# Patient Record
Sex: FEMALE | Race: BLACK OR AFRICAN AMERICAN | Employment: OTHER | URBAN - METROPOLITAN AREA
[De-identification: names, ages, dates, MRNs, and addresses within clinical notes are randomized per-mention and may not be internally consistent; named-entity substitution may affect disease eponyms.]

---

## 2017-01-23 RX ORDER — FUROSEMIDE 80 MG/1
TABLET ORAL
Qty: 45 TAB | Refills: 11 | Status: SHIPPED | OUTPATIENT
Start: 2017-01-23 | End: 2017-11-21 | Stop reason: SDUPTHER

## 2017-01-31 DIAGNOSIS — F41.9 CHRONIC ANXIETY: ICD-10-CM

## 2017-01-31 RX ORDER — CLONAZEPAM 1 MG/1
TABLET ORAL
Qty: 60 TAB | Refills: 3 | OUTPATIENT
Start: 2017-01-31 | End: 2017-02-02 | Stop reason: SDUPTHER

## 2017-02-02 DIAGNOSIS — F41.9 CHRONIC ANXIETY: ICD-10-CM

## 2017-02-02 RX ORDER — CLONAZEPAM 1 MG/1
TABLET ORAL
Qty: 60 TAB | Refills: 3 | OUTPATIENT
Start: 2017-02-02 | End: 2017-03-28

## 2017-02-13 ENCOUNTER — HOSPITAL ENCOUNTER (OUTPATIENT)
Dept: LAB | Age: 82
Discharge: HOME | End: 2017-02-13
Payer: MEDICARE

## 2017-02-13 ENCOUNTER — OFFICE VISIT (OUTPATIENT)
Dept: FAMILY MEDICINE CLINIC | Age: 82
End: 2017-02-13

## 2017-02-13 VITALS
SYSTOLIC BLOOD PRESSURE: 125 MMHG | OXYGEN SATURATION: 95 % | RESPIRATION RATE: 16 BRPM | HEIGHT: 60 IN | WEIGHT: 193.8 LBS | TEMPERATURE: 96.6 F | HEART RATE: 66 BPM | DIASTOLIC BLOOD PRESSURE: 86 MMHG | BODY MASS INDEX: 38.05 KG/M2

## 2017-02-13 DIAGNOSIS — I10 ESSENTIAL HYPERTENSION: Primary | Chronic | ICD-10-CM

## 2017-02-13 DIAGNOSIS — K21.9 GASTROESOPHAGEAL REFLUX DISEASE WITHOUT ESOPHAGITIS: Chronic | ICD-10-CM

## 2017-02-13 DIAGNOSIS — M15.9 PRIMARY OSTEOARTHRITIS INVOLVING MULTIPLE JOINTS: ICD-10-CM

## 2017-02-13 DIAGNOSIS — F41.9 ANXIETY: Chronic | ICD-10-CM

## 2017-02-13 DIAGNOSIS — J06.9 UPPER RESPIRATORY TRACT INFECTION, UNSPECIFIED TYPE: ICD-10-CM

## 2017-02-13 DIAGNOSIS — I25.10 ATHEROSCLEROSIS OF NATIVE CORONARY ARTERY OF NATIVE HEART WITHOUT ANGINA PECTORIS: ICD-10-CM

## 2017-02-13 DIAGNOSIS — I50.32 CHRONIC DIASTOLIC HEART FAILURE (HCC): ICD-10-CM

## 2017-02-13 DIAGNOSIS — Z51.81 ENCOUNTER FOR MEDICATION MONITORING: ICD-10-CM

## 2017-02-13 DIAGNOSIS — M47.815 SPONDYLOSIS OF THORACOLUMBAR REGION WITHOUT MYELOPATHY OR RADICULOPATHY: ICD-10-CM

## 2017-02-13 DIAGNOSIS — E78.2 MIXED HYPERLIPIDEMIA: ICD-10-CM

## 2017-02-13 PROCEDURE — 80048 BASIC METABOLIC PNL TOTAL CA: CPT

## 2017-02-13 PROCEDURE — 36415 COLL VENOUS BLD VENIPUNCTURE: CPT

## 2017-02-13 PROCEDURE — 80061 LIPID PANEL: CPT

## 2017-02-13 RX ORDER — DICLOFENAC SODIUM 75 MG/1
75 TABLET, DELAYED RELEASE ORAL
Qty: 30 TAB | Refills: 1 | Status: SHIPPED | OUTPATIENT
Start: 2017-02-13 | End: 2017-04-28

## 2017-02-13 NOTE — PROGRESS NOTES
Chief Complaint   Patient presents with    Hypertension     follow up    Cholesterol Problem     follow up    Nasal Congestion    Knee Pain     right knee     Pt states she was seen at [facility] 12/26/2016 and ws told she had a sinus infection and given prescription for antibiotic but only took for a couple of days because she was feeling better. Pt still c/o nasal congestion.      CMP 12/26/2016    Lipid 11/7/2016    Mammogram 7/16/2015

## 2017-02-13 NOTE — PROGRESS NOTES
HISTORY OF PRESENT ILLNESS  Eugene Kay is a 80 y.o. female. HPI   Follow up on chronic medical problems. C/o nasal congestion and cough for the past few weeks. Was treated at the end of December thru ER for sinus infection with Augmentin but never completed rx. Congestion got worse again after stopping the medication. days. Coughing up phlegm at times. No fever or chills noted. Has malaise. Throat is scratchy. Has post nasal drainage. No chest pain or SOB. No wheezing noted. Cardiovascular Review:  The patient has hypertension, hyperlipidemia, Chronic diastolic heart failure, ASCHD and obesity. Diet and Lifestyle: generally follows a low fat low cholesterol diet, generally follows a low sodium diet, sedentary. Pertinent ROS: taking medications as instructed, no medication side effects noted, no TIA's, no chest pain on exertion, no dyspnea on exertion, no swelling of ankles, no orthostatic dizziness or lightheadedness, no palpitations, no muscle aches or pain. Home BP Monitoring: is not measured at home. Pertinent ROS: taking medications as instructed  Osteoarthritis:  Patient has osteoarthritis. Overall doing better with back pain. Pain waxes and wanes. C/o b/l knee pain for the past several weeks. Knee pain comes and goes. No injury noted. Also having more pain in the left shoulder d/t her arthritis. Has not taken anything for the pain. Pain is 5-6/10. Symptoms onset: problem is longstanding. Rheumatological ROS: stable, mild-to-moderate joint symptoms intermittently, reasonably well controlled by PRN meds. Response to treatment plan: stable and intermittent. Anxiety Review:  Patient is seen for anxiety. Increase stress with family issues. Ongoing symptoms include:anxiety overall is better. Patient denies: palpitations, sweating, chest pain, shortness of breath. Reported side effects from the treatment: none.     Patient Active Problem List   Diagnosis Code  Hypokalemia E87.6    Hiatal hernia K44.9    Anxiety F41.9    S/P hysterectomy Z90.710    Spinal stenosis M48.00    S/P foot surgery Z98.890    Environmental allergies Z91.09    S/P cardiac catheterization Z98.890    Hypovitaminosis D E55.9    Chronic ischemic heart disease I25.9    Mixed hyperlipidemia E78.2    Chronic diastolic heart failure (HCC) I50.32    Acute respiratory failure (HCC) J96.00    Pneumonia, organism unspecified J18.9    Chest pain at rest R07.9    RBBB I45.10    Chest pain, unspecified R07.9    Essential hypertension I10    Gastroesophageal reflux disease without esophagitis K21.9    Atherosclerosis of native coronary artery without angina pectoris I25.10    Chronic anxiety F41.9    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 Take with food  Indications: OSTEOARTHRITIS 30 Tab 1    clonazePAM (KLONOPIN) 1 mg tablet TAKE 1/2 to 1 TABLET BY MOUTH IN THE MORNING as needed for anxiety AND TAKE 1 TABLET AT BEDTIME for sleep. 60 Tab 3    furosemide (LASIX) 80 mg tablet TAKE 1 TABLET BY MOUTH EVERY MORNING AND TAKE 1/2 TABLET EVERY EVENING 45 Tab 11    guaiFENesin ER (MUCINEX) 600 mg ER tablet Take 1 Tab by mouth two (2) times daily as needed for Congestion. 20 Tab 0    fluticasone (FLONASE) 50 mcg/actuation nasal spray 2 Sprays by Both Nostrils route daily. 1 Bottle 0    pravastatin (PRAVACHOL) 40 mg tablet Take 1 Tab by mouth nightly.  30 Tab 11    amLODIPine (NORVASC) 10 mg tablet TAKE 1/2 (ONE-HALF) TABLET BY MOUTH TWICE PER DAY 30 Tab 6    losartan (COZAAR) 100 mg tablet TAKE 1 TABLET BY MOUTH EVERY DAY 30 Tab 5    hydrocortisone (HYTONE) 2.5 % ointment Apply  to affected area two (2) times daily as needed.  triamcinolone acetonide (KENALOG) 0.1 % topical cream Apply  to affected area two (2) times daily as needed for Skin Irritation or Itching. 30 g 0    nitroglycerin (NITROSTAT) 0.4 mg SL tablet TAKE 1 TAB BY SUBLINGUAL ROUTE EVERY 5 MINUTES AS NEEDED. 25 Tab 1    omeprazole (PRILOSEC) 20 mg capsule TAKE ONE CAPSULE BY MOUTH EVERY MORNING 30 Cap 11    potassium chloride SR (KLOR-CON 10) 10 mEq tablet TAKE 2 TABLETS BY MOUTH EVERY DAY 60 Tab 11    metoprolol (LOPRESSOR) 25 mg tablet TAKE 1 TABLET BY MOUTH TWICE A DAY 60 Tab 11    isosorbide mononitrate ER (IMDUR) 30 mg tablet TAKE 1 TABLET EVERY DAY 30 Tab 11    cholecalciferol, vitamin D3, (VITAMIN D3) 2,000 unit tab Take 1 Tab by mouth daily.  desonide (DESOWEN) 0.05 % topical ointment Apply  to affected area two (2) times daily as needed for Skin Irritation.  econazole nitrate (SPECTAZOLE) 1 % topical cream Apply  to affected area two (2) times daily as needed. 15 g 0    acetaminophen (TYLENOL EXTRA STRENGTH) 500 mg tablet Take 1,000 mg by mouth every six (6) hours as needed for Pain.  Aspirin, Buffered 81 mg tab Take 81 mg by mouth daily.          Allergies   Allergen Reactions    Bactrim [Sulfamethoxazole-Trimethoprim] Unknown (comments)     Pt does not recall    Darvocet A500 [Propoxyphene N-Acetaminophen] Itching       Past Medical History   Diagnosis Date    Anxiety     Aortic stenosis 3/3/2010    Aortic stenosis     Arrhythmia      MURMUR    Arthritis      SPINAL STENOSIS    Atherosclerosis of coronary artery     CHF (congestive heart failure) ([facility] [address] 3/3/2010    CHF (congestive heart failure) ([facility] [address] 01/2002    Chronic heart failure ([facility] [address] 1/2002; 3/3/2010     chronic diastolic heart failure    Chronic pain      LOWER BACK, RIGHT KNEE    Environmental allergies 3/3/2010    GERD (gastroesophageal reflux disease) 3/3/2010    Hiatal hernia 3/3/2010    High cholesterol 3/3/2010    HTN (hypertension) 3/3/2010    Hypokalemia 3/3/2010    Ischemic heart disease, chronic     Obese     Psychiatric disorder      anxiety    S/P hysterectomy 3/3/2010    Spinal stenosis 3/3/2010       Past Surgical History   Procedure Laterality Date    Hx bunionectomy      Cardiac catheterization  01/2002     normal coronaries    Decompress disc rf lumbar  07/2007    Pr colonoscopy flx dx w/collj spec when pfrmd  [id number]     Dr Rigo Skinner Hx orthopaedic Bilateral      BUNIONECTOMY    Hx orthopaedic Right      WRIST FX    Hx bunionectomy      Hx heart catheterization  3/3/10    Hx hysterectomy  1978    Hx lumbar diskectomy      Hx breast lumpectomy Left 1989     benign left breast    Hx back surgery  5/1/2014    Hx other surgical  10/12/2015     mole removed from right side of face by Dr. Alesia Mckeon History   Problem Relation Age of Onset    Hypertension Mother     Heart Disease Father     Stroke Sister     Heart Disease Sister     Cancer Brother      LUNG    Cancer Brother      PROSTATE    Stroke Daughter 47    Anesth Problems Neg Hx        Social History   Substance Use Topics    Smoking status: Never Smoker    Smokeless tobacco: Never Used    Alcohol use No        Lab Results  Component Value Date/Time   WBC 5.0 12/26/2016 02:25 PM   WBC 5.2 05/07/2012 01:27 PM   HGB 12.8 12/26/2016 02:25 PM   HCT 38.9 12/26/2016 02:25 PM   PLATELET 994 46/34/5567 02:25 PM   MCV 88.0 12/26/2016 02:25 PM       Lab Results  Component Value Date/Time   Cholesterol, total 225 11/07/2016 12:21 PM   HDL Cholesterol 54 11/07/2016 12:21 PM   LDL, calculated 140 11/07/2016 12:21 PM   Triglyceride 155 11/07/2016 12:21 PM   CHOL/HDL Ratio 3.1 11/01/2010 05:26 PM       Lab Results   Component Value Date/Time    Sodium 138 12/26/2016 02:25 PM    Potassium 3.2 12/26/2016 02:25 PM    Chloride 101 12/26/2016 02:25 PM    CO2 32 12/26/2016 02:25 PM    Anion gap 5 12/26/2016 02:25 PM    Glucose 96 12/26/2016 02:25 PM    BUN 13 12/26/2016 02:25 PM    Creatinine 1.01 12/26/2016 02:25 PM    BUN/Creatinine ratio 13 12/26/2016 02:25 PM    GFR est AA >60 12/26/2016 02:25 PM    GFR est non-AA 53 12/26/2016 02:25 PM    Calcium 8.8 12/26/2016 02:25 PM    Bilirubin, total 0.5 12/26/2016 02:25 PM    ALT (SGPT) 18 12/26/2016 02:25 PM    AST (SGOT) 17 12/26/2016 02:25 PM    Alk. phosphatase 91 12/26/2016 02:25 PM    Protein, total 7.6 12/26/2016 02:25 PM    Albumin 3.3 12/26/2016 02:25 PM    Globulin 4.3 12/26/2016 02:25 PM    A-G Ratio 0.8 12/26/2016 02:25 PM      Lab Results   Component Value Date/Time    Hemoglobin A1c 5.4 04/16/2014 12:20 PM    Hemoglobin A1c 6.1 05/07/2012 01:27 PM    Hemoglobin A1c (POC) 5.5 11/26/2014 12:12 PM         Review of Systems   HENT: Positive for congestion. Respiratory: Positive for cough and sputum production. Musculoskeletal: Positive for back pain and joint pain. Endo/Heme/Allergies: Positive for environmental allergies. Physical Exam   Constitutional: She appears well-developed and well-nourished. /86 (BP 1 Location: Left arm, BP Patient Position: Sitting)  Pulse 66  Temp 96.6 °F (35.9 °C) (Oral)   Resp 16  Ht 5' (1.524 m)  Wt 193 lb 12.8 oz (87.9 kg)  LMP  (LMP Unknown)  SpO2 95%  BMI 37.85 kg/m2     HENT:   Right Ear: Tympanic membrane and ear canal normal.   Left Ear: Tympanic membrane and ear canal normal.   Nose: Mucosal edema and rhinorrhea present. Mouth/Throat: Mucous membranes are normal. Posterior oropharyngeal erythema present. No oropharyngeal exudate. Neck: Trachea normal, normal range of motion and full passive range of motion without pain. Neck supple. No edema present. No thyromegaly present. Cardiovascular: Normal rate and regular rhythm. No murmur heard. Pulmonary/Chest: Effort normal and breath sounds normal. She has no wheezes. She has no rales. Abdominal: Soft. Normal appearance and bowel sounds are normal. There is no tenderness.    Musculoskeletal: Normal range of motion. She exhibits no edema. Left shoulder: She exhibits tenderness, bony tenderness and pain. She exhibits normal range of motion, no spasm, normal pulse and normal strength. Right knee: She exhibits normal range of motion, no swelling and no effusion. Tenderness found. Medial joint line and lateral joint line tenderness noted. Left knee: She exhibits normal range of motion, no swelling and no effusion. Tenderness found. Medial joint line and lateral joint line tenderness noted. Lymphadenopathy:     She has no cervical adenopathy. Skin: Skin is warm and dry. Psychiatric: She has a normal mood and affect. Nursing note and vitals reviewed. ASSESSMENT and Sakshi Maddox was seen today for hypertension, cholesterol problem, nasal congestion and knee pain. Diagnoses and all orders for this visit:    Essential hypertension  Stable   -     METABOLIC PANEL, BASIC    Mixed hyperlipidemia  -     LIPID PANEL    Chronic diastolic heart failure (HCC)//  Atherosclerosis of native coronary artery of native heart without angina pectoris  aortic stenosis    Anxiety  Stable     Gastroesophageal reflux disease without esophagitis  Stable on prilosec. 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 Take with food  Indications: OSTEOARTHRITIS  Discussed GI side effects and to watch for increased with swelling. Instructions for exercises given and reviewed with pt. Pt also to use heat to the area 3-4 times a day over the next several days until sx are improved. Upper Respiratory Tract Infection  Has 1 week of the Augmentin left so can resume this and add mucinex.       Encounter for medication monitoring      Follow-up Disposition: 1 month  reviewed diet, exercise and weight control  cardiovascular risk and specific lipid/LDL goals reviewed  reviewed medications and side effects in detail  specific diabetic recommendations: low cholesterol diet, weight control and daily exercise discussed and glycohemoglobin and other lab monitoring discussed     I have discussed diagnosis listed in this note with pt and/or family. I have discussed treatment plans and options and the risk/benefit analysis of those options, including safe use of medications and possible medication side effects. Through the use of shared decision making we have agreed to the above plan. The patient has received an after-visit summary and questions were answered concerning future plans and follow up. Advise pt of any urgent changes then to proceed to the ER.

## 2017-02-13 NOTE — MR AVS SNAPSHOT
Visit Information Date & Time Provider Department Dept. Phone Encounter #  
 2/13/2017 10:45 AM Santa Benavides MD [facility] [phone] [id number] Your Appointments 2/22/2017  9:30 AM  
ESTABLISHED PATIENT with MD L[facility]) Appt Note: 6 MO. F/U  
 [address]  
[phone] [address]  
  
    
  
 3/28/2017 11:15 AM  
TRANSITIONAL CARE MANAGEMENT with Santa Benavides MD  
[facility] Appt Note: Medicare Wellness per Dr. Guille Pleitez [address] [id number] [phone] [address] [place] [address] [address] Upcoming Health Maintenance Date Due OSTEOPOROSIS SCREENING (DEXA) 7/31/2002 GLAUCOMA SCREENING Q2Y 6/15/2015 MEDICARE YEARLY EXAM 10/13/2016 COLONOSCOPY 10/13/2020 DTaP/Tdap/Td series (2 - Td) 7/25/2026 Allergies as of 2/13/2017  Review Complete On: 2/13/2017 By: Shadia Self LPN Severity Noted Reaction Type Reactions Bactrim [Sulfamethoxazole-trimethoprim]  03/29/2010    Unknown (comments) Pt does not recall Darvocet A500 [Propoxyphene N-acetaminophen]  11/01/2010    Itching Current Immunizations  Reviewed on 2/13/2017 Name Date Influenza High Dose Vaccine PF 11/7/2016, 10/13/2015, 11/26/2014 Influenza Vaccine 12/31/2013 Influenza Vaccine Split 10/9/2012, 11/1/2011, 11/1/2010 Pneumococcal Vaccine (Unspecified Type) 1/1/2008 Reviewed by Santa Benavides MD on 2/13/2017 at 11:55 AM  
You Were Diagnosed With   
  
 Codes Comments Essential hypertension    -  Primary ICD-10-CM: I10 
ICD-9-CM: 401.9 Mixed hyperlipidemia     ICD-10-CM: E78.2 ICD-9-CM: 272.2 Primary osteoarthritis involving multiple joints     ICD-10-CM: M15.0 ICD-9-CM: 715.09 Vitals BP Pulse Temp Resp Height(growth percentile) Weight(growth percentile) 125/86 (BP 1 Location: Left arm, BP Patient Position: Sitting) 66 96.6 °F (35.9 °C) (Oral) 16 5' (1.524 m) 193 lb 12.8 oz (87.9 kg) LMP SpO2 BMI OB Status Smoking Status (LMP Unknown) 95% 37.85 kg/m2 Hysterectomy Never Smoker Vitals History BMI and BSA Data Body Mass Index Body Surface Area  
 37.85 kg/m 2 1.93 m 2 Preferred Pharmacy Pharmacy Name Phone [id number]- [place], [place] - [address] [phone] Your Updated Medication List  
  
   
This list is accurate as of: 2/13/17 12:08 PM.  Always use your most recent med list. amLODIPine 10 mg tablet Commonly known as:  David Ruelas TAKE 1/2 (ONE-HALF) TABLET BY MOUTH TWICE PER DAY  
  
 aspirin, buffered 81 mg Tab Take 81 mg by mouth daily. clonazePAM 1 mg tablet Commonly known as:  Shoshana Adele 1/2 to 1 TABLET BY MOUTH IN THE MORNING as needed for anxiety AND TAKE 1 TABLET AT BEDTIME for sleep. desonide 0.05 % topical ointment Commonly known as:  [place] Apply  to affected area two (2) times daily as needed for Skin Irritation. diclofenac EC 75 mg EC tablet Commonly known as:  VOLTAREN Take 1 Tab by mouth two (2) times daily as needed. Take with food  Indications: OSTEOARTHRITIS  
  
 econazole nitrate 1 % topical cream  
Commonly known as:  Pankaj Alcala Apply  to affected area two (2) times daily as needed. fluticasone 50 mcg/actuation nasal spray Commonly known as:  [place] 2 Sprays by Both Nostrils route daily. furosemide 80 mg tablet Commonly known as:  LASIX TAKE 1 TABLET BY MOUTH EVERY MORNING AND TAKE 1/2 TABLET EVERY EVENING  
  
 guaiFENesin  mg ER tablet Commonly known as:  Miguelito & Miguelito Take 1 Tab by mouth two (2) times daily as needed for Congestion. hydrocortisone 2.5 % ointment Commonly known as:  HYTONE Apply  to affected area two (2) times daily as needed. isosorbide mononitrate ER 30 mg tablet Commonly known as:  IMDUR  
TAKE 1 TABLET EVERY DAY  
  
 losartan 100 mg tablet Commonly known as:  COZAAR  
TAKE 1 TABLET BY MOUTH EVERY DAY  
  
 metoprolol tartrate 25 mg tablet Commonly known as:  LOPRESSOR  
TAKE 1 TABLET BY MOUTH TWICE A DAY  
  
 nitroglycerin 0.4 mg SL tablet Commonly known as:  NITROSTAT  
TAKE 1 TAB BY SUBLINGUAL ROUTE EVERY 5 MINUTES AS NEEDED. omeprazole 20 mg capsule Commonly known as:  PRILOSEC  
TAKE ONE CAPSULE BY MOUTH EVERY MORNING  
  
 potassium chloride SR 10 mEq tablet Commonly known as:  KLOR-CON 10  
TAKE 2 TABLETS BY MOUTH EVERY DAY  
  
 pravastatin 40 mg tablet Commonly known as:  PRAVACHOL Take 1 Tab by mouth nightly. triamcinolone acetonide 0.1 % topical cream  
Commonly known as:  KENALOG Apply  to affected area two (2) times daily as needed for Skin Irritation or Itching. TYLENOL EXTRA STRENGTH 500 mg tablet Generic drug:  acetaminophen Take 1,000 mg by mouth every six (6) hours as needed for Pain. VITAMIN D3 2,000 unit Tab Generic drug:  cholecalciferol (vitamin D3) Take 1 Tab by mouth daily. Prescriptions Sent to Pharmacy Refills  
 diclofenac EC (VOLTAREN) 75 mg EC tablet 1 Sig: Take 1 Tab by mouth two (2) times daily as needed. Take with food  Indications: OSTEOARTHRITIS Class: Normal  
 Pharmacy: [facility]/pharmacy [address] [place] [address] [address] Ph #: [phone] Route: Oral  
  
We Performed the Following LIPID PANEL [42047 CPT(R)] METABOLIC PANEL, BASIC [72685 CPT(R)] To-Do List   
 03/13/2017 9:50 AM  
  Appointment with [facility] at [facility] ([phone]) Shower or bathe using soap and water. Do not use deodorant, powder, perfumes, or lotion the day of your exam.  If your prior mammograms were not performed at [facility] 6 please bring films with you or forward prior images 2 days before your procedure. Check in at registration 15min before your appointment time unless you were instructed to do otherwise. A script is not necessary, but if you have one, please bring it on the day of the mammogram or have it faxed to the department. [facility] [phone] [facility]  [phone] [facility] [address] [place]  [phone]  [phone] [address] [phone] [facility]! Dear Harmony Dunn: 
Thank you for requesting a [employer] account. Our records indicate that you already have an active [employer] account. You can access your account anytime at [email or web address] Did you know that you can access your hospital and ER discharge instructions at any time in [employer]? You can also review all of your test results from your hospital stay or ER visit. Additional Information If you have questions, please visit the Frequently Asked Questions section of the [employer] website at [email or web address] Remember, [employer] is NOT to be used for urgent needs. For medical emergencies, dial 911. Now available from your iPhone and Android! Please provide this summary of care documentation to your next provider. Your primary care clinician is listed as Thai Piper. If you have any questions after today's visit, please call [phone].

## 2017-02-14 LAB
BUN SERPL-MCNC: 10 MG/DL (ref 8–27)
BUN/CREAT SERPL: 11 (ref 11–26)
CALCIUM SERPL-MCNC: 9.2 MG/DL (ref 8.7–10.3)
CHLORIDE SERPL-SCNC: 99 MMOL/L (ref 96–106)
CHOLEST SERPL-MCNC: 194 MG/DL (ref 100–199)
CO2 SERPL-SCNC: 31 MMOL/L (ref 18–29)
CREAT SERPL-MCNC: 0.95 MG/DL (ref 0.57–1)
GLUCOSE SERPL-MCNC: 94 MG/DL (ref 65–99)
HDLC SERPL-MCNC: 50 MG/DL
INTERPRETATION, 910389: NORMAL
INTERPRETATION: NORMAL
LDLC SERPL CALC-MCNC: 109 MG/DL (ref 0–99)
PDF IMAGE, 910387: NORMAL
POTASSIUM SERPL-SCNC: 3.7 MMOL/L (ref 3.5–5.2)
SODIUM SERPL-SCNC: 143 MMOL/L (ref 134–144)
TRIGL SERPL-MCNC: 177 MG/DL (ref 0–149)
VLDLC SERPL CALC-MCNC: 35 MG/DL (ref 5–40)

## 2017-02-16 ENCOUNTER — TELEPHONE (OUTPATIENT)
Dept: FAMILY MEDICINE CLINIC | Age: 82
End: 2017-02-16

## 2017-02-16 NOTE — TELEPHONE ENCOUNTER
Kar Casillas calling from [facility] [phone], he needs to knee brace form faxed over to [phone].

## 2017-02-22 ENCOUNTER — OFFICE VISIT (OUTPATIENT)
Dept: CARDIOLOGY CLINIC | Age: 82
End: 2017-02-22

## 2017-02-22 VITALS
SYSTOLIC BLOOD PRESSURE: 172 MMHG | OXYGEN SATURATION: 93 % | HEART RATE: 65 BPM | DIASTOLIC BLOOD PRESSURE: 88 MMHG | WEIGHT: 192.8 LBS | BODY MASS INDEX: 37.85 KG/M2 | HEIGHT: 60 IN

## 2017-02-22 DIAGNOSIS — I25.9 CHRONIC ISCHEMIC HEART DISEASE: ICD-10-CM

## 2017-02-22 DIAGNOSIS — E78.2 MIXED HYPERLIPIDEMIA: ICD-10-CM

## 2017-02-22 DIAGNOSIS — F41.9 CHRONIC ANXIETY: ICD-10-CM

## 2017-02-22 DIAGNOSIS — G47.20 CIRCADIAN RHYTHM SLEEP DISORDER: ICD-10-CM

## 2017-02-22 DIAGNOSIS — I50.32 CHRONIC DIASTOLIC HEART FAILURE (HCC): ICD-10-CM

## 2017-02-22 DIAGNOSIS — R07.9 CHEST PAIN AT REST: ICD-10-CM

## 2017-02-22 DIAGNOSIS — M48.00 SPINAL STENOSIS, UNSPECIFIED SPINAL REGION: ICD-10-CM

## 2017-02-22 DIAGNOSIS — I10 ESSENTIAL HYPERTENSION: Primary | ICD-10-CM

## 2017-02-22 NOTE — MR AVS SNAPSHOT
Visit Information Date & Time Provider Department Dept. Phone Encounter #  
 2/22/2017  9:30 AM MD Lirbado [facility] [phone] [id number] Your Appointments 8/23/2017  9:30 AM  
ESTABLISHED PATIENT with MD Librado Garcia [facility]) Appt Note: 6 MO. F/U  
 [address]  
[phone] [address]  
  
    
  
 3/28/2017 11:15 AM  
TRANSITIONAL CARE MANAGEMENT with Clarke Flores MD  
[facility] [address]) Appt Note: Medicare Wellness per Dr. Mcallister [address] [place] [address] Drive [facility] [id number] [phone] [address] [facility] [address] Upcoming Health Maintenance Date Due OSTEOPOROSIS SCREENING (DEXA) 7/31/2002 GLAUCOMA SCREENING Q2Y 6/15/2015 MEDICARE YEARLY EXAM 10/13/2016 COLONOSCOPY 10/13/2020 DTaP/Tdap/Td series (2 - Td) 7/25/2026 Allergies as of 2/22/2017  Review Complete On: 2/22/2017 By: Otto Grigsby Severity Noted Reaction Type Reactions Bactrim [Sulfamethoxazole-trimethoprim]  03/29/2010    Unknown (comments) Pt does not recall Darvocet A500 [Propoxyphene N-acetaminophen]  11/01/2010    Itching Current Immunizations  Reviewed on 2/13/2017 Name Date Influenza High Dose Vaccine PF 11/7/2016, 10/13/2015, 11/26/2014 Influenza Vaccine 12/31/2013 Influenza Vaccine Split 10/9/2012, 11/1/2011, 11/1/2010 Pneumococcal Vaccine (Unspecified Type) 1/1/2008 Not reviewed this visit You Were Diagnosed With   
  
 Codes Comments Essential hypertension    -  Primary ICD-10-CM: I10 
ICD-9-CM: 401.9 Chronic diastolic heart failure (HCC)     ICD-10-CM: I50.32 
ICD-9-CM: 428.32 Chest pain, unspecified     ICD-10-CM: R07.9 ICD-9-CM: 786.50 Chest pain at rest     ICD-10-CM: R07.9 ICD-9-CM: 786.50 Vitals BP  
  
  
  
  
  
 172/88 Vitals History BMI and BSA Data Body Mass Index Body Surface Area  
 37.65 kg/m 2 1.92 m 2 Preferred Pharmacy Pharmacy Name Phone [id number] [place], [place] - [address] [phone] Your Updated Medication List  
  
   
This list is accurate as of: 2/22/17 11:43 AM.  Always use your most recent med list. amLODIPine 10 mg tablet Commonly known as:  Mckinley Smith TAKE 1/2 (ONE-HALF) TABLET BY MOUTH TWICE PER DAY  
  
 aspirin, buffered 81 mg Tab Take 81 mg by mouth daily. clonazePAM 1 mg tablet Commonly known as:  [place] 1/2 to 1 TABLET BY MOUTH IN THE MORNING as needed for anxiety AND TAKE 1 TABLET AT BEDTIME for sleep. desonide 0.05 % topical ointment Commonly known as:  Filomena Gaxiola Apply  to affected area two (2) times daily as needed for Skin Irritation. diclofenac EC 75 mg EC tablet Commonly known as:  VOLTAREN Take 1 Tab by mouth two (2) times daily as needed. Take with food  Indications: OSTEOARTHRITIS  
  
 econazole nitrate 1 % topical cream  
Commonly known as:  Sean House Apply  to affected area two (2) times daily as needed. fluticasone 50 mcg/actuation nasal spray Commonly known as:  Silvamanisha Simpson 2 Sprays by Both Nostrils route daily. furosemide 80 mg tablet Commonly known as:  LASIX TAKE 1 TABLET BY MOUTH EVERY MORNING AND TAKE 1/2 TABLET EVERY EVENING  
  
 guaiFENesin  mg ER tablet Commonly known as:  Miguelito & Miguelito Take 1 Tab by mouth two (2) times daily as needed for Congestion. hydrocortisone 2.5 % ointment Commonly known as:  HYTONE Apply  to affected area two (2) times daily as needed. isosorbide mononitrate ER 30 mg tablet Commonly known as:  IMDUR  
TAKE 1 TABLET EVERY DAY  
  
 losartan 100 mg tablet Commonly known as:  COZAAR  
TAKE 1 TABLET BY MOUTH EVERY DAY  
  
 metoprolol tartrate 25 mg tablet Commonly known as:  LOPRESSOR  
TAKE 1 TABLET BY MOUTH TWICE A DAY  
  
 nitroglycerin 0.4 mg SL tablet Commonly known as:  NITROSTAT  
TAKE 1 TAB BY SUBLINGUAL ROUTE EVERY 5 MINUTES AS NEEDED. omeprazole 20 mg capsule Commonly known as:  PRILOSEC  
TAKE ONE CAPSULE BY MOUTH EVERY MORNING  
  
 potassium chloride SR 10 mEq tablet Commonly known as:  KLOR-CON 10  
TAKE 2 TABLETS BY MOUTH EVERY DAY  
  
 pravastatin 40 mg tablet Commonly known as:  PRAVACHOL Take 1 Tab by mouth nightly. triamcinolone acetonide 0.1 % topical cream  
Commonly known as:  KENALOG Apply  to affected area two (2) times daily as needed for Skin Irritation or Itching. TYLENOL EXTRA STRENGTH 500 mg tablet Generic drug:  acetaminophen Take 1,000 mg by mouth every six (6) hours as needed for Pain. VITAMIN D3 2,000 unit Tab Generic drug:  cholecalciferol (vitamin D3) Take 1 Tab by mouth daily. We Performed the Following AMB POC EKG ROUTINE W/ 12 LEADS, INTER & REP [93028 CPT(R)] To-Do List   
 03/13/2017 9:50 AM  
  Appointment with [facility] 1 at [facility] ([phone]) Shower or bathe using soap and water. Do not use deodorant, powder, perfumes, or lotion the day of your exam.  If your prior mammograms were not performed at [facility] 6 please bring films with you or forward prior images 2 days before your procedure. Check in at registration 15min before your appointment time unless you were instructed to do otherwise. A script is not necessary, but if you have one, please bring it on the day of the mammogram or have it faxed to the department. [facility] [phone] [facility]  [phone] [place] [address] MG  [phone] [facility] [phone] [place] [id number] [facility] [phone] Patient Instructions -- Follow up 6 months Heart-Healthy Diet: Care Instructions Your Care Instructions A heart-healthy diet has lots of vegetables, fruits, nuts, beans, and whole grains, and is low in salt. It limits foods that are high in saturated fat, such as meats, cheeses, and fried foods. It may be hard to change your diet, but even small changes can lower your risk of heart attack and heart disease. Follow-up care is a key part of your treatment and safety. Be sure to make and go to all appointments, and call your doctor if you are having problems. It's also a good idea to know your test results and keep a list of the medicines you take. How can you care for yourself at home? Watch your portions · Learn what a serving is. A \"serving\" and a \"portion\" are not always the same thing. Make sure that you are not eating larger portions than are recommended. For example, a serving of pasta is ½ cup. A serving size of meat is 2 to 3 ounces. A 3-ounce serving is about the size of a deck of cards. Measure serving sizes until you are good at [place]" them. Keep in mind that restaurants often serve portions that are 2 or 3 times the size of one serving. · To keep your energy level up and keep you from feeling hungry, eat often but in smaller portions. · Eat only the number of calories you need to stay at a healthy weight. If you need to lose weight, eat fewer calories than your body burns (through exercise and other physical activity). Eat more fruits and vegetables · Eat a variety of fruit and vegetables every day. Dark green, deep orange, red, or yellow fruits and vegetables are especially good for you. Examples include spinach, carrots, peaches, and berries. · Keep carrots, celery, and other veggies handy for snacks. Buy fruit that is in season and store it where you can see it so that you will be tempted to eat it. · Cook dishes that have a lot of veggies in them, such as stir-fries and soups. Limit saturated and trans fat · Read food labels, and try to avoid saturated and trans fats. They increase your risk of heart disease. Trans fat is found in many processed foods such as cookies and crackers. · Use olive or canola oil when you cook. Try cholesterol-lowering spreads, such as Benecol or Take Control. · Bake, broil, grill, or steam foods instead of frying them. · Choose lean meats instead of high-fat meats such as hot dogs and sausages. Cut off all visible fat when you prepare meat. · Eat fish, skinless poultry, and meat alternatives such as soy products instead of high-fat meats. Soy products, such as tofu, may be especially good for your heart. · Choose low-fat or fat-free milk and dairy products. Eat fish · Eat at least two servings of fish a week. Certain fish, such as salmon and tuna, contain omega-3 fatty acids, which may help reduce your risk of heart attack. Eat foods high in fiber · Eat a variety of grain products every day. Include whole-grain foods that have lots of fiber and nutrients. Examples of whole-grain foods include oats, whole wheat bread, and brown rice. · Buy whole-grain breads and cereals, instead of white bread or pastries. Limit salt and sodium · Limit how much salt and sodium you eat to help lower your blood pressure. · Taste food before you salt it. Add only a little salt when you think you need it. With time, your taste buds will adjust to less salt. · Eat fewer snack items, fast foods, and other high-salt, processed foods. Check food labels for the amount of sodium in packaged foods. · Choose low-sodium versions of canned goods (such as soups, vegetables, and beans). Limit sugar · Limit drinks and foods with added sugar. These include candy, desserts, and soda pop. Limit alcohol · Limit alcohol to no more than 2 drinks a day for men and 1 drink a day for women. Too much alcohol can cause health problems. When should you call for help? Watch closely for changes in your health, and be sure to contact your doctor if: 
· You would like help planning heart-healthy meals. Where can you learn more? Go to http://anderson-jesús.info/. Enter V137 in the search box to learn more about \"Heart-Healthy Diet: Care Instructions. \" Current as of: January 27, 2016 Content Version: 11.1 © 8560-3817 [employer]. Care instructions adapted under license by [employer] (which disclaims liability or warranty for this information). If you have questions about a medical condition or this instruction, always ask your healthcare professional. [address] any warranty or liability for your use of this information. Introducing [facility] & HEALTH SERVICES! Dear Yosef Jay: 
Thank you for requesting a [employer] account. Our records indicate that you already have an active [employer] account. You can access your account anytime at [email or web address] Did you know that you can access your hospital and ER discharge instructions at any time in [employer]? You can also review all of your test results from your hospital stay or ER visit. Additional Information If you have questions, please visit the Frequently Asked Questions section of the [employer] website at [email or web address] Remember, [employer] is NOT to be used for urgent needs. For medical emergencies, dial 911. Now available from your iPhone and Android! Please provide this summary of care documentation to your next provider. Your primary care clinician is listed as Yury Munguia. If you have any questions after today's visit, please call [phone].

## 2017-02-22 NOTE — PROGRESS NOTES
[facility]   [address] [facility], [address] [place] [address] Road                                          NEW PATIENT HPI/FOLLOW-UP      NAME:  Whitney Peterson   :   1935   MRN:   [id number]   PCP:  Michelle Seth MD           Subjective: The patient is a 80y.o. year old female  who returns for a routine follow-up. Since the last visit, patient reports no new cardiac symptoms. States she is suffering from a sinus infection with congestion and HA - being treated and her knee arthritis - also being treated; this is her main complaint today. There is some edema but she notes this is chronic as well. She denies change in exercise tolerance - notes stable RICHARD \"if I move too fast\", denies chest pain, medication intolerance, palpitations, shortness of breath, PND/orthopneam wheezing, sputum, syncope, dizziness or light headedness. Doing satisfactorily. Review of Systems  General: Pt denies excessive weight gain or loss. Pt is able to conduct ADL's. Respiratory:  +RICHARD, nasal/sinus congestion Denies shortness of breath, wheezing or stridor.   Cardiovascular: +edema Denies precordial pain, palpitations, or PND  Gastrointestinal: Denies poor appetite, indigestion, abdominal pain or blood in stool  Peripheral vascular: Denies claudication, leg cramps  Neuropsychiatric: +HA, Denies paresthesias,tingling,numbness,anxiety,depression,fatigue  Musculoskeletal: Denies pain,tenderness, soreness,swelling      Past Medical History:   Diagnosis Date    Anxiety     Aortic stenosis 3/3/2010    Aortic stenosis     Arrhythmia     MURMUR    Arthritis     SPINAL STENOSIS    Atherosclerosis of coronary artery     CHF (congestive heart failure) ([address] 3/3/2010    CHF (congestive heart failure) ([address] 2002    Chronic heart failure ([address] 2002; 3/3/2010    chronic diastolic heart failure    Chronic pain     LOWER BACK, RIGHT KNEE    Environmental allergies 3/3/2010    GERD (gastroesophageal reflux disease) 3/3/2010    Hiatal hernia 3/3/2010    High cholesterol 3/3/2010    HTN (hypertension) 3/3/2010    Hypokalemia 3/3/2010    Ischemic heart disease, chronic     Obese     Psychiatric disorder     anxiety    S/P hysterectomy 3/3/2010    Spinal stenosis 3/3/2010     Patient Active Problem List    Diagnosis Date Noted    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 02/12/2016    Encounter for medication monitoring 11/29/2015    Sleep - wake disorder 08/25/2015    Essential hypertension 07/12/2015    Gastroesophageal reflux disease without esophagitis 07/12/2015    Atherosclerosis of native coronary artery without angina pectoris 07/12/2015    Chronic anxiety 07/12/2015    Chest pain, unspecified 02/24/2014    Chest pain at rest 02/20/2014    RBBB 02/20/2014    Acute respiratory failure ([facility] 04/02/2013    Pneumonia, organism unspecified 04/02/2013    Chronic ischemic heart disease 05/29/2012    Mixed hyperlipidemia 05/29/2012    Chronic diastolic heart failure ([facility] 05/29/2012    Hypovitaminosis D 07/28/2010    Hypokalemia 03/03/2010    Hiatal hernia 03/03/2010    Anxiety 03/03/2010    S/P hysterectomy 03/03/2010    Spinal stenosis 03/03/2010    S/P foot surgery 03/03/2010    Environmental allergies 03/03/2010    S/P cardiac catheterization 03/03/2010      Past Surgical History:   Procedure Laterality Date    CARDIAC CATHETERIZATION  01/2002    normal coronaries    DECOMPRESS DISC RF LUMBAR  07/2007    HX BACK SURGERY  5/1/2014    HX BREAST LUMPECTOMY Left 1989    benign left breast    HX BUNIONECTOMY      HX BUNIONECTOMY      HX HEART CATHETERIZATION  3/3/10    HX HYSTERECTOMY  1978    HX LUMBAR DISKECTOMY      HX ORTHOPAEDIC Bilateral     BUNIONECTOMY    HX ORTHOPAEDIC Right     WRIST FX    HX OTHER SURGICAL  10/12/2015    mole removed from right side of face by Dr. Atlee Litten [facility] [address] [facility]  [id number] Dr Lakhwinder Sainz     Allergies   Allergen Reactions    Bactrim [Sulfamethoxazole-Trimethoprim] Unknown (comments)     Pt does not recall    Darvocet A500 [Propoxyphene N-Acetaminophen] Itching      Family History   Problem Relation Age of Onset    Hypertension Mother     Heart Disease Father     Stroke Sister     Heart Disease Sister     Cancer Brother      LUNG    Cancer Brother      PROSTATE    Stroke Daughter 47    Anesth Problems Neg Hx       Social History     Social History    Marital status:      Spouse name: N/A    Number of children: N/A    Years of education: N/A     Occupational History    Not on file. Social History Main Topics    Smoking status: Never Smoker    Smokeless tobacco: Never Used    Alcohol use No    Drug use: No    Sexual activity: Not Currently     Other Topics Concern    Not on file     Social History Narrative      Current Outpatient Prescriptions   Medication Sig    diclofenac EC (VOLTAREN) 75 mg EC tablet Take 1 Tab by mouth two (2) times daily as needed. Take with food  Indications: OSTEOARTHRITIS    clonazePAM (KLONOPIN) 1 mg tablet TAKE 1/2 to 1 TABLET BY MOUTH IN THE MORNING as needed for anxiety AND TAKE 1 TABLET AT BEDTIME for sleep.  furosemide (LASIX) 80 mg tablet TAKE 1 TABLET BY MOUTH EVERY MORNING AND TAKE 1/2 TABLET EVERY EVENING    guaiFENesin ER (MUCINEX) 600 mg ER tablet Take 1 Tab by mouth two (2) times daily as needed for Congestion.  fluticasone (FLONASE) 50 mcg/actuation nasal spray 2 Sprays by Both Nostrils route daily.  pravastatin (PRAVACHOL) 40 mg tablet Take 1 Tab by mouth nightly.  amLODIPine (NORVASC) 10 mg tablet TAKE 1/2 (ONE-HALF) TABLET BY MOUTH TWICE PER DAY    losartan (COZAAR) 100 mg tablet TAKE 1 TABLET BY MOUTH EVERY DAY    hydrocortisone (HYTONE) 2.5 % ointment Apply  to affected area two (2) times daily as needed.     triamcinolone acetonide (KENALOG) 0.1 % topical cream Apply  to affected area two (2) times daily as needed for Skin Irritation or Itching.  nitroglycerin (NITROSTAT) 0.4 mg SL tablet TAKE 1 TAB BY SUBLINGUAL ROUTE EVERY 5 MINUTES AS NEEDED.  omeprazole (PRILOSEC) 20 mg capsule TAKE ONE CAPSULE BY MOUTH EVERY MORNING    potassium chloride SR (KLOR-CON 10) 10 mEq tablet TAKE 2 TABLETS BY MOUTH EVERY DAY    metoprolol (LOPRESSOR) 25 mg tablet TAKE 1 TABLET BY MOUTH TWICE A DAY    isosorbide mononitrate ER (IMDUR) 30 mg tablet TAKE 1 TABLET EVERY DAY    cholecalciferol, vitamin D3, (VITAMIN D3) 2,000 unit tab Take 1 Tab by mouth daily.  desonide (DESOWEN) 0.05 % topical ointment Apply  to affected area two (2) times daily as needed for Skin Irritation.  econazole nitrate (SPECTAZOLE) 1 % topical cream Apply  to affected area two (2) times daily as needed.  acetaminophen (TYLENOL EXTRA STRENGTH) 500 mg tablet Take 1,000 mg by mouth every six (6) hours as needed for Pain.  Aspirin, Buffered 81 mg tab Take 81 mg by mouth daily. No current facility-administered medications for this visit. I have reviewed the MAs notes, vitals, problem list, allergy list, medical history, family medical, social history and medications. Objective:     Physical Exam:     Vitals:    02/22/17 1039   BP: 172/88   Pulse: 65   SpO2: 93%   Weight: 192 lb 12.8 oz (87.5 kg)   Height: 5' (1.524 m)    Body mass index is 37.65 kg/(m^2). General: WDWN, in no acute distress. Pleasant affect. HEENT: No carotid bruits, no JVD, trach is midline. Heart:  Normal S1/S2 negative S3 or S4. Regular, no murmur, gallop or rub.   Respiratory: Clear bilaterally, no wheezing or rales  Abdomen:   Soft, non-tender, bowel sounds are active.   Extremities:  +LE edema L>R, normal cap refill, no cyanosis. +TTP right knee  Neuro: A&Ox3, speech clear, gait stable. Skin: Skin color is normal. No rashes; +hyperpigmented papular lesions on face. No diaphoresis.   Vascular: 2+ pulses symmetric in all extremities      Data Review:       Cardiographics:    EKG: NSR,RBBB    Cardiology Labs:    Results for orders placed or performed during the hospital encounter of 05/28/14   EKG, 12 LEAD, INITIAL   Result Value Ref Range    Ventricular Rate 98 BPM    Atrial Rate 98 BPM    P-R Interval 196 ms    QRS Duration 118 ms    Q-T Interval 356 ms    QTC Calculation (Bezet) 454 ms    Calculated P Axis 69 degrees    Calculated R Axis -32 degrees    Calculated T Axis 20 degrees    Diagnosis       Normal sinus rhythm  Left axis deviation  Left ventricular hypertrophy with QRS widening  Right bundle branch block  When compared with ECG of 02-MAY-2014 08:09,  Vent. rate has increased BY  35 BPM  Confirmed by Nicci Franco ([id number]) on 5/28/2014 3:32:37 PM       Lab Results   Component Value Date/Time    Cholesterol, total 194 02/13/2017 12:17 PM    HDL Cholesterol 50 02/13/2017 12:17 PM    LDL, calculated 109 02/13/2017 12:17 PM    Triglyceride 177 02/13/2017 12:17 PM    CHOL/HDL Ratio 3.1 11/01/2010 05:26 PM       Lab Results   Component Value Date/Time    Sodium 143 02/13/2017 12:17 PM    Potassium 3.7 02/13/2017 12:17 PM    Chloride 99 02/13/2017 12:17 PM    CO2 31 02/13/2017 12:17 PM    Anion gap 5 12/26/2016 02:25 PM    Glucose 94 02/13/2017 12:17 PM    BUN 10 02/13/2017 12:17 PM    Creatinine 0.95 02/13/2017 12:17 PM    BUN/Creatinine ratio 11 02/13/2017 12:17 PM    GFR est AA 66 02/13/2017 12:17 PM    GFR est non-AA 57 02/13/2017 12:17 PM    Calcium 9.2 02/13/2017 12:17 PM    Bilirubin, total 0.5 12/26/2016 02:25 PM    AST (SGOT) 17 12/26/2016 02:25 PM    Alk. phosphatase 91 12/26/2016 02:25 PM    Protein, total 7.6 12/26/2016 02:25 PM    Albumin 3.3 12/26/2016 02:25 PM    Globulin 4.3 12/26/2016 02:25 PM    A-G Ratio 0.8 12/26/2016 02:25 PM    ALT (SGPT) 18 12/26/2016 02:25 PM          Assessment:       ICD-10-CM ICD-9-CM    1. Essential hypertension I10 401.9 AMB POC EKG ROUTINE W/ 12 LEADS, INTER & REP   2.  Chronic diastolic heart failure ([facility] I50.32 428.32 AMB POC EKG ROUTINE W/ 12 LEADS, INTER & REP   3. Chest pain at rest R07.9 786.50 AMB POC EKG ROUTINE W/ 12 LEADS, INTER & REP   4. Chronic ischemic heart disease I25.9 414.9    5. Mixed hyperlipidemia E78.2 272.2    6. 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 Chronic anxiety F41.9 300.00    8. Spinal stenosis, unspecified spinal region M48.00 724.00          Discussion: Patient presents at this time stable from a cardiac perspective. Pleased with present cardiac status. Plan: 1. Continue same meds. Lipid profile and labs followed by PCP, Dr. Wellington Starkey. 2.Encouraged to exercise to tolerance, lose weight and follow low fat, low cholesterol, low sodium predominantly Plant-based (consider Mediterranean) diet. Call with questions or concerns. Will follow up any test results by phone and/or f/u here in office if needed. Swetha Arellano 3.Follow up: 6 months    I have discussed the diagnosis with the patient and the intended plan as seen in the above orders. The patient has received an after-visit summary and questions were answered concerning future plans. I have discussed any concerning medication side effects and warnings with the patient as well. Patient seen and examined. All pertinent data reviewed. I have reviewed detailed note as outlined by Jimbo Zamora. Case discussed with Nursing/medical assistant staff and Jimbo Zamora. Patient with mildly elevated BP due to diet self-indulgence. Advised to follow low salt diet. Plans as outlined.     Fredi Arthur PA-C  2/22/2017     BONY Bagley

## 2017-02-22 NOTE — PATIENT INSTRUCTIONS
-- Follow up 6 months     Heart-Healthy Diet: Care Instructions  Your Care Instructions    A heart-healthy diet has lots of vegetables, fruits, nuts, beans, and whole grains, and is low in salt. It limits foods that are high in saturated fat, such as meats, cheeses, and fried foods. It may be hard to change your diet, but even small changes can lower your risk of heart attack and heart disease. Follow-up care is a key part of your treatment and safety. Be sure to make and go to all appointments, and call your doctor if you are having problems. It's also a good idea to know your test results and keep a list of the medicines you take. How can you care for yourself at home? Watch your portions  · Learn what a serving is. A \"serving\" and a \"portion\" are not always the same thing. Make sure that you are not eating larger portions than are recommended. For example, a serving of pasta is ½ cup. A serving size of meat is 2 to 3 ounces. A 3-ounce serving is about the size of a deck of cards. Measure serving sizes until you are good at [place]" them. Keep in mind that restaurants often serve portions that are 2 or 3 times the size of one serving. · To keep your energy level up and keep you from feeling hungry, eat often but in smaller portions. · Eat only the number of calories you need to stay at a healthy weight. If you need to lose weight, eat fewer calories than your body burns (through exercise and other physical activity). Eat more fruits and vegetables  · Eat a variety of fruit and vegetables every day. Dark green, deep orange, red, or yellow fruits and vegetables are especially good for you. Examples include spinach, carrots, peaches, and berries. · Keep carrots, celery, and other veggies handy for snacks. Buy fruit that is in season and store it where you can see it so that you will be tempted to eat it. · Cook dishes that have a lot of veggies in them, such as stir-fries and soups.   Limit saturated and trans fat  · Read food labels, and try to avoid saturated and trans fats. They increase your risk of heart disease. Trans fat is found in many processed foods such as cookies and crackers. · Use olive or canola oil when you cook. Try cholesterol-lowering spreads, such as Benecol or Take Control. · Bake, broil, grill, or steam foods instead of frying them. · Choose lean meats instead of high-fat meats such as hot dogs and sausages. Cut off all visible fat when you prepare meat. · Eat fish, skinless poultry, and meat alternatives such as soy products instead of high-fat meats. Soy products, such as tofu, may be especially good for your heart. · Choose low-fat or fat-free milk and dairy products. Eat fish  · Eat at least two servings of fish a week. Certain fish, such as salmon and tuna, contain omega-3 fatty acids, which may help reduce your risk of heart attack. Eat foods high in fiber  · Eat a variety of grain products every day. Include whole-grain foods that have lots of fiber and nutrients. Examples of whole-grain foods include oats, whole wheat bread, and brown rice. · Buy whole-grain breads and cereals, instead of white bread or pastries. Limit salt and sodium  · Limit how much salt and sodium you eat to help lower your blood pressure. · Taste food before you salt it. Add only a little salt when you think you need it. With time, your taste buds will adjust to less salt. · Eat fewer snack items, fast foods, and other high-salt, processed foods. Check food labels for the amount of sodium in packaged foods. · Choose low-sodium versions of canned goods (such as soups, vegetables, and beans). Limit sugar  · Limit drinks and foods with added sugar. These include candy, desserts, and soda pop. Limit alcohol  · Limit alcohol to no more than 2 drinks a day for men and 1 drink a day for women. Too much alcohol can cause health problems. When should you call for help?   Watch closely for changes in your health, and be sure to contact your doctor if:  · You would like help planning heart-healthy meals. Where can you learn more? Go to http://anderson-jesús.info/. Enter V137 in the search box to learn more about \"Heart-Healthy Diet: Care Instructions. \"  Current as of: January 27, 2016  Content Version: 11.1  © 2634-0889 [employer]. Care instructions adapted under license by [employer] (which disclaims liability or warranty for this information). If you have questions about a medical condition or this instruction, always ask your healthcare professional. [place] [address] any warranty or liability for your use of this information.

## 2017-03-21 RX ORDER — ISOSORBIDE MONONITRATE 30 MG/1
TABLET, EXTENDED RELEASE ORAL
Qty: 30 TAB | Refills: 11 | Status: SHIPPED | OUTPATIENT
Start: 2017-03-21 | End: 2017-10-10 | Stop reason: SDUPTHER

## 2017-03-27 RX ORDER — METOPROLOL TARTRATE 25 MG/1
TABLET, FILM COATED ORAL
Qty: 60 TAB | Refills: 11 | Status: SHIPPED | OUTPATIENT
Start: 2017-03-27 | End: 2018-04-06 | Stop reason: SDUPTHER

## 2017-03-27 RX ORDER — LOSARTAN POTASSIUM 100 MG/1
TABLET ORAL
Qty: 30 TAB | Refills: 11 | Status: SHIPPED | OUTPATIENT
Start: 2017-03-27 | End: 2017-11-21 | Stop reason: SDUPTHER

## 2017-03-28 ENCOUNTER — OFFICE VISIT (OUTPATIENT)
Dept: FAMILY MEDICINE CLINIC | Age: 82
End: 2017-03-28

## 2017-03-28 ENCOUNTER — HOSPITAL ENCOUNTER (OUTPATIENT)
Dept: LAB | Age: 82
Discharge: HOME | End: 2017-03-28
Payer: MEDICARE

## 2017-03-28 VITALS
WEIGHT: 195.8 LBS | HEIGHT: 58 IN | RESPIRATION RATE: 16 BRPM | DIASTOLIC BLOOD PRESSURE: 89 MMHG | HEART RATE: 64 BPM | OXYGEN SATURATION: 94 % | TEMPERATURE: 96.1 F | BODY MASS INDEX: 41.1 KG/M2 | SYSTOLIC BLOOD PRESSURE: 135 MMHG

## 2017-03-28 DIAGNOSIS — Z13.5 GLAUCOMA SCREENING: ICD-10-CM

## 2017-03-28 DIAGNOSIS — Z00.00 MEDICARE ANNUAL WELLNESS VISIT, SUBSEQUENT: Primary | ICD-10-CM

## 2017-03-28 DIAGNOSIS — Z51.81 ENCOUNTER FOR MEDICATION MONITORING: ICD-10-CM

## 2017-03-28 DIAGNOSIS — I50.32 CHRONIC DIASTOLIC HEART FAILURE (HCC): ICD-10-CM

## 2017-03-28 DIAGNOSIS — I25.10 ATHEROSCLEROSIS OF NATIVE CORONARY ARTERY OF NATIVE HEART WITHOUT ANGINA PECTORIS: ICD-10-CM

## 2017-03-28 DIAGNOSIS — Z12.31 ENCOUNTER FOR SCREENING MAMMOGRAM FOR BREAST CANCER: ICD-10-CM

## 2017-03-28 DIAGNOSIS — F41.9 CHRONIC ANXIETY: ICD-10-CM

## 2017-03-28 DIAGNOSIS — K21.9 GASTROESOPHAGEAL REFLUX DISEASE WITHOUT ESOPHAGITIS: Chronic | ICD-10-CM

## 2017-03-28 DIAGNOSIS — E78.2 MIXED HYPERLIPIDEMIA: ICD-10-CM

## 2017-03-28 DIAGNOSIS — Z13.820 SCREENING FOR OSTEOPOROSIS: ICD-10-CM

## 2017-03-28 DIAGNOSIS — M47.815 SPONDYLOSIS OF THORACOLUMBAR REGION WITHOUT MYELOPATHY OR RADICULOPATHY: ICD-10-CM

## 2017-03-28 DIAGNOSIS — I25.9 CHRONIC ISCHEMIC HEART DISEASE: ICD-10-CM

## 2017-03-28 DIAGNOSIS — Z78.0 POST-MENOPAUSAL: ICD-10-CM

## 2017-03-28 DIAGNOSIS — Z12.11 ENCOUNTER FOR SCREENING FECAL OCCULT BLOOD TESTING: ICD-10-CM

## 2017-03-28 DIAGNOSIS — I10 ESSENTIAL HYPERTENSION: Chronic | ICD-10-CM

## 2017-03-28 DIAGNOSIS — R82.90 NONSPECIFIC FINDING ON EXAMINATION OF URINE: ICD-10-CM

## 2017-03-28 DIAGNOSIS — Z79.82 LONG TERM (CURRENT) USE OF ASPIRIN: ICD-10-CM

## 2017-03-28 LAB
BILIRUB UR QL STRIP: NORMAL
GLUCOSE UR-MCNC: NEGATIVE MG/DL
KETONES P FAST UR STRIP-MCNC: NEGATIVE MG/DL
PH UR STRIP: 6 [PH] (ref 4.6–8)
PROT UR QL STRIP: NORMAL MG/DL
SP GR UR STRIP: 1.01 (ref 1–1.03)
UA UROBILINOGEN AMB POC: NORMAL (ref 0.2–1)
URINALYSIS CLARITY POC: CLEAR
URINALYSIS COLOR POC: YELLOW
URINE BLOOD POC: NORMAL
URINE LEUKOCYTES POC: NORMAL
URINE NITRITES POC: NEGATIVE

## 2017-03-28 PROCEDURE — 87086 URINE CULTURE/COLONY COUNT: CPT

## 2017-03-28 RX ORDER — KETOCONAZOLE 200 MG/1
200 TABLET ORAL DAILY
Qty: 7 TAB | Refills: 0 | Status: SHIPPED | OUTPATIENT
Start: 2017-03-28 | End: 2017-04-04

## 2017-03-28 RX ORDER — CEPHALEXIN 500 MG/1
500 CAPSULE ORAL 3 TIMES DAILY
Qty: 30 CAP | Refills: 0 | Status: SHIPPED | OUTPATIENT
Start: 2017-03-28 | End: 2017-04-02

## 2017-03-28 NOTE — PROGRESS NOTES
Chief Complaint   Patient presents with   [place] Annual Wellness Visit     Medicare [facility] 2/13/2017    Lipid 2/13/2017    Mammogram 7/16/2015    Pt states her last eye exam was with MD at [facility]. ( Pt can not remember MD's name)

## 2017-03-28 NOTE — MR AVS SNAPSHOT
Visit Information Date & Time Provider Department Dept. Phone Encounter #  
 3/28/2017 11:15 AM Yamil Dennis MD [facility] [phone] [id number] Follow-up Instructions Return in about 5 months (around 8/28/2017). Your Appointments 8/11/2017 10:00 AM  
ROUTINE CARE with Yamil Dennis MD  
[facility]) Appt Note: 5m f/u  
 [address] [place] [phone]  
[phone] [phone]  
  
    
 8/23/2017  9:30 AM  
ESTABLISHED PATIENT with Nicole Velez MD  
[facility]) Appt Note: 6 MO. F/U  
 [address] [facility]  
[phone] [address] Upcoming Health Maintenance Date Due OSTEOPOROSIS SCREENING (DEXA) 7/31/2002 GLAUCOMA SCREENING Q2Y 6/15/2015 MEDICARE YEARLY EXAM 3/29/2018 COLONOSCOPY 10/13/2020 DTaP/Tdap/Td series (2 - Td) 7/25/2026 Allergies as of 3/28/2017  Review Complete On: 3/28/2017 By: Yamil Dennis MD  
  
 Severity Noted Reaction Type Reactions Bactrim [Sulfamethoxazole-trimethoprim]  03/29/2010    Unknown (comments) Pt does not recall Darvocet A500 [Propoxyphene N-acetaminophen]  11/01/2010    Itching Current Immunizations  Reviewed on 3/28/2017 Name Date Influenza High Dose Vaccine PF 11/7/2016, 10/13/2015, 11/26/2014 Influenza Vaccine 12/31/2013 Influenza Vaccine Split 10/9/2012, 11/1/2011, 11/1/2010 Pneumococcal Vaccine (Unspecified Type) 1/1/2008 Reviewed by Yamil Dennis MD on 3/28/2017 at  2:26 PM  
You Were Diagnosed With   
  
 Codes Comments Medicare annual wellness visit, subsequent    -  Primary ICD-10-CM: Z00.00 ICD-9-CM: V70.0 Encounter for screening fecal occult blood testing     ICD-10-CM: Z12.11 ICD-9-CM: V76.51 Long term (current) use of aspirin     ICD-10-CM: Z79.82 ICD-9-CM: V58.66 Essential hypertension     ICD-10-CM: I10 
ICD-9-CM: 401.9 Mixed hyperlipidemia     ICD-10-CM: E78.2 ICD-9-CM: 272.2 Chronic diastolic heart failure (HCC)     ICD-10-CM: I50.32 
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 
ICD-9-CM: V58.83 Post-menopausal     ICD-10-CM: Z78.0 ICD-9-CM: V49.81 Screening for osteoporosis     ICD-10-CM: Z13.820 ICD-9-CM: V82.81 Encounter for screening mammogram for breast cancer     ICD-10-CM: Z12.31 
ICD-9-CM: V76.12 Vitals BP Pulse Temp Resp Height(growth percentile) Weight(growth percentile) 135/89 (BP 1 Location: Left arm, BP Patient Position: Sitting) 64 96.1 °F (35.6 °C) (Oral) 16 4' 9.5\" (1.461 m) 195 lb 12.8 oz (88.8 kg) LMP SpO2 BMI OB Status Smoking Status (LMP Unknown) 94% 41.64 kg/m2 Hysterectomy Never Smoker Vitals History BMI and BSA Data Body Mass Index Body Surface Area  
 41.64 kg/m 2 1.9 m 2 Preferred Pharmacy Pharmacy Name Phone [id number]- [place], [place] - [address] [phone] Your Updated Medication List  
  
   
This list is accurate as of: 3/28/17  4:36 PM.  Always use your most recent med list. amLODIPine 10 mg tablet Commonly known as:  Tonia Srinivasan TAKE 1/2 (ONE-HALF) TABLET BY MOUTH TWICE PER DAY  
  
 aspirin, buffered 81 mg Tab Take 81 mg by mouth daily. diclofenac EC 75 mg EC tablet Commonly known as:  VOLTAREN Take 1 Tab by mouth two (2) times daily as needed. Take with food  Indications: OSTEOARTHRITIS  
  
 econazole nitrate 1 % topical cream  
Commonly known as:  Evie Wilmer Apply  to affected area two (2) times daily as needed. fluticasone 50 mcg/actuation nasal spray Commonly known as:  Alric Eaves 2 Sprays by Both Nostrils route daily. furosemide 80 mg tablet Commonly known as:  LASIX TAKE 1 TABLET BY MOUTH EVERY MORNING AND TAKE 1/2 TABLET EVERY EVENING  
  
 guaiFENesin  mg ER tablet Commonly known as:  Miguelito & Miguelito Take 1 Tab by mouth two (2) times daily as needed for Congestion. hydrocortisone 2.5 % ointment Commonly known as:  HYTONE Apply  to affected area two (2) times daily as needed. isosorbide mononitrate ER 30 mg tablet Commonly known as:  IMDUR  
TAKE 1 TABLET EVERY DAY  
  
 ketoconazole 200 mg tablet Commonly known as:  NIZORAL Take 1 Tab by mouth daily for 7 days. losartan 100 mg tablet Commonly known as:  COZAAR  
TAKE 1 TABLET BY MOUTH EVERY DAY  
  
 metoprolol tartrate 25 mg tablet Commonly known as:  LOPRESSOR  
TAKE 1 TABLET BY MOUTH TWICE A DAY  
  
 nitroglycerin 0.4 mg SL tablet Commonly known as:  NITROSTAT  
TAKE 1 TAB BY SUBLINGUAL ROUTE EVERY 5 MINUTES AS NEEDED. omeprazole 20 mg capsule Commonly known as:  PRILOSEC  
TAKE ONE CAPSULE BY MOUTH EVERY MORNING  
  
 potassium chloride SR 10 mEq tablet Commonly known as:  KLOR-CON 10  
TAKE 2 TABLETS BY MOUTH EVERY DAY  
  
 pravastatin 40 mg tablet Commonly known as:  PRAVACHOL Take 1 Tab by mouth nightly. TYLENOL EXTRA STRENGTH 500 mg tablet Generic drug:  acetaminophen Take 1,000 mg by mouth every six (6) hours as needed for Pain. VITAMIN D3 2,000 unit Tab Generic drug:  cholecalciferol (vitamin D3) Take 1 Tab by mouth daily. Prescriptions Sent to Pharmacy Refills  
 ketoconazole (NIZORAL) 200 mg tablet 0 Sig: Take 1 Tab by mouth daily for 7 days. Class: Normal  
 [facility] [address] [address] [facility] [address] Ph #: [phone] Route: Oral  
  
We Performed the Following AMB POC FECAL BLOOD, OCCULT, QL 1 CARD [24606 CPT(R)] AMB POC URINALYSIS DIP STICK AUTO W/O MICRO [26931 CPT(R)] Follow-up Instructions Return in about 5 months (around 8/28/2017). To-Do List   
 03/28/2017 Imaging:  DEXA BONE DENSITY STUDY AXIAL   
  
 03/28/2017 Imaging:  CRYSTAL MAMMO BI SCREENING INCL CAD Introducing [facility] SERVICES! Dear Nazia Madison: 
Thank you for requesting a [employer] account. Our records indicate that you already have an active D[employer]er account. You can access your account anytime at [email or web address] Did you know that you can access your hospital and ER discharge instructions at any time in Dial [employer]er? You can also review all of your test results from your hospital stay or ER visit. Additional Information If you have questions, please visit the Frequently Asked Questions section of the Dial a [employer]er website at [email or web address] Remember, Dial a Dealer is NOT to be used for urgent needs. For medical emergencies, dial 911. Now available from your iPhone and Android! Please provide this summary of care documentation to your next provider. Your primary care clinician is listed as Sarah Real. If you have any questions after today's visit, please call [phone].

## 2017-03-28 NOTE — PROGRESS NOTES
This is a Subsequent Medicare Annual Wellness Visit providing Personalized Prevention Plan Services (PPPS) (Performed 12 months after initial AWV and PPPS )    I have reviewed the patient's medical history in detail and updated the computerized patient record.      History     Past Medical History:   Diagnosis Date    Anxiety     Aortic stenosis 3/3/2010    Aortic stenosis     Arrhythmia     MURMUR    Arthritis     SPINAL STENOSIS    Atherosclerosis of coronary artery     CHF (congestive heart failure) ([facility] [address] 3/3/2010    CHF (congestive heart failure) ([facility] [address] 01/2002    Chronic heart failure ([facility] [address] 1/2002; 3/3/2010    chronic diastolic heart failure    Chronic pain     LOWER BACK, RIGHT KNEE    Environmental allergies 3/3/2010    GERD (gastroesophageal reflux disease) 3/3/2010    Hiatal hernia 3/3/2010    High cholesterol 3/3/2010    HTN (hypertension) 3/3/2010    Hypokalemia 3/3/2010    Ischemic heart disease, chronic     Obese     Psychiatric disorder     anxiety    S/P hysterectomy 3/3/2010    Spinal stenosis 3/3/2010      Past Surgical History:   Procedure Laterality Date    CARDIAC CATHETERIZATION  01/2002    normal coronaries    DECOMPRESS DISC RF LUMBAR  07/2007    HX BACK SURGERY  5/1/2014    HX BREAST LUMPECTOMY Left 1989    benign left breast    HX BUNIONECTOMY      HX BUNIONECTOMY      HX HEART CATHETERIZATION  3/3/10    HX HYSTERECTOMY  1978    HX LUMBAR DISKECTOMY      HX ORTHOPAEDIC Bilateral     BUNIONECTOMY    HX ORTHOPAEDIC Right     WRIST FX    HX OTHER SURGICAL  10/12/2015    mole removed from right side of face by Dr. Graeme Eli FLX DX [place] [facility] PFRMD  [id number]    Dr Thrasher Marceline     Current Outpatient Prescriptions   Medication Sig Dispense Refill    metoprolol tartrate (LOPRESSOR) 25 mg tablet TAKE 1 TABLET BY MOUTH TWICE A DAY 60 Tab 11    losartan (COZAAR) 100 mg tablet TAKE 1 TABLET BY MOUTH EVERY DAY 30 Tab 11    isosorbide mononitrate ER (IMDUR) 30 mg tablet TAKE 1 TABLET EVERY DAY 30 Tab 11    diclofenac EC (VOLTAREN) 75 mg EC tablet Take 1 Tab by mouth two (2) times daily as needed. Take with food  Indications: OSTEOARTHRITIS 30 Tab 1    clonazePAM (KLONOPIN) 1 mg tablet TAKE 1/2 to 1 TABLET BY MOUTH IN THE MORNING as needed for anxiety AND TAKE 1 TABLET AT BEDTIME for sleep. 60 Tab 3    furosemide (LASIX) 80 mg tablet TAKE 1 TABLET BY MOUTH EVERY MORNING AND TAKE 1/2 TABLET EVERY EVENING 45 Tab 11    guaiFENesin ER (MUCINEX) 600 mg ER tablet Take 1 Tab by mouth two (2) times daily as needed for Congestion. 20 Tab 0    fluticasone (FLONASE) 50 mcg/actuation nasal spray 2 Sprays by Both Nostrils route daily. 1 Bottle 0    pravastatin (PRAVACHOL) 40 mg tablet Take 1 Tab by mouth nightly. 30 Tab 11    amLODIPine (NORVASC) 10 mg tablet TAKE 1/2 (ONE-HALF) TABLET BY MOUTH TWICE PER DAY 30 Tab 6    hydrocortisone (HYTONE) 2.5 % ointment Apply  to affected area two (2) times daily as needed.  triamcinolone acetonide (KENALOG) 0.1 % topical cream Apply  to affected area two (2) times daily as needed for Skin Irritation or Itching. 30 g 0    nitroglycerin (NITROSTAT) 0.4 mg SL tablet TAKE 1 TAB BY SUBLINGUAL ROUTE EVERY 5 MINUTES AS NEEDED. 25 Tab 1    omeprazole (PRILOSEC) 20 mg capsule TAKE ONE CAPSULE BY MOUTH EVERY MORNING 30 Cap 11    potassium chloride SR (KLOR-CON 10) 10 mEq tablet TAKE 2 TABLETS BY MOUTH EVERY DAY 60 Tab 11    cholecalciferol, vitamin D3, (VITAMIN D3) 2,000 unit tab Take 1 Tab by mouth daily.  desonide (DESOWEN) 0.05 % topical ointment Apply  to affected area two (2) times daily as needed for Skin Irritation.  econazole nitrate (SPECTAZOLE) 1 % topical cream Apply  to affected area two (2) times daily as needed. 15 g 0    acetaminophen (TYLENOL EXTRA STRENGTH) 500 mg tablet Take 1,000 mg by mouth every six (6) hours as needed for Pain.  Aspirin, Buffered 81 mg tab Take 81 mg by mouth daily. Allergies   Allergen Reactions    Bactrim [Sulfamethoxazole-Trimethoprim] Unknown (comments)     Pt does not recall    Darvocet A500 [Propoxyphene N-Acetaminophen] Itching     Family History   Problem Relation Age of Onset    Hypertension Mother     Heart Disease Father     Stroke Sister     Heart Disease Sister     Cancer Brother      LUNG    Cancer Brother      PROSTATE    Stroke Daughter 47    Anesth Problems Neg Hx      Social History   Substance Use Topics    Smoking status: Never Smoker    Smokeless tobacco: Never Used    Alcohol use No     Patient Active Problem List   Diagnosis Code    Hypokalemia E87.6    Hiatal hernia K44.9    Anxiety F41.9    S/P hysterectomy Z90.710    Spinal stenosis M48.00    S/P foot surgery Z98.890    Environmental allergies Z91.09    S/P cardiac catheterization Z98.890    Hypovitaminosis D E55.9    Chronic ischemic heart disease I25.9    Mixed hyperlipidemia E78.2    Chronic diastolic heart failure (HCC) I50.32    Acute respiratory failure (HCC) J96.00    Pneumonia, organism unspecified J18.9    Chest pain at rest R07.9    RBBB I45.10    Chest pain, unspecified R07.9    Essential hypertension I10    Gastroesophageal reflux disease without esophagitis K21.9    Atherosclerosis of native coronary artery without angina pectoris I25.10    Chronic anxiety F41.9    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 No    Do you average more than 7 drinks per week?   No      Functional Ability and Level of Safety:   Fall Risk Assessment:   Fall Risk Assessment, last 12 mths  4/30/2014    Able to walk? Yes    Fall in past 12 months? No        Functional Ability:   Does the patient exhibit a steady gait? yes    How long did it take the patient to get up and walk from a sitting position? seconds    Is the patient self reliant? (ie can do own laundry, meals, household chores)  yes   Does the patient handle his/her own medications? yes   Does the patient handle his/her own money? yes   Is the patients home safe (ie good lighting, handrails on stairs and bath, etc.)? yes   Did you notice or did patient express any hearing difficulties? no   Did you notice or did patient express any vision difficulties?  no   Were distance and reading eye charts used? yes     Advance Care Planning:   Patient was offered the opportunity to discuss advance care planning:  yes    Does patient have an Advance Directive:  no    If no, did you provide information on Caring Connections? yes        Hearing Loss   none    Activities of Daily Living   Self-care. Requires assistance with: no ADLs    Fall Risk     Fall Risk Assessment, last 12 mths 2/13/2017   Able to walk? Yes   Fall in past 12 months? No   Fall with injury? -   Number of falls in past 12 months -   Fall Risk Score -     Abuse Screen   Patient is not abused    Review of Systems   Constitutional: Negative for malaise/fatigue. HENT: Negative for congestion. Eyes: Negative for blurred vision. Respiratory: Negative for cough and shortness of breath. Cardiovascular: Negative for chest pain, palpitations and leg swelling. Gastrointestinal: Negative for abdominal pain, constipation and heartburn. Genitourinary: Negative for dysuria, frequency and urgency. Musculoskeletal: Positive for back pain and joint pain. Neurological: Negative for dizziness, tingling and headaches. Endo/Heme/Allergies: Negative for environmental allergies. Psychiatric/Behavioral: Negative for depression. The patient does not have insomnia. Rash under both breast.  Seen by derm and put on cream for yeast infection but has not been helping. Physical Examination     Evaluation of Cognitive Function:  Mood/affect:  neutral  Appearance: age appropriate  Family member/caregiver input: NA    Visit Vitals    /89 (BP 1 Location: Left arm, BP Patient Position: Sitting)    Pulse 64    Temp 96.1 °F (35.6 °C) (Oral)    Resp 16    Ht 4' 9.5\" (1.461 m)    Wt 195 lb 12.8 oz (88.8 kg)    LMP  (LMP Unknown)    SpO2 94%    BMI 41.64 kg/m2     General:  Alert, cooperative, no distress, appears stated age. Head:  Normocephalic, without obvious abnormality, atraumatic. Eyes:  Conjunctivae/corneas clear. PERRL, EOMs intact. Fundi benign. Ears:  Normal TMs and external ear canals both ears. Nose: Nares normal. Septum midline. Mucosa normal. No drainage or sinus tenderness. Throat: Lips, mucosa, and tongue normal. Teeth and gums normal.   Neck: Supple, symmetrical, trachea midline, no adenopathy, thyroid: no enlargement/tenderness/nodules, no carotid bruit and no JVD. Back:   Symmetric, no curvature. ROM normal. No CVA tenderness. Lungs:   Clear to auscultation bilaterally. Chest wall:  No tenderness or deformity. Heart:  Regular rate and rhythm, S1, S2 normal, no murmur, click, rub or gallop. Breast Exam:  No tenderness, masses, or nipple abnormality. Abdomen:   Soft, non-tender. Bowel sounds normal. No masses,  No organomegaly. Genitalia:  Normal female    Rectal:  Normal tone,  no masses or tenderness  Guaiac negative stool. Extremities: Extremities normal, atraumatic, no cyanosis. Trace edema. Pulses: 2+ and symmetric all extremities. Skin: Skin color, texture, turgor normal. Fungal rash under both breasts   Lymph nodes: Cervical, supraclavicular, and axillary nodes normal.   Neurologic: CNII-XII intact.  Normal strength, sensation and reflexes throughout. Patient Care Team:  Agustina Johnson MD as PCP - General  Edith Kraus, RN as Nurse Navigator  Jp Hansen MD (Cardiology)    Advice/Referrals/Counseling   Education and counseling provided:  Are appropriate based on today's review and evaluation  End-of-Life planning (with patient's consent)      Assessment/Plan   Azam Mondragon was seen today for annual wellness visit. 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 Intertrigo  -     ketoconazole (NIZORAL) 200 mg tablet; Take 1 Tab by mouth daily for 7 days. Discussed preventive measures for rash. Follow-up Disposition:  Return in about 5 months (around 8/28/2017). reviewed diet, exercise and weight control  cardiovascular risk and specific lipid/LDL goals reviewed  reviewed medications and side effects in detail.

## 2017-03-29 LAB — BACTERIA UR CULT: NORMAL

## 2017-04-19 ENCOUNTER — TELEPHONE (OUTPATIENT)
Dept: FAMILY MEDICINE CLINIC | Age: 82
End: 2017-04-19

## 2017-04-19 NOTE — TELEPHONE ENCOUNTER
Princess pacheco from [facility] [phone] would like to know if the office received the paper work for a knee brace.

## 2017-04-21 RX ORDER — POTASSIUM CHLORIDE 750 MG/1
TABLET, FILM COATED, EXTENDED RELEASE ORAL
Qty: 60 TAB | Refills: 11 | Status: SHIPPED | OUTPATIENT
Start: 2017-04-21 | End: 2017-08-31 | Stop reason: SDUPTHER

## 2017-04-23 ENCOUNTER — APPOINTMENT (OUTPATIENT)
Dept: GENERAL RADIOLOGY | Age: 82
End: 2017-04-23
Attending: PHYSICIAN ASSISTANT
Payer: MEDICARE

## 2017-04-23 ENCOUNTER — HOSPITAL ENCOUNTER (EMERGENCY)
Age: 82
Discharge: HOME | End: 2017-04-23
Attending: EMERGENCY MEDICINE
Payer: MEDICARE

## 2017-04-23 VITALS
RESPIRATION RATE: 16 BRPM | WEIGHT: 193 LBS | OXYGEN SATURATION: 92 % | SYSTOLIC BLOOD PRESSURE: 159 MMHG | HEIGHT: 60 IN | BODY MASS INDEX: 37.89 KG/M2 | HEART RATE: 72 BPM | TEMPERATURE: 98.4 F | DIASTOLIC BLOOD PRESSURE: 90 MMHG

## 2017-04-23 DIAGNOSIS — B30.9 VIRAL CONJUNCTIVITIS OF RIGHT EYE: ICD-10-CM

## 2017-04-23 DIAGNOSIS — J84.9 INTERSTITIAL LUNG DISEASE (HCC): ICD-10-CM

## 2017-04-23 DIAGNOSIS — J18.9 COMMUNITY ACQUIRED PNEUMONIA: Primary | ICD-10-CM

## 2017-04-23 PROCEDURE — 94640 AIRWAY INHALATION TREATMENT: CPT

## 2017-04-23 PROCEDURE — 99284 EMERGENCY DEPT VISIT MOD MDM: CPT

## 2017-04-23 PROCEDURE — 71020 XR CHEST PA LAT: CPT

## 2017-04-23 PROCEDURE — 74011250637 HC RX REV CODE- 250/637: Performed by: PHYSICIAN ASSISTANT

## 2017-04-23 PROCEDURE — 74011000250 HC RX REV CODE- 250: Performed by: PHYSICIAN ASSISTANT

## 2017-04-23 PROCEDURE — 77030029684 HC NEB SM VOL KT MONA -A

## 2017-04-23 RX ORDER — IPRATROPIUM BROMIDE AND ALBUTEROL SULFATE 2.5; .5 MG/3ML; MG/3ML
3 SOLUTION RESPIRATORY (INHALATION)
Status: COMPLETED | OUTPATIENT
Start: 2017-04-23 | End: 2017-04-23

## 2017-04-23 RX ORDER — ALBUTEROL SULFATE 90 UG/1
2 AEROSOL, METERED RESPIRATORY (INHALATION)
Qty: 1 INHALER | Refills: 0 | Status: SHIPPED | OUTPATIENT
Start: 2017-04-23 | End: 2017-08-15

## 2017-04-23 RX ORDER — AZITHROMYCIN 250 MG/1
TABLET, FILM COATED ORAL
Qty: 6 TAB | Refills: 0 | Status: SHIPPED | OUTPATIENT
Start: 2017-04-23 | End: 2017-04-28 | Stop reason: ALTCHOICE

## 2017-04-23 RX ORDER — BENZONATATE 100 MG/1
100 CAPSULE ORAL
Status: DISCONTINUED | OUTPATIENT
Start: 2017-04-23 | End: 2017-04-23 | Stop reason: HOSPADM

## 2017-04-23 RX ORDER — GUAIFENESIN 100 MG/5ML
200 SOLUTION ORAL
Qty: 118 ML | Refills: 0 | Status: SHIPPED | OUTPATIENT
Start: 2017-04-23 | End: 2017-08-15

## 2017-04-23 RX ORDER — ERYTHROMYCIN 5 MG/G
OINTMENT OPHTHALMIC
Qty: 1 G | Refills: 0 | Status: SHIPPED | OUTPATIENT
Start: 2017-04-23 | End: 2017-04-28

## 2017-04-23 RX ORDER — GUAIFENESIN 100 MG/5ML
200 SOLUTION ORAL
Status: COMPLETED | OUTPATIENT
Start: 2017-04-23 | End: 2017-04-23

## 2017-04-23 RX ADMIN — BENZONATATE 100 MG: 100 CAPSULE ORAL at 10:40

## 2017-04-23 RX ADMIN — IPRATROPIUM BROMIDE AND ALBUTEROL SULFATE 3 ML: .5; 3 SOLUTION RESPIRATORY (INHALATION) at 10:33

## 2017-04-23 RX ADMIN — GUAIFENESIN 200 MG: 100 SOLUTION ORAL at 10:40

## 2017-04-23 NOTE — DISCHARGE INSTRUCTIONS
Pinkeye: Care Instructions  Your Care Instructions    Pinkeye is redness and swelling of the eye surface and the conjunctiva (the lining of the eyelid and the covering of the white part of the eye). Pinkeye is also called conjunctivitis. Pinkeye is often caused by infection with bacteria or a virus. Dry air, allergies, smoke, and chemicals are other common causes. Pinkeye often clears on its own in 7 to 10 days. Antibiotics only help if the pinkeye is caused by bacteria. Pinkeye caused by infection spreads easily. If an allergy or chemical is causing pinkeye, it will not go away unless you can avoid whatever is causing it. Follow-up care is a key part of your treatment and safety. Be sure to make and go to all appointments, and call your doctor if you are having problems. Its also a good idea to know your test results and keep a list of the medicines you take. How can you care for yourself at home? · Wash your hands often. Always wash them before and after you treat pinkeye or touch your eyes or face. · Use moist cotton or a clean, wet cloth to remove crust. Wipe from the inside corner of the eye to the outside. Use a clean part of the cloth for each wipe. · Put cold or warm wet cloths on your eye a few times a day if the eye hurts. · Do not wear contact lenses or eye makeup until the pinkeye is gone. Throw away any eye makeup you were using when you got pinkeye. Clean your contacts and storage case. If you wear disposable contacts, use a new pair when your eye has cleared and it is safe to wear contacts again. · If the doctor gave you antibiotic ointment or eyedrops, use them as directed. Use the medicine for as long as instructed, even if your eye starts looking better soon. Keep the bottle tip clean, and do not let it touch the eye area. · To put in eyedrops or ointment:  ¨ Tilt your head back, and pull your lower eyelid down with one finger.   ¨ Drop or squirt the medicine inside the lower lid.  ¨ Close your eye for 30 to 60 seconds to let the drops or ointment move around. ¨ Do not touch the ointment or dropper tip to your eyelashes or any other surface. · Do not share towels, pillows, or washcloths while you have pinkeye. When should you call for help? Call your doctor now or seek immediate medical care if:  · You have pain in your eye, not just irritation on the surface. · You have a change in vision or loss of vision. · You have an increase in discharge from the eye. · Your eye has not started to improve or begins to get worse within 48 hours after you start using antibiotics. · Pinkeye lasts longer than 7 days. Watch closely for changes in your health, and be sure to contact your doctor if you have any problems. Where can you learn more? Go to http://anderson-jesús.info/. Enter Y392 in the search box to learn more about \"Pinkeye: Care Instructions. \"  Current as of: May 27, 2016  Content Version: 11.2  © 8911-3367 [employer]. Care instructions adapted under license by [employer] (which disclaims liability or warranty for this information). If you have questions about a medical condition or this instruction, always ask your healthcare professional. [address] any warranty or liability for your use of this information. Pneumonia: Care Instructions  Your Care Instructions    Pneumonia is an infection of the lungs. Most cases are caused by infections from bacteria or viruses. Pneumonia may be mild or very severe. If it is caused by bacteria, you will be treated with antibiotics. It may take a few weeks to a few months to recover fully from pneumonia, depending on how sick you were and whether your overall health is good. Follow-up care is a key part of your treatment and safety. Be sure to make and go to all appointments, and call your doctor if you are having problems.  Its also a good idea to know your test results and keep a list of the medicines you take. How can you care for yourself at home? · Take your antibiotics exactly as directed. Do not stop taking the medicine just because you are feeling better. You need to take the full course of antibiotics. · Take your medicines exactly as prescribed. Call your doctor if you think you are having a problem with your medicine. · Get plenty of rest and sleep. You may feel weak and tired for a while, but your energy level will improve with time. · To prevent dehydration, drink plenty of fluids, enough so that your urine is light yellow or clear like water. Choose water and other caffeine-free clear liquids until you feel better. If you have kidney, heart, or liver disease and have to limit fluids, talk with your doctor before you increase the amount of fluids you drink. · Take care of your cough so you can rest. A cough that brings up mucus from your lungs is common with pneumonia. It is one way your body gets rid of the infection. But if coughing keeps you from resting or causes severe fatigue and chest-wall pain, talk to your doctor. He or she may suggest that you take a medicine to reduce the cough. · Use a vaporizer or humidifier to add moisture to your bedroom. Follow the directions for cleaning the machine. · Do not smoke or allow others to smoke around you. Smoke will make your cough last longer. If you need help quitting, talk to your doctor about stop-smoking programs and medicines. These can increase your chances of quitting for good. · Take an over-the-counter pain medicine, such as acetaminophen (Tylenol), ibuprofen (Advil, Motrin), or naproxen (Aleve). Read and follow all instructions on the label. · Do not take two or more pain medicines at the same time unless the doctor told you to. Many pain medicines have acetaminophen, which is Tylenol. Too much acetaminophen (Tylenol) can be harmful.   · If you were given a spirometer to measure how well your lungs are working, use it as instructed. This can help your doctor tell how your recovery is going. · To prevent pneumonia in the future, talk to your doctor about getting a flu vaccine (once a year) and a pneumococcal vaccine (one time only for most people). When should you call for help? Call 911 anytime you think you may need emergency care. For example, call if:  · You have severe trouble breathing. Call your doctor now or seek immediate medical care if:  · You cough up dark brown or bloody mucus (sputum). · You have new or worse trouble breathing. · You are dizzy or lightheaded, or you feel like you may faint. Watch closely for changes in your health, and be sure to contact your doctor if:  · You have a new or higher fever. · You are coughing more deeply or more often. · You are not getting better after 2 days (48 hours). · You do not get better as expected. Where can you learn more? Go to http://anderson-jesús.info/. Enter [id number] in the search box to learn more about \"Pneumonia: Care Instructions. \"  Current as of: May 23, 2016  Content Version: 11.2  © 2493-1814 [employer]. Care instructions adapted under license by [employer] (which disclaims liability or warranty for this information). If you have questions about a medical condition or this instruction, always ask your healthcare professional. [place] [address] any warranty or liability for your use of this information.

## 2017-04-23 NOTE — ED NOTES
Family at bedside. Patient reports feeling \"bad\" for about two days. First neb treatment in progress. Emergency Department Nursing Plan of Care       The Nursing Plan of Care is developed from the Nursing assessment and Emergency Department Attending provider initial evaluation. The plan of care may be reviewed in the ED Provider note.     The Plan of Care was developed with the following considerations:   Patient / Family readiness to learn indicated by:verbalized understanding  Persons(s) to be included in education: patient  Barriers to Learning/Limitations:No    Signed     Austin Langston RN    4/23/2017   10:37 AM

## 2017-04-23 NOTE — ED PROVIDER NOTES
Patient is a 80 y.o. female presenting with cough. The history is provided by the patient and a relative. Cough   This is a new (other people in the household displaying similar symptoms) problem. Episode onset: 3 days ago. The problem occurs every few minutes. The problem has not changed since onset. The cough is productive of sputum (white sputum). There has been no fever. Associated symptoms include eye redness (R), headaches (gradual onset) and rhinorrhea. Pertinent negatives include no chest pain, no chills, no sweats, no weight loss, no ear congestion, no ear pain, no sore throat, no myalgias, no shortness of breath, no wheezing, no nausea, no vomiting and no confusion. She has tried nothing for the symptoms. She is not a smoker.         Past Medical History:   Diagnosis Date    Anxiety     Aortic stenosis 3/3/2010    Aortic stenosis     Arrhythmia     MURMUR    Arthritis     SPINAL STENOSIS    Atherosclerosis of coronary artery     CHF (congestive heart failure) ([address] 3/3/2010    CHF (congestive heart failure) ([address] 01/2002    Chronic heart failure ([address] 1/2002; 3/3/2010    chronic diastolic heart failure    Chronic pain     LOWER BACK, RIGHT KNEE    Environmental allergies 3/3/2010    GERD (gastroesophageal reflux disease) 3/3/2010    Hiatal hernia 3/3/2010    High cholesterol 3/3/2010    HTN (hypertension) 3/3/2010    Hypokalemia 3/3/2010    Ischemic heart disease, chronic     Obese     Psychiatric disorder     anxiety    S/P hysterectomy 3/3/2010    Spinal stenosis 3/3/2010       Past Surgical History:   Procedure Laterality Date    CARDIAC CATHETERIZATION  01/2002    normal coronaries    DECOMPRESS DISC RF LUMBAR  07/2007    HX BACK SURGERY  5/1/2014    HX BREAST LUMPECTOMY Left 1989    benign left breast    HX BUNIONECTOMY      HX BUNIONECTOMY      HX HEART CATHETERIZATION  3/3/10    HX HYSTERECTOMY  1978    HX LUMBAR DISKECTOMY      HX ORTHOPAEDIC Bilateral BUNIONECTOMY    HX ORTHOPAEDIC Right     WRIST FX    HX OTHER SURGICAL  10/12/2015    mole removed from right side of face by Dr. Swapna Mccormick FLX DX W/COLLJ Radha Bonilla PFRMD  [id number]    Dr Yordan Youngblood         Family History:   Problem Relation Age of Onset    Hypertension Mother     Heart Disease Father     Stroke Sister     Heart Disease Sister     Cancer Brother      LUNG    Cancer Brother      PROSTATE    Hypertension Brother     No Known Problems Brother     Lung Disease Brother     No Known Problems Brother     No Known Problems Brother     Stroke Daughter 47    No Known Problems Daughter     Anesth Problems Neg Hx        Social History     Social History    Marital status:      Spouse name: N/A    Number of children: N/A    Years of education: N/A     Occupational History    Not on file. Social History Main Topics    Smoking status: Never Smoker    Smokeless tobacco: Never Used    Alcohol use No    Drug use: No    Sexual activity: Not Currently     Other Topics Concern    Not on file     Social History Narrative         ALLERGIES: Bactrim [sulfamethoxazole-trimethoprim] and Darvocet a500 [propoxyphene n-acetaminophen]    Review of Systems   Constitutional: Negative. Negative for activity change, appetite change, chills, diaphoresis, fever, unexpected weight change and weight loss. HENT: Positive for rhinorrhea. Negative for congestion, ear pain, hearing loss, sinus pressure, sneezing, sore throat and trouble swallowing. Eyes: Positive for redness (R). Negative for pain, itching and visual disturbance. Respiratory: Positive for cough (productive). Negative for shortness of breath and wheezing. Cardiovascular: Negative for chest pain, palpitations and leg swelling. Gastrointestinal: Negative for abdominal pain, constipation, diarrhea, nausea and vomiting. Genitourinary: Negative for dysuria.    Musculoskeletal: Negative for arthralgias, gait problem and myalgias. Skin: Negative for color change, pallor, rash and wound. Neurological: Positive for headaches (gradual onset). Negative for tremors, weakness, light-headedness and numbness. Psychiatric/Behavioral: Negative for confusion. All other systems reviewed and are negative. Vitals:    04/23/17 0947 04/23/17 1034 04/23/17 1039   BP: 159/90     Pulse: 78  72   Resp: 16     Temp: 98.4 °F (36.9 °C)     SpO2: 91% 92%    Weight: 87.5 kg (193 lb)     Height: 5' (1.524 m)              Physical Exam   Constitutional: She is oriented to person, place, and time. Vital signs are normal. She appears well-developed and well-nourished. No distress. 80 y.o.  female in NAD  Communicates appropriately and in full sentences   HENT:   Head: Normocephalic and atraumatic. Right Ear: External ear normal.   Left Ear: External ear normal.   Mouth/Throat: Oropharynx is clear and moist. No oropharyngeal exudate. Minimally injected posterior oropharynx   Eyes: EOM are normal. Pupils are equal, round, and reactive to light. Right eye exhibits no discharge. Left eye exhibits no discharge. Right conjunctiva is injected. Right conjunctiva has no hemorrhage. Left conjunctiva is not injected. Left conjunctiva has no hemorrhage. No scleral icterus. Right eye exhibits normal extraocular motion and no nystagmus. Left eye exhibits normal extraocular motion and no nystagmus. Right pupil is round and reactive. Left pupil is round and reactive. Pupils are equal.   Neck: Normal range of motion. Neck supple. No tracheal deviation present. No nuchal rigidity or meningeal signs   Cardiovascular: Normal rate, regular rhythm, normal heart sounds and intact distal pulses. Pulmonary/Chest: Effort normal and breath sounds normal. No stridor. No respiratory distress. She has no wheezes. Shallow breaths, non-tachypneic  Decreased breath sounds   Abdominal: Soft. Bowel sounds are normal. She exhibits no distension.  There is no tenderness. Elevated BMI   Musculoskeletal: Normal range of motion. She exhibits no edema, tenderness or deformity. No neurologic, motor, vascular, or compartment embarrassment observed on exam. No focal neurologic deficits. Lymphadenopathy:     She has no cervical adenopathy. Neurological: She is alert and oriented to person, place, and time. She has normal strength. She displays no atrophy. No cranial nerve deficit or sensory deficit. She exhibits normal muscle tone. Coordination and gait normal.   GCS 15   Skin: Skin is warm and dry. No rash noted. She is not diaphoretic. No erythema. No pallor. Psychiatric: She has a normal mood and affect. Nursing note and vitals reviewed. MDM  Number of Diagnoses or Management Options  Community acquired pneumonia:   Interstitial lung disease ([facility] [address]):   Viral conjunctivitis of right eye:   Diagnosis management comments: DDx: PNA, viral conjunctivitis, bronchitis, asthma, URI, viral illness, sinusitis       Amount and/or Complexity of Data Reviewed  Tests in the radiology section of CPT®: ordered and reviewed  Review and summarize past medical records: yes  Independent visualization of images, tracings, or specimens: yes      ED Course       Procedures    I reviewed our electronic medical record system for any past medical records that were available that may contribute to the patients current condition, the nursing notes and and vital signs from today's visit     Nursing notes will be reviewed as they become available in realtime while the pt is in the ED. Progress Note:  10:07 AM  The patients presenting problems have been discussed, and they are in agreement with the care plan formulated and outlined with them. I have encouraged them to ask questions as they arise throughout their visit. Will continue to monitor. Progress Note:  11:13 AM  Pt states she is feeling significantly better. Pt pulse ox is 92% on 1.5L NC.  Pt states that she is [employer] and has spoke with her cardiologist about this who has counseled her that she does not need home oxygen therapy. Will refer patient to pulmonologist due to her lower oxygen saturations and her interstitial disease. DISCHARGE NOTE:  11:15 AM  Mishel Farley's  results have been reviewed with her. She has been counseled regarding her diagnosis. She verbally conveys understanding and agreement of the signs, symptoms, diagnosis, treatment and prognosis and additionally agrees to follow up as recommended with Dr. Mike Mustafa MD in 25 - 48 hours. She also agrees with the care-plan and conveys that all of her questions have been answered. I have also put together some discharge instructions for her that include: 1) educational information regarding their diagnosis, 2) how to care for their diagnosis at home, as well a 3) list of reasons why they would want to return to the ED prior to their follow-up appointment, should their condition change. She and/or family's questions have been answered. I have encouraged them to see the official results in [facility]\" or to retrieve the specifics of their results from medical records. IMAGING COMPLETED AND REVIEWED:  CXR Results  (Last 48 hours)               04/23/17 1019  XR CHEST PA LAT Final result    Impression:  IMPRESSION:    Slight increase in interstitial prominence since prior studies. Correlate for   developing interstitial edema or interstitial infection. .  . Narrative:  INDICATION:  productive cough, no known fever. EXAM: 2 VIEW CHEST RADIOGRAPH. COMPARISON: 12/26/2016, 10/13/2015. FINDINGS: Frontal and lateral views of the chest show diffuse interstitial   prominence, mildly increased since prior studies. Shelvy Setting NThe heart, mediastinum and   pulmonary vasculature are stable. The bony thorax is unremarkable for age,   except for incidentally noted lumbar fixation. . ..                   CLINICAL IMPRESSION:  1.  Community acquired pneumonia    2. Viral conjunctivitis of right eye    3. Interstitial lung disease ([facility] [address]        Plan:  1. Return precautions  2. Medications as prescribed  3. Follow-ups as discussed    Discharge Medication List as of 4/23/2017 10:44 AM      START taking these medications    Details   erythromycin (ILOTYCIN) ophthalmic ointment Apply to affected eye up to four times daily, Normal, Disp-1 g, R-0      azithromycin (ZITHROMAX Z-KERRI) 250 mg tablet Take 2 tablets (500mg) on day 1; then take 1 tablet (250 mg) on days 2, 3, 4 and 5, Normal, Disp-6 Tab, R-0      guaiFENesin (ROBITUSSIN) 100 mg/5 mL liquid Take 10 mL by mouth three (3) times daily as needed for Cough., Normal, Disp-118 mL, R-0      albuterol (PROAIR HFA) 90 mcg/actuation inhaler Take 2 Puffs by inhalation every four (4) hours as needed for Wheezing., Normal, Disp-1 Inhaler, R-0         CONTINUE these medications which have NOT CHANGED    Details   potassium chloride SR (KLOR-CON 10) 10 mEq tablet TAKE 2 TABLETS BY MOUTH EVERY DAY, Normal, Disp-60 Tab, R-11      metoprolol tartrate (LOPRESSOR) 25 mg tablet TAKE 1 TABLET BY MOUTH TWICE A DAY, Normal, Disp-60 Tab, R-11      losartan (COZAAR) 100 mg tablet TAKE 1 TABLET BY MOUTH EVERY DAY, Normal, Disp-30 Tab, R-11      isosorbide mononitrate ER (IMDUR) 30 mg tablet TAKE 1 TABLET EVERY DAY, Normal, Disp-30 Tab, R-11      diclofenac EC (VOLTAREN) 75 mg EC tablet Take 1 Tab by mouth two (2) times daily as needed. Take with food  Indications: OSTEOARTHRITIS, Normal, Disp-30 Tab, R-1      furosemide (LASIX) 80 mg tablet TAKE 1 TABLET BY MOUTH EVERY MORNING AND TAKE 1/2 TABLET EVERY EVENING, Normal, Disp-45 Tab, R-11      fluticasone (FLONASE) 50 mcg/actuation nasal spray 2 Sprays by Both Nostrils route daily. , Normal, Disp-1 Bottle, R-0      pravastatin (PRAVACHOL) 40 mg tablet Take 1 Tab by mouth nightly., Normal, Disp-30 Tab, R-11      amLODIPine (NORVASC) 10 mg tablet TAKE 1/2 (ONE-HALF) TABLET BY MOUTH TWICE PER DAY, Normal, Disp-30 Tab, R-6      hydrocortisone (HYTONE) 2.5 % ointment Apply  to affected area two (2) times daily as needed., Historical Med      nitroglycerin (NITROSTAT) 0.4 mg SL tablet TAKE 1 TAB BY SUBLINGUAL ROUTE EVERY 5 MINUTES AS NEEDED., Normal, Disp-25 Tab, R-1      omeprazole (PRILOSEC) 20 mg capsule TAKE ONE CAPSULE BY MOUTH EVERY MORNING, Normal, Disp-30 Cap, R-11      cholecalciferol, vitamin D3, (VITAMIN D3) 2,000 unit tab Take 1 Tab by mouth daily. , Historical Med      econazole nitrate (SPECTAZOLE) 1 % topical cream Apply  to affected area two (2) times daily as needed., Normal, Disp-15 g, R-0      acetaminophen (TYLENOL EXTRA STRENGTH) 500 mg tablet Take 1,000 mg by mouth every six (6) hours as needed for Pain., Historical Med      Aspirin, Buffered 81 mg tab Take 81 mg by mouth daily. , Historical Med         STOP taking these medications       guaiFENesin ER (MUCINEX) 600 mg ER tablet Comments:   Reason for Stopping:              Follow-up Information     Follow up With Details Comments Contact Kailee Casas MD Schedule an appointment as soon as possible for a visit in 2 days As needed, If symptoms worsen, Possible further evaluation and treatment [address] [place] [address] Road  [address] [place] [address]  [phone]      [facility] Schedule an appointment as soon as possible for a visit in 2 days As needed, If symptoms worsen, Possible further evaluation and treatment [address] [facility] EMERGENCY DEPT Go to As needed, If symptoms worsen [address] [facility]    Mary Carmen Mccauley MD Schedule an appointment as soon as possible for a visit in 2 days As needed, If symptoms worsen, Possible further evaluation and treatment, Ensure resolution of pneumonia [address] [facility]  [facility]  [address]  [facility]  [phone]          Return to the closest emergency room or follow up sooner for any deterioration      This note will not be viewable in MyChart.

## 2017-04-23 NOTE — ED NOTES
Reviewed discharge instructions, follow up information and (4) prescriptions with patient and her family. Declined w/c escort out of ED. Ambulatory with cane. Discharged home with family.

## 2017-04-25 ENCOUNTER — PATIENT OUTREACH (OUTPATIENT)
Dept: FAMILY MEDICINE CLINIC | Age: 82
End: 2017-04-25

## 2017-04-25 NOTE — PROGRESS NOTES
Patient listed on discharge [facility]) report on . Patient  to [facility] ED  and DX with pneumonia. Contacted patient to perform post ED discharge assessment. Verified  and address with patient as identifiers. Provided introduction to self, and explanation of the NN role. Performed medication reconciliation with patient, and patient verbalizes understanding of administration of home medications. Reviewed discharge instructions with patient. Patient verbalizes understand of discharge instructions and follow-up care. Patient needs scheduled for  f/u with Dr Darcie Bird, none open until  and patient has an eye appointment then. Reviewed red flags with patient, and patient verbalizes understanding. Patient given an opportunity to ask questions. Contact information provided to the patient for future reference or further questions. Patient states that she is still wheezing and some audible wheezing on the phone, last used her albuterol last night. Patient states she doesn't think she is doing it right. Had her sit down and walked her through step by step. Encouraged patient to use it every 4 hours as needed and explained why she needs to use it. Denies fever or cough.          Red Flags:  Fever, cough, increased SOB, wheezing or C/P.

## 2017-04-25 NOTE — Clinical Note
Hi, To [facility] ED 4/23 and DX with pneumonia, still wheezing. Next appt open is 5/5 and she has an eye appt that day.

## 2017-04-28 ENCOUNTER — OFFICE VISIT (OUTPATIENT)
Dept: FAMILY MEDICINE CLINIC | Age: 82
End: 2017-04-28

## 2017-04-28 VITALS
HEART RATE: 69 BPM | OXYGEN SATURATION: 93 % | WEIGHT: 194.6 LBS | TEMPERATURE: 96.2 F | SYSTOLIC BLOOD PRESSURE: 125 MMHG | BODY MASS INDEX: 38.21 KG/M2 | HEIGHT: 60 IN | DIASTOLIC BLOOD PRESSURE: 82 MMHG | RESPIRATION RATE: 20 BRPM

## 2017-04-28 DIAGNOSIS — J98.01 BRONCHOSPASM: ICD-10-CM

## 2017-04-28 DIAGNOSIS — L30.9 DERMATITIS: ICD-10-CM

## 2017-04-28 DIAGNOSIS — J18.9 PNEUMONIA DUE TO INFECTIOUS ORGANISM, UNSPECIFIED LATERALITY, UNSPECIFIED PART OF LUNG: Primary | ICD-10-CM

## 2017-04-28 RX ORDER — PROMETHAZINE HYDROCHLORIDE AND DEXTROMETHORPHAN HYDROBROMIDE 6.25; 15 MG/5ML; MG/5ML
5 SYRUP ORAL
Qty: 240 ML | Refills: 1 | Status: SHIPPED | OUTPATIENT
Start: 2017-04-28 | End: 2018-03-28

## 2017-04-28 RX ORDER — LEVOFLOXACIN 500 MG/1
500 TABLET, FILM COATED ORAL DAILY
Qty: 7 TAB | Refills: 0 | Status: SHIPPED | OUTPATIENT
Start: 2017-04-28 | End: 2017-05-05

## 2017-04-28 RX ORDER — PREDNISONE 10 MG/1
10 TABLET ORAL 2 TIMES DAILY
Qty: 10 TAB | Refills: 0 | Status: SHIPPED | OUTPATIENT
Start: 2017-04-28 | End: 2017-05-03

## 2017-04-28 RX ORDER — PROMETHAZINE HYDROCHLORIDE AND DEXTROMETHORPHAN HYDROBROMIDE 6.25; 15 MG/5ML; MG/5ML
5 SYRUP ORAL
Qty: 240 ML | Refills: 1 | Status: SHIPPED | OUTPATIENT
Start: 2017-04-28 | End: 2017-04-28 | Stop reason: SDUPTHER

## 2017-04-28 RX ORDER — MOMETASONE FUROATE 1 MG/G
CREAM TOPICAL
Qty: 15 G | Refills: 0 | Status: SHIPPED | OUTPATIENT
Start: 2017-04-28 | End: 2017-08-15

## 2017-04-28 NOTE — PROGRESS NOTES
Patient here for a follow up from the ER for pneumonia.  Patient is feeling a little better, but is still wheezing

## 2017-04-28 NOTE — MR AVS SNAPSHOT
Visit Information Date & Time Provider Department Dept. Phone Encounter #  
 4/28/2017  8:00 AM Juilanne KernLinden [phone] [id number] Follow-up Instructions Return in about 2 weeks (around 5/12/2017). Your Appointments 5/15/2017  2:00 PM  
ROUTINE CARE with Julianne Kern MD  
[address]) Appt Note: follow up double book per [address] [facility] [facility] 7 [address]  
[phone] [address] [facility] [address] 8/11/2017 10:00 AM  
ROUTINE CARE with Julianne Kern MD  
[address]) Appt Note: 5m f/u  
 [address] [place] [address] [address]  
[phone] 8/23/2017  9:30 AM  
ESTABLISHED PATIENT with Gabe Ng MD  
[facility]) Appt Note: 6 MO. F/U  
 [address] [facility]  
[phone] [address] Upcoming Health Maintenance Date Due OSTEOPOROSIS SCREENING (DEXA) 7/31/2002 GLAUCOMA SCREENING Q2Y 6/15/2015 MEDICARE YEARLY EXAM 3/29/2018 COLONOSCOPY 10/13/2020 DTaP/Tdap/Td series (2 - Td) 7/25/2026 Allergies as of 4/28/2017  Review Complete On: 4/28/2017 By: Julianne Kern MD  
  
 Severity Noted Reaction Type Reactions Bactrim [Sulfamethoxazole-trimethoprim]  03/29/2010    Unknown (comments) Pt does not recall Darvocet A500 [Propoxyphene N-acetaminophen]  11/01/2010    Itching Current Immunizations  Reviewed on 4/28/2017 Name Date Influenza High Dose Vaccine PF 11/7/2016, 10/13/2015, 11/26/2014 Influenza Vaccine 12/31/2013 Influenza Vaccine Split 10/9/2012, 11/1/2011, 11/1/2010 Pneumococcal Vaccine (Unspecified Type) 1/1/2008 Reviewed by Julianne Kern MD on 4/28/2017 at  8:22 AM  
You Were Diagnosed With   
  
 Codes Comments Pneumonia due to infectious organism, unspecified laterality, unspecified part of lung    -  Primary ICD-10-CM: J18.9 ICD-9-CM: 136.9, 484.8 Bronchospasm     ICD-10-CM: J98.01 
ICD-9-CM: 519.11 Vitals BP Pulse Temp Resp Height(growth percentile) Weight(growth percentile) 125/82 69 96.2 °F (35.7 °C) (Oral) 20 5' (1.524 m) 194 lb 9.6 oz (88.3 kg) LMP SpO2 PF BMI OB Status Smoking Status (LMP Unknown) 93% 200 L/min 38.01 kg/m2 Hysterectomy Never Smoker Vitals History BMI and BSA Data Body Mass Index Body Surface Area 38.01 kg/m 2 1.93 m 2 Preferred Pharmacy Pharmacy Name Phone [id number]- [place], [place] - [address] [phone] Your Updated Medication List  
  
   
This list is accurate as of: 4/28/17  2:01 PM.  Always use your most recent med list.  
  
  
  
  
 albuterol 90 mcg/actuation inhaler Commonly known as:  PROAIR HFA Take 2 Puffs by inhalation every four (4) hours as needed for Wheezing. amLODIPine 10 mg tablet Commonly known as:  Elsy Dural TAKE 1/2 (ONE-HALF) TABLET BY MOUTH TWICE PER DAY  
  
 aspirin, buffered 81 mg Tab Take 81 mg by mouth daily. econazole nitrate 1 % topical cream  
Commonly known as:  Manoj Duck Apply  to affected area two (2) times daily as needed. furosemide 80 mg tablet Commonly known as:  LASIX TAKE 1 TABLET BY MOUTH EVERY MORNING AND TAKE 1/2 TABLET EVERY EVENING  
  
 guaiFENesin 100 mg/5 mL liquid Commonly known as:  ROBITUSSIN Take 10 mL by mouth three (3) times daily as needed for Cough. hydrocortisone 2.5 % ointment Commonly known as:  HYTONE Apply  to affected area two (2) times daily as needed. isosorbide mononitrate ER 30 mg tablet Commonly known as:  IMDUR  
TAKE 1 TABLET EVERY DAY  
  
 levoFLOXacin 500 mg tablet Commonly known as:  Julio Leak Take 1 Tab by mouth daily for 7 days. losartan 100 mg tablet Commonly known as:  COZAAR  
TAKE 1 TABLET BY MOUTH EVERY DAY  
  
 metoprolol tartrate 25 mg tablet Commonly known as:  LOPRESSOR  
TAKE 1 TABLET BY MOUTH TWICE A DAY  
  
 mometasone 0.1 % topical cream  
Commonly known as:  Audria Gun Apply  to affected area two (2) times daily (after meals). nitroglycerin 0.4 mg SL tablet Commonly known as:  NITROSTAT  
TAKE 1 TAB BY SUBLINGUAL ROUTE EVERY 5 MINUTES AS NEEDED. omeprazole 20 mg capsule Commonly known as:  PRILOSEC  
TAKE ONE CAPSULE BY MOUTH EVERY MORNING  
  
 potassium chloride SR 10 mEq tablet Commonly known as:  KLOR-CON 10  
TAKE 2 TABLETS BY MOUTH EVERY DAY  
  
 pravastatin 40 mg tablet Commonly known as:  PRAVACHOL Take 1 Tab by mouth nightly. predniSONE 10 mg tablet Commonly known as:  Nir Hug Take 1 Tab by mouth two (2) times a day for 5 days. promethazine-dextromethorphan 6.25-15 mg/5 mL syrup Commonly known as:  PROMETHAZINE-DM Take 5 mL by mouth every six (6) hours as needed for Cough. TYLENOL EXTRA STRENGTH 500 mg tablet Generic drug:  acetaminophen Take 1,000 mg by mouth every six (6) hours as needed for Pain. VITAMIN D3 2,000 unit Tab Generic drug:  cholecalciferol (vitamin D3) Take 1 Tab by mouth daily. Prescriptions Sent to Pharmacy Refills  
 predniSONE (DELTASONE) 10 mg tablet 0 Sig: Take 1 Tab by mouth two (2) times a day for 5 days. Class: Normal  
 Pharmacy: [facility]/pharmacy [address] [place] [address] [address] Ph #: [phone] Route: Oral  
 levoFLOXacin (LEVAQUIN) 500 mg tablet 0 Sig: Take 1 Tab by mouth daily for 7 days. Class: Normal  
 Pharmacy: [facility]/pharmacy [address] [place] [address] [address] Ph #: [phone]  Route: Oral  
 promethazine-dextromethorphan (PROMETHAZINE-DM) 6.25-15 mg/5 mL syrup (Discontinued) 1 Sig: Take 5 mL by mouth every six (6) hours as needed for Cough. Class: Normal  
 Pharmacy: [facility]/pharmacy [address] [place] [address] [address] Ph #: [phone] Route: Oral  
 Reason for Discontinue: Reorder  
 mometasone (ELOCON) 0.1 % topical cream 0 Sig: Apply  to affected area two (2) times daily (after meals). Class: Normal  
 Pharmacy: [facility]/pharmacy [address] [place] [address] [address] Ph #: [phone] Route: Topical  
  
Follow-up Instructions Return in about 2 weeks (around 5/12/2017). To-Do List   
 04/28/2017 Imaging:  XR CHEST PA LAT [facility] SERVICES! Dear [place]: 
Thank you for requesting a ChatID account. Our records indicate that you already have an active ChatID account. You can access your account anytime at [email or web address] Did you know that you can access your hospital and ER discharge instructions at any time in ChatID? You can also review all of your test results from your hospital stay or ER visit. Additional Information If you have questions, please visit the Frequently Asked Questions section of the ChatID website at [email or web address] Remember, ChatID is NOT to be used for urgent needs. For medical emergencies, dial 911. Now available from your iPhone and Android! Please provide this summary of care documentation to your next provider. Your primary care clinician is listed as Andrea Mcintosh. If you have any questions after today's visit, please call [phone].

## 2017-04-28 NOTE — PROGRESS NOTES
HISTORY OF PRESENT ILLNESS  Libertad Escobar is a 80 y.o. female. HPI   Follow up ER for PNA. Still having cough and chest feels congested. Completed the zpak today. Has been wheezing and increased cough at night. Coughing up thick phlegm and feels like she chokes on it at night. No fever or chills noted. Has malaise. Throat is scratchy. No chest pain or SOB. Patient Active Problem List   Diagnosis Code    Hypokalemia E87.6    Hiatal hernia K44.9    Anxiety F41.9    S/P hysterectomy Z90.710    Spinal stenosis M48.00    S/P foot surgery Z98.890    Environmental allergies Z91.09    S/P cardiac catheterization Z98.890    Hypovitaminosis D E55.9    Chronic ischemic heart disease I25.9    Mixed hyperlipidemia E78.2    Chronic diastolic heart failure (HCC) I50.32    Acute respiratory failure (HCC) J96.00    Pneumonia, organism unspecified J18.9    Chest pain at rest R07.9    RBBB I45.10    Chest pain, unspecified R07.9    Essential hypertension I10    Gastroesophageal reflux disease without esophagitis K21.9    Atherosclerosis of native coronary artery without angina pectoris I25.10    Chronic anxiety F41.9    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 118 mL 0    albuterol (PROAIR HFA) 90 mcg/actuation inhaler Take 2 Puffs by inhalation every four (4) hours as needed for Wheezing.  1 Inhaler 0    potassium chloride SR (KLOR-CON 10) 10 mEq tablet TAKE 2 TABLETS BY MOUTH EVERY DAY 60 Tab 11    metoprolol tartrate (LOPRESSOR) 25 mg tablet TAKE 1 TABLET BY MOUTH TWICE A DAY 60 Tab 11    losartan (COZAAR) 100 mg tablet TAKE 1 TABLET BY MOUTH EVERY DAY 30 Tab 11    isosorbide mononitrate ER (IMDUR) 30 mg tablet TAKE 1 TABLET EVERY DAY 30 Tab 11    diclofenac EC (VOLTAREN) 75 mg EC tablet Take 1 Tab by mouth two (2) times daily as needed. Take with food  Indications: OSTEOARTHRITIS 30 Tab 1    furosemide (LASIX) 80 mg tablet TAKE 1 TABLET BY MOUTH EVERY MORNING AND TAKE 1/2 TABLET EVERY EVENING 45 Tab 11    fluticasone (FLONASE) 50 mcg/actuation nasal spray 2 Sprays by Both Nostrils route daily. 1 Bottle 0    pravastatin (PRAVACHOL) 40 mg tablet Take 1 Tab by mouth nightly. 30 Tab 11    amLODIPine (NORVASC) 10 mg tablet TAKE 1/2 (ONE-HALF) TABLET BY MOUTH TWICE PER DAY 30 Tab 6    hydrocortisone (HYTONE) 2.5 % ointment Apply  to affected area two (2) times daily as needed.  nitroglycerin (NITROSTAT) 0.4 mg SL tablet TAKE 1 TAB BY SUBLINGUAL ROUTE EVERY 5 MINUTES AS NEEDED. 25 Tab 1    omeprazole (PRILOSEC) 20 mg capsule TAKE ONE CAPSULE BY MOUTH EVERY MORNING 30 Cap 11    cholecalciferol, vitamin D3, (VITAMIN D3) 2,000 unit tab Take 1 Tab by mouth daily.  econazole nitrate (SPECTAZOLE) 1 % topical cream Apply  to affected area two (2) times daily as needed. 15 g 0    acetaminophen (TYLENOL EXTRA STRENGTH) 500 mg tablet Take 1,000 mg by mouth every six (6) hours as needed for Pain.  Aspirin, Buffered 81 mg tab Take 81 mg by mouth daily.          Allergies   Allergen Reactions    Bactrim [Sulfamethoxazole-Trimethoprim] Unknown (comments)     Pt does not recall    Darvocet A500 [Propoxyphene N-Acetaminophen] Itching       Past Medical History:   Diagnosis Date    Anxiety     Aortic stenosis 3/3/2010    Aortic stenosis     Arrhythmia     MURMUR    Arthritis     SPINAL STENOSIS    Atherosclerosis of coronary artery     CHF (congestive heart failure) ([facility] 3/3/2010    CHF (congestive heart failure) ([facility] 01/2002    Chronic heart failure ([facility] 1/2002; 3/3/2010    chronic diastolic heart failure    Chronic pain     LOWER BACK, RIGHT KNEE    Environmental allergies 3/3/2010    GERD (gastroesophageal reflux disease) 3/3/2010    Hiatal hernia 3/3/2010    High cholesterol 3/3/2010    HTN (hypertension) 3/3/2010    Hypokalemia 3/3/2010    Ischemic heart disease, chronic     Obese     Psychiatric disorder     anxiety    S/P hysterectomy 3/3/2010    Spinal stenosis 3/3/2010       Past Surgical History:   Procedure Laterality Date    CARDIAC CATHETERIZATION  01/2002    normal coronaries    DECOMPRESS DISC RF LUMBAR  07/2007    HX BACK SURGERY  5/1/2014    HX BREAST LUMPECTOMY Left 1989    benign left breast    HX BUNIONECTOMY      HX BUNIONECTOMY      HX HEART CATHETERIZATION  3/3/10    HX HYSTERECTOMY  1978    HX LUMBAR DISKECTOMY      HX ORTHOPAEDIC Bilateral     BUNIONECTOMY    HX ORTHOPAEDIC Right     WRIST FX    HX OTHER SURGICAL  10/12/2015    mole removed from right side of face by Dr. Camelia Mays FLX DX W/COLLJ [address] PFRMD  [id number]    Dr Laney Amado       Family History   Problem Relation Age of Onset    Hypertension Mother     Heart Disease Father     Stroke Sister     Heart Disease Sister     Cancer Brother      LUNG    Cancer Brother      PROSTATE    Hypertension Brother     No Known Problems Brother     Lung Disease Brother     No Known Problems Brother     No Known Problems Brother     Stroke Daughter 47    No Known Problems Daughter     Anesth Problems Neg Hx        Social History   Substance Use Topics    Smoking status: Never Smoker    Smokeless tobacco: Never Used    Alcohol use No        Review of Systems   Skin: Positive for itching and rash. Rash around the left eye which has been itching. Physical Exam   Constitutional: She appears well-developed and well-nourished. /82  Pulse 69  Temp 96.2 °F (35.7 °C) (Oral)   Resp 20  Ht 5' (1.524 m)  Wt 194 lb 9.6 oz (88.3 kg)  LMP  (LMP Unknown)  SpO2 93%   L/min  BMI 38.01 kg/m2     HENT:   Right Ear: Tympanic membrane and ear canal normal.   Left Ear: Tympanic membrane and ear canal normal.   Nose: Mucosal edema and rhinorrhea present. Mouth/Throat: Mucous membranes are normal. Posterior oropharyngeal erythema present. No oropharyngeal exudate. Neck: Trachea normal, normal range of motion and full passive range of motion without pain. Neck supple. No edema present. No thyromegaly present. Cardiovascular: Normal rate and regular rhythm. Pulmonary/Chest: Effort normal. She has wheezes. She has rhonchi. She has no rales. Abdominal: Soft. Normal appearance and bowel sounds are normal. There is no tenderness. Lymphadenopathy:     She has no cervical adenopathy. Skin: Rash noted. Vitals reviewed. ASSESSMENT and Vanita Nadine was seen today for pneumonia. Diagnoses and all orders for this visit:    Pneumonia due to infectious organism, unspecified laterality, unspecified part of lung  -     XR CHEST PA LAT;  I reviewed xray which shows no actual acute infiltrate at this point.  -    Add levoFLOXacin (LEVAQUIN) 500 mg tablet; Take 1 Tab by mouth daily for 7 days based on sx of not improved with sx. Add mucinex     Bronchospasm  -     predniSONE (DELTASONE) 10 mg tablet; Take 1 Tab by mouth two (2) times a day for 5 days. Dermatitis   -     mometasone (ELOCON) 0.1 % topical cream; Apply  to affected area two (2) times daily      Follow-up Disposition:  Return in about 2 weeks (around 5/12/2017). reviewed medications and side effects in detail  . Through the use of shared decision making we have agreed to the above plan. The patient has received an after-visit summary and questions were answered concerning future plans and follow up. Advise pt of any urgent changes then to proceed to the ER.

## 2017-05-10 RX ORDER — NITROGLYCERIN 0.4 MG/1
TABLET SUBLINGUAL
Qty: 25 TAB | Refills: 1 | Status: SHIPPED | OUTPATIENT
Start: 2017-05-10 | End: 2017-12-27 | Stop reason: SDUPTHER

## 2017-05-15 ENCOUNTER — OFFICE VISIT (OUTPATIENT)
Dept: FAMILY MEDICINE CLINIC | Age: 82
End: 2017-05-15

## 2017-05-15 ENCOUNTER — HOSPITAL ENCOUNTER (OUTPATIENT)
Dept: LAB | Age: 82
Discharge: HOME | End: 2017-05-15
Payer: MEDICARE

## 2017-05-15 VITALS
BODY MASS INDEX: 38.64 KG/M2 | DIASTOLIC BLOOD PRESSURE: 78 MMHG | WEIGHT: 196.8 LBS | HEIGHT: 60 IN | OXYGEN SATURATION: 93 % | RESPIRATION RATE: 18 BRPM | TEMPERATURE: 96.3 F | HEART RATE: 68 BPM | SYSTOLIC BLOOD PRESSURE: 139 MMHG

## 2017-05-15 DIAGNOSIS — I50.32 CHRONIC DIASTOLIC HEART FAILURE (HCC): ICD-10-CM

## 2017-05-15 DIAGNOSIS — R53.83 FATIGUE, UNSPECIFIED TYPE: ICD-10-CM

## 2017-05-15 DIAGNOSIS — I25.10 ATHEROSCLEROSIS OF NATIVE CORONARY ARTERY OF NATIVE HEART WITHOUT ANGINA PECTORIS: ICD-10-CM

## 2017-05-15 DIAGNOSIS — I25.9 CHRONIC ISCHEMIC HEART DISEASE: ICD-10-CM

## 2017-05-15 DIAGNOSIS — Z51.81 ENCOUNTER FOR MEDICATION MONITORING: ICD-10-CM

## 2017-05-15 DIAGNOSIS — E78.2 MIXED HYPERLIPIDEMIA: ICD-10-CM

## 2017-05-15 DIAGNOSIS — I10 ESSENTIAL HYPERTENSION: Chronic | ICD-10-CM

## 2017-05-15 DIAGNOSIS — R06.02 SOB (SHORTNESS OF BREATH): Primary | ICD-10-CM

## 2017-05-15 LAB
BILIRUB UR QL STRIP: NEGATIVE
GLUCOSE UR-MCNC: NEGATIVE MG/DL
KETONES P FAST UR STRIP-MCNC: NEGATIVE MG/DL
PH UR STRIP: 6 [PH] (ref 4.6–8)
PROT UR QL STRIP: NEGATIVE MG/DL
SP GR UR STRIP: 1.01 (ref 1–1.03)
UA UROBILINOGEN AMB POC: NORMAL (ref 0.2–1)
URINALYSIS CLARITY POC: CLEAR
URINALYSIS COLOR POC: YELLOW
URINE BLOOD POC: NORMAL
URINE LEUKOCYTES POC: NEGATIVE
URINE NITRITES POC: NEGATIVE

## 2017-05-15 PROCEDURE — 36415 COLL VENOUS BLD VENIPUNCTURE: CPT

## 2017-05-15 PROCEDURE — 80053 COMPREHEN METABOLIC PANEL: CPT

## 2017-05-15 PROCEDURE — 85025 COMPLETE CBC W/AUTO DIFF WBC: CPT

## 2017-05-15 PROCEDURE — 84443 ASSAY THYROID STIM HORMONE: CPT

## 2017-05-15 RX ORDER — DICLOFENAC SODIUM 75 MG/1
TABLET, DELAYED RELEASE ORAL
Refills: 1 | COMMUNITY
Start: 2017-02-13 | End: 2017-08-15

## 2017-05-15 RX ORDER — CLONAZEPAM 1 MG/1
TABLET ORAL
Refills: 3 | COMMUNITY
Start: 2017-05-02 | End: 2017-06-02 | Stop reason: SDUPTHER

## 2017-05-15 RX ORDER — AZITHROMYCIN 250 MG/1
TABLET, FILM COATED ORAL
Refills: 0 | COMMUNITY
Start: 2017-04-23 | End: 2017-05-15 | Stop reason: ALTCHOICE

## 2017-05-15 NOTE — PROGRESS NOTES
Chief Complaint   Patient presents with    Pneumonia     1 month F/U for PNA. Pt here for a F/U on PNA continues with non-productive cough.     BMP 2/13/2017    Lipid 2/13/2017    Mammogram 7/16/2015    Pt states her last eye exam was with Dr. Dante Meek MD at [facility].

## 2017-05-15 NOTE — MR AVS SNAPSHOT
Visit Information Date & Time Provider Department Dept. Phone Encounter #  
 5/15/2017  2:00 PM Shira TejadaLinden [phone] [id number] Follow-up Instructions Return in about 3 months (around 8/15/2017). Follow-up and Disposition History Your Appointments 8/11/2017 10:00 AM  
ROUTINE CARE with Shira Tejada MD  
[facility]) Appt Note: 5m f/u  
 [address] [facility] [address]  
[phone] [address] [id number]  
  
    
 8/23/2017  9:30 AM  
ESTABLISHED PATIENT with Maxi Dillard MD  
[facility]) Appt Note: 6 MO. F/U  
 [address]  
[phone] [address] Upcoming Health Maintenance Date Due OSTEOPOROSIS SCREENING (DEXA) 7/31/2002 GLAUCOMA SCREENING Q2Y 6/15/2015 INFLUENZA AGE 9 TO ADULT 8/1/2017 MEDICARE YEARLY EXAM 3/29/2018 COLONOSCOPY 10/13/2020 DTaP/Tdap/Td series (2 - Td) 7/25/2026 Allergies as of 5/15/2017  Review Complete On: 5/15/2017 By: Shira Tejada MD  
  
 Severity Noted Reaction Type Reactions Bactrim [Sulfamethoxazole-trimethoprim]  03/29/2010    Unknown (comments) Pt does not recall Darvocet A500 [Propoxyphene N-acetaminophen]  11/01/2010    Itching Current Immunizations  Reviewed on 5/15/2017 Name Date Influenza High Dose Vaccine PF 11/7/2016, 10/13/2015, 11/26/2014 Influenza Vaccine 12/31/2013 Influenza Vaccine Split 10/9/2012, 11/1/2011, 11/1/2010 Pneumococcal Vaccine (Unspecified Type) 1/1/2008 Reviewed by Shira Tejada MD on 5/15/2017 at  2:00 PM  
You Were Diagnosed With   
  
 Codes Comments  SOB (shortness of breath)    -  Primary ICD-10-CM: R06.02 
ICD-9-CM: 786.05   
 Fatigue, unspecified type     ICD-10-CM: R53.83 ICD-9-CM: 780.79 Essential hypertension     ICD-10-CM: I10 
ICD-9-CM: 401.9 Chronic diastolic heart failure (HCC)     ICD-10-CM: I50.32 
ICD-9-CM: 428.32 Chronic ischemic heart disease     ICD-10-CM: I25.9 ICD-9-CM: 414.9 Mixed hyperlipidemia     ICD-10-CM: E78.2 ICD-9-CM: 272.2 Atherosclerosis of native coronary artery of native heart without angina pectoris     ICD-10-CM: I25.10 ICD-9-CM: 414.01 Encounter for medication monitoring     ICD-10-CM: Z51.81 
ICD-9-CM: V58.83 Vitals BP Pulse Temp Resp Height(growth percentile) Weight(growth percentile) 139/78 (BP 1 Location: Left arm, BP Patient Position: Sitting) 68 96.3 °F (35.7 °C) (Oral) 18 5' (1.524 m) 196 lb 12.8 oz (89.3 kg) LMP SpO2 PF BMI OB Status Smoking Status (LMP Unknown) 93% 200 L/min 38.43 kg/m2 Hysterectomy Never Smoker Vitals History BMI and BSA Data Body Mass Index Body Surface Area  
 38.43 kg/m 2 1.94 m 2 Preferred Pharmacy Pharmacy Name Phone [facility]Y #[id number]- [place], [place] - [address] [phone] Your Updated Medication List  
  
   
This list is accurate as of: 5/15/17  2:56 PM.  Always use your most recent med list.  
  
  
  
  
 albuterol 90 mcg/actuation inhaler Commonly known as:  PROAIR HFA Take 2 Puffs by inhalation every four (4) hours as needed for Wheezing. amLODIPine 10 mg tablet Commonly known as:  Terence Jaksch TAKE 1/2 (ONE-HALF) TABLET BY MOUTH TWICE PER DAY  
  
 aspirin, buffered 81 mg Tab Take 81 mg by mouth daily. clonazePAM 1 mg tablet Commonly known as:  KlonoPIN  
TAKE1/2 TO 1 TAB BY MOUTH EVERY MORNING AS NEEDED ANXIETY & TAKE 1 TAB BY MOUTH AT BEDTIME FOR SLEEP  
  
 diclofenac EC 75 mg EC tablet Commonly known as:  VOLTAREN  
TAKE 1 TABLET BY MOUTH TWICE A DAY WITH FOOD AS NEEDED  
  
 econazole nitrate 1 % topical cream  
 Commonly known as:  Nathaniel Shaper Apply  to affected area two (2) times daily as needed. furosemide 80 mg tablet Commonly known as:  LASIX TAKE 1 TABLET BY MOUTH EVERY MORNING AND TAKE 1/2 TABLET EVERY EVENING  
  
 guaiFENesin 100 mg/5 mL liquid Commonly known as:  ROBITUSSIN Take 10 mL by mouth three (3) times daily as needed for Cough. hydrocortisone 2.5 % ointment Commonly known as:  HYTONE Apply  to affected area two (2) times daily as needed. isosorbide mononitrate ER 30 mg tablet Commonly known as:  IMDUR  
TAKE 1 TABLET EVERY DAY  
  
 losartan 100 mg tablet Commonly known as:  COZAAR  
TAKE 1 TABLET BY MOUTH EVERY DAY  
  
 metoprolol tartrate 25 mg tablet Commonly known as:  LOPRESSOR  
TAKE 1 TABLET BY MOUTH TWICE A DAY  
  
 mometasone 0.1 % topical cream  
Commonly known as:  Saddie Satchel Apply  to affected area two (2) times daily (after meals). nitroglycerin 0.4 mg SL tablet Commonly known as:  NITROSTAT  
TAKE 1 TAB BY SUBLINGUAL ROUTE EVERY 5 MINUTES AS NEEDED. omeprazole 20 mg capsule Commonly known as:  PRILOSEC  
TAKE ONE CAPSULE BY MOUTH EVERY MORNING  
  
 potassium chloride SR 10 mEq tablet Commonly known as:  KLOR-CON 10  
TAKE 2 TABLETS BY MOUTH EVERY DAY  
  
 pravastatin 40 mg tablet Commonly known as:  PRAVACHOL Take 1 Tab by mouth nightly. promethazine-dextromethorphan 6.25-15 mg/5 mL syrup Commonly known as:  PROMETHAZINE-DM Take 5 mL by mouth every six (6) hours as needed for Cough. TYLENOL EXTRA STRENGTH 500 mg tablet Generic drug:  acetaminophen Take 1,000 mg by mouth every six (6) hours as needed for Pain. VITAMIN D3 2,000 unit Tab Generic drug:  cholecalciferol (vitamin D3) Take 1 Tab by mouth daily. We Performed the Following AMB POC COMPLETE CBC, AUTOMATED [96388 CPT(R)] AMB POC EKG ROUTINE W/ 12 LEADS, INTER & REP [83444 CPT(R)] AMB POC URINALYSIS DIP STICK AUTO W/ MICRO [99520 CPT(R)] METABOLIC PANEL, COMPREHENSIVE [82887 CPT(R)] TSH 3RD GENERATION [42628 CPT(R)] Follow-up Instructions Return in about 3 months (around 8/15/2017). Introducing [facility]! Dear Griselda Mack: 
Thank you for requesting a SHARKMARX account. Our records indicate that you already have an active SHARKMARX account. You can access your account anytime at [email or web address] Did you know that you can access your hospital and ER discharge instructions at any time in SHARKMARX? You can also review all of your test results from your hospital stay or ER visit. Additional Information If you have questions, please visit the Frequently Asked Questions section of the SHARKMARX website at [email or web address] Remember, SHARKMARX is NOT to be used for urgent needs. For medical emergencies, dial 911. Now available from your iPhone and Android! Please provide this summary of care documentation to your next provider. Your primary care clinician is listed as Katie Allen. If you have any questions after today's visit, please call [phone].

## 2017-05-15 NOTE — PROGRESS NOTES
HISTORY OF PRESENT ILLNESS  Justin Bishop is a 80 y.o. female. HPI   Follow up ER for PNA. Still having cough and feels like she can't get the mucous up. Overall cough is better. Wheezing is better and \"just about gone\". No fever or chills noted. Has malaise and feels tire and slow. No chest pain and SOB is at baseline. Cardiovascular Review:  The patient has hypertension, hyperlipidemia, Chronic diastolic heart failure, ASCHD and obesity. Diet and Lifestyle: generally follows a low fat low cholesterol diet, generally follows a low sodium diet, sedentary. Pertinent ROS: taking medications as instructed, no medication side effects noted, no TIA's, no chest pain on exertion, no swelling of ankles, no orthostatic dizziness or lightheadedness, no palpitations, no muscle aches or pain. Home BP Monitoring: is not measured at home.   Pertinent ROS: taking medications as instructed        Patient Active Problem List   Diagnosis Code    Hypokalemia E87.6    Hiatal hernia K44.9    Anxiety F41.9    S/P hysterectomy Z90.710    Spinal stenosis M48.00    S/P foot surgery Z98.890    Environmental allergies Z91.09    S/P cardiac catheterization Z98.890    Hypovitaminosis D E55.9    Chronic ischemic heart disease I25.9    Mixed hyperlipidemia E78.2    Chronic diastolic heart failure (HCC) I50.32    Acute respiratory failure (HCC) J96.00    Pneumonia, organism unspecified J18.9    Chest pain at rest R07.9    RBBB I45.10    Chest pain, unspecified R07.9    Essential hypertension I10    Gastroesophageal reflux disease without esophagitis K21.9    Atherosclerosis of native coronary artery without angina pectoris I25.10    Chronic anxiety F41.9    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 25 Tab 1    mometasone (ELOCON) 0.1 % topical cream Apply  to affected area two (2) times daily (after meals). 15 g 0    promethazine-dextromethorphan (PROMETHAZINE-DM) 6.25-15 mg/5 mL syrup Take 5 mL by mouth every six (6) hours as needed for Cough. 240 mL 1    guaiFENesin (ROBITUSSIN) 100 mg/5 mL liquid Take 10 mL by mouth three (3) times daily as needed for Cough. 118 mL 0    albuterol (PROAIR HFA) 90 mcg/actuation inhaler Take 2 Puffs by inhalation every four (4) hours as needed for Wheezing. 1 Inhaler 0    potassium chloride SR (KLOR-CON 10) 10 mEq tablet TAKE 2 TABLETS BY MOUTH EVERY DAY 60 Tab 11    metoprolol tartrate (LOPRESSOR) 25 mg tablet TAKE 1 TABLET BY MOUTH TWICE A DAY 60 Tab 11    losartan (COZAAR) 100 mg tablet TAKE 1 TABLET BY MOUTH EVERY DAY 30 Tab 11    isosorbide mononitrate ER (IMDUR) 30 mg tablet TAKE 1 TABLET EVERY DAY 30 Tab 11    furosemide (LASIX) 80 mg tablet TAKE 1 TABLET BY MOUTH EVERY MORNING AND TAKE 1/2 TABLET EVERY EVENING 45 Tab 11    pravastatin (PRAVACHOL) 40 mg tablet Take 1 Tab by mouth nightly. 30 Tab 11    amLODIPine (NORVASC) 10 mg tablet TAKE 1/2 (ONE-HALF) TABLET BY MOUTH TWICE PER DAY 30 Tab 6    hydrocortisone (HYTONE) 2.5 % ointment Apply  to affected area two (2) times daily as needed.  omeprazole (PRILOSEC) 20 mg capsule TAKE ONE CAPSULE BY MOUTH EVERY MORNING 30 Cap 11    cholecalciferol, vitamin D3, (VITAMIN D3) 2,000 unit tab Take 1 Tab by mouth daily.  econazole nitrate (SPECTAZOLE) 1 % topical cream Apply  to affected area two (2) times daily as needed. 15 g 0    acetaminophen (TYLENOL EXTRA STRENGTH) 500 mg tablet Take 1,000 mg by mouth every six (6) hours as needed for Pain.  Aspirin, Buffered 81 mg tab Take 81 mg by mouth daily.          Allergies   Allergen Reactions    Bactrim [Sulfamethoxazole-Trimethoprim] Unknown (comments) Pt does not recall    Darvocet A500 [Propoxyphene N-Acetaminophen] Itching         Past Medical History:   Diagnosis Date    Anxiety     Aortic stenosis 3/3/2010    Aortic stenosis     Arrhythmia     MURMUR    Arthritis     SPINAL STENOSIS    Atherosclerosis of coronary artery     CHF (congestive heart failure) ([facility] [address] 3/3/2010    CHF (congestive heart failure) ([facility] [address] 01/2002    Chronic heart failure ([facility] [address] 1/2002; 3/3/2010    chronic diastolic heart failure    Chronic pain     LOWER BACK, RIGHT KNEE    Environmental allergies 3/3/2010    GERD (gastroesophageal reflux disease) 3/3/2010    Hiatal hernia 3/3/2010    High cholesterol 3/3/2010    HTN (hypertension) 3/3/2010    Hypokalemia 3/3/2010    Ischemic heart disease, chronic     Obese     Psychiatric disorder     anxiety    S/P hysterectomy 3/3/2010    Spinal stenosis 3/3/2010         Past Surgical History:   Procedure Laterality Date    CARDIAC CATHETERIZATION  01/2002    normal coronaries    DECOMPRESS DISC RF LUMBAR  07/2007    HX BACK SURGERY  5/1/2014    HX BREAST LUMPECTOMY Left 1989    benign left breast    HX BUNIONECTOMY      HX BUNIONECTOMY      HX HEART CATHETERIZATION  3/3/10    HX HYSTERECTOMY  1978    HX LUMBAR DISKECTOMY      HX ORTHOPAEDIC Bilateral     BUNIONECTOMY    HX ORTHOPAEDIC Right     WRIST FX    HX OTHER SURGICAL  10/12/2015    mole removed from right side of face by Dr. Scotty Boyer FLX DX W/COLLJ [facility] INPATIENT REHABILITATION WHEN PFRMD  [id number]    Dr Jannelle Kocher         Family History   Problem Relation Age of Onset    Hypertension Mother     Heart Disease Father     Stroke Sister     Heart Disease Sister     Cancer Brother      LUNG    Cancer Brother      PROSTATE    Hypertension Brother     No Known Problems Brother     Lung Disease Brother     No Known Problems Brother     No Known Problems Brother     Stroke Daughter 47    No Known Problems Daughter     Anesth Problems Neg Hx        Social History Substance Use Topics    Smoking status: Never Smoker    Smokeless tobacco: Never Used    Alcohol use No        Review of Systems       Physical Exam   Constitutional: She appears well-developed and well-nourished. Visit Vitals    /78 (BP 1 Location: Left arm, BP Patient Position: Sitting)    Pulse 68    Temp 96.3 °F (35.7 °C) (Oral)    Resp 18    Ht 5' (1.524 m)    Wt 196 lb 12.8 oz (89.3 kg)    LMP  (LMP Unknown)    SpO2 93%     L/min    BMI 38.43 kg/m2       Review of Systems   Constitutional: Negative for malaise/fatigue. HENT: Negative for congestion. Eyes: Negative for blurred vision. Respiratory: Negative for cough and shortness of breath. Cardiovascular: Negative for chest pain, palpitations and leg swelling. Gastrointestinal: Negative for abdominal pain, constipation and heartburn. Genitourinary: Negative for dysuria, frequency and urgency. Musculoskeletal: Positive for back pain and joint pain. Neurological: Negative for dizziness, tingling and headaches. Endo/Heme/Allergies: Negative for environmental allergies. Psychiatric/Behavioral: Negative for depression. The patient does not have insomnia. Physical Exam   Constitutional: She appears well-developed and well-nourished. Visit Vitals    /78 (BP 1 Location: Left arm, BP Patient Position: Sitting)    Pulse 68    Temp 96.3 °F (35.7 °C) (Oral)    Resp 18    Ht 5' (1.524 m)    Wt 196 lb 12.8 oz (89.3 kg)    LMP  (LMP Unknown)    SpO2 93%     L/min    BMI 38.43 kg/m2       HENT:   Right Ear: Tympanic membrane and ear canal normal.   Left Ear: Tympanic membrane and ear canal normal.   Nose: No mucosal edema or rhinorrhea. Mouth/Throat: Oropharynx is clear and moist and mucous membranes are normal.   Neck: Normal range of motion. Neck supple. No thyromegaly present. Cardiovascular: Normal rate and regular rhythm. No murmur heard.   Pulmonary/Chest: Effort normal and breath sounds normal.   Abdominal: Soft. Bowel sounds are normal. There is no tenderness. Musculoskeletal: Normal range of motion. She exhibits no edema. Lymphadenopathy:     She has no cervical adenopathy. Skin: Skin is warm and dry. Psychiatric: She has a normal mood and affect. Nursing note and vitals reviewed. ASSESSMENT and Lexie Perera was seen today for pneumonia. Diagnoses and all orders for this visit:    SOB (shortness of breath)  Essentially at baseline. PNA has resolved. Will monitor for worsening sx  -     AMB POC EKG ROUTINE W/ 12 LEADS, INTER & REP    Fatigue, unspecified type  -     AMB POC COMPLETE CBC, AUTOMATED  -     METABOLIC PANEL, COMPREHENSIVE  -     TSH 3RD GENERATION    Essential hypertension  Stable     Chronic diastolic heart failure (HCC)//Chronic ischemic heart disease  Atherosclerosis of native coronary artery of native heart without angina pectoris  EKG no acute change noted     Mixed hyperlipidemia  Continue to monitor. Work on diet and exercise. Encounter for medication monitoring  -     AMB POC URINALYSIS DIP STICK AUTO W/ MICRO      Follow-up Disposition:  Return in about 3 months (around 8/15/2017). reviewed diet, exercise and weight control  cardiovascular risk and specific lipid/LDL goals reviewed  reviewed medications and side effects in detail    I have discussed diagnosis listed in this note with pt and/or family. I have discussed treatment plans and options and the risk/benefit analysis of those options, including safe use of medications and possible medication side effects. Through the use of shared decision making we have agreed to the above plan. The patient has received an after-visit summary and questions were answered concerning future plans and follow up. Advise pt of any urgent changes then to proceed to the ER.

## 2017-05-16 LAB
ALBUMIN SERPL-MCNC: 3.7 G/DL (ref 3.5–4.8)
ALBUMIN/GLOB SERPL: 1.2 {RATIO} (ref 1.2–2.2)
ALP SERPL-CCNC: 76 IU/L (ref 39–117)
ALT SERPL-CCNC: 13 IU/L (ref 0–32)
AST SERPL-CCNC: 17 IU/L (ref 0–40)
BASOPHILS # BLD AUTO: 0 X10E3/UL (ref 0–0.2)
BASOPHILS NFR BLD AUTO: 1 %
BILIRUB SERPL-MCNC: 0.5 MG/DL (ref 0–1.2)
BUN SERPL-MCNC: 23 MG/DL (ref 8–27)
BUN/CREAT SERPL: 19 (ref 12–28)
CALCIUM SERPL-MCNC: 8.8 MG/DL (ref 8.7–10.3)
CHLORIDE SERPL-SCNC: 98 MMOL/L (ref 96–106)
CO2 SERPL-SCNC: 28 MMOL/L (ref 18–29)
CREAT SERPL-MCNC: 1.24 MG/DL (ref 0.57–1)
EOSINOPHIL # BLD AUTO: 0.3 X10E3/UL (ref 0–0.4)
EOSINOPHIL NFR BLD AUTO: 5 %
ERYTHROCYTE [DISTWIDTH] IN BLOOD BY AUTOMATED COUNT: 14.3 % (ref 12.3–15.4)
GLOBULIN SER CALC-MCNC: 3.2 G/DL (ref 1.5–4.5)
GLUCOSE SERPL-MCNC: 98 MG/DL (ref 65–99)
HCT VFR BLD AUTO: 38.1 % (ref 34–46.6)
HGB BLD-MCNC: 12.4 G/DL (ref 11.1–15.9)
IMM GRANULOCYTES # BLD: 0 X10E3/UL (ref 0–0.1)
IMM GRANULOCYTES NFR BLD: 0 %
INTERPRETATION: NORMAL
LYMPHOCYTES # BLD AUTO: 1.7 X10E3/UL (ref 0.7–3.1)
LYMPHOCYTES NFR BLD AUTO: 27 %
MCH RBC QN AUTO: 28.5 PG (ref 26.6–33)
MCHC RBC AUTO-ENTMCNC: 32.5 G/DL (ref 31.5–35.7)
MCV RBC AUTO: 88 FL (ref 79–97)
MONOCYTES # BLD AUTO: 0.6 X10E3/UL (ref 0.1–0.9)
MONOCYTES NFR BLD AUTO: 9 %
NEUTROPHILS # BLD AUTO: 3.7 X10E3/UL (ref 1.4–7)
NEUTROPHILS NFR BLD AUTO: 58 %
PLATELET # BLD AUTO: 204 X10E3/UL (ref 150–379)
POTASSIUM SERPL-SCNC: 3.7 MMOL/L (ref 3.5–5.2)
PROT SERPL-MCNC: 6.9 G/DL (ref 6–8.5)
RBC # BLD AUTO: 4.35 X10E6/UL (ref 3.77–5.28)
SODIUM SERPL-SCNC: 141 MMOL/L (ref 134–144)
TSH SERPL DL<=0.005 MIU/L-ACNC: 2.47 UIU/ML (ref 0.45–4.5)
WBC # BLD AUTO: 6.3 X10E3/UL (ref 3.4–10.8)

## 2017-05-25 ENCOUNTER — PATIENT OUTREACH (OUTPATIENT)
Dept: FAMILY MEDICINE CLINIC | Age: 82
End: 2017-05-25

## 2017-06-01 RX ORDER — AMLODIPINE BESYLATE 10 MG/1
TABLET ORAL
Qty: 30 TAB | Refills: 2 | Status: SHIPPED | OUTPATIENT
Start: 2017-06-01 | End: 2017-08-26 | Stop reason: SDUPTHER

## 2017-06-02 RX ORDER — CLONAZEPAM 1 MG/1
TABLET ORAL
Qty: 60 TAB | Refills: 3 | OUTPATIENT
Start: 2017-06-02 | End: 2017-10-05 | Stop reason: SDUPTHER

## 2017-06-02 NOTE — TELEPHONE ENCOUNTER
Spoke with patient and informed that Norvasc was refilled by Dr. Lauren Marshall yesterday. Patient verbalized understanding.

## 2017-06-02 NOTE — TELEPHONE ENCOUNTER
Pt needs refills for amLODIPine (NORVASC) 10 mg tablet, clonazePAM (KLONOPIN) 1 mg tablet, [facility] on file.      Pt# [phone]

## 2017-06-27 RX ORDER — OMEPRAZOLE 20 MG/1
CAPSULE, DELAYED RELEASE ORAL
Qty: 30 CAP | Refills: 11 | Status: SHIPPED | OUTPATIENT
Start: 2017-06-27 | End: 2018-02-05

## 2017-07-17 ENCOUNTER — PATIENT OUTREACH (OUTPATIENT)
Dept: FAMILY MEDICINE CLINIC | Age: 82
End: 2017-07-17

## 2017-07-17 ENCOUNTER — HOSPITAL ENCOUNTER (EMERGENCY)
Age: 82
Discharge: HOME | End: 2017-07-17
Attending: EMERGENCY MEDICINE
Payer: MEDICARE

## 2017-07-17 ENCOUNTER — APPOINTMENT (OUTPATIENT)
Dept: GENERAL RADIOLOGY | Age: 82
End: 2017-07-17
Attending: PHYSICIAN ASSISTANT
Payer: MEDICARE

## 2017-07-17 VITALS
HEIGHT: 60 IN | WEIGHT: 191 LBS | OXYGEN SATURATION: 97 % | RESPIRATION RATE: 18 BRPM | HEART RATE: 98 BPM | TEMPERATURE: 97.8 F | SYSTOLIC BLOOD PRESSURE: 154 MMHG | DIASTOLIC BLOOD PRESSURE: 89 MMHG | BODY MASS INDEX: 37.5 KG/M2

## 2017-07-17 DIAGNOSIS — M94.0 COSTOCHONDRITIS: Primary | ICD-10-CM

## 2017-07-17 DIAGNOSIS — R07.81 RIB PAIN ON RIGHT SIDE: ICD-10-CM

## 2017-07-17 PROCEDURE — 71101 X-RAY EXAM UNILAT RIBS/CHEST: CPT

## 2017-07-17 PROCEDURE — 96372 THER/PROPH/DIAG INJ SC/IM: CPT

## 2017-07-17 PROCEDURE — 74011250636 HC RX REV CODE- 250/636: Performed by: PHYSICIAN ASSISTANT

## 2017-07-17 PROCEDURE — 99282 EMERGENCY DEPT VISIT SF MDM: CPT

## 2017-07-17 RX ORDER — KETOROLAC TROMETHAMINE 30 MG/ML
15 INJECTION, SOLUTION INTRAMUSCULAR; INTRAVENOUS
Status: COMPLETED | OUTPATIENT
Start: 2017-07-17 | End: 2017-07-17

## 2017-07-17 RX ORDER — TRAMADOL HYDROCHLORIDE 50 MG/1
50 TABLET ORAL
Qty: 10 TAB | Refills: 0 | Status: SHIPPED | OUTPATIENT
Start: 2017-07-17 | End: 2017-10-10 | Stop reason: ALTCHOICE

## 2017-07-17 RX ADMIN — KETOROLAC TROMETHAMINE 15 MG: 30 INJECTION, SOLUTION INTRAMUSCULAR; INTRAVENOUS at 13:57

## 2017-07-17 NOTE — ED PROVIDER NOTES
Patient is a 80 y.o. female presenting with rib pain. The history is provided by the patient. Rib Pain   This is a new problem. Episode onset: 5 days. The problem has not changed since onset. Associated symptoms include cough (x 2wks). Pertinent negatives include no rhinorrhea, no sore throat, no sputum production, no hemoptysis, no wheezing, no vomiting, no abdominal pain and no leg swelling. She has tried nothing for the symptoms. She has had prior ED visits. Associated medical issues include pneumonia. Associated medical issues comments: Aortic stenosis, CHF, HTN, anxiety, hiatal hernia.         Past Medical History:   Diagnosis Date    Anxiety     Aortic stenosis 3/3/2010    Aortic stenosis     Arrhythmia     MURMUR    Arthritis     SPINAL STENOSIS    Atherosclerosis of coronary artery     CHF (congestive heart failure) ([facility] [address] 3/3/2010    CHF (congestive heart failure) ([facility] [address] 01/2002    Chronic heart failure ([facility] [address] 1/2002; 3/3/2010    chronic diastolic heart failure    Chronic pain     LOWER BACK, RIGHT KNEE    Environmental allergies 3/3/2010    GERD (gastroesophageal reflux disease) 3/3/2010    Hiatal hernia 3/3/2010    High cholesterol 3/3/2010    HTN (hypertension) 3/3/2010    Hypokalemia 3/3/2010    Ischemic heart disease, chronic     Obese     Psychiatric disorder     anxiety    S/P hysterectomy 3/3/2010    Spinal stenosis 3/3/2010       Past Surgical History:   Procedure Laterality Date    CARDIAC CATHETERIZATION  01/2002    normal coronaries    DECOMPRESS DISC RF LUMBAR  07/2007    HX BACK SURGERY  5/1/2014    HX BREAST LUMPECTOMY Left 1989    benign left breast    HX BUNIONECTOMY      HX BUNIONECTOMY      HX HEART CATHETERIZATION  3/3/10    HX HYSTERECTOMY  1978    HX LUMBAR DISKECTOMY      HX ORTHOPAEDIC Bilateral     BUNIONECTOMY    HX ORTHOPAEDIC Right     WRIST FX    HX OTHER SURGICAL  10/12/2015    mole removed from right side of face by Dr. Dania Blackmon FLX DX W/COLLJ [facility] INPATIENT REHABILITATION WHEN PFRMD  [id number]    Dr Sujey Rivas         Family History:   Problem Relation Age of Onset    Hypertension Mother     Heart Disease Father     Stroke Sister     Heart Disease Sister     Cancer Brother      LUNG    Cancer Brother      PROSTATE    Hypertension Brother     No Known Problems Brother     Lung Disease Brother     No Known Problems Brother     No Known Problems Brother     Stroke Daughter 47    No Known Problems Daughter     Anesth Problems Neg Hx        Social History     Social History    Marital status:      Spouse name: N/A    Number of children: N/A    Years of education: N/A     Occupational History    Not on file. Social History Main Topics    Smoking status: Never Smoker    Smokeless tobacco: Never Used    Alcohol use No    Drug use: No    Sexual activity: Not Currently     Other Topics Concern    Not on file     Social History Narrative         ALLERGIES: Bactrim [sulfamethoxazole-trimethoprim] and Darvocet a500 [propoxyphene n-acetaminophen]    Review of Systems   HENT: Negative for rhinorrhea and sore throat. Respiratory: Positive for cough (x 2wks). Negative for hemoptysis, sputum production and wheezing. Cardiovascular: Negative for leg swelling. Gastrointestinal: Negative for abdominal pain and vomiting. Vitals:    07/17/17 1210   BP: (!) 151/99   Pulse: 69   Resp: 18   Temp: 97.8 °F (36.6 °C)   SpO2: 95%   Weight: 86.6 kg (191 lb)   Height: 5' (1.524 m)            Physical Exam   Constitutional: She is oriented to person, place, and time. She appears well-developed and well-nourished. No distress. HENT:   Head: Normocephalic and atraumatic. Eyes: Conjunctivae are normal.   Neck: Normal range of motion. Cardiovascular: Normal rate and regular rhythm. Murmur heard. Pulmonary/Chest: Effort normal and breath sounds normal. No respiratory distress. She has no wheezes. She has no rales.  She exhibits bony tenderness (along R lateral rib cage). She exhibits no crepitus, no edema, no deformity, no swelling and no retraction. Abdominal: Soft. Bowel sounds are normal. She exhibits no distension. There is no tenderness. There is no rigidity, no rebound, no guarding and no CVA tenderness. Musculoskeletal: Normal range of motion. Neurological: She is alert and oriented to person, place, and time. Skin: Skin is warm and dry. Psychiatric: She has a normal mood and affect. Her behavior is normal. Judgment and thought content normal.   Nursing note and vitals reviewed. MDM  Number of Diagnoses or Management Options  Diagnosis management comments: DDX: PNA, bronchitis, costochondritis, mm strain, URI, rib fx    Progress Note:  1:31 PM  Discussed Xray findings with pt and daughter. The patient is ready for discharge. The patient's signs, symptoms, diagnosis, and discharge instruction have been discussed and the patient has conveyed their understanding. The patient is to follow up as recommended or return to the ER should their symptoms worsen. Plan has been discussed and the patient is in agreement.        Amount and/or Complexity of Data Reviewed  Tests in the radiology section of CPT®: ordered and reviewed      ED Course       Procedures

## 2017-07-17 NOTE — ED NOTES
Pt presents to ED c/o right flank pain x 1 week w/o injury. Pt states she has also had intermittent shortness of breath \"for a long time\". Pt denies any other symptoms. Emergency Department Nursing Plan of Care       The Nursing Plan of Care is developed from the Nursing assessment and Emergency Department Attending provider initial evaluation. The plan of care may be reviewed in the ED Provider note.     The Plan of Care was developed with the following considerations:   Patient / Family readiness to learn indicated by:verbalized understanding  Persons(s) to be included in education: patient  Barriers to Learning/Limitations:No    Signed     Gisel Payton RN    7/17/2017   12:17 PM

## 2017-07-17 NOTE — PROGRESS NOTES
Nurse Navigator Note- patient seen at [facility] with C/O Rib pain. Called to make a F/U appt. [address] [facility].

## 2017-07-17 NOTE — DISCHARGE INSTRUCTIONS
Costochondritis: Care Instructions  Your Care Instructions  You have chest pain because the cartilage of your rib cage is inflamed. This problem is called costochondritis. This type of chest wall pain may last from days to weeks. It is not a heart problem. Sometimes costochondritis occurs with a cold or the flu, and other times the exact cause is not known. Follow-up care is a key part of your treatment and safety. Be sure to make and go to all appointments, and call your doctor if you are having problems. Its also a good idea to know your test results and keep a list of the medicines you take. How can you care for yourself at home? · Take medicines for pain and inflammation exactly as directed. ¨ If the doctor gave you a prescription medicine, take it as prescribed. ¨ If you are not taking a prescription pain medicine, ask your doctor if you can take an over-the-counter medicine. ¨ Do not take two or more pain medicines at the same time unless the doctor told you to. Many pain medicines have acetaminophen, which is Tylenol. Too much acetaminophen (Tylenol) can be harmful. · It may help to use a warm compress or heating pad (set on low) on your chest. You can also try alternating heat and ice. Put ice or a cold pack on the area for 10 to 20 minutes at a time. Put a thin cloth between the ice and your skin. · Avoid any activity that strains the chest area. As your pain gets better, you can slowly return to your normal activities. · Do not use tape, an elastic bandage, a \"rib belt,\" or anything else that restricts your chest wall motion. When should you call for help? Call 911 anytime you think you may need emergency care. For example, call if:  · You have new or different chest pain or pressure. This may occur with:  ¨ Sweating. ¨ Shortness of breath. ¨ Nausea or vomiting. ¨ Pain that spreads from the chest to the neck, jaw, or one or both shoulders or arms. ¨ Dizziness or lightheadedness.   ¨ A fast or uneven pulse. After calling 911, chew 1 adult-strength aspirin. Wait for an ambulance. Do not try to drive yourself. · You have severe trouble breathing. Call your doctor now or seek immediate medical care if:  · You have a fever or cough. · You have any trouble breathing. · Your chest pain gets worse. Watch closely for changes in your health, and be sure to contact your doctor if:  · Your chest pain continues even though you are taking anti-inflammatory medicine. · Your chest wall pain has not improved after 5 to 7 days. Where can you learn more? Go to http://anderson-jesús.info/. Enter P593 in the search box to learn more about \"Costochondritis: Care Instructions. \"  Current as of: March 20, 2017  Content Version: 11.3  © 1019-2086 [employer]. Care instructions adapted under license by [employer] (which disclaims liability or warranty for this information). If you have questions about a medical condition or this instruction, always ask your healthcare professional. [place] [address] any warranty or liability for your use of this information.

## 2017-07-18 ENCOUNTER — PATIENT OUTREACH (OUTPATIENT)
Dept: FAMILY MEDICINE CLINIC | Age: 82
End: 2017-07-18

## 2017-08-15 ENCOUNTER — HOSPITAL ENCOUNTER (OUTPATIENT)
Dept: LAB | Age: 82
Discharge: HOME | End: 2017-08-15
Payer: MEDICARE

## 2017-08-15 ENCOUNTER — OFFICE VISIT (OUTPATIENT)
Dept: FAMILY MEDICINE CLINIC | Age: 82
End: 2017-08-15

## 2017-08-15 VITALS
HEIGHT: 60 IN | WEIGHT: 196.6 LBS | BODY MASS INDEX: 38.6 KG/M2 | DIASTOLIC BLOOD PRESSURE: 79 MMHG | TEMPERATURE: 96.8 F | OXYGEN SATURATION: 91 % | SYSTOLIC BLOOD PRESSURE: 120 MMHG | HEART RATE: 68 BPM | RESPIRATION RATE: 18 BRPM

## 2017-08-15 DIAGNOSIS — Z51.81 ENCOUNTER FOR MEDICATION MONITORING: ICD-10-CM

## 2017-08-15 DIAGNOSIS — E78.2 MIXED HYPERLIPIDEMIA: ICD-10-CM

## 2017-08-15 DIAGNOSIS — F41.9 CHRONIC ANXIETY: ICD-10-CM

## 2017-08-15 DIAGNOSIS — R06.02 SOBOE (SHORTNESS OF BREATH ON EXERTION): ICD-10-CM

## 2017-08-15 DIAGNOSIS — I50.32 CHRONIC DIASTOLIC HEART FAILURE (HCC): ICD-10-CM

## 2017-08-15 DIAGNOSIS — M47.815 SPONDYLOSIS OF THORACOLUMBAR REGION WITHOUT MYELOPATHY OR RADICULOPATHY: ICD-10-CM

## 2017-08-15 DIAGNOSIS — I10 ESSENTIAL HYPERTENSION: Primary | Chronic | ICD-10-CM

## 2017-08-15 DIAGNOSIS — I25.10 ATHEROSCLEROSIS OF NATIVE CORONARY ARTERY OF NATIVE HEART WITHOUT ANGINA PECTORIS: ICD-10-CM

## 2017-08-15 PROCEDURE — 83880 ASSAY OF NATRIURETIC PEPTIDE: CPT

## 2017-08-15 PROCEDURE — 80053 COMPREHEN METABOLIC PANEL: CPT

## 2017-08-15 PROCEDURE — 80061 LIPID PANEL: CPT

## 2017-08-15 NOTE — PROGRESS NOTES
HISTORY OF PRESENT ILLNESS  Kaci Chowdhury is a [de-identified] y.o. female. HPI    Follow up on chronic medical problems. C/o increased SOB over the past 5 to 6 weeks. This is different from what she has experienced in the past.  Gets winded with ambulating to the bathroom in her house. Has to sit and rest with ambulating in the house and doing her household chores. She is usually not SOB with theses activities. No chest pain. No increased in swelling. Denies chest pain. Has been compliant with her medications. Due to see cardiology on next week. PA chest xray 2 weeks ago no increased in interstitial edema. Cardiovascular Review:  The patient has hypertension, hyperlipidemia, chronic diastolic heart failure and obesity. Diet and Lifestyle: generally follows a low fat low cholesterol diet, generally follows a low sodium diet, sedentary. Pertinent ROS: taking medications as instructed, no medication side effects noted, no TIA's, no chest pain on exertion, no swelling of ankles, no orthostatic dizziness or lightheadedness, no palpitations, no muscle aches or pain. Home BP Monitoring: is not measured at home. Pertinent ROS: taking medications as instructed  Osteoarthritis:  Patient has osteoarthritis. Overall doing better with back pain. Pain waxes and wanes. Symptoms onset: problem is longstanding. Rheumatological ROS: stable, mild-to-moderate joint symptoms intermittently, reasonably well controlled by PRN meds. Response to treatment plan: stable and intermittent. Anxiety Review:  Patient is seen for anxiety. Increase stress with family issues. Ongoing symptoms include:anxiety overall is better. Patient denies: palpitations, sweating, chest pain, shortness of breath. Reported side effects from the treatment: none.     Patient Active Problem List   Diagnosis Code    Hypokalemia E87.6    Hiatal hernia K44.9    Anxiety F41.9    S/P hysterectomy Z90.710    Spinal stenosis M48.00    S/P foot surgery Z98.890    Environmental allergies Z91.09    S/P cardiac catheterization Z98.890    Hypovitaminosis D E55.9    Chronic ischemic heart disease I25.9    Mixed hyperlipidemia E78.2    Chronic diastolic heart failure (HCC) I50.32    Acute respiratory failure (HCC) J96.00    Chest pain at rest R07.9    RBBB I45.10    Chest pain, unspecified R07.9    Essential hypertension I10    Gastroesophageal reflux disease without esophagitis K21.9    Atherosclerosis of native coronary artery without angina pectoris I25.10    Chronic anxiety F41.9    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 Max Daily Amount: 150 mg. 10 Tab 0    omeprazole (PRILOSEC) 20 mg capsule TAKE ONE CAPSULE BY MOUTH EVERY MORNING 30 Cap 11    clonazePAM (KLONOPIN) 1 mg tablet TAKE1/2 TO 1 TAB BY MOUTH EVERY MORNING AS NEEDED ANXIETY & TAKE 1 TAB BY MOUTH AT BEDTIME FOR SLEEP 60 Tab 3    amLODIPine (NORVASC) 10 mg tablet TAKE 1/2 TABLET BY MOUTH TWICE PER DAY 30 Tab 2    diclofenac EC (VOLTAREN) 75 mg EC tablet TAKE 1 TABLET BY MOUTH TWICE A DAY WITH FOOD AS NEEDED  1    nitroglycerin (NITROSTAT) 0.4 mg SL tablet TAKE 1 TAB BY SUBLINGUAL ROUTE EVERY 5 MINUTES AS NEEDED. 25 Tab 1    mometasone (ELOCON) 0.1 % topical cream Apply  to affected area two (2) times daily (after meals). 15 g 0    promethazine-dextromethorphan (PROMETHAZINE-DM) 6.25-15 mg/5 mL syrup Take 5 mL by mouth every six (6) hours as needed for Cough. 240 mL 1    guaiFENesin (ROBITUSSIN) 100 mg/5 mL liquid Take 10 mL by mouth three (3) times daily as needed for Cough.  118 mL 0    albuterol (PROAIR HFA) 90 mcg/actuation inhaler Take 2 Puffs by inhalation every four (4) hours as needed for Wheezing. 1 Inhaler 0    potassium chloride SR (KLOR-CON 10) 10 mEq tablet TAKE 2 TABLETS BY MOUTH EVERY DAY 60 Tab 11    metoprolol tartrate (LOPRESSOR) 25 mg tablet TAKE 1 TABLET BY MOUTH TWICE A DAY 60 Tab 11    losartan (COZAAR) 100 mg tablet TAKE 1 TABLET BY MOUTH EVERY DAY 30 Tab 11    isosorbide mononitrate ER (IMDUR) 30 mg tablet TAKE 1 TABLET EVERY DAY 30 Tab 11    furosemide (LASIX) 80 mg tablet TAKE 1 TABLET BY MOUTH EVERY MORNING AND TAKE 1/2 TABLET EVERY EVENING 45 Tab 11    pravastatin (PRAVACHOL) 40 mg tablet Take 1 Tab by mouth nightly. 30 Tab 11    hydrocortisone (HYTONE) 2.5 % ointment Apply  to affected area two (2) times daily as needed.  cholecalciferol, vitamin D3, (VITAMIN D3) 2,000 unit tab Take 1 Tab by mouth daily.  econazole nitrate (SPECTAZOLE) 1 % topical cream Apply  to affected area two (2) times daily as needed. 15 g 0    acetaminophen (TYLENOL EXTRA STRENGTH) 500 mg tablet Take 1,000 mg by mouth every six (6) hours as needed for Pain.  Aspirin, Buffered 81 mg tab Take 81 mg by mouth daily.          Allergies   Allergen Reactions    Bactrim [Sulfamethoxazole-Trimethoprim] Unknown (comments)     Pt does not recall    Darvocet A500 [Propoxyphene N-Acetaminophen] Itching         Past Medical History:   Diagnosis Date    Anxiety     Aortic stenosis 3/3/2010    Aortic stenosis     Arrhythmia     MURMUR    Arthritis     SPINAL STENOSIS    Atherosclerosis of coronary artery     CHF (congestive heart failure) ([facility] [address] 3/3/2010    CHF (congestive heart failure) ([facility] [address] 01/2002    Chronic heart failure ([facility] [address] 1/2002; 3/3/2010    chronic diastolic heart failure    Chronic pain     LOWER BACK, RIGHT KNEE    Environmental allergies 3/3/2010    GERD (gastroesophageal reflux disease) 3/3/2010    Hiatal hernia 3/3/2010    High cholesterol 3/3/2010    HTN (hypertension) 3/3/2010    Hypokalemia 3/3/2010    Ischemic heart disease, chronic     Obese     Psychiatric disorder     anxiety    S/P hysterectomy 3/3/2010    Spinal stenosis 3/3/2010         Past Surgical History:   Procedure Laterality Date    CARDIAC CATHETERIZATION  01/2002    normal coronaries    DECOMPRESS DISC RF LUMBAR  07/2007    HX BACK SURGERY  5/1/2014    HX BREAST LUMPECTOMY Left 1989    benign left breast    HX BUNIONECTOMY      HX BUNIONECTOMY      HX HEART CATHETERIZATION  3/3/10    HX HYSTERECTOMY  1978    HX LUMBAR DISKECTOMY      HX ORTHOPAEDIC Bilateral     BUNIONECTOMY    HX ORTHOPAEDIC Right     WRIST FX    HX OTHER SURGICAL  10/12/2015    mole removed from right side of face by Dr. Santos Guevara FLX DX W/COLLJ [facility] PFRMD  [id number]    Dr Yvette Sanchez         Family History   Problem Relation Age of Onset    Hypertension Mother     Heart Disease Father     Stroke Sister     Heart Disease Sister     Cancer Brother      LUNG    Cancer Brother      PROSTATE    Hypertension Brother     No Known Problems Brother     Lung Disease Brother     No Known Problems Brother     No Known Problems Brother     Stroke Daughter 47    No Known Problems Daughter     Anesth Problems Neg Hx        Social History   Substance Use Topics    Smoking status: Never Smoker    Smokeless tobacco: Never Used    Alcohol use No        Lab Results   Component Value Date/Time    WBC 6.3 05/15/2017 03:04 PM    WBC 5.2 05/07/2012 01:27 PM    HGB 12.4 05/15/2017 03:04 PM    HCT 38.1 05/15/2017 03:04 PM    PLATELET 281 68/88/1224 03:04 PM    MCV 88 05/15/2017 03:04 PM       Lab Results   Component Value Date/Time    Cholesterol, total 194 02/13/2017 12:17 PM    HDL Cholesterol 50 02/13/2017 12:17 PM    LDL, calculated 109 02/13/2017 12:17 PM    Triglyceride 177 02/13/2017 12:17 PM    CHOL/HDL Ratio 3.1 11/01/2010 05:26 PM       Lab Results   Component Value Date/Time    Sodium 141 05/15/2017 03:04 PM    Potassium 3.7 05/15/2017 03:04 PM    Chloride 98 05/15/2017 03:04 PM CO2 28 05/15/2017 03:04 PM    Anion gap 5 12/26/2016 02:25 PM    Glucose 98 05/15/2017 03:04 PM    BUN 23 05/15/2017 03:04 PM    Creatinine 1.24 05/15/2017 03:04 PM    BUN/Creatinine ratio 19 05/15/2017 03:04 PM    GFR est AA 48 05/15/2017 03:04 PM    GFR est non-AA 41 05/15/2017 03:04 PM    Calcium 8.8 05/15/2017 03:04 PM    Bilirubin, total 0.5 05/15/2017 03:04 PM    ALT (SGPT) 13 05/15/2017 03:04 PM    AST (SGOT) 17 05/15/2017 03:04 PM    Alk. phosphatase 76 05/15/2017 03:04 PM    Protein, total 6.9 05/15/2017 03:04 PM    Albumin 3.7 05/15/2017 03:04 PM    Globulin 4.3 12/26/2016 02:25 PM    A-G Ratio 1.2 05/15/2017 03:04 PM      Lab Results   Component Value Date/Time    Hemoglobin A1c 5.4 04/16/2014 12:20 PM    Hemoglobin A1c (POC) 5.5 11/26/2014 12:12 PM         Review of Systems   Constitutional: Negative for malaise/fatigue. HENT: Negative for congestion. Eyes: Negative for blurred vision. Respiratory: Negative for cough and shortness of breath. Cardiovascular: Negative for chest pain, palpitations and leg swelling. Gastrointestinal: Negative for abdominal pain, constipation and heartburn. Genitourinary: Negative for dysuria, frequency and urgency. Musculoskeletal: Positive for back pain and joint pain. Neurological: Negative for dizziness, tingling and headaches. Endo/Heme/Allergies: Negative for environmental allergies. Psychiatric/Behavioral: Negative for depression. The patient does not have insomnia. Physical Exam   Constitutional: She appears well-developed and well-nourished. /79 (BP 1 Location: Left arm, BP Patient Position: Sitting)  Pulse 68  Temp 96.8 °F (36 °C) (Oral)   Resp 18  Ht 5' (1.524 m)  Wt 196 lb 9.6 oz (89.2 kg)  LMP  (LMP Unknown)  SpO2 91%  BMI 38.4 kg/m2     HENT:   Right Ear: Tympanic membrane and ear canal normal.   Left Ear: Tympanic membrane and ear canal normal.   Nose: No mucosal edema or rhinorrhea.    Mouth/Throat: Oropharynx is clear and moist and mucous membranes are normal.   Neck: Normal range of motion. Neck supple. No thyromegaly present. Cardiovascular: Normal rate and regular rhythm. No murmur heard. Pulmonary/Chest: Effort normal and breath sounds normal.   Abdominal: Soft. Bowel sounds are normal. There is no tenderness. Musculoskeletal: Normal range of motion. She exhibits trace edema. Lymphadenopathy:     She has no cervical adenopathy. Skin: Skin is warm and dry. Psychiatric: She has a normal mood and affect. Nursing note and vitals reviewed. ASSESSMENT and PLAN  Diagnoses and all orders for this visit:    1. Essential hypertension  Stable     2. Mixed hyperlipidemia  -     LIPID PANEL    3. SOBOE (shortness of breath on exertion)  O2 sat is a lower than her usual.  Pt could not go for CTA today but stated she will go if scheduled for tomorrow. She is advised to proceed to the ER should her sx worsen or if she develops chest pain. -     CTA CHEST W OR W WO CONT; Future    4. Chronic diastolic heart failure (HCC)//  5. Atherosclerosis of native coronary artery of native heart without angina pectoris  Follow up as per cardiology    6. 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 Chronic anxiety  Stable     8. Encounter for medication monitoring  -     METABOLIC PANEL, COMPREHENSIVE  -     AMB POC COMPLETE CBC, AUTOMATED      Follow-up Disposition:  Return in about 3 months (around 11/15/2017). reviewed diet, exercise and weight control  cardiovascular risk and specific lipid/LDL goals reviewed  reviewed medications and side effects in detail  specific diabetic recommendations: low cholesterol diet, weight control and daily exercise discussed and glycohemoglobin and other lab monitoring discussed    I have discussed diagnosis listed in this note with pt and/or family.  I have discussed treatment plans and options and the risk/benefit analysis of those options, including safe use of medications and possible medication side effects. Through the use of shared decision making we have agreed to the above plan. The patient has received an after-visit summary and questions were answered concerning future plans and follow up. Advise pt of any urgent changes then to proceed to the ER.

## 2017-08-15 NOTE — MR AVS SNAPSHOT
Visit Information Date & Time Provider Department Dept. Phone Encounter #  
 8/15/2017  9:45 AM Jasmyn Story MD [facility] [phone] [id number] Follow-up Instructions Return in about 3 months (around 11/15/2017). Your Appointments 8/23/2017  9:30 AM  
ESTABLISHED PATIENT with [facility]) Appt Note: 6 MO. F/U  
 [address] [place]  
[phone] [address] Upcoming Health Maintenance Date Due OSTEOPOROSIS SCREENING (DEXA) 7/31/2002 GLAUCOMA SCREENING Q2Y 6/15/2015 INFLUENZA AGE 9 TO ADULT 8/1/2017 MEDICARE YEARLY EXAM 3/29/2018 COLONOSCOPY 10/13/2020 DTaP/Tdap/Td series (2 - Td) 7/25/2026 Allergies as of 8/15/2017  Review Complete On: 8/15/2017 By: Jasmyn Story MD  
  
 Severity Noted Reaction Type Reactions Bactrim [Sulfamethoxazole-trimethoprim]  03/29/2010    Unknown (comments) Pt does not recall Darvocet A500 [Propoxyphene N-acetaminophen]  11/01/2010    Itching Current Immunizations  Reviewed on 8/15/2017 Name Date Influenza High Dose Vaccine PF 11/7/2016, 10/13/2015, 11/26/2014 Influenza Vaccine 12/31/2013 Influenza Vaccine Split 10/9/2012, 11/1/2011, 11/1/2010 Pneumococcal Vaccine (Unspecified Type) 1/1/2008 Reviewed by Jasmyn Story MD on 8/15/2017 at 10:52 AM  
You Were Diagnosed With   
  
 Codes Comments Essential hypertension    -  Primary ICD-10-CM: I10 
ICD-9-CM: 401.9 Mixed hyperlipidemia     ICD-10-CM: E78.2 ICD-9-CM: 272.2 SOBOE (shortness of breath on exertion)     ICD-10-CM: R06.02 
ICD-9-CM: 786.05 Chronic diastolic heart failure (HCC)     ICD-10-CM: I50.32 
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 2 Chronic anxiety     ICD-10-CM: F41.9 ICD-9-CM: 300.00 Encounter for medication monitoring     ICD-10-CM: Z51.81 
ICD-9-CM: V58.83 Vitals BP Pulse Temp Resp Height(growth percentile) Weight(growth percentile) 120/79 (BP 1 Location: Left arm, BP Patient Position: Sitting) 68 96.8 °F (36 °C) (Oral) 18 5' (1.524 m) 196 lb 9.6 oz (89.2 kg) LMP SpO2 BMI OB Status Smoking Status (LMP Unknown) 91% 38.4 kg/m2 Hysterectomy Never Smoker BMI and BSA Data Body Mass Index Body Surface Area  
 38.4 kg/m 2 1.94 m 2 Preferred Pharmacy Pharmacy Name Phone [id number]- [place], [place] - [address] [phone] Your Updated Medication List  
  
   
This list is accurate as of: 8/15/17 11:32 AM.  Always use your most recent med list. amLODIPine 10 mg tablet Commonly known as:  Unknown [place] TAKE 1/2 TABLET BY MOUTH TWICE PER DAY  
  
 aspirin, buffered 81 mg Tab Take 81 mg by mouth daily. clonazePAM 1 mg tablet Commonly known as:  KlonoPIN  
TAKE1/2 TO 1 TAB BY MOUTH EVERY MORNING AS NEEDED ANXIETY & TAKE 1 TAB BY MOUTH AT BEDTIME FOR SLEEP  
  
 furosemide 80 mg tablet Commonly known as:  LASIX TAKE 1 TABLET BY MOUTH EVERY MORNING AND TAKE 1/2 TABLET EVERY EVENING  
  
 isosorbide mononitrate ER 30 mg tablet Commonly known as:  IMDUR  
TAKE 1 TABLET EVERY DAY  
  
 losartan 100 mg tablet Commonly known as:  COZAAR  
TAKE 1 TABLET BY MOUTH EVERY DAY  
  
 metoprolol tartrate 25 mg tablet Commonly known as:  LOPRESSOR  
TAKE 1 TABLET BY MOUTH TWICE A DAY  
  
 nitroglycerin 0.4 mg SL tablet Commonly known as:  NITROSTAT  
TAKE 1 TAB BY SUBLINGUAL ROUTE EVERY 5 MINUTES AS NEEDED. omeprazole 20 mg capsule Commonly known as:  PRILOSEC  
TAKE ONE CAPSULE BY MOUTH EVERY MORNING  
  
 potassium chloride SR 10 mEq tablet Commonly known as:  KLOR-CON 10  
TAKE 2 TABLETS BY MOUTH EVERY DAY  
  
 pravastatin 40 mg tablet Commonly known as:  PRAVACHOL Take 1 Tab by mouth nightly. promethazine-dextromethorphan 6.25-15 mg/5 mL syrup Commonly known as:  PROMETHAZINE-DM Take 5 mL by mouth every six (6) hours as needed for Cough. traMADol 50 mg tablet Commonly known as:  ULTRAM  
Take 1 Tab by mouth every eight (8) hours as needed for Pain. Max Daily Amount: 150 mg.  
  
 TYLENOL EXTRA STRENGTH 500 mg tablet Generic drug:  acetaminophen Take 1,000 mg by mouth every six (6) hours as needed for Pain. VITAMIN D3 2,000 unit Tab Generic drug:  cholecalciferol (vitamin D3) Take 1 Tab by mouth daily. We Performed the Following AMB POC COMPLETE CBC, AUTOMATED [90774 CPT(R)] LIPID PANEL [08642 CPT(R)] METABOLIC PANEL, COMPREHENSIVE [00353 CPT(R)] Follow-up Instructions Return in about 3 months (around 11/15/2017). To-Do List   
 08/15/2017 Imaging:  CTA CHEST W OR W WO CONT   
  
 08/16/2017 1:00 PM  
  Appointment with [facility] 1 at [facility] [address] ([phone]) CONTRAST STUDY: 1. The patient should not eat solid food four hours before the appointment but should be encouraged to drink clear liquids. 2. The patient will require IV access for contrast administration. 3. The patient should not take  Ibuprofen (Advil, Motrin, etc.) and Naproxen Sodium (Aleve, etc.)  on the day of the exam. Stopping non-steroidal anti-inflammatory agents (NSAIDs) like Ibuprofen decreases the risk of kidney damage from the x-ray contrast (dye). 4. Bring any non [facility] facility films/images pertaining to the area of interest with you on the day of appointment.  5. Bring current lab work if available(within last 90 days CMP) ***If scheduled at [facility], iSTAT is not available, labs will need to be done before appointment*** 6. Check in at registration at least 30 minutes before appt time unless you were instructed to do otherwise. Introducing [facility] SERVICES! Dear Pastor Aguilera: 
Thank you for requesting a [employer] account. Our records indicate that you already have an active [employer] account. You can access your account anytime at [email or web address]. Patient [email or web address] Did you know that you can access your hospital and ER discharge instructions at any time in [employer]? You can also review all of your test results from your hospital stay or ER visit. Additional Information If you have questions, please visit the Frequently Asked Questions section of the [employer] website at [email or web address] Remember, [employer] is NOT to be used for urgent needs. For medical emergencies, dial 911. Now available from your iPhone and Android! Please provide this summary of care documentation to your next provider. Your primary care clinician is listed as Timur Groves. If you have any questions after today's visit, please call [phone].

## 2017-08-15 NOTE — PROGRESS NOTES
Chief Complaint   Patient presents with    Hypertension    Cholesterol Problem     1. Have you been to the ER, urgent care clinic since your last visit? Hospitalized since your last visit? Yes When: June 2017 to [facility] ER for SOB and congestion    2. Have you seen or consulted any other health care providers outside of the [address] [place] [address] since your last visit? Include any pap smears or colon screening.  No

## 2017-08-16 ENCOUNTER — TELEPHONE (OUTPATIENT)
Dept: FAMILY MEDICINE CLINIC | Age: 82
End: 2017-08-16

## 2017-08-16 ENCOUNTER — HOSPITAL ENCOUNTER (OUTPATIENT)
Dept: CT IMAGING | Age: 82
Discharge: HOME | End: 2017-08-16
Attending: FAMILY MEDICINE
Payer: MEDICARE

## 2017-08-16 DIAGNOSIS — R06.02 SOBOE (SHORTNESS OF BREATH ON EXERTION): ICD-10-CM

## 2017-08-16 LAB
ALBUMIN SERPL-MCNC: 4 G/DL (ref 3.5–4.7)
ALBUMIN/GLOB SERPL: 1.3 {RATIO} (ref 1.2–2.2)
ALP SERPL-CCNC: 85 IU/L (ref 39–117)
ALT SERPL-CCNC: 14 IU/L (ref 0–32)
AST SERPL-CCNC: 17 IU/L (ref 0–40)
BILIRUB SERPL-MCNC: 0.6 MG/DL (ref 0–1.2)
BUN SERPL-MCNC: 14 MG/DL (ref 8–27)
BUN/CREAT SERPL: 15 (ref 12–28)
CALCIUM SERPL-MCNC: 9.1 MG/DL (ref 8.7–10.3)
CHLORIDE SERPL-SCNC: 99 MMOL/L (ref 96–106)
CHOLEST SERPL-MCNC: 212 MG/DL (ref 100–199)
CO2 SERPL-SCNC: 27 MMOL/L (ref 18–29)
CREAT SERPL-MCNC: 0.96 MG/DL (ref 0.57–1)
GLOBULIN SER CALC-MCNC: 3.2 G/DL (ref 1.5–4.5)
GLUCOSE SERPL-MCNC: 99 MG/DL (ref 65–99)
HDLC SERPL-MCNC: 59 MG/DL
INTERPRETATION, 910389: NORMAL
INTERPRETATION: NORMAL
LDLC SERPL CALC-MCNC: 130 MG/DL (ref 0–99)
PDF IMAGE, 910387: NORMAL
POTASSIUM SERPL-SCNC: 3.4 MMOL/L (ref 3.5–5.2)
PROT SERPL-MCNC: 7.2 G/DL (ref 6–8.5)
SODIUM SERPL-SCNC: 142 MMOL/L (ref 134–144)
TRIGL SERPL-MCNC: 115 MG/DL (ref 0–149)
VLDLC SERPL CALC-MCNC: 23 MG/DL (ref 5–40)

## 2017-08-16 PROCEDURE — 74011636320 HC RX REV CODE- 636/320

## 2017-08-16 PROCEDURE — 71275 CT ANGIOGRAPHY CHEST: CPT

## 2017-08-16 RX ORDER — SODIUM CHLORIDE 0.9 % (FLUSH) 0.9 %
10 SYRINGE (ML) INJECTION
Status: DISPENSED | OUTPATIENT
Start: 2017-08-16 | End: 2017-08-17

## 2017-08-17 LAB — BNP SERPL-MCNC: 120.5 PG/ML (ref 0–100)

## 2017-08-23 ENCOUNTER — OFFICE VISIT (OUTPATIENT)
Dept: CARDIOLOGY CLINIC | Age: 82
End: 2017-08-23

## 2017-08-23 VITALS
BODY MASS INDEX: 38.48 KG/M2 | SYSTOLIC BLOOD PRESSURE: 139 MMHG | WEIGHT: 196 LBS | OXYGEN SATURATION: 93 % | HEIGHT: 60 IN | HEART RATE: 62 BPM | DIASTOLIC BLOOD PRESSURE: 85 MMHG

## 2017-08-23 DIAGNOSIS — E78.2 MIXED HYPERLIPIDEMIA: ICD-10-CM

## 2017-08-23 DIAGNOSIS — Z98.890 S/P CARDIAC CATHETERIZATION: ICD-10-CM

## 2017-08-23 DIAGNOSIS — I50.32 CHRONIC DIASTOLIC HEART FAILURE (HCC): ICD-10-CM

## 2017-08-23 DIAGNOSIS — I10 ESSENTIAL HYPERTENSION: Primary | Chronic | ICD-10-CM

## 2017-08-23 DIAGNOSIS — G47.20 CIRCADIAN RHYTHM SLEEP DISORDER: ICD-10-CM

## 2017-08-23 DIAGNOSIS — I25.9 CHRONIC ISCHEMIC HEART DISEASE: ICD-10-CM

## 2017-08-23 DIAGNOSIS — F41.9 CHRONIC ANXIETY: ICD-10-CM

## 2017-08-23 DIAGNOSIS — I45.10 RBBB: ICD-10-CM

## 2017-08-23 DIAGNOSIS — I25.10 ATHEROSCLEROSIS OF NATIVE CORONARY ARTERY OF NATIVE HEART WITHOUT ANGINA PECTORIS: ICD-10-CM

## 2017-08-23 NOTE — PROGRESS NOTES
[facility]   [address] [facility] [place], [address]                                          NEW PATIENT HPI/FOLLOW-UP      NAME:  Milford Lanes   :   1935   MRN:   [id number]   PCP:  Samanta Small MD           Subjective: The patient is a [de-identified]y.o. year old female with h/o HTN, hyperlipidemia, chronic diastolic heart failure, RBBB, NSTEMI s/p cardiac cath in  showing distal PDA too small for stenting (see scanned cath document under Cardiology) who returns for a routine follow-up. Since the last visit, patient reports worsening RICHARD in past 2 months. Reports it is worse when she rushes and can somewhat control the SOB by slowing down and taking her time to do things. Notes it occurs with short distances and mild exertion. Need only cross a room to elicit sx. She also reports an episode of chest pain with palpitations/rapid heart beat and SOB that woke her from sleep 2 nights ago. No LE swelling, except at right knee where she reports arthritis. Denies medication intolerance, palpitations, sputum, syncope, dizziness or light headedness. Doing satisfactorily. Review of Systems  General: Pt denies excessive weight gain or loss. Pt is able to conduct ADL's. Respiratory: +shortness of breath, RICHARD, Denies wheezing or stridor.   Cardiovascular: +precordial pain, palpitations, Denies edema or PND  Gastrointestinal: Denies poor appetite, indigestion, abdominal pain or blood in stool  Peripheral vascular: Denies claudication, leg cramps  Neuropsychiatric: Denies paresthesias,tingling,numbness,anxiety,depression,fatigue  Musculoskeletal: Denies pain,tenderness, soreness,swelling      Past Medical History:   Diagnosis Date    Anxiety     Aortic stenosis 3/3/2010    Aortic stenosis     Arrhythmia     MURMUR    Arthritis     SPINAL STENOSIS    Atherosclerosis of coronary artery     CHF (congestive heart failure) ([facility] 3/3/2010    CHF (congestive heart failure) ([facility] 01/2002    Chronic heart failure ([facility] 1/2002; 3/3/2010    chronic diastolic heart failure    Chronic pain     LOWER BACK, RIGHT KNEE    Environmental allergies 3/3/2010    GERD (gastroesophageal reflux disease) 3/3/2010    Hiatal hernia 3/3/2010    High cholesterol 3/3/2010    HTN (hypertension) 3/3/2010    Hypokalemia 3/3/2010    Ischemic heart disease, chronic     Obese     Psychiatric disorder     anxiety    S/P hysterectomy 3/3/2010    Spinal stenosis 3/3/2010     Patient Active Problem List    Diagnosis Date Noted    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 02/12/2016    Encounter for medication monitoring 11/29/2015    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    Essential hypertension 07/12/2015    Gastroesophageal reflux disease without esophagitis 07/12/2015    Atherosclerosis of native coronary artery without angina pectoris 07/12/2015    Chronic anxiety 07/12/2015    Chest pain, unspecified 02/24/2014    Chest pain at rest 02/20/2014    RBBB 02/20/2014    Acute respiratory failure ([facility] 04/02/2013    Chronic ischemic heart disease 05/29/2012    Mixed hyperlipidemia 05/29/2012    Chronic diastolic heart failure ([facility] 05/29/2012    Hypovitaminosis D 07/28/2010    Hypokalemia 03/03/2010    Hiatal hernia 03/03/2010    Anxiety 03/03/2010    S/P hysterectomy 03/03/2010    Spinal stenosis 03/03/2010    S/P foot surgery 03/03/2010    Environmental allergies 03/03/2010    S/P cardiac catheterization 03/03/2010      Past Surgical History:   Procedure Laterality Date    CARDIAC CATHETERIZATION  01/2002    normal coronaries    DECOMPRESS DISC RF LUMBAR  07/2007    HX BACK SURGERY  5/1/2014    HX BREAST LUMPECTOMY Left 1989    benign left breast    HX BUNIONECTOMY      HX BUNIONECTOMY      HX HEART CATHETERIZATION  3/3/10    HX HYSTERECTOMY  1978    HX LUMBAR DISKECTOMY      HX ORTHOPAEDIC Bilateral     BUNIONECTOMY    HX ORTHOPAEDIC Right     WRIST FX    HX OTHER SURGICAL  10/12/2015    mole removed from right side of face by Dr. Jeanna Fonseca FLX DX Kamryn Rubin PFD  [id number]    Dr Brayan Loving     Allergies   Allergen Reactions    Bactrim [Sulfamethoxazole-Trimethoprim] Unknown (comments)     Pt does not recall    Darvocet A500 [Propoxyphene N-Acetaminophen] Itching      Family History   Problem Relation Age of Onset    Hypertension Mother     Heart Disease Father     Stroke Sister     Heart Disease Sister     Cancer Brother      LUNG    Cancer Brother      PROSTATE    Hypertension Brother     No Known Problems Brother     Lung Disease Brother     No Known Problems Brother     No Known Problems Brother     Stroke Daughter 47    No Known Problems Daughter     Anesth Problems Neg Hx       Social History     Social History    Marital status:      Spouse name: N/A    Number of children: N/A    Years of education: N/A     Occupational History    Not on file. Social History Main Topics    Smoking status: Never Smoker    Smokeless tobacco: Never Used    Alcohol use No    Drug use: No    Sexual activity: Not Currently     Other Topics Concern    Not on file     Social History Narrative      Current Outpatient Prescriptions   Medication Sig    traMADol (ULTRAM) 50 mg tablet Take 1 Tab by mouth every eight (8) hours as needed for Pain. Max Daily Amount: 150 mg.    omeprazole (PRILOSEC) 20 mg capsule TAKE ONE CAPSULE BY MOUTH EVERY MORNING    clonazePAM (KLONOPIN) 1 mg tablet TAKE1/2 TO 1 TAB BY MOUTH EVERY MORNING AS NEEDED ANXIETY & TAKE 1 TAB BY MOUTH AT BEDTIME FOR SLEEP    amLODIPine (NORVASC) 10 mg tablet TAKE 1/2 TABLET BY MOUTH TWICE PER DAY    nitroglycerin (NITROSTAT) 0.4 mg SL tablet TAKE 1 TAB BY SUBLINGUAL ROUTE EVERY 5 MINUTES AS NEEDED.  promethazine-dextromethorphan (PROMETHAZINE-DM) 6.25-15 mg/5 mL syrup Take 5 mL by mouth every six (6) hours as needed for Cough.  potassium chloride SR (KLOR-CON 10) 10 mEq tablet TAKE 2 TABLETS BY MOUTH EVERY DAY    metoprolol tartrate (LOPRESSOR) 25 mg tablet TAKE 1 TABLET BY MOUTH TWICE A DAY    losartan (COZAAR) 100 mg tablet TAKE 1 TABLET BY MOUTH EVERY DAY    isosorbide mononitrate ER (IMDUR) 30 mg tablet TAKE 1 TABLET EVERY DAY    furosemide (LASIX) 80 mg tablet TAKE 1 TABLET BY MOUTH EVERY MORNING AND TAKE 1/2 TABLET EVERY EVENING    pravastatin (PRAVACHOL) 40 mg tablet Take 1 Tab by mouth nightly.  cholecalciferol, vitamin D3, (VITAMIN D3) 2,000 unit tab Take 1 Tab by mouth daily.  acetaminophen (TYLENOL EXTRA STRENGTH) 500 mg tablet Take 1,000 mg by mouth every six (6) hours as needed for Pain.  Aspirin, Buffered 81 mg tab Take 81 mg by mouth daily. No current facility-administered medications for this visit. I have reviewed the MAs notes, vitals, problem list, allergy list, medical history, family medical, social history and medications. Objective:     Physical Exam:     Vitals:    08/23/17 0932   BP: 139/85   Pulse: 62   SpO2: 93%   Weight: 196 lb (88.9 kg)   Height: 5' (1.524 m)    Body mass index is 38.28 kg/(m^2). General: WDWN elderly female ambulatory with cane, in no acute distress. Pleasant affect. HEENT: No carotid bruits, no JVD, trach is midline. Heart:  Normal S1/S2 negative S3 or S4. Regular with extra beats, no murmur, gallop or rub.   Respiratory: Clear bilaterally, no wheezing or rales  Abdomen:   Soft, non-tender, bowel sounds are active.   Extremities:  No edema, normal cap refill, no cyanosis. Neuro: A&Ox3, speech clear, gait stable. Skin: Skin color is normal. No rashes or lesions. No diaphoresis. Vascular: 2+ pulses symmetric in all extremities      Data Review:       Cardiographics:    EKG: NSR, with RBBB (chronic) and occasional PVCs.     Cardiology Labs:    Results for orders placed or performed during the hospital encounter of 05/28/14   EKG, 12 LEAD, INITIAL Result Value Ref Range    Ventricular Rate 98 BPM    Atrial Rate 98 BPM    P-R Interval 196 ms    QRS Duration 118 ms    Q-T Interval 356 ms    QTC Calculation (Bezet) 454 ms    Calculated P Axis 69 degrees    Calculated R Axis -32 degrees    Calculated T Axis 20 degrees    Diagnosis       Normal sinus rhythm  Left axis deviation  Left ventricular hypertrophy with QRS widening  Right bundle branch block  When compared with ECG of 02-MAY-2014 08:09,  Vent. rate has increased BY  35 BPM  Confirmed by Kathy Doom ([id number]) on 5/28/2014 3:32:37 PM       Lab Results   Component Value Date/Time    Cholesterol, total 212 08/15/2017 11:07 AM    HDL Cholesterol 59 08/15/2017 11:07 AM    LDL, calculated 130 08/15/2017 11:07 AM    Triglyceride 115 08/15/2017 11:07 AM    CHOL/HDL Ratio 3.1 11/01/2010 05:26 PM       Lab Results   Component Value Date/Time    Sodium 142 08/15/2017 11:07 AM    Potassium 3.4 08/15/2017 11:07 AM    Chloride 99 08/15/2017 11:07 AM    CO2 27 08/15/2017 11:07 AM    Anion gap 5 12/26/2016 02:25 PM    Glucose 99 08/15/2017 11:07 AM    BUN 14 08/15/2017 11:07 AM    Creatinine 0.96 08/15/2017 11:07 AM    BUN/Creatinine ratio 15 08/15/2017 11:07 AM    GFR est AA 65 08/15/2017 11:07 AM    GFR est non-AA 56 08/15/2017 11:07 AM    Calcium 9.1 08/15/2017 11:07 AM    Bilirubin, total 0.6 08/15/2017 11:07 AM    AST (SGOT) 17 08/15/2017 11:07 AM    Alk. phosphatase 85 08/15/2017 11:07 AM    Protein, total 7.2 08/15/2017 11:07 AM    Albumin 4.0 08/15/2017 11:07 AM    Globulin 4.3 12/26/2016 02:25 PM    A-G Ratio 1.3 08/15/2017 11:07 AM    ALT (SGPT) 14 08/15/2017 11:07 AM         Assessment:       ICD-10-CM ICD-9-CM    1. Essential hypertension I10 401.9 AMB POC EKG ROUTINE W/ 12 LEADS, INTER & REP   2. Chronic ischemic heart disease I25.9 414.9 AMB POC EKG ROUTINE W/ 12 LEADS, INTER & REP   3. Mixed hyperlipidemia E78.2 272.2    4. Chronic diastolic heart failure (HCC) I50.32 428.32    5.  Circadian rhythm sleep disorder G47.20 327.30    6. Chronic anxiety F41.9 300.00          Discussion: Patient presents at this time stable from a cardiac perspective. Pleased with present status. Plan: 1. Continue same meds. Lipid profile and labs followed by PCP.   -- NM Lexiscan Stress test   -- Echo   -- Consider event monitor if palpitations recur after ischemic w/u complete   -- Advise sleep apnea w/u through PCP    2. Encouraged to exercise to tolerance, lose weight, and follow low fat, low cholesterol, low sodium predominantly Plant-based (consider Mediterranean) diet. 3.Follow up: 6 months or sooner, pending results   -- Call with questions or concerns. Will follow up any test results by phone and/or f/u here in office if needed. .        I have discussed the diagnosis with the patient and the intended plan as seen in the above orders. The patient has received an after-visit summary and questions were answered concerning future plans. I have discussed any concerning medication side effects and warnings with the patient as well. Patient seen and examined. All pertinent data reviewed. I have reviewed detailed note as outlined by Roosevelt Adams. Case discussed with Nursing/medical assistant staff and Roosevelt Adams. Patient with progressive SOB and fatigue over very short distances over last few months. One episode of nocturnal rapid palpitations awakening from sleep few days ago. Took sl NTG with relief in 10-15 min. No chest pain. Has hx of severe diffuse small vsl disease in PDA/ROSA RCA via cardiac cath 2009--not a candidate for stenting mor CABG at the time. Aggressive medical rx implemented. Plans as outlined.         J Carlos Nunez PA-C  8/23/2017    BONY Cox

## 2017-08-23 NOTE — PATIENT INSTRUCTIONS
-- Please schedule the stress test and echo  -- We will follow up with you regarding results     Cardiac Perfusion Scan (Medicine): About This Test  What is it? A cardiac perfusion scan measures the amount of blood in your heart muscle at rest and after your heart has been made to work hard. During the scan, a camera takes pictures of your heart after a radioactive tracer is injected into a vein in your arm. The tracer travels through the blood and into your heart. As the tracer moves through your heart, areas that have good blood flow absorb the tracer. Areas that do not absorb the tracer may not be getting enough blood or may have been damaged by a heart attack. The pictures show this difference. Two sets of pictures may be made during the test. One set is taken while you are resting. Another set is taken after your heart has been made to work harder (called a stress test). The heart can be stressed by using medicine or exercise. This information is about using medicine to stress the heart. This test is also known by other names, including myocardial perfusion scan, myocardial perfusion imaging, thallium scan, sestamibi cardiac scan, and nuclear stress test.  Why is this test done? The test is often done to find out what may be causing chest pain or pressure. It may be done after a heart attack to see if areas of the heart are not getting enough blood or to find out how much your heart has been damaged from the heart attack. How can you prepare for the test?  Tell your doctor if:  · You take medicine for an erection problem, such as sildenafil (Viagra), tadalafil (Cialis), and vardenafil (Levitra). You may need to take nitroglycerin during this test, which can cause a serious reaction if you have taken a medicine for an erection problem within the past 48 hours. · You have had bleeding problems, or if you take aspirin or some other blood thinner. · You are or might be pregnant. · You are breastfeeding. Don't use your breast milk for 1 to 2 days after the scan. Use formula instead. What happens during the test?  Resting or baseline scan  · You will take your top off and be given a gown to wear. · Electrodes will be attached to your chest to keep track of your heartbeats. · Your arm will be cleaned. You will have a tube, called an IV, going into your arm. A small amount of the radioactive tracer will be put in the IV. · You will lie on your back or your stomach on a table with a large camera positioned above your chest. The camera records the tracer's signals as it moves through your blood. The camera does not produce any radiation, so you are not exposed to any additional radiation while the scan is being done. · You will be asked to remain very still during each scan, which takes about 5 to 10 minutes. The camera will move to take more pictures at different angles. Several scans will be taken. This test takes about 30 to 40 minutes. Stress scan using medicine  The stress scan is done in two parts. In many hospitals, you first have the resting scan. You are then given a medicine that makes your heart work harder and you have another scan. Sometimes the stress scan is done first.  · You will have a test called an electrocardiogram (EKG or ECG), which takes about 5 to 10 minutes. You may have other EKGs during and after the stress test.  · Medicine will be put in your IV. It will make your heart work harder. You may get a headache and feel dizzy, flushed, and nauseated from the medicine, but these symptoms usually do not last long. · Your heartbeat and blood pressure may be checked. · Your symptoms such as chest pain and shortness of breath will be checked. · A few minutes after you get the medicine, another small amount of radioactive tracer is injected. You may be given something to reverse the medicine used to make your heart work harder.   · You will wait for 30 to 40 minutes and then have another resting scan. See the \"Resting or baseline scan\" section. This test takes about 30 to 40 minutes. What else should you know about the test?  · Sometimes more pictures are taken 2 to 4 hours after the stress scan. · No electricity passes through your body during the test. There is no danger of getting an electrical shock. What happens after the test?  · You can go back to your usual activities right away. · You will probably be able to go home right away. · Drink plenty of fluids for the next 24 hours to help flush the tracer out of your body. If you have kidney, heart, or liver disease and have to limit fluids, talk with your doctor before you increase the amount of fluids you drink. When should you call for help? Call 911 anytime you think you may need emergency care. For example, call if:  · You passed out (lost consciousness). · You have been diagnosed with angina, and you have angina symptoms that do not go away with rest or are not getting better within 5 minutes after you take a dose of nitroglycerin. · You have symptoms of a heart attack. These may include:  ¨ Chest pain or pressure, or a strange feeling in the chest.  ¨ Sweating. ¨ Shortness of breath. ¨ Nausea or vomiting. ¨ Pain, pressure, or a strange feeling in the back, neck, jaw, or upper belly or in one or both shoulders or arms. ¨ Lightheadedness or sudden weakness. ¨ A fast or irregular heartbeat. After you call 911, the  may tell you to chew 1 adult-strength or 2 to 4 low-dose aspirin. Wait for an ambulance. Do not try to drive yourself. Call your doctor now or seek immediate medical care if:  · You have had any angina symptoms, such as chest pain or pressure, even if they have gone away. · You have new or increased shortness of breath. · You are dizzy or lightheaded, or you feel like you may faint. Watch closely for changes in your health, and be sure to contact your doctor if you have any problems.   Follow-up care is a key part of your treatment and safety. Be sure to make and go to all appointments, and call your doctor if you are having problems. It's also a good idea to keep a list of the medicines you take. Ask your doctor when you can expect to have your test results. Where can you learn more? Go to http://anderson-jesús.info/. Enter R320 in the search box to learn more about \"Cardiac Perfusion Scan (Medicine): About This Test.\"  Current as of: May 2, 2016  Content Version: 11.3  © 6164-2786 [employer]. Care instructions adapted under license by [employer] (which disclaims liability or warranty for this information). If you have questions about a medical condition or this instruction, always ask your healthcare professional. [address] any warranty or liability for your use of this information.

## 2017-08-23 NOTE — MR AVS SNAPSHOT
Visit Information Date & Time Provider Department Dept. Phone Encounter #  
 8/23/2017  9:30 AM Claudeen Platts, MD [facility] [place] [address] [id number] Your Appointments 11/20/2017 10:00 AM  
ROUTINE CARE with Bharath Issa MD  
[facility]) Appt Note: 3 mnth fu; r/s  
 [address] [id number] [phone] [address] [place] [address] [address] Upcoming Health Maintenance Date Due OSTEOPOROSIS SCREENING (DEXA) 7/31/2002 GLAUCOMA SCREENING Q2Y 6/15/2015 INFLUENZA AGE 9 TO ADULT 8/1/2017 MEDICARE YEARLY EXAM 3/29/2018 COLONOSCOPY 10/13/2020 DTaP/Tdap/Td series (2 - Td) 7/25/2026 Allergies as of 8/23/2017  Review Complete On: 8/15/2017 By: Bharath Issa MD  
  
 Severity Noted Reaction Type Reactions Bactrim [Sulfamethoxazole-trimethoprim]  03/29/2010    Unknown (comments) Pt does not recall Darvocet A500 [Propoxyphene N-acetaminophen]  11/01/2010    Itching Current Immunizations  Reviewed on 8/15/2017 Name Date Influenza High Dose Vaccine PF 11/7/2016, 10/13/2015, 11/26/2014 Influenza Vaccine 12/31/2013 Influenza Vaccine Split 10/9/2012, 11/1/2011, 11/1/2010 Pneumococcal Vaccine (Unspecified Type) 1/1/2008 Not reviewed this visit You Were Diagnosed With   
  
 Codes Comments Essential hypertension    -  Primary ICD-10-CM: I10 
ICD-9-CM: 401.9 Chronic ischemic heart disease     ICD-10-CM: I25.9 ICD-9-CM: 414.9 Mixed hyperlipidemia     ICD-10-CM: E78.2 ICD-9-CM: 272.2 Chronic diastolic heart failure (HCC)     ICD-10-CM: I50.32 
ICD-9-CM: 428.32 Circadian rhythm sleep disorder     ICD-10-CM: G47.20 ICD-9-CM: 327.30 Chronic anxiety     ICD-10-CM: F41.9 ICD-9-CM: 300.00 RBBB     ICD-10-CM: I45.10 ICD-9-CM: 426.4 S/P cardiac catheterization     ICD-10-CM: Z98.890 ICD-9-CM: V45.89 2009 Vitals BP Pulse Height(growth percentile) Weight(growth percentile) LMP SpO2  
 139/85 62 5' (1.524 m) 196 lb (88.9 kg) (LMP Unknown) 93% BMI OB Status Smoking Status 38.28 kg/m2 Hysterectomy Never Smoker Vitals History BMI and BSA Data Body Mass Index Body Surface Area  
 38.28 kg/m 2 1.94 m 2 Preferred Pharmacy Pharmacy Name Phone [id number]- [facility] [address] [phone] Your Updated Medication List  
  
   
This list is accurate as of: 8/23/17 10:21 AM.  Always use your most recent med list. amLODIPine 10 mg tablet Commonly known as:  Jim Rings TAKE 1/2 TABLET BY MOUTH TWICE PER DAY  
  
 aspirin, buffered 81 mg Tab Take 81 mg by mouth daily. clonazePAM 1 mg tablet Commonly known as:  KlonoPIN  
TAKE1/2 TO 1 TAB BY MOUTH EVERY MORNING AS NEEDED ANXIETY & TAKE 1 TAB BY MOUTH AT BEDTIME FOR SLEEP  
  
 furosemide 80 mg tablet Commonly known as:  LASIX TAKE 1 TABLET BY MOUTH EVERY MORNING AND TAKE 1/2 TABLET EVERY EVENING  
  
 isosorbide mononitrate ER 30 mg tablet Commonly known as:  IMDUR  
TAKE 1 TABLET EVERY DAY  
  
 losartan 100 mg tablet Commonly known as:  COZAAR  
TAKE 1 TABLET BY MOUTH EVERY DAY  
  
 metoprolol tartrate 25 mg tablet Commonly known as:  LOPRESSOR  
TAKE 1 TABLET BY MOUTH TWICE A DAY  
  
 nitroglycerin 0.4 mg SL tablet Commonly known as:  NITROSTAT  
TAKE 1 TAB BY SUBLINGUAL ROUTE EVERY 5 MINUTES AS NEEDED. omeprazole 20 mg capsule Commonly known as:  PRILOSEC  
TAKE ONE CAPSULE BY MOUTH EVERY MORNING  
  
 potassium chloride SR 10 mEq tablet Commonly known as:  KLOR-CON 10  
TAKE 2 TABLETS BY MOUTH EVERY DAY  
  
 pravastatin 40 mg tablet Commonly known as:  PRAVACHOL Take 1 Tab by mouth nightly. promethazine-dextromethorphan 6.25-15 mg/5 mL syrup Commonly known as:  PROMETHAZINE-DM Take 5 mL by mouth every six (6) hours as needed for Cough. traMADol 50 mg tablet Commonly known as:  ULTRAM  
Take 1 Tab by mouth every eight (8) hours as needed for Pain. Max Daily Amount: 150 mg.  
  
 TYLENOL EXTRA STRENGTH 500 mg tablet Generic drug:  acetaminophen Take 1,000 mg by mouth every six (6) hours as needed for Pain. VITAMIN D3 2,000 unit Tab Generic drug:  cholecalciferol (vitamin D3) Take 1 Tab by mouth daily. We Performed the Following AMB POC EKG ROUTINE W/ 12 LEADS, INTER & REP [03282 CPT(R)] To-Do List   
 08/23/2017 ECHO:  2D ECHO COMPLETE ADULT (TTE) W OR WO CONTR   
  
 08/23/2017 Imaging:  NM CARDIAC SPECT W STRS/REST MULT   
  
 08/23/2017 ECG:  NUCLEAR STRESS TEST Patient Instructions -- Please schedule the stress test and echo 
-- We will follow up with you regarding results Cardiac Perfusion Scan (Medicine): About This Test 
What is it? A cardiac perfusion scan measures the amount of blood in your heart muscle at rest and after your heart has been made to work hard. During the scan, a camera takes pictures of your heart after a radioactive tracer is injected into a vein in your arm. The tracer travels through the blood and into your heart. As the tracer moves through your heart, areas that have good blood flow absorb the tracer. Areas that do not absorb the tracer may not be getting enough blood or may have been damaged by a heart attack. The pictures show this difference. Two sets of pictures may be made during the test. One set is taken while you are resting. Another set is taken after your heart has been made to work harder (called a stress test). The heart can be stressed by using medicine or exercise. This information is about using medicine to stress the heart. This test is also known by other names, including myocardial perfusion scan, myocardial perfusion imaging, thallium scan, sestamibi cardiac scan, and nuclear stress test. 
Why is this test done? The test is often done to find out what may be causing chest pain or pressure. It may be done after a heart attack to see if areas of the heart are not getting enough blood or to find out how much your heart has been damaged from the heart attack. How can you prepare for the test? 
Tell your doctor if: 
· You take medicine for an erection problem, such as sildenafil (Viagra), tadalafil (Cialis), and vardenafil (Levitra). You may need to take nitroglycerin during this test, which can cause a serious reaction if you have taken a medicine for an erection problem within the past 48 hours. · You have had bleeding problems, or if you take aspirin or some other blood thinner. · You are or might be pregnant. · You are breastfeeding. Don't use your breast milk for 1 to 2 days after the scan. Use formula instead. What happens during the test? 
Resting or baseline scan · You will take your top off and be given a gown to wear. · Electrodes will be attached to your chest to keep track of your heartbeats. · Your arm will be cleaned. You will have a tube, called an IV, going into your arm. A small amount of the radioactive tracer will be put in the IV. · You will lie on your back or your stomach on a table with a large camera positioned above your chest. The camera records the tracer's signals as it moves through your blood. The camera does not produce any radiation, so you are not exposed to any additional radiation while the scan is being done. · You will be asked to remain very still during each scan, which takes about 5 to 10 minutes. The camera will move to take more pictures at different angles. Several scans will be taken. This test takes about 30 to 40 minutes. Stress scan using medicine The stress scan is done in two parts. In many hospitals, you first have the resting scan. You are then given a medicine that makes your heart work harder and you have another scan. Sometimes the stress scan is done first. 
· You will have a test called an electrocardiogram (EKG or ECG), which takes about 5 to 10 minutes. You may have other EKGs during and after the stress test. 
· Medicine will be put in your IV. It will make your heart work harder. You may get a headache and feel dizzy, flushed, and nauseated from the medicine, but these symptoms usually do not last long. · Your heartbeat and blood pressure may be checked. · Your symptoms such as chest pain and shortness of breath will be checked. · A few minutes after you get the medicine, another small amount of radioactive tracer is injected. You may be given something to reverse the medicine used to make your heart work harder. · You will wait for 30 to 40 minutes and then have another resting scan. See the \"Resting or baseline scan\" section. This test takes about 30 to 40 minutes. What else should you know about the test? 
· Sometimes more pictures are taken 2 to 4 hours after the stress scan. · No electricity passes through your body during the test. There is no danger of getting an electrical shock. What happens after the test? 
· You can go back to your usual activities right away. · You will probably be able to go home right away. · Drink plenty of fluids for the next 24 hours to help flush the tracer out of your body. If you have kidney, heart, or liver disease and have to limit fluids, talk with your doctor before you increase the amount of fluids you drink. When should you call for help? Call 911 anytime you think you may need emergency care. For example, call if: 
· You passed out (lost consciousness).  
· You have been diagnosed with angina, and you have angina symptoms that do not go away with rest or are not getting better within 5 minutes after you take a dose of nitroglycerin. · You have symptoms of a heart attack. These may include: ¨ Chest pain or pressure, or a strange feeling in the chest. 
¨ Sweating. ¨ Shortness of breath. ¨ Nausea or vomiting. ¨ Pain, pressure, or a strange feeling in the back, neck, jaw, or upper belly or in one or both shoulders or arms. ¨ Lightheadedness or sudden weakness. ¨ A fast or irregular heartbeat. After you call 911, the  may tell you to chew 1 adult-strength or 2 to 4 low-dose aspirin. Wait for an ambulance. Do not try to drive yourself. Call your doctor now or seek immediate medical care if: 
· You have had any angina symptoms, such as chest pain or pressure, even if they have gone away. · You have new or increased shortness of breath. · You are dizzy or lightheaded, or you feel like you may faint. Watch closely for changes in your health, and be sure to contact your doctor if you have any problems. Follow-up care is a key part of your treatment and safety. Be sure to make and go to all appointments, and call your doctor if you are having problems. It's also a good idea to keep a list of the medicines you take. Ask your doctor when you can expect to have your test results. Where can you learn more? Go to http://anderson-jesús.info/. Enter R320 in the search box to learn more about \"Cardiac Perfusion Scan (Medicine): About This Test.\" Current as of: May 2, 2016 Content Version: 11.3 © 6461-9592 Healthwise, Incorporated. Care instructions adapted under license by [employer] (which disclaims liability or warranty for this information). If you have questions about a medical condition or this instruction, always ask your healthcare professional. [place] [address] any warranty or liability for your use of this information. Introducing [facility]! Dear Jayce Chirinos: Thank you for requesting a [email or web address] account. Our records indicate that you already have an active [email or web address] account. You can access your account anytime at [email or web address] Did you know that you can access your hospital and ER discharge instructions at any time in [email or web address]? You can also review all of your test results from your hospital stay or ER visit. Additional Information If you have questions, please visit the Frequently Asked Questions section of the [email or web address] website at [email or web address]/. Remember, [email or web address] is NOT to be used for urgent needs. For medical emergencies, dial 911. Now available from your iPhone and Android! Please provide this summary of care documentation to your next provider. Your primary care clinician is listed as Candy Castro. If you have any questions after today's visit, please call [phone].

## 2017-08-31 RX ORDER — POTASSIUM CHLORIDE 750 MG/1
TABLET, FILM COATED, EXTENDED RELEASE ORAL
Qty: 90 TAB | Refills: 11 | Status: SHIPPED | OUTPATIENT
Start: 2017-08-31 | End: 2017-11-21 | Stop reason: SDUPTHER

## 2017-09-05 ENCOUNTER — HOSPITAL ENCOUNTER (OUTPATIENT)
Dept: NON INVASIVE DIAGNOSTICS | Age: 82
Discharge: HOME | End: 2017-09-05
Attending: PHYSICIAN ASSISTANT
Payer: MEDICARE

## 2017-09-05 ENCOUNTER — HOSPITAL ENCOUNTER (OUTPATIENT)
Dept: NUCLEAR MEDICINE | Age: 82
Discharge: HOME | End: 2017-09-05
Attending: PHYSICIAN ASSISTANT
Payer: MEDICARE

## 2017-09-05 DIAGNOSIS — F41.9 CHRONIC ANXIETY: ICD-10-CM

## 2017-09-05 DIAGNOSIS — E78.2 MIXED HYPERLIPIDEMIA: ICD-10-CM

## 2017-09-05 DIAGNOSIS — I25.9 CHRONIC ISCHEMIC HEART DISEASE: ICD-10-CM

## 2017-09-05 DIAGNOSIS — G47.20 CIRCADIAN RHYTHM SLEEP DISORDER: ICD-10-CM

## 2017-09-05 DIAGNOSIS — I45.10 RBBB: ICD-10-CM

## 2017-09-05 DIAGNOSIS — Z98.890 S/P CARDIAC CATHETERIZATION: ICD-10-CM

## 2017-09-05 DIAGNOSIS — I10 ESSENTIAL HYPERTENSION: Chronic | ICD-10-CM

## 2017-09-05 DIAGNOSIS — I50.32 CHRONIC DIASTOLIC HEART FAILURE (HCC): ICD-10-CM

## 2017-09-05 LAB
ATTENDING PHYSICIAN, CST07: NORMAL
DIAGNOSIS, 93000: NORMAL
DUKE TM SCORE RESULT, CST14: NORMAL
DUKE TREADMILL SCORE, CST13: -5
ECG INTERP BEFORE EX, CST11: NORMAL
ECG INTERP DURING EX, CST12: NORMAL
FUNCTIONAL CAPACITY, CST17: NORMAL
KNOWN CARDIAC CONDITION, CST08: NORMAL
MAX. DIASTOLIC BP, CST04: 97 MMHG
MAX. HEART RATE, CST05: 86 BPM
MAX. SYSTOLIC BP, CST03: 177 MMHG
OVERALL BP RESPONSE TO EXERCISE, CST16: NORMAL
OVERALL HR RESPONSE TO EXERCISE, CST15: NORMAL
PEAK EX METS, CST10: 1 METS
PROTOCOL NAME, CST01: NORMAL
TEST INDICATION, CST09: NORMAL

## 2017-09-05 PROCEDURE — 93306 TTE W/DOPPLER COMPLETE: CPT

## 2017-09-05 PROCEDURE — 93017 CV STRESS TEST TRACING ONLY: CPT

## 2017-09-05 PROCEDURE — 74011250636 HC RX REV CODE- 250/636

## 2017-09-05 PROCEDURE — 78452 HT MUSCLE IMAGE SPECT MULT: CPT

## 2017-09-05 RX ORDER — SODIUM CHLORIDE 0.9 % (FLUSH) 0.9 %
10 SYRINGE (ML) INJECTION AS NEEDED
Status: DISCONTINUED | OUTPATIENT
Start: 2017-09-05 | End: 2017-09-09 | Stop reason: HOSPADM

## 2017-09-05 RX ORDER — SODIUM CHLORIDE 0.9 % (FLUSH) 0.9 %
SYRINGE (ML) INJECTION
Status: DISPENSED
Start: 2017-09-05 | End: 2017-09-05

## 2017-09-05 RX ADMIN — REGADENOSON 0.4 MG: 0.08 INJECTION, SOLUTION INTRAVENOUS at 12:57

## 2017-09-05 RX ADMIN — Medication 10 ML: at 13:00

## 2017-09-05 NOTE — PROGRESS NOTES
PROGRESS NOTE    The patient Lew arana [de-identified] y.o. female arrived on 9/5/2017 for an appointment in cardiology. Patient was transported to cardiology outpatient unit by Caldwell Lesch, RN by wheelchair. RN verifies test explanation by physician with patient. Reviews test and allows for questions. No further questions. Medical history and allergies reviewed  and noted. Home Meds reviewed. Prior to Admission medications    Medication Sig Start Date End Date Taking? Authorizing Provider   potassium chloride SR (KLOR-CON 10) 10 mEq tablet TAKE 3 TABLETS BY MOUTH EVERY DAY 8/31/17   Lakshmi Bruce MD   amLODIPine (NORVASC) 10 mg tablet TAKE 1/2 TABLET BY MOUTH TWICE A DAY 8/27/17   Robert Keyes MD   traMADol (ULTRAM) 50 mg tablet Take 1 Tab by mouth every eight (8) hours as needed for Pain. Max Daily Amount: 150 mg. 7/17/17   Gabe Ahumada, PA-C   omeprazole (PRILOSEC) 20 mg capsule TAKE ONE CAPSULE BY MOUTH EVERY MORNING 6/27/17   Lakshmi Bruce MD   clonazePAM (KLONOPIN) 1 mg tablet TAKE1/2 TO 1 TAB BY MOUTH EVERY MORNING AS NEEDED ANXIETY & TAKE 1 TAB BY MOUTH AT BEDTIME FOR SLEEP 6/2/17   Lakshmi Bruce MD   nitroglycerin (NITROSTAT) 0.4 mg SL tablet TAKE 1 TAB BY SUBLINGUAL ROUTE EVERY 5 MINUTES AS NEEDED. 5/10/17   Lakshmi Bruce MD   promethazine-dextromethorphan (PROMETHAZINE-DM) 6.25-15 mg/5 mL syrup Take 5 mL by mouth every six (6) hours as needed for Cough.  4/28/17   Lakshmi Bruce MD   metoprolol tartrate (LOPRESSOR) 25 mg tablet TAKE 1 TABLET BY MOUTH TWICE A DAY 3/27/17   Lakshmi Bruce MD   losartan (COZAAR) 100 mg tablet TAKE 1 TABLET BY MOUTH EVERY DAY 3/27/17   Lakshmi Bruce MD   isosorbide mononitrate ER (IMDUR) 30 mg tablet TAKE 1 TABLET EVERY DAY 3/21/17   Lakshmi Bruce MD   furosemide (LASIX) 80 mg tablet TAKE 1 TABLET BY MOUTH EVERY MORNING AND TAKE 1/2 TABLET EVERY EVENING 1/23/17   Balaji Salter Griselda Mena MD   pravastatin (PRAVACHOL) 40 mg tablet Take 1 Tab by mouth nightly. 12/19/16   Kelsie Paredes MD   cholecalciferol, vitamin D3, (VITAMIN D3) 2,000 unit tab Take 1 Tab by mouth daily. Historical Provider   acetaminophen (TYLENOL EXTRA STRENGTH) 500 mg tablet Take 1,000 mg by mouth every six (6) hours as needed for Pain. Historical Provider   Aspirin, Buffered 81 mg tab Take 81 mg by mouth daily. Historical Provider   Dr Jennifer Reyes notified of pt arrival    Patient resting on stretcher with side rails in up position for safety. All BP's noted on stress strip. Patient monitored throughout test. Physician and RN remained in room with patient throughout test.144/65 hr 69 resp 18, no sob this am, 99 % sats on room air. Patient prepped for test. IV started # 24 right hand by this Joanne Stone RN.    3566  Dr. Jennifer Reyes, Joanne Stone RN and Nuclear Medicine Tech in room, Timeout called. Universal protocol followed. Dr Jennifer Reyes explained test tot he patient, no further questions noted, consent obtained. Patient sitting on side of bed with seated leg exercise forLexiscan stress test.    Patient IV checked for patency. Normal saline flush 10 cc injected and IV remains patent. [address] 0.04mg/5ml injected over ten seconds followed by saline flush of 10cc. After 40 seconds Myoview injected by Nuclear Medicine Tech and then saline flush of 10 cc injected to maintain patency of IV.  1313  Patient given snack and soda. Patient Vicky Artemio monitored in unit until BP and heart rate returned to baseline. Patient allowed to dress and this RN transported patient by wheelchair Nuclear Medicine area. iv removed, tip  Intact, dsg placed. All personal belongings returned to patient. This RN notes to patient if chest pain or shortness of breath occurs in the future, please return to the Emergency Room.

## 2017-09-13 ENCOUNTER — TELEPHONE (OUTPATIENT)
Dept: CARDIOLOGY CLINIC | Age: 82
End: 2017-09-13

## 2017-09-13 NOTE — TELEPHONE ENCOUNTER
Outgoing call to patient per. [facility] GONZALES to notify her that stress test was negative for blockages. Patient was asked if she was having rayne palpitations? Patient stated 'she was not but, was having some shortness of breath.'    Patient was informed that we will follow up with her soon regarding Echo results. Patient verbalized understanding.

## 2017-09-22 RX ORDER — OMEGA-3-ACID ETHYL ESTERS 1 G/1
2 CAPSULE, LIQUID FILLED ORAL 2 TIMES DAILY
Qty: 360 CAP | Refills: 3 | Status: SHIPPED | OUTPATIENT
Start: 2017-09-22 | End: 2017-10-10 | Stop reason: ALTCHOICE

## 2017-10-05 ENCOUNTER — TELEPHONE (OUTPATIENT)
Dept: FAMILY MEDICINE CLINIC | Age: 82
End: 2017-10-05

## 2017-10-05 RX ORDER — CLONAZEPAM 1 MG/1
TABLET ORAL
Qty: 60 TAB | Refills: 3 | Status: SHIPPED | OUTPATIENT
Start: 2017-10-05 | End: 2018-02-12 | Stop reason: SDUPTHER

## 2017-10-10 ENCOUNTER — OFFICE VISIT (OUTPATIENT)
Dept: CARDIOLOGY CLINIC | Age: 82
End: 2017-10-10

## 2017-10-10 VITALS
DIASTOLIC BLOOD PRESSURE: 97 MMHG | BODY MASS INDEX: 39.07 KG/M2 | OXYGEN SATURATION: 95 % | HEART RATE: 65 BPM | WEIGHT: 199 LBS | HEIGHT: 60 IN | SYSTOLIC BLOOD PRESSURE: 148 MMHG

## 2017-10-10 DIAGNOSIS — I45.10 RBBB: ICD-10-CM

## 2017-10-10 DIAGNOSIS — I10 ESSENTIAL HYPERTENSION: ICD-10-CM

## 2017-10-10 DIAGNOSIS — Z98.890 S/P CARDIAC CATHETERIZATION: ICD-10-CM

## 2017-10-10 DIAGNOSIS — R07.89 ATYPICAL CHEST PAIN: Primary | ICD-10-CM

## 2017-10-10 DIAGNOSIS — I25.10 ATHEROSCLEROSIS OF NATIVE CORONARY ARTERY OF NATIVE HEART WITHOUT ANGINA PECTORIS: ICD-10-CM

## 2017-10-10 DIAGNOSIS — F41.9 CHRONIC ANXIETY: ICD-10-CM

## 2017-10-10 DIAGNOSIS — I50.32 CHRONIC DIASTOLIC HEART FAILURE (HCC): ICD-10-CM

## 2017-10-10 DIAGNOSIS — G47.20 CIRCADIAN RHYTHM SLEEP DISORDER: ICD-10-CM

## 2017-10-10 DIAGNOSIS — I25.9 CHRONIC ISCHEMIC HEART DISEASE: ICD-10-CM

## 2017-10-10 RX ORDER — ISOSORBIDE MONONITRATE 60 MG/1
TABLET, EXTENDED RELEASE ORAL
Qty: 30 TAB | Refills: 11 | Status: SHIPPED | OUTPATIENT
Start: 2017-10-10 | End: 2018-11-20 | Stop reason: SDUPTHER

## 2017-10-10 NOTE — PATIENT INSTRUCTIONS
-- INCREASE isosorbide mononitrate to 60 mg once a day  -- We will hold off on any other changes for now and see how you do  -- Please call us in 1-2 weeks after starting this medication to let us know how you're doing  -- Follow up in the office in 3 months     -- Call with any questions or concerns      Learning About Left Ventricular Hypertrophy (LVH)  What is left ventricular hypertrophy? Left ventricular hypertrophy (LVH) means that the muscle of the heart's main pump (left ventricle) has become thick and enlarged. This can happen over time if the left ventricle has to work too hard. This part of the heart needs to be strong to pump oxygen-rich blood to your entire body. When the ventricle gets thick, other changes can happen in the heart. The heart's electrical system might not work normally, the heart muscle may not get enough oxygen, and the heart may not pump as well as it should. LVH is usually caused by high blood pressure. It may also be caused by a heart problem, such as hypertrophic cardiomyopathy or a heart valve problem like aortic valve stenosis. It can be stressful to learn that you have a problem with your heart. But there are things you can do to feel better and help keep this condition from getting worse. What are the symptoms? LVH may not cause symptoms. When it does, the most common ones are:  · Shortness of breath. · Feeling tired or dizzy. · Angina symptoms, such as chest pain or pressure, which may be worse when you're active. · Feeling like your heart is fluttering, racing, or pounding (palpitations). New or worse symptoms may be a sign of heart failure. Heart failure means that your heart doesn't pump as much blood as your body needs. What can you expect when you have LVH? LVH is linked to an increased risk of other problems, including heart attack, heart failure, stroke, and heart rhythm problems. Treatment can help reduce these risks. How is LVH treated?   The best treatment to know your test results and keep a list of the medicines you take. Where can you learn more? Go to http://anderson-jesús.info/. Enter D396 in the search box to learn more about \"Learning About Left Ventricular Hypertrophy (LVH). \"  Current as of: November 14, 2016  Content Version: 11.3  © 8500-7565 [employer]. Care instructions adapted under license by [employer] (which disclaims liability or warranty for this information). If you have questions about a medical condition or this instruction, always ask your healthcare professional. [address] any warranty or liability for your use of this information.

## 2017-10-10 NOTE — MR AVS SNAPSHOT
Visit Information Date & Time Provider Department Dept. Phone Encounter #  
 10/10/2017  9:45 AM Cammy Ward MD [facility] 1 [id number] Your Appointments 11/20/2017 10:00 AM  
ROUTINE CARE with Pau Paul MD  
[facility] [address]) Appt Note: 3 mnth fu; r/s  
 [address] [facility] [id number] [phone] [address] [facility] [address] 3/13/2018 10:00 AM  
ESTABLISHED PATIENT with Cammy Ward MD  
[facility]) Appt Note: 6 MO. F/U STRESS TEST / ECHO  
 [address] [place] [address]  
[phone] [address] Upcoming Health Maintenance Date Due OSTEOPOROSIS SCREENING (DEXA) 7/31/2002 GLAUCOMA SCREENING Q2Y 6/15/2015 INFLUENZA AGE 9 TO ADULT 8/1/2017 MEDICARE YEARLY EXAM 3/29/2018 COLONOSCOPY 10/13/2020 DTaP/Tdap/Td series (2 - Td) 7/25/2026 Allergies as of 10/10/2017  Review Complete On: 8/23/2017 By: Camym Ward MD  
  
 Severity Noted Reaction Type Reactions Bactrim [Sulfamethoxazole-trimethoprim]  03/29/2010    Unknown (comments) Pt does not recall Darvocet A500 [Propoxyphene N-acetaminophen]  11/01/2010    Itching Current Immunizations  Reviewed on 8/15/2017 Name Date Influenza High Dose Vaccine PF 11/7/2016, 10/13/2015, 11/26/2014 Influenza Vaccine 12/31/2013 Influenza Vaccine Split 10/9/2012, 11/1/2011, 11/1/2010 Pneumococcal Vaccine (Unspecified Type) 1/1/2008 Not reviewed this visit You Were Diagnosed With   
  
 Codes Comments Essential hypertension    -  Primary ICD-10-CM: I10 
ICD-9-CM: 401.9 RBBB     ICD-10-CM: I45.10 ICD-9-CM: 426.4 Atherosclerosis of native coronary artery of native heart without angina pectoris     ICD-10-CM: I25.10 ICD-9-CM: 414.01 Chronic ischemic heart disease     ICD-10-CM: I25.9 ICD-9-CM: 414.9 Chronic diastolic heart failure (HCC)     ICD-10-CM: I50.32 
ICD-9-CM: 428.32 S/P cardiac catheterization     ICD-10-CM: Z98.890 ICD-9-CM: V45.89 Vitals BP Pulse Height(growth percentile) Weight(growth percentile) LMP SpO2  
 (!) 148/97 65 5' (1.524 m) 199 lb (90.3 kg) (LMP Unknown) 95% BMI OB Status Smoking Status 38.86 kg/m2 Hysterectomy Never Smoker Vitals History BMI and BSA Data Body Mass Index Body Surface Area  
 38.86 kg/m 2 1.96 m 2 Preferred Pharmacy Pharmacy Name Phone [id number]- [place], [place] - [address] [phone] Your Updated Medication List  
  
   
This list is accurate as of: 10/10/17 10:53 AM.  Always use your most recent med list. amLODIPine 10 mg tablet Commonly known as:  Ma Fanti TAKE 1/2 TABLET BY MOUTH TWICE A DAY  
  
 aspirin, buffered 81 mg Tab Take 81 mg by mouth daily. clonazePAM 1 mg tablet Commonly known as:  KlonoPIN  
TAKE1/2 TO 1 TAB BY MOUTH EVERY MORNING AS NEEDED ANXIETY & TAKE 1 TAB BY MOUTH AT BEDTIME FOR SLEEP  
  
 furosemide 80 mg tablet Commonly known as:  LASIX TAKE 1 TABLET BY MOUTH EVERY MORNING AND TAKE 1/2 TABLET EVERY EVENING  
  
 isosorbide mononitrate ER 60 mg CR tablet Commonly known as:  IMDUR  
TAKE 1 TABLET EVERY DAY  
  
 losartan 100 mg tablet Commonly known as:  COZAAR  
TAKE 1 TABLET BY MOUTH EVERY DAY  
  
 metoprolol tartrate 25 mg tablet Commonly known as:  LOPRESSOR  
TAKE 1 TABLET BY MOUTH TWICE A DAY  
  
 nitroglycerin 0.4 mg SL tablet Commonly known as:  NITROSTAT  
TAKE 1 TAB BY SUBLINGUAL ROUTE EVERY 5 MINUTES AS NEEDED. omeprazole 20 mg capsule Commonly known as:  PRILOSEC  
 TAKE ONE CAPSULE BY MOUTH EVERY MORNING  
  
 potassium chloride SR 10 mEq tablet Commonly known as:  KLOR-CON 10  
TAKE 3 TABLETS BY MOUTH EVERY DAY  
  
 pravastatin 40 mg tablet Commonly known as:  PRAVACHOL Take 1 Tab by mouth nightly. promethazine-dextromethorphan 6.25-15 mg/5 mL syrup Commonly known as:  PROMETHAZINE-DM Take 5 mL by mouth every six (6) hours as needed for Cough. TYLENOL EXTRA STRENGTH 500 mg tablet Generic drug:  acetaminophen Take 1,000 mg by mouth every six (6) hours as needed for Pain. VITAMIN D3 2,000 unit Tab Generic drug:  cholecalciferol (vitamin D3) Take 1 Tab by mouth daily. Prescriptions Sent to Pharmacy Refills  
 isosorbide mononitrate ER (IMDUR) 60 mg CR tablet 11 Sig: TAKE 1 TABLET EVERY DAY Class: Normal  
 Pharmacy: [facility]/pharmacy [address] [place] [address] [address]  #: [phone] Patient Instructions -- INCREASE isosorbide mononitrate to 60 mg once a day 
-- We will hold off on any other changes for now and see how you do -- Please call us in 1-2 weeks after starting this medication to let us know how you're doing 
-- Follow up in the office in 3 months  
 
-- Call with any questions or concerns Learning About Left Ventricular Hypertrophy (LVH) What is left ventricular hypertrophy? Left ventricular hypertrophy (LVH) means that the muscle of the heart's main pump (left ventricle) has become thick and enlarged. This can happen over time if the left ventricle has to work too hard. This part of the heart needs to be strong to pump oxygen-rich blood to your entire body. When the ventricle gets thick, other changes can happen in the heart. The heart's electrical system might not work normally, the heart muscle may not get enough oxygen, and the heart may not pump as well as it should. LVH is usually caused by high blood pressure.  It may also be caused by a heart problem, such as hypertrophic cardiomyopathy or a heart valve problem like aortic valve stenosis. It can be stressful to learn that you have a problem with your heart. But there are things you can do to feel better and help keep this condition from getting worse. What are the symptoms? LVH may not cause symptoms. When it does, the most common ones are: · Shortness of breath. · Feeling tired or dizzy. · Angina symptoms, such as chest pain or pressure, which may be worse when you're active. · Feeling like your heart is fluttering, racing, or pounding (palpitations). New or worse symptoms may be a sign of heart failure. Heart failure means that your heart doesn't pump as much blood as your body needs. What can you expect when you have LVH? LVH is linked to an increased risk of other problems, including heart attack, heart failure, stroke, and heart rhythm problems. Treatment can help reduce these risks. How is LVH treated? The best treatment will depend on what caused LVH. For many people, the focus will be on treating high blood pressure. Getting high blood pressure under control may keep LVH from getting worse. This can help prevent heart failure. It can also help lower the risk of heart attack and stroke. Medicines and lifestyle changes are used to treat high blood pressure. It may take some time to find the right medicine or medicines for you. Work with your doctor by taking your medicines as prescribed and going to all of your follow-up appointments. If LVH was caused by a heart problem, you may have other treatment options. Treatment may help lower your risk of heart failure and other serious problems. What you can do at home Healthy habits are important for your heart. Taking an active role in your treatment can help you feel better and protect your health. · Be more active.  Talk to your doctor before you start an exercise program. Together you can create a plan that can help keep your heart and body healthy. Your doctor might suggest that you get 30 minutes of exercise most days of the week. · Eat heart-healthy foods. Heart-healthy foods include fruits, vegetables, high-fiber foods, fish, and foods low in sodium, saturated fat, and trans fat. · Lose extra weight. Being active and eating healthy foods can help you stay at a healthy weight or lose weight if you need to. · Take your medicines exactly as prescribed. Do not stop or change your medicines without talking to your doctor first. Talk to your doctor if you have problems with your medicines. · Don't smoke. Quitting smoking lowers your risk of heart attack and stroke. If you need help quitting, talk to your doctor about stop-smoking programs and medicines. These can increase your chances of quitting for good. Follow-up care is a key part of your treatment and safety. Be sure to make and go to all appointments, and call your doctor if you are having problems. It's also a good idea to know your test results and keep a list of the medicines you take. Where can you learn more? Go to http://anderson-jesús.info/. Enter D396 in the search box to learn more about \"Learning About Left Ventricular Hypertrophy (LVH). \" 
Current as of: November 14, 2016 Content Version: 11.3 © 2311-7314 [employer]. Care instructions adapted under license by [employer] (which disclaims liability or warranty for this information). If you have questions about a medical condition or this instruction, always ask your healthcare professional. [place] [address] any warranty or liability for your use of this information. Introducing [facility]! Dear Natalia Duenas: 
Thank you for requesting a WeGame account. Our records indicate that you already have an active WeGame account.   You can access your account anytime at [email or web address]. [email or web address] Did you know that you can access your hospital and ER discharge instructions at any time in Keepstream? You can also review all of your test results from your hospital stay or ER visit. Additional Information If you have questions, please visit the Frequently Asked Questions section of the Keepstream website at [email or web address] Remember, Keepstream is NOT to be used for urgent needs. For medical emergencies, dial 911. Now available from your iPhone and Android! Please provide this summary of care documentation to your next provider. Your primary care clinician is listed as Claude Combe. If you have any questions after today's visit, please call [phone].

## 2017-10-10 NOTE — PROGRESS NOTES
[facility]   [address] [facility], [address] [place] [address] Road                                          NEW PATIENT HPI/FOLLOW-UP      NAME:  Myrna Card   :   1935   MRN:   [id number]   PCP:  Santa Benavides MD           Subjective: The patient is a [de-identified]y.o. year old female with h/o HTN, hyperlipidemia, chronic diastolic heart failure, RBBB, NSTEMI s/p cardiac cath in  showing distal PDA too small for stenting (see scanned cath document under Cardiology) who returns for a routine follow-up. Since the last visit, patient reports no new symptoms. RICHARD is stable, chronic. Denies chest pain, edema, medication intolerance, palpitations, PND/orthopnea, wheezing, sputum, syncope, dizziness or light headedness. Doing satisfactorily. Review of Systems  General: Pt denies excessive weight gain or loss. Pt is able to conduct ADL's. Respiratory: +shortness of breath, RICHARD,Denies wheezing or stridor.   Cardiovascular: Denies precordial pain, palpitations, edema or PND  Gastrointestinal: Denies poor appetite, indigestion, abdominal pain or blood in stool  Peripheral vascular: Denies claudication, leg cramps  Neuropsychiatric: Denies paresthesias,tingling,numbness,anxiety,depression,fatigue  Musculoskeletal: Denies pain,tenderness, soreness,swelling      Past Medical History:   Diagnosis Date    Anxiety     Aortic stenosis 3/3/2010    Aortic stenosis     Arrhythmia     MURMUR    Arthritis     SPINAL STENOSIS    Atherosclerosis of coronary artery     CHF (congestive heart failure) ([address] 3/3/2010    CHF (congestive heart failure) ([address] 2002    Chronic heart failure ([address] 2002; 3/3/2010    chronic diastolic heart failure    Chronic pain     LOWER BACK, RIGHT KNEE    Environmental allergies 3/3/2010    GERD (gastroesophageal reflux disease) 3/3/2010    Hiatal hernia 3/3/2010    High cholesterol 3/3/2010    HTN (hypertension) 3/3/2010    Hypokalemia 3/3/2010  Ischemic heart disease, chronic     Obese     Psychiatric disorder     anxiety    S/P hysterectomy 3/3/2010    Spinal stenosis 3/3/2010     Patient Active Problem List    Diagnosis Date Noted    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 02/12/2016    Encounter for medication monitoring 11/29/2015    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    Essential hypertension 07/12/2015    Gastroesophageal reflux disease without esophagitis 07/12/2015    Atherosclerosis of native coronary artery without angina pectoris--diffuse small vsl disease via cath cath 2009--RCA PDA/ROSA 07/12/2015    Chronic anxiety 07/12/2015    Chest pain, unspecified 02/24/2014    Chest pain at rest 02/20/2014    RBBB 02/20/2014    Acute respiratory failure ([facility] 04/02/2013    Chronic ischemic heart disease 05/29/2012    Mixed hyperlipidemia 05/29/2012    Chronic diastolic heart failure ([facility] 05/29/2012    Hypovitaminosis D 07/28/2010    Hypokalemia 03/03/2010    Hiatal hernia 03/03/2010    Anxiety 03/03/2010    S/P hysterectomy 03/03/2010    Spinal stenosis 03/03/2010    S/P foot surgery 03/03/2010    Environmental allergies 03/03/2010    S/P cardiac catheterization 03/03/2010      Past Surgical History:   Procedure Laterality Date    CARDIAC CATHETERIZATION  01/2002    normal coronaries    DECOMPRESS DISC RF LUMBAR  07/2007    HX BACK SURGERY  5/1/2014    HX BREAST LUMPECTOMY Left 1989    benign left breast    HX BUNIONECTOMY      HX BUNIONECTOMY      HX HEART CATHETERIZATION  3/3/10    HX HYSTERECTOMY  1978    HX LUMBAR DISKECTOMY      HX ORTHOPAEDIC Bilateral     BUNIONECTOMY    HX ORTHOPAEDIC Right     WRIST FX    HX OTHER SURGICAL  10/12/2015    mole removed from right side of face by Dr. Rubio Height FLX DX W/COLLJ [facility] WHEN PFRMD  [id number]    Dr Peña Pour     Allergies   Allergen Reactions    Bactrim [Sulfamethoxazole-Trimethoprim] Unknown (comments)     Pt does not recall    Darvocet A500 [Propoxyphene N-Acetaminophen] Itching      Family History   Problem Relation Age of Onset    Hypertension Mother     Heart Disease Father     Stroke Sister     Heart Disease Sister     Cancer Brother      LUNG    Cancer Brother      PROSTATE    Hypertension Brother     No Known Problems Brother     Lung Disease Brother     No Known Problems Brother     No Known Problems Brother     Stroke Daughter 47    No Known Problems Daughter     Anesth Problems Neg Hx       Social History     Social History    Marital status:      Spouse name: N/A    Number of children: N/A    Years of education: N/A     Occupational History    Not on file. Social History Main Topics    Smoking status: Never Smoker    Smokeless tobacco: Never Used    Alcohol use No    Drug use: No    Sexual activity: Not Currently     Other Topics Concern    Not on file     Social History Narrative      Current Outpatient Prescriptions   Medication Sig    isosorbide mononitrate ER (IMDUR) 60 mg CR tablet TAKE 1 TABLET EVERY DAY    clonazePAM (KLONOPIN) 1 mg tablet TAKE1/2 TO 1 TAB BY MOUTH EVERY MORNING AS NEEDED ANXIETY & TAKE 1 TAB BY MOUTH AT BEDTIME FOR SLEEP    potassium chloride SR (KLOR-CON 10) 10 mEq tablet TAKE 3 TABLETS BY MOUTH EVERY DAY    amLODIPine (NORVASC) 10 mg tablet TAKE 1/2 TABLET BY MOUTH TWICE A DAY    omeprazole (PRILOSEC) 20 mg capsule TAKE ONE CAPSULE BY MOUTH EVERY MORNING    nitroglycerin (NITROSTAT) 0.4 mg SL tablet TAKE 1 TAB BY SUBLINGUAL ROUTE EVERY 5 MINUTES AS NEEDED.  promethazine-dextromethorphan (PROMETHAZINE-DM) 6.25-15 mg/5 mL syrup Take 5 mL by mouth every six (6) hours as needed for Cough.     metoprolol tartrate (LOPRESSOR) 25 mg tablet TAKE 1 TABLET BY MOUTH TWICE A DAY    losartan (COZAAR) 100 mg tablet TAKE 1 TABLET BY MOUTH EVERY DAY    furosemide (LASIX) 80 mg tablet TAKE 1 TABLET BY MOUTH EVERY MORNING AND TAKE 1/2 TABLET EVERY EVENING  pravastatin (PRAVACHOL) 40 mg tablet Take 1 Tab by mouth nightly.  cholecalciferol, vitamin D3, (VITAMIN D3) 2,000 unit tab Take 1 Tab by mouth daily.  acetaminophen (TYLENOL EXTRA STRENGTH) 500 mg tablet Take 1,000 mg by mouth every six (6) hours as needed for Pain.  Aspirin, Buffered 81 mg tab Take 81 mg by mouth daily. No current facility-administered medications for this visit. I have reviewed the MAs notes, vitals, problem list, allergy list, medical history, family medical, social history and medications. Objective:     Physical Exam:     Vitals:    10/10/17 0957   BP: (!) 148/97   Pulse: 65   SpO2: 95%   Weight: 199 lb (90.3 kg)   Height: 5' (1.524 m)    Body mass index is 38.86 kg/(m^2). General: WDWN elderly, in no acute distress. HEENT: No carotid bruits, no JVD, trach is midline. Heart:  Normal S1/S2 negative S3 or S4. Regular, 1/2 systolic murmur, no gallop or rub.   Respiratory: Clear bilaterally, no wheezing or rales  Abdomen:   Soft, non-tender, bowel sounds are active.   Extremities:  No edema, normal cap refill, no cyanosis. Neuro: A&Ox3, speech clear, gait stable. Skin: Skin color is normal. No rashes or lesions. No diaphoresis. Vascular: 2+ pulses symmetric in all extremities      Data Review:       Cardiographics:    EKG: None today    Cardiology Labs:    Results for orders placed or performed during the hospital encounter of 05/28/14   EKG, 12 LEAD, INITIAL   Result Value Ref Range    Ventricular Rate 98 BPM    Atrial Rate 98 BPM    P-R Interval 196 ms    QRS Duration 118 ms    Q-T Interval 356 ms    QTC Calculation (Bezet) 454 ms    Calculated P Axis 69 degrees    Calculated R Axis -32 degrees    Calculated T Axis 20 degrees    Diagnosis       Normal sinus rhythm  Left axis deviation  Left ventricular hypertrophy with QRS widening  Right bundle branch block  When compared with ECG of 02-MAY-2014 08:09,  Vent.  rate has increased BY  35 BPM  Confirmed by Melly Dawson ([id number]) on 5/28/2014 3:32:37 PM       Lab Results   Component Value Date/Time    Cholesterol, total 212 08/15/2017 11:07 AM    HDL Cholesterol 59 08/15/2017 11:07 AM    LDL, calculated 130 08/15/2017 11:07 AM    Triglyceride 115 08/15/2017 11:07 AM    CHOL/HDL Ratio 3.1 11/01/2010 05:26 PM       Lab Results   Component Value Date/Time    Sodium 142 08/15/2017 11:07 AM    Potassium 3.4 08/15/2017 11:07 AM    Chloride 99 08/15/2017 11:07 AM    CO2 27 08/15/2017 11:07 AM    Anion gap 5 12/26/2016 02:25 PM    Glucose 99 08/15/2017 11:07 AM    BUN 14 08/15/2017 11:07 AM    Creatinine 0.96 08/15/2017 11:07 AM    BUN/Creatinine ratio 15 08/15/2017 11:07 AM    GFR est AA 65 08/15/2017 11:07 AM    GFR est non-AA 56 08/15/2017 11:07 AM    Calcium 9.1 08/15/2017 11:07 AM    Bilirubin, total 0.6 08/15/2017 11:07 AM    AST (SGOT) 17 08/15/2017 11:07 AM    Alk. phosphatase 85 08/15/2017 11:07 AM    Protein, total 7.2 08/15/2017 11:07 AM    Albumin 4.0 08/15/2017 11:07 AM    Globulin 4.3 12/26/2016 02:25 PM    A-G Ratio 1.3 08/15/2017 11:07 AM    ALT (SGPT) 14 08/15/2017 11:07 AM          Assessment:       ICD-10-CM ICD-9-CM    1. Atypical chest pain R07.89 786.59    2. Chronic diastolic heart failure (HCC) I50.32 428.32 isosorbide mononitrate ER (IMDUR) 60 mg CR tablet   3. Essential hypertension I10 401.9 isosorbide mononitrate ER (IMDUR) 60 mg CR tablet   4. RBBB I45.10 426.4 isosorbide mononitrate ER (IMDUR) 60 mg CR tablet   5. Atherosclerosis of native coronary artery of native heart without angina pectoris I25.10 414.01 isosorbide mononitrate ER (IMDUR) 60 mg CR tablet   6. Chronic ischemic heart disease I25.9 414.9 isosorbide mononitrate ER (IMDUR) 60 mg CR tablet   7. S/P cardiac catheterization Z98.890 V45.89 isosorbide mononitrate ER (IMDUR) 60 mg CR tablet   8. Chronic anxiety F41.9 300.00    9.  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 BP not ideally controlled. Echo with RVH/LVH and dilated atria. Perfusion imaging shows a : \"small area of reversibility is most likely artifactual due to hypertrophy and inferior wall interference from the liver. There is no sign of abnormal coronary flow reserve. The patient has severe concentric hypertrophy and may have potential for outflow tract obstruction under certain circumstances\". Will watch closely on larger dose of nitrate. Call if headache worse with associated dizziness,etc. Consider change amlodipine and metoprolol to Cardiazem. Pleased with present cardiac status. Plan: 1. Continue same meds - with INCREASE in IMDUR to 60 mg every day    -- Consider switch to Diltiazem from Amlodipine/metoprolol combo at next visit (pt notes she just filled this medication and would like to hold off on switch d/t financial concerns)   -- Lipid profile and labs followed by PCP. 2.Encouraged to exercise to tolerance, lose weight, and follow low fat, low cholesterol, low sodium predominantly Plant-based (consider Mediterranean) diet. 3.Follow up: 3 months   -- with phone call 1-2 weeks to let us know how she is doing on increased Imdur    I have discussed the diagnosis with the patient and the intended plan as seen in the above orders. The patient has received an after-visit summary and questions were answered concerning future plans. I have discussed any concerning medication side effects and warnings with the patient as well. Patient seen and examined. All pertinent data reviewed. I have reviewed detailed note as outlined by Anmol Bowman. Case discussed with Nursing/medical assistant staff and Anmol Bowman. Patient with RICHARD and atypical chest pain for which NST obtained revealing inferior artifact--no ischemia. Will try increase in Imdur dose and note BP and atypical chest pain response. May need fine tuning. Plans as outlined.       Jonn Castillo CHRISTIAN Manrique  10/10/2017     BONY Mcadams

## 2017-11-20 ENCOUNTER — HOSPITAL ENCOUNTER (OUTPATIENT)
Dept: LAB | Age: 82
Discharge: HOME | End: 2017-11-20
Payer: MEDICARE

## 2017-11-20 ENCOUNTER — OFFICE VISIT (OUTPATIENT)
Dept: FAMILY MEDICINE CLINIC | Age: 82
End: 2017-11-20

## 2017-11-20 VITALS
TEMPERATURE: 95.5 F | SYSTOLIC BLOOD PRESSURE: 128 MMHG | DIASTOLIC BLOOD PRESSURE: 78 MMHG | WEIGHT: 198 LBS | OXYGEN SATURATION: 93 % | HEART RATE: 71 BPM | HEIGHT: 60 IN | BODY MASS INDEX: 38.87 KG/M2 | RESPIRATION RATE: 20 BRPM

## 2017-11-20 DIAGNOSIS — I50.32 CHRONIC DIASTOLIC HEART FAILURE (HCC): ICD-10-CM

## 2017-11-20 DIAGNOSIS — K21.9 GASTROESOPHAGEAL REFLUX DISEASE WITHOUT ESOPHAGITIS: Chronic | ICD-10-CM

## 2017-11-20 DIAGNOSIS — Z51.81 ENCOUNTER FOR MEDICATION MONITORING: ICD-10-CM

## 2017-11-20 DIAGNOSIS — I10 ESSENTIAL HYPERTENSION: Primary | Chronic | ICD-10-CM

## 2017-11-20 DIAGNOSIS — M17.11 PRIMARY OSTEOARTHRITIS OF RIGHT KNEE: ICD-10-CM

## 2017-11-20 DIAGNOSIS — E78.2 MIXED HYPERLIPIDEMIA: ICD-10-CM

## 2017-11-20 DIAGNOSIS — I25.9 CHRONIC ISCHEMIC HEART DISEASE: ICD-10-CM

## 2017-11-20 DIAGNOSIS — Z13.5 GLAUCOMA SCREENING: ICD-10-CM

## 2017-11-20 DIAGNOSIS — Z23 ENCOUNTER FOR IMMUNIZATION: ICD-10-CM

## 2017-11-20 DIAGNOSIS — F41.9 CHRONIC ANXIETY: ICD-10-CM

## 2017-11-20 DIAGNOSIS — F51.01 PRIMARY INSOMNIA: ICD-10-CM

## 2017-11-20 PROCEDURE — 80061 LIPID PANEL: CPT

## 2017-11-20 PROCEDURE — 80053 COMPREHEN METABOLIC PANEL: CPT

## 2017-11-20 PROCEDURE — 36415 COLL VENOUS BLD VENIPUNCTURE: CPT

## 2017-11-20 RX ORDER — ISOSORBIDE MONONITRATE 30 MG/1
TABLET, EXTENDED RELEASE ORAL
Refills: 11 | COMMUNITY
Start: 2017-09-26 | End: 2017-11-20

## 2017-11-20 RX ORDER — HYDROCORTISONE 25 MG/G
OINTMENT TOPICAL
Refills: 2 | COMMUNITY
Start: 2017-09-22 | End: 2017-11-20

## 2017-11-20 RX ORDER — MOMETASONE FUROATE 1 MG/G
OINTMENT TOPICAL
Refills: 2 | COMMUNITY
Start: 2017-10-18 | End: 2020-07-22 | Stop reason: ALTCHOICE

## 2017-11-20 RX ORDER — ISOSORBIDE MONONITRATE 60 MG/1
TABLET, EXTENDED RELEASE ORAL
COMMUNITY
End: 2019-02-11 | Stop reason: DRUGHIGH

## 2017-11-20 RX ORDER — MOMETASONE FUROATE 50 UG/1
SPRAY, METERED NASAL
COMMUNITY
Start: 2017-11-18 | End: 2017-11-20

## 2017-11-20 RX ORDER — MELATONIN 5 MG
5 CAPSULE ORAL
Qty: 30 CAP | Refills: 3
Start: 2017-11-20 | End: 2018-02-12

## 2017-11-20 RX ORDER — HYDROCORTISONE VALERATE 2 MG/G
OINTMENT TOPICAL
Refills: 1 | COMMUNITY
Start: 2017-10-18 | End: 2019-04-15

## 2017-11-20 RX ORDER — ECONAZOLE NITRATE 10 MG/G
CREAM TOPICAL
COMMUNITY
End: 2018-06-07

## 2017-11-20 NOTE — TELEPHONE ENCOUNTER
[address] MANFRED Thibodeaux and informed that lidocaine ointment is not on medication list. Will call to verify. Reviewed chart and on 9- and Freya Flores spoke with patient and did not request medication. Called patient left message to call back to verify if lidocaine ointment requested from [address]

## 2017-11-20 NOTE — TELEPHONE ENCOUNTER
[facility] @ [phone] would like a refill on patients Lidocaine patches.  They said they are faxing over another request.

## 2017-11-20 NOTE — MR AVS SNAPSHOT
Visit Information Date & Time Provider Department Dept. Phone Encounter #  
 11/20/2017 10:00 AM [place] Euceda [phone] [id number] Follow-up Instructions Return in about 3 months (around 2/20/2018). Your Appointments 2/20/2018 11:00 AM  
ESTABLISHED PATIENT with MD Librado [facility]) [address]  
[phone] [address]  
  
    
 3/13/2018 10:00 AM  
ESTABLISHED PATIENT with MD Librado [facility]) Appt Note: 6 MO. F/U STRESS TEST / ECHO  
 [address]  
[phone] Upcoming Health Maintenance Date Due OSTEOPOROSIS SCREENING (DEXA) 7/31/2002 GLAUCOMA SCREENING Q2Y 6/15/2015 MEDICARE YEARLY EXAM 3/29/2018 COLONOSCOPY 10/13/2020 DTaP/Tdap/Td series (2 - Td) 7/25/2026 Allergies as of 11/20/2017  Review Complete On: 11/20/2017 By: Josefina Donohue LPN Severity Noted Reaction Type Reactions Bactrim [Sulfamethoxazole-trimethoprim]  03/29/2010    Unknown (comments) Pt does not recall Darvocet A500 [Propoxyphene N-acetaminophen]  11/01/2010    Itching Current Immunizations  Reviewed on 8/15/2017 Name Date Influenza High Dose Vaccine PF 11/7/2016, 10/13/2015, 11/26/2014 Influenza Vaccine 12/31/2013 Influenza Vaccine Split 10/9/2012, 11/1/2011, 11/1/2010 Pneumococcal Vaccine (Unspecified Type) 1/1/2008 Not reviewed this visit You Were Diagnosed With   
  
 Codes Comments Essential hypertension    -  Primary ICD-10-CM: I10 
ICD-9-CM: 401.9 Chronic diastolic heart failure (HCC)     ICD-10-CM: I50.32 
ICD-9-CM: 428.32  Encounter for medication monitoring     ICD-10-CM: Z51.81 
 ICD-9-CM: V58.83 Mixed hyperlipidemia     ICD-10-CM: E78.2 ICD-9-CM: 272.2 Chronic ischemic heart disease     ICD-10-CM: I25.9 ICD-9-CM: 414.9 Glaucoma screening     ICD-10-CM: Z13.5 ICD-9-CM: V80.1 Primary osteoarthritis of right knee     ICD-10-CM: M17.11 ICD-9-CM: 715.16 Vitals BP Pulse Temp Resp Height(growth percentile) Weight(growth percentile) 128/78 71 95.5 °F (35.3 °C) (Oral) 20 5' (1.524 m) 198 lb (89.8 kg) LMP SpO2 BMI OB Status Smoking Status (LMP Unknown) 93% 38.67 kg/m2 Hysterectomy Never Smoker Vitals History BMI and BSA Data Body Mass Index Body Surface Area  
 38.67 kg/m 2 1.95 m 2 Preferred Pharmacy Pharmacy Name Phone [facility][id number]- [place], [place] - [address] [phone] Your Updated Medication List  
  
   
This list is accurate as of: 11/20/17 11:50 AM.  Always use your most recent med list. amLODIPine 10 mg tablet Commonly known as:  Leroy Mullet TAKE 1/2 TABLET BY MOUTH TWICE A DAY  
  
 aspirin, buffered 81 mg Tab Take 81 mg by mouth daily. clonazePAM 1 mg tablet Commonly known as:  KlonoPIN  
TAKE1/2 TO 1 TAB BY MOUTH EVERY MORNING AS NEEDED ANXIETY & TAKE 1 TAB BY MOUTH AT BEDTIME FOR SLEEP  
  
 econazole nitrate 1 % topical cream  
Commonly known as:  Ibarra Moment Apply  to affected area daily. furosemide 80 mg tablet Commonly known as:  LASIX TAKE 1 TABLET BY MOUTH EVERY MORNING AND TAKE 1/2 TABLET EVERY EVENING  
  
 hydrocortisone valerate 0.2 % ointment Commonly known as:  WEST-STEFFANIE  
APPLY TO AFFECTED AREA (FACE) TWICE A DAY AS NEEDED  
  
 isosorbide mononitrate ER 60 mg CR tablet Commonly known as:  IMDUR Take 1/2 tablet daily  
  
 losartan 100 mg tablet Commonly known as:  COZAAR  
TAKE 1 TABLET BY MOUTH EVERY DAY  
  
 melatonin 5 mg Cap capsule Take 1 Cap by mouth nightly. metoprolol tartrate 25 mg tablet Commonly known as:  LOPRESSOR  
TAKE 1 TABLET BY MOUTH TWICE A DAY  
  
 mometasone 0.1 % ointment Commonly known as:  ELOCON  
APPLY TO AFFECTED AREA (SCALP) EVERY DAY AS NEEDED  
  
 nitroglycerin 0.4 mg SL tablet Commonly known as:  NITROSTAT  
TAKE 1 TAB BY SUBLINGUAL ROUTE EVERY 5 MINUTES AS NEEDED. omeprazole 20 mg capsule Commonly known as:  PRILOSEC  
TAKE ONE CAPSULE BY MOUTH EVERY MORNING  
  
 potassium chloride SR 10 mEq tablet Commonly known as:  KLOR-CON 10  
TAKE 3 TABLETS BY MOUTH EVERY DAY  
  
 pravastatin 40 mg tablet Commonly known as:  PRAVACHOL Take 1 Tab by mouth nightly. promethazine-dextromethorphan 6.25-15 mg/5 mL syrup Commonly known as:  PROMETHAZINE-DM Take 5 mL by mouth every six (6) hours as needed for Cough. TYLENOL EXTRA STRENGTH 500 mg tablet Generic drug:  acetaminophen Take 1,000 mg by mouth every six (6) hours as needed for Pain. VITAMIN D3 2,000 unit Tab Generic drug:  cholecalciferol (vitamin D3) Take 1 Tab by mouth daily. We Performed the Following LIPID PANEL [38011 CPT(R)] METABOLIC PANEL, COMPREHENSIVE [04151 CPT(R)] REFERRAL TO OPTOMETRY [id number] Custom] REFERRAL TO ORTHOPEDIC SURGERY [REF62 Custom] Follow-up Instructions Return in about 3 months (around 2/20/2018). Referral Information Referral ID Referred By Referred To  
  
 [id number] Alcon Ward MD   
   [address], [address] [place], [place] [address] Phone: [phone] Fax: [phone] Visits Status Start Date End Date 1 Authorized 11/20/17 11/20/18 If your referral has a status of pending review or denied, additional information will be sent to support the outcome of this decision. Referral ID Referred By Referred To  
 [id number] DESIREE [place]) Stanford Marte MD  
   [address] [facility] [address], [address] Phone: [phone] Fax: [phone] Visits Status Start Date End Date 1 Authorized 11/20/17 11/20/18 If your referral has a status of pending review or denied, additional information will be sent to support the outcome of this decision. Introducing [facility]! Dear [facility]: 
Thank you for requesting a [employer] account. Our records indicate that you already have an active Strong[employer] account. You can access your account anytime at [email or web address] Did you know that you can access your hospital and ER discharge instructions at any time in [employer]? You can also review all of your test results from your hospital stay or ER visit. Additional Information If you have questions, please visit the Frequently Asked Questions section of the Strong[employer] website at [email or web address] Remember, Strong[employer] is NOT to be used for urgent needs. For medical emergencies, dial 911. Now available from your iPhone and Android! Please provide this summary of care documentation to your next provider. Your primary care clinician is listed as [employer]. If you have any questions after today's visit, please call [phone].

## 2017-11-20 NOTE — PROGRESS NOTES
HISTORY OF PRESENT ILLNESS  Ary Rios is a [de-identified] y.o. female. HPI   Follow up on chronic medical problems. Cardiovascular Review:  The patient has hypertension, hyperlipidemia, Chronic diastolic heart failure, ASCHD and obesity. Had eval for her SOB and told d/t aortic stenosis. Diet and Lifestyle: generally follows a low fat low cholesterol diet, generally follows a low sodium diet, sedentary. Pertinent ROS: taking medications as instructed, no medication side effects noted, no TIA's, no chest pain on exertion, mild swelling of ankles, no orthostatic dizziness or lightheadedness, no palpitations, no muscle aches or pain. Home BP Monitoring: is not measured at home. Osteoarthritis:  Patient has osteoarthritis. Overall doing better with back pain but has been having increase knee pain. Aching in the right knees and increase pain with walking. Has had 2 injections in the knee which has not helped very much. Pain is 5-6/10. Taking tylenol for the pain which helps some. Symptoms onset: problem is longstanding. Rheumatological ROS: stable, mild-to-moderate joint symptoms intermittently, reasonably well controlled by PRN meds. Response to treatment plan: stable and intermittent. Anxiety Review:  Patient is seen for anxiety. Ongoing symptoms include:anxiety overall is better. Patient denies: palpitations, sweating, chest pain, shortness of breath. Reported side effects from the treatment: none.     Patient Active Problem List   Diagnosis Code    Hypokalemia E87.6    Hiatal hernia K44.9    Anxiety F41.9    S/P hysterectomy Z90.710    Spinal stenosis M48.00    S/P foot surgery Z98.890    Environmental allergies Z91.09    S/P cardiac catheterization Z98.890    Hypovitaminosis D E55.9    Chronic ischemic heart disease I25.9    Mixed hyperlipidemia E78.2    Chronic diastolic heart failure (HCC) I50.32    Chest pain at rest R07.9    RBBB I45.10    Atypical chest pain R07.89  Essential hypertension I10    Gastroesophageal reflux disease without esophagitis K21.9    Atherosclerosis of native coronary artery without angina pectoris--diffuse small vsl disease via cath cath 2009--RCA PDA/ROSA I25.10    Chronic anxiety F41.9    Encounter for medication monitoring Z51.81    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  melatonin 5 mg cap capsule Take 1 Cap by mouth nightly. 30 Cap 3    clonazePAM (KLONOPIN) 1 mg tablet TAKE1/2 TO 1 TAB BY MOUTH EVERY MORNING AS NEEDED ANXIETY & TAKE 1 TAB BY MOUTH AT BEDTIME FOR SLEEP 60 Tab 3    potassium chloride SR (KLOR-CON 10) 10 mEq tablet TAKE 3 TABLETS BY MOUTH EVERY DAY 90 Tab 11    amLODIPine (NORVASC) 10 mg tablet TAKE 1/2 TABLET BY MOUTH TWICE A DAY 30 Tab 6    omeprazole (PRILOSEC) 20 mg capsule TAKE ONE CAPSULE BY MOUTH EVERY MORNING 30 Cap 11    nitroglycerin (NITROSTAT) 0.4 mg SL tablet TAKE 1 TAB BY SUBLINGUAL ROUTE EVERY 5 MINUTES AS NEEDED. 25 Tab 1    promethazine-dextromethorphan (PROMETHAZINE-DM) 6.25-15 mg/5 mL syrup Take 5 mL by mouth every six (6) hours as needed for Cough.  240 mL 1    metoprolol tartrate (LOPRESSOR) 25 mg tablet TAKE 1 TABLET BY MOUTH TWICE A DAY 60 Tab 11    losartan (COZAAR) 100 mg tablet TAKE 1 TABLET BY MOUTH EVERY DAY 30 Tab 11    furosemide (LASIX) 80 mg tablet TAKE 1 TABLET BY MOUTH EVERY MORNING AND TAKE 1/2 TABLET EVERY EVENING 45 Tab 11    pravastatin (PRAVACHOL) 40 mg tablet Take 1 Tab by mouth nightly. 30 Tab 11    cholecalciferol, vitamin D3, (VITAMIN D3) 2,000 unit tab Take 1 Tab by mouth daily.  acetaminophen (TYLENOL EXTRA STRENGTH) 500 mg tablet Take 1,000 mg by mouth every six (6) hours as needed for Pain.  Aspirin, Buffered 81 mg tab Take 81 mg by mouth daily.          Allergies   Allergen Reactions    Bactrim [Sulfamethoxazole-Trimethoprim] Unknown (comments)     Pt does not recall    Darvocet A500 [Propoxyphene N-Acetaminophen] Itching       Past Medical History:   Diagnosis Date    Anxiety     Aortic stenosis 3/3/2010    Aortic stenosis     Arrhythmia     MURMUR    Arthritis     SPINAL STENOSIS    Atherosclerosis of coronary artery     CHF (congestive heart failure) ([facility] [address] 3/3/2010    CHF (congestive heart failure) ([facility] [address] 01/2002    Chronic heart failure ([facility] [address] 1/2002; 3/3/2010    chronic diastolic heart failure    Chronic pain     LOWER BACK, RIGHT KNEE    Environmental allergies 3/3/2010    GERD (gastroesophageal reflux disease) 3/3/2010    Hiatal hernia 3/3/2010    High cholesterol 3/3/2010    HTN (hypertension) 3/3/2010    Hypokalemia 3/3/2010    Ischemic heart disease, chronic     Obese     Psychiatric disorder     anxiety    S/P hysterectomy 3/3/2010    Spinal stenosis 3/3/2010       Past Surgical History:   Procedure Laterality Date    CARDIAC CATHETERIZATION  01/2002    normal coronaries    DECOMPRESS DISC RF LUMBAR  07/2007    HX BACK SURGERY  5/1/2014    HX BREAST LUMPECTOMY Left 1989    benign left breast    HX BUNIONECTOMY      HX BUNIONECTOMY      HX HEART CATHETERIZATION  3/3/10    HX HYSTERECTOMY  1978    HX LUMBAR DISKECTOMY      HX ORTHOPAEDIC Bilateral     BUNIONECTOMY    HX ORTHOPAEDIC Right     WRIST FX    HX OTHER SURGICAL  10/12/2015    mole removed from right side of face by Dr. Alonso Wasserman FLX DX [facility]COLLJ [facility]  [id number]    Dr Dorian Squires       Family History   Problem Relation Age of Onset    Hypertension Mother     Heart Disease Father     Stroke Sister     Heart Disease Sister     Cancer Brother      LUNG    Cancer Brother      PROSTATE    Hypertension Brother     No Known Problems Brother     Lung Disease Brother     No Known Problems Brother     No Known Problems Brother     Stroke Daughter 47    No Known Problems Daughter     Anesth Problems Neg Hx        Social History   Substance Use Topics    Smoking status: Never Smoker    Smokeless tobacco: Never Used    Alcohol use No        Lab Results  Component Value Date/Time   WBC 6.3 05/15/2017 03:04 PM   HGB 12.4 05/15/2017 03:04 PM   HCT 38.1 05/15/2017 03:04 PM   PLATELET 918 92/39/4400 03:04 PM   MCV 88 05/15/2017 03:04 PM     Lab Results  Component Value Date/Time   Cholesterol, total 212 08/15/2017 11:07 AM   HDL Cholesterol 59 08/15/2017 11:07 AM   LDL, calculated 130 08/15/2017 11:07 AM   Triglyceride 115 08/15/2017 11:07 AM   CHOL/HDL Ratio 3.1 11/01/2010 05:26 PM     Lab Results   Component Value Date/Time    Sodium 142 08/15/2017 11:07 AM    Potassium 3.4 08/15/2017 11:07 AM    Chloride 99 08/15/2017 11:07 AM    CO2 27 08/15/2017 11:07 AM    Anion gap 5 12/26/2016 02:25 PM    Glucose 99 08/15/2017 11:07 AM    BUN 14 08/15/2017 11:07 AM    Creatinine 0.96 08/15/2017 11:07 AM    BUN/Creatinine ratio 15 08/15/2017 11:07 AM    GFR est AA 65 08/15/2017 11:07 AM    GFR est non-AA 56 08/15/2017 11:07 AM    Calcium 9.1 08/15/2017 11:07 AM    Bilirubin, total 0.6 08/15/2017 11:07 AM    ALT (SGPT) 14 08/15/2017 11:07 AM    AST (SGOT) 17 08/15/2017 11:07 AM    Alk.  phosphatase 85 08/15/2017 11:07 AM    Protein, total 7.2 08/15/2017 11:07 AM    Albumin 4.0 08/15/2017 11:07 AM    Globulin 4.3 12/26/2016 02:25 PM    A-G Ratio 1.3 08/15/2017 11:07 AM      Lab Results   Component Value Date/Time    Hemoglobin A1c 5.4 04/16/2014 12:20 PM Hemoglobin A1c (POC) 5.5 11/26/2014 12:12 PM      Review of Systems   Constitutional: Negative for malaise/fatigue. HENT: Negative for congestion. Eyes: Negative for blurred vision. Respiratory: Negative for cough and shortness of breath. Cardiovascular: Negative for chest pain, palpitations and leg swelling. Gastrointestinal: Negative for abdominal pain, constipation and heartburn. Genitourinary: Negative for dysuria, frequency and urgency. Musculoskeletal: Negative for back pain and joint pain. Neurological: Negative for dizziness, tingling and headaches. Endo/Heme/Allergies: Negative for environmental allergies. Psychiatric/Behavioral: Negative for depression. The patient is nervous/anxious and has insomnia. Physical Exam   Constitutional: She appears well-developed and well-nourished. /78  Pulse 71  Temp 95.5 °F (35.3 °C) (Oral)   Resp 20  Ht 5' (1.524 m)  Wt 198 lb (89.8 kg)  LMP  (LMP Unknown)  SpO2 93%  BMI 38.67 kg/m2     HENT:   Right Ear: Tympanic membrane and ear canal normal.   Left Ear: Tympanic membrane and ear canal normal.   Nose: No mucosal edema or rhinorrhea. Mouth/Throat: Oropharynx is clear and moist and mucous membranes are normal.   Neck: Normal range of motion. Neck supple. No thyromegaly present. Cardiovascular: Normal rate and regular rhythm. No murmur heard. Pulmonary/Chest: Effort normal and breath sounds normal.   Abdominal: Soft. Bowel sounds are normal. There is no tenderness. Musculoskeletal: Normal range of motion. She exhibits edema (trace). Right knee: She exhibits normal range of motion, no swelling and no effusion. Lymphadenopathy:     She has no cervical adenopathy. Skin: Skin is warm and dry. Psychiatric: She has a normal mood and affect. Nursing note and vitals reviewed. ASSESSMENT and PLAN  Diagnoses and all orders for this visit:    1.  Essential hypertension  Stable at goal.      2. Mixed hyperlipidemia  -     LIPID PANEL    3. Chronic diastolic heart failure (HCC)//  4. Chronic ischemic heart disease  As per cardiology. 5. Primary osteoarthritis of right knee  -     REFERRAL TO ORTHOPEDIC SURGERY  Instructions for exercises given and reviewed with pt. Pt also to use heat to the area 3-4 times a day over the next several days until sx are improved. 6. Chronic anxiety  Stable     7. Gastroesophageal reflux disease without esophagitis  Stable on prilosec. 8. Primary insomnia  -     Add melatonin 5 mg cap capsule; Take 1 Cap by mouth nightly. 9. Glaucoma screening  -     REFERRAL TO OPTOMETRY    10. Class 2 obesity due to excess calories with serious comorbidity and body mass index (BMI) of 38.0 to 38.9 in adult  I have reviewed/discussed the above normal BMI with the patient. I have recommended the following interventions: dietary management education, guidance, and counseling . 11. Encounter for medication monitoring  -     METABOLIC PANEL, COMPREHENSIVE      Follow-up Disposition:  Return in about 3 months (around 2/20/2018). reviewed diet, exercise and weight control  cardiovascular risk and specific lipid/LDL goals reviewed  reviewed medications and side effects in detail    I have discussed diagnosis listed in this note with pt and/or family. I have discussed treatment plans and options and the risk/benefit analysis of those options, including safe use of medications and possible medication side effects. Through the use of shared decision making we have agreed to the above plan. The patient has received an after-visit summary and questions were answered concerning future plans and follow up. Advise pt of any urgent changes then to proceed to the ER.

## 2017-11-20 NOTE — PROGRESS NOTES
Chief Complaint   Patient presents with    Hypertension    Cholesterol Problem    Knee Pain         CMP 8/15/17  Lipid 8/15/17  Micro albumin 5/5/17  Mammogram 7/6/15

## 2017-11-21 LAB
ALBUMIN SERPL-MCNC: 4.1 G/DL (ref 3.5–4.7)
ALBUMIN/GLOB SERPL: 1.2 {RATIO} (ref 1.2–2.2)
ALP SERPL-CCNC: 96 IU/L (ref 39–117)
ALT SERPL-CCNC: 15 IU/L (ref 0–32)
AST SERPL-CCNC: 18 IU/L (ref 0–40)
BILIRUB SERPL-MCNC: 0.6 MG/DL (ref 0–1.2)
BUN SERPL-MCNC: 14 MG/DL (ref 8–27)
BUN/CREAT SERPL: 16 (ref 12–28)
CALCIUM SERPL-MCNC: 9.2 MG/DL (ref 8.7–10.3)
CHLORIDE SERPL-SCNC: 98 MMOL/L (ref 96–106)
CHOLEST SERPL-MCNC: 230 MG/DL (ref 100–199)
CO2 SERPL-SCNC: 29 MMOL/L (ref 18–29)
CREAT SERPL-MCNC: 0.9 MG/DL (ref 0.57–1)
GFR SERPLBLD CREATININE-BSD FMLA CKD-EPI: 61 ML/MIN/1.73
GFR SERPLBLD CREATININE-BSD FMLA CKD-EPI: 70 ML/MIN/1.73
GLOBULIN SER CALC-MCNC: 3.5 G/DL (ref 1.5–4.5)
GLUCOSE SERPL-MCNC: 99 MG/DL (ref 65–99)
HDLC SERPL-MCNC: 57 MG/DL
INTERPRETATION, 910389: NORMAL
LDLC SERPL CALC-MCNC: 146 MG/DL (ref 0–99)
POTASSIUM SERPL-SCNC: 3.9 MMOL/L (ref 3.5–5.2)
PROT SERPL-MCNC: 7.6 G/DL (ref 6–8.5)
SODIUM SERPL-SCNC: 141 MMOL/L (ref 134–144)
TRIGL SERPL-MCNC: 136 MG/DL (ref 0–149)
VLDLC SERPL CALC-MCNC: 27 MG/DL (ref 5–40)

## 2017-11-21 RX ORDER — POTASSIUM CHLORIDE 750 MG/1
TABLET, FILM COATED, EXTENDED RELEASE ORAL
Qty: 270 TAB | Refills: 3 | Status: SHIPPED | OUTPATIENT
Start: 2017-11-21 | End: 2018-03-08 | Stop reason: SDUPTHER

## 2017-11-21 RX ORDER — PRAVASTATIN SODIUM 40 MG/1
40 TABLET ORAL
Qty: 90 TAB | Refills: 3 | Status: SHIPPED | OUTPATIENT
Start: 2017-11-21 | End: 2018-02-22 | Stop reason: ALTCHOICE

## 2017-11-21 RX ORDER — LOSARTAN POTASSIUM 100 MG/1
TABLET ORAL
Qty: 90 TAB | Refills: 3 | Status: SHIPPED | OUTPATIENT
Start: 2017-11-21 | End: 2018-12-24 | Stop reason: SDUPTHER

## 2017-11-21 RX ORDER — FUROSEMIDE 80 MG/1
TABLET ORAL
Qty: 135 TAB | Refills: 3 | Status: SHIPPED | OUTPATIENT
Start: 2017-11-21 | End: 2018-11-23 | Stop reason: SDUPTHER

## 2017-11-22 NOTE — PROGRESS NOTES
Verbal order received per Dr. Rhoades Stamp 13 IM-VORB. Pt received Prevnar 13 IM in right deltoid without any difficulty. Verbal order received by Dr. Ajit Duke dose flu vaccine IM. Pt received high dose flu vaccine IM in left deltoid without any difficulty.

## 2017-11-30 RX ORDER — ATORVASTATIN CALCIUM 40 MG/1
40 TABLET, FILM COATED ORAL
Qty: 30 TAB | Refills: 3 | Status: SHIPPED | OUTPATIENT
Start: 2017-11-30 | End: 2018-02-22 | Stop reason: ALTCHOICE

## 2017-12-27 RX ORDER — NITROGLYCERIN 0.4 MG/1
TABLET SUBLINGUAL
Qty: 25 TAB | Refills: 1 | Status: SHIPPED | OUTPATIENT
Start: 2017-12-27 | End: 2018-05-21 | Stop reason: SDUPTHER

## 2017-12-29 ENCOUNTER — OFFICE VISIT (OUTPATIENT)
Dept: FAMILY MEDICINE CLINIC | Age: 82
End: 2017-12-29

## 2017-12-29 VITALS
BODY MASS INDEX: 37.5 KG/M2 | SYSTOLIC BLOOD PRESSURE: 127 MMHG | TEMPERATURE: 95.4 F | WEIGHT: 191 LBS | OXYGEN SATURATION: 92 % | HEIGHT: 60 IN | RESPIRATION RATE: 16 BRPM | HEART RATE: 70 BPM | DIASTOLIC BLOOD PRESSURE: 84 MMHG

## 2017-12-29 DIAGNOSIS — M48.061 SPINAL STENOSIS OF LUMBAR REGION WITHOUT NEUROGENIC CLAUDICATION: ICD-10-CM

## 2017-12-29 DIAGNOSIS — M54.30 SCIATIC LEG PAIN: Primary | ICD-10-CM

## 2017-12-29 RX ORDER — MOMETASONE FUROATE 50 UG/1
SPRAY, METERED NASAL
COMMUNITY
Start: 2017-12-02 | End: 2018-02-12

## 2017-12-29 RX ORDER — PREDNISONE 10 MG/1
10 TABLET ORAL 2 TIMES DAILY
Qty: 10 TAB | Refills: 0 | Status: SHIPPED | OUTPATIENT
Start: 2017-12-29 | End: 2018-01-03

## 2017-12-29 RX ORDER — TRAMADOL HYDROCHLORIDE 50 MG/1
50 TABLET ORAL
Qty: 21 TAB | Refills: 0 | Status: SHIPPED | OUTPATIENT
Start: 2017-12-29 | End: 2018-02-12

## 2017-12-29 NOTE — PROGRESS NOTES
Chief Complaint   Patient presents with    Leg Pain     right leg     Pain x 2 weeks. Pt had eye exam with Dr. Yomaira Coats approximately 2 weeks ago. KRYSTLE signed.

## 2018-01-02 NOTE — PROGRESS NOTES
HISTORY OF PRESENT ILLNESS  Omi Hernandez is a [de-identified] y.o. female. HPI   C/o right leg pain over the past 2-3 weeks. Has been worse over the past weeks. Pain is keeping her awake at night. Has known spinal stenosis  An has also had some pain in the lower back. No numbness or weakness. Pain is 8/10. \"Pain goes all the way down the leg. \"  Has been taking tylenol for the pain which has not been helping. Patient Active Problem List   Diagnosis Code    Hypokalemia E87.6    Hiatal hernia K44.9    Anxiety F41.9    S/P hysterectomy Z90.710    Spinal stenosis M48.00    S/P foot surgery Z98.890    Environmental allergies Z91.09    S/P cardiac catheterization Z98.890    Hypovitaminosis D E55.9    Chronic ischemic heart disease I25.9    Mixed hyperlipidemia E78.2    Chronic diastolic heart failure (HCC) I50.32    Chest pain at rest R07.9    RBBB I45.10    Atypical chest pain R07.89    Essential hypertension I10    Gastroesophageal reflux disease without esophagitis K21.9    Atherosclerosis of native coronary artery without angina pectoris--diffuse small vsl disease via cath cath 2009--RCA PDA/ROSA I25.10    Chronic anxiety F41.9    Encounter for medication monitoring Z51.81    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 10 Tab 0    traMADol (ULTRAM) 50 mg tablet Take 1 Tab by mouth every eight (8) hours as needed for Pain. Max Daily Amount: 150 mg. 21 Tab 0    nitroglycerin (NITROSTAT) 0.4 mg SL tablet TAKE 1 TAB BY SUBLINGUAL ROUTE EVERY 5 MINUTES AS NEEDED. 25 Tab 1    atorvastatin (LIPITOR) 40 mg tablet Take 1 Tab by mouth nightly.  30 Tab 3    amLODIPine (NORVASC) 10 mg tablet TAKE 1/2 TABLET BY MOUTH TWICE A DAY 30 Tab 6    losartan (COZAAR) 100 mg tablet TAKE 1 TABLET BY MOUTH EVERY DAY 90 Tab 3    potassium chloride SR (KLOR-CON 10) 10 mEq tablet TAKE 3 TABLETS BY MOUTH EVERY  Tab 3    furosemide (LASIX) 80 mg tablet TAKE 1 TABLET BY MOUTH EVERY MORNING AND TAKE 1/2 TABLET EVERY EVENING 135 Tab 3    pravastatin (PRAVACHOL) 40 mg tablet Take 1 Tab by mouth nightly. 90 Tab 3    hydrocortisone valerate (WEST-STEFFANIE) 0.2 % ointment APPLY TO AFFECTED AREA (FACE) TWICE A DAY AS NEEDED  1    mometasone (ELOCON) 0.1 % ointment APPLY TO AFFECTED AREA (SCALP) EVERY DAY AS NEEDED  2    isosorbide mononitrate ER (IMDUR) 60 mg CR tablet Take 1/2 tablet daily      econazole nitrate (SPECTAZOLE) 1 % topical cream Apply  to affected area daily.  melatonin 5 mg cap capsule Take 1 Cap by mouth nightly. 30 Cap 3    clonazePAM (KLONOPIN) 1 mg tablet TAKE1/2 TO 1 TAB BY MOUTH EVERY MORNING AS NEEDED ANXIETY & TAKE 1 TAB BY MOUTH AT BEDTIME FOR SLEEP 60 Tab 3    omeprazole (PRILOSEC) 20 mg capsule TAKE ONE CAPSULE BY MOUTH EVERY MORNING 30 Cap 11    promethazine-dextromethorphan (PROMETHAZINE-DM) 6.25-15 mg/5 mL syrup Take 5 mL by mouth every six (6) hours as needed for Cough. 240 mL 1    metoprolol tartrate (LOPRESSOR) 25 mg tablet TAKE 1 TABLET BY MOUTH TWICE A DAY 60 Tab 11    cholecalciferol, vitamin D3, (VITAMIN D3) 2,000 unit tab Take 1 Tab by mouth daily.  acetaminophen (TYLENOL EXTRA STRENGTH) 500 mg tablet Take 1,000 mg by mouth every six (6) hours as needed for Pain.  Aspirin, Buffered 81 mg tab Take 81 mg by mouth daily.       mometasone (NASONEX) 50 mcg/actuation nasal spray          Allergies   Allergen Reactions    Bactrim [Sulfamethoxazole-Trimethoprim] Unknown (comments)     Pt does not recall    Darvocet A500 [Propoxyphene N-Acetaminophen] Itching       Past Medical History:   Diagnosis Date    Anxiety     Aortic stenosis 3/3/2010    Aortic stenosis     Arrhythmia     MURMUR    Arthritis     SPINAL STENOSIS    Atherosclerosis of coronary artery     CHF (congestive heart failure) ([facility] [address] 3/3/2010    CHF (congestive heart failure) ([facility] [address] 01/2002    Chronic heart failure ([facility] [address] 1/2002; 3/3/2010    chronic diastolic heart failure    Chronic pain     LOWER BACK, RIGHT KNEE    Environmental allergies 3/3/2010    GERD (gastroesophageal reflux disease) 3/3/2010    Hiatal hernia 3/3/2010    High cholesterol 3/3/2010    HTN (hypertension) 3/3/2010    Hypokalemia 3/3/2010    Ischemic heart disease, chronic     Obese     Psychiatric disorder     anxiety    S/P hysterectomy 3/3/2010    Spinal stenosis 3/3/2010       Past Surgical History:   Procedure Laterality Date    CARDIAC CATHETERIZATION  01/2002    normal coronaries    DECOMPRESS DISC RF LUMBAR  07/2007    HX BACK SURGERY  5/1/2014    HX BREAST LUMPECTOMY Left 1989    benign left breast    HX BUNIONECTOMY      HX BUNIONECTOMY      HX HEART CATHETERIZATION  3/3/10    HX HYSTERECTOMY  1978    HX LUMBAR DISKECTOMY      HX ORTHOPAEDIC Bilateral     BUNIONECTOMY    HX ORTHOPAEDIC Right     WRIST FX    HX OTHER SURGICAL  10/12/2015    mole removed from right side of face by Dr. Jax Quan FLX DX W/[facility] WHEN PFRMD  [id number]    Dr Patrick Hendrix       Family History   Problem Relation Age of Onset    Hypertension Mother     Heart Disease Father     Stroke Sister     Heart Disease Sister     Cancer Brother      LUNG    Cancer Brother      PROSTATE    Hypertension Brother     No Known Problems Brother     Lung Disease Brother     No Known Problems Brother     No Known Problems Brother     Stroke Daughter 47    No Known Problems Daughter     Anesth Problems Neg Hx        Social History   Substance Use Topics    Smoking status: Never Smoker    Smokeless tobacco: Never Used    Alcohol use No     Lab Results  Component Value Date/Time   WBC 6.3 05/15/2017 03:04 PM HGB 12.4 05/15/2017 03:04 PM   HCT 38.1 05/15/2017 03:04 PM   PLATELET 814 28/29/8697 03:04 PM   MCV 88 05/15/2017 03:04 PM     Lab Results  Component Value Date/Time   Cholesterol, total 230 11/20/2017 11:35 AM   HDL Cholesterol 57 11/20/2017 11:35 AM   LDL, calculated 146 11/20/2017 11:35 AM   Triglyceride 136 11/20/2017 11:35 AM   CHOL/HDL Ratio 3.1 11/01/2010 05:26 PM     Lab Results   Component Value Date/Time    Sodium 141 11/20/2017 11:35 AM    Potassium 3.9 11/20/2017 11:35 AM    Chloride 98 11/20/2017 11:35 AM    CO2 29 11/20/2017 11:35 AM    Anion gap 5 12/26/2016 02:25 PM    Glucose 99 11/20/2017 11:35 AM    BUN 14 11/20/2017 11:35 AM    Creatinine 0.90 11/20/2017 11:35 AM    BUN/Creatinine ratio 16 11/20/2017 11:35 AM    GFR est AA 70 11/20/2017 11:35 AM    GFR est non-AA 61 11/20/2017 11:35 AM    Calcium 9.2 11/20/2017 11:35 AM    Bilirubin, total 0.6 11/20/2017 11:35 AM    ALT (SGPT) 15 11/20/2017 11:35 AM    AST (SGOT) 18 11/20/2017 11:35 AM    Alk. phosphatase 96 11/20/2017 11:35 AM    Protein, total 7.6 11/20/2017 11:35 AM    Albumin 4.1 11/20/2017 11:35 AM    Globulin 4.3 12/26/2016 02:25 PM    A-G Ratio 1.2 11/20/2017 11:35 AM         Review of Systems   Constitutional: Negative for malaise/fatigue. HENT: Negative for congestion. Eyes: Negative for blurred vision. Respiratory: Negative for cough and shortness of breath. Cardiovascular: Negative for chest pain, palpitations and leg swelling. Gastrointestinal: Negative for abdominal pain, constipation and heartburn. Genitourinary: Negative for dysuria, frequency and urgency. Musculoskeletal: Positive for back pain. Negative for joint pain. Neurological: Negative for dizziness, tingling and headaches. Endo/Heme/Allergies: Negative for environmental allergies. Psychiatric/Behavioral: Negative for depression. The patient does not have insomnia. Physical Exam   Constitutional: She appears well-developed and well-nourished. /84 (BP 1 Location: Left arm, BP Patient Position: Sitting)  Pulse 70  Temp 95.4 °F (35.2 °C) (Oral)   Resp 16  Ht 5' (1.524 m)  Wt 191 lb (86.6 kg)  LMP  (LMP Unknown)  SpO2 92%  BMI 37.3 kg/m2     Cardiovascular: Normal rate and regular rhythm. No murmur heard. Pulmonary/Chest: Effort normal and breath sounds normal.   Abdominal: Soft. Bowel sounds are normal. There is no tenderness. Musculoskeletal: She exhibits no edema. Lumbar back: She exhibits decreased range of motion, tenderness, bony tenderness and pain. Neurological: She has normal strength. Gait (limps with walking on the right side.  ) abnormal.   Nursing note and vitals reviewed. ASSESSMENT and PLAN  Diagnoses and all orders for this visit:    1. Sciatic leg pain//  2. Spinal stenosis of lumbar region  -     predniSONE (DELTASONE) 10 mg tablet; Take 1 Tab by mouth two (2) times a day for 5 days. -     traMADol (ULTRAM) 50 mg tablet; Take 1 Tab by mouth every eight (8) hours as needed for Pain. Max Daily Amount: 150 mg. Instructions for exercises given and reviewed with pt. Pt also to use heat to the area 3-4 times a day over the next several days until sx are improved. Follow-up Disposition:  Return if symptoms worsen or fail to improve. reviewed medications and side effects in detail    I have discussed diagnosis listed in this note with pt and/or family. I have discussed treatment plans and options and the risk/benefit analysis of those options, including safe use of medications and possible medication side effects. Through the use of shared decision making we have agreed to the above plan. The patient has received an after-visit summary and questions were answered concerning future plans and follow up. Advise pt of any urgent changes then to proceed to the ER.

## 2018-02-05 ENCOUNTER — HOSPITAL ENCOUNTER (EMERGENCY)
Age: 83
Discharge: HOME | End: 2018-02-05
Attending: EMERGENCY MEDICINE
Payer: MEDICARE

## 2018-02-05 ENCOUNTER — APPOINTMENT (OUTPATIENT)
Dept: GENERAL RADIOLOGY | Age: 83
End: 2018-02-05
Attending: EMERGENCY MEDICINE
Payer: MEDICARE

## 2018-02-05 VITALS
OXYGEN SATURATION: 98 % | DIASTOLIC BLOOD PRESSURE: 60 MMHG | RESPIRATION RATE: 20 BRPM | BODY MASS INDEX: 37.48 KG/M2 | WEIGHT: 190.92 LBS | SYSTOLIC BLOOD PRESSURE: 119 MMHG | HEIGHT: 60 IN | TEMPERATURE: 97.9 F | HEART RATE: 70 BPM

## 2018-02-05 DIAGNOSIS — K21.00 GASTROESOPHAGEAL REFLUX DISEASE WITH ESOPHAGITIS: ICD-10-CM

## 2018-02-05 DIAGNOSIS — R07.89 CHEST WALL PAIN: ICD-10-CM

## 2018-02-05 DIAGNOSIS — R07.89 ATYPICAL CHEST PAIN: Primary | ICD-10-CM

## 2018-02-05 LAB
ALBUMIN SERPL-MCNC: 3.2 G/DL (ref 3.5–5)
ALBUMIN/GLOB SERPL: 0.8 {RATIO} (ref 1.1–2.2)
ALP SERPL-CCNC: 87 U/L (ref 45–117)
ALT SERPL-CCNC: 20 U/L (ref 12–78)
ANION GAP SERPL CALC-SCNC: 7 MMOL/L (ref 5–15)
AST SERPL-CCNC: 18 U/L (ref 15–37)
ATRIAL RATE: 77 BPM
BASOPHILS # BLD: 0 K/UL (ref 0–0.1)
BASOPHILS NFR BLD: 1 % (ref 0–1)
BILIRUB SERPL-MCNC: 0.5 MG/DL (ref 0.2–1)
BUN SERPL-MCNC: 13 MG/DL (ref 6–20)
BUN/CREAT SERPL: 13 (ref 12–20)
CALCIUM SERPL-MCNC: 8.9 MG/DL (ref 8.5–10.1)
CALCULATED P AXIS, ECG09: 59 DEGREES
CALCULATED R AXIS, ECG10: -32 DEGREES
CALCULATED T AXIS, ECG11: 17 DEGREES
CHLORIDE SERPL-SCNC: 104 MMOL/L (ref 97–108)
CK MB CFR SERPL CALC: NORMAL % (ref 0–2.5)
CK MB SERPL-MCNC: <1 NG/ML (ref 5–25)
CK SERPL-CCNC: 107 U/L (ref 26–192)
CO2 SERPL-SCNC: 32 MMOL/L (ref 21–32)
CREAT SERPL-MCNC: 1.01 MG/DL (ref 0.55–1.02)
DIAGNOSIS, 93000: NORMAL
DIFFERENTIAL METHOD BLD: NORMAL
EOSINOPHIL # BLD: 0.1 K/UL (ref 0–0.4)
EOSINOPHIL NFR BLD: 2 % (ref 0–7)
ERYTHROCYTE [DISTWIDTH] IN BLOOD BY AUTOMATED COUNT: 13.5 % (ref 11.5–14.5)
GLOBULIN SER CALC-MCNC: 4 G/DL (ref 2–4)
GLUCOSE SERPL-MCNC: 103 MG/DL (ref 65–100)
HCT VFR BLD AUTO: 38.6 % (ref 35–47)
HGB BLD-MCNC: 12.4 G/DL (ref 11.5–16)
IMM GRANULOCYTES # BLD: 0 K/UL (ref 0–0.04)
IMM GRANULOCYTES NFR BLD AUTO: 0 % (ref 0–0.5)
LYMPHOCYTES # BLD: 1.4 K/UL (ref 0.8–3.5)
LYMPHOCYTES NFR BLD: 27 % (ref 12–49)
MCH RBC QN AUTO: 28.4 PG (ref 26–34)
MCHC RBC AUTO-ENTMCNC: 32.1 G/DL (ref 30–36.5)
MCV RBC AUTO: 88.5 FL (ref 80–99)
MONOCYTES # BLD: 0.4 K/UL (ref 0–1)
MONOCYTES NFR BLD: 8 % (ref 5–13)
NEUTS SEG # BLD: 3.2 K/UL (ref 1.8–8)
NEUTS SEG NFR BLD: 62 % (ref 32–75)
NRBC # BLD: 0 K/UL (ref 0–0.01)
NRBC BLD-RTO: 0 PER 100 WBC
P-R INTERVAL, ECG05: 186 MS
PLATELET # BLD AUTO: 200 K/UL (ref 150–400)
PMV BLD AUTO: 11.8 FL (ref 8.9–12.9)
POTASSIUM SERPL-SCNC: 3.5 MMOL/L (ref 3.5–5.1)
PROT SERPL-MCNC: 7.2 G/DL (ref 6.4–8.2)
Q-T INTERVAL, ECG07: 414 MS
QRS DURATION, ECG06: 126 MS
QTC CALCULATION (BEZET), ECG08: 468 MS
RBC # BLD AUTO: 4.36 M/UL (ref 3.8–5.2)
SODIUM SERPL-SCNC: 143 MMOL/L (ref 136–145)
TROPONIN I SERPL-MCNC: <0.04 NG/ML
VENTRICULAR RATE, ECG03: 77 BPM
WBC # BLD AUTO: 5.2 K/UL (ref 3.6–11)

## 2018-02-05 PROCEDURE — 71045 X-RAY EXAM CHEST 1 VIEW: CPT

## 2018-02-05 PROCEDURE — 85025 COMPLETE CBC W/AUTO DIFF WBC: CPT | Performed by: EMERGENCY MEDICINE

## 2018-02-05 PROCEDURE — 80053 COMPREHEN METABOLIC PANEL: CPT | Performed by: EMERGENCY MEDICINE

## 2018-02-05 PROCEDURE — 74011000250 HC RX REV CODE- 250: Performed by: EMERGENCY MEDICINE

## 2018-02-05 PROCEDURE — 99284 EMERGENCY DEPT VISIT MOD MDM: CPT

## 2018-02-05 PROCEDURE — 36415 COLL VENOUS BLD VENIPUNCTURE: CPT | Performed by: EMERGENCY MEDICINE

## 2018-02-05 PROCEDURE — 84484 ASSAY OF TROPONIN QUANT: CPT | Performed by: EMERGENCY MEDICINE

## 2018-02-05 PROCEDURE — 93005 ELECTROCARDIOGRAM TRACING: CPT

## 2018-02-05 PROCEDURE — 74011250637 HC RX REV CODE- 250/637: Performed by: EMERGENCY MEDICINE

## 2018-02-05 PROCEDURE — 94762 N-INVAS EAR/PLS OXIMTRY CONT: CPT

## 2018-02-05 PROCEDURE — 82550 ASSAY OF CK (CPK): CPT | Performed by: EMERGENCY MEDICINE

## 2018-02-05 RX ORDER — ALUMINA, MAGNESIA, AND SIMETHICONE 2400; 2400; 240 MG/30ML; MG/30ML; MG/30ML
10 SUSPENSION ORAL
Qty: 335 ML | Refills: 0 | Status: SHIPPED | OUTPATIENT
Start: 2018-02-05 | End: 2018-05-30

## 2018-02-05 RX ORDER — OMEPRAZOLE 20 MG/1
CAPSULE, DELAYED RELEASE ORAL
Qty: 30 CAP | Refills: 11 | Status: SHIPPED | OUTPATIENT
Start: 2018-02-05 | End: 2018-03-28

## 2018-02-05 RX ADMIN — PHENOBARBITAL ELIXIR 50 ML: 16.2; .1037; .0065; .0194 ELIXIR ORAL at 12:50

## 2018-02-05 NOTE — DISCHARGE INSTRUCTIONS
Chest Pain: Care Instructions  Your Care Instructions    There are many things that can cause chest pain. Some are not serious and will get better on their own in a few days. But some kinds of chest pain need more testing and treatment. Your doctor may have recommended a follow-up visit in the next 8 to 12 hours. If you are not getting better, you may need more tests or treatment. Even though your doctor has released you, you still need to watch for any problems. The doctor carefully checked you, but sometimes problems can develop later. If you have new symptoms or if your symptoms do not get better, get medical care right away. If you have worse or different chest pain or pressure that lasts more than 5 minutes or you passed out (lost consciousness), call 911 or seek other emergency help right away. A medical visit is only one step in your treatment. Even if you feel better, you still need to do what your doctor recommends, such as going to all suggested follow-up appointments and taking medicines exactly as directed. This will help you recover and help prevent future problems. How can you care for yourself at home? · Rest until you feel better. · Take your medicine exactly as prescribed. Call your doctor if you think you are having a problem with your medicine. · Do not drive after taking a prescription pain medicine. When should you call for help? Call 911 if:  ? · You passed out (lost consciousness). ? · You have severe difficulty breathing. ? · You have symptoms of a heart attack. These may include:  ¨ Chest pain or pressure, or a strange feeling in your chest.  ¨ Sweating. ¨ Shortness of breath. ¨ Nausea or vomiting. ¨ Pain, pressure, or a strange feeling in your back, neck, jaw, or upper belly or in one or both shoulders or arms. ¨ Lightheadedness or sudden weakness. ¨ A fast or irregular heartbeat.   After you call 911, the  may tell you to chew 1 adult-strength or 2 to 4 low-dose aspirin. Wait for an ambulance. Do not try to drive yourself. ?Call your doctor today if:  ? · You have any trouble breathing. ? · Your chest pain gets worse. ? · You are dizzy or lightheaded, or you feel like you may faint. ? · You are not getting better as expected. ? · You are having new or different chest pain. Where can you learn more? Go to http://anderson-jesús.info/. Enter A120 in the search box to learn more about \"Chest Pain: Care Instructions. \"  Current as of: March 20, 2017  Content Version: 11.4  © 8917-0950 [employer]. Care instructions adapted under license by [employer] (which disclaims liability or warranty for this information). If you have questions about a medical condition or this instruction, always ask your healthcare professional. [place] [address] any warranty or liability for your use of this information. Musculoskeletal Chest Pain: Care Instructions  Your Care Instructions    Chest pain is not always a sign that something is wrong with your heart or that you have another serious problem. The doctor thinks your chest pain is caused by strained muscles or ligaments, inflamed chest cartilage, or another problem in your chest, rather than by your heart. You may need more tests to find the cause of your chest pain. Follow-up care is a key part of your treatment and safety. Be sure to make and go to all appointments, and call your doctor if you are having problems. It's also a good idea to know your test results and keep a list of the medicines you take. How can you care for yourself at home? · Take pain medicines exactly as directed. ¨ If the doctor gave you a prescription medicine for pain, take it as prescribed. ¨ If you are not taking a prescription pain medicine, ask your doctor if you can take an over-the-counter medicine. · Rest and protect the sore area.   · Stop, change, or take a break from any activity that may be causing your pain or soreness. · Put ice or a cold pack on the sore area for 10 to 20 minutes at a time. Try to do this every 1 to 2 hours for the next 3 days (when you are awake) or until the swelling goes down. Put a thin cloth between the ice and your skin. · After 2 or 3 days, apply a heating pad set on low or a warm cloth to the area that hurts. Some doctors suggest that you go back and forth between hot and cold. · Do not wrap or tape your ribs for support. This may cause you to take smaller breaths, which could increase your risk of lung problems. · Mentholated creams such as Bengay or Icy Hot may soothe sore muscles. Follow the instructions on the package. · Follow your doctor's instructions for exercising. · Gentle stretching and massage may help you get better faster. Stretch slowly to the point just before pain begins, and hold the stretch for at least 15 to 30 seconds. Do this 3 or 4 times a day. Stretch just after you have applied heat. · As your pain gets better, slowly return to your normal activities. Any increased pain may be a sign that you need to rest a while longer. When should you call for help? Call 911 anytime you think you may need emergency care. For example, call if:  ? · You have chest pain or pressure. This may occur with:  ¨ Sweating. ¨ Shortness of breath. ¨ Nausea or vomiting. ¨ Pain that spreads from the chest to the neck, jaw, or one or both shoulders or arms. ¨ Dizziness or lightheadedness. ¨ A fast or uneven pulse. After calling 911, chew 1 adult-strength aspirin. Wait for an ambulance. Do not try to drive yourself. ? · You have sudden chest pain and shortness of breath, or you cough up blood. ?Call your doctor now or seek immediate medical care if:  ? · You have any trouble breathing. ? · Your chest pain gets worse. ? · Your chest pain occurs consistently with exercise and is relieved by rest.   ? Watch closely for changes in your health, and be sure to contact your doctor if:  ? · Your chest pain does not get better after 1 week. Where can you learn more? Go to http://anderson-jesús.info/. Enter V293 in the search box to learn more about \"Musculoskeletal Chest Pain: Care Instructions. \"  Current as of: March 20, 2017  Content Version: 11.4  © 7164-7673 [employer]. Care instructions adapted under license by [employer] (which disclaims liability or warranty for this information). If you have questions about a medical condition or this instruction, always ask your healthcare professional. [place] [address] any warranty or liability for your use of this information. Gastroesophageal Reflux Disease (GERD): Care Instructions  Your Care Instructions    Gastroesophageal reflux disease (GERD) is the backward flow of stomach acid into the esophagus. The esophagus is the tube that leads from your throat to your stomach. A one-way valve prevents the stomach acid from moving up into this tube. When you have GERD, this valve does not close tightly enough. If you have mild GERD symptoms including heartburn, you may be able to control the problem with antacids or over-the-counter medicine. Changing your diet, losing weight, and making other lifestyle changes can also help reduce symptoms. Follow-up care is a key part of your treatment and safety. Be sure to make and go to all appointments, and call your doctor if you are having problems. It's also a good idea to know your test results and keep a list of the medicines you take. How can you care for yourself at home? · Take your medicines exactly as prescribed. Call your doctor if you think you are having a problem with your medicine. · Your doctor may recommend over-the-counter medicine. For mild or occasional indigestion, antacids, such as Tums, Gaviscon, Mylanta, or Maalox, may help.  Your doctor also may recommend over-the-counter acid reducers, such as Pepcid AC, Tagamet HB, Zantac 75, or Prilosec. Read and follow all instructions on the label. If you use these medicines often, talk with your doctor. · Change your eating habits. ¨ It's best to eat several small meals instead of two or three large meals. ¨ After you eat, wait 2 to 3 hours before you lie down. ¨ Chocolate, mint, and alcohol can make GERD worse. ¨ Spicy foods, foods that have a lot of acid (like tomatoes and oranges), and coffee can make GERD symptoms worse in some people. If your symptoms are worse after you eat a certain food, you may want to stop eating that food to see if your symptoms get better. · Do not smoke or chew tobacco. Smoking can make GERD worse. If you need help quitting, talk to your doctor about stop-smoking programs and medicines. These can increase your chances of quitting for good. · If you have GERD symptoms at night, raise the head of your bed 6 to 8 inches by putting the frame on blocks or placing a foam wedge under the head of your mattress. (Adding extra pillows does not work.)  · Do not wear tight clothing around your middle. · Lose weight if you need to. Losing just 5 to 10 pounds can help. When should you call for help? Call your doctor now or seek immediate medical care if:  ? · You have new or different belly pain. ? · Your stools are black and tarlike or have streaks of blood. ? Watch closely for changes in your health, and be sure to contact your doctor if:  ? · Your symptoms have not improved after 2 days. ? · Food seems to catch in your throat or chest.   Where can you learn more? Go to http://anderson-jesús.info/. Enter Q019 in the search box to learn more about \"Gastroesophageal Reflux Disease (GERD): Care Instructions. \"  Current as of: May 12, 2017  Content Version: 11.4  © 8909-3405 [employer].  Care instructions adapted under license by [employer] (which disclaims liability or warranty for this information). If you have questions about a medical condition or this instruction, always ask your healthcare professional. [place] [address] any warranty or liability for your use of this information.

## 2018-02-05 NOTE — PROGRESS NOTES
CM reviewed chart. CM attempted twice to go in to assess patient, but nursing staff was with patient one time then x-ray was with them the second time. CM met with patient, verified for correct patient, and completed assessment. CM spoke with patient and patient daughter on PCP appointment. Patient confirmed she has an appointment scheduled already and does not need another one set up. CM also explained home health to patient. Patient was not interested in obtaining this resource at this time. CM explained to patient and family if patient ever becomes interested in this resource she can ask Care Management. Patient asked clarifying questions and expressed she will ask if she becomes interested.        Scaffold MSW, QMHP

## 2018-02-05 NOTE — ED PROVIDER NOTES
EMERGENCY DEPARTMENT HISTORY AND PHYSICAL EXAM      Date: 2/5/2018  Patient Name: Deonte Calderon    History of Presenting Illness     Chief Complaint   Patient presents with    Shortness of Breath     Pt c/o SOB      Chest Pain     CP for 3-4 days. + use of NTG sl       History Provided By: Patient    HPI: Deonte Calderon, [de-identified] y.o. female with PMHx significant for high cholesterol, hypokalemia, hiatal hernia, GERD, aortic stenosis, spinal stenosis, CHF, arrhythmia, HTN, obesity, anxiety, and atherosclerosis, presents ambulatory to the ED with cc of intermittent episodes of chest pain which started 3 to 4 days ago. Pt describes the sensation as pressure. She explains she \"feels like her heart is skipping a beat\". Pt notes her pain is worse with exertion. She also c/o associated SOB. Pt states she thinks her pain would improve if she were able to belch. She initially thought it was gas, but now believes it is cardiac related. Pt took 2 Nitroglycerin with mild relief. Her pain is moderate. She takes 81 mg of ASA daily. Pt has not had any stents placed. She reports no additional complaints. PCP: Jose L Hu MD  Cardiology: Dr. Pete Lozada    There are no other complaints, changes, or physical findings at this time. Current Outpatient Prescriptions   Medication Sig Dispense Refill    omeprazole (PRILOSEC) 20 mg capsule TAKE ONE CAPSULE BY MOUTH EVERY MORNING 30 Cap 11    aluminum & magnesium hydroxide-simethicone (MAALOX MAXIMUM STRENGTH) 400-400-40 mg/5 mL suspension Take 10 mL by mouth every six (6) hours as needed for Indigestion. 335 mL 0    mometasone (NASONEX) 50 mcg/actuation nasal spray       traMADol (ULTRAM) 50 mg tablet Take 1 Tab by mouth every eight (8) hours as needed for Pain.  Max Daily Amount: 150 mg. 21 Tab 0    nitroglycerin (NITROSTAT) 0.4 mg SL tablet TAKE 1 TAB BY SUBLINGUAL ROUTE EVERY 5 MINUTES AS NEEDED. 25 Tab 1    atorvastatin (LIPITOR) 40 mg tablet Take 1 Tab by mouth nightly. 30 Tab 3    amLODIPine (NORVASC) 10 mg tablet TAKE 1/2 TABLET BY MOUTH TWICE A DAY 30 Tab 6    losartan (COZAAR) 100 mg tablet TAKE 1 TABLET BY MOUTH EVERY DAY 90 Tab 3    potassium chloride SR (KLOR-CON 10) 10 mEq tablet TAKE 3 TABLETS BY MOUTH EVERY  Tab 3    furosemide (LASIX) 80 mg tablet TAKE 1 TABLET BY MOUTH EVERY MORNING AND TAKE 1/2 TABLET EVERY EVENING 135 Tab 3    pravastatin (PRAVACHOL) 40 mg tablet Take 1 Tab by mouth nightly. 90 Tab 3    hydrocortisone valerate (WEST-STEFFANIE) 0.2 % ointment APPLY TO AFFECTED AREA (FACE) TWICE A DAY AS NEEDED  1    mometasone (ELOCON) 0.1 % ointment APPLY TO AFFECTED AREA (SCALP) EVERY DAY AS NEEDED  2    isosorbide mononitrate ER (IMDUR) 60 mg CR tablet Take 1/2 tablet daily      econazole nitrate (SPECTAZOLE) 1 % topical cream Apply  to affected area daily.  melatonin 5 mg cap capsule Take 1 Cap by mouth nightly. 30 Cap 3    clonazePAM (KLONOPIN) 1 mg tablet TAKE1/2 TO 1 TAB BY MOUTH EVERY MORNING AS NEEDED ANXIETY & TAKE 1 TAB BY MOUTH AT BEDTIME FOR SLEEP 60 Tab 3    promethazine-dextromethorphan (PROMETHAZINE-DM) 6.25-15 mg/5 mL syrup Take 5 mL by mouth every six (6) hours as needed for Cough. 240 mL 1    metoprolol tartrate (LOPRESSOR) 25 mg tablet TAKE 1 TABLET BY MOUTH TWICE A DAY 60 Tab 11    cholecalciferol, vitamin D3, (VITAMIN D3) 2,000 unit tab Take 1 Tab by mouth daily.  acetaminophen (TYLENOL EXTRA STRENGTH) 500 mg tablet Take 1,000 mg by mouth every six (6) hours as needed for Pain.  Aspirin, Buffered 81 mg tab Take 81 mg by mouth daily.          Past History     Past Medical History:  Past Medical History:   Diagnosis Date    Anxiety     Aortic stenosis 3/3/2010    Aortic stenosis     Arrhythmia     MURMUR    Arthritis     SPINAL STENOSIS    Atherosclerosis of coronary artery     CHF (congestive heart failure) ([facility] [address] 3/3/2010    [facility] 01/[facility]    Chronic heart failure ([address] 1/2002; 3/3/2010    chronic diastolic heart failure    Chronic pain     LOWER BACK, RIGHT KNEE    Environmental allergies 3/3/2010    GERD (gastroesophageal reflux disease) 3/3/2010    Hiatal hernia 3/3/2010    High cholesterol 3/3/2010    HTN (hypertension) 3/3/2010    Hypokalemia 3/3/2010    Ischemic heart disease, chronic     Obese     Psychiatric disorder     anxiety    S/P hysterectomy 3/3/2010    Spinal stenosis 3/3/2010       Past Surgical History:  Past Surgical History:   Procedure Laterality Date    CARDIAC CATHETERIZATION  01/2002    normal coronaries    DECOMPRESS DISC RF LUMBAR  07/2007    HX BACK SURGERY  5/1/2014    HX BREAST LUMPECTOMY Left 1989    benign left breast    HX BUNIONECTOMY      HX BUNIONECTOMY      HX HEART CATHETERIZATION  3/3/10    HX HYSTERECTOMY  1978    HX LUMBAR DISKECTOMY      HX ORTHOPAEDIC Bilateral     BUNIONECTOMY    HX ORTHOPAEDIC Right     WRIST FX    HX OTHER SURGICAL  10/12/2015    mole removed from right side of face by Dr. Shai Benton FLX DX W/[facility] WHEN PFRMD  [id number]    Dr Tri Light       Family History:  Family History   Problem Relation Age of Onset    Hypertension Mother     Heart Disease Father     Stroke Sister     Heart Disease Sister     Cancer Brother      LUNG    Cancer Brother      PROSTATE    Hypertension Brother     No Known Problems Brother     Lung Disease Brother     No Known Problems Brother     No Known Problems Brother     Stroke Daughter 47    No Known Problems Daughter     Anesth Problems Neg Hx        Social History:  Social History   Substance Use Topics    Smoking status: Never Smoker    Smokeless tobacco: Never Used    Alcohol use No       Allergies:   Allergies   Allergen Reactions    Bactrim [Sulfamethoxazole-Trimethoprim] Unknown (comments)     Pt does not recall    Darvocet A500 [Propoxyphene N-Acetaminophen] Itching         Review of Systems   Review of Systems Constitutional: Negative for chills and fever. HENT: Negative for congestion, rhinorrhea, sneezing and sore throat. Eyes: Negative for redness and visual disturbance. Respiratory: Positive for shortness of breath. Cardiovascular: Positive for chest pain. Negative for leg swelling. Gastrointestinal: Negative for abdominal pain, nausea and vomiting. Genitourinary: Negative for difficulty urinating and frequency. Musculoskeletal: Negative for back pain, myalgias and neck stiffness. Skin: Negative for rash. Neurological: Negative for dizziness, syncope, weakness and headaches. Hematological: Negative for adenopathy. All other systems reviewed and are negative. Physical Exam   Physical Exam   Constitutional: She is oriented to person, place, and time. She appears well-developed and well-nourished. Pt is overweight. HENT:   Head: Normocephalic and atraumatic. Mouth/Throat: Oropharynx is clear and moist.   Eyes: Conjunctivae and EOM are normal.   Neck: Normal range of motion and full passive range of motion without pain. Neck supple. Cardiovascular: Normal rate, regular rhythm, S1 normal, S2 normal, intact distal pulses and normal pulses. Murmur heard. 2 out of 6 systolic ejection murmur best heard along the external border. Pulmonary/Chest: Effort normal and breath sounds normal. No respiratory distress. She has no wheezes. Abdominal: Soft. Normal appearance and bowel sounds are normal. She exhibits no distension. There is no tenderness. There is no rebound. Musculoskeletal: Normal range of motion. Neurological: She is alert and oriented to person, place, and time. She has normal strength. Skin: Skin is warm, dry and intact. No rash noted. Psychiatric: She has a normal mood and affect. Her speech is normal and behavior is normal. Judgment and thought content normal.   Nursing note and vitals reviewed.       Diagnostic Study Results     Labs -     Recent Results (from the past 12 hour(s))   EKG, 12 LEAD, INITIAL    Collection Time: 02/05/18 11:40 AM   Result Value Ref Range    Ventricular Rate 77 BPM    Atrial Rate 77 BPM    P-R Interval 186 ms    QRS Duration 126 ms    Q-T Interval 414 ms    QTC Calculation (Bezet) 468 ms    Calculated P Axis 59 degrees    Calculated R Axis -32 degrees    Calculated T Axis 17 degrees    Diagnosis       Sinus rhythm with premature atrial complexes with aberrant conduction  Left axis deviation  Right bundle branch block  Voltage criteria for left ventricular hypertrophy  Abnormal ECG  When compared with ECG of 28-MAY-2014 14:27,  aberrant conduction is now present  Right bundle branch block is now present     TROPONIN I    Collection Time: 02/05/18 12:44 PM   Result Value Ref Range    Troponin-I, Qt. <0.04 <0.05 ng/mL   CBC WITH AUTOMATED DIFF    Collection Time: 02/05/18 12:44 PM   Result Value Ref Range    WBC 5.2 3.6 - 11.0 K/uL    RBC 4.36 3.80 - 5.20 M/uL    HGB 12.4 11.5 - 16.0 g/dL    HCT 38.6 35.0 - 47.0 %    MCV 88.5 80.0 - 99.0 FL    MCH 28.4 26.0 - 34.0 PG    MCHC 32.1 30.0 - 36.5 g/dL    RDW 13.5 11.5 - 14.5 %    PLATELET 821 153 - 228 K/uL    MPV 11.8 8.9 - 12.9 FL    NRBC 0.0 0  WBC    ABSOLUTE NRBC 0.00 0.00 - 0.01 K/uL    NEUTROPHILS 62 32 - 75 %    LYMPHOCYTES 27 12 - 49 %    MONOCYTES 8 5 - 13 %    EOSINOPHILS 2 0 - 7 %    BASOPHILS 1 0 - 1 %    IMMATURE GRANULOCYTES 0 0.0 - 0.5 %    ABS. NEUTROPHILS 3.2 1.8 - 8.0 K/UL    ABS. LYMPHOCYTES 1.4 0.8 - 3.5 K/UL    ABS. MONOCYTES 0.4 0.0 - 1.0 K/UL    ABS. EOSINOPHILS 0.1 0.0 - 0.4 K/UL    ABS. BASOPHILS 0.0 0.0 - 0.1 K/UL    ABS. IMM.  GRANS. 0.0 0.00 - 0.04 K/UL    DF AUTOMATED     CK W/ CKMB & INDEX    Collection Time: 02/05/18 12:44 PM   Result Value Ref Range     26 - 192 U/L    CK - MB <1.0 <3.6 NG/ML    CK-MB Index Cannot be calculated 0.0 - 2.5     METABOLIC PANEL, COMPREHENSIVE    Collection Time: 02/05/18 12:44 PM   Result Value Ref Range    Sodium 143 136 - 145 mmol/L    Potassium 3.5 3.5 - 5.1 mmol/L    Chloride 104 97 - 108 mmol/L    CO2 32 21 - 32 mmol/L    Anion gap 7 5 - 15 mmol/L    Glucose 103 (H) 65 - 100 mg/dL    BUN 13 6 - 20 MG/DL    Creatinine 1.01 0.55 - 1.02 MG/DL    BUN/Creatinine ratio 13 12 - 20      GFR est AA >60 >60 ml/min/1.73m2    GFR est non-AA 53 (L) >60 ml/min/1.73m2    Calcium 8.9 8.5 - 10.1 MG/DL    Bilirubin, total 0.5 0.2 - 1.0 MG/DL    ALT (SGPT) 20 12 - 78 U/L    AST (SGOT) 18 15 - 37 U/L    Alk. phosphatase 87 45 - 117 U/L    Protein, total 7.2 6.4 - 8.2 g/dL    Albumin 3.2 (L) 3.5 - 5.0 g/dL    Globulin 4.0 2.0 - 4.0 g/dL    A-G Ratio 0.8 (L) 1.1 - 2.2         Radiologic Studies -     CXR Results  (Last 48 hours)               02/05/18 1228  XR CHEST PORT Final result    Impression:  IMPRESSION: Bibasilar subsegmental atelectasis. Narrative:  EXAM:  XR CHEST PORT       INDICATION:  Chest Pain       COMPARISON:  7.17.2017. FINDINGS: A portable AP radiograph of the chest was obtained at 1225 hours. The   patient is on a cardiac monitor. There is peribronchial cuffing and there is   bibasilar subsegmental atelectasis. There is central congestion. The cardiac and   mediastinal contours are stable. The bones and soft tissues are grossly within   normal limits. Medical Decision Making   I am the first provider for this patient. I reviewed the vital signs, available nursing notes, past medical history, past surgical history, family history and social history. Vital Signs-Reviewed the patient's vital signs.   Patient Vitals for the past 12 hrs:   Temp Pulse Resp BP SpO2   02/05/18 1433 - 70 20 119/60 98 %   02/05/18 1316 - 66 17 - -   02/05/18 1132 97.9 °F (36.6 °C) 75 18 127/77 97 %       Pulse Oximetry Analysis - 97% on RA    Cardiac Monitor:   Rate: 75 bpm  Rhythm: sinus rhythm with premature atrial complexes    EKG interpretation: (Preliminary) 1140  Rhythm: sinus rhythm with premature atrial complexes; and regular. Rate (approx.): 77; Axis: left axis deviation; PA interval: normal; QRS interval: normal ; ST/T wave: normal; Other findings: right bundle branch block. Written by [facility], ED Scribe, as dictated by Randal Barclay MD.    Records Reviewed: Nursing Notes and Old Medical Records    Provider Notes (Medical Decision Making):   DDx: ACS, unstable angina, GERD, hiatal hernia     ED Course:   Initial assessment performed. The patients presenting problems have been discussed, and they are in agreement with the care plan formulated and outlined with them. I have encouraged them to ask questions as they arise throughout their visit. CONSULT NOTE:  1:44 PM  Randal Barclay MD spoke with Dr. Cristiana Bazan,  Specialty: Cardiology  Discussed patient's hx, disposition, and available diagnostic and imaging results. Reviewed care plans. Consultant agrees with plans as outlined. He will see and evaluate in the ED.    2:28 PM  Cardiology at bedside. He recommends discharge and close outpatient follow up. Dr. Cristiana Bazan will repeat her stress test. However, he feels that most of this is GERD related. Disposition:  DISCHARGE NOTE  2:44 PM  The patient has been re-evaluated and is ready for discharge. Reviewed available results with patient. Counseled patient on diagnosis and care plan. Patient has expressed understanding, and all questions have been answered. Patient agrees with plan and agrees to follow up as recommended, or return to the ED if their symptoms worsen. Discharge instructions have been provided and explained to the patient, along with reasons to return to the ED. PLAN:  1. Current Discharge Medication List      START taking these medications    Details   aluminum & magnesium hydroxide-simethicone (MAALOX MAXIMUM STRENGTH) 400-400-40 mg/5 mL suspension Take 10 mL by mouth every six (6) hours as needed for Indigestion.   Qty: 335 mL, Refills: 0         CONTINUE these medications which have CHANGED    Details   omeprazole (PRILOSEC) 20 mg capsule TAKE ONE CAPSULE BY MOUTH EVERY MORNING  Qty: 30 Cap, Refills: 11           2. Follow-up Information     Follow up With Details Comments Contact Steven De La Vega MD Schedule an appointment as soon as possible for a visit  [address]  [address] [facility] [address]  [address] [place] [address] Road, MD Schedule an appointment as soon as possible for a visit  [address]  [address] [address]  [phone]      [facility] EMERGENCY DEPT  As needed, If symptoms worsen [address]  [phone]        Return to ED if worse     Diagnosis     Clinical Impression:   1. Atypical chest pain    2. Chest wall pain    3. Gastroesophageal reflux disease with esophagitis        Attestations:    Attestation Note:  This note is prepared by LISA Camacho, acting as Scribe for MD Renetta Cabral MD: The scribe's documentation has been prepared under my direction and personally reviewed by me in its entirety. I confirm that the note above accurately reflects all work, treatment, procedures, and medical decision making performed by me.

## 2018-02-05 NOTE — ED NOTES
Reviewed discharge instructions, follow up information and prescriptions with patient and daughter. Patient ambulatory with cane out of ED. Declined wheelchair escort out of ED. Discharged home with family.

## 2018-02-09 ENCOUNTER — TELEPHONE (OUTPATIENT)
Dept: CARDIOLOGY CLINIC | Age: 83
End: 2018-02-09

## 2018-02-09 NOTE — TELEPHONE ENCOUNTER
Spoke with pt . Verified 2 identifers.   Pt states she is getting better from her gas pain and will make an appointment to be seen by [facility] [place]

## 2018-02-12 ENCOUNTER — OFFICE VISIT (OUTPATIENT)
Dept: FAMILY MEDICINE CLINIC | Age: 83
End: 2018-02-12

## 2018-02-12 VITALS
HEIGHT: 60 IN | OXYGEN SATURATION: 94 % | DIASTOLIC BLOOD PRESSURE: 70 MMHG | RESPIRATION RATE: 20 BRPM | SYSTOLIC BLOOD PRESSURE: 119 MMHG | BODY MASS INDEX: 39.27 KG/M2 | WEIGHT: 200 LBS | TEMPERATURE: 97.3 F | HEART RATE: 66 BPM

## 2018-02-12 DIAGNOSIS — K21.9 GASTROESOPHAGEAL REFLUX DISEASE WITHOUT ESOPHAGITIS: Primary | ICD-10-CM

## 2018-02-12 DIAGNOSIS — I49.8 PERIODIC HEART FLUTTER: ICD-10-CM

## 2018-02-12 DIAGNOSIS — Z51.81 ENCOUNTER FOR MEDICATION MONITORING: ICD-10-CM

## 2018-02-12 DIAGNOSIS — I50.32 CHRONIC DIASTOLIC HEART FAILURE (HCC): ICD-10-CM

## 2018-02-12 DIAGNOSIS — I10 ESSENTIAL HYPERTENSION: Chronic | ICD-10-CM

## 2018-02-12 DIAGNOSIS — E78.2 MIXED HYPERLIPIDEMIA: ICD-10-CM

## 2018-02-12 DIAGNOSIS — H10.33 ACUTE BACTERIAL CONJUNCTIVITIS OF BOTH EYES: ICD-10-CM

## 2018-02-12 DIAGNOSIS — F41.9 ANXIETY: Chronic | ICD-10-CM

## 2018-02-12 RX ORDER — CLONAZEPAM 1 MG/1
TABLET ORAL
Qty: 60 TAB | Refills: 3 | Status: SHIPPED | OUTPATIENT
Start: 2018-02-12 | End: 2018-06-18 | Stop reason: SDUPTHER

## 2018-02-12 RX ORDER — TOBRAMYCIN AND DEXAMETHASONE 3; 1 MG/ML; MG/ML
1 SUSPENSION/ DROPS OPHTHALMIC 3 TIMES DAILY
Qty: 5 ML | Refills: 0 | Status: SHIPPED | OUTPATIENT
Start: 2018-02-12 | End: 2018-02-17

## 2018-02-12 RX ORDER — PANTOPRAZOLE SODIUM 40 MG/1
40 TABLET, DELAYED RELEASE ORAL DAILY
Qty: 30 TAB | Refills: 6 | Status: SHIPPED | OUTPATIENT
Start: 2018-02-12 | End: 2018-03-28 | Stop reason: SDUPTHER

## 2018-02-12 NOTE — MR AVS SNAPSHOT
[address] [facility] 
 
 
 [address] [facility] [address] [address] 
[phone] Patient: Nestor Escobedo MRN: [id number] :1935 Visit Information Date & Time Provider Department Dept. Phone Encounter #  
 2018 11:15 AM Jason Mtz MD [address] [facility] [place] [phone] [id number] Follow-up Instructions Return in about 6 weeks (around 3/26/2018). Your Appointments 2018 11:00 AM  
ESTABLISHED PATIENT with Ruba Xie MD  
[address] [facility]) [address]  
[phone] [address]  
  
    
 3/13/2018 10:00 AM  
ESTABLISHED PATIENT with Ruba Xie MD  
[address] [facility]) Appt Note: 6 MO. F/U STRESS TEST / ECHO  
 [address]  
[phone] 3/21/2018 10:45 AM  
ROUTINE CARE with Jason Mtz MD  
[address] [address] [facility]) Appt Note: ROUTINE FOLLOW UP  
 [address] [facility] [address] [address]  
[phone] [address] Upcoming Health Maintenance Date Due OSTEOPOROSIS SCREENING (DEXA) 2002 GLAUCOMA SCREENING Q2Y 6/15/2015 MEDICARE YEARLY EXAM 3/29/2018 COLONOSCOPY 10/13/2020 DTaP/Tdap/Td series (2 - Td) 2026 Allergies as of 2018  Review Complete On: 2018 By: Jason Mtz MD  
  
 Severity Noted Reaction Type Reactions Bactrim [Sulfamethoxazole-trimethoprim]  2010    Unknown (comments) Pt does not recall Darvocet A500 [Propoxyphene N-acetaminophen]  2010    Itching Current Immunizations  Reviewed on 2018 Name Date Influenza High Dose Vaccine PF 11/20/2017, 11/7/2016, 10/13/2015, 11/26/2014 Influenza Vaccine 12/31/2013 Influenza Vaccine Split 10/9/2012, 11/1/2011, 11/1/2010 Pneumococcal Conjugate (PCV-13) 11/20/2017 Pneumococcal Vaccine (Unspecified Type) 1/1/2008 Not reviewed this visit You Were Diagnosed With   
  
 Codes Comments Anxiety    -  Primary ICD-10-CM: F41.9 ICD-9-CM: 300.00 Periodic heart flutter     ICD-10-CM: I49.8 ICD-9-CM: 427.42 Vitals BP Pulse Temp Resp Height(growth percentile) Weight(growth percentile) 119/70 (BP 1 Location: Right arm, BP Patient Position: Sitting) 66 97.3 °F (36.3 °C) (Oral) 20 5' (1.524 m) 200 lb (90.7 kg) LMP SpO2 BMI OB Status Smoking Status (LMP Unknown) 94% 39.06 kg/m2 Hysterectomy Never Smoker Vitals History BMI and BSA Data Body Mass Index Body Surface Area 39.06 kg/m 2 1.96 m 2 Preferred Pharmacy Pharmacy Name Phone [id number]- [place], [place] - [address] [phone] Your Updated Medication List  
  
   
This list is accurate as of: 2/12/18 12:06 PM.  Always use your most recent med list.  
  
  
  
  
 aluminum & magnesium hydroxide-simethicone 400-400-40 mg/5 mL suspension Commonly known as:  MAALOX MAXIMUM STRENGTH Take 10 mL by mouth every six (6) hours as needed for Indigestion. amLODIPine 10 mg tablet Commonly known as:  Cookie Boozer TAKE 1/2 TABLET BY MOUTH TWICE A DAY  
  
 aspirin, buffered 81 mg Tab Take 81 mg by mouth daily. atorvastatin 40 mg tablet Commonly known as:  LIPITOR Take 1 Tab by mouth nightly. clonazePAM 1 mg tablet Commonly known as:  KlonoPIN  
TAKE1/2 TO 1 TAB BY MOUTH EVERY MORNING AS NEEDED ANXIETY & TAKE 1 TAB BY MOUTH AT BEDTIME FOR SLEEP  
  
 econazole nitrate 1 % topical cream  
Commonly known as:  Dipesh Richey Apply  to affected area daily. furosemide 80 mg tablet Commonly known as:  LASIX TAKE 1 TABLET BY MOUTH EVERY MORNING AND TAKE 1/2 TABLET EVERY EVENING  
  
 hydrocortisone valerate 0.2 % ointment Commonly known as:  WEST-STEFFANIE  
APPLY TO AFFECTED AREA (FACE) TWICE A DAY AS NEEDED  
  
 isosorbide mononitrate ER 60 mg CR tablet Commonly known as:  IMDUR Take 1/2 tablet daily  
  
 losartan 100 mg tablet Commonly known as:  COZAAR  
TAKE 1 TABLET BY MOUTH EVERY DAY  
  
 metoprolol tartrate 25 mg tablet Commonly known as:  LOPRESSOR  
TAKE 1 TABLET BY MOUTH TWICE A DAY  
  
 mometasone 0.1 % ointment Commonly known as:  ELOCON  
APPLY TO AFFECTED AREA (SCALP) EVERY DAY AS NEEDED  
  
 nitroglycerin 0.4 mg SL tablet Commonly known as:  NITROSTAT  
TAKE 1 TAB BY SUBLINGUAL ROUTE EVERY 5 MINUTES AS NEEDED. omeprazole 20 mg capsule Commonly known as:  PRILOSEC  
TAKE ONE CAPSULE BY MOUTH EVERY MORNING  
  
 pantoprazole 40 mg tablet Commonly known as:  PROTONIX Take 1 Tab by mouth daily. potassium chloride SR 10 mEq tablet Commonly known as:  KLOR-CON 10  
TAKE 3 TABLETS BY MOUTH EVERY DAY  
  
 pravastatin 40 mg tablet Commonly known as:  PRAVACHOL Take 1 Tab by mouth nightly. promethazine-dextromethorphan 6.25-15 mg/5 mL syrup Commonly known as:  PROMETHAZINE-DM Take 5 mL by mouth every six (6) hours as needed for Cough. tobramycin-dexamethasone ophthalmic suspension Commonly known as:  Kriss Fogo Administer 1 Drop to both eyes three (3) times daily for 5 days. TYLENOL EXTRA STRENGTH 500 mg tablet Generic drug:  acetaminophen Take 1,000 mg by mouth every six (6) hours as needed for Pain. VITAMIN D3 2,000 unit Tab Generic drug:  cholecalciferol (vitamin D3) Take 1 Tab by mouth daily. Prescriptions Printed Refills  
 clonazePAM (KLONOPIN) 1 mg tablet 3 Sig: TAKE1/2 TO 1 TAB BY MOUTH EVERY MORNING AS NEEDED ANXIETY & TAKE 1 TAB BY MOUTH AT BEDTIME FOR SLEEP Class: Print Prescriptions Sent to Pharmacy Refills  
 pantoprazole (PROTONIX) 40 mg tablet 6 Sig: Take 1 Tab by mouth daily. Class: Normal  
 Pharmacy: [facility]/pharmacy [address] [place] [address] S[address] Ph #: [phone] Route: Oral  
 tobramycin-dexamethasone (TOBRADEX) ophthalmic suspension 0 Sig: Administer 1 Drop to both eyes three (3) times daily for 5 days. Class: Normal  
 Pharmacy: [facility]/pharmacy [address] [place] [address] [address] Ph #: [phone] Route: Both Eyes We Performed the Following REFERRAL TO CARDIOLOGY [SRN21 Custom] Comments:  
 Holter Monitor Follow-up Instructions Return in about 6 weeks (around 3/26/2018). Referral Information Referral ID Referred By Referred To  
  
 [id number] Gabriele [place] MD   
   [address] [address] [address] [facility] Phone: [phone] Fax: [phone] Visits Status Start Date End Date 1 New Request 2/12/18 2/12/19 If your referral has a status of pending review or denied, additional information will be sent to support the outcome of this decision. Introducing \[facility]\"" & HEALTH SERVICES! Dear Rosa Silva: 
Thank you for requesting a SNADEC account. Our records indicate that you already have an active SNADEC account. You can access your account anytime at [email or web address] Did you know that you can access your hospital and ER discharge instructions at any time in SNADEC? You can also review all of your test results from your hospital stay or ER visit. Additional Information If you have questions, please visit the Frequently Asked Questions section of the SNADEC website at [email or web address] Remember, SNADEC is NOT to be used for urgent needs. For medical emergencies, dial 911. Now available from your iPhone and Android! Please provide this summary of care documentation to your next provider. Your primary care clinician is listed as Joon Sr. If you have any questions after today's visit, please call [phone].

## 2018-02-12 NOTE — PROGRESS NOTES
HISTORY OF PRESENT ILLNESS  Atul Madera is a [de-identified] y.o. female. HPI   Follow ER for chest pain that turned out to be d/t GERD. Had negative cardiac eval thru the [facility]. was seen by cardiology. Started on antiacid which has help some. Knows that fried foods flare up her sx of heartburn. Still has a fluttering sensation in the chest.  \"Feels nervous feeing in the chest.\"  No SOB. Patient Active Problem List   Diagnosis Code    Hypokalemia E87.6    Hiatal hernia K44.9    Anxiety F41.9    S/P hysterectomy Z90.710    Spinal stenosis M48.00    S/P foot surgery Z98.890    Environmental allergies Z91.09    S/P cardiac catheterization Z98.890    Hypovitaminosis D E55.9    Chronic ischemic heart disease I25.9    Mixed hyperlipidemia E78.2    Chronic diastolic heart failure (HCC) I50.32    Chest pain, unspecified R07.9    Chest pain at rest R07.9    RBBB I45.10    Atypical chest pain R07.89    Essential hypertension I10    Gastroesophageal reflux disease without esophagitis K21.9    Atherosclerosis of native coronary artery without angina pectoris--diffuse small vsl disease via cath cath 2009--RCA PDA/ROSA I25.10    Chronic anxiety F41.9    Encounter for medication monitoring Z51.81    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 30 Tab 6    tobramycin-dexamethasone (TOBRADEX) ophthalmic suspension Administer 1 Drop to both eyes three (3) times daily for 5 days.  5 mL 0    omeprazole (PRILOSEC) 20 mg capsule TAKE ONE CAPSULE BY MOUTH EVERY MORNING 30 Cap 11    aluminum & magnesium hydroxide-simethicone (MAALOX MAXIMUM STRENGTH) 400-400-40 mg/5 mL suspension Take 10 mL by mouth every six (6) hours as needed for Indigestion. 335 mL 0    nitroglycerin (NITROSTAT) 0.4 mg SL tablet TAKE 1 TAB BY SUBLINGUAL ROUTE EVERY 5 MINUTES AS NEEDED. 25 Tab 1    atorvastatin (LIPITOR) 40 mg tablet Take 1 Tab by mouth nightly. 30 Tab 3    amLODIPine (NORVASC) 10 mg tablet TAKE 1/2 TABLET BY MOUTH TWICE A DAY 30 Tab 6    losartan (COZAAR) 100 mg tablet TAKE 1 TABLET BY MOUTH EVERY DAY 90 Tab 3    potassium chloride SR (KLOR-CON 10) 10 mEq tablet TAKE 3 TABLETS BY MOUTH EVERY  Tab 3    furosemide (LASIX) 80 mg tablet TAKE 1 TABLET BY MOUTH EVERY MORNING AND TAKE 1/2 TABLET EVERY EVENING 135 Tab 3    pravastatin (PRAVACHOL) 40 mg tablet Take 1 Tab by mouth nightly. 90 Tab 3    hydrocortisone valerate (WEST-STEFFANIE) 0.2 % ointment APPLY TO AFFECTED AREA (FACE) TWICE A DAY AS NEEDED  1    mometasone (ELOCON) 0.1 % ointment APPLY TO AFFECTED AREA (SCALP) EVERY DAY AS NEEDED  2    isosorbide mononitrate ER (IMDUR) 60 mg CR tablet Take 1/2 tablet daily      econazole nitrate (SPECTAZOLE) 1 % topical cream Apply  to affected area daily.  promethazine-dextromethorphan (PROMETHAZINE-DM) 6.25-15 mg/5 mL syrup Take 5 mL by mouth every six (6) hours as needed for Cough. 240 mL 1    metoprolol tartrate (LOPRESSOR) 25 mg tablet TAKE 1 TABLET BY MOUTH TWICE A DAY 60 Tab 11    cholecalciferol, vitamin D3, (VITAMIN D3) 2,000 unit tab Take 1 Tab by mouth daily.  acetaminophen (TYLENOL EXTRA STRENGTH) 500 mg tablet Take 1,000 mg by mouth every six (6) hours as needed for Pain.  Aspirin, Buffered 81 mg tab Take 81 mg by mouth daily.          Allergies   Allergen Reactions    Bactrim [Sulfamethoxazole-Trimethoprim] Unknown (comments)     Pt does not recall    Darvocet A500 [Propoxyphene N-Acetaminophen] Itching       Past Medical History:   Diagnosis Date  Anxiety     Aortic stenosis 3/3/2010    Aortic stenosis     Arrhythmia     MURMUR    Arthritis     SPINAL STENOSIS    Atherosclerosis of coronary artery     CHF (congestive heart failure) ([facility] [address] 3/3/2010    CHF (congestive heart failure) ([facility] [address] 01/2002    Chronic heart failure ([facility] [address] 1/2002; 3/3/2010    chronic diastolic heart failure    Chronic pain     LOWER BACK, RIGHT KNEE    Environmental allergies 3/3/2010    GERD (gastroesophageal reflux disease) 3/3/2010    Hiatal hernia 3/3/2010    High cholesterol 3/3/2010    HTN (hypertension) 3/3/2010    Hypokalemia 3/3/2010    Ischemic heart disease, chronic     Obese     Psychiatric disorder     anxiety    S/P hysterectomy 3/3/2010    Spinal stenosis 3/3/2010       Past Surgical History:   Procedure Laterality Date    CARDIAC CATHETERIZATION  01/2002    normal coronaries    DECOMPRESS DISC RF LUMBAR  07/2007    HX BACK SURGERY  5/1/2014    HX BREAST LUMPECTOMY Left 1989    benign left breast    HX BUNIONECTOMY      HX BUNIONECTOMY      HX HEART CATHETERIZATION  3/3/10    HX HYSTERECTOMY  1978    HX LUMBAR DISKECTOMY      HX ORTHOPAEDIC Bilateral     BUNIONECTOMY    HX ORTHOPAEDIC Right     WRIST FX    HX OTHER SURGICAL  10/12/2015    mole removed from right side of face by Dr. Thornton Night FLX DX W/[facility] WHEN PFRMD  [id number]    Dr Estefani Sheriff       Family History   Problem Relation Age of Onset    Hypertension Mother     Heart Disease Father     Stroke Sister     Heart Disease Sister     Cancer Brother      LUNG    Cancer Brother      PROSTATE    Hypertension Brother     No Known Problems Brother     Lung Disease Brother     No Known Problems Brother     No Known Problems Brother     Stroke Daughter 47    No Known Problems Daughter     Anesth Problems Neg Hx        Social History   Substance Use Topics    Smoking status: Never Smoker    Smokeless tobacco: Never Used    Alcohol use No        Lab Results  Component Value Date/Time   WBC 5.2 02/05/2018 12:44 PM   HGB 12.4 02/05/2018 12:44 PM   HCT 38.6 02/05/2018 12:44 PM   PLATELET 960 41/99/1232 12:44 PM   MCV 88.5 02/05/2018 12:44 PM     Lab Results  Component Value Date/Time   Cholesterol, total 230 (H) 11/20/2017 11:35 AM   HDL Cholesterol 57 11/20/2017 11:35 AM   LDL, calculated 146 (H) 11/20/2017 11:35 AM   Triglyceride 136 11/20/2017 11:35 AM   CHOL/HDL Ratio 3.1 11/01/2010 05:26 PM     Lab Results  Component Value Date/Time   TSH 2.470 05/15/2017 03:04 PM      Lab Results   Component Value Date/Time    Sodium 143 02/05/2018 12:44 PM    Potassium 3.5 02/05/2018 12:44 PM    Chloride 104 02/05/2018 12:44 PM    CO2 32 02/05/2018 12:44 PM    Anion gap 7 02/05/2018 12:44 PM    Glucose 103 (H) 02/05/2018 12:44 PM    BUN 13 02/05/2018 12:44 PM    Creatinine 1.01 02/05/2018 12:44 PM    BUN/Creatinine ratio 13 02/05/2018 12:44 PM    GFR est AA >60 02/05/2018 12:44 PM    GFR est non-AA 53 (L) 02/05/2018 12:44 PM    Calcium 8.9 02/05/2018 12:44 PM    Bilirubin, total 0.5 02/05/2018 12:44 PM    ALT (SGPT) 20 02/05/2018 12:44 PM    AST (SGOT) 18 02/05/2018 12:44 PM    Alk. phosphatase 87 02/05/2018 12:44 PM    Protein, total 7.2 02/05/2018 12:44 PM    Albumin 3.2 (L) 02/05/2018 12:44 PM    Globulin 4.0 02/05/2018 12:44 PM    A-G Ratio 0.8 (L) 02/05/2018 12:44 PM          Review of Systems   Constitutional: Negative for malaise/fatigue. HENT: Negative for congestion. Eyes: Positive for discharge. Negative for blurred vision. Has had eye discharge over the past wee. Eyes are stuck together in the mornings d/t drainage. No blurred vision noted. Respiratory: Negative for cough and shortness of breath. Cardiovascular: Negative for chest pain, palpitations and leg swelling. Gastrointestinal: Positive for heartburn. Negative for abdominal pain, blood in stool, constipation, melena, nausea and vomiting.    Genitourinary: Negative for dysuria, frequency and urgency. Musculoskeletal: Negative for back pain and joint pain. Neurological: Negative for dizziness, tingling and headaches. Endo/Heme/Allergies: Negative for environmental allergies. Psychiatric/Behavioral: Negative for depression. The patient does not have insomnia. Physical Exam   Constitutional: She appears well-developed and well-nourished. /70 (BP 1 Location: Right arm, BP Patient Position: Sitting)  Pulse 66  Temp 97.3 °F (36.3 °C) (Oral)   Resp 20  Ht 5' (1.524 m)  Wt 200 lb (90.7 kg)  LMP  (LMP Unknown)  SpO2 94%  BMI 39.06 kg/m2     HENT:   Right Ear: Tympanic membrane and ear canal normal.   Left Ear: Tympanic membrane and ear canal normal.   Nose: No mucosal edema or rhinorrhea. Mouth/Throat: Oropharynx is clear and moist and mucous membranes are normal.   Eyes: EOM are normal. Pupils are equal, round, and reactive to light. Right conjunctiva is injected. Left conjunctiva is injected. Neck: Normal range of motion. Neck supple. No thyromegaly present. Cardiovascular: Normal rate and regular rhythm. No murmur heard. Pulmonary/Chest: Effort normal and breath sounds normal.   Abdominal: Soft. Bowel sounds are normal. There is no tenderness. Musculoskeletal: Normal range of motion. She exhibits no edema. Lymphadenopathy:     She has no cervical adenopathy. Skin: Skin is warm and dry. Psychiatric: She has a normal mood and affect. Nursing note and vitals reviewed. ASSESSMENT and PLAN  Diagnoses and all orders for this visit:    1. Gastroesophageal reflux disease without esophagitis  -     Change to pantoprazole (PROTONIX) 40 mg tablet; Take 1 Tab by mouth daily. The pathophysiology of reflux is discussed. Anti-reflux measures such as raising the head of the bed, avoiding tight clothing or belts, avoiding eating late at night and not lying down shortly after mealtime and achieving weight loss are discussed.  Avoid ASA, NSAID's, caffeine, peppermints, chocolate and other food triggers, alcohol and tobacco.     2. Periodic heart flutter  -     AMB POC EKG 24HR MONITORING  -     ECG HOLTER MONITOR, ANALYSIS 48 HR; Future    3. Anxiety  -     Refill clonazePAM (KLONOPIN) 1 mg tablet; TAKE1/2 TO 1 TAB BY MOUTH EVERY MORNING AS NEEDED ANXIETY & TAKE 1 TAB BY MOUTH AT BEDTIME FOR SLEEP    4. Essential hypertension  Stable     5. Mixed hyperlipidemia  Continue to monitor. Work on diet and exercise. 6. Chronic diastolic heart failure ([address]  Has follow up with cardiology for next week. 7. Acute bacterial conjunctivitis of both eyes  -     tobramycin-dexamethasone (TOBRADEX) ophthalmic suspension; Administer 1 Drop to both eyes three (3) times daily for 5 days. Warm compresses. 8. Encounter for medication monitoring      Follow-up Disposition:  Return in about 6 weeks (around 3/26/2018). reviewed diet, exercise and weight control  cardiovascular risk and specific lipid/LDL goals reviewed  reviewed medications and side effects in detail    I have discussed diagnosis listed in this note with pt and/or family. I have discussed treatment plans and options and the risk/benefit analysis of those options, including safe use of medications and possible medication side effects. Through the use of shared decision making we have agreed to the above plan. The patient has received an after-visit summary and questions were answered concerning future plans and follow up. Advise pt of any urgent changes then to proceed to the ER.

## 2018-02-13 ENCOUNTER — HOSPITAL ENCOUNTER (OUTPATIENT)
Dept: NON INVASIVE DIAGNOSTICS | Age: 83
Discharge: HOME | End: 2018-02-13
Attending: FAMILY MEDICINE
Payer: MEDICARE

## 2018-02-13 DIAGNOSIS — I49.8 PERIODIC HEART FLUTTER: ICD-10-CM

## 2018-02-13 PROCEDURE — 93225 XTRNL ECG REC<48 HRS REC: CPT | Performed by: FAMILY MEDICINE

## 2018-02-13 NOTE — PROGRESS NOTES
Pt arrived for 48 Holter monitor. Pt stated she did not want to come back 3 days in a row. (Monitors only record for 24 hours, so she would have to be re-hooked tomorrow and return monitor on Thursday. Pt stated she has her symptoms every day.  Order changed to 24 hour monitor per patient's request.

## 2018-02-15 ENCOUNTER — TELEPHONE (OUTPATIENT)
Dept: FAMILY MEDICINE CLINIC | Age: 83
End: 2018-02-15

## 2018-02-15 NOTE — PROGRESS NOTES
Homero Michelle,  Please review this holter report. She has an appt with you for next week.   She is complaining of feeling fluttering in her chest.

## 2018-02-15 NOTE — PROCEDURES
[address] [facility]    Jay Mora  MR#: [id number]  : 1935  ACCOUNT #: [de-identified]   DATE OF SERVICE: 2018    PRIMARY CARE PHYSICIAN:  Junior Velasquez MD    REASON FOR STUDY: Palpitations. Holter monitor was carried out between  and . During the recording, the patient did not return a diary. The patient had a minimum heart rate of 53 beats per minute with a maximum heart of 155 beats per minute. There were isolated premature ventricular contractions. There were APCs as well. There were atrial couplets . There were runs of sequential APCs. The longest run was 12 beats. @  approximately 150. There appears to be an intraventricular conduction delay. No significant pauses. SUMMARY  1.  Regular sinus rhythm is observed. 2.  Multiple premature ventricular contractions, which are isolated. 3.  Multiple atrial premature contractions with atrial couplets. 4.  Runs of SVT with ventricular response of approximately 150. The above findings may correlate with the patient's complaints of palpitations.       Mathew Estrada MD       [facility].Lissa  D: 02/15/2018 08:31     T: 02/15/2018 10:41  JOB #: [id number]  CC: Karson Borrego MD

## 2018-02-16 ENCOUNTER — TELEPHONE (OUTPATIENT)
Dept: FAMILY MEDICINE CLINIC | Age: 83
End: 2018-02-16

## 2018-02-16 NOTE — TELEPHONE ENCOUNTER
----- Message from Ben Dodd MD sent at 2/15/2018 10:54 AM EST -----  Please send her holter report to Dr. Myranda Dee office. Please fax it to his office as he may miss it in the chart. Also let pt know to tell Dr. Myranda Dee to look at her Holter report that she has had when she sees him on next week.

## 2018-02-16 NOTE — TELEPHONE ENCOUNTER
Holter monitor faxed to Dr. Lucho Nettles office [phone]. Called patient and informed that holter monitor results were faxed to Dr. Yeyo Talavera and him know to look at report. Patient stated she would.

## 2018-02-20 ENCOUNTER — OFFICE VISIT (OUTPATIENT)
Dept: CARDIOLOGY CLINIC | Age: 83
End: 2018-02-20

## 2018-02-20 ENCOUNTER — TELEPHONE (OUTPATIENT)
Dept: FAMILY MEDICINE CLINIC | Age: 83
End: 2018-02-20

## 2018-02-20 VITALS
DIASTOLIC BLOOD PRESSURE: 72 MMHG | WEIGHT: 198 LBS | SYSTOLIC BLOOD PRESSURE: 122 MMHG | HEART RATE: 70 BPM | HEIGHT: 60 IN | BODY MASS INDEX: 38.87 KG/M2 | OXYGEN SATURATION: 93 % | RESPIRATION RATE: 16 BRPM

## 2018-02-20 DIAGNOSIS — I25.10 ATHEROSCLEROSIS OF NATIVE CORONARY ARTERY OF NATIVE HEART WITHOUT ANGINA PECTORIS: ICD-10-CM

## 2018-02-20 DIAGNOSIS — I10 ESSENTIAL HYPERTENSION: Chronic | ICD-10-CM

## 2018-02-20 DIAGNOSIS — I50.32 CHRONIC DIASTOLIC HEART FAILURE (HCC): ICD-10-CM

## 2018-02-20 DIAGNOSIS — I25.9 CHRONIC ISCHEMIC HEART DISEASE: ICD-10-CM

## 2018-02-20 DIAGNOSIS — E78.2 MIXED HYPERLIPIDEMIA: ICD-10-CM

## 2018-02-20 DIAGNOSIS — F41.9 CHRONIC ANXIETY: ICD-10-CM

## 2018-02-20 DIAGNOSIS — I45.2 BIFASCICULAR BUNDLE BRANCH BLOCK: ICD-10-CM

## 2018-02-20 DIAGNOSIS — I49.8 PAROXYSMAL CARDIAC ARRHYTHMIA: Primary | ICD-10-CM

## 2018-02-20 DIAGNOSIS — K21.9 GASTROESOPHAGEAL REFLUX DISEASE WITHOUT ESOPHAGITIS: Chronic | ICD-10-CM

## 2018-02-20 DIAGNOSIS — M48.00 SPINAL STENOSIS, UNSPECIFIED SPINAL REGION: ICD-10-CM

## 2018-02-20 RX ORDER — TRAMADOL HYDROCHLORIDE 50 MG/1
TABLET ORAL
Refills: 0 | COMMUNITY
Start: 2017-12-29 | End: 2018-03-28

## 2018-02-20 NOTE — MR AVS SNAPSHOT
[address] [facility] 
 
 
 [address] 
[phone] Patient: Clay Layne MRN:  :[id number] Visit Information Date & Time Provider Department Dept. Phone Encounter #  
 2018 11:00 AM MD Librado Mol[facility] [phone] [id number] Your Appointments 3/21/2018 10:45 AM  
ROUTINE CARE with Ruddy Garcia MD  
[facility] [address]) Appt Note: ROUTINE FOLLOW UP  
 [address] [facility] [id number] [phone] [id number]  
  
    
 3/30/2018  1:00 PM  
ESTABLISHED PATIENT with MD Librado Molina [facility]) Appt Note: ONE MONTH FOLLOW UP-  CR  
 [facility]  
[phone] [address] [facility] [address] Upcoming Health Maintenance Date Due OSTEOPOROSIS SCREENING (DEXA) 2002 GLAUCOMA SCREENING Q2Y 6/15/2015 MEDICARE YEARLY EXAM 3/29/2018 COLONOSCOPY 10/13/2020 DTaP/Tdap/Td series (2 - Td) 2026 Allergies as of 2018  Review Complete On: 2018 By: Joye Hodgkins, LPN Severity Noted Reaction Type Reactions Bactrim [Sulfamethoxazole-trimethoprim]  2010    Unknown (comments) Pt does not recall Darvocet A500 [Propoxyphene N-acetaminophen]  2010    Itching Current Immunizations  Reviewed on 2018 Name Date Influenza High Dose Vaccine PF 2017, 2016, 10/13/2015, 2014 Influenza Vaccine 2013 Influenza Vaccine Split 10/9/2012, 2011, 2010 Pneumococcal Conjugate (PCV-13) 2017 Pneumococcal Vaccine (Unspecified Type) 2008 Not reviewed this visit You Were Diagnosed With   
  
 Codes Comments Mixed hyperlipidemia    -  Primary ICD-10-CM: D37.0 ICD-9-CM: 272.2 Essential hypertension     ICD-10-CM: I10 
ICD-9-CM: 401.9 Chronic ischemic heart disease     ICD-10-CM: I25.9 ICD-9-CM: 414.9 Chronic diastolic heart failure (HCC)     ICD-10-CM: I50.32 
ICD-9-CM: 428.32 Atherosclerosis of native coronary artery of native heart without angina pectoris     ICD-10-CM: I25.10 ICD-9-CM: 414.01 Gastroesophageal reflux disease without esophagitis     ICD-10-CM: K21.9 ICD-9-CM: 530.81 Vitals BP Pulse Resp Height(growth percentile) Weight(growth percentile) LMP  
 122/72 (BP 1 Location: Right arm, BP Patient Position: Sitting) 70 16 5' (1.524 m) 198 lb (89.8 kg) (LMP Unknown) SpO2 BMI OB Status Smoking Status 93% 38.67 kg/m2 Hysterectomy Never Smoker Vitals History BMI and BSA Data Body Mass Index Body Surface Area  
 38.67 kg/m 2 1.95 m 2 Preferred Pharmacy Pharmacy Name Phone [id number] [address] [phone] Your Updated Medication List  
  
   
This list is accurate as of: 2/20/18 12:22 PM.  Always use your most recent med list.  
  
  
  
  
 aluminum & magnesium hydroxide-simethicone 400-400-40 mg/5 mL suspension Commonly known as:  MAALOX MAXIMUM STRENGTH Take 10 mL by mouth every six (6) hours as needed for Indigestion. amLODIPine 10 mg tablet Commonly known as:  Aida Julio TAKE 1/2 TABLET BY MOUTH TWICE A DAY  
  
 aspirin, buffered 81 mg Tab Take 81 mg by mouth daily. atorvastatin 40 mg tablet Commonly known as:  LIPITOR Take 1 Tab by mouth nightly. clonazePAM 1 mg tablet Commonly known as:  KlonoPIN  
TAKE1/2 TO 1 TAB BY MOUTH EVERY MORNING AS NEEDED ANXIETY & TAKE 1 TAB BY MOUTH AT BEDTIME FOR SLEEP  
  
 econazole nitrate 1 % topical cream  
Commonly known as:  Fonda Gitelman Apply  to affected area daily. furosemide 80 mg tablet Commonly known as:  LASIX TAKE 1 TABLET BY MOUTH EVERY MORNING AND TAKE 1/2 TABLET EVERY EVENING  
  
 hydrocortisone valerate 0.2 % ointment Commonly known as:  WEST-STEFFANIE  
APPLY TO AFFECTED AREA (FACE) TWICE A DAY AS NEEDED  
  
 isosorbide mononitrate ER 60 mg CR tablet Commonly known as:  IMDUR Take 1/2 tablet daily  
  
 losartan 100 mg tablet Commonly known as:  COZAAR  
TAKE 1 TABLET BY MOUTH EVERY DAY  
  
 metoprolol tartrate 25 mg tablet Commonly known as:  LOPRESSOR  
TAKE 1 TABLET BY MOUTH TWICE A DAY  
  
 mometasone 0.1 % ointment Commonly known as:  ELOCON  
APPLY TO AFFECTED AREA (SCALP) EVERY DAY AS NEEDED  
  
 nitroglycerin 0.4 mg SL tablet Commonly known as:  NITROSTAT  
TAKE 1 TAB BY SUBLINGUAL ROUTE EVERY 5 MINUTES AS NEEDED. omeprazole 20 mg capsule Commonly known as:  PRILOSEC  
TAKE ONE CAPSULE BY MOUTH EVERY MORNING  
  
 pantoprazole 40 mg tablet Commonly known as:  PROTONIX Take 1 Tab by mouth daily. potassium chloride SR 10 mEq tablet Commonly known as:  KLOR-CON 10  
TAKE 3 TABLETS BY MOUTH EVERY DAY  
  
 pravastatin 40 mg tablet Commonly known as:  PRAVACHOL Take 1 Tab by mouth nightly. promethazine-dextromethorphan 6.25-15 mg/5 mL syrup Commonly known as:  PROMETHAZINE-DM Take 5 mL by mouth every six (6) hours as needed for Cough. traMADol 50 mg tablet Commonly known as:  ULTRAM  
TAKE 1 TABLET BY MOUTH EVERY 8 HOURS AS NEEDED FOR PAIN  
  
 TYLENOL EXTRA STRENGTH 500 mg tablet Generic drug:  acetaminophen Take 1,000 mg by mouth every six (6) hours as needed for Pain. VITAMIN D3 2,000 unit Tab Generic drug:  cholecalciferol (vitamin D3) Take 1 Tab by mouth daily. We Performed the Following AMB POC EKG ROUTINE W/ 12 LEADS, INTER & REP [72141 CPT(R)] Introducing [address]! Dear Julián Antony: 
Thank you for requesting a [email or web address] account.   Our records indicate that you already have an active [email or web address] account. You can access your account anytime at [email or web address] Did you know that you can access your hospital and ER discharge instructions at any time in [email or web address]? You can also review all of your test results from your hospital stay or ER visit. Additional Information If you have questions, please visit the Frequently Asked Questions section of the [email or web address] website at [email or web address] Remember, [email or web address] is NOT to be used for urgent needs. For medical emergencies, dial 911. Now available from your iPhone and Android! Please provide this summary of care documentation to your next provider. Your primary care clinician is listed as Mickey Bell. If you have any questions after today's visit, please call [phone].

## 2018-02-20 NOTE — TELEPHONE ENCOUNTER
Patient wants to get the medication Crestor , she would like to speak with Misty about this medication.   Please give her a call @ [phone]

## 2018-02-20 NOTE — PROGRESS NOTES
Chief Complaint   Patient presents with   [address]     pt chest pain ,sob, flutters in chest.   1. Have you been to the ER, urgent care clinic since your last visit? Hospitalized since your last visit? Yes When: 2/5/18, [facility] [place] -CHEST PAIN    2. Have you seen or consulted any other health care providers outside of the [address] [place] [address] Wes since your last visit? Include any pap smears or colon screening. No    PT RECENTLY HAD A MONITOR.

## 2018-02-20 NOTE — PROGRESS NOTES
[facility]   [address] [facility], [facility] [address]                                          NEW PATIENT HPI/FOLLOW-UP      NAME:  Lamont Friday   :   1935   MRN:   [id number]   PCP:  Ezekiel Castle MD           Subjective: The patient is a [de-identified]y.o. year old female  who returns for a routine follow-up after EM obtained for intermittent, short-lived skipping heart beats and flutterings over last few weeks. Occur at anytime during the day mostly. No associated symptoms. Had HM revealing frequent PVC's and APC,couplets,triplets and rare very short bursts NSSVT. Also admits to difficulty sleeping at night--tossing and turning,up and down. Sleeps on 2 pillows. Catches up on sleep with naps during the day. Easily expectorates/regurgitates thick, slimey, mucousy, stringy emesis. Admits to early satiety and intermittent vomitus,belching,bloating and burping. Eats late into the night. Started on PPI by PCP just recently. No benefit yet. Denies change in exercise tolerance, chest pain, edema, medication intolerance, PND/orthopnea wheezing, sputum, syncope, dizziness or light headedness. Review of Systems  General: Pt denies excessive weight gain or loss. Pt is able to conduct ADL's. Respiratory: +shortness of breath, +RICHARD, -wheezing or stridor.   Cardiovascular: Denies precordial pain, +palpitations, -edema or PND  Gastrointestinal: + poor appetite,+ indigestion, +abdominal pain,- blood in stool  Peripheral vascular: Denies claudication, leg cramps  Neuropsychiatric: Denies paresthesias,tingling,numbness,anxiety,depression,fatigue  Musculoskeletal: Denies pain,tenderness, soreness,swelling      Past Medical History:   Diagnosis Date    Anxiety     Aortic stenosis 3/3/2010    Aortic stenosis     Arrhythmia     MURMUR    Arthritis     SPINAL STENOSIS    Atherosclerosis of coronary artery     CHF (congestive heart failure) ([facility] [address] 3/3/2010    CHF (congestive heart failure) ([facility] 01/2002    Chronic heart failure ([facility] 1/2002; 3/3/2010    chronic diastolic heart failure    Chronic pain     LOWER BACK, RIGHT KNEE    Environmental allergies 3/3/2010    GERD (gastroesophageal reflux disease) 3/3/2010    Hiatal hernia 3/3/2010    High cholesterol 3/3/2010    HTN (hypertension) 3/3/2010    Hypokalemia 3/3/2010    Ischemic heart disease, chronic     Obese     Psychiatric disorder     anxiety    S/P hysterectomy 3/3/2010    Spinal stenosis 3/3/2010     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 02/12/2016    Encounter for medication monitoring 11/29/2015    Essential hypertension 07/12/2015    Gastroesophageal reflux disease without esophagitis 07/12/2015    Atherosclerosis of native coronary artery without angina pectoris--diffuse small vsl disease via cath cath 2009--RCA PDA/ROSA 07/12/2015    Chronic anxiety 07/12/2015    Atypical chest pain 02/24/2014    Chest pain at rest 02/20/2014    RBBB 02/20/2014    Chest pain, unspecified 02/11/2013    Chronic ischemic heart disease 05/29/2012    Mixed hyperlipidemia 05/29/2012    Chronic diastolic heart failure ([facility] 05/29/2012    Hypovitaminosis D 07/28/2010    Hypokalemia 03/03/2010    Hiatal hernia 03/03/2010    Anxiety 03/03/2010    S/P hysterectomy 03/03/2010    Spinal stenosis 03/03/2010    S/P foot surgery 03/03/2010    Environmental allergies 03/03/2010    S/P cardiac catheterization 03/03/2010      Past Surgical History:   Procedure Laterality Date    CARDIAC CATHETERIZATION  01/2002    normal coronaries    DECOMPRESS DISC RF LUMBAR  07/2007    HX BACK SURGERY  5/1/2014    HX BREAST LUMPECTOMY Left 1989    benign left breast    HX BUNIONECTOMY      HX BUNIONECTOMY      HX HEART CATHETERIZATION  3/3/10    HX HYSTERECTOMY  1978    HX LUMBAR DISKECTOMY      HX ORTHOPAEDIC Bilateral     BUNIONECTOMY    HX ORTHOPAEDIC Right     WRIST FX    HX OTHER SURGICAL  10/12/2015    mole removed from right side of face [facility] Dr. [id number]    Dr Jeanne Mondragon     Allergies   Allergen Reactions    Bactrim [Sulfamethoxazole-Trimethoprim] Unknown (comments)     Pt does not recall    Darvocet A500 [Propoxyphene N-Acetaminophen] Itching      Family History   Problem Relation Age of Onset    Hypertension Mother     Heart Disease Father     Stroke Sister     Heart Disease Sister     Cancer Brother      LUNG    Cancer Brother      PROSTATE    Hypertension Brother     No Known Problems Brother     Lung Disease Brother     No Known Problems Brother     No Known Problems Brother     Stroke Daughter 47    No Known Problems Daughter     Anesth Problems Neg Hx       Social History     Social History    Marital status:      Spouse name: N/A    Number of children: N/A    Years of education: N/A     Occupational History    Not on file. Social History Main Topics    Smoking status: Never Smoker    Smokeless tobacco: Never Used    Alcohol use No    Drug use: No    Sexual activity: Not Currently     Other Topics Concern    Not on file     Social History Narrative      Current Outpatient Prescriptions   Medication Sig    traMADol (ULTRAM) 50 mg tablet TAKE 1 TABLET BY MOUTH EVERY 8 HOURS AS NEEDED FOR PAIN    clonazePAM (KLONOPIN) 1 mg tablet TAKE1/2 TO 1 TAB BY MOUTH EVERY MORNING AS NEEDED ANXIETY & TAKE 1 TAB BY MOUTH AT BEDTIME FOR SLEEP    pantoprazole (PROTONIX) 40 mg tablet Take 1 Tab by mouth daily.  aluminum & magnesium hydroxide-simethicone (MAALOX MAXIMUM STRENGTH) 400-400-40 mg/5 mL suspension Take 10 mL by mouth every six (6) hours as needed for Indigestion.     nitroglycerin (NITROSTAT) 0.4 mg SL tablet TAKE 1 TAB BY SUBLINGUAL ROUTE EVERY 5 MINUTES AS NEEDED.  atorvastatin (LIPITOR) 40 mg tablet Take 1 Tab by mouth nightly.  amLODIPine (NORVASC) 10 mg tablet TAKE 1/2 TABLET BY MOUTH TWICE A DAY    losartan (COZAAR) 100 mg tablet TAKE 1 TABLET BY MOUTH EVERY DAY    potassium chloride SR (KLOR-CON 10) 10 mEq tablet TAKE 3 TABLETS BY MOUTH EVERY DAY    furosemide (LASIX) 80 mg tablet TAKE 1 TABLET BY MOUTH EVERY MORNING AND TAKE 1/2 TABLET EVERY EVENING    pravastatin (PRAVACHOL) 40 mg tablet Take 1 Tab by mouth nightly.  hydrocortisone valerate (WEST-STEFFANIE) 0.2 % ointment APPLY TO AFFECTED AREA (FACE) TWICE A DAY AS NEEDED    mometasone (ELOCON) 0.1 % ointment APPLY TO AFFECTED AREA (SCALP) EVERY DAY AS NEEDED    isosorbide mononitrate ER (IMDUR) 60 mg CR tablet Take 1/2 tablet daily    econazole nitrate (SPECTAZOLE) 1 % topical cream Apply  to affected area daily.  promethazine-dextromethorphan (PROMETHAZINE-DM) 6.25-15 mg/5 mL syrup Take 5 mL by mouth every six (6) hours as needed for Cough.  metoprolol tartrate (LOPRESSOR) 25 mg tablet TAKE 1 TABLET BY MOUTH TWICE A DAY    cholecalciferol, vitamin D3, (VITAMIN D3) 2,000 unit tab Take 1 Tab by mouth daily.  acetaminophen (TYLENOL EXTRA STRENGTH) 500 mg tablet Take 1,000 mg by mouth every six (6) hours as needed for Pain.  Aspirin, Buffered 81 mg tab Take 81 mg by mouth daily.  omeprazole (PRILOSEC) 20 mg capsule TAKE ONE CAPSULE BY MOUTH EVERY MORNING     No current facility-administered medications for this visit. I have reviewed the nurses notes, vitals, problem list, allergy list, medical history, family medical, social history and medications. Objective:     Physical Exam:     Vitals:    02/20/18 1123 02/20/18 1136   BP: 124/76 122/72   Pulse: 66 70   Resp: 16    SpO2: 97% 93%   Weight: 198 lb (89.8 kg)    Height: 5' (1.524 m)     Body mass index is 38.67 kg/(m^2). General: Well developed,obese in no acute distress.   HEENT: No carotid bruits, no JVD, trach is midline. Heart:  Normal S1/S2 negative S3 or S4. Regular, Gr 3/6 SE murmur, no gallop or rub.   Respiratory: Clear bilaterally, no wheezing or rales  Abdomen:   Soft, non-tender, bowel sounds are active.   Extremities:  No edema, normal cap refill, no cyanosis. Neuro: A&Ox3, speech clear, gait stable. Skin: Skin color is normal. No rashes or lesions. No diaphoresis. Vascular: 2+ pulses symmetric in all extremities        Data Review:       Cardiographics:    EKG: NSR,RBBB. [facility]    Cardiology Labs:    Results for orders placed or performed during the hospital encounter of 02/05/18   EKG, 12 LEAD, INITIAL   Result Value Ref Range    Ventricular Rate 77 BPM    Atrial Rate 77 BPM    P-R Interval 186 ms    QRS Duration 126 ms    Q-T Interval 414 ms    QTC Calculation (Bezet) 468 ms    Calculated P Axis 59 degrees    Calculated R Axis -32 degrees    Calculated T Axis 17 degrees    Diagnosis       Sinus rhythm with premature atrial complexes  Left axis deviation  Right bundle branch block  Voltage criteria for left ventricular hypertrophy  When compared with ECG of 28-MAY-2014 14:27,  No significant change was found  Confirmed by Rosamaria Sheridan ([id number]) on 2/5/2018 5:25:34 PM         Lab Results   Component Value Date/Time    Cholesterol, total 230 (H) 11/20/2017 11:35 AM    HDL Cholesterol 57 11/20/2017 11:35 AM    LDL, calculated 146 (H) 11/20/2017 11:35 AM    Triglyceride 136 11/20/2017 11:35 AM    CHOL/HDL Ratio 3.1 11/01/2010 05:26 PM       Lab Results   Component Value Date/Time    Sodium 143 02/05/2018 12:44 PM    Potassium 3.5 02/05/2018 12:44 PM    Chloride 104 02/05/2018 12:44 PM    CO2 32 02/05/2018 12:44 PM    Anion gap 7 02/05/2018 12:44 PM    Glucose 103 (H) 02/05/2018 12:44 PM    BUN 13 02/05/2018 12:44 PM    Creatinine 1.01 02/05/2018 12:44 PM    BUN/Creatinine ratio 13 02/05/2018 12:44 PM    GFR est AA >60 02/05/2018 12:44 PM    GFR est non-AA 53 (L) 02/05/2018 12:44 PM    Calcium 8.9 02/05/2018 12:44 PM    Bilirubin, total 0.5 02/05/2018 12:44 PM    AST (SGOT) 18 02/05/2018 12:44 PM    Alk. phosphatase 87 02/05/2018 12:44 PM    Protein, total 7.2 02/05/2018 12:44 PM    Albumin 3.2 (L) 02/05/2018 12:44 PM    Globulin 4.0 02/05/2018 12:44 PM    A-G Ratio 0.8 (L) 02/05/2018 12:44 PM    ALT (SGPT) 20 02/05/2018 12:44 PM          Assessment:       ICD-10-CM ICD-9-CM    1. Paroxysmal cardiac arrhythmia--PVC's,APC's,NSSVT I49.8 427.89    2. Gastroesophageal reflux disease without esophagitis K21.9 530.81    3. Mixed hyperlipidemia E78.2 272.2 AMB POC EKG ROUTINE W/ 12 LEADS, INTER & REP   4. Essential hypertension I10 401.9    5. Chronic ischemic heart disease I25.9 414.9    6. Chronic diastolic heart failure (HCC) I50.32 428.32    7. Atherosclerosis of native coronary artery of native heart without angina pectoris I25.10 414.01    8. Bifascicular bundle branch block--RBBB,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 426.53    9. Spinal stenosis, unspecified spinal region M48.00 724.00    10. Chronic anxiety F41.9 300.00    11. Class 2 obesity due to excess calories with serious comorbidity and body mass index (BMI) of 38.0 to 38.9 in adult E66.09 278.00     Z68.38 V85.38          Discussion: Patient presents at this time with complex symptomatology manifested as both cardiac arrhythmias(frequent PVC's, APC's and NSSVT) and GI symptoms suggesting severe reflux with possible stricture. Doubt atypical angina. Cardiac arrhythmia may be related to SHIRLEY. Will need Sleep study. Hesitant to change meds at this time. On BB. Consider EM/ILR and/or EP consultation. Hold off til SHIRLEY excluded. Started on PPI. Will likely need GI evaluation inclusive of EGD. Patient to follow with PCP to discuss above . Will discuss. Plan: 1. Continue same meds. Lipid profile and labs followed by PCP. Not at goal. On Pravachol. Was controlled on Crestor but switched due to cost. Will likely need to resume or start Lipitor. Will leave to PCP. [place] 2.Encouraged to exercise to tolerance, lose weight and follow low fat, low cholesterol, low sodium predominantly Plant-based (consider Mediterranean) diet. Call with questions or concerns. Will follow up any test results by phone and/or f/u here in office if needed. [place] 3.Follow up: 1 month or sooner if needed. Echo at f/u to reassess AS--mild in past.    I have discussed the diagnosis with the patient and the intended plan as seen in the above orders. The patient has received an after-visit summary and questions were answered concerning future plans. I have discussed any concerning medication side effects and warnings with the patient as well.     Ada Oden MD  2/20/2018

## 2018-02-21 NOTE — TELEPHONE ENCOUNTER
Pt states she was told by cardiologist the Pravastatin was not working and that she needed to try Crestor. Pt states Crestor is very expensive and she can not afford. Pt would like to know if there is another medication she can try.

## 2018-02-22 RX ORDER — ROSUVASTATIN CALCIUM 10 MG/1
10 TABLET, COATED ORAL
Qty: 30 TAB | Refills: 11 | Status: SHIPPED | OUTPATIENT
Start: 2018-02-22 | End: 2019-03-13 | Stop reason: SDUPTHER

## 2018-02-22 NOTE — TELEPHONE ENCOUNTER
Called patient and she is currently taking Pravastatin 40 mg; cards desires more potent statin. Atorvastatin on profile but she is not taking this. She asked about Crestor but says last year it was $200 and she can't afford that. Crestor now has a generic version so d/w pt will send that over to her pharmacy for 30 day supply and confirm cost is reasonable. Thirty days for Rosuvastatin 10 mg is $6.30  Called pt and she is fine with that. Advised to stop pravastatin when start rosuvastatin. Patient verbalized understanding of information presented. Answered all of the patient's questions.       Lakisha Calle, SHAVONNED, CDE

## 2018-02-27 ENCOUNTER — TELEPHONE (OUTPATIENT)
Dept: FAMILY MEDICINE CLINIC | Age: 83
End: 2018-02-27

## 2018-02-27 NOTE — TELEPHONE ENCOUNTER
Spoke with pt . Verified 2 identifers. Pt states  she feels miserable and is still  having trouble with gas and indigestions and she wants to let Mindy Priest know to advise best contact number is [phone] told pt I would forward to Dr. Ze Aaron today and return her call with response. Patient verbalize understanding .

## 2018-02-27 NOTE — TELEPHONE ENCOUNTER
Patient called saying she is still having issues with gas and would like a return call at [phone].

## 2018-02-28 NOTE — TELEPHONE ENCOUNTER
Spoke with pt . Verified 2 identifers. told pt per   SEE PCP BACK IN F/U Patient verbalize understanding .

## 2018-03-07 ENCOUNTER — APPOINTMENT (OUTPATIENT)
Dept: GENERAL RADIOLOGY | Age: 83
End: 2018-03-07
Attending: EMERGENCY MEDICINE
Payer: MEDICARE

## 2018-03-07 ENCOUNTER — HOSPITAL ENCOUNTER (EMERGENCY)
Age: 83
Discharge: HOME | End: 2018-03-07
Attending: EMERGENCY MEDICINE | Admitting: EMERGENCY MEDICINE
Payer: MEDICARE

## 2018-03-07 ENCOUNTER — APPOINTMENT (OUTPATIENT)
Dept: ULTRASOUND IMAGING | Age: 83
End: 2018-03-07
Attending: EMERGENCY MEDICINE
Payer: MEDICARE

## 2018-03-07 ENCOUNTER — TELEPHONE (OUTPATIENT)
Dept: FAMILY MEDICINE CLINIC | Age: 83
End: 2018-03-07

## 2018-03-07 ENCOUNTER — APPOINTMENT (OUTPATIENT)
Dept: CT IMAGING | Age: 83
End: 2018-03-07
Attending: EMERGENCY MEDICINE
Payer: MEDICARE

## 2018-03-07 VITALS
DIASTOLIC BLOOD PRESSURE: 79 MMHG | HEART RATE: 64 BPM | TEMPERATURE: 97.8 F | SYSTOLIC BLOOD PRESSURE: 157 MMHG | OXYGEN SATURATION: 93 % | RESPIRATION RATE: 20 BRPM

## 2018-03-07 DIAGNOSIS — I31.39 PERICARDIAL EFFUSION: ICD-10-CM

## 2018-03-07 DIAGNOSIS — R07.89 ATYPICAL CHEST PAIN: Primary | ICD-10-CM

## 2018-03-07 DIAGNOSIS — K80.20 CALCULUS OF GALLBLADDER WITHOUT CHOLECYSTITIS WITHOUT OBSTRUCTION: ICD-10-CM

## 2018-03-07 LAB
ALBUMIN SERPL-MCNC: 3.4 G/DL (ref 3.5–5)
ALBUMIN/GLOB SERPL: 0.7 {RATIO} (ref 1.1–2.2)
ALP SERPL-CCNC: 99 U/L (ref 45–117)
ALT SERPL-CCNC: 19 U/L (ref 12–78)
ANION GAP SERPL CALC-SCNC: 6 MMOL/L (ref 5–15)
APPEARANCE UR: CLEAR
AST SERPL-CCNC: 20 U/L (ref 15–37)
ATRIAL RATE: 68 BPM
BACTERIA URNS QL MICRO: NEGATIVE /HPF
BASOPHILS # BLD: 0 K/UL (ref 0–0.1)
BASOPHILS NFR BLD: 0 % (ref 0–1)
BILIRUB SERPL-MCNC: 0.5 MG/DL (ref 0.2–1)
BILIRUB UR QL: NEGATIVE
BUN SERPL-MCNC: 9 MG/DL (ref 6–20)
BUN/CREAT SERPL: 8 (ref 12–20)
CALCIUM SERPL-MCNC: 8.9 MG/DL (ref 8.5–10.1)
CALCULATED P AXIS, ECG09: 74 DEGREES
CALCULATED R AXIS, ECG10: -14 DEGREES
CALCULATED T AXIS, ECG11: 28 DEGREES
CHLORIDE SERPL-SCNC: 102 MMOL/L (ref 97–108)
CK SERPL-CCNC: 104 U/L (ref 26–192)
CO2 SERPL-SCNC: 31 MMOL/L (ref 21–32)
COLOR UR: NORMAL
CREAT SERPL-MCNC: 1.15 MG/DL (ref 0.55–1.02)
DIAGNOSIS, 93000: NORMAL
DIFFERENTIAL METHOD BLD: NORMAL
EOSINOPHIL # BLD: 0.2 K/UL (ref 0–0.4)
EOSINOPHIL NFR BLD: 3 % (ref 0–7)
EPITH CASTS URNS QL MICRO: NORMAL /LPF
ERYTHROCYTE [DISTWIDTH] IN BLOOD BY AUTOMATED COUNT: 13.7 % (ref 11.5–14.5)
GLOBULIN SER CALC-MCNC: 4.8 G/DL (ref 2–4)
GLUCOSE SERPL-MCNC: 101 MG/DL (ref 65–100)
GLUCOSE UR STRIP.AUTO-MCNC: NEGATIVE MG/DL
HCT VFR BLD AUTO: 42.1 % (ref 35–47)
HGB BLD-MCNC: 13.5 G/DL (ref 11.5–16)
HGB UR QL STRIP: NEGATIVE
IMM GRANULOCYTES # BLD: 0 K/UL (ref 0–0.04)
IMM GRANULOCYTES NFR BLD AUTO: 0 % (ref 0–0.5)
KETONES UR QL STRIP.AUTO: NEGATIVE MG/DL
LEUKOCYTE ESTERASE UR QL STRIP.AUTO: NEGATIVE
LIPASE SERPL-CCNC: 215 U/L (ref 73–393)
LYMPHOCYTES # BLD: 1.7 K/UL (ref 0.8–3.5)
LYMPHOCYTES NFR BLD: 31 % (ref 12–49)
MCH RBC QN AUTO: 28.4 PG (ref 26–34)
MCHC RBC AUTO-ENTMCNC: 32.1 G/DL (ref 30–36.5)
MCV RBC AUTO: 88.6 FL (ref 80–99)
MONOCYTES # BLD: 0.5 K/UL (ref 0–1)
MONOCYTES NFR BLD: 10 % (ref 5–13)
NEUTS SEG # BLD: 2.9 K/UL (ref 1.8–8)
NEUTS SEG NFR BLD: 55 % (ref 32–75)
NITRITE UR QL STRIP.AUTO: NEGATIVE
NRBC # BLD: 0 K/UL (ref 0–0.01)
NRBC BLD-RTO: 0 PER 100 WBC
P-R INTERVAL, ECG05: 192 MS
PH UR STRIP: 7 [PH] (ref 5–8)
PLATELET # BLD AUTO: 200 K/UL (ref 150–400)
PMV BLD AUTO: 11.6 FL (ref 8.9–12.9)
POTASSIUM SERPL-SCNC: 3.2 MMOL/L (ref 3.5–5.1)
PROT SERPL-MCNC: 8.2 G/DL (ref 6.4–8.2)
PROT UR STRIP-MCNC: NEGATIVE MG/DL
Q-T INTERVAL, ECG07: 412 MS
QRS DURATION, ECG06: 134 MS
QTC CALCULATION (BEZET), ECG08: 438 MS
RBC # BLD AUTO: 4.75 M/UL (ref 3.8–5.2)
RBC #/AREA URNS HPF: NORMAL /HPF (ref 0–5)
SODIUM SERPL-SCNC: 139 MMOL/L (ref 136–145)
SP GR UR REFRACTOMETRY: 1.01 (ref 1–1.03)
TROPONIN I BLD-MCNC: <0.04 NG/ML (ref 0–0.08)
TROPONIN I SERPL-MCNC: <0.04 NG/ML
UA: UC IF INDICATED,UAUC: NORMAL
UROBILINOGEN UR QL STRIP.AUTO: 0.2 EU/DL (ref 0.2–1)
VENTRICULAR RATE, ECG03: 68 BPM
WBC # BLD AUTO: 5.3 K/UL (ref 3.6–11)
WBC URNS QL MICRO: NORMAL /HPF (ref 0–4)

## 2018-03-07 PROCEDURE — 71275 CT ANGIOGRAPHY CHEST: CPT

## 2018-03-07 PROCEDURE — 74011250636 HC RX REV CODE- 250/636: Performed by: EMERGENCY MEDICINE

## 2018-03-07 PROCEDURE — 99285 EMERGENCY DEPT VISIT HI MDM: CPT

## 2018-03-07 PROCEDURE — 80053 COMPREHEN METABOLIC PANEL: CPT | Performed by: EMERGENCY MEDICINE

## 2018-03-07 PROCEDURE — 83690 ASSAY OF LIPASE: CPT | Performed by: EMERGENCY MEDICINE

## 2018-03-07 PROCEDURE — 74011250637 HC RX REV CODE- 250/637: Performed by: EMERGENCY MEDICINE

## 2018-03-07 PROCEDURE — 81001 URINALYSIS AUTO W/SCOPE: CPT | Performed by: EMERGENCY MEDICINE

## 2018-03-07 PROCEDURE — 84484 ASSAY OF TROPONIN QUANT: CPT | Performed by: EMERGENCY MEDICINE

## 2018-03-07 PROCEDURE — 77030029684 HC NEB SM VOL KT MONA -A

## 2018-03-07 PROCEDURE — 74011000250 HC RX REV CODE- 250: Performed by: EMERGENCY MEDICINE

## 2018-03-07 PROCEDURE — 82550 ASSAY OF CK (CPK): CPT | Performed by: EMERGENCY MEDICINE

## 2018-03-07 PROCEDURE — 76705 ECHO EXAM OF ABDOMEN: CPT

## 2018-03-07 PROCEDURE — 85025 COMPLETE CBC W/AUTO DIFF WBC: CPT | Performed by: EMERGENCY MEDICINE

## 2018-03-07 PROCEDURE — 36415 COLL VENOUS BLD VENIPUNCTURE: CPT | Performed by: EMERGENCY MEDICINE

## 2018-03-07 PROCEDURE — 74011636320 HC RX REV CODE- 636/320: Performed by: EMERGENCY MEDICINE

## 2018-03-07 PROCEDURE — 94640 AIRWAY INHALATION TREATMENT: CPT

## 2018-03-07 PROCEDURE — 71046 X-RAY EXAM CHEST 2 VIEWS: CPT

## 2018-03-07 PROCEDURE — 93005 ELECTROCARDIOGRAM TRACING: CPT

## 2018-03-07 RX ORDER — SODIUM CHLORIDE 9 MG/ML
50 INJECTION, SOLUTION INTRAVENOUS
Status: COMPLETED | OUTPATIENT
Start: 2018-03-07 | End: 2018-03-07

## 2018-03-07 RX ORDER — IPRATROPIUM BROMIDE AND ALBUTEROL SULFATE 2.5; .5 MG/3ML; MG/3ML
3 SOLUTION RESPIRATORY (INHALATION)
Status: COMPLETED | OUTPATIENT
Start: 2018-03-07 | End: 2018-03-07

## 2018-03-07 RX ORDER — POTASSIUM CHLORIDE 20 MEQ/1
40 TABLET, EXTENDED RELEASE ORAL
Status: COMPLETED | OUTPATIENT
Start: 2018-03-07 | End: 2018-03-07

## 2018-03-07 RX ORDER — NITROGLYCERIN 0.4 MG/1
0.4 TABLET SUBLINGUAL
Status: DISCONTINUED | OUTPATIENT
Start: 2018-03-07 | End: 2018-03-07 | Stop reason: HOSPADM

## 2018-03-07 RX ORDER — SIMETHICONE 80 MG
80 TABLET,CHEWABLE ORAL
Qty: 20 TAB | Refills: 0 | Status: SHIPPED | OUTPATIENT
Start: 2018-03-07 | End: 2018-06-05 | Stop reason: CLARIF

## 2018-03-07 RX ORDER — METAXALONE 800 MG/1
400 TABLET ORAL ONCE
Status: COMPLETED | OUTPATIENT
Start: 2018-03-07 | End: 2018-03-07

## 2018-03-07 RX ORDER — SODIUM CHLORIDE 0.9 % (FLUSH) 0.9 %
10 SYRINGE (ML) INJECTION
Status: COMPLETED | OUTPATIENT
Start: 2018-03-07 | End: 2018-03-07

## 2018-03-07 RX ORDER — FAMOTIDINE 20 MG/1
20 TABLET, FILM COATED ORAL
Status: COMPLETED | OUTPATIENT
Start: 2018-03-07 | End: 2018-03-07

## 2018-03-07 RX ORDER — SIMETHICONE 80 MG
80 TABLET,CHEWABLE ORAL
Status: COMPLETED | OUTPATIENT
Start: 2018-03-07 | End: 2018-03-07

## 2018-03-07 RX ADMIN — POTASSIUM CHLORIDE 40 MEQ: 20 TABLET, EXTENDED RELEASE ORAL at 15:47

## 2018-03-07 RX ADMIN — SODIUM CHLORIDE 50 ML/HR: 900 INJECTION, SOLUTION INTRAVENOUS at 17:56

## 2018-03-07 RX ADMIN — IPRATROPIUM BROMIDE AND ALBUTEROL SULFATE 3 ML: .5; 3 SOLUTION RESPIRATORY (INHALATION) at 16:48

## 2018-03-07 RX ADMIN — Medication 10 ML: at 17:56

## 2018-03-07 RX ADMIN — SIMETHICONE 80 MG: 80 TABLET, CHEWABLE ORAL at 15:47

## 2018-03-07 RX ADMIN — IOPAMIDOL 100 ML: 755 INJECTION, SOLUTION INTRAVENOUS at 17:56

## 2018-03-07 RX ADMIN — METAXALONE 400 MG: 800 TABLET ORAL at 12:25

## 2018-03-07 RX ADMIN — FAMOTIDINE 20 MG: 20 TABLET, FILM COATED ORAL at 12:22

## 2018-03-07 NOTE — ED NOTES
Pt brought back to room by wheelchair from triage. assisted to bed. ekg order placed. Delegated to tech to obtain ekg and to place on monitor x 3. Report given to primary rn.

## 2018-03-07 NOTE — DISCHARGE INSTRUCTIONS
Chest Pain: Care Instructions  Your Care Instructions    There are many things that can cause chest pain. Some are not serious and will get better on their own in a few days. But some kinds of chest pain need more testing and treatment. Your doctor may have recommended a follow-up visit in the next 8 to 12 hours. If you are not getting better, you may need more tests or treatment. Even though your doctor has released you, you still need to watch for any problems. The doctor carefully checked you, but sometimes problems can develop later. If you have new symptoms or if your symptoms do not get better, get medical care right away. If you have worse or different chest pain or pressure that lasts more than 5 minutes or you passed out (lost consciousness), call 911 or seek other emergency help right away. A medical visit is only one step in your treatment. Even if you feel better, you still need to do what your doctor recommends, such as going to all suggested follow-up appointments and taking medicines exactly as directed. This will help you recover and help prevent future problems. How can you care for yourself at home? · Rest until you feel better. · Take your medicine exactly as prescribed. Call your doctor if you think you are having a problem with your medicine. · Do not drive after taking a prescription pain medicine. When should you call for help? Call 911 if:  ? · You passed out (lost consciousness). ? · You have severe difficulty breathing. ? · You have symptoms of a heart attack. These may include:  ¨ Chest pain or pressure, or a strange feeling in your chest.  ¨ Sweating. ¨ Shortness of breath. ¨ Nausea or vomiting. ¨ Pain, pressure, or a strange feeling in your back, neck, jaw, or upper belly or in one or both shoulders or arms. ¨ Lightheadedness or sudden weakness. ¨ A fast or irregular heartbeat.   After you call 911, the  may tell you to chew 1 adult-strength or 2 to 4 low-dose aspirin. Wait for an ambulance. Do not try to drive yourself. ?Call your doctor today if:  ? · You have any trouble breathing. ? · Your chest pain gets worse. ? · You are dizzy or lightheaded, or you feel like you may faint. ? · You are not getting better as expected. ? · You are having new or different chest pain. Where can you learn more? Go to http://anderson-jesús.info/. Enter A120 in the search box to learn more about \"Chest Pain: Care Instructions. \"  Current as of: March 20, 2017  Content Version: 11.4  © 8870-3578 [employer]. Care instructions adapted under license by [employer] (which disclaims liability or warranty for this information). If you have questions about a medical condition or this instruction, always ask your healthcare professional. [place] [address] any warranty or liability for your use of this information. Biliary Colic: Care Instructions  Your Care Instructions    Biliary (say \"BILL-ee-air-ee\") colic is belly pain caused by gallbladder problems. It is usually caused by a gallstone moving through or blocking the common bile duct or cystic duct. Gallstones are stones that form in the gallbladder. They are made of cholesterol and other substances. The gallbladder is a small sac located just under the liver. It stores bile released by the liver. Bile helps you digest fats. Gallstones also can form in the common bile duct or cystic duct. These ducts carry bile from the gallbladder and the liver to the small intestine. Gallstones may be as small as a grain of sand or as large as a golf ball. Gallstones that cause severe symptoms usually are treated with surgery to remove the gallbladder. If the first attack of biliary colic is mild, it is often safe to wait until you have had another attack before you think about having surgery. The doctor has checked you carefully, but problems can develop later.  If you notice any problems or new symptoms, get medical treatment right away. Follow-up care is a key part of your treatment and safety. Be sure to make and go to all appointments, and call your doctor if you are having problems. It's also a good idea to know your test results and keep a list of the medicines you take. How can you care for yourself at home? · Take pain medicines exactly as directed. ¨ If the doctor gave you a prescription medicine for pain, take it as prescribed. ¨ If you are not taking a prescription pain medicine, ask your doctor if you can take an over-the-counter medicine. Read and follow all instructions on the label. · Avoid foods that cause symptoms, especially fatty foods. These can cause biliary colic. · You may need more tests to look at your gallbladder. When should you call for help? Call your doctor now or seek immediate medical care if:  ? · You have a fever. ? · You have new belly pain, or your pain gets worse. ? · There is a new or increasing yellow tint to your skin or the whites of your eyes. ? · Your urine is dark yellow-brown, or your stools are light-colored or white. ? · You cannot keep down fluids. ? Watch closely for changes in your health, and be sure to contact your doctor if:  ? · You do not get better as expected. ? · You are not getting better after 1 day (24 hours). Where can you learn more? Go to http://anderson-jesús.info/. Enter C023 in the search box to learn more about \"Biliary Colic: Care Instructions. \"  Current as of: May 12, 2017  Content Version: 11.4  © 5455-0443 [employer]. Care instructions adapted under license by "[employer]" (which disclaims liability or warranty for this information). If you have questions about a medical condition or this instruction, always ask your healthcare professional. [address] any warranty or liability for your use of this information.

## 2018-03-07 NOTE — ED PROVIDER NOTES
EMERGENCY DEPARTMENT HISTORY AND PHYSICAL EXAM      Date: 3/7/2018  Patient Name: Mali Lara    History of Presenting Illness     Chief Complaint   Patient presents with    Epigastric Pain     having chest pain, acid reflux and gas       History Provided By: Patient    HPI: Mali Lara, [de-identified] y.o. female with PMHx significant for HTN, CHF, GERD, hypercholesterolemia, anxiety, and arthritis, presents ambulatory to the ED with cc of stabbing left shoulder pain and left chest wall pain since ~1000. Pt reports onset of her symptoms while loading her washing machine. She also c/o the sensation of \"needing to belch but being unable to,\" but denies associated pain. She notes mild SOB. Pt denies specific trauma or fall to which her symptoms might be attributed. She states her pain is exacerbated by movement of her arm. Pt states she took NTG following onset of her pain today, but did not take her prescribed ASA 81 mg. Pt denies hx of cardiac stent or pacemaker, and states she has normal stress test \"months ago. \" Pt notes hx of lumbar diskectomy. She denies hx of cholecystectomy. Pt specifically denies nausea, vomiting, fever, chills, or cough. PCP: Fatou Rivera MD   Cardiology: Yuki Herman MD    There are no other complaints, changes, or physical findings at this time. Current Facility-Administered Medications   Medication Dose Route Frequency Provider Last Rate Last Dose    nitroglycerin (NITROSTAT) tablet 0.4 mg  0.4 mg SubLINGual Q5MIN PRN Minor Bumps. Qian Woods MD         Current Outpatient Prescriptions   Medication Sig Dispense Refill    simethicone (MYLICON) 80 mg chewable tablet Take 1 Tab by mouth every six (6) hours as needed for Flatulence. Indications: FLATULENCE 20 Tab 0    rosuvastatin (CRESTOR) 10 mg tablet Take 1 Tab by mouth nightly.  30 Tab 11    traMADol (ULTRAM) 50 mg tablet TAKE 1 TABLET BY MOUTH EVERY 8 HOURS AS NEEDED FOR PAIN  0    clonazePAM (KLONOPIN) 1 mg tablet TAKE1/2 TO 1 TAB BY MOUTH EVERY MORNING AS NEEDED ANXIETY & TAKE 1 TAB BY MOUTH AT BEDTIME FOR SLEEP 60 Tab 3    pantoprazole (PROTONIX) 40 mg tablet Take 1 Tab by mouth daily. 30 Tab 6    omeprazole (PRILOSEC) 20 mg capsule TAKE ONE CAPSULE BY MOUTH EVERY MORNING 30 Cap 11    aluminum & magnesium hydroxide-simethicone (MAALOX MAXIMUM STRENGTH) 400-400-40 mg/5 mL suspension Take 10 mL by mouth every six (6) hours as needed for Indigestion. 335 mL 0    nitroglycerin (NITROSTAT) 0.4 mg SL tablet TAKE 1 TAB BY SUBLINGUAL ROUTE EVERY 5 MINUTES AS NEEDED. 25 Tab 1    amLODIPine (NORVASC) 10 mg tablet TAKE 1/2 TABLET BY MOUTH TWICE A DAY 30 Tab 6    losartan (COZAAR) 100 mg tablet TAKE 1 TABLET BY MOUTH EVERY DAY 90 Tab 3    potassium chloride SR (KLOR-CON 10) 10 mEq tablet TAKE 3 TABLETS BY MOUTH EVERY  Tab 3    furosemide (LASIX) 80 mg tablet TAKE 1 TABLET BY MOUTH EVERY MORNING AND TAKE 1/2 TABLET EVERY EVENING 135 Tab 3    hydrocortisone valerate (WEST-STEFFANIE) 0.2 % ointment APPLY TO AFFECTED AREA (FACE) TWICE A DAY AS NEEDED  1    mometasone (ELOCON) 0.1 % ointment APPLY TO AFFECTED AREA (SCALP) EVERY DAY AS NEEDED  2    isosorbide mononitrate ER (IMDUR) 60 mg CR tablet Take 1/2 tablet daily      econazole nitrate (SPECTAZOLE) 1 % topical cream Apply  to affected area daily.  promethazine-dextromethorphan (PROMETHAZINE-DM) 6.25-15 mg/5 mL syrup Take 5 mL by mouth every six (6) hours as needed for Cough. 240 mL 1    metoprolol tartrate (LOPRESSOR) 25 mg tablet TAKE 1 TABLET BY MOUTH TWICE A DAY 60 Tab 11    cholecalciferol, vitamin D3, (VITAMIN D3) 2,000 unit tab Take 1 Tab by mouth daily.  acetaminophen (TYLENOL EXTRA STRENGTH) 500 mg tablet Take 1,000 mg by mouth every six (6) hours as needed for Pain.  Aspirin, Buffered 81 mg tab Take 81 mg by mouth daily.          Past History     Past Medical History:  Past Medical History:   Diagnosis Date    Anxiety     Aortic stenosis 3/3/2010    Aortic stenosis     Arrhythmia     MURMUR    Arthritis     SPINAL STENOSIS    Atherosclerosis of coronary artery     CHF (congestive heart failure) ([facility] [address] 3/3/2010    CHF (congestive heart failure) ([facility] [address] 01/2002    Chronic heart failure ([facility] [address] 1/2002; 3/3/2010    chronic diastolic heart failure    Chronic pain     LOWER BACK, RIGHT KNEE    Environmental allergies 3/3/2010    GERD (gastroesophageal reflux disease) 3/3/2010    Hiatal hernia 3/3/2010    High cholesterol 3/3/2010    HTN (hypertension) 3/3/2010    Hypokalemia 3/3/2010    Ischemic heart disease, chronic     Obese     Psychiatric disorder     anxiety    S/P hysterectomy 3/3/2010    Spinal stenosis 3/3/2010       Past Surgical History:  Past Surgical History:   Procedure Laterality Date    CARDIAC CATHETERIZATION  01/2002    normal coronaries    DECOMPRESS DISC RF LUMBAR  07/2007    HX BACK SURGERY  5/1/2014    HX BREAST LUMPECTOMY Left 1989    benign left breast    HX BUNIONECTOMY      HX BUNIONECTOMY      HX HEART CATHETERIZATION  3/3/10    HX HYSTERECTOMY  1978    HX LUMBAR DISKECTOMY      HX ORTHOPAEDIC Bilateral     BUNIONECTOMY    HX ORTHOPAEDIC Right     WRIST FX    HX OTHER SURGICAL  10/12/2015    mole removed from right side of face by Dr. Jax Quan FLX DX W/COLLJ [facility] WHEN PFRMD  [id number]    Dr Patrick Hendrix       Family History:  Family History   Problem Relation Age of Onset    Hypertension Mother     Heart Disease Father     Stroke Sister     Heart Disease Sister     Cancer Brother      LUNG    Cancer Brother      PROSTATE    Hypertension Brother     No Known Problems Brother     Lung Disease Brother     No Known Problems Brother     No Known Problems Brother     Stroke Daughter 47    No Known Problems Daughter     Anesth Problems Neg Hx        Social History:  Social History   Substance Use Topics    Smoking status: Never Smoker    Smokeless tobacco: Never Used    Alcohol use No Allergies: Allergies   Allergen Reactions    Bactrim [Sulfamethoxazole-Trimethoprim] Unknown (comments)     Pt does not recall    Darvocet A500 [Propoxyphene N-Acetaminophen] Itching         Review of Systems   Review of Systems   Constitutional: Negative for chills and fever. HENT: Negative for congestion. Eyes: Negative for visual disturbance. Respiratory: Positive for shortness of breath (mild). Negative for cough and chest tightness. Cardiovascular: Negative for chest pain and leg swelling. Gastrointestinal: Negative for abdominal pain, nausea and vomiting. Endocrine: Negative for polyuria. Genitourinary: Negative for dysuria and frequency. Musculoskeletal: Positive for arthralgias (left shoulder) and myalgias (left chest wall). Skin: Negative for color change. Allergic/Immunologic: Negative for immunocompromised state. Neurological: Negative for numbness. Physical Exam   Physical Exam     Nursing note and vitals reviewed.   General appearance: non-toxic  Eyes: PERRL, EOMI, conjunctiva normal, anicteric sclera  HEENT: mucous membranes tacky, upper gingiva edentulous, oropharynx is clear  Pulmonary: slightly diminished in B/L bases; no crackles, no rales  Cardiac: normal rate and regular rhythm, no murmurs, gallops, or rubs, 2+DP pulses, 2+ radial pulses  Abdomen: soft, nontender, bowel sounds present; mild RUQ TTP, negative Choi's, negative McBurney's  MSK: 1+ pitting edema B/L lower extremities; TTP left anterior chest wall, left shoulder pain reproduced with full flexion and adduction  Neuro: Alert, answers questions appropriately  Skin: capillary refill brisk      Diagnostic Study Results     Labs -     Recent Results (from the past 12 hour(s))   EKG, 12 LEAD, INITIAL    Collection Time: 03/07/18 11:07 AM   Result Value Ref Range    Ventricular Rate 68 BPM    Atrial Rate 68 BPM    P-R Interval 192 ms    QRS Duration 134 ms    Q-T Interval 412 ms    QTC Calculation (Bezet) 438 ms    Calculated P Axis 74 degrees    Calculated R Axis -14 degrees    Calculated T Axis 28 degrees    Diagnosis       Normal sinus rhythm  Right bundle branch block  Voltage criteria for left ventricular hypertrophy  When compared with ECG of 05-FEB-2018 11:40,  premature atrial complexes are no longer present  Confirmed by Valinda Kocher ([id number]) on 3/7/2018 6:32:57 PM     CBC WITH AUTOMATED DIFF    Collection Time: 03/07/18 12:21 PM   Result Value Ref Range    WBC 5.3 3.6 - 11.0 K/uL    RBC 4.75 3.80 - 5.20 M/uL    HGB 13.5 11.5 - 16.0 g/dL    HCT 42.1 35.0 - 47.0 %    MCV 88.6 80.0 - 99.0 FL    MCH 28.4 26.0 - 34.0 PG    MCHC 32.1 30.0 - 36.5 g/dL    RDW 13.7 11.5 - 14.5 %    PLATELET 104 835 - 606 K/uL    MPV 11.6 8.9 - 12.9 FL    NRBC 0.0 0  WBC    ABSOLUTE NRBC 0.00 0.00 - 0.01 K/uL    NEUTROPHILS 55 32 - 75 %    LYMPHOCYTES 31 12 - 49 %    MONOCYTES 10 5 - 13 %    EOSINOPHILS 3 0 - 7 %    BASOPHILS 0 0 - 1 %    IMMATURE GRANULOCYTES 0 0.0 - 0.5 %    ABS. NEUTROPHILS 2.9 1.8 - 8.0 K/UL    ABS. LYMPHOCYTES 1.7 0.8 - 3.5 K/UL    ABS. MONOCYTES 0.5 0.0 - 1.0 K/UL    ABS. EOSINOPHILS 0.2 0.0 - 0.4 K/UL    ABS. BASOPHILS 0.0 0.0 - 0.1 K/UL    ABS. IMM. GRANS. 0.0 0.00 - 0.04 K/UL    DF AUTOMATED     METABOLIC PANEL, COMPREHENSIVE    Collection Time: 03/07/18 12:21 PM   Result Value Ref Range    Sodium 139 136 - 145 mmol/L    Potassium 3.2 (L) 3.5 - 5.1 mmol/L    Chloride 102 97 - 108 mmol/L    CO2 31 21 - 32 mmol/L    Anion gap 6 5 - 15 mmol/L    Glucose 101 (H) 65 - 100 mg/dL    BUN 9 6 - 20 MG/DL    Creatinine 1.15 (H) 0.55 - 1.02 MG/DL    BUN/Creatinine ratio 8 (L) 12 - 20      GFR est AA 55 (L) >60 ml/min/1.73m2    GFR est non-AA 45 (L) >60 ml/min/1.73m2    Calcium 8.9 8.5 - 10.1 MG/DL    Bilirubin, total 0.5 0.2 - 1.0 MG/DL    ALT (SGPT) 19 12 - 78 U/L    AST (SGOT) 20 15 - 37 U/L    Alk.  phosphatase 99 45 - 117 U/L    Protein, total 8.2 6.4 - 8.2 g/dL    Albumin 3.4 (L) 3.5 - 5.0 g/dL Globulin 4.8 (H) 2.0 - 4.0 g/dL    A-G Ratio 0.7 (L) 1.1 - 2.2     LIPASE    Collection Time: 03/07/18 12:21 PM   Result Value Ref Range    Lipase 215 73 - 393 U/L   CK    Collection Time: 03/07/18 12:21 PM   Result Value Ref Range     26 - 192 U/L   TROPONIN I    Collection Time: 03/07/18 12:21 PM   Result Value Ref Range    Troponin-I, Qt. <0.04 <0.05 ng/mL   URINALYSIS W/ REFLEX CULTURE    Collection Time: 03/07/18 12:59 PM   Result Value Ref Range    Color YELLOW/STRAW      Appearance CLEAR CLEAR      Specific gravity 1.006 1.003 - 1.030      pH (UA) 7.0 5.0 - 8.0      Protein NEGATIVE  NEG mg/dL    Glucose NEGATIVE  NEG mg/dL    Ketone NEGATIVE  NEG mg/dL    Bilirubin NEGATIVE  NEG      Blood NEGATIVE  NEG      Urobilinogen 0.2 0.2 - 1.0 EU/dL    Nitrites NEGATIVE  NEG      Leukocyte Esterase NEGATIVE  NEG      WBC 0-4 0 - 4 /hpf    RBC 0-5 0 - 5 /hpf    Epithelial cells FEW FEW /lpf    Bacteria NEGATIVE  NEG /hpf    UA:UC IF INDICATED CULTURE NOT INDICATED BY UA RESULT CNI     POC TROPONIN-I    Collection Time: 03/07/18  3:45 PM   Result Value Ref Range    Troponin-I (POC) <0.04 0.00 - 0.08 ng/mL       Radiologic Studies -   US ABD LTD   Final Result   Clinical indication: Epigastric abdominal pain.     Right upper quadrant ultrasound is performed. [place] gallstones are seen in the  otherwise unremarkable gallbladder. No pericholecystic fluid, wall thickening or  tenderness. The common bile duct is 5 mm, the liver appears unremarkable portal  vein flow is hepatopedal. Pancreatic head and right kidney appear normal. The  examination is somewhat limited due to body habitus.     IMPRESSION   impression: Cholelithiasis. CXR Results  (Last 48 hours)               03/07/18 1143  XR CHEST PA LAT Final result    Impression:  Impression: No acute abnormality. Narrative:  Exam:  2 view chest       Indication: Chest pain and epigastric pain       Comparison to 2/5/2018.        PA and lateral views demonstrate stable cardiomegaly. The aorta is tortuous and   atherosclerotic. There is no acute process in the lung fields. Degenerative   changes are noted in the thoracic spine. Postoperative changes are noted in the   lumbar spine. CT Results  (Last 48 hours)               03/07/18 7377  CTA CHEST W OR W WO CONT Final result    Impression:  IMPRESSION:    There is no pulmonary embolism. There is no aortic aneurysm or dissection. Cardiomegaly. Small pericardial effusion. Narrative:  CLINICAL HISTORY: Dyspnea on exertion       INDICATION: Dyspnea       COMPARISON: 8/16/2017       TECHNIQUE: CT of the chest with  IV contrast , Isovue-370 is performed. Axial   images from the thoracic inlet to the level of the upper abdomen are obtained. Manual post-processing of the images and coronal reformatting is also performed. CT dose reduction was achieved through use of a standardized protocol tailored   for this examination and automatic exposure control for dose modulation. Multiplanar reformatted imaging was performed. Sagittal and coronal reformatting. 3-D Postprocessing of imaging was performed. 3-D MIP reconstructed images were obtained in the coronal plane. FINDINGS:    There is no pulmonary embolism. There is no aortic aneurysm or dissection. Cardiomegaly. Small pericardial effusion. Three-vessel aortic arch. Minimal   atelectatic change at the lung bases. There is no pleural effusion. There is no   mediastinal, axillary or hilar lymphadenopathy. The aorta is normal in course   and caliber. The proximal pulmonary arteries are without evidence of filling   defects. No lytic or blastic lesions are identified. The remainder of the upper   abdomen visualized is unremarkable. Medical Decision Making   I am the first provider for this patient.     I reviewed the vital signs, available nursing notes, past medical history, past surgical history, family history and social history. Vital Signs-Reviewed the patient's vital signs. Patient Vitals for the past 12 hrs:   Temp Pulse Resp BP SpO2   03/07/18 1532 97.8 °F (36.6 °C) 64 20 157/79 93 %   03/07/18 1530 - (!) 59 17 152/66 90 %   03/07/18 1515 - 63 17 157/79 92 %   03/07/18 1500 - 60 22 148/74 92 %   03/07/18 1445 - 62 14 148/78 94 %   03/07/18 1430 - 60 15 131/78 90 %   03/07/18 1415 - - - 112/82 -   03/07/18 1345 - 64 23 113/72 91 %     Pulse Oximetry Analysis - 93% on RA    Cardiac Monitor:   Rate: 68 bpm  Rhythm: Normal Sinus Rhythm      EKG interpretation: 11:07  Rhythm: normal sinus rhythm and RBBB. Rate (approx.): 68; Axis: normal; AR interval: 192 ms; QRS interval: 134 ms; ST/T wave: nonspecific ST/T wave changes, no ST elevation, similar to 2/5/18; Other findings: QT/QTc 412/438 ms. Records Reviewed: Nursing Notes and Old Medical Records    Provider Notes (Medical Decision Making):     DDx: thoracic strain, muscle spasm, PNA, gallstones, pancreatitis, gastritis, ACS    Ekg/labs reassuring. No evidence of PE on CTA chest; +small pericardial effusion. CP atypical, somewhat reproducible, and suspicious for msk vs GI etiology. Advised close outpatient f/u with cardiology, electively w/ general surgery. ED Course:   Initial assessment performed. The patients presenting problems have been discussed, and they are in agreement with the care plan formulated and outlined with them. I have encouraged them to ask questions as they arise throughout their visit. 11:23 AM  Per records, Dr. Marc Swenson saw pt at [facility] (3/5/18), felt pt's symptoms were likely due to GERD, and would have pt follow up for stress test.    Consult Note:  1:59 PM  Arina Eli MD   spoke with Lm Mari MD  Specialty: Cardiology  Discussed pts hx, disposition, and available diagnostic and imaging results. Reviewed care plans. Consultant agrees with plans as outlined.   He advises obtaining repeat cardiac enzymes, and states pt can be discharged for outpatient follow up if normal.    4:18 PM  Pt re-evaluated, still appears mildly SOB, lung somewhat tight, will give neb treatment and obtain CTA. 5:15 PM  Improved after neb treatment; normal work of breathing. Disposition:  DISCHARGE NOTE:  7:00 PM  The patient is ready for discharge. The patients signs, symptoms, diagnosis, and instructions for discharge have been discussed and the pt has conveyed their understanding. The patient is to follow up as recommended or return to the ER should their symptoms worsen. Plan has been discussed and patient has conveyed their agreement. PLAN:  1. Discharge Medication List as of 3/7/2018  6:22 PM      START taking these medications    Details   simethicone (MYLICON) 80 mg chewable tablet Take 1 Tab by mouth every six (6) hours as needed for Flatulence. Indications: FLATULENCE, Normal, Disp-20 Tab, R-0         CONTINUE these medications which have NOT CHANGED    Details   rosuvastatin (CRESTOR) 10 mg tablet Take 1 Tab by mouth nightly., Normal, Disp-30 Tab, R-11      traMADol (ULTRAM) 50 mg tablet TAKE 1 TABLET BY MOUTH EVERY 8 HOURS AS NEEDED FOR PAIN, Historical Med, R-0      clonazePAM (KLONOPIN) 1 mg tablet TAKE1/2 TO 1 TAB BY MOUTH EVERY MORNING AS NEEDED ANXIETY & TAKE 1 TAB BY MOUTH AT BEDTIME FOR SLEEP, Print, Disp-60 Tab, R-3      pantoprazole (PROTONIX) 40 mg tablet Take 1 Tab by mouth daily. , Normal, Disp-30 Tab, R-6      omeprazole (PRILOSEC) 20 mg capsule TAKE ONE CAPSULE BY MOUTH EVERY MORNING, Normal, Disp-30 Cap, R-11      aluminum & magnesium hydroxide-simethicone (MAALOX MAXIMUM STRENGTH) 400-400-40 mg/5 mL suspension Take 10 mL by mouth every six (6) hours as needed for Indigestion. , Normal, Disp-335 mL, R-0      nitroglycerin (NITROSTAT) 0.4 mg SL tablet TAKE 1 TAB BY SUBLINGUAL ROUTE EVERY 5 MINUTES AS NEEDED., Normal, Disp-25 Tab, R-1      amLODIPine (NORVASC) 10 mg tablet TAKE 1/2 TABLET BY MOUTH TWICE A DAY, Normal, Disp-30 Tab, R-6      losartan (COZAAR) 100 mg tablet TAKE 1 TABLET BY MOUTH EVERY DAY, Normal, Disp-90 Tab, R-3      furosemide (LASIX) 80 mg tablet TAKE 1 TABLET BY MOUTH EVERY MORNING AND TAKE 1/2 TABLET EVERY EVENING, Normal, Disp-135 Tab, R-3      potassium chloride SR (KLOR-CON 10) 10 mEq tablet TAKE 3 TABLETS BY MOUTH EVERY DAY, Normal, Disp-270 Tab, R-3      hydrocortisone valerate (WEST-STEFFANIE) 0.2 % ointment APPLY TO AFFECTED AREA (FACE) TWICE A DAY AS NEEDED, Historical Med, R-1      mometasone (ELOCON) 0.1 % ointment APPLY TO AFFECTED AREA (SCALP) EVERY DAY AS NEEDED, Historical Med, R-2      isosorbide mononitrate ER (IMDUR) 60 mg CR tablet Take 1/2 tablet daily, Historical Med      econazole nitrate (SPECTAZOLE) 1 % topical cream Apply  to affected area daily. , Historical Med      promethazine-dextromethorphan (PROMETHAZINE-DM) 6.25-15 mg/5 mL syrup Take 5 mL by mouth every six (6) hours as needed for Cough., Normal, Disp-240 mL, R-1      metoprolol tartrate (LOPRESSOR) 25 mg tablet TAKE 1 TABLET BY MOUTH TWICE A DAY, Normal, Disp-60 Tab, R-11      cholecalciferol, vitamin D3, (VITAMIN D3) 2,000 unit tab Take 1 Tab by mouth daily. , Historical Med      acetaminophen (TYLENOL EXTRA STRENGTH) 500 mg tablet Take 1,000 mg by mouth every six (6) hours as needed for Pain., Historical Med      Aspirin, Buffered 81 mg tab Take 81 mg by mouth daily. , Historical Med           2.    Follow-up Information     Follow up With Details Comments 94 Danny Benito MD Schedule an appointment as soon as possible for a visit in 2 days [address]. [phone]      [facility] EMERGENCY DEPT Go in 1 day If symptoms worsen [address] Jason Mtz MD Schedule an appointment as soon as possible for a visit in 2 days As needed [address] [facility] [address]  [phone]      Michael Roche MD Schedule an appointment as soon as possible for a visit in 1 week [address] [facility] [address]  [phone]          Return to ED if worse     Diagnosis     Clinical Impression:   1. Atypical chest pain    2. Calculus of gallbladder without cholecystitis without obstruction    3. Pericardial effusion        Attestations: This note is prepared by Ravi Domingo, acting as Scribe for Deonte Ramirez MD.    Deonte Ramirez MD: The scribe's documentation has been prepared under my direction and personally reviewed by me in its entirety. I confirm that the note above accurately reflects all work, treatment, procedures, and medical decision making performed by me.

## 2018-03-07 NOTE — ED NOTES
Ambulated pt to Bedside commode and in room. Pt desaturated to 85% with good pleth. Notified MD Sujata Lugo.  Pt returned to 92% at rest.

## 2018-03-08 ENCOUNTER — TELEPHONE (OUTPATIENT)
Dept: CARDIOLOGY CLINIC | Age: 83
End: 2018-03-08

## 2018-03-08 DIAGNOSIS — K80.20 GALLSTONES: Primary | ICD-10-CM

## 2018-03-08 RX ORDER — POTASSIUM CHLORIDE 750 MG/1
TABLET, FILM COATED, EXTENDED RELEASE ORAL
Qty: 450 TAB | Refills: 3 | Status: SHIPPED | OUTPATIENT
Start: 2018-03-08 | End: 2019-04-12 | Stop reason: SDUPTHER

## 2018-03-08 NOTE — TELEPHONE ENCOUNTER
Spoke with pt . Verified 2 identifers. Pt states she was in the emergency room 3/7/18 for chest pain and wants to know can she have a stress test done and would like a sooner appt. PSR notified and will schedule sooner appt today. Told pt I would relay info to providers and return her call with response. Patient verbalize understanding .

## 2018-03-12 ENCOUNTER — TELEPHONE (OUTPATIENT)
Dept: CARDIOLOGY CLINIC | Age: 83
End: 2018-03-12

## 2018-03-13 ENCOUNTER — TELEPHONE (OUTPATIENT)
Dept: CARDIOLOGY CLINIC | Age: 83
End: 2018-03-13

## 2018-03-19 ENCOUNTER — OFFICE VISIT (OUTPATIENT)
Dept: SURGERY | Age: 83
End: 2018-03-19

## 2018-03-19 VITALS
HEIGHT: 60 IN | TEMPERATURE: 97.8 F | HEART RATE: 61 BPM | BODY MASS INDEX: 37.89 KG/M2 | DIASTOLIC BLOOD PRESSURE: 78 MMHG | RESPIRATION RATE: 16 BRPM | SYSTOLIC BLOOD PRESSURE: 126 MMHG | OXYGEN SATURATION: 94 % | WEIGHT: 193 LBS

## 2018-03-19 DIAGNOSIS — R10.13 EPIGASTRIC PAIN: Primary | ICD-10-CM

## 2018-03-19 NOTE — PROGRESS NOTES
Chief Complaint   Patient presents with    Abdominal Pain     gallstones, went to ED [facility] ER, test done to show she has gallstones       1. Have you been to the ER, urgent care clinic since your last visit? Yes ED [facility] Er for abdomial pain  Hospitalized since your last visit? no    2. Have you seen or consulted any other health care providers outside of the [address] [place] [address] since your last visit? Include any pap smears or colon screening.   no

## 2018-03-19 NOTE — PROGRESS NOTES
To: Kelsie Paredes MD   CC: Dr. Ovalle Members      From: Emelina Howard MD    Thank you for sending Hannah Gomez to see us. Encounter Date: 3/19/2018  History and Physical    Assessment:   Gallstones. Epigastric bloating. Left shoulder pain with movement. Comorbid CAD, GERD, HTN. Body mass index is 37.69 kg/(m^2). Status post hysterectomy, back surgery. Plan/Recommendations/Medical Decision Making: Will check a HIDA. Agree with Dr. Ovalle Members that she may need GI eval.    Discussed the risk of surgery including infection, hematoma, bleeding, bile duct or bowel injury, recurrence or persistence of symptoms, conversion to an open operation, retained CBD stones, possible ZAY drain, and the risks of general anesthetic. All questions were answered to the patient's satisfaction. Perioperative management of comorbidies above. HPI:   Patient is a [de-identified] y.o. female who is seen in consultation at the request of the ER for epigastric pain. Went to ER 3/7/18 left shoulder and chest wall stabbing pain. Onset of pain was when she was loading her clothes washer. Has been having a lot of reflux. Not easting much because it makes her feel \"terrible. \"  For the last month or so. She gets a lot of gas and indigestions. Generic gassex has been helping. Denies dysphagia. No history of jaundice, nancy urine, acholic stools. Per Dr. Addi Muller February note, \"Patient presents at this time with complex symptomatology manifested as both cardiac arrhythmias(frequent PVC's, APC's and NSSVT) and GI symptoms suggesting severe reflux with possible stricture. Doubt atypical angina. Cardiac arrhythmia may be related to SHIRLEY. Will need Sleep study. Hesitant to change meds at this time. On BB. Consider EM/ILR and/or EP consultation. Hold off til SHIRLEY excluded. Started on PPI. Will likely need GI evaluation inclusive of EGD. \"    Past Medical History:   Diagnosis Date    Anxiety  Aortic stenosis 3/3/2010    Aortic stenosis     Arrhythmia     MURMUR    Arthritis     SPINAL STENOSIS    Atherosclerosis of coronary artery     CHF (congestive heart failure) ([facility] 3/3/2010    CHF (congestive heart failure) ([facility] 01/2002    Chronic heart failure ([facility] 1/2002; 3/3/2010    chronic diastolic heart failure    Chronic pain     LOWER BACK, RIGHT KNEE    Environmental allergies 3/3/2010    GERD (gastroesophageal reflux disease) 3/3/2010    Hiatal hernia 3/3/2010    High cholesterol 3/3/2010    HTN (hypertension) 3/3/2010    Hypokalemia 3/3/2010    Ischemic heart disease, chronic     Obese     Psychiatric disorder     anxiety    S/P hysterectomy 3/3/2010    Spinal stenosis 3/3/2010      Past Surgical History:   Procedure Laterality Date    CARDIAC CATHETERIZATION  01/2002    normal coronaries    DECOMPRESS DISC RF LUMBAR  07/2007    HX BACK SURGERY  5/1/2014    HX BREAST LUMPECTOMY Left 1989    benign left breast    HX BUNIONECTOMY      HX BUNIONECTOMY      HX HEART CATHETERIZATION  3/3/10    HX HYSTERECTOMY  1978    HX LUMBAR DISKECTOMY      HX ORTHOPAEDIC Bilateral     BUNIONECTOMY    HX ORTHOPAEDIC Right     WRIST FX    HX OTHER SURGICAL  10/12/2015    mole removed from right side of face by Dr. Kaleb Howell FLX DX W/COLLJ [facility] WHEN PFRMD  [id number]    Dr Gabrielle Doyle     Social History   Substance Use Topics    Smoking status: Never Smoker    Smokeless tobacco: Never Used    Alcohol use No      Family History   Problem Relation Age of Onset    Hypertension Mother     Heart Disease Father     Stroke Sister     Heart Disease Sister     Cancer Brother      LUNG    Cancer Brother      PROSTATE    Hypertension Brother     No Known Problems Brother     Lung Disease Brother     No Known Problems Brother     No Known Problems Brother     Stroke Daughter 47    No Known Problems Daughter     Anesth Problems Neg Hx        Current Outpatient Prescriptions Medication Sig    potassium chloride SR (KLOR-CON 10) 10 mEq tablet TAKE 3 TABLETS IN THE MORNING AND 2 TABLETS IN THE EVENING BY MOUTH EVERY DAY    simethicone (MYLICON) 80 mg chewable tablet Take 1 Tab by mouth every six (6) hours as needed for Flatulence. Indications: FLATULENCE    rosuvastatin (CRESTOR) 10 mg tablet Take 1 Tab by mouth nightly.  clonazePAM (KLONOPIN) 1 mg tablet TAKE1/2 TO 1 TAB BY MOUTH EVERY MORNING AS NEEDED ANXIETY & TAKE 1 TAB BY MOUTH AT BEDTIME FOR SLEEP    pantoprazole (PROTONIX) 40 mg tablet Take 1 Tab by mouth daily.  nitroglycerin (NITROSTAT) 0.4 mg SL tablet TAKE 1 TAB BY SUBLINGUAL ROUTE EVERY 5 MINUTES AS NEEDED.  amLODIPine (NORVASC) 10 mg tablet TAKE 1/2 TABLET BY MOUTH TWICE A DAY    losartan (COZAAR) 100 mg tablet TAKE 1 TABLET BY MOUTH EVERY DAY    furosemide (LASIX) 80 mg tablet TAKE 1 TABLET BY MOUTH EVERY MORNING AND TAKE 1/2 TABLET EVERY EVENING    hydrocortisone valerate (WEST-STEFFANIE) 0.2 % ointment APPLY TO AFFECTED AREA (FACE) TWICE A DAY AS NEEDED    mometasone (ELOCON) 0.1 % ointment APPLY TO AFFECTED AREA (SCALP) EVERY DAY AS NEEDED    isosorbide mononitrate ER (IMDUR) 60 mg CR tablet Take 1/2 tablet daily    econazole nitrate (SPECTAZOLE) 1 % topical cream Apply  to affected area daily.  promethazine-dextromethorphan (PROMETHAZINE-DM) 6.25-15 mg/5 mL syrup Take 5 mL by mouth every six (6) hours as needed for Cough.  metoprolol tartrate (LOPRESSOR) 25 mg tablet TAKE 1 TABLET BY MOUTH TWICE A DAY    cholecalciferol, vitamin D3, (VITAMIN D3) 2,000 unit tab Take 1 Tab by mouth daily.  acetaminophen (TYLENOL EXTRA STRENGTH) 500 mg tablet Take 1,000 mg by mouth every six (6) hours as needed for Pain.  Aspirin, Buffered 81 mg tab Take 81 mg by mouth daily.     traMADol (ULTRAM) 50 mg tablet TAKE 1 TABLET BY MOUTH EVERY 8 HOURS AS NEEDED FOR PAIN    omeprazole (PRILOSEC) 20 mg capsule TAKE ONE CAPSULE BY MOUTH EVERY MORNING    aluminum & magnesium hydroxide-simethicone (MAALOX MAXIMUM STRENGTH) 400-400-40 mg/5 mL suspension Take 10 mL by mouth every six (6) hours as needed for Indigestion. No current facility-administered medications for this visit. Allergies: Allergies   Allergen Reactions    Bactrim [Sulfamethoxazole-Trimethoprim] Unknown (comments)     Pt does not recall    Darvocet A500 [Propoxyphene N-Acetaminophen] Itching        Review of Systems:  10 systems reviewed. See scanned sheet in \"Media\" section. See HPI for pertinent positives and negatives. Objective:     Visit Vitals    /78 (BP 1 Location: Right arm, BP Patient Position: Sitting)    Pulse 61    Temp 97.8 °F (36.6 °C) (Oral)    Resp 16    Ht 5' (1.524 m)    Wt 87.5 kg (193 lb)    LMP  (LMP Unknown)    SpO2 94%    BMI 37.69 kg/m2     General appearance  Alert, cooperative, no distress, appears stated age   HEENT  Anicteric   Neck Supple   Back   No CVA tenderness   Lungs   CTAB   Heart  Regular rate and rhythm. No murmur, rub or gallop   Abdomen   Soft. Bowel sounds normal. No masses, no organomegaly. Not TTP. Extremities No CCE.    Pulses 2+ right radial   Skin Skin color, texture, turgor normal.        Neurologic Without overt sensory or motor deficit     Imaging and Lab Review:   images and reports reviewed    Signed By: Evelia Heck MD     March 19, 2018

## 2018-03-19 NOTE — MR AVS SNAPSHOT
[address] [facility], [address] [facility], [place] [phone] Patient: Carlene Chan MRN:  [id number] Visit Information Date & Time Provider Department Dept. Phone Encounter #  
 3/19/2018 10:40 AM Chema Nick MD [facility] [id number] Your Appointments 3/28/2018 10:00 AM  
ROUTINE CARE with Jemma Mckee MD  
[facility]) Appt Note: rescheduled 3/21 due to weather,  routine f/u  
 [address] [facility] [id number]  
[phone] [id number] [phone]  
  
    
 3/30/2018  1:00 PM  
ESTABLISHED PATIENT with Kaleb Menon MD  
[facility]) Appt Note: ONE MONTH FOLLOW UP-  CR  
 [address] [facility] [address]  
[phone] [address] [facility] [address] Upcoming Health Maintenance Date Due Bone Densitometry (Dexa) Screening 2002 MEDICARE YEARLY EXAM 3/29/2018 GLAUCOMA SCREENING Q2Y 2019 COLONOSCOPY 10/13/2020 DTaP/Tdap/Td series (2 - Td) 2026 Allergies as of 3/19/2018  Review Complete On: 3/19/2018 By: Chema Nick MD  
  
 Severity Noted Reaction Type Reactions Bactrim [Sulfamethoxazole-trimethoprim]  2010    Unknown (comments) Pt does not recall Darvocet A500 [Propoxyphene N-acetaminophen]  2010    Itching Current Immunizations  Reviewed on 2018 Name Date Influenza High Dose Vaccine PF 2017, 2016, 10/13/2015, 2014 Influenza Vaccine 2013 Influenza Vaccine Split 10/9/2012, 2011, 2010 Pneumococcal Conjugate (PCV-13) 2017 Pneumococcal Vaccine (Unspecified Type) 2008 Not reviewed this visit You Were Diagnosed With   
  
 Codes Comments Epigastric pain    -  Primary ICD-10-CM: R10.13 ICD-9-CM: 789.06 Vitals BP Pulse Temp Resp Height(growth percentile) Weight(growth percentile) 126/78 (BP 1 Location: Right arm, BP Patient Position: Sitting) 61 97.8 °F (36.6 °C) (Oral) 16 5' (1.524 m) 193 lb (87.5 kg) LMP SpO2 BMI OB Status Smoking Status (LMP Unknown) 94% 37.69 kg/m2 Hysterectomy Never Smoker Vitals History BMI and BSA Data Body Mass Index Body Surface Area  
 37.69 kg/m 2 1.92 m 2 Preferred Pharmacy Pharmacy Name Phone [id number]- [place], [place] - [address] [phone] Your Updated Medication List  
  
   
This list is accurate as of 3/19/18 11:59 PM.  Always use your most recent med list.  
  
  
  
  
 aluminum & magnesium hydroxide-simethicone 400-400-40 mg/5 mL suspension Commonly known as:  MAALOX MAXIMUM STRENGTH Take 10 mL by mouth every six (6) hours as needed for Indigestion. amLODIPine 10 mg tablet Commonly known as:  Grace Giron TAKE 1/2 TABLET BY MOUTH TWICE A DAY  
  
 aspirin, buffered 81 mg Tab Take 81 mg by mouth daily. clonazePAM 1 mg tablet Commonly known as:  KlonoPIN  
TAKE1/2 TO 1 TAB BY MOUTH EVERY MORNING AS NEEDED ANXIETY & TAKE 1 TAB BY MOUTH AT BEDTIME FOR SLEEP  
  
 econazole nitrate 1 % topical cream  
Commonly known as:  Orvilla Munda Apply  to affected area daily. furosemide 80 mg tablet Commonly known as:  LASIX TAKE 1 TABLET BY MOUTH EVERY MORNING AND TAKE 1/2 TABLET EVERY EVENING  
  
 hydrocortisone valerate 0.2 % ointment Commonly known as:  WEST-STEFFANIE  
APPLY TO AFFECTED AREA (FACE) TWICE A DAY AS NEEDED  
  
 isosorbide mononitrate ER 60 mg CR tablet Commonly known as:  IMDUR Take 1/2 tablet daily  
  
 losartan 100 mg tablet Commonly known as:  COZAAR  
TAKE 1 TABLET BY MOUTH EVERY DAY  
  
 metoprolol tartrate 25 mg tablet Commonly known as:  LOPRESSOR  
TAKE 1 TABLET BY MOUTH TWICE A DAY  
  
 mometasone 0.1 % ointment Commonly known as:  ELOCON  
APPLY TO AFFECTED AREA (SCALP) EVERY DAY AS NEEDED  
  
 nitroglycerin 0.4 mg SL tablet Commonly known as:  NITROSTAT  
TAKE 1 TAB BY SUBLINGUAL ROUTE EVERY 5 MINUTES AS NEEDED. omeprazole 20 mg capsule Commonly known as:  PRILOSEC  
TAKE ONE CAPSULE BY MOUTH EVERY MORNING  
  
 pantoprazole 40 mg tablet Commonly known as:  PROTONIX Take 1 Tab by mouth daily. potassium chloride SR 10 mEq tablet Commonly known as:  KLOR-CON 10  
TAKE 3 TABLETS IN THE MORNING AND 2 TABLETS IN THE EVENING BY MOUTH EVERY DAY  
  
 promethazine-dextromethorphan 6.25-15 mg/5 mL syrup Commonly known as:  PROMETHAZINE-DM Take 5 mL by mouth every six (6) hours as needed for Cough. rosuvastatin 10 mg tablet Commonly known as:  CRESTOR Take 1 Tab by mouth nightly. simethicone 80 mg chewable tablet Commonly known as:  Elsy Abler Take 1 Tab by mouth every six (6) hours as needed for Flatulence. Indications: FLATULENCE  
  
 traMADol 50 mg tablet Commonly known as:  ULTRAM  
TAKE 1 TABLET BY MOUTH EVERY 8 HOURS AS NEEDED FOR PAIN  
  
 TYLENOL EXTRA STRENGTH 500 mg tablet Generic drug:  acetaminophen Take 1,000 mg by mouth every six (6) hours as needed for Pain. VITAMIN D3 2,000 unit Tab Generic drug:  cholecalciferol (vitamin D3) Take 1 Tab by mouth daily. To-Do List   
 03/19/2018 Imaging:  NM HEPATOBILIARY W INTERVENTION   
  
 03/27/2018 10:00 AM  
  Appointment with [address] [place] [address] 3 at [facility] MED ([phone]) Please bring any recent X-rays with you to the procedure. You must bring a LIST or BAG of all current medication you are taking with you to your appointment. NOTHING TO EAT OR DRINK 4 HOURS PRIOR TO ARRIVAL AND PLEASE BRING SOMEONE TO DRIVE THE PATIENT HOME AFTERWARDS.   PATIENT SHOULD EAT A FATTY MEAL THE NIGHT BEFORE  THE PATIENT SHOULD NOT TAKE PAIN MEDICATION 4-6 HOURS PRIOR TO ARRIVAL TIME FOR TEST. Introducing [facility] & HEALTH SERVICES! Dear Adam Trivedi: 
Thank you for requesting a [employer] account. Our records indicate that you already have an active [employer] account. You can access your account anytime at [email or web address] Did you know that you can access your hospital and ER discharge instructions at any time in [employer]? You can also review all of your test results from your hospital stay or ER visit. Additional Information If you have questions, please visit the Frequently Asked Questions section of the [employer] website at [email or web address] Remember, [employer] is NOT to be used for urgent needs. For medical emergencies, dial 911. Now available from your iPhone and Android! Please provide this summary of care documentation to your next provider. Your primary care clinician is listed as Riddhi Srinivasan. If you have any questions after today's visit, please call [phone].

## 2018-03-21 ENCOUNTER — TELEPHONE (OUTPATIENT)
Dept: SURGERY | Age: 83
End: 2018-03-21

## 2018-03-21 NOTE — TELEPHONE ENCOUNTER
Called spoke to Mrs. Dhruv Phipps. She had a question regarding Miralax instructions. Advised her to take in the am 1 hr after her pills. And it is okay to take prune juice. She verbalizes instructions.

## 2018-03-27 ENCOUNTER — HOSPITAL ENCOUNTER (OUTPATIENT)
Dept: NUCLEAR MEDICINE | Age: 83
Discharge: HOME | End: 2018-03-27
Attending: SURGERY
Payer: MEDICARE

## 2018-03-27 VITALS — BODY MASS INDEX: 37.11 KG/M2 | WEIGHT: 190 LBS

## 2018-03-27 DIAGNOSIS — R10.13 EPIGASTRIC PAIN: ICD-10-CM

## 2018-03-27 PROCEDURE — 74011250636 HC RX REV CODE- 250/636

## 2018-03-27 PROCEDURE — 78227 HEPATOBIL SYST IMAGE W/DRUG: CPT

## 2018-03-27 RX ADMIN — SINCALIDE 1.72 MCG: 5 INJECTION, POWDER, LYOPHILIZED, FOR SOLUTION INTRAVENOUS at 11:54

## 2018-03-28 ENCOUNTER — OFFICE VISIT (OUTPATIENT)
Dept: FAMILY MEDICINE CLINIC | Age: 83
End: 2018-03-28

## 2018-03-28 ENCOUNTER — TELEPHONE (OUTPATIENT)
Dept: SURGERY | Age: 83
End: 2018-03-28

## 2018-03-28 VITALS
SYSTOLIC BLOOD PRESSURE: 112 MMHG | DIASTOLIC BLOOD PRESSURE: 74 MMHG | HEART RATE: 71 BPM | TEMPERATURE: 97.4 F | BODY MASS INDEX: 36.48 KG/M2 | RESPIRATION RATE: 18 BRPM | OXYGEN SATURATION: 95 % | WEIGHT: 185.8 LBS | HEIGHT: 60 IN

## 2018-03-28 DIAGNOSIS — I50.32 CHRONIC DIASTOLIC HEART FAILURE (HCC): ICD-10-CM

## 2018-03-28 DIAGNOSIS — I10 ESSENTIAL HYPERTENSION: Chronic | ICD-10-CM

## 2018-03-28 DIAGNOSIS — M15.9 PRIMARY OSTEOARTHRITIS INVOLVING MULTIPLE JOINTS: ICD-10-CM

## 2018-03-28 DIAGNOSIS — Z51.81 ENCOUNTER FOR MEDICATION MONITORING: ICD-10-CM

## 2018-03-28 DIAGNOSIS — F41.9 CHRONIC ANXIETY: ICD-10-CM

## 2018-03-28 DIAGNOSIS — E78.2 MIXED HYPERLIPIDEMIA: ICD-10-CM

## 2018-03-28 DIAGNOSIS — M47.815 SPONDYLOSIS OF THORACOLUMBAR REGION WITHOUT MYELOPATHY OR RADICULOPATHY: ICD-10-CM

## 2018-03-28 DIAGNOSIS — K21.9 GASTROESOPHAGEAL REFLUX DISEASE WITHOUT ESOPHAGITIS: Primary | ICD-10-CM

## 2018-03-28 DIAGNOSIS — I25.10 ATHEROSCLEROSIS OF NATIVE CORONARY ARTERY OF NATIVE HEART WITHOUT ANGINA PECTORIS: ICD-10-CM

## 2018-03-28 DIAGNOSIS — K80.20 GALL STONES: ICD-10-CM

## 2018-03-28 PROBLEM — E66.01 SEVERE OBESITY (BMI 35.0-39.9) WITH COMORBIDITY (HCC): Status: ACTIVE | Noted: 2018-03-28

## 2018-03-28 RX ORDER — PANTOPRAZOLE SODIUM 40 MG/1
40 TABLET, DELAYED RELEASE ORAL DAILY
Qty: 30 TAB | Refills: 6
Start: 2018-03-28 | End: 2018-09-19 | Stop reason: SDUPTHER

## 2018-03-28 NOTE — PROGRESS NOTES
Lamont Friday is a [de-identified] y.o. female    Chief Complaint   Patient presents with    Hypertension     follow up    Cholesterol Problem     follow up     1. Have you been to the ER, urgent care clinic since your last visit? Hospitalized since your last visit? yes, went to [address] [place] [address] on 3/7/18 for stomach pain       2. Have you seen or consulted any other health care providers outside of the [address] [place] [address] since your last visit? Include any pap smears or colon screening.  No     Health Maintenance Due   Topic Date Due    Bone Densitometry (Dexa) Screening  07/31/2002

## 2018-03-28 NOTE — TELEPHONE ENCOUNTER
Spoke to Dr. Radha Eldridge office today regarding Sirisha Ligia. We discussed that Dr. Bejarano Heads will call the patient and discuss results of Hida scan. Called and left message for daughter Lane zavala when calling back.

## 2018-03-28 NOTE — MR AVS SNAPSHOT
Dianerodney Felisa 
 
 
 [address] [facility] [address] [address] 
[phone] Patient: Paco Cui MRN: [id number] :[id number] Visit Information Date & Time Provider Department Dept. Phone Encounter #  
 3/28/2018 10:00 AM Yanet Lira MD [facility] [phone] [id number] Follow-up Instructions Return in about 3 months (around 2018). Your Appointments 3/30/2018  1:00 PM  
ESTABLISHED PATIENT with Nathan Tipton MD  
[facility]) Appt Note: ONE MONTH FOLLOW UP-  CR  
 [address] [facility] [address] [place] [address]  
[phone] [address] Upcoming Health Maintenance Date Due Bone Densitometry (Dexa) Screening 2002 MEDICARE YEARLY EXAM 3/29/2018 GLAUCOMA SCREENING Q2Y 2019 COLONOSCOPY 10/13/2020 DTaP/Tdap/Td series (2 - Td) 2026 Allergies as of 3/28/2018  Review Complete On: 3/28/2018 By: Clementina Richey LPN Severity Noted Reaction Type Reactions Bactrim [Sulfamethoxazole-trimethoprim]  2010    Unknown (comments) Pt does not recall Darvocet A500 [Propoxyphene N-acetaminophen]  2010    Itching Current Immunizations  Reviewed on 2018 Name Date Influenza High Dose Vaccine PF 2017, 2016, 10/13/2015, 2014 Influenza Vaccine 2013 Influenza Vaccine Split 10/9/2012, 2011, 2010 Pneumococcal Conjugate (PCV-13) 2017 Pneumococcal Vaccine (Unspecified Type) 2008 Not reviewed this visit You Were Diagnosed With   
  
 Codes Comments Gastroesophageal reflux disease without esophagitis     ICD-10-CM: K21.9 ICD-9-CM: 530.81 Vitals BP Pulse Temp Resp Height(growth percentile) Weight(growth percentile) 112/74 (BP 1 Location: Right arm, BP Patient Position: Sitting) 71 97.4 °F (36.3 °C) (Oral) 18 5' (1.524 m) 185 lb 12.8 oz (84.3 kg) LMP SpO2 BMI OB Status Smoking Status (LMP Unknown) 95% 36.29 kg/m2 Hysterectomy Never Smoker Vitals History BMI and BSA Data Body Mass Index Body Surface Area  
 36.29 kg/m 2 1.89 m 2 Preferred Pharmacy Pharmacy Name Phone [facility]ACY [facility][id number]- [place], [place] - [address] [phone] Your Updated Medication List  
  
   
This list is accurate as of 3/28/18 11:23 AM.  Always use your most recent med list.  
  
  
  
  
 aluminum & magnesium hydroxide-simethicone 400-400-40 mg/5 mL suspension Commonly known as:  MAALOX MAXIMUM STRENGTH Take 10 mL by mouth every six (6) hours as needed for Indigestion. amLODIPine 10 mg tablet Commonly known as:  Erendira Aiken TAKE 1/2 TABLET BY MOUTH TWICE A DAY  
  
 aspirin, buffered 81 mg Tab Take 81 mg by mouth daily. clonazePAM 1 mg tablet Commonly known as:  KlonoPIN  
TAKE1/2 TO 1 TAB BY MOUTH EVERY MORNING AS NEEDED ANXIETY & TAKE 1 TAB BY MOUTH AT BEDTIME FOR SLEEP  
  
 econazole nitrate 1 % topical cream  
Commonly known as:  Belynda Tessa Apply  to affected area daily. furosemide 80 mg tablet Commonly known as:  LASIX TAKE 1 TABLET BY MOUTH EVERY MORNING AND TAKE 1/2 TABLET EVERY EVENING  
  
 hydrocortisone valerate 0.2 % ointment Commonly known as:  WEST-STEFFANIE  
APPLY TO AFFECTED AREA (FACE) TWICE A DAY AS NEEDED  
  
 isosorbide mononitrate ER 60 mg CR tablet Commonly known as:  IMDUR Take 1/2 tablet daily  
  
 losartan 100 mg tablet Commonly known as:  COZAAR  
TAKE 1 TABLET BY MOUTH EVERY DAY  
  
 metoprolol tartrate 25 mg tablet Commonly known as:  LOPRESSOR  
TAKE 1 TABLET BY MOUTH TWICE A DAY  
  
 mometasone 0.1 % ointment Commonly known as:  ELOCON  
APPLY TO AFFECTED AREA (SCALP) EVERY DAY AS NEEDED  
  
 nitroglycerin 0.4 mg SL tablet Commonly known as:  NITROSTAT  
TAKE 1 TAB BY SUBLINGUAL ROUTE EVERY 5 MINUTES AS NEEDED. pantoprazole 40 mg tablet Commonly known as:  PROTONIX Take 1 Tab by mouth daily. Take 30 minutes prior to breakfast.  
  
 potassium chloride SR 10 mEq tablet Commonly known as:  KLOR-CON 10  
TAKE 3 TABLETS IN THE MORNING AND 2 TABLETS IN THE EVENING BY MOUTH EVERY DAY  
  
 rosuvastatin 10 mg tablet Commonly known as:  CRESTOR Take 1 Tab by mouth nightly. simethicone 80 mg chewable tablet Commonly known as:  Zadie Cohens Take 1 Tab by mouth every six (6) hours as needed for Flatulence. Indications: FLATULENCE  
  
 TYLENOL EXTRA STRENGTH 500 mg tablet Generic drug:  acetaminophen Take 1,000 mg by mouth every six (6) hours as needed for Pain. VITAMIN D3 2,000 unit Tab Generic drug:  cholecalciferol (vitamin D3) Take 1 Tab by mouth daily. Follow-up Instructions Return in about 3 months (around 6/13/2018). Introducing [facility] & HEALTH SERVICES! Dear [facility]: 
Thank you for requesting a [employer] account. Our records indicate that you already have an active [employer] account. You can access your account anytime at [email or web address] Did you know that you can access your hospital and ER discharge instructions at any time in Plated? You can also review all of your test results from your hospital stay or ER visit. Additional Information If you have questions, please visit the Frequently Asked Questions section of the [employer] website at [email or web address] Remember, [employer] is NOT to be used for urgent needs. For medical emergencies, dial 911. Now available from your iPhone and Android! Please provide this summary of care documentation to your next provider. Your primary care clinician is listed as Tyler Molina.  If you have any questions after today's visit, please call [phone].

## 2018-03-28 NOTE — PROGRESS NOTES
HISTORY OF PRESENT ILLNESS  Toño Templeton is a [de-identified] y.o. female. HPI   Follow up on chronic medical problems. Her abd sx are improved some. Had HIDA scan on yesterday and awaiting the results from surgeon. Has changed her diet which has also help with the abd pain and burning that she was having. No nausea or vomiting. She has lost about 13 pounds on the last 4 to 5 weeks. Cardiovascular Review:  The patient has hypertension, hyperlipidemia, chronic diastolic heart failure, ASCHD and obesity. Had eval for her SOB and told d/t aortic stenosis. Has follow up with cardiology for this Friday. Diet and Lifestyle: generally follows a low fat low cholesterol diet, generally follows a low sodium diet, sedentary. Pertinent ROS: taking medications as instructed, no medication side effects noted, no TIA's, no chest pain on exertion, mild swelling of ankles, no orthostatic dizziness or lightheadedness, no palpitations, no muscle aches or pain. Home BP Monitoring: is not measured at home. Osteoarthritis:  Patient has osteoarthritis. Overall doing better with back pain and knee pain. Thinks pain is improved in the knees since her sudheer is less. Pain is 5-6/10. Taking tylenol for the pain which helps some. Symptoms onset: problem is longstanding. Rheumatological ROS: stable, mild-to-moderate joint symptoms intermittently, reasonably well controlled by PRN meds. Response to treatment plan: stable and intermittent. Anxiety Review:  Patient is seen for anxiety. Ongoing symptoms include:anxiety overall is better. Patient denies: palpitations, sweating, chest pain, shortness of breath. Reported side effects from the treatment: none.     Patient Active Problem List   Diagnosis Code    Hypokalemia E87.6    Hiatal hernia K44.9    Anxiety F41.9    S/P hysterectomy Z90.710    Spinal stenosis M48.00    S/P foot surgery Z98.890    Environmental allergies Z91.09    S/P cardiac catheterization Z98.890    Hypovitaminosis D E55.9    Chronic ischemic heart disease I25.9    Mixed hyperlipidemia E78.2    Chronic diastolic heart failure (HCC) I50.32    Chest pain, unspecified R07.9    Chest pain at rest R07.9    Bifascicular bundle branch block--RBBB,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    Atypical chest pain R07.89    Essential hypertension I10    Gastroesophageal reflux disease without esophagitis K21.9    Atherosclerosis of native coronary artery without angina pectoris--diffuse small vsl disease via cath cath 2009--RCA PDA/ROSA I25.10    Chronic anxiety F41.9    Encounter for medication monitoring Z51.81    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 9) with comorbidity (HCC) E66.01       Current Outpatient Prescriptions   Medication Sig Dispense Refill    pantoprazole (PROTONIX) 40 mg tablet Take 1 Tab by mouth daily. Take 30 minutes prior to breakfast. 30 Tab 6    potassium chloride SR (KLOR-CON 10) 10 mEq tablet TAKE 3 TABLETS IN THE MORNING AND 2 TABLETS IN THE EVENING BY MOUTH EVERY  Tab 3    simethicone (MYLICON) 80 mg chewable tablet Take 1 Tab by mouth every six (6) hours as needed for Flatulence. Indications: FLATULENCE 20 Tab 0    rosuvastatin (CRESTOR) 10 mg tablet Take 1 Tab by mouth nightly. 30 Tab 11    clonazePAM (KLONOPIN) 1 mg tablet TAKE1/2 TO 1 TAB BY MOUTH EVERY MORNING AS NEEDED ANXIETY & TAKE 1 TAB BY MOUTH AT BEDTIME FOR SLEEP 60 Tab 3    aluminum & magnesium hydroxide-simethicone (MAALOX MAXIMUM STRENGTH) 400-400-40 mg/5 mL suspension Take 10 mL by mouth every six (6) hours as needed for Indigestion. 335 mL 0    nitroglycerin (NITROSTAT) 0.4 mg SL tablet TAKE 1 TAB BY SUBLINGUAL ROUTE EVERY 5 MINUTES AS NEEDED. 25 Tab 1    amLODIPine (NORVASC) 10 mg tablet TAKE 1/2 TABLET BY MOUTH TWICE A DAY 30 Tab 6    losartan (COZAAR) 100 mg tablet TAKE 1 TABLET BY MOUTH EVERY DAY 90 Tab 3    furosemide (LASIX) 80 mg tablet TAKE 1 TABLET BY MOUTH EVERY MORNING AND TAKE 1/2 TABLET EVERY EVENING 135 Tab 3    hydrocortisone valerate (WEST-STEFFANIE) 0.2 % ointment APPLY TO AFFECTED AREA (FACE) TWICE A DAY AS NEEDED  1    mometasone (ELOCON) 0.1 % ointment APPLY TO AFFECTED AREA (SCALP) EVERY DAY AS NEEDED  2    isosorbide mononitrate ER (IMDUR) 60 mg CR tablet Take 1/2 tablet daily      econazole nitrate (SPECTAZOLE) 1 % topical cream Apply  to affected area daily.  metoprolol tartrate (LOPRESSOR) 25 mg tablet TAKE 1 TABLET BY MOUTH TWICE A DAY 60 Tab 11    cholecalciferol, vitamin D3, (VITAMIN D3) 2,000 unit tab Take 1 Tab by mouth daily.  acetaminophen (TYLENOL EXTRA STRENGTH) 500 mg tablet Take 1,000 mg by mouth every six (6) hours as needed for Pain.  Aspirin, Buffered 81 mg tab Take 81 mg by mouth daily.          Allergies   Allergen Reactions    Bactrim [Sulfamethoxazole-Trimethoprim] Unknown (comments)     Pt does not recall    Darvocet A500 [Propoxyphene N-Acetaminophen] Itching       Past Medical History:   Diagnosis Date    Anxiety     Aortic stenosis 3/3/2010    Aortic stenosis     Arrhythmia     MURMUR    Arthritis     SPINAL STENOSIS    Atherosclerosis of coronary artery     CHF (congestive heart failure) ([facility] [address] 3/3/2010    CHF (congestive heart failure) ([facility] [address] 01/2002    Chronic heart failure ([facility] [address] 1/2002; 3/3/2010    chronic diastolic heart failure    Chronic pain     LOWER BACK, RIGHT KNEE    Environmental allergies 3/3/2010    GERD (gastroesophageal reflux disease) 3/3/2010    Hiatal hernia 3/3/2010    High cholesterol 3/3/2010    HTN (hypertension) 3/3/2010    Hypokalemia 3/3/2010    Ischemic heart disease, chronic     Obese     Psychiatric disorder     anxiety    S/P hysterectomy 3/3/2010    Spinal stenosis 3/3/2010       Past Surgical History:   Procedure Laterality Date    CARDIAC CATHETERIZATION  01/2002    normal coronaries    DECOMPRESS DISC RF LUMBAR  07/2007    HX BACK SURGERY  5/1/2014    HX BREAST LUMPECTOMY Left 1989    benign left breast    HX BUNIONECTOMY      HX BUNIONECTOMY      HX HEART CATHETERIZATION  3/3/10    HX HYSTERECTOMY  1978    HX LUMBAR DISKECTOMY      HX ORTHOPAEDIC Bilateral     BUNIONECTOMY    HX ORTHOPAEDIC Right     WRIST FX    HX OTHER SURGICAL  10/12/2015    mole removed from right side of face by Dr. Winston La FLX DX Michael Primus PFRMD  [id number]    Dr Bruce Romero       Family History   Problem Relation Age of Onset    Hypertension Mother     Heart Disease Father     Stroke Sister     Heart Disease Sister     Cancer Brother      LUNG    Cancer Brother      PROSTATE    Hypertension Brother     No Known Problems Brother     Lung Disease Brother     No Known Problems Brother     No Known Problems Brother     Stroke Daughter 47    No Known Problems Daughter     Anesth Problems Neg Hx        Social History   Substance Use Topics    Smoking status: Never Smoker    Smokeless tobacco: Never Used    Alcohol use No        Lab Results  Component Value Date/Time   WBC 5.3 03/07/2018 12:21 PM   HGB 13.5 03/07/2018 12:21 PM   HCT 42.1 03/07/2018 12:21 PM   PLATELET 434 60/43/6872 12:21 PM   MCV 88.6 03/07/2018 12:21 PM     Lab Results  Component Value Date/Time   Cholesterol, total 230 (H) 11/20/2017 11:35 AM   HDL Cholesterol 57 11/20/2017 11:35 AM   LDL, calculated 146 (H) 11/20/2017 11:35 AM   Triglyceride 136 11/20/2017 11:35 AM   CHOL/HDL Ratio 3.1 11/01/2010 05:26 PM     Lab Results   Component Value Date/Time    Sodium 139 03/07/2018 12:21 PM    Potassium 3.2 (L) 03/07/2018 12:21 PM    Chloride 102 03/07/2018 12:21 PM    CO2 31 03/07/2018 12:21 PM    Anion gap 6 03/07/2018 12:21 PM    Glucose 101 (H) 03/07/2018 12:21 PM BUN 9 03/07/2018 12:21 PM    Creatinine 1.15 (H) 03/07/2018 12:21 PM    BUN/Creatinine ratio 8 (L) 03/07/2018 12:21 PM    GFR est AA 55 (L) 03/07/2018 12:21 PM    GFR est non-AA 45 (L) 03/07/2018 12:21 PM    Calcium 8.9 03/07/2018 12:21 PM    Bilirubin, total 0.5 03/07/2018 12:21 PM    ALT (SGPT) 19 03/07/2018 12:21 PM    AST (SGOT) 20 03/07/2018 12:21 PM    Alk. phosphatase 99 03/07/2018 12:21 PM    Protein, total 8.2 03/07/2018 12:21 PM    Albumin 3.4 (L) 03/07/2018 12:21 PM    Globulin 4.8 (H) 03/07/2018 12:21 PM    A-G Ratio 0.7 (L) 03/07/2018 12:21 PM      Lab Results   Component Value Date/Time    Hemoglobin A1c 5.4 04/16/2014 12:20 PM    Hemoglobin A1c (POC) 5.5 11/26/2014 12:12 PM         Review of Systems   Constitutional: Negative for malaise/fatigue. HENT: Negative for congestion. Eyes: Negative for blurred vision. Respiratory: Negative for cough and shortness of breath. Cardiovascular: Negative for chest pain, palpitations and leg swelling. Gastrointestinal: Negative for abdominal pain, constipation and heartburn. Genitourinary: Negative for dysuria, frequency and urgency. Musculoskeletal: Positive for back pain and joint pain. Neurological: Negative for dizziness, tingling and headaches. Endo/Heme/Allergies: Negative for environmental allergies. Psychiatric/Behavioral: Negative for depression. The patient does not have insomnia. Physical Exam   Constitutional: She appears well-developed and well-nourished. /74 (BP 1 Location: Right arm, BP Patient Position: Sitting)  Pulse 71  Temp 97.4 °F (36.3 °C) (Oral)   Resp 18  Ht 5' (1.524 m)  Wt 185 lb 12.8 oz (84.3 kg)  LMP  (LMP Unknown)  SpO2 95%  BMI 36.29 kg/m2   HENT:   Right Ear: Tympanic membrane and ear canal normal.   Left Ear: Tympanic membrane and ear canal normal.   Nose: No mucosal edema or rhinorrhea.    Mouth/Throat: Oropharynx is clear and moist and mucous membranes are normal.   Neck: Normal range of motion. Neck supple. No thyromegaly present. Cardiovascular: Normal rate and regular rhythm. No murmur heard. Pulmonary/Chest: Effort normal and breath sounds normal.   Abdominal: Soft. Bowel sounds are normal. There is no tenderness. Musculoskeletal: Normal range of motion. She exhibits no edema. Lymphadenopathy:     She has no cervical adenopathy. Skin: Skin is warm and dry. Psychiatric: She has a normal mood and affect. Nursing note and vitals reviewed. ASSESSMENT and PLAN  Diagnoses and all orders for this visit:    1. Gastroesophageal reflux disease without esophagitis  -     Refill pantoprazole (PROTONIX) 40 mg tablet; Take 1 Tab by mouth daily. Take 30 minutes prior to breakfast.    2. Gall stones  As per surgery    3. Essential hypertension    4. Mixed hyperlipidemia    5. Chronic diastolic heart failure (HCC)//  6. Atherosclerosis of native coronary artery of native heart without angina pectoris    7. Chronic anxiety  Stable     8. 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 Primary osteoarthritis involving multiple joints  Stable     10. Encounter for medication monitoring      Follow-up Disposition:  Return in about 3 months (around 6/13/2018). reviewed diet, exercise and weight control  cardiovascular risk and specific lipid/LDL goals reviewed  reviewed medications and side effects in detail  specific diabetic recommendations: low cholesterol diet, weight control and daily exercise discussed and glycohemoglobin and other lab monitoring discussed     I have discussed diagnosis listed in this note with pt and/or family. I have discussed treatment plans and options and the risk/benefit analysis of those options, including safe use of medications and possible medication side effects. Through the use of shared decision making we have agreed to the above plan.  The patient has received an after-visit summary and questions were answered concerning future plans and follow up. Advise pt of any urgent changes then to proceed to the ER.

## 2018-03-30 ENCOUNTER — OFFICE VISIT (OUTPATIENT)
Dept: CARDIOLOGY CLINIC | Age: 83
End: 2018-03-30

## 2018-03-30 VITALS
DIASTOLIC BLOOD PRESSURE: 87 MMHG | BODY MASS INDEX: 36.12 KG/M2 | WEIGHT: 184 LBS | HEART RATE: 72 BPM | SYSTOLIC BLOOD PRESSURE: 143 MMHG | HEIGHT: 60 IN | RESPIRATION RATE: 12 BRPM | OXYGEN SATURATION: 95 %

## 2018-03-30 DIAGNOSIS — F41.9 ANXIETY: Chronic | ICD-10-CM

## 2018-03-30 DIAGNOSIS — I45.2 BIFASCICULAR BUNDLE BRANCH BLOCK: ICD-10-CM

## 2018-03-30 DIAGNOSIS — I10 ESSENTIAL HYPERTENSION: Primary | Chronic | ICD-10-CM

## 2018-03-30 DIAGNOSIS — I50.32 CHRONIC DIASTOLIC HEART FAILURE (HCC): ICD-10-CM

## 2018-03-30 DIAGNOSIS — I25.9 CHRONIC ISCHEMIC HEART DISEASE: ICD-10-CM

## 2018-03-30 DIAGNOSIS — E78.2 MIXED HYPERLIPIDEMIA: ICD-10-CM

## 2018-03-30 DIAGNOSIS — I49.8 PAROXYSMAL CARDIAC ARRHYTHMIA: ICD-10-CM

## 2018-03-30 DIAGNOSIS — I25.10 ATHEROSCLEROSIS OF NATIVE CORONARY ARTERY OF NATIVE HEART WITHOUT ANGINA PECTORIS: ICD-10-CM

## 2018-03-30 NOTE — PROGRESS NOTES
[facility]   [address] [facility], [address] [place] [address] Road                                          NEW PATIENT HPI/FOLLOW-UP      NAME:  Cristian Jennings   :   1935   MRN:   [id number]   PCP:  Jasmyn Story MD           Subjective: The patient is a [de-identified]y.o. year old female  who returns for a routine follow-up. Since the last visit, patient reports feeling better on anti-anxiety med and new PPI. Reflux better since diet change and SHIRLEY symptoms better since reflux better. As a result palpitations less frequent. Being evaluated for gallstones. Denies change in exercise tolerance, chest pain, edema, medication intolerance, palpitations, shortness of breath, PND/orthopnea wheezing, sputum, syncope, dizziness or light headedness. Doing satisfactorily. Review of Systems  General: Pt denies excessive weight gain or loss. Pt is able to conduct ADL's. Respiratory: Denies shortness of breath, RICHARD, wheezing or stridor.   Cardiovascular: Denies precordial pain, +palpitations--less, edema or PND  Gastrointestinal: appetite too good--trying to stop eating all times of day and night,+ indigestion-better, +abdominal pain--less,-blood in stool  Peripheral vascular: Denies claudication, leg cramps  Neuropsychiatric: Denies paresthesias,tingling,numbness,anxiety,depression,fatigue  Musculoskeletal: Denies pain,tenderness, soreness,swelling      Past Medical History:   Diagnosis Date    Anxiety     Aortic stenosis 3/3/2010    Aortic stenosis     Arrhythmia     MURMUR    Arthritis     SPINAL STENOSIS    Atherosclerosis of coronary artery     CHF (congestive heart failure) ([address] 3/3/2010    CHF (congestive heart failure) ([address] 2002    Chronic heart failure ([address] 2002; 3/3/2010    chronic diastolic heart failure    Chronic pain     LOWER BACK, RIGHT KNEE    Environmental allergies 3/3/2010    GERD (gastroesophageal reflux disease) 3/3/2010    Hiatal hernia 3/3/2010    High cholesterol 3/3/2010    HTN (hypertension) 3/3/2010    Hypokalemia 3/3/2010    Ischemic heart disease, chronic     Obese     Psychiatric disorder     anxiety    S/P hysterectomy 3/3/2010    Spinal stenosis 3/3/2010     Patient Active Problem List    Diagnosis Date Noted    Severe obesity (BMI 35.0-39. 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 02/12/2016    Encounter for medication monitoring 11/29/2015    Essential hypertension 07/12/2015    Gastroesophageal reflux disease without esophagitis 07/12/2015    Atherosclerosis of native coronary artery without angina pectoris--diffuse small vsl disease via cath cath 2009--RCA PDA/ROSA 07/12/2015    Chronic anxiety 07/12/2015    Atypical chest pain 02/24/2014    Chest pain at rest 02/20/2014    Bifascicular bundle branch block--RBBB,IRVING 02/20/2014    Chest pain, unspecified 02/11/2013    Chronic ischemic heart disease 05/29/2012    Mixed hyperlipidemia 05/29/2012    Chronic diastolic heart failure ([facility] [address] 05/29/2012    Hypovitaminosis D 07/28/2010    Hypokalemia 03/03/2010    Hiatal hernia 03/03/2010    Anxiety 03/03/2010    S/P hysterectomy 03/03/2010    Spinal stenosis 03/03/2010    S/P foot surgery 03/03/2010    Environmental allergies 03/03/2010    S/P cardiac catheterization 03/03/2010      Past Surgical History:   Procedure Laterality Date    CARDIAC CATHETERIZATION  01/2002    normal coronaries    DECOMPRESS DISC RF LUMBAR  07/2007    HX BACK SURGERY  5/1/2014    HX BREAST LUMPECTOMY Left 1989    benign left breast    HX BUNIONECTOMY      HX BUNIONECTOMY      HX HEART CATHETERIZATION  3/3/10    HX HYSTERECTOMY  1978    HX LUMBAR DISKECTOMY      HX ORTHOPAEDIC Bilateral BUNIONECTOMY    HX ORTHOPAEDIC Right     WRIST FX    HX OTHER SURGICAL  10/12/2015    mole removed from right side of face by Dr. Amber Cheema FLX DX Sherren Alpha PFD  [id number]    Dr Bakari Quintero     Allergies   Allergen Reactions    Bactrim [Sulfamethoxazole-Trimethoprim] Unknown (comments)     Pt does not recall    Darvocet A500 [Propoxyphene N-Acetaminophen] Itching      Family History   Problem Relation Age of Onset    Hypertension Mother     Heart Disease Father     Stroke Sister     Heart Disease Sister     Cancer Brother      LUNG    Cancer Brother      PROSTATE    Hypertension Brother     No Known Problems Brother     Lung Disease Brother     No Known Problems Brother     No Known Problems Brother     Stroke Daughter 47    No Known Problems Daughter     Anesth Problems Neg Hx       Social History     Social History    Marital status:      Spouse name: N/A    Number of children: N/A    Years of education: N/A     Occupational History    Not on file. Social History Main Topics    Smoking status: Never Smoker    Smokeless tobacco: Never Used    Alcohol use No    Drug use: No    Sexual activity: Not Currently     Other Topics Concern    Not on file     Social History Narrative      Current Outpatient Prescriptions   Medication Sig    pantoprazole (PROTONIX) 40 mg tablet Take 1 Tab by mouth daily. Take 30 minutes prior to breakfast.    potassium chloride SR (KLOR-CON 10) 10 mEq tablet TAKE 3 TABLETS IN THE MORNING AND 2 TABLETS IN THE EVENING BY MOUTH EVERY DAY    simethicone (MYLICON) 80 mg chewable tablet Take 1 Tab by mouth every six (6) hours as needed for Flatulence. Indications: FLATULENCE    rosuvastatin (CRESTOR) 10 mg tablet Take 1 Tab by mouth nightly.     clonazePAM (KLONOPIN) 1 mg tablet TAKE1/2 TO 1 TAB BY MOUTH EVERY MORNING AS NEEDED ANXIETY & TAKE 1 TAB BY MOUTH AT BEDTIME FOR SLEEP    nitroglycerin (NITROSTAT) 0.4 mg SL tablet TAKE 1 TAB BY SUBLINGUAL ROUTE EVERY 5 MINUTES AS NEEDED.  amLODIPine (NORVASC) 10 mg tablet TAKE 1/2 TABLET BY MOUTH TWICE A DAY    losartan (COZAAR) 100 mg tablet TAKE 1 TABLET BY MOUTH EVERY DAY    furosemide (LASIX) 80 mg tablet TAKE 1 TABLET BY MOUTH EVERY MORNING AND TAKE 1/2 TABLET EVERY EVENING    hydrocortisone valerate (WEST-STEFFANIE) 0.2 % ointment APPLY TO AFFECTED AREA (FACE) TWICE A DAY AS NEEDED    isosorbide mononitrate ER (IMDUR) 60 mg CR tablet Take 1/2 tablet daily    metoprolol tartrate (LOPRESSOR) 25 mg tablet TAKE 1 TABLET BY MOUTH TWICE A DAY    cholecalciferol, vitamin D3, (VITAMIN D3) 2,000 unit tab Take 1 Tab by mouth daily.  acetaminophen (TYLENOL EXTRA STRENGTH) 500 mg tablet Take 1,000 mg by mouth every six (6) hours as needed for Pain.  Aspirin, Buffered 81 mg tab Take 81 mg by mouth daily.  aluminum & magnesium hydroxide-simethicone (MAALOX MAXIMUM STRENGTH) 400-400-40 mg/5 mL suspension Take 10 mL by mouth every six (6) hours as needed for Indigestion.  mometasone (ELOCON) 0.1 % ointment APPLY TO AFFECTED AREA (SCALP) EVERY DAY AS NEEDED    econazole nitrate (SPECTAZOLE) 1 % topical cream Apply  to affected area daily. No current facility-administered medications for this visit. I have reviewed the nurses notes, vitals, problem list, allergy list, medical history, family medical, social history and medications. Objective:     Physical Exam:     Vitals:    03/30/18 1312 03/30/18 1313   BP: 143/86 143/87   Pulse: 69 72   Resp: 12    SpO2: 95%    Weight: 184 lb (83.5 kg)    Height: 5' (1.524 m)     Body mass index is 35.94 kg/(m^2). General: Well developed, in no acute distress. HEENT: No carotid bruits, no JVD, trach is midline. Heart:  Normal S1/S2 negative S3 or S4.  Regular, Gr 2/6 SE murmur, -Gallop or rub.   Respiratory: Clear bilaterally, no wheezing or rales  Abdomen:   Soft, non-tender, bowel sounds are active.   Extremities:  No edema, normal cap refill, no cyanosis. Neuro: A&Ox3, speech clear, gait stable. Skin: Skin color is normal. No rashes or lesions. No diaphoresis. Vascular: 2+ pulses symmetric in all extremities        Data Review:       Cardiographics:      Cardiology Labs:    Results for orders placed or performed during the hospital encounter of 03/07/18   EKG, 12 LEAD, INITIAL   Result Value Ref Range    Ventricular Rate 68 BPM    Atrial Rate 68 BPM    P-R Interval 192 ms    QRS Duration 134 ms    Q-T Interval 412 ms    QTC Calculation (Bezet) 438 ms    Calculated P Axis 74 degrees    Calculated R Axis -14 degrees    Calculated T Axis 28 degrees    Diagnosis       Normal sinus rhythm  Right bundle branch block  Voltage criteria for left ventricular hypertrophy  When compared with ECG of 05-FEB-2018 11:40,  premature atrial complexes are no longer present  Confirmed by Ingris Menendez ([id number]) on 3/7/2018 6:32:57 PM         Lab Results   Component Value Date/Time    Cholesterol, total 230 (H) 11/20/2017 11:35 AM    HDL Cholesterol 57 11/20/2017 11:35 AM    LDL, calculated 146 (H) 11/20/2017 11:35 AM    Triglyceride 136 11/20/2017 11:35 AM    CHOL/HDL Ratio 3.1 11/01/2010 05:26 PM       Lab Results   Component Value Date/Time    Sodium 139 03/07/2018 12:21 PM    Potassium 3.2 (L) 03/07/2018 12:21 PM    Chloride 102 03/07/2018 12:21 PM    CO2 31 03/07/2018 12:21 PM    Anion gap 6 03/07/2018 12:21 PM    Glucose 101 (H) 03/07/2018 12:21 PM    BUN 9 03/07/2018 12:21 PM    Creatinine 1.15 (H) 03/07/2018 12:21 PM    BUN/Creatinine ratio 8 (L) 03/07/2018 12:21 PM    GFR est AA 55 (L) 03/07/2018 12:21 PM    GFR est non-AA 45 (L) 03/07/2018 12:21 PM    Calcium 8.9 03/07/2018 12:21 PM    Bilirubin, total 0.5 03/07/2018 12:21 PM    AST (SGOT) 20 03/07/2018 12:21 PM    Alk.  phosphatase 99 03/07/2018 12:21 PM    Protein, total 8.2 03/07/2018 12:21 PM    Albumin 3.4 (L) 03/07/2018 12:21 PM    Globulin 4.8 (H) 03/07/2018 12:21 PM    A-G Ratio 0.7 (L) 03/07/2018 12:21 PM    ALT (SGPT) 19 03/07/2018 12:21 PM          Assessment:       ICD-10-CM ICD-9-CM    1. Essential hypertension I10 401.9    2. Chronic ischemic heart disease I25.9 414.9    3. Mixed hyperlipidemia E78.2 272.2    4. Chronic diastolic heart failure (HCC) I50.32 428.32    5. Bifascicular bundle branch block--RBBB,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 426.53    6. Atherosclerosis of native coronary artery of native heart without angina pectoris I25.10 414.01    7. Paroxysmal cardiac arrhythmia--PVC's,APC's,NSSVT I49.8 427.89    8. Anxiety F41.9 300.00    9. Class 2 obesity due to excess calories with serious comorbidity and body mass index (BMI) of 38.0 to 38.9 in adult E66.09 278.00     Z68.38 V85.38          Discussion: Patient presents at this time stable from a cardiac perspective. Cardiac symptomatology less--palpitations,SOB likely due to improvement in reflux and abdominal pain and stress level. For now will hold off on med changes as stable. If cholecystectomy needed then repeat Lexiscan NST. Pleased with present cardiac status. Plan: 1. Continue same meds. Lipid profile and labs followed by PCP. 2.Encouraged to exercise to tolerance, lose weight and follow low fat, low cholesterol, low sodium predominantly Plant-based (consider Mediterranean) diet. Call with questions or concerns. Will follow up any test results by phone and/or f/u here in office if needed. Phyllis Alves 3.Follow up: 3 months    I have discussed the diagnosis with the patient and the intended plan as seen in the above orders. The patient has received an after-visit summary and questions were answered concerning future plans. I have discussed any concerning medication side effects and warnings with the patient as well.     Molly Husain MD  3/30/2018

## 2018-03-30 NOTE — MR AVS SNAPSHOT
[address] [facility] 
 
 
 [address] 
[phone] Patient: Shayan Linda MRN:  [id number] Visit Information Date & Time Provider Department Dept. Phone Encounter #  
 3/30/2018  1:00 PM MD Librado Contreras [facility] [place] [address] [id number] Your Appointments 2018  9:00 AM  
ROUTINE CARE with Danuta Martel MD  
[facility] Appt Note: routine follow up  
 [address] [facility] [id number] [phone] [address] [facility] [id number]  
  
    
 2018 10:20 AM  
ESTABLISHED PATIENT with MD Librado Rodney [facility]) Appt Note: 3 MO. F/U  
 [address]  
[phone] [address] Upcoming Health Maintenance Date Due Bone Densitometry (Dexa) Screening 2002 MEDICARE YEARLY EXAM 3/29/2018 GLAUCOMA SCREENING Q2Y 2019 COLONOSCOPY 10/13/2020 DTaP/Tdap/Td series (2 - Td) 2026 Allergies as of 3/30/2018  Review Complete On: 3/30/2018 By: Dirk Mercado, LPN Severity Noted Reaction Type Reactions Bactrim [Sulfamethoxazole-trimethoprim]  2010    Unknown (comments) Pt does not recall Darvocet A500 [Propoxyphene N-acetaminophen]  2010    Itching Current Immunizations  Reviewed on 2018 Name Date Influenza High Dose Vaccine PF 2017, 2016, 10/13/2015, 2014 Influenza Vaccine 2013 Influenza Vaccine Split 10/9/2012, 2011, 2010 Pneumococcal Conjugate (PCV-13) 2017 Pneumococcal Vaccine (Unspecified Type) 2008 Not reviewed this visit You Were Diagnosed With   
  
 Codes Comments Essential hypertension    -  Primary ICD-10-CM: I10 
ICD-9-CM: 401.9 Chronic ischemic heart disease     ICD-10-CM: I25.9 ICD-9-CM: 414.9 Mixed hyperlipidemia     ICD-10-CM: E78.2 ICD-9-CM: 272.2 Chronic diastolic heart failure (HCC)     ICD-10-CM: I50.32 
ICD-9-CM: 428.32 Bifascicular bundle branch block     ICD-10-CM: I45.2 ICD-9-CM: 426.53 Atherosclerosis of native coronary artery of native heart without angina pectoris     ICD-10-CM: I25.10 ICD-9-CM: 414.01 Paroxysmal cardiac arrhythmia     ICD-10-CM: I49.8 ICD-9-CM: 427.89 Anxiety     ICD-10-CM: F41.9 ICD-9-CM: 300.00 Class 2 obesity due to excess calories with serious comorbidity and body mass index (BMI) of 38.0 to 38.9 in adult     ICD-10-CM: E66.09, Z68.38 
ICD-9-CM: 278.00, V85.38 Vitals BP Pulse Resp Height(growth percentile) Weight(growth percentile) LMP  
 143/87 (BP 1 Location: Left arm, BP Patient Position: Sitting) 72 12 5' (1.524 m) 184 lb (83.5 kg) (LMP Unknown) SpO2 BMI OB Status Smoking Status 95% 35.94 kg/m2 Hysterectomy Never Smoker Vitals History BMI and BSA Data Body Mass Index Body Surface Area 35.94 kg/m 2 1.88 m 2 Preferred Pharmacy Pharmacy Name Phone [id number] [address] [phone] Your Updated Medication List  
  
   
This list is accurate as of 3/30/18  1:38 PM.  Always use your most recent med list.  
  
  
  
  
 aluminum & magnesium hydroxide-simethicone 400-400-40 mg/5 mL suspension Commonly known as:  MAALOX MAXIMUM STRENGTH Take 10 mL by mouth every six (6) hours as needed for Indigestion. amLODIPine 10 mg tablet Commonly known as:  Blanquitaonia Grates TAKE 1/2 TABLET BY MOUTH TWICE A DAY  
  
 aspirin, buffered 81 mg Tab Take 81 mg by mouth daily. clonazePAM 1 mg tablet Commonly known as:  Rosibel Coley  
 TAKE1/2 TO 1 TAB BY MOUTH EVERY MORNING AS NEEDED ANXIETY & TAKE 1 TAB BY MOUTH AT BEDTIME FOR SLEEP  
  
 econazole nitrate 1 % topical cream  
Commonly known as:  Manas Poncho Apply  to affected area daily. furosemide 80 mg tablet Commonly known as:  LASIX TAKE 1 TABLET BY MOUTH EVERY MORNING AND TAKE 1/2 TABLET EVERY EVENING  
  
 hydrocortisone valerate 0.2 % ointment Commonly known as:  WEST-STEFFANIE  
APPLY TO AFFECTED AREA (FACE) TWICE A DAY AS NEEDED  
  
 isosorbide mononitrate ER 60 mg CR tablet Commonly known as:  IMDUR Take 1/2 tablet daily  
  
 losartan 100 mg tablet Commonly known as:  COZAAR  
TAKE 1 TABLET BY MOUTH EVERY DAY  
  
 metoprolol tartrate 25 mg tablet Commonly known as:  LOPRESSOR  
TAKE 1 TABLET BY MOUTH TWICE A DAY  
  
 mometasone 0.1 % ointment Commonly known as:  ELOCON  
APPLY TO AFFECTED AREA (SCALP) EVERY DAY AS NEEDED  
  
 nitroglycerin 0.4 mg SL tablet Commonly known as:  NITROSTAT  
TAKE 1 TAB BY SUBLINGUAL ROUTE EVERY 5 MINUTES AS NEEDED. pantoprazole 40 mg tablet Commonly known as:  PROTONIX Take 1 Tab by mouth daily. Take 30 minutes prior to breakfast.  
  
 potassium chloride SR 10 mEq tablet Commonly known as:  KLOR-CON 10  
TAKE 3 TABLETS IN THE MORNING AND 2 TABLETS IN THE EVENING BY MOUTH EVERY DAY  
  
 rosuvastatin 10 mg tablet Commonly known as:  CRESTOR Take 1 Tab by mouth nightly. simethicone 80 mg chewable tablet Commonly known as:  Ellender Drown Take 1 Tab by mouth every six (6) hours as needed for Flatulence. Indications: FLATULENCE  
  
 TYLENOL EXTRA STRENGTH 500 mg tablet Generic drug:  acetaminophen Take 1,000 mg by mouth every six (6) hours as needed for Pain. VITAMIN D3 2,000 unit Tab Generic drug:  cholecalciferol (vitamin D3) Take 1 Tab by mouth daily. Introducing [facility]! Dear Basilio Gauthier: 
Thank you for requesting a [email or web address] account.   Our records indicate that you already have an active [employer] account. You can access your account anytime at [email or web address] Did you know that you can access your hospital and ER discharge instructions at any time in [employer]? You can also review all of your test results from your hospital stay or ER visit. Additional Information If you have questions, please visit the Frequently Asked Questions section of the [employer] website at [email or web address] Remember, [employer] is NOT to be used for urgent needs. For medical emergencies, dial 911. Now available from your iPhone and Android! Please provide this summary of care documentation to your next provider. Your primary care clinician is listed as Tawanna Scherer. If you have any questions after today's visit, please call [phone].

## 2018-03-30 NOTE — PROGRESS NOTES
Chief Complaint   Patient presents with    Shortness of Breath     no other complaintss. 1. Have you been to the ER, urgent care clinic since your last visit? Hospitalized since your last visit? No    2. Have you seen or consulted any other health care providers outside of the [address] [place] [address] since your last visit? Include any pap smears or colon screening.  No

## 2018-04-06 RX ORDER — METOPROLOL TARTRATE 25 MG/1
TABLET, FILM COATED ORAL
Qty: 60 TAB | Refills: 11 | Status: SHIPPED | OUTPATIENT
Start: 2018-04-06 | End: 2019-02-11 | Stop reason: DRUGHIGH

## 2018-04-11 NOTE — TELEPHONE ENCOUNTER
Spoke to Mrs. Salcido Faisal. She is doing okay. Not a whole not of pain just twinges. Would like to scheduled surgery in the next two weeks. Will get back to her in the next few days after looking at schedule. She is fine with plan.

## 2018-04-18 NOTE — TELEPHONE ENCOUNTER
Patient needs Don Subramanian from cardiology. Before lap choley can be set up. Will get her appointment. And wait for clearance before scheduling.

## 2018-04-24 DIAGNOSIS — Z01.818 PREOPERATIVE CLEARANCE: ICD-10-CM

## 2018-04-24 DIAGNOSIS — R93.2 ABNORMAL GALL BLADDER DIAGNOSTIC IMAGING: Primary | ICD-10-CM

## 2018-05-02 ENCOUNTER — TELEPHONE (OUTPATIENT)
Dept: SURGERY | Age: 83
End: 2018-05-02

## 2018-05-02 DIAGNOSIS — R10.13 EPIGASTRIC ABDOMINAL PAIN: Primary | ICD-10-CM

## 2018-05-02 DIAGNOSIS — I25.9 CHRONIC ISCHEMIC HEART DISEASE: ICD-10-CM

## 2018-05-02 DIAGNOSIS — R07.89 OTHER CHEST PAIN: ICD-10-CM

## 2018-05-02 NOTE — TELEPHONE ENCOUNTER
Spoke with patient. Scheduled for [place] for 5/4/18 at 1 PM, [facility].  Patient will check on transportation and call our office back if ok to proceed with test.

## 2018-05-03 ENCOUNTER — TELEPHONE (OUTPATIENT)
Dept: SURGERY | Age: 83
End: 2018-05-03

## 2018-05-03 NOTE — TELEPHONE ENCOUNTER
Spoke with patient. Mally scan scheduled on 5/4/2018 with Dr Rachel Crain for 1 PM. He Pellet and test instructions discussed with patient.

## 2018-05-04 ENCOUNTER — CLINICAL SUPPORT (OUTPATIENT)
Dept: CARDIOLOGY CLINIC | Age: 83
End: 2018-05-04

## 2018-05-04 DIAGNOSIS — R93.2 ABNORMAL GALL BLADDER DIAGNOSTIC IMAGING: ICD-10-CM

## 2018-05-04 DIAGNOSIS — Z01.818 PREOPERATIVE CLEARANCE: ICD-10-CM

## 2018-05-10 NOTE — TELEPHONE ENCOUNTER
Spoke with patient. Jeanette Gerber scheduled for 5/7/18 with Dr Raegan Gonsalves . Patient given address and instructions.

## 2018-05-21 DIAGNOSIS — R07.9 CHEST PAIN, UNSPECIFIED TYPE: Primary | ICD-10-CM

## 2018-05-21 RX ORDER — NITROGLYCERIN 0.4 MG/1
TABLET SUBLINGUAL
Qty: 30 TAB | Refills: 1 | Status: SHIPPED | OUTPATIENT
Start: 2018-05-21 | End: 2018-10-25 | Stop reason: SDUPTHER

## 2018-05-30 ENCOUNTER — OFFICE VISIT (OUTPATIENT)
Dept: FAMILY MEDICINE CLINIC | Age: 83
End: 2018-05-30

## 2018-05-30 ENCOUNTER — HOSPITAL ENCOUNTER (OUTPATIENT)
Dept: LAB | Age: 83
Discharge: HOME | End: 2018-05-30
Payer: MEDICARE

## 2018-05-30 VITALS
BODY MASS INDEX: 36.32 KG/M2 | HEART RATE: 67 BPM | OXYGEN SATURATION: 96 % | RESPIRATION RATE: 16 BRPM | SYSTOLIC BLOOD PRESSURE: 127 MMHG | HEIGHT: 60 IN | DIASTOLIC BLOOD PRESSURE: 85 MMHG | WEIGHT: 185 LBS | TEMPERATURE: 96.5 F

## 2018-05-30 DIAGNOSIS — F41.9 CHRONIC ANXIETY: ICD-10-CM

## 2018-05-30 DIAGNOSIS — M17.11 PRIMARY OSTEOARTHRITIS OF RIGHT KNEE: ICD-10-CM

## 2018-05-30 DIAGNOSIS — I10 ESSENTIAL HYPERTENSION: Primary | Chronic | ICD-10-CM

## 2018-05-30 DIAGNOSIS — I50.32 CHRONIC DIASTOLIC HEART FAILURE (HCC): ICD-10-CM

## 2018-05-30 DIAGNOSIS — K21.9 GASTROESOPHAGEAL REFLUX DISEASE WITHOUT ESOPHAGITIS: Chronic | ICD-10-CM

## 2018-05-30 DIAGNOSIS — I25.10 ATHEROSCLEROSIS OF NATIVE CORONARY ARTERY OF NATIVE HEART WITHOUT ANGINA PECTORIS: ICD-10-CM

## 2018-05-30 DIAGNOSIS — E78.2 MIXED HYPERLIPIDEMIA: ICD-10-CM

## 2018-05-30 DIAGNOSIS — I25.9 CHRONIC ISCHEMIC HEART DISEASE: ICD-10-CM

## 2018-05-30 DIAGNOSIS — Z51.81 ENCOUNTER FOR MEDICATION MONITORING: ICD-10-CM

## 2018-05-30 PROCEDURE — 36415 COLL VENOUS BLD VENIPUNCTURE: CPT

## 2018-05-30 PROCEDURE — 80061 LIPID PANEL: CPT

## 2018-05-30 PROCEDURE — 80048 BASIC METABOLIC PNL TOTAL CA: CPT

## 2018-05-30 PROCEDURE — 83735 ASSAY OF MAGNESIUM: CPT

## 2018-05-30 RX ORDER — DICLOFENAC SODIUM 10 MG/G
2 GEL TOPICAL 4 TIMES DAILY
Qty: 100 G | Refills: 3 | Status: SHIPPED | OUTPATIENT
Start: 2018-05-30 | End: 2019-11-04

## 2018-05-30 RX ORDER — AMMONIUM LACTATE 12 G/100G
LOTION TOPICAL
Refills: 0 | COMMUNITY
Start: 2018-04-25 | End: 2018-06-07

## 2018-05-30 NOTE — PROGRESS NOTES
HISTORY OF PRESENT ILLNESS  Karla Toscano is a [de-identified] y.o. female.   HPI   {Choose one or more HPI Chronic Disease Notes, press DELETE if none desired:14316}  {Choose one or more SmartLinks; press DELETE if none desired:2019708}   {Choose one or more Last Lab values; press DELETE if none desired:2968260}     ROS    Physical Exam    ASSESSMENT and PLAN  {ASSESSMENT/PLAN:51047}

## 2018-05-30 NOTE — PROGRESS NOTES
Chief Complaint   Patient presents with    Hypertension     follow up    Cholesterol Problem     follow up    Knee Pain     patient c/o right knee pain      Patient states she has been using heat and taking Tylenol ES for left knee pain but with little relief. Eye exam 12/11/2017 by Dr. Jacob Lynch. 1. Have you been to the ER, urgent care clinic since your last visit? Hospitalized since your last visit? No    2. Have you seen or consulted any other health care providers outside of the [address] [place] [address] since your last visit? Include any pap smears or colon screening.  No

## 2018-05-30 NOTE — PROGRESS NOTES
HISTORY OF PRESENT ILLNESS  Karla Toscano is a [de-identified] y.o. female. HPI   Patient seen today for follow-up on HTN, Cholesterol   Patient stated she has been taking medication as prescribed but, does not monitor her blood pressure at home. Patient has no complaints of chest pain, SOB. Patient stated she usually follows a low fat low cholesterol diet and takes medication as prescribed. Patient also stated that her lower extremities have been swollen and is usually swollen every day, because she is usually up doing house work all day. She also stated that she elevates her legs at nights and it help to decrease swelling. Patient stated she will be having surgery on June 6th for gallbladder surgery. Cardiovascular Review:  The patient has hypertension, hyperlipidemia and obesity. Diet and Lifestyle: generally follows a low fat low cholesterol diet  Home BP Monitoring: is not measured at home. Pertinent ROS: taking medications as instructed, no medication side effects noted, no TIA's, no chest pain on exertion, no dyspnea on exertion, noting swelling of ankles. Osteoarthritis and Chronic Pain:  Patient has osteoarthritis, primarily affecting the knees and lower back. Overall her back pain has been at a minimal.     Symptoms onset: problem is longstanding. Patient stated she has been having pain in her right knee for the past two weeks, and have been using tylenol and heat to help in relieving the pain. Patient rated her pain 6/10. Has known OA in the knee from previous xray in 2016. She also stated that usually elevates her legs at nights to help decrease swelling.        .   Patient Active Problem List   Diagnosis Code    Hypokalemia E87.6    Hiatal hernia K44.9    Anxiety F41.9    S/P hysterectomy Z90.710    Spinal stenosis M48.00    S/P foot surgery Z98.890    Environmental allergies Z91.09    S/P cardiac catheterization Z98.890    Hypovitaminosis D E55.9    Chronic ischemic heart disease I25.9  Mixed hyperlipidemia E78.2    Chronic diastolic heart failure (HCC) I50.32    Chest pain, unspecified R07.9    Chest pain at rest R07.9    Bifascicular bundle branch block--RBBB,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    Atypical chest pain R07.89    Essential hypertension I10    Gastroesophageal reflux disease without esophagitis K21.9    Atherosclerosis of native coronary artery without angina pectoris--diffuse small vsl disease via cath cath 2009--RCA PDA/ROSA I25.10    Chronic anxiety F41.9    Encounter for medication monitoring Z51.81    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 9) with comorbidity ([facility]) E66.01     Current Outpatient Prescriptions   Medication Sig Dispense Refill    ammonium lactate (LAC-HYDRIN) 12 % lotion APPLY TO LOWER EXTREMITIES AND FEET TWICE A DAY  0    diclofenac (VOLTAREN) 1 % gel Apply 2 g to affected area four (4) times daily. 100 g 3    nitroglycerin (NITROSTAT) 0.4 mg SL tablet TAKE 1 TAB BY SUBLINGUAL ROUTE EVERY 5 MINUTES AS NEEDED. 30 Tab 1    metoprolol tartrate (LOPRESSOR) 25 mg tablet TAKE 1 TABLET BY MOUTH TWICE A DAY 60 Tab 11    pantoprazole (PROTONIX) 40 mg tablet Take 1 Tab by mouth daily. Take 30 minutes prior to breakfast. 30 Tab 6    potassium chloride SR (KLOR-CON 10) 10 mEq tablet TAKE 3 TABLETS IN THE MORNING AND 2 TABLETS IN THE EVENING BY MOUTH EVERY  Tab 3    simethicone (MYLICON) 80 mg chewable tablet Take 1 Tab by mouth every six (6) hours as needed for Flatulence. Indications: FLATULENCE 20 Tab 0    rosuvastatin (CRESTOR) 10 mg tablet Take 1 Tab by mouth nightly.  30 Tab 11    clonazePAM (KLONOPIN) 1 mg tablet TAKE1/2 TO 1 TAB BY MOUTH EVERY MORNING AS NEEDED ANXIETY & TAKE 1 TAB BY MOUTH AT BEDTIME FOR SLEEP 60 Tab 3    amLODIPine (NORVASC) 10 mg tablet TAKE 1/2 TABLET BY MOUTH TWICE A DAY 30 Tab 6    losartan (COZAAR) 100 mg tablet TAKE 1 TABLET BY MOUTH EVERY DAY 90 Tab 3    furosemide (LASIX) 80 mg tablet TAKE 1 TABLET BY MOUTH EVERY MORNING AND TAKE 1/2 TABLET EVERY EVENING 135 Tab 3    hydrocortisone valerate (WEST-STEFFANIE) 0.2 % ointment APPLY TO AFFECTED AREA (FACE) TWICE A DAY AS NEEDED  1    mometasone (ELOCON) 0.1 % ointment APPLY TO AFFECTED AREA (SCALP) EVERY DAY AS NEEDED  2    isosorbide mononitrate ER (IMDUR) 60 mg CR tablet Take 1/2 tablet daily      econazole nitrate (SPECTAZOLE) 1 % topical cream Apply  to affected area daily as needed.  cholecalciferol, vitamin D3, (VITAMIN D3) 2,000 unit tab Take 1 Tab by mouth daily.  acetaminophen (TYLENOL EXTRA STRENGTH) 500 mg tablet Take 1,000 mg by mouth every six (6) hours as needed for Pain.  Aspirin, Buffered 81 mg tab Take 81 mg by mouth daily.        Allergies   Allergen Reactions    Bactrim [Sulfamethoxazole-Trimethoprim] Unknown (comments)     Pt does not recall    Darvocet A500 [Propoxyphene N-Acetaminophen] Itching     Past Medical History:   Diagnosis Date    Anxiety     Aortic stenosis 3/3/2010    Aortic stenosis     Arrhythmia     MURMUR    Arthritis     SPINAL STENOSIS    Atherosclerosis of coronary artery     CHF (congestive heart failure) ([facility] [address] 3/3/2010    CHF (congestive heart failure) ([facility] [address] 01/2002    Chronic heart failure ([facility] [address] 1/2002; 3/3/2010    chronic diastolic heart failure    Chronic pain     LOWER BACK, RIGHT KNEE    Environmental allergies 3/3/2010    GERD (gastroesophageal reflux disease) 3/3/2010    Hiatal hernia 3/3/2010    High cholesterol 3/3/2010    HTN (hypertension) 3/3/2010    Hypokalemia 3/3/2010    Ischemic heart disease, chronic     Obese     Psychiatric disorder     anxiety    S/P hysterectomy 3/3/2010    Spinal stenosis 3/3/2010     Past Surgical History:   Procedure Laterality Date    CARDIAC CATHETERIZATION  01/2002    normal coronaries    DECOMPRESS DISC RF LUMBAR  07/2007    HX BACK SURGERY  5/1/2014    HX BREAST LUMPECTOMY Left 1989    benign left breast    HX BUNIONECTOMY      HX BUNIONECTOMY      HX HEART CATHETERIZATION  3/3/10    HX HYSTERECTOMY  1978    HX LUMBAR DISKECTOMY      HX ORTHOPAEDIC Bilateral     BUNIONECTOMY    HX ORTHOPAEDIC Right     WRIST FX    HX OTHER SURGICAL  10/12/2015    mole removed from right side of face by Dr. Osmany Batista FLX DX W/COLLJ Ayla Lang PFD  [id number]    Dr Kaila William     Family History   Problem Relation Age of Onset    Hypertension Mother     Heart Disease Father     Stroke Sister     Heart Disease Sister     Cancer Brother      LUNG    Cancer Brother      PROSTATE    Hypertension Brother     No Known Problems Brother     Lung Disease Brother     No Known Problems Brother     No Known Problems Brother     Stroke Daughter 47    No Known Problems Daughter     Anesth Problems Neg Hx      Social History   Substance Use Topics    Smoking status: Never Smoker    Smokeless tobacco: Never Used    Alcohol use No      Lab Results  Component Value Date/Time   Cholesterol, total 230 (H) 11/20/2017 11:35 AM   HDL Cholesterol 57 11/20/2017 11:35 AM   LDL, calculated 146 (H) 11/20/2017 11:35 AM   Triglyceride 136 11/20/2017 11:35 AM   CHOL/HDL Ratio 3.1 11/01/2010 05:26 PM     Lab Results   Component Value Date/Time    B-type Natriuretic Peptide 120.5 (H) 08/15/2017 11:07 AM    NT pro- 05/28/2014 02:45 PM    NT pro-BNP 90 07/29/2009 03:30 AM         Review of Systems   Constitutional: Negative for chills, fever and weight loss. HENT: Negative for congestion, ear pain, hearing loss and tinnitus. Eyes: Negative for blurred vision, pain, discharge and redness. Respiratory: Negative for cough, shortness of breath and wheezing. Cardiovascular: Positive for leg swelling.  Negative for chest pain and palpitations. Patient stated her leg get swollen every day because she is always on her feet doing house work. Gastrointestinal: Negative for abdominal pain, constipation, nausea and vomiting. Genitourinary: Negative for dysuria, frequency and urgency. Musculoskeletal: Positive for joint pain. Negative for back pain, myalgias and neck pain. Patient stated she has been having pain in her right knee for the past two weeks. Skin: Negative. Neurological: Negative for dizziness, weakness and headaches. Endo/Heme/Allergies: Negative. Psychiatric/Behavioral: Negative. Physical Exam   Constitutional: She is oriented to person, place, and time. She appears well-developed and well-nourished. Blood pressure 127/85, pulse 67, temperature 96.5 °F (35.8 °C), temperature source Oral, resp. rate 16, height 5' (1.524 m), weight 185 lb (83.9 kg), SpO2 96 %. HENT:   Head: Normocephalic. Right Ear: External ear normal.   Left Ear: External ear normal.   Nose: Nose normal.   Mouth/Throat: Oropharynx is clear and moist.   Eyes: Right eye exhibits no discharge. Left eye exhibits no discharge. Cardiovascular: Normal rate, regular rhythm and intact distal pulses. Pulmonary/Chest: No respiratory distress. She has no wheezes. She exhibits no tenderness. Abdominal: Soft. Bowel sounds are normal.   Musculoskeletal: She exhibits edema and tenderness. Right knee: She exhibits swelling. She exhibits normal range of motion and no effusion. Tenderness found. Medial joint line and lateral joint line tenderness noted. Trace edema in BLE. Neurological: She is alert and oriented to person, place, and time. Skin: Skin is warm and dry. Psychiatric: She has a normal mood and affect. Nursing note and vitals reviewed. ASSESSMENT and PLAN  Diagnoses and all orders for this visit:    1.  Essential hypertension        - Stable at goal        - Reviewed with patient to continue to follow low sodium diet to help with decreasing blood pressure    2. Mixed hyperlipidemia  -     LIPID PANEL        - Discuss with patient the importance of following low fat, low cholesterol diet    3. Chronic diastolic heart failure (HCC)//  4. Chronic ischemic heart disease//  5. Atherosclerosis of native coronary artery of native heart without angina pectoris  Stable. She has been cleared for her GB surgery by cardiology. 6. Gastroesophageal reflux disease without esophagitis   Discussed with patient to continue to take medication as prescribed and   to remain in a sitting position for at least 30 minutes after having  meals     7. Primary osteoarthritis of right knee  -     diclofenac (VOLTAREN) 1 % gel; Apply 2 g to affected area four (4) times daily. Discussed with patient to continue to apply heat and elevate lower extremities to help on relieving pain and decrease swelling   Discuss with patient the use of compression stockings to help with decreasing swelling in lower extremities    8. Chronic anxiety   No recent episodes    9. Encounter for medication monitoring  -     METABOLIC PANEL, BASIC  -     MAGNESIUM        - LIPID    Follow-up Disposition:  Return in about 3 months (around 9/10/2018). Reviewed diet, exercise and weight control  Cardiovascular risk and specific lipid/LDL goals reviewed  Reviewed medications and side effects in detail    I have discussed diagnosis listed in this note with pt and/or family. I have discussed treatment  plan and options and the risk/benefit analysis of those options, including safe use of medications  Through the use of shared decision making we have agreed to the above plan. The patient has  received an after-visit summary and questions were answered concerning future plans and follow-  Up. Advise patient of any urgent changes then to proceed to the ER. Pt was seen with student NP and I agree with note as outline.   Assessment and plan discussed with the pt who understand her dx and treatment plan. I have discussed diagnosis listed in this note with pt and/or family. I have discussed treatment plans and options and the risk/benefit analysis of those options, including safe use of medications and possible medication side effects. Through the use of shared decision making we have agreed to the above plan. The patient has received an after-visit summary and questions were answered concerning future plans and follow up. Advise pt of any urgent changes then to proceed to the ER.

## 2018-05-30 NOTE — MR AVS SNAPSHOT
[address] [facility] 
 
 
 [address] [facility] [address] [address] 
[phone] Patient: Kaci Chowdhury MRN: [id number] :1935 Visit Information Date & Time Provider Department Dept. Phone Encounter #  
 2018  9:00 AM Riddhi Srinivasan MD [facility] [phone] [id number] Follow-up Instructions Return in about 3 months (around 9/10/2018). Your Appointments 2018 10:20 AM  
ESTABLISHED PATIENT with Ruben Merida MD  
[facility]) Appt Note: 3 MO. F/U  
 Manasa Hernandez [address]  
[phone] [address] Upcoming Health Maintenance Date Due Bone Densitometry (Dexa) Screening 2002 MEDICARE YEARLY EXAM 3/29/2018 Influenza Age 5 to Adult 2018 GLAUCOMA SCREENING Q2Y 2019 COLONOSCOPY 10/13/2020 DTaP/Tdap/Td series (2 - Td) 2026 Allergies as of 2018  Review Complete On: 2018 By: Riddhi Srinivasan MD  
  
 Severity Noted Reaction Type Reactions Bactrim [Sulfamethoxazole-trimethoprim]  2010    Unknown (comments) Pt does not recall Darvocet A500 [Propoxyphene N-acetaminophen]  2010    Itching Current Immunizations  Reviewed on 2018 Name Date Influenza High Dose Vaccine PF 2017, 2016, 10/13/2015, 2014 Influenza Vaccine 2013 Influenza Vaccine Split 10/9/2012, 2011, 2010 Pneumococcal Conjugate (PCV-13) 2017 Pneumococcal Vaccine (Unspecified Type) 2008 Reviewed by Riddhi Srinivasan MD on 2018 at 10:32 AM  
You Were Diagnosed With   
  
 Codes Comments Essential hypertension    -  Primary ICD-10-CM: I10 
ICD-9-CM: 401.9 Mixed hyperlipidemia     ICD-10-CM: E78.2 ICD-9-CM: 272.2 Chronic diastolic heart failure (HCC)     ICD-10-CM: I50.32 
ICD-9-CM: 428.32 Chronic ischemic heart disease     ICD-10-CM: I25.9 ICD-9-CM: 414.9 Atherosclerosis of native coronary artery of native heart without angina pectoris     ICD-10-CM: I25.10 ICD-9-CM: 414.01 Gastroesophageal reflux disease without esophagitis     ICD-10-CM: K21.9 ICD-9-CM: 530.81 Primary osteoarthritis of right knee     ICD-10-CM: M17.11 ICD-9-CM: 715.16 Chronic anxiety     ICD-10-CM: F41.9 ICD-9-CM: 300.00 Encounter for medication monitoring     ICD-10-CM: Z51.81 
ICD-9-CM: V58.83 Vitals BP Pulse Temp Resp Height(growth percentile) Weight(growth percentile) 127/85 (BP 1 Location: Left arm, BP Patient Position: Sitting) 67 96.5 °F (35.8 °C) (Oral) 16 5' (1.524 m) 185 lb (83.9 kg) LMP SpO2 BMI OB Status Smoking Status (LMP Unknown) 96% 36.13 kg/m2 Hysterectomy Never Smoker Vitals History BMI and BSA Data Body Mass Index Body Surface Area  
 36.13 kg/m 2 1.88 m 2 Preferred Pharmacy Pharmacy Name Phone [facility] #[id number]- [place], [place] - [address] [phone] Your Updated Medication List  
  
   
This list is accurate as of 5/30/18 10:54 AM.  Always use your most recent med list. amLODIPine 10 mg tablet Commonly known as:  Nixon Mcfadden TAKE 1/2 TABLET BY MOUTH TWICE A DAY  
  
 ammonium lactate 12 % lotion Commonly known as:  LAC-HYDRIN  
APPLY TO LOWER EXTREMITIES AND FEET TWICE A DAY  
  
 aspirin, buffered 81 mg Tab Take 81 mg by mouth daily. clonazePAM 1 mg tablet Commonly known as:  KlonoPIN  
TAKE1/2 TO 1 TAB BY MOUTH EVERY MORNING AS NEEDED ANXIETY & TAKE 1 TAB BY MOUTH AT BEDTIME FOR SLEEP  
  
 diclofenac 1 % Gel Commonly known as:  VOLTAREN Apply 2 g to affected area four (4) times daily.  econazole nitrate 1 % topical cream  
Commonly known as:  Lul Yu  
 Apply  to affected area daily as needed. furosemide 80 mg tablet Commonly known as:  LASIX TAKE 1 TABLET BY MOUTH EVERY MORNING AND TAKE 1/2 TABLET EVERY EVENING  
  
 hydrocortisone valerate 0.2 % ointment Commonly known as:  WEST-STEFFANIE  
APPLY TO AFFECTED AREA (FACE) TWICE A DAY AS NEEDED  
  
 isosorbide mononitrate ER 60 mg CR tablet Commonly known as:  IMDUR Take 1/2 tablet daily  
  
 losartan 100 mg tablet Commonly known as:  COZAAR  
TAKE 1 TABLET BY MOUTH EVERY DAY  
  
 metoprolol tartrate 25 mg tablet Commonly known as:  LOPRESSOR  
TAKE 1 TABLET BY MOUTH TWICE A DAY  
  
 mometasone 0.1 % ointment Commonly known as:  ELOCON  
APPLY TO AFFECTED AREA (SCALP) EVERY DAY AS NEEDED  
  
 nitroglycerin 0.4 mg SL tablet Commonly known as:  NITROSTAT  
TAKE 1 TAB BY SUBLINGUAL ROUTE EVERY 5 MINUTES AS NEEDED. pantoprazole 40 mg tablet Commonly known as:  PROTONIX Take 1 Tab by mouth daily. Take 30 minutes prior to breakfast.  
  
 potassium chloride SR 10 mEq tablet Commonly known as:  KLOR-CON 10  
TAKE 3 TABLETS IN THE MORNING AND 2 TABLETS IN THE EVENING BY MOUTH EVERY DAY  
  
 rosuvastatin 10 mg tablet Commonly known as:  CRESTOR Take 1 Tab by mouth nightly. simethicone 80 mg chewable tablet Commonly known as:  Emmette Guanaco Take 1 Tab by mouth every six (6) hours as needed for Flatulence. Indications: FLATULENCE  
  
 TYLENOL EXTRA STRENGTH 500 mg tablet Generic drug:  acetaminophen Take 1,000 mg by mouth every six (6) hours as needed for Pain. VITAMIN D3 2,000 unit Tab Generic drug:  cholecalciferol (vitamin D3) Take 1 Tab by mouth daily. Prescriptions Sent to Pharmacy Refills  
 diclofenac (VOLTAREN) 1 % gel 3 Sig: Apply 2 g to affected area four (4) times daily. Class: Normal  
 Pharmacy: [facility]/pharmacy [id number] Ph #: [phone]  Route: Topical  
  
 We Performed the Following LIPID PANEL [83974 CPT(R)] MAGNESIUM [id number] CPT(R)] METABOLIC PANEL, BASIC [67013 CPT(R)] Follow-up Instructions Return in about 3 months (around 9/10/2018). Introducing [facility]! Dear Taty Clark: 
Thank you for requesting a "[employer]" account. Our records indicate that you already have an active "[employer]" account. You can access your account anytime at [email or web address] Did you know that you can access your hospital and ER discharge instructions at any time in "[employer]"? You can also review all of your test results from your hospital stay or ER visit. Additional Information If you have questions, please visit the Frequently Asked Questions section of the "[employer]" website at [email or web address] Remember, "[employer]" is NOT to be used for urgent needs. For medical emergencies, dial 911. Now available from your iPhone and Android! Please provide this summary of care documentation to your next provider. Your primary care clinician is listed as Gavi Pride. If you have any questions after today's visit, please call [phone].

## 2018-05-31 LAB
BUN SERPL-MCNC: 13 MG/DL (ref 8–27)
BUN/CREAT SERPL: 13 (ref 12–28)
CALCIUM SERPL-MCNC: 9.7 MG/DL (ref 8.7–10.3)
CHLORIDE SERPL-SCNC: 98 MMOL/L (ref 96–106)
CHOLEST SERPL-MCNC: 193 MG/DL (ref 100–199)
CO2 SERPL-SCNC: 29 MMOL/L (ref 18–29)
CREAT SERPL-MCNC: 1.04 MG/DL (ref 0.57–1)
GFR SERPLBLD CREATININE-BSD FMLA CKD-EPI: 51 ML/MIN/1.73
GFR SERPLBLD CREATININE-BSD FMLA CKD-EPI: 59 ML/MIN/1.73
GLUCOSE SERPL-MCNC: 93 MG/DL (ref 65–99)
HDLC SERPL-MCNC: 57 MG/DL
INTERPRETATION, 910389: NORMAL
INTERPRETATION: NORMAL
LDLC SERPL CALC-MCNC: 109 MG/DL (ref 0–99)
MAGNESIUM SERPL-MCNC: 2.2 MG/DL (ref 1.6–2.3)
PDF IMAGE, 910387: NORMAL
POTASSIUM SERPL-SCNC: 3.4 MMOL/L (ref 3.5–5.2)
SODIUM SERPL-SCNC: 143 MMOL/L (ref 134–144)
TRIGL SERPL-MCNC: 136 MG/DL (ref 0–149)
VLDLC SERPL CALC-MCNC: 27 MG/DL (ref 5–40)

## 2018-06-05 ENCOUNTER — TELEPHONE (OUTPATIENT)
Dept: CARDIOLOGY CLINIC | Age: 83
End: 2018-06-05

## 2018-06-05 ENCOUNTER — DOCUMENTATION ONLY (OUTPATIENT)
Dept: CARDIOLOGY CLINIC | Age: 83
End: 2018-06-05

## 2018-06-05 NOTE — TELEPHONE ENCOUNTER
PT WOULD PLEASE LIKE A CALL FROM DR. MADERA CONCERNING NEED TO STOP ASPRIN HAVING SURGERY 6/6/18 ASA THX.

## 2018-06-05 NOTE — TELEPHONE ENCOUNTER
Spoke with pt . Verified 2 identifers. Told pt letter addressing aspirin has been sent to doctor office r/t her procedure . Patient verbalize understanding .

## 2018-06-11 ENCOUNTER — TELEPHONE (OUTPATIENT)
Dept: SURGERY | Age: 83
End: 2018-06-11

## 2018-06-11 RX ORDER — PROMETHAZINE HYDROCHLORIDE AND DEXTROMETHORPHAN HYDROBROMIDE 6.25; 15 MG/5ML; MG/5ML
5 SYRUP ORAL
Qty: 180 ML | Refills: 0 | Status: SHIPPED | OUTPATIENT
Start: 2018-06-11 | End: 2018-06-18

## 2018-06-11 RX ORDER — AZITHROMYCIN 250 MG/1
TABLET, FILM COATED ORAL
Qty: 6 TAB | Refills: 0 | Status: SHIPPED | OUTPATIENT
Start: 2018-06-11 | End: 2018-06-16

## 2018-06-11 NOTE — TELEPHONE ENCOUNTER
Patient called (#[phone]) back and notified that Dr. Adrianne Gilford was consulted about her symptoms and that she is sched. For surgery on Wednesday 6/13/18 and informed patient that Dr. Lawrence Has is sending to prescriptions to her pharmacy ([facility]). Patient instructed that she would need to call her surgeon and notify of her symptoms in case her surgery may need to be reschd. Patient stated that she had already notified the surgeon and to call back if her symptoms does not improve. Patient verbalized her understanding.

## 2018-06-11 NOTE — TELEPHONE ENCOUNTER
Patient called stating that she would like to come in to be seen for a cold she is experiencing. Patient can be reached at [phone].

## 2018-06-11 NOTE — TELEPHONE ENCOUNTER
Patient called (#[phone]) in reference to her complaining of having a cold. The patient stated that on 6/8/18 she started with cold symptoms of sneezing, non productive hacking coughing, chest congestion, runny nose and eyes and that she has not taken any medications because she always wait for Dr. Nate Bonilla to prescribe her something.

## 2018-06-11 NOTE — TELEPHONE ENCOUNTER
C/o nasal/chest congestion for few days. Maqking an appointment with PCP. She will reschedule once medically cleared. opportunity for questions with clarification.

## 2018-06-14 ENCOUNTER — TELEPHONE (OUTPATIENT)
Dept: SURGERY | Age: 83
End: 2018-06-14

## 2018-06-14 NOTE — TELEPHONE ENCOUNTER
Spoke with Ms Rosanne Arthur. Pt should restart aspirin. She is also taking antibiotics at this time.

## 2018-06-18 DIAGNOSIS — F41.9 ANXIETY: Chronic | ICD-10-CM

## 2018-06-18 RX ORDER — CLONAZEPAM 1 MG/1
TABLET ORAL
Qty: 60 TAB | Refills: 3 | OUTPATIENT
Start: 2018-06-18 | End: 2018-10-23 | Stop reason: SDUPTHER

## 2018-06-18 NOTE — TELEPHONE ENCOUNTER
Print RX    Last Visit: 5/30/1820-Xtwcol-Ulabpr  Next Appt: 9/11/1882-Lcabxo-Hnmcic  Previous Refill Encounter: 2/12/18-60+3 refills    Requested Prescriptions     Pending Prescriptions Disp Refills    clonazePAM (KLONOPIN) 1 mg tablet 60 Tab 3     Sig: TAKE1/2 TO 1 TAB BY MOUTH EVERY MORNING AS NEEDED ANXIETY & TAKE 1 TAB BY MOUTH AT BEDTIME FOR SLEEP

## 2018-07-05 ENCOUNTER — OFFICE VISIT (OUTPATIENT)
Dept: CARDIOLOGY CLINIC | Age: 83
End: 2018-07-05

## 2018-07-05 DIAGNOSIS — I10 ESSENTIAL HYPERTENSION: Primary | ICD-10-CM

## 2018-07-05 DIAGNOSIS — I25.9 CHRONIC ISCHEMIC HEART DISEASE: ICD-10-CM

## 2018-09-11 ENCOUNTER — HOSPITAL ENCOUNTER (OUTPATIENT)
Dept: LAB | Age: 83
Discharge: HOME | End: 2018-09-11
Payer: MEDICARE

## 2018-09-11 ENCOUNTER — OFFICE VISIT (OUTPATIENT)
Dept: FAMILY MEDICINE CLINIC | Age: 83
End: 2018-09-11

## 2018-09-11 VITALS
TEMPERATURE: 96.7 F | DIASTOLIC BLOOD PRESSURE: 71 MMHG | HEART RATE: 74 BPM | HEIGHT: 62 IN | RESPIRATION RATE: 18 BRPM | BODY MASS INDEX: 35.37 KG/M2 | OXYGEN SATURATION: 94 % | SYSTOLIC BLOOD PRESSURE: 115 MMHG | WEIGHT: 192.2 LBS

## 2018-09-11 DIAGNOSIS — I10 ESSENTIAL HYPERTENSION: Primary | Chronic | ICD-10-CM

## 2018-09-11 DIAGNOSIS — Z63.8 STRESS DUE TO FAMILY TENSION: ICD-10-CM

## 2018-09-11 DIAGNOSIS — I25.9 CHRONIC ISCHEMIC HEART DISEASE: ICD-10-CM

## 2018-09-11 DIAGNOSIS — Z00.00 MEDICARE ANNUAL WELLNESS VISIT, SUBSEQUENT: ICD-10-CM

## 2018-09-11 DIAGNOSIS — I25.10 ATHEROSCLEROSIS OF NATIVE CORONARY ARTERY OF NATIVE HEART WITHOUT ANGINA PECTORIS: ICD-10-CM

## 2018-09-11 DIAGNOSIS — Z51.81 ENCOUNTER FOR MEDICATION MONITORING: ICD-10-CM

## 2018-09-11 DIAGNOSIS — F41.9 CHRONIC ANXIETY: ICD-10-CM

## 2018-09-11 DIAGNOSIS — I50.32 CHRONIC DIASTOLIC HEART FAILURE (HCC): ICD-10-CM

## 2018-09-11 DIAGNOSIS — E78.2 MIXED HYPERLIPIDEMIA: ICD-10-CM

## 2018-09-11 DIAGNOSIS — Z23 ENCOUNTER FOR IMMUNIZATION: ICD-10-CM

## 2018-09-11 PROCEDURE — 36415 COLL VENOUS BLD VENIPUNCTURE: CPT

## 2018-09-11 PROCEDURE — 80053 COMPREHEN METABOLIC PANEL: CPT

## 2018-09-11 PROCEDURE — 85027 COMPLETE CBC AUTOMATED: CPT

## 2018-09-11 PROCEDURE — 80061 LIPID PANEL: CPT

## 2018-09-11 SDOH — SOCIAL STABILITY - SOCIAL INSECURITY: OTHER SPECIFIED PROBLEMS RELATED TO PRIMARY SUPPORT GROUP: Z63.8

## 2018-09-11 NOTE — PROGRESS NOTES
HISTORY OF PRESENT ILLNESS  Heather Zaman is a 82 y.o. female. HPI   Follow up on chronic medical problems. Overall she has been feeling well. Cardiovascular Review:  The patient has hypertension, hyperlipidemia, Chronic diastolic heart failure, ASCHD and obesity. Had eval for her SOB and told d/t aortic stenosis. Diet and Lifestyle: generally follows a low fat low cholesterol diet, generally follows a low sodium diet, sedentary. Pertinent ROS: taking medications as instructed, no medication side effects noted, no TIA's, no chest pain on exertion, mild swelling of ankles, no orthostatic dizziness or lightheadedness, no palpitations, no muscle aches or pain. Home BP Monitoring: is not measured at home. Osteoarthritis:  Patient has osteoarthritis. Overall doing better with back pain. Decided not to go thru with the back surgery just yet. Still having knee pain. Aching more in the right knee and increase pain with walking. Has had 2 injections in the knee which has not helped very much. Pain is 5-6/10. Taking tylenol for the pain which helps some. Symptoms onset: problem is longstanding. Rheumatological ROS: stable, mild-to-moderate joint symptoms intermittently, reasonably well controlled by PRN meds. Response to treatment plan: stable and intermittent. Anxiety Review:  Patient is seen for anxiety. Ongoing symptoms include: increased anxiety and feeling upset d/t family issues with her dtg and grandchildren. Her dtg has been sick and her [address] old granddtg has been belligerent and not going to school. Her 29year old grandson had to move back in with her. We had a long discussion about setting boundaries for her family so that it is not so upsetting for her. Patient denies: palpitations, sweating, chest pain, shortness of breath. Reported side effects from the treatment: none.     Patient Active Problem List   Diagnosis Code    Hypokalemia E87.6    Hiatal hernia K44.9    Anxiety F41.9    S/P hysterectomy Z90.710    Spinal stenosis M48.00    S/P foot surgery Z98.890    Environmental allergies Z91.09    S/P cardiac catheterization Z98.890    Hypovitaminosis D E55.9    Chronic ischemic heart disease I25.9    Mixed hyperlipidemia E78.2    Chronic diastolic heart failure (HCC) I50.32    Chest pain, unspecified R07.9    Chest pain at rest R07.9    Bifascicular bundle branch block--RBBB,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    Atypical chest pain R07.89    Essential hypertension I10    Gastroesophageal reflux disease without esophagitis K21.9    Atherosclerosis of native coronary artery without angina pectoris--diffuse small vsl disease via cath cath 2009--RCA PDA/ROSA I25.10    Chronic anxiety F41.9    Encounter for medication monitoring Z51.81    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 9) with comorbidity (HCC) E66.01       Current Outpatient Prescriptions   Medication Sig Dispense Refill    clonazePAM (KLONOPIN) 1 mg tablet TAKE1/2 TO 1 TAB BY MOUTH EVERY MORNING AS NEEDED ANXIETY & TAKE 1 TAB BY MOUTH AT BEDTIME FOR SLEEP 60 Tab 3    simethicone (GAS RELIEF) 80 mg chewable tablet Take 80 mg by mouth every six (6) hours as needed for Flatulence.  diclofenac (VOLTAREN) 1 % gel Apply 2 g to affected area four (4) times daily. 100 g 3    nitroglycerin (NITROSTAT) 0.4 mg SL tablet TAKE 1 TAB BY SUBLINGUAL ROUTE EVERY 5 MINUTES AS NEEDED. 30 Tab 1    metoprolol tartrate (LOPRESSOR) 25 mg tablet TAKE 1 TABLET BY MOUTH TWICE A DAY (Patient taking differently: 25 mg. TAKE 1 TABLET BY MOUTH TWICE A DAY) 60 Tab 11    pantoprazole (PROTONIX) 40 mg tablet Take 1 Tab by mouth daily.  Take 30 minutes prior to breakfast. 30 Tab 6  potassium chloride SR (KLOR-CON 10) 10 mEq tablet TAKE 3 TABLETS IN THE MORNING AND 2 TABLETS IN THE EVENING BY MOUTH EVERY  Tab 3    rosuvastatin (CRESTOR) 10 mg tablet Take 1 Tab by mouth nightly. 30 Tab 11    amLODIPine (NORVASC) 10 mg tablet TAKE 1/2 TABLET BY MOUTH TWICE A DAY 30 Tab 6    losartan (COZAAR) 100 mg tablet TAKE 1 TABLET BY MOUTH EVERY DAY 90 Tab 3    furosemide (LASIX) 80 mg tablet TAKE 1 TABLET BY MOUTH EVERY MORNING AND TAKE 1/2 TABLET EVERY EVENING 135 Tab 3    hydrocortisone valerate (WEST-STEFFANIE) 0.2 % ointment APPLY TO AFFECTED AREA (FACE) TWICE A DAY AS NEEDED  1    mometasone (ELOCON) 0.1 % ointment APPLY TO AFFECTED AREA (SCALP) EVERY DAY AS NEEDED  2    isosorbide mononitrate ER (IMDUR) 60 mg CR tablet Take 1/2 tablet daily      cholecalciferol, vitamin D3, (VITAMIN D3) 2,000 unit tab Take 1 Tab by mouth daily.  acetaminophen (TYLENOL EXTRA STRENGTH) 500 mg tablet Take 1,000 mg by mouth every six (6) hours as needed for Pain.  Aspirin, Buffered 81 mg tab Take 81 mg by mouth daily.          Allergies   Allergen Reactions    Bactrim [Sulfamethoxazole-Trimethoprim] Unknown (comments)     Pt does not recall    Darvocet A500 [Propoxyphene N-Acetaminophen] Itching         Past Medical History:   Diagnosis Date    Anxiety     Aortic stenosis 3/3/2010    Aortic stenosis     Arrhythmia     MURMUR    Arthritis     SPINAL STENOSIS    Atherosclerosis of coronary artery     Biliary dyskinesia     CHF (congestive heart failure) ([facility] 3/3/2010    CHF (congestive heart failure) ([facility] 01/2002    Chronic heart failure ([facility] 1/2002; 3/3/2010    chronic diastolic heart failure    Chronic pain     LOWER BACK, RIGHT KNEE    Environmental allergies 3/3/2010    GERD (gastroesophageal reflux disease) 3/3/2010    Hiatal hernia 3/3/2010    High cholesterol 3/3/2010    HTN (hypertension) 3/3/2010    Hypokalemia 3/3/2010    Ischemic heart disease, chronic     Obese     Psychiatric disorder     anxiety    S/P hysterectomy 3/3/2010    Spinal stenosis 3/3/2010         Past Surgical History:   Procedure Laterality Date    CARDIAC CATHETERIZATION  01/2002    normal coronaries    DECOMPRESS DISC RF LUMBAR  07/2007    HX BACK SURGERY  5/1/2014    HX BREAST LUMPECTOMY Left 1989    benign left breast    HX BUNIONECTOMY      HX BUNIONECTOMY      HX HEART CATHETERIZATION  3/3/10    HX HYSTERECTOMY  1978    HX LUMBAR DISKECTOMY      HX ORTHOPAEDIC Bilateral     BUNIONECTOMY    HX ORTHOPAEDIC Right     WRIST FX    HX OTHER SURGICAL  10/12/2015    mole removed from right side of face by Dr. Helene Espana FLX DX W/COLLJ [facility] WHEN PFRMD  [id number]    Dr Nicole Ramirez         Family History   Problem Relation Age of Onset    Hypertension Mother     Heart Disease Father     Stroke Sister     Heart Disease Sister     Cancer Brother      LUNG    Cancer Brother      PROSTATE    Hypertension Brother     No Known Problems Brother     Lung Disease Brother     No Known Problems Brother     No Known Problems Brother     Stroke Daughter 47    No Known Problems Daughter     Anesth Problems Neg Hx        Social History   Substance Use Topics    Smoking status: Never Smoker    Smokeless tobacco: Never Used    Alcohol use No        Lab Results   Component Value Date/Time    WBC 5.3 03/07/2018 12:21 PM    HGB 13.5 03/07/2018 12:21 PM    HCT 42.1 03/07/2018 12:21 PM    PLATELET 494 02/72/6712 12:21 PM    MCV 88.6 03/07/2018 12:21 PM     Lab Results   Component Value Date/Time    Cholesterol, total 193 05/30/2018 10:51 AM    HDL Cholesterol 57 05/30/2018 10:51 AM    LDL, calculated 109 (H) 05/30/2018 10:51 AM    Triglyceride 136 05/30/2018 10:51 AM    CHOL/HDL Ratio 3.1 11/01/2010 05:26 PM     Lab Results   Component Value Date/Time    Sodium 143 05/30/2018 10:51 AM    Potassium 3.4 (L) 05/30/2018 10:51 AM    Chloride 98 05/30/2018 10:51 AM    CO2 29 05/30/2018 10:51 AM    Anion gap 6 03/07/2018 12:21 PM    Glucose 93 05/30/2018 10:51 AM    BUN 13 05/30/2018 10:51 AM    Creatinine 1.04 (H) 05/30/2018 10:51 AM    BUN/Creatinine ratio 13 05/30/2018 10:51 AM    GFR est AA 59 (L) 05/30/2018 10:51 AM    GFR est non-AA 51 (L) 05/30/2018 10:51 AM    Calcium 9.7 05/30/2018 10:51 AM    Bilirubin, total 0.5 03/07/2018 12:21 PM    ALT (SGPT) 19 03/07/2018 12:21 PM    AST (SGOT) 20 03/07/2018 12:21 PM    Alk. phosphatase 99 03/07/2018 12:21 PM    Protein, total 8.2 03/07/2018 12:21 PM    Albumin 3.4 (L) 03/07/2018 12:21 PM    Globulin 4.8 (H) 03/07/2018 12:21 PM    A-G Ratio 0.7 (L) 03/07/2018 12:21 PM      Lab Results   Component Value Date/Time    Hemoglobin A1c 5.4 04/16/2014 12:20 PM    Hemoglobin A1c (POC) 5.5 11/26/2014 12:12 PM      Review of Systems   Constitutional: Negative for malaise/fatigue. HENT: Negative for congestion. Eyes: Negative for blurred vision. Respiratory: Negative for cough and shortness of breath. Cardiovascular: Negative for chest pain, palpitations and leg swelling. Gastrointestinal: Negative for abdominal pain, constipation and heartburn. Genitourinary: Negative for dysuria, frequency and urgency. Neurological: Negative for dizziness, tingling and headaches. Endo/Heme/Allergies: Negative for environmental allergies. Psychiatric/Behavioral: Negative for depression. The patient is nervous/anxious and has insomnia. Physical Exam   Constitutional: She appears well-developed and well-nourished. /71 (BP 1 Location: Left arm, BP Patient Position: Sitting)  Pulse 74  Temp 96.7 °F (35.9 °C) (Oral)   Resp 18  Ht 5' 1.5\" (1.562 m)  Wt 192 lb 3.2 oz (87.2 kg)  LMP  (LMP Unknown)  SpO2 94%  BMI 35.73 kg/m2    HENT:   Right Ear: Tympanic membrane and ear canal normal.   Left Ear: Tympanic membrane and ear canal normal.   Nose: No mucosal edema or rhinorrhea.    Mouth/Throat: Oropharynx is clear and moist and mucous membranes are normal.   Neck: Normal range of motion. Neck supple. No thyromegaly present. Cardiovascular: Normal rate and regular rhythm. No murmur heard. Pulmonary/Chest: Effort normal and breath sounds normal.   Abdominal: Soft. Bowel sounds are normal. There is no tenderness. Musculoskeletal: Normal range of motion. She exhibits edema (trace). Right knee: She exhibits normal range of motion, no swelling and no effusion. Lymphadenopathy:     She has no cervical adenopathy. Skin: Skin is warm and dry. Psychiatric: She has a normal mood and affect. Nursing note and vitals reviewed. ASSESSMENT and PLAN  Diagnoses and all orders for this visit:    1. Essential hypertension  Stable at goal.      2. Mixed hyperlipidemia  -     LIPID PANEL    3. Chronic diastolic heart failure (HCC)//  4. Atherosclerosis of native coronary artery of native heart without angina pectoris  5. Chronic ischemic heart disease  Stable. Has follow up with cardiology for November. 6. Chronic anxiety//  7. Stress due to family tension  As above    8. Encounter for medication monitoring  -     CBC W/O DIFF  -     METABOLIC PANEL, COMPREHENSIVE    9. Encounter for immunization  -     Influenza Vaccine Inactivated (IIV) (FLUAD), Subunit, Adjuvanted, IM ([id number])  -     MO IMMUNIZ ADMIN,1 SINGLE/COMB VAC/TOXOID      Follow-up Disposition:  Return in about 3 months (around 12/11/2018). reviewed diet, exercise and weight control  cardiovascular risk and specific lipid/LDL goals reviewed  reviewed medications and side effects in detail    I have discussed diagnosis listed in this note with pt and/or family. I have discussed treatment plans and options and the risk/benefit analysis of those options, including safe use of medications and possible medication side effects. Through the use of shared decision making we have agreed to the above plan.  The patient has received an after-visit summary and questions were answered concerning future plans and follow up. Advise pt of any urgent changes then to proceed to the ER.

## 2018-09-11 NOTE — MR AVS SNAPSHOT
[address] [facility] 
 
 
 [address] [facility] [address] [address] [id number] 
[phone] Patient: Pradeep Mai MRN: [id number] :[id number] Visit Information Date & Time Provider Department Dept. Phone Encounter #  
 2018 10:45 AM Angelica Antunez MD [facility] [phone] [id number] Follow-up Instructions Return in about 3 months (around 2018). Upcoming Health Maintenance Date Due Bone Densitometry (Dexa) Screening 2002 MEDICARE YEARLY EXAM 3/29/2018 GLAUCOMA SCREENING Q2Y 2019 COLONOSCOPY 10/13/2020 DTaP/Tdap/Td series (2 - Td) 2026 Allergies as of 2018  Review Complete On: 2018 By: Polo Navarrete LPN Severity Noted Reaction Type Reactions Bactrim [Sulfamethoxazole-trimethoprim]  2010    Unknown (comments) Pt does not recall Darvocet A500 [Propoxyphene N-acetaminophen]  2010    Itching Current Immunizations  Reviewed on 2018 Name Date Influenza High Dose Vaccine PF 2017, 2016, 10/13/2015, 2014 Influenza Vaccine 2013 Influenza Vaccine (Tri) Adjuvanted 2018 Influenza Vaccine Split 10/9/2012, 2011, 2010 Pneumococcal Conjugate (PCV-13) 2017 Pneumococcal Vaccine (Unspecified Type) 2008 Reviewed by Angelica Antunez MD on 2018 at  7:45 AM  
You Were Diagnosed With   
  
 Codes Comments Essential hypertension    -  Primary ICD-10-CM: I10 
ICD-9-CM: 401.9 Mixed hyperlipidemia     ICD-10-CM: E78.2 ICD-9-CM: 272.2 Chronic diastolic heart failure (HCC)     ICD-10-CM: I50.32 
ICD-9-CM: 428.32 Atherosclerosis of native coronary artery of native heart without angina pectoris     ICD-10-CM: I25.10 ICD-9-CM: 414.01 Chronic ischemic heart disease     ICD-10-CM: I25.9 ICD-9-CM: 414.9 Chronic anxiety     ICD-10-CM: F41.9 ICD-9-CM: 300.00 Encounter for medication monitoring     ICD-10-CM: Z51.81 
ICD-9-CM: V58.83 Encounter for immunization     ICD-10-CM: P00 ICD-9-CM: V03.89 Vitals BP Pulse Temp Resp Height(growth percentile) Weight(growth percentile) 115/71 (BP 1 Location: Left arm, BP Patient Position: Sitting) 74 96.7 °F (35.9 °C) (Oral) 18 5' 1.5\" (1.562 m) 192 lb 3.2 oz (87.2 kg) LMP SpO2 BMI OB Status Smoking Status (LMP Unknown) 94% 35.73 kg/m2 Hysterectomy Never Smoker Vitals History BMI and BSA Data Body Mass Index Body Surface Area 35.73 kg/m 2 1.95 m 2 Preferred Pharmacy Pharmacy Name Phone [id number]- [place], [place] - [address] [phone] Your Updated Medication List  
  
   
This list is accurate as of 9/11/18 11:51 AM.  Always use your most recent med list. amLODIPine 10 mg tablet Commonly known as:  Joice Conine TAKE 1/2 TABLET BY MOUTH TWICE A DAY  
  
 aspirin, buffered 81 mg Tab Take 81 mg by mouth daily. clonazePAM 1 mg tablet Commonly known as:  KlonoPIN  
TAKE1/2 TO 1 TAB BY MOUTH EVERY MORNING AS NEEDED ANXIETY & TAKE 1 TAB BY MOUTH AT BEDTIME FOR SLEEP  
  
 diclofenac 1 % Gel Commonly known as:  VOLTAREN Apply 2 g to affected area four (4) times daily. furosemide 80 mg tablet Commonly known as:  LASIX TAKE 1 TABLET BY MOUTH EVERY MORNING AND TAKE 1/2 TABLET EVERY EVENING  
  
 GAS RELIEF 80 mg chewable tablet Generic drug:  simethicone Take 80 mg by mouth every six (6) hours as needed for Flatulence. hydrocortisone valerate 0.2 % ointment Commonly known as:  WEST-STEFFANIE  
APPLY TO AFFECTED AREA (FACE) TWICE A DAY AS NEEDED  
  
 isosorbide mononitrate ER 60 mg CR tablet Commonly known as:  IMDUR Take 1/2 tablet daily  
  
 losartan 100 mg tablet Commonly known as:  COZAAR  
TAKE 1 TABLET BY MOUTH EVERY DAY  
  
 metoprolol tartrate 25 mg tablet Commonly known as:  LOPRESSOR  
TAKE 1 TABLET BY MOUTH TWICE A DAY  
  
 mometasone 0.1 % ointment Commonly known as:  ELOCON  
APPLY TO AFFECTED AREA (SCALP) EVERY DAY AS NEEDED  
  
 nitroglycerin 0.4 mg SL tablet Commonly known as:  NITROSTAT  
TAKE 1 TAB BY SUBLINGUAL ROUTE EVERY 5 MINUTES AS NEEDED. pantoprazole 40 mg tablet Commonly known as:  PROTONIX Take 1 Tab by mouth daily. Take 30 minutes prior to breakfast.  
  
 potassium chloride SR 10 mEq tablet Commonly known as:  KLOR-CON 10  
TAKE 3 TABLETS IN THE MORNING AND 2 TABLETS IN THE EVENING BY MOUTH EVERY DAY  
  
 rosuvastatin 10 mg tablet Commonly known as:  CRESTOR Take 1 Tab by mouth nightly. TYLENOL EXTRA STRENGTH 500 mg tablet Generic drug:  acetaminophen Take 1,000 mg by mouth every six (6) hours as needed for Pain. VITAMIN D3 2,000 unit Tab Generic drug:  cholecalciferol (vitamin D3) Take 1 Tab by mouth daily. We Performed the Following CBC W/O DIFF [35050 CPT(R)] INFLUENZA VACCINE INACTIVATED (IIV), SUBUNIT, ADJUVANTED, IM [id number] CPT(R)] LIPID PANEL [21152 CPT(R)] METABOLIC PANEL, COMPREHENSIVE [22693 CPT(R)] [place] IMMUNIZ ADMIN,1 SINGLE/COMB VAC/TOXOID [id number] CPT(R)] Follow-up Instructions Return in about 3 months (around 12/11/2018). Introducing [facility]! Dear Elwood: 
Thank you for requesting a [employer] account. Our records indicate that you already have an active [employer] account. You can access your account anytime at [email or web address] Did you know that you can access your hospital and ER discharge instructions at any time in [employer]? You can also review all of your test results from your hospital stay or ER visit. Additional Information If you have questions, please visit the Frequently Asked Questions section of the [employer] website at [email or web address] Remember, [employer] is NOT to be used for urgent needs. For medical emergencies, dial 911. Now available from your iPhone and Android! Please provide this summary of care documentation to your next provider. Your primary care clinician is listed as Osmani Landa. If you have any questions after today's visit, please call [phone].

## 2018-09-11 NOTE — PROGRESS NOTES
Chief Complaint   Patient presents with    Hypertension     Follow-up   1. Have you been to the ER, urgent care clinic since your last visit? Hospitalized since your last visit? No    2. Have you seen or consulted any other health care providers outside of the [facility] since your last visit? Include any pap smears or colon screening. No   Verbal order for flu vaccine per Dr Ashley Livingston    She denies any symptoms , reactions or allergies that would exclude them from being immunized today. Risks and adverse reactions were discussed and the VIS was given to them. All questions were addressed. She was observed for 15 min post injection. There were no reactions observed.     Lakesha Joiner LPN

## 2018-09-11 NOTE — PROGRESS NOTES
This is a Subsequent Medicare Annual Wellness Visit providing Personalized Prevention Plan Services (PPPS) (Performed 12 months after initial AWV and PPPS )    I have reviewed the patient's medical history in detail and updated the computerized patient record.      History     Past Medical History:   Diagnosis Date    Anxiety     Aortic stenosis 3/3/2010    Aortic stenosis     Arrhythmia     MURMUR    Arthritis     SPINAL STENOSIS    Atherosclerosis of coronary artery     Biliary dyskinesia     CHF (congestive heart failure) ([address] 3/3/2010    CHF (congestive heart failure) ([address] 01/2002    Chronic heart failure ([address] 1/2002; 3/3/2010    chronic diastolic heart failure    Chronic pain     LOWER BACK, RIGHT KNEE    Environmental allergies 3/3/2010    GERD (gastroesophageal reflux disease) 3/3/2010    Hiatal hernia 3/3/2010    High cholesterol 3/3/2010    HTN (hypertension) 3/3/2010    Hypokalemia 3/3/2010    Ischemic heart disease, chronic     Obese     Psychiatric disorder     anxiety    S/P hysterectomy 3/3/2010    Spinal stenosis 3/3/2010      Past Surgical History:   Procedure Laterality Date    CARDIAC CATHETERIZATION  01/2002    normal coronaries    DECOMPRESS DISC RF LUMBAR  07/2007    HX BACK SURGERY  5/1/2014    HX BREAST LUMPECTOMY Left 1989    benign left breast    HX BUNIONECTOMY      HX BUNIONECTOMY      HX HEART CATHETERIZATION  3/3/10    HX HYSTERECTOMY  1978    HX LUMBAR DISKECTOMY      HX ORTHOPAEDIC Bilateral     BUNIONECTOMY    HX ORTHOPAEDIC Right     WRIST FX    HX OTHER SURGICAL  10/12/2015    mole removed from right side of face by Dr. Dorethia Closs FLX DX Singh James PFRMD  [id number]    Dr Xavier Gibson     Current Outpatient Prescriptions   Medication Sig Dispense Refill    clonazePAM (KLONOPIN) 1 mg tablet TAKE1/2 TO 1 TAB BY MOUTH EVERY MORNING AS NEEDED ANXIETY & TAKE 1 TAB BY MOUTH AT BEDTIME FOR SLEEP 60 Tab 3    simethicone (GAS RELIEF) 80 mg chewable tablet Take 80 mg by mouth every six (6) hours as needed for Flatulence.  diclofenac (VOLTAREN) 1 % gel Apply 2 g to affected area four (4) times daily. 100 g 3    nitroglycerin (NITROSTAT) 0.4 mg SL tablet TAKE 1 TAB BY SUBLINGUAL ROUTE EVERY 5 MINUTES AS NEEDED. 30 Tab 1    metoprolol tartrate (LOPRESSOR) 25 mg tablet TAKE 1 TABLET BY MOUTH TWICE A DAY (Patient taking differently: 25 mg. TAKE 1 TABLET BY MOUTH TWICE A DAY) 60 Tab 11    pantoprazole (PROTONIX) 40 mg tablet Take 1 Tab by mouth daily. Take 30 minutes prior to breakfast. 30 Tab 6    potassium chloride SR (KLOR-CON 10) 10 mEq tablet TAKE 3 TABLETS IN THE MORNING AND 2 TABLETS IN THE EVENING BY MOUTH EVERY  Tab 3    rosuvastatin (CRESTOR) 10 mg tablet Take 1 Tab by mouth nightly. 30 Tab 11    amLODIPine (NORVASC) 10 mg tablet TAKE 1/2 TABLET BY MOUTH TWICE A DAY 30 Tab 6    losartan (COZAAR) 100 mg tablet TAKE 1 TABLET BY MOUTH EVERY DAY 90 Tab 3    furosemide (LASIX) 80 mg tablet TAKE 1 TABLET BY MOUTH EVERY MORNING AND TAKE 1/2 TABLET EVERY EVENING 135 Tab 3    hydrocortisone valerate (WEST-STEFFANIE) 0.2 % ointment APPLY TO AFFECTED AREA (FACE) TWICE A DAY AS NEEDED  1    mometasone (ELOCON) 0.1 % ointment APPLY TO AFFECTED AREA (SCALP) EVERY DAY AS NEEDED  2    isosorbide mononitrate ER (IMDUR) 60 mg CR tablet Take 1/2 tablet daily      cholecalciferol, vitamin D3, (VITAMIN D3) 2,000 unit tab Take 1 Tab by mouth daily.  acetaminophen (TYLENOL EXTRA STRENGTH) 500 mg tablet Take 1,000 mg by mouth every six (6) hours as needed for Pain.  Aspirin, Buffered 81 mg tab Take 81 mg by mouth daily.        Allergies   Allergen Reactions    Bactrim [Sulfamethoxazole-Trimethoprim] Unknown (comments)     Pt does not recall    Darvocet A500 [Propoxyphene N-Acetaminophen] Itching     Family History   Problem Relation Age of Onset    Hypertension Mother     Heart Disease Father     Stroke Sister     Heart Disease Sister  Cancer Brother      LUNG    Cancer Brother      PROSTATE    Hypertension Brother     No Known Problems Brother     Lung Disease Brother     No Known Problems Brother     No Known Problems Brother     Stroke Daughter 47    No Known Problems Daughter     Anesth Problems Neg Hx      Social History   Substance Use Topics    Smoking status: Never Smoker    Smokeless tobacco: Never Used    Alcohol use No     Patient Active Problem List   Diagnosis Code    Hypokalemia E87.6    Hiatal hernia K44.9    Anxiety F41.9    S/P hysterectomy Z90.710    Spinal stenosis M48.00    S/P foot surgery Z98.890    Environmental allergies Z91.09    S/P cardiac catheterization Z98.890    Hypovitaminosis D E55.9    Chronic ischemic heart disease I25.9    Mixed hyperlipidemia E78.2    Chronic diastolic heart failure (HCC) I50.32    Chest pain, unspecified R07.9    Chest pain at rest R07.9    Bifascicular bundle branch block--RBBB,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    Atypical chest pain R07.89    Essential hypertension I10    Gastroesophageal reflux disease without esophagitis K21.9    Atherosclerosis of native coronary artery without angina pectoris--diffuse small vsl disease via cath cath 2009--RCA PDA/ROSA I25.10    Chronic anxiety F41.9    Encounter for medication monitoring Z51.81    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 9) with comorbidity ([facility] E66.01       Depression Risk Factor Screening:     PHQ over the last two weeks 9/11/2018   Little interest or pleasure in doing things Not at all   Feeling down, depressed, irritable, or hopeless Not at all   Total Score PHQ 2 0     Alcohol Risk Factor Screening:    On any occasion during the past 3 months, have you had more than 3 drinks containing alcohol? No    Do you average more than 7 drinks per week? No      Functional Ability and Level of Safety:     Functional Ability:   Does the patient exhibit a steady gait? No ambulates with a cance. How long did it take the patient to get up and walk from a sitting position? Several seconds    Is the patient self reliant? (ie can do own laundry, meals, household chores)  yes   Does the patient handle his/her own medications? yes   Does the patient handle his/her own money? yes   Is the patients home safe (ie good lighting, handrails on stairs and bath, etc.)? Yes has grab bars in there bathroom   Did you notice or did patient express any hearing difficulties? no   Did you notice or did patient express any vision difficulties?  no   Were distance and reading eye charts used? no     Advance Care Planning:   Patient was offered the opportunity to discuss advance care planning:  yes    Does patient have an Advance Directive:  no    If no, did you provide information on Caring Connections? yes        Hearing Loss   none    Activities of Daily Living   Self-care. Requires assistance with: no ADLs    Fall Risk     Fall Risk Assessment, last 12 mths 9/11/2018   Able to walk? Yes   Fall in past 12 months? No   Fall with injury? -   Number of falls in past 12 months -   Fall Risk Score -     Abuse Screen   Patient is not abused      Advice/Referrals/Counseling   Education and counseling provided:  Are appropriate based on today's review and evaluation  End-of-Life planning (with patient's consent)      Assessment/Plan   Macarena Stanton was seen today for annual wellness visit.     Diagnoses and all orders for this visit:    Medicare annual wellness visit, subsequent

## 2018-09-12 LAB
ALBUMIN SERPL-MCNC: 4.1 G/DL (ref 3.5–4.7)
ALBUMIN/GLOB SERPL: 1.2 {RATIO} (ref 1.2–2.2)
ALP SERPL-CCNC: 91 IU/L (ref 39–117)
ALT SERPL-CCNC: 14 IU/L (ref 0–32)
AST SERPL-CCNC: 17 IU/L (ref 0–40)
BILIRUB SERPL-MCNC: 0.6 MG/DL (ref 0–1.2)
BUN SERPL-MCNC: 13 MG/DL (ref 8–27)
BUN/CREAT SERPL: 11 (ref 12–28)
CALCIUM SERPL-MCNC: 9.7 MG/DL (ref 8.7–10.3)
CHLORIDE SERPL-SCNC: 101 MMOL/L (ref 96–106)
CHOLEST SERPL-MCNC: 181 MG/DL (ref 100–199)
CO2 SERPL-SCNC: 28 MMOL/L (ref 20–29)
CREAT SERPL-MCNC: 1.19 MG/DL (ref 0.57–1)
ERYTHROCYTE [DISTWIDTH] IN BLOOD BY AUTOMATED COUNT: 14.3 % (ref 12.3–15.4)
GLOBULIN SER CALC-MCNC: 3.3 G/DL (ref 1.5–4.5)
GLUCOSE SERPL-MCNC: 95 MG/DL (ref 65–99)
HCT VFR BLD AUTO: 41.5 % (ref 34–46.6)
HDLC SERPL-MCNC: 55 MG/DL
HGB BLD-MCNC: 12.9 G/DL (ref 11.1–15.9)
INTERPRETATION, 910389: NORMAL
INTERPRETATION: NORMAL
LDLC SERPL CALC-MCNC: 97 MG/DL (ref 0–99)
MCH RBC QN AUTO: 28.7 PG (ref 26.6–33)
MCHC RBC AUTO-ENTMCNC: 31.1 G/DL (ref 31.5–35.7)
MCV RBC AUTO: 92 FL (ref 79–97)
PDF IMAGE, 910387: NORMAL
PLATELET # BLD AUTO: 211 X10E3/UL (ref 150–379)
POTASSIUM SERPL-SCNC: 4.5 MMOL/L (ref 3.5–5.2)
PROT SERPL-MCNC: 7.4 G/DL (ref 6–8.5)
RBC # BLD AUTO: 4.49 X10E6/UL (ref 3.77–5.28)
SODIUM SERPL-SCNC: 144 MMOL/L (ref 134–144)
TRIGL SERPL-MCNC: 146 MG/DL (ref 0–149)
VLDLC SERPL CALC-MCNC: 29 MG/DL (ref 5–40)
WBC # BLD AUTO: 6.6 X10E3/UL (ref 3.4–10.8)

## 2018-09-19 DIAGNOSIS — K21.9 GASTROESOPHAGEAL REFLUX DISEASE WITHOUT ESOPHAGITIS: ICD-10-CM

## 2018-09-19 RX ORDER — PANTOPRAZOLE SODIUM 40 MG/1
40 TABLET, DELAYED RELEASE ORAL DAILY
Qty: 90 TAB | Refills: 3 | Status: SHIPPED | OUTPATIENT
Start: 2018-09-19 | End: 2019-02-11 | Stop reason: DRUGHIGH

## 2018-09-19 NOTE — TELEPHONE ENCOUNTER
Last Visit: 9/1142-[id number]-EyrqifNba Salgado Appt: None  Previous Refill Encounter: 2/12-30+5    Requested Prescriptions     Pending Prescriptions Disp Refills    pantoprazole (PROTONIX) 40 mg tablet 90 Tab 3     Sig: Take 1 Tab by mouth daily.  Take 30 minutes prior to breakfast.

## 2018-10-22 RX ORDER — AMLODIPINE BESYLATE 10 MG/1
TABLET ORAL
Qty: 14 TAB | Refills: 0 | Status: SHIPPED | OUTPATIENT
Start: 2018-10-22 | End: 2018-11-12 | Stop reason: SDUPTHER

## 2018-10-22 NOTE — TELEPHONE ENCOUNTER
[facility] pt    Spoke with patient. Verified patient with two patient identifiers. Advised was due back in June 2018. States she will call for appt. Two week supply given until seen. Patient verbalized understanding.

## 2018-10-23 DIAGNOSIS — F41.9 ANXIETY: Chronic | ICD-10-CM

## 2018-10-23 RX ORDER — CLONAZEPAM 1 MG/1
TABLET ORAL
Qty: 60 TAB | Refills: 3 | OUTPATIENT
Start: 2018-10-23 | End: 2019-03-05 | Stop reason: SDUPTHER

## 2018-10-23 NOTE — TELEPHONE ENCOUNTER
Last Visit: 9/11  Next Appt: 10/30  Previous Refill Encounter: 6/18-60+3    Requested Prescriptions     Pending Prescriptions Disp Refills    clonazePAM (KLONOPIN) 1 mg tablet 60 Tab 3     Sig: TAKE1/2 TO 1 TAB BY MOUTH EVERY MORNING AS NEEDED ANXIETY & TAKE 1 TAB BY MOUTH AT BEDTIME FOR SLEEP

## 2018-10-25 DIAGNOSIS — R07.9 CHEST PAIN, UNSPECIFIED TYPE: ICD-10-CM

## 2018-10-25 RX ORDER — NITROGLYCERIN 0.4 MG/1
TABLET SUBLINGUAL
Qty: 30 TAB | Refills: 1 | Status: SHIPPED | OUTPATIENT
Start: 2018-10-25 | End: 2018-12-13 | Stop reason: SDUPTHER

## 2018-10-25 NOTE — TELEPHONE ENCOUNTER
Last Visit: 9/11  Next Appt: 10/30  Previous Refill Encounter: 5/21-25+1    Requested Prescriptions     Pending Prescriptions Disp Refills    nitroglycerin (NITROSTAT) 0.4 mg SL tablet 30 Tab 1     Sig: TAKE 1 TAB BY SUBLINGUAL ROUTE EVERY 5 MINUTES AS NEEDED.

## 2018-10-30 ENCOUNTER — OFFICE VISIT (OUTPATIENT)
Dept: CARDIOLOGY CLINIC | Age: 83
End: 2018-10-30

## 2018-10-30 VITALS
DIASTOLIC BLOOD PRESSURE: 95 MMHG | BODY MASS INDEX: 34.41 KG/M2 | HEART RATE: 68 BPM | WEIGHT: 187 LBS | RESPIRATION RATE: 20 BRPM | HEIGHT: 62 IN | OXYGEN SATURATION: 96 % | SYSTOLIC BLOOD PRESSURE: 140 MMHG

## 2018-10-30 DIAGNOSIS — I35.0 NONRHEUMATIC AORTIC VALVE STENOSIS: ICD-10-CM

## 2018-10-30 DIAGNOSIS — I35.0 AORTIC STENOSIS, MILD: ICD-10-CM

## 2018-10-30 DIAGNOSIS — N18.30 CKD (CHRONIC KIDNEY DISEASE) STAGE 3, GFR 30-59 ML/MIN (HCC): ICD-10-CM

## 2018-10-30 DIAGNOSIS — E66.01 SEVERE OBESITY (BMI 35.0-39.9) WITH COMORBIDITY (HCC): ICD-10-CM

## 2018-10-30 DIAGNOSIS — K21.9 GASTROESOPHAGEAL REFLUX DISEASE WITHOUT ESOPHAGITIS: Chronic | ICD-10-CM

## 2018-10-30 DIAGNOSIS — I10 ESSENTIAL HYPERTENSION: Primary | ICD-10-CM

## 2018-10-30 DIAGNOSIS — F41.9 CHRONIC ANXIETY: ICD-10-CM

## 2018-10-30 DIAGNOSIS — I49.8 PAROXYSMAL CARDIAC ARRHYTHMIA: ICD-10-CM

## 2018-10-30 DIAGNOSIS — I25.10 ATHEROSCLEROSIS OF NATIVE CORONARY ARTERY OF NATIVE HEART WITHOUT ANGINA PECTORIS: ICD-10-CM

## 2018-10-30 DIAGNOSIS — E78.2 MIXED HYPERLIPIDEMIA: ICD-10-CM

## 2018-10-30 DIAGNOSIS — I50.32 CHRONIC DIASTOLIC HEART FAILURE (HCC): ICD-10-CM

## 2018-10-30 NOTE — PROGRESS NOTES
[facility]   [address] [facility], [address] [place] [address] Road                                          NEW PATIENT HPI/FOLLOW-UP      NAME:  Swapna Ortiz   :   1935   MRN:   [id number]   PCP:  Rossy Rosas MD           Subjective: The patient is a 82y.o. year old female  who returns for a routine follow-up. Since the last visit, patient reports no new symptoms except for family stress. Caring for 4  Grandchildren ages 14(twins) to 29. Here primarily to fill Amlodipine Rx. Denies change in exercise tolerance, chest pain, edema, medication intolerance, palpitations, shortness of breath, PND/orthopnea wheezing, sputum, syncope, dizziness or light headedness. Doing satisfactorily form cardiac perspective. Review of Systems  General: Pt denies excessive weight gain or loss. Pt is able to conduct ADL's. Respiratory: Denies shortness of breath, RICHARD, wheezing or stridor.   Cardiovascular: Denies precordial pain, palpitations, edema or PND  Gastrointestinal: Denies poor appetite, indigestion, abdominal pain or blood in stool  Peripheral vascular: Denies claudication, leg cramps  Neuropsychiatric: Denies paresthesias,tingling,numbness,++anxiety,+depression,-fatigue  Musculoskeletal: Denies pain,tenderness, soreness,swelling      Past Medical History:   Diagnosis Date    Anxiety     Aortic stenosis 3/3/2010    Aortic stenosis     Arrhythmia     MURMUR    Arthritis     SPINAL STENOSIS    Atherosclerosis of coronary artery     Biliary dyskinesia     CHF (congestive heart failure) ([address] 3/3/2010    CHF (congestive heart failure) ([address] 2002    Chronic heart failure ([address] 2002; 3/3/2010    chronic diastolic heart failure    Chronic pain     LOWER BACK, RIGHT KNEE    Environmental allergies 3/3/2010    GERD (gastroesophageal reflux disease) 3/3/2010    Hiatal hernia 3/3/2010    High cholesterol 3/3/2010    HTN (hypertension) 3/3/2010    Hypokalemia 3/3/2010    Ischemic heart disease, chronic     Obese     Psychiatric disorder     anxiety    S/P hysterectomy 3/3/2010    Spinal stenosis 3/3/2010     Patient Active Problem List    Diagnosis Date Noted    Severe obesity (BMI 35.0-39. 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 02/12/2016    Encounter for medication monitoring 11/29/2015    Essential hypertension 07/12/2015    Gastroesophageal reflux disease without esophagitis 07/12/2015    Atherosclerosis of native coronary artery without angina pectoris--diffuse small vsl disease via cath cath 2009--RCA PDA/ROSA 07/12/2015    Chronic anxiety 07/12/2015    Atypical chest pain 02/24/2014    Chest pain at rest 02/20/2014    Bifascicular bundle branch block--RBBB,IRVING 02/20/2014    Chest pain, unspecified 02/11/2013    Chronic ischemic heart disease 05/29/2012    Mixed hyperlipidemia 05/29/2012    Chronic diastolic heart failure ([facility] [address] 05/29/2012    Hypovitaminosis D 07/28/2010    Hypokalemia 03/03/2010    Hiatal hernia 03/03/2010    Anxiety 03/03/2010    S/P hysterectomy 03/03/2010    Aortic stenosis--mild--mGr 12 mmHg  03/03/2010    Spinal stenosis 03/03/2010    S/P foot surgery 03/03/2010    Environmental allergies 03/03/2010    S/P cardiac catheterization 03/03/2010      Past Surgical History:   Procedure Laterality Date    CARDIAC CATHETERIZATION  01/2002    normal coronaries    DECOMPRESS DISC RF LUMBAR  07/2007    HX BACK SURGERY  5/1/2014    HX BREAST LUMPECTOMY Left 1989    benign left breast    HX BUNIONECTOMY      HX BUNIONECTOMY      HX HEART CATHETERIZATION  3/3/10    HX HYSTERECTOMY  1978    HX LUMBAR DISKECTOMY      HX ORTHOPAEDIC Bilateral     BUNIONECTOMY    HX ORTHOPAEDIC Right     WRIST FX    HX OTHER SURGICAL  10/12/2015    mole removed from right side of face by Dr. Priyank Duncan FLX DX W/COLLJ Rodriguez Dowd PFRMD  [id number]    Dr Romero Grade     Allergies   Allergen Reactions    Bactrim [Sulfamethoxazole-Trimethoprim] Unknown (comments)     Pt does not recall    Darvocet A500 [Propoxyphene N-Acetaminophen] Itching      Family History   Problem Relation Age of Onset    Hypertension Mother     Heart Disease Father     Stroke Sister     Heart Disease Sister     Cancer Brother         LUNG    Cancer Brother         PROSTATE    Hypertension Brother     No Known Problems Brother     Lung Disease Brother     No Known Problems Brother     No Known Problems Brother     Stroke Daughter 47    No Known Problems Daughter     Anesth Problems Neg Hx       Social History     Socioeconomic History    Marital status:      Spouse name: Not on file    Number of children: Not on file    Years of education: Not on file    Highest education level: Not on file   Social Needs    Financial resource strain: Not on file    Food insecurity - worry: Not on file    Food insecurity - inability: Not on file   [employer] needs - medical: Not on file   [employer] needs - non-medical: Not on file   Occupational History    Not on file   Tobacco Use    Smoking status: Never Smoker    Smokeless tobacco: Never Used   Substance and Sexual Activity    Alcohol use: No     Alcohol/week: 0.0 oz    Drug use: No    Sexual activity: Not Currently   Other Topics Concern    Not on file   Social History Narrative    Not on file      Current Outpatient Medications   Medication Sig    nitroglycerin (NITROSTAT) 0.4 mg SL tablet TAKE 1 TAB BY SUBLINGUAL ROUTE EVERY 5 MINUTES AS NEEDED.     clonazePAM (KLONOPIN) 1 mg tablet TAKE1/2 TO 1 TAB BY MOUTH EVERY MORNING AS NEEDED ANXIETY & TAKE 1 TAB BY MOUTH AT BEDTIME FOR SLEEP    amLODIPine (NORVASC) 10 mg tablet TAKE 1/2 TABLET BY MOUTH TWICE A DAY    pantoprazole (PROTONIX) 40 mg tablet Take 1 Tab by mouth daily. Take 30 minutes prior to breakfast.    simethicone (GAS RELIEF) 80 mg chewable tablet Take 80 mg by mouth every six (6) hours as needed for Flatulence.  diclofenac (VOLTAREN) 1 % gel Apply 2 g to affected area four (4) times daily.  metoprolol tartrate (LOPRESSOR) 25 mg tablet TAKE 1 TABLET BY MOUTH TWICE A DAY (Patient taking differently: 25 mg. TAKE 1 TABLET BY MOUTH TWICE A DAY)    potassium chloride SR (KLOR-CON 10) 10 mEq tablet TAKE 3 TABLETS IN THE MORNING AND 2 TABLETS IN THE EVENING BY MOUTH EVERY DAY    rosuvastatin (CRESTOR) 10 mg tablet Take 1 Tab by mouth nightly.  losartan (COZAAR) 100 mg tablet TAKE 1 TABLET BY MOUTH EVERY DAY    furosemide (LASIX) 80 mg tablet TAKE 1 TABLET BY MOUTH EVERY MORNING AND TAKE 1/2 TABLET EVERY EVENING    mometasone (ELOCON) 0.1 % ointment APPLY TO AFFECTED AREA (SCALP) EVERY DAY AS NEEDED    isosorbide mononitrate ER (IMDUR) 60 mg CR tablet Take 1/2 tablet daily    cholecalciferol, vitamin D3, (VITAMIN D3) 2,000 unit tab Take 1 Tab by mouth daily.  acetaminophen (TYLENOL EXTRA STRENGTH) 500 mg tablet Take 1,000 mg by mouth every six (6) hours as needed for Pain.  Aspirin, Buffered 81 mg tab Take 81 mg by mouth daily.  hydrocortisone valerate (WEST-STEFFANIE) 0.2 % ointment APPLY TO AFFECTED AREA (FACE) TWICE A DAY AS NEEDED     No current facility-administered medications for this visit. I have reviewed the nurses notes, vitals, problem list, allergy list, medical history, family medical, social history and medications. Objective:     Physical Exam:     Vitals:    10/30/18 0958   BP: (!) 140/95   Pulse: 68   Resp: 20   SpO2: 96%   Weight: 187 lb (84.8 kg)   Height: 5' 1.5\" (1.562 m)    Body mass index is 34.76 kg/m². General: Well developed,obese in no acute distress. HEENT: No carotid bruits, no JVD, trach is midline.    Heart:  Normal S1/S2 negative S3 or S4. Regular, Gr 3/6 SE murmur, -gallop or rub.   Respiratory: Clear bilaterally, no wheezing or rales  Abdomen:   Soft, non-tender, bowel sounds are active.   Extremities:  No edema, normal cap refill, no cyanosis. Neuro: A&Ox3, speech clear, gait stable. Skin: Skin color is normal. No rashes or lesions. No diaphoresis.   Vascular: 2+ pulses symmetric in all extremities        Data Review:       Cardiographics:    EKG: NSR,borderline 1st degree AV [facility]    Cardiology Labs:    Results for orders placed or performed during the hospital encounter of 03/07/18   EKG, 12 LEAD, INITIAL   Result Value Ref Range    Ventricular Rate 68 BPM    Atrial Rate 68 BPM    P-R Interval 192 ms    QRS Duration 134 ms    Q-T Interval 412 ms    QTC Calculation (Bezet) 438 ms    Calculated P Axis 74 degrees    Calculated R Axis -14 degrees    Calculated T Axis 28 degrees    Diagnosis       Normal sinus rhythm  Right bundle branch block  Voltage criteria for left ventricular hypertrophy  When compared with ECG of 05-FEB-2018 11:40,  premature atrial complexes are no longer present  Confirmed by Piper Tello ([id number]) on 3/7/2018 6:32:57 PM         Lab Results   Component Value Date/Time    Cholesterol, total 181 09/11/2018 12:12 PM    HDL Cholesterol 55 09/11/2018 12:12 PM    LDL, calculated 97 09/11/2018 12:12 PM    Triglyceride 146 09/11/2018 12:12 PM    CHOL/HDL Ratio 3.1 11/01/2010 05:26 PM       Lab Results   Component Value Date/Time    Sodium 144 09/11/2018 12:12 PM    Potassium 4.5 09/11/2018 12:12 PM    Chloride 101 09/11/2018 12:12 PM    CO2 28 09/11/2018 12:12 PM    Anion gap 6 03/07/2018 12:21 PM    Glucose 95 09/11/2018 12:12 PM    BUN 13 09/11/2018 12:12 PM    Creatinine 1.19 (H) 09/11/2018 12:12 PM    BUN/Creatinine ratio 11 (L) 09/11/2018 12:12 PM    GFR est AA 49 (L) 09/11/2018 12:12 PM    GFR est non-AA 43 (L) 09/11/2018 12:12 PM    Calcium 9.7 09/11/2018 12:12 PM    Bilirubin, total 0.6 09/11/2018 12:12 PM    AST (SGOT) 17 09/11/2018 12:12 PM    Alk. phosphatase 91 09/11/2018 12:12 PM    Protein, total 7.4 09/11/2018 12:12 PM    Albumin 4.1 09/11/2018 12:12 PM    Globulin 4.8 (H) 03/07/2018 12:21 PM    A-G Ratio 1.2 09/11/2018 12:12 PM    ALT (SGPT) 14 09/11/2018 12:12 PM          Assessment:       ICD-10-CM ICD-9-CM    1. Essential hypertension I10 401.9 AMB POC EKG ROUTINE W/ 12 LEADS, INTER & REP      2D ECHO COMPLETE ADULT (TTE) W OR WO CONTR   2. Paroxysmal cardiac arrhythmia--PVC's,APC's,NSSVT I49.8 427.89    3. Atherosclerosis of native coronary artery of native heart without angina pectoris I25.10 414.01    4. Chronic diastolic heart failure (HCC) I50.32 428.32 2D ECHO COMPLETE ADULT (TTE) W OR WO CONTR   5. Mixed hyperlipidemia E78.2 272.2    6. Severe obesity (BMI 35.0-39. 9) with comorbidity ([facility] [address] E66.01 278.01    7. Gastroesophageal reflux disease without esophagitis K21.9 530.81    8. Chronic anxiety F41.9 300.00    9. Nonrheumatic aortic valve stenosis I35.0 424.1    10. CKD (chronic kidney disease) stage 3, GFR 30-59 ml/min ([facility]) N18.3 585.3    11. Aortic stenosis, mild--mGr 12 mmHg I35.0 424.1 2D ECHO COMPLETE ADULT (TTE) W OR WO CONTR         Discussion: Patient presents at this time stable from a cardiac perspective. Under tremendous family stress addressed by PCP. BP high normal likely due to stress. Here primarily to fill Amlodipine Rx. Call if remains elevated. In process of having anti-anxiety meds managed bt PCP. No cardiac complaints. Will repeat echo to reassess mild-moderate AVS. Pleased with present cardiac status. Plan: 1. Continue same meds. Lipid profile and labs followed by PCP. 2.Encouraged to exercise to tolerance, lose weight and follow low fat, low cholesterol, low sodium predominantly Plant-based (consider Mediterranean) diet. Call with questions or concerns. Will follow up any test results by phone and/or f/u here in office if needed. Suresh Grewal       3.Follow up: 6 months    I have discussed the diagnosis with the patient and the intended plan as seen in the above orders. The patient has received an after-visit summary and questions were answered concerning future plans. I have discussed any concerning medication side effects and warnings with the patient as well.     Vinay Eduardo MD  10/30/2018

## 2018-11-12 RX ORDER — AMLODIPINE BESYLATE 10 MG/1
TABLET ORAL
Qty: 14 TAB | Refills: 0 | Status: SHIPPED | OUTPATIENT
Start: 2018-11-12 | End: 2019-02-11 | Stop reason: DRUGHIGH

## 2018-11-19 DIAGNOSIS — I25.10 ATHEROSCLEROSIS OF NATIVE CORONARY ARTERY OF NATIVE HEART WITHOUT ANGINA PECTORIS: ICD-10-CM

## 2018-11-19 DIAGNOSIS — I50.32 CHRONIC DIASTOLIC HEART FAILURE (HCC): ICD-10-CM

## 2018-11-19 DIAGNOSIS — I10 ESSENTIAL HYPERTENSION: ICD-10-CM

## 2018-11-19 DIAGNOSIS — Z98.890 S/P CARDIAC CATHETERIZATION: ICD-10-CM

## 2018-11-19 DIAGNOSIS — I45.10 RBBB: ICD-10-CM

## 2018-11-19 DIAGNOSIS — I25.9 CHRONIC ISCHEMIC HEART DISEASE: ICD-10-CM

## 2018-11-20 RX ORDER — ISOSORBIDE MONONITRATE 60 MG/1
TABLET, EXTENDED RELEASE ORAL
Qty: 30 TAB | Refills: 5 | Status: SHIPPED | OUTPATIENT
Start: 2018-11-20 | End: 2019-02-11 | Stop reason: DRUGHIGH

## 2018-11-23 RX ORDER — FUROSEMIDE 80 MG/1
TABLET ORAL
Qty: 135 TAB | Refills: 3 | Status: SHIPPED | OUTPATIENT
Start: 2018-11-23 | End: 2019-12-02 | Stop reason: SDUPTHER

## 2018-11-23 NOTE — TELEPHONE ENCOUNTER
Patient called stating that she needs a refill on furosemide (LASIX) 80 mg tablet. Patient can be reached at [phone].

## 2018-12-04 RX ORDER — AMLODIPINE BESYLATE 10 MG/1
TABLET ORAL
Qty: 14 TAB | Refills: 0 | Status: SHIPPED | OUTPATIENT
Start: 2018-12-04 | End: 2018-12-09 | Stop reason: SDUPTHER

## 2018-12-09 RX ORDER — AMLODIPINE BESYLATE 10 MG/1
TABLET ORAL
Qty: 14 TAB | Refills: 0 | Status: SHIPPED | OUTPATIENT
Start: 2018-12-09 | End: 2019-02-11 | Stop reason: SDUPTHER

## 2018-12-09 RX ORDER — AMLODIPINE BESYLATE 10 MG/1
10 TABLET ORAL DAILY
Qty: 30 TAB | Refills: 3 | Status: SHIPPED | OUTPATIENT
Start: 2018-12-09 | End: 2019-02-11 | Stop reason: DRUGHIGH

## 2018-12-13 DIAGNOSIS — R07.9 CHEST PAIN, UNSPECIFIED TYPE: ICD-10-CM

## 2018-12-13 RX ORDER — NITROGLYCERIN 0.4 MG/1
TABLET SUBLINGUAL
Qty: 100 TAB | Refills: 1 | Status: SHIPPED | OUTPATIENT
Start: 2018-12-13 | End: 2019-04-22 | Stop reason: SDUPTHER

## 2018-12-13 NOTE — TELEPHONE ENCOUNTER
[facility] Requests A 90 day supply on the patients behalf. Last Visit: 9/11  Next Appt: 2/11  Previous Refill Encounter: 10/25-30+1    Requested Prescriptions     Pending Prescriptions Disp Refills    nitroglycerin (NITROSTAT) 0.4 mg SL tablet 100 Tab 1     Sig: TAKE 1 TAB BY SUBLINGUAL ROUTE EVERY 5 MINUTES AS NEEDED.

## 2018-12-24 RX ORDER — LOSARTAN POTASSIUM 100 MG/1
TABLET ORAL
Qty: 90 TAB | Refills: 0 | Status: SHIPPED | OUTPATIENT
Start: 2018-12-24 | End: 2019-03-24 | Stop reason: SDUPTHER

## 2018-12-24 NOTE — TELEPHONE ENCOUNTER
Last Visit: 9/11  Next Appt: 2/11  Previous Refill Encounter: 11/21/17-90+3    Requested Prescriptions     Pending Prescriptions Disp Refills    losartan (COZAAR) 100 mg tablet 90 Tab 3     Sig: TAKE 1 TABLET BY MOUTH EVERY DAY

## 2019-01-01 NOTE — CONSULTS
[facility]                     [facility] [address]       2/5/2018 1:57 PM    [address] [facility]     Date of  Admission: 2/5/2018 11:26 AM     Admission type:Emergency    Consult for:  Consult by: Subjective: La Mejia is a [de-identified] y.o. female PMHx significant for mxed hyperlipidemia, hypokalemia, hiatal hernia, hx of diffuse small vsl disease involving RCA-PDA/ROSA via cardiac cath 2009 committed to med rx, negative Lexiscan NST 9/17, GERD, mild aortic stenosis, spinal stenosis, CHF, palpitations/arrhythmia, HTN, obesity, anxiety who presents to ED with 3-4 day hx of finding it difficult to regurgitate and swallow thick, stringy,slimmy mucus especially when lying in bed. Mucus is associated with stringulation due to difficulty swallowing followed by coughing which was subsequently followed by left costochondral pain radiating around under left breast. Chest pain worse when moving left arm, lying on left side and coughing. In addition to above, also began to feel \"skipping heart beats\". Observing the monitor, patient admitted to no symptoms of \"skipping heart beats\" correlating with occasional PVC's and APC's noted on monitor. Because of chest pain, took 2  Sl NTG tabs and presented for further evaluation. Upon arrival, EKG obtained revealed NSR with acute changes. CXR revealed no acute changes. Cardiac biomarkers normal x2. Given GI cocktail with improvement in symptoms. Asked to see to exclude cardiac etiology. Previous treatment/evaluation includes echocardiogram, cardiac catheterization and Lexiscan NST .  Cardiac risk factors: family history, dyslipidemia, obesity, sedentary life style, hypertension, stress, post-menopausal.      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 02/12/2016    Encounter for medication monitoring 11/29/2015    Essential hypertension 07/12/2015    Gastroesophageal reflux disease without esophagitis 07/12/2015    Atherosclerosis of native coronary artery without angina pectoris--diffuse small vsl disease via cath cath 2009--RCA PDA/ROSA 07/12/2015    Chronic anxiety 07/12/2015    Chest pain with high risk of acute coronary syndrome 02/24/2014    Chest pain at rest 02/20/2014    RBBB 02/20/2014    Chronic ischemic heart disease 05/29/2012    Mixed hyperlipidemia 05/29/2012    Chronic diastolic heart failure ([address] 05/29/2012    Hypovitaminosis D 07/28/2010    Hypokalemia 03/03/2010    Hiatal hernia 03/03/2010    Anxiety 03/03/2010    S/P hysterectomy 03/03/2010    Spinal stenosis 03/03/2010    S/P foot surgery 03/03/2010    Environmental allergies 03/03/2010    S/P cardiac catheterization 03/03/2010      Martha Triana MD  Past Medical History:   Diagnosis Date    Anxiety     Aortic stenosis 3/3/2010    Aortic stenosis     Arrhythmia     MURMUR    Arthritis     SPINAL STENOSIS    Atherosclerosis of coronary artery     CHF (congestive heart failure) ([address] 3/3/2010    CHF (congestive heart failure) ([address] 01/2002    Chronic heart failure ([address] 1/2002; 3/3/2010    chronic diastolic heart failure    Chronic pain     LOWER BACK, RIGHT KNEE    Environmental allergies 3/3/2010    GERD (gastroesophageal reflux disease) 3/3/2010    Hiatal hernia 3/3/2010    High cholesterol 3/3/2010    HTN (hypertension) 3/3/2010    Hypokalemia 3/3/2010    Ischemic heart disease, chronic     Obese     Psychiatric disorder     anxiety    S/P hysterectomy 3/3/2010    Spinal stenosis 3/3/2010      Social History     Social History    Marital status:      Spouse name: N/A    Number of children: N/A    Years of education: N/A     Social History Main Topics    Smoking status: Never Smoker    Smokeless tobacco: Never Used    Alcohol use No    Drug use: No    Sexual activity: Not Currently     Other Topics Concern    Not on file     Social History Narrative     Allergies   Allergen Reactions    Bactrim [Sulfamethoxazole-Trimethoprim] Unknown (comments)     Pt does not recall    Darvocet A500 [Propoxyphene N-Acetaminophen] Itching      Family History   Problem Relation Age of Onset    Hypertension Mother     Heart Disease Father     Stroke Sister     Heart Disease Sister     Cancer Brother      LUNG    Cancer Brother      PROSTATE    Hypertension Brother     No Known Problems Brother     Lung Disease Brother     No Known Problems Brother     No Known Problems Brother     Stroke Daughter 47    No Known Problems Daughter     Anesth Problems Neg Hx       No current facility-administered medications for this encounter. Current Outpatient Prescriptions   Medication Sig    mometasone (NASONEX) 50 mcg/actuation nasal spray     traMADol (ULTRAM) 50 mg tablet Take 1 Tab by mouth every eight (8) hours as needed for Pain. Max Daily Amount: 150 mg.    nitroglycerin (NITROSTAT) 0.4 mg SL tablet TAKE 1 TAB BY SUBLINGUAL ROUTE EVERY 5 MINUTES AS NEEDED.  atorvastatin (LIPITOR) 40 mg tablet Take 1 Tab by mouth nightly.  amLODIPine (NORVASC) 10 mg tablet TAKE 1/2 TABLET BY MOUTH TWICE A DAY    losartan (COZAAR) 100 mg tablet TAKE 1 TABLET BY MOUTH EVERY DAY    potassium chloride SR (KLOR-CON 10) 10 mEq tablet TAKE 3 TABLETS BY MOUTH EVERY DAY    furosemide (LASIX) 80 mg tablet TAKE 1 TABLET BY MOUTH EVERY MORNING AND TAKE 1/2 TABLET EVERY EVENING    pravastatin (PRAVACHOL) 40 mg tablet Take 1 Tab by mouth nightly.     hydrocortisone valerate (WEST-STEFFANIE) 0.2 % ointment APPLY TO AFFECTED AREA (FACE) TWICE A DAY AS NEEDED    mometasone (ELOCON) 0.1 % ointment APPLY TO AFFECTED AREA (SCALP) EVERY DAY AS NEEDED    isosorbide mononitrate ER (IMDUR) 60 mg CR tablet Take 1/2 tablet daily    econazole nitrate (SPECTAZOLE) 1 % topical cream Apply  to affected area daily.  melatonin 5 mg cap capsule Take 1 Cap by mouth nightly.  clonazePAM (KLONOPIN) 1 mg tablet TAKE1/2 TO 1 TAB BY MOUTH EVERY MORNING AS NEEDED ANXIETY & TAKE 1 TAB BY MOUTH AT BEDTIME FOR SLEEP    omeprazole (PRILOSEC) 20 mg capsule TAKE ONE CAPSULE BY MOUTH EVERY MORNING    promethazine-dextromethorphan (PROMETHAZINE-DM) 6.25-15 mg/5 mL syrup Take 5 mL by mouth every six (6) hours as needed for Cough.  metoprolol tartrate (LOPRESSOR) 25 mg tablet TAKE 1 TABLET BY MOUTH TWICE A DAY    cholecalciferol, vitamin D3, (VITAMIN D3) 2,000 unit tab Take 1 Tab by mouth daily.  acetaminophen (TYLENOL EXTRA STRENGTH) 500 mg tablet Take 1,000 mg by mouth every six (6) hours as needed for Pain.  Aspirin, Buffered 81 mg tab Take 81 mg by mouth daily.         Review of Symptoms:   Constitutional: negative  Eyes: negative   Ears, nose, mouth, throat, and face: negative  Respiratory: + reflux   Cardiovascular: + chest pain,palpitations  Gastrointestinal: negative  Genitourinary:negative   Musculoskeletal:negative   Neurological: negative   Endocrine: negative          Subjective:      Visit Vitals    /77 (BP 1 Location: Right arm)    Pulse 66    Temp 97.9 °F (36.6 °C)    Resp 17    Ht 5' (1.524 m)    Wt 190 lb 14.7 oz (86.6 kg)    SpO2 97%    BMI 37.29 kg/m2       Physical:   Heart: RRR, no m/S3/JVD, no carotid bruits   Lungs: clear   Abdomen: Soft, +BS, NTND   Extremities: LE susie +DP/PT, no edema   Neurologic: Grossly normal    Data Review:   Recent Labs      02/05/18   1244   WBC  5.2   HGB  12.4   HCT  38.6   PLT  200     Recent Labs      02/05/18   1244   NA  143   K  3.5   CL  104   CO2  32   GLU  103*   BUN  13   CREA  1.01   CA  8.9   ALB  3.2*   TBILI  0.5   SGOT  18   ALT  20       Recent Labs      02/05/18   1244   TROIQ  <0.04   CPK  107   CKMB  <1.0       No intake or output data in the 24 hours ending 02/05/18 9505     Cardiographics:    Telemetry: NSR with APC's,PVC's  ECG: as above    Echocardiogram: 9/17 SUMMARY:    Left ventricle: Systolic function was vigorous by visual assessment. Ejection fraction was estimated to be 65 %. There were no regional wall  motion abnormalities. Wall thickness was moderately to markedly increased. There was moderate concentric hypertrophy. There was no asymmetric  hypertrophy. Mass was definitely increased. Doppler parameters were  consistent with a reversible restrictive pattern, indicative of decreased  left ventricular diastolic compliance and/or increased left atrial  pressure (grade 3 diastolic dysfunction). Right ventricle: The ventricle was mildly dilated. Wall thickness was  moderately to markedly increased. Systolic pressure was moderately  increased. Estimated peak pressure was 40 mmHg. Estimated peak pressure  was in the range of 45 mmHg. Left atrium: The atrium was moderately to severely dilated. Mitral valve: There was mild to moderate regurgitation. Regurgitation  grade was 2+ on a scale of 0 to 4+. Aortic valve: The aortic valve has a peak gradient of 21 mm with a mean of  12 mm. The valve was probably trileaflet. Leaflets exhibited moderately  increased thickness, mild calcification, moderately reduced cuspal  separation, reduced mobility, and sclerosis. Transaortic velocity was  increased due to valvular stenosis. There was moderate stenosis. Regurgitation grade was 1+ on a scale of 0 to 4+. No obvious mass,  vegetation or thrombus noted. Tricuspid valve: There was moderate regurgitation. Regurgitation grade was  1-2+ on a scale of 0 to 4+. Lexiscan results:  9/17: The test is technically adequate. The quantitative finding of a  small area of reversibility is most likely artifactual due to hypertrophy and  inferior wall interference from the liver.  There is no sign of abnormal coronary  flow reserve. The patient has severe concentric hypertrophy and could have  potential for outflow tract obstruction under certain circumstances. Diastolic  function is impaired at rest but improved significantly under the influence of  Lexiscan. CXRAY:as above        Assessment:     Assessment:       Principal Problem:    Atypical chest pain (2/24/2014)    Active Problems:    Hiatal hernia (3/3/2010)      Anxiety (3/3/2010)      Spinal stenosis (3/3/2010)      Chronic ischemic heart disease (5/29/2012)      Mixed hyperlipidemia (5/29/2012)      Chronic diastolic heart failure ([address] (5/29/2012)      Essential hypertension (7/12/2015)      Gastroesophageal reflux disease without esophagitis (7/12/2015)      Atherosclerosis of native coronary artery without angina pectoris--diffuse small vsl disease via cath cath 2009--RCA PDA/ROSA (7/12/2015)      Overview: Aggressive medical rx implemented. Plan:     Principal Problem:      Atypical chest pain (2/24/2014)--appears to be costochondral in origin initiated by coughing initiated by GI reflux with aspiration. CXR reveals bilateral basal atelectasis. No evidence of ACS. Have advised caution using sl NTG. From cardiac standpoint no objection to discharge with outpatient f/u. No need for echo and repeat Lexiscan NST at this time. Recommend f/u with PCP for consideration GI referral.     Active Problems:    Hiatal hernia (3/3/2010)--as above      Chronic ischemic heart disease (5/29/2012)--stable      Chronic diastolic heart failure ([address] (5/29/2012)--compensated. Gastroesophageal reflux disease without esophagitis (7/12/2015)--states she is on \"medication for this\". Suggest switch to different agent. Atherosclerosis of native coronary artery without angina pectoris--diffuse small vsl disease via cath cath 2009--RCA PDA/ROSA (7/12/2015)      Overview: Aggressive medical rx implemented. Continue such. Thank you.       Signed: Elda Avila MD No

## 2019-01-04 ENCOUNTER — TELEPHONE (OUTPATIENT)
Dept: FAMILY MEDICINE CLINIC | Age: 84
End: 2019-01-04

## 2019-01-04 DIAGNOSIS — K21.9 GASTROESOPHAGEAL REFLUX DISEASE, ESOPHAGITIS PRESENCE NOT SPECIFIED: ICD-10-CM

## 2019-01-04 DIAGNOSIS — R10.13 EPIGASTRIC PAIN: Primary | ICD-10-CM

## 2019-01-04 NOTE — TELEPHONE ENCOUNTER
Patient called stating that she would like a call back regarding a personal matter. Patient can be reached at [phone].

## 2019-01-04 NOTE — TELEPHONE ENCOUNTER
Patient states she has had gas, stomach burning, appetite off, under breast right side hurts x 4 days. She took gas pill which helped. She states she has been eating greasy foods which she knows she is not suppose to be , she thinks it is her gall bladder acting up.

## 2019-01-07 NOTE — TELEPHONE ENCOUNTER
Called. Still having stomach pain. Thinks that it is gas. Has been doing a lot of belching. Also having issues with constipation. Will refer back to GI.

## 2019-02-11 ENCOUNTER — OFFICE VISIT (OUTPATIENT)
Dept: FAMILY MEDICINE CLINIC | Age: 84
End: 2019-02-11

## 2019-02-11 ENCOUNTER — HOSPITAL ENCOUNTER (OUTPATIENT)
Dept: LAB | Age: 84
Discharge: HOME | End: 2019-02-11
Payer: MEDICARE

## 2019-02-11 VITALS
BODY MASS INDEX: 34.52 KG/M2 | WEIGHT: 187.6 LBS | HEIGHT: 62 IN | TEMPERATURE: 97 F | SYSTOLIC BLOOD PRESSURE: 133 MMHG | DIASTOLIC BLOOD PRESSURE: 79 MMHG | OXYGEN SATURATION: 96 % | RESPIRATION RATE: 18 BRPM | HEART RATE: 66 BPM

## 2019-02-11 DIAGNOSIS — L23.9 ALLERGIC DERMATITIS: ICD-10-CM

## 2019-02-11 DIAGNOSIS — K21.9 GASTROESOPHAGEAL REFLUX DISEASE, ESOPHAGITIS PRESENCE NOT SPECIFIED: ICD-10-CM

## 2019-02-11 DIAGNOSIS — M15.9 PRIMARY OSTEOARTHRITIS INVOLVING MULTIPLE JOINTS: ICD-10-CM

## 2019-02-11 DIAGNOSIS — Z51.81 ENCOUNTER FOR MEDICATION MONITORING: ICD-10-CM

## 2019-02-11 DIAGNOSIS — E78.2 MIXED HYPERLIPIDEMIA: ICD-10-CM

## 2019-02-11 DIAGNOSIS — I10 ESSENTIAL HYPERTENSION: Primary | ICD-10-CM

## 2019-02-11 DIAGNOSIS — I50.32 CHRONIC DIASTOLIC HEART FAILURE (HCC): ICD-10-CM

## 2019-02-11 DIAGNOSIS — M17.11 PRIMARY OSTEOARTHRITIS OF RIGHT KNEE: ICD-10-CM

## 2019-02-11 DIAGNOSIS — I25.9 CHRONIC ISCHEMIC HEART DISEASE: ICD-10-CM

## 2019-02-11 DIAGNOSIS — F41.9 CHRONIC ANXIETY: ICD-10-CM

## 2019-02-11 DIAGNOSIS — I25.10 ATHEROSCLEROSIS OF NATIVE CORONARY ARTERY OF NATIVE HEART WITHOUT ANGINA PECTORIS: ICD-10-CM

## 2019-02-11 DIAGNOSIS — M79.674 PAIN OF TOE OF RIGHT FOOT: ICD-10-CM

## 2019-02-11 PROCEDURE — 80053 COMPREHEN METABOLIC PANEL: CPT

## 2019-02-11 PROCEDURE — 36415 COLL VENOUS BLD VENIPUNCTURE: CPT

## 2019-02-11 PROCEDURE — 80061 LIPID PANEL: CPT

## 2019-02-11 RX ORDER — PANTOPRAZOLE SODIUM 40 MG/1
40 TABLET, DELAYED RELEASE ORAL 2 TIMES DAILY
COMMUNITY
End: 2019-02-11 | Stop reason: SDUPTHER

## 2019-02-11 RX ORDER — ISOSORBIDE MONONITRATE 30 MG/1
30 TABLET, EXTENDED RELEASE ORAL DAILY
Qty: 90 TAB | Refills: 3 | Status: SHIPPED | OUTPATIENT
Start: 2019-02-11 | End: 2020-02-21 | Stop reason: SDUPTHER

## 2019-02-11 RX ORDER — CETIRIZINE HCL 10 MG
10 TABLET ORAL
Qty: 30 TAB | Refills: 1 | Status: SHIPPED | OUTPATIENT
Start: 2019-02-11 | End: 2019-03-11 | Stop reason: SDUPTHER

## 2019-02-11 RX ORDER — PREDNISONE 10 MG/1
10 TABLET ORAL 2 TIMES DAILY
Qty: 10 TAB | Refills: 0 | Status: SHIPPED | OUTPATIENT
Start: 2019-02-11 | End: 2019-02-16

## 2019-02-11 RX ORDER — PANTOPRAZOLE SODIUM 40 MG/1
40 TABLET, DELAYED RELEASE ORAL 2 TIMES DAILY
COMMUNITY
Start: 2019-02-11 | End: 2019-03-05 | Stop reason: SDUPTHER

## 2019-02-11 RX ORDER — AMLODIPINE BESYLATE 5 MG/1
5 TABLET ORAL 2 TIMES DAILY
Qty: 180 TAB | Refills: 1 | Status: SHIPPED | OUTPATIENT
Start: 2019-02-11 | End: 2019-02-11 | Stop reason: SDUPTHER

## 2019-02-11 RX ORDER — AMLODIPINE BESYLATE 5 MG/1
5 TABLET ORAL 2 TIMES DAILY
Qty: 180 TAB | Refills: 1 | Status: SHIPPED | OUTPATIENT
Start: 2019-02-11 | End: 2020-10-28 | Stop reason: SDUPTHER

## 2019-02-11 RX ORDER — METOPROLOL TARTRATE 25 MG/1
TABLET, FILM COATED ORAL
COMMUNITY
End: 2020-04-20 | Stop reason: SDUPTHER

## 2019-02-11 NOTE — PROGRESS NOTES
Chief Complaint   Patient presents with    Hypertension     follow up    Cholesterol Problem     follow up     Last colonoscopy 8/8/2002 by Dr. Brendan Morales- patient does not want another colonoscopy. Patient is having an EGD done 2/27/2019    Patient states last eye exam was in 2018 by Dr. Luana Carrel but can not remember month. Patient signed medical release. 1. Have you been to the ER, urgent care clinic since your last visit? Hospitalized since your last visit? 2. Have you seen or consulted any other health care providers outside of the [address] [place] [address] since your last visit? Include any pap smears or colon screening.

## 2019-02-11 NOTE — PROGRESS NOTES
HISTORY OF PRESENT ILLNESS  Gali Thompson is a 82 y.o. female. HPI   Follow up on chronic medical problems. C/o rash mostly on the legs and arms that comes and goes and has redness and itching that comes and goes over the past week. No new foods lotion, soaps or medications. Cardiovascular Review:  The patient has hypertension, hyperlipidemia, chronic diastolic heart failure, ASCHD and obesity. Had eval for her SOB and told d/t aortic stenosis. Diet and Lifestyle: generally follows a low fat low cholesterol diet, generally follows a low sodium diet, sedentary. Pertinent ROS: taking medications as instructed, no medication side effects noted, no TIA's, no chest pain on exertion, mild swelling of ankles, no orthostatic dizziness or lightheadedness, no palpitations, no muscle aches or pain. Home BP Monitoring: is not measured at home. Osteoarthritis:  Patient has osteoarthritis. Overall doing better with back pain. Decided not to go thru with the back surgery just yet. Still having knee pain. Aching more in the right knee and increase pain with walking. Has had 2 injections in the knee which has not helped very much. Pain is 5-6/10. Taking tylenol for the pain which helps some. Symptoms onset: problem is longstanding. Rheumatological ROS: stable, mild-to-moderate joint symptoms intermittently, reasonably well controlled by PRN meds. Response to treatment plan: stable and intermittent. Anxiety Review:  Patient is seen for anxiety. Ongoing symptoms include: increased anxiety and feeling upset d/t family issues with her dtg and grandchildren. Her dtg has been sick and her 15year old granddtg has been belligerent and not going to school. Her 29year old grandson had to move back in with her. We had a long discussion about setting boundaries for her family so that it is not so upsetting for her. Patient denies: palpitations, sweating, chest pain, shortness of breath. Reported side effects from the treatment: none. 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    Atypical chest pain R07.89    Essential hypertension I10    Gastroesophageal reflux disease without esophagitis K21.9    Atherosclerosis of native coronary artery without angina pectoris--diffuse small vsl disease via cath cath 2009--RCA PDA/ROSA I25.10    Chronic anxiety F41.9    Encounter for medication monitoring Z51.81    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 M47.815    Class 2 obesity due to excess calories with serious comorbidity and body mass index (BMI) of 38.0 to 38.9 in adult [id number]    Paroxysmal cardiac arrhythmia--PVC's,APC's,NSSVT I49.8    Severe obesity (BMI 35.0-39. 9) with comorbidity ([facility]) E66.01    CKD (chronic kidney disease) stage 3, GFR 30-59 ml/min ([facility]) N18.3       Current Outpatient Medications   Medication Sig Dispense Refill    losartan (COZAAR) 100 mg tablet TAKE 1 TABLET BY MOUTH EVERY DAY 90 Tab 0    nitroglycerin (NITROSTAT) 0.4 mg SL tablet TAKE 1 TAB BY SUBLINGUAL ROUTE EVERY 5 MINUTES AS NEEDED. 100 Tab 1    amLODIPine (NORVASC) 10 mg tablet TAKE 1/2 TABLET BY MOUTH TWICE A DAY **MUST MAKE APT FOR REFILLS 14 Tab 0    amLODIPine (NORVASC) 10 mg tablet Take 1 Tab by mouth daily.  Take 1/2 tab BID 30 Tab 3    furosemide (LASIX) 80 mg tablet TAKE 1 TABLET BY MOUTH EVERY MORNING AND TAKE 1/2 TABLET EVERY EVENING 135 Tab 3    isosorbide mononitrate ER (IMDUR) 60 mg CR tablet TAKE 1 TABLET EVERY DAY 30 Tab 5    amLODIPine (NORVASC) 10 mg tablet TAKE 1/2 TABLET BY MOUTH TWICE A DAY **MUST MAKE APT FOR REFILLS 14 Tab 0    clonazePAM (KLONOPIN) 1 mg tablet TAKE1/2 TO 1 TAB BY MOUTH EVERY MORNING AS NEEDED ANXIETY & TAKE 1 TAB BY MOUTH AT BEDTIME FOR SLEEP 60 Tab 3    pantoprazole (PROTONIX) 40 mg tablet Take 1 Tab by mouth daily. Take 30 minutes prior to breakfast. 90 Tab 3    simethicone (GAS RELIEF) 80 mg chewable tablet Take 80 mg by mouth every six (6) hours as needed for Flatulence.  diclofenac (VOLTAREN) 1 % gel Apply 2 g to affected area four (4) times daily. 100 g 3    metoprolol tartrate (LOPRESSOR) 25 mg tablet TAKE 1 TABLET BY MOUTH TWICE A DAY (Patient taking differently: 25 mg. TAKE 1 TABLET BY MOUTH TWICE A DAY) 60 Tab 11    potassium chloride SR (KLOR-CON 10) 10 mEq tablet TAKE 3 TABLETS IN THE MORNING AND 2 TABLETS IN THE EVENING BY MOUTH EVERY  Tab 3    rosuvastatin (CRESTOR) 10 mg tablet Take 1 Tab by mouth nightly. 30 Tab 11    hydrocortisone valerate (WEST-STEFFANIE) 0.2 % ointment APPLY TO AFFECTED AREA (FACE) TWICE A DAY AS NEEDED  1    mometasone (ELOCON) 0.1 % ointment APPLY TO AFFECTED AREA (SCALP) EVERY DAY AS NEEDED  2    isosorbide mononitrate ER (IMDUR) 60 mg CR tablet Take 1/2 tablet daily      cholecalciferol, vitamin D3, (VITAMIN D3) 2,000 unit tab Take 1 Tab by mouth daily.  acetaminophen (TYLENOL EXTRA STRENGTH) 500 mg tablet Take 1,000 mg by mouth every six (6) hours as needed for Pain.  Aspirin, Buffered 81 mg tab Take 81 mg by mouth daily.          Allergies   Allergen Reactions    Bactrim [Sulfamethoxazole-Trimethoprim] Unknown (comments)     Pt does not recall    Darvocet A500 [Propoxyphene N-Acetaminophen] Itching         Past Medical History:   Diagnosis Date    Anxiety     Aortic stenosis 3/3/2010    Aortic stenosis     Arrhythmia     MURMUR    Arthritis     SPINAL STENOSIS    Atherosclerosis of coronary artery     Biliary dyskinesia     CHF [facility]congestive heart failure) ([facility] [address] 3/3/2010    CHF (congestive heart failure) ([facility] [address] 01/2002    Chronic heart failure ([facility] [address] 1/2002; 3/3/2010    chronic diastolic heart failure    Chronic pain     LOWER BACK, RIGHT KNEE    Environmental allergies 3/3/2010    GERD (gastroesophageal reflux disease) 3/3/2010    Hiatal hernia 3/3/2010    High cholesterol 3/3/2010    HTN (hypertension) 3/3/2010    Hypokalemia 3/3/2010    Ischemic heart disease, chronic     Obese     Psychiatric disorder     anxiety    S/P hysterectomy 3/3/2010    Spinal stenosis 3/3/2010         Past Surgical History:   Procedure Laterality Date    CARDIAC CATHETERIZATION  01/2002    normal coronaries    DECOMPRESS DISC RF LUMBAR  07/2007    HX BACK SURGERY  5/1/2014    HX BREAST LUMPECTOMY Left 1989    benign left breast    HX BUNIONECTOMY      HX BUNIONECTOMY      HX HEART CATHETERIZATION  3/3/10    HX HYSTERECTOMY  1978    HX LUMBAR DISKECTOMY      HX ORTHOPAEDIC Bilateral     BUNIONECTOMY    HX ORTHOPAEDIC Right     WRIST FX    HX OTHER SURGICAL  10/12/2015    mole removed from right side of face by Dr. Hayes Vega FLX DX W/COLLJ [facility] WHEN PFRMD  [id number]    Dr Rose Mary Montemayor        Family History   Problem Relation Age of Onset    Hypertension Mother     Heart Disease Father     Stroke Sister     Heart Disease Sister     Cancer Brother         LUNG    Cancer Brother         PROSTATE    Hypertension Brother     No Known Problems Brother     Lung Disease Brother     No Known Problems Brother     No Known Problems Brother     Stroke Daughter 47    No Known Problems Daughter     Anesth Problems Neg Hx              Social History     Tobacco Use    Smoking status: Never Smoker    Smokeless tobacco: Never Used   Substance Use Topics    Alcohol use: No     Alcohol/week: 0.0 oz        Lab Results   Component Value Date/Time    WBC 6.6 09/11/2018 12:12 PM    HGB 12.9 09/11/2018 12:12 PM    HCT 41.5 09/11/2018 12:12 PM    PLATELET 281 45/33/8835 12:12 PM    MCV 92 09/11/2018 12:12 PM     Lab Results   Component Value Date/Time    Cholesterol, total 181 09/11/2018 12:12 PM    HDL Cholesterol 55 09/11/2018 12:12 PM    LDL, calculated 97 09/11/2018 12:12 PM    Triglyceride 146 09/11/2018 12:12 PM    CHOL/HDL Ratio 3.1 11/01/2010 05:26 PM     Lab Results   Component Value Date/Time    Sodium 144 09/11/2018 12:12 PM    Potassium 4.5 09/11/2018 12:12 PM    Chloride 101 09/11/2018 12:12 PM    CO2 28 09/11/2018 12:12 PM    Anion gap 6 03/07/2018 12:21 PM    Glucose 95 09/11/2018 12:12 PM    BUN 13 09/11/2018 12:12 PM    Creatinine 1.19 (H) 09/11/2018 12:12 PM    BUN/Creatinine ratio 11 (L) 09/11/2018 12:12 PM    GFR est AA 49 (L) 09/11/2018 12:12 PM    GFR est non-AA 43 (L) 09/11/2018 12:12 PM    Calcium 9.7 09/11/2018 12:12 PM    Bilirubin, total 0.6 09/11/2018 12:12 PM    ALT (SGPT) 14 09/11/2018 12:12 PM    AST (SGOT) 17 09/11/2018 12:12 PM    Alk. phosphatase 91 09/11/2018 12:12 PM    Protein, total 7.4 09/11/2018 12:12 PM    Albumin 4.1 09/11/2018 12:12 PM    Globulin 4.8 (H) 03/07/2018 12:21 PM    A-G Ratio 1.2 09/11/2018 12:12 PM      Lab Results   Component Value Date/Time    Hemoglobin A1c 5.4 04/16/2014 12:20 PM    Hemoglobin A1c (POC) 5.5 11/26/2014 12:12 PM      Review of Systems   Constitutional: Negative for malaise/fatigue. HENT: Negative for congestion. Eyes: Negative for blurred vision. Respiratory: Negative for cough and shortness of breath. Cardiovascular: Negative for chest pain, palpitations and leg swelling. Gastrointestinal: Getting EGD done on 2/27 for persistent heartburn and abd pain. Has increased protonix to twice a day per GI which has helped some. Genitourinary: Negative for dysuria, frequency and urgency. Neurological: Negative for dizziness, tingling and headaches.    Endo/Heme/Allergies: Negative for environmental allergies. Psychiatric/Behavioral: Negative for depression. The patient is nervous/anxious and has insomnia. Physical Exam   Constitutional: She appears well-developed and well-nourished. Visit Vitals  /79 (BP 1 Location: Right arm, BP Patient Position: Sitting)   Pulse 66   Temp 97 °F (36.1 °C) (Oral)   Resp 18   Ht 5' 1.5\" (1.562 m)   Wt 187 lb 9.6 oz (85.1 kg)   LMP  (LMP Unknown)   SpO2 96%   BMI 34.87 kg/m²     HENT:   Right Ear: Tympanic membrane and ear canal normal.   Left Ear: Tympanic membrane and ear canal normal.   Nose: No mucosal edema or rhinorrhea. Mouth/Throat: Oropharynx is clear and moist and mucous membranes are normal.   Neck: Normal range of motion. Neck supple. No thyromegaly present. Cardiovascular: Normal rate and regular rhythm. No murmur heard. Pulmonary/Chest: Effort normal and breath sounds normal.   Abdominal: Soft. Bowel sounds are normal. There is no tenderness. Musculoskeletal: Normal range of motion. She exhibits edema (trace). Right knee: She exhibits normal range of motion, no swelling and no effusion. Has mid joint tenderness. Lymphadenopathy:     She has no cervical adenopathy. Skin: Skin is warm and dry. Scattered areas of red hives on the legs and arms. Has painful toenail on the right foot second toe. Nail has grown around the toe. Psychiatric: She has a normal mood and affect. Nursing note and vitals reviewed. ASSESSMENT and PLAN  Diagnoses and all orders for this visit:    1. Essential hypertension  -     amLODIPine (NORVASC) 5 mg tablet; Take 1 Tab by mouth two (2) times a day. Take at 9am and 9pm every day. 2. Mixed hyperlipidemia  -     LIPID PANEL    3. Chronic diastolic heart failure (HCC)//  4. Atherosclerosis of native coronary artery of native heart without angina pectoris  5. Chronic ischemic heart disease  -     isosorbide mononitrate ER (IMDUR) 30 mg tablet; Take 1 Tab by mouth daily.     6. Chronic anxiety  Stable     7. Primary osteoarthritis involving multiple joints//  8. Primary osteoarthritis of right knee  -     predniSONE (DELTASONE) 10 mg tablet; Take 10 mg by mouth two (2) times a day for 5 days. Take for knee pain from arthritis and for rash. Instructions for exercises given and reviewed with pt. Pt also to use heat to the area 3-4 times a day over the next several days until sx are improved. 9. Pain of toe of right foot  -     REFERRAL TO PODIATRY    10. Gastroesophageal reflux disease, esophagitis presence not specified    11. Allergic dermatitis  -     predniSONE (DELTASONE) 10 mg tablet; Take 10 mg by mouth two (2) times a day for 5 days. Take for knee pain from arthritis and for rash. -     cetirizine (ZYRTEC) 10 mg tablet; Take 1 Tab by mouth daily as needed (and rash). 12. Encounter for medication monitoring  -     METABOLIC PANEL, COMPREHENSIVE      Follow-up Disposition:  Return in about 2 months (around 4/11/2019). reviewed diet, exercise and weight control  cardiovascular risk and specific lipid/LDL goals reviewed  reviewed medications and side effects in detail      I have discussed diagnosis listed in this note with pt and/or family. I have discussed treatment plans and options and the risk/benefit analysis of those options, including safe use of medications and possible medication side effects. Through the use of shared decision making we have agreed to the above plan. The patient has received an after-visit summary and questions were answered concerning future plans and follow up. Advise pt of any urgent changes then to proceed to the ER.

## 2019-02-12 LAB
ALBUMIN SERPL-MCNC: 4.1 G/DL (ref 3.5–4.7)
ALBUMIN/GLOB SERPL: 1.1 {RATIO} (ref 1.2–2.2)
ALP SERPL-CCNC: 93 IU/L (ref 39–117)
ALT SERPL-CCNC: 22 IU/L (ref 0–32)
AST SERPL-CCNC: 19 IU/L (ref 0–40)
BILIRUB SERPL-MCNC: 0.5 MG/DL (ref 0–1.2)
BUN SERPL-MCNC: 11 MG/DL (ref 8–27)
BUN/CREAT SERPL: 11 (ref 12–28)
CALCIUM SERPL-MCNC: 9.7 MG/DL (ref 8.7–10.3)
CHLORIDE SERPL-SCNC: 101 MMOL/L (ref 96–106)
CHOLEST SERPL-MCNC: 179 MG/DL (ref 100–199)
CO2 SERPL-SCNC: 25 MMOL/L (ref 20–29)
CREAT SERPL-MCNC: 1.02 MG/DL (ref 0.57–1)
GLOBULIN SER CALC-MCNC: 3.9 G/DL (ref 1.5–4.5)
GLUCOSE SERPL-MCNC: 92 MG/DL (ref 65–99)
HDLC SERPL-MCNC: 58 MG/DL
INTERPRETATION, 910389: NORMAL
INTERPRETATION: NORMAL
LDLC SERPL CALC-MCNC: 100 MG/DL (ref 0–99)
PDF IMAGE, 910387: NORMAL
POTASSIUM SERPL-SCNC: 4 MMOL/L (ref 3.5–5.2)
PROT SERPL-MCNC: 8 G/DL (ref 6–8.5)
SODIUM SERPL-SCNC: 143 MMOL/L (ref 134–144)
TRIGL SERPL-MCNC: 103 MG/DL (ref 0–149)
VLDLC SERPL CALC-MCNC: 21 MG/DL (ref 5–40)

## 2019-02-27 ENCOUNTER — HOSPITAL ENCOUNTER (OUTPATIENT)
Age: 84
Setting detail: OUTPATIENT SURGERY
Discharge: HOME | End: 2019-02-27
Attending: INTERNAL MEDICINE | Admitting: INTERNAL MEDICINE
Payer: MEDICARE

## 2019-02-27 ENCOUNTER — ANESTHESIA (OUTPATIENT)
Dept: ENDOSCOPY | Age: 84
End: 2019-02-27
Payer: MEDICARE

## 2019-02-27 ENCOUNTER — ANESTHESIA EVENT (OUTPATIENT)
Dept: ENDOSCOPY | Age: 84
End: 2019-02-27
Payer: MEDICARE

## 2019-02-27 VITALS
HEIGHT: 62 IN | OXYGEN SATURATION: 98 % | WEIGHT: 187 LBS | SYSTOLIC BLOOD PRESSURE: 130 MMHG | DIASTOLIC BLOOD PRESSURE: 68 MMHG | TEMPERATURE: 97.6 F | HEART RATE: 74 BPM | RESPIRATION RATE: 23 BRPM | BODY MASS INDEX: 34.41 KG/M2

## 2019-02-27 PROCEDURE — 74011250636 HC RX REV CODE- 250/636

## 2019-02-27 PROCEDURE — 77030019988 HC FCPS ENDOSC DISP BSC -B: Performed by: INTERNAL MEDICINE

## 2019-02-27 PROCEDURE — 76060000031 HC ANESTHESIA FIRST 0.5 HR: Performed by: INTERNAL MEDICINE

## 2019-02-27 PROCEDURE — 76040000019: Performed by: INTERNAL MEDICINE

## 2019-02-27 PROCEDURE — 88305 TISSUE EXAM BY PATHOLOGIST: CPT

## 2019-02-27 PROCEDURE — 74011000250 HC RX REV CODE- 250

## 2019-02-27 PROCEDURE — 74011250636 HC RX REV CODE- 250/636: Performed by: INTERNAL MEDICINE

## 2019-02-27 RX ORDER — SODIUM CHLORIDE 0.9 % (FLUSH) 0.9 %
5-40 SYRINGE (ML) INJECTION EVERY 8 HOURS
Status: DISCONTINUED | OUTPATIENT
Start: 2019-02-27 | End: 2019-02-27 | Stop reason: HOSPADM

## 2019-02-27 RX ORDER — GLYCOPYRROLATE 0.2 MG/ML
INJECTION INTRAMUSCULAR; INTRAVENOUS AS NEEDED
Status: DISCONTINUED | OUTPATIENT
Start: 2019-02-27 | End: 2019-02-27 | Stop reason: HOSPADM

## 2019-02-27 RX ORDER — SODIUM CHLORIDE 0.9 % (FLUSH) 0.9 %
5-40 SYRINGE (ML) INJECTION AS NEEDED
Status: DISCONTINUED | OUTPATIENT
Start: 2019-02-27 | End: 2019-02-27 | Stop reason: HOSPADM

## 2019-02-27 RX ORDER — PROPOFOL 10 MG/ML
INJECTION, EMULSION INTRAVENOUS AS NEEDED
Status: DISCONTINUED | OUTPATIENT
Start: 2019-02-27 | End: 2019-02-27 | Stop reason: HOSPADM

## 2019-02-27 RX ORDER — EPINEPHRINE 0.1 MG/ML
1 INJECTION INTRACARDIAC; INTRAVENOUS
Status: DISCONTINUED | OUTPATIENT
Start: 2019-02-27 | End: 2019-02-27 | Stop reason: HOSPADM

## 2019-02-27 RX ORDER — NALOXONE HYDROCHLORIDE 0.4 MG/ML
0.4 INJECTION, SOLUTION INTRAMUSCULAR; INTRAVENOUS; SUBCUTANEOUS
Status: ACTIVE | OUTPATIENT
Start: 2019-02-27 | End: 2019-02-27

## 2019-02-27 RX ORDER — FLUMAZENIL 0.1 MG/ML
0.2 INJECTION INTRAVENOUS
Status: ACTIVE | OUTPATIENT
Start: 2019-02-27 | End: 2019-02-27

## 2019-02-27 RX ORDER — LIDOCAINE HYDROCHLORIDE 20 MG/ML
INJECTION, SOLUTION EPIDURAL; INFILTRATION; INTRACAUDAL; PERINEURAL AS NEEDED
Status: DISCONTINUED | OUTPATIENT
Start: 2019-02-27 | End: 2019-02-27 | Stop reason: HOSPADM

## 2019-02-27 RX ORDER — SODIUM CHLORIDE 9 MG/ML
25 INJECTION, SOLUTION INTRAVENOUS CONTINUOUS
Status: DISPENSED | OUTPATIENT
Start: 2019-02-27 | End: 2019-02-27

## 2019-02-27 RX ORDER — DEXTROMETHORPHAN/PSEUDOEPHED 2.5-7.5/.8
1.2 DROPS ORAL
Status: DISCONTINUED | OUTPATIENT
Start: 2019-02-27 | End: 2019-02-27 | Stop reason: HOSPADM

## 2019-02-27 RX ORDER — ATROPINE SULFATE 0.1 MG/ML
0.5 INJECTION INTRAVENOUS
Status: DISCONTINUED | OUTPATIENT
Start: 2019-02-27 | End: 2019-02-27 | Stop reason: HOSPADM

## 2019-02-27 RX ADMIN — LIDOCAINE HYDROCHLORIDE 100 MG: 20 INJECTION, SOLUTION EPIDURAL; INFILTRATION; INTRACAUDAL; PERINEURAL at 10:03

## 2019-02-27 RX ADMIN — GLYCOPYRROLATE 0.2 MG: 0.2 INJECTION INTRAMUSCULAR; INTRAVENOUS at 10:03

## 2019-02-27 RX ADMIN — PROPOFOL 30 MG: 10 INJECTION, EMULSION INTRAVENOUS at 10:08

## 2019-02-27 RX ADMIN — PROPOFOL 70 MG: 10 INJECTION, EMULSION INTRAVENOUS at 10:04

## 2019-02-27 RX ADMIN — SODIUM CHLORIDE 25 ML/HR: 900 INJECTION, SOLUTION INTRAVENOUS at 09:18

## 2019-02-27 NOTE — ANESTHESIA POSTPROCEDURE EVALUATION
Procedure(s): ESOPHAGOGASTRODUODENOSCOPY (EGD) ESOPHAGOGASTRODUODENAL (EGD) BIOPSY. Anesthesia Post Evaluation Patient location during evaluation: PACU Note status: Adequate. Level of consciousness: responsive to verbal stimuli and sleepy but conscious Pain management: satisfactory to patient Airway patency: patent Anesthetic complications: no 
Cardiovascular status: acceptable Respiratory status: acceptable Hydration status: acceptable Comments: +Post-Anesthesia Evaluation and Assessment Patient: Augusto Duke MRN: [id number]  SSN: [id number] YOB: 1935  Age: 82 y.o. Sex: female Cardiovascular Function/Vital Signs /68   Pulse 74   Temp 36.4 °C (97.6 °F)   Resp 23   Ht 5' 2\" (1.575 m)   Wt 84.8 kg (187 lb)   SpO2 98%   Breastfeeding? No   BMI 34.20 kg/m² Patient is status post Procedure(s): ESOPHAGOGASTRODUODENOSCOPY (EGD) ESOPHAGOGASTRODUODENAL (EGD) BIOPSY. Nausea/Vomiting: Controlled. Postoperative hydration reviewed and adequate. Pain: 
Pain Scale 1: Numeric (0 - 10) (02/27/19 1033) Pain Intensity 1: 0 (02/27/19 1033) Managed. Neurological Status: At baseline. Mental Status and Level of Consciousness: Arousable. Pulmonary Status:  
O2 Device: Room air (02/27/19 1033) Adequate oxygenation and airway patent. Complications related to anesthesia: None Post-anesthesia assessment completed. No concerns. Signed By: Uma Mccauley MD  
 2/27/2019 Post anesthesia nausea and vomiting:  controlled Visit Vitals /68 Pulse 74 Temp 36.4 °C (97.6 °F) Resp 23 Ht 5' 2\" (1.575 m) Wt 84.8 kg (187 lb) SpO2 98% Breastfeeding? No  
BMI 34.20 kg/m²

## 2019-02-27 NOTE — ANESTHESIA PREPROCEDURE EVALUATION
Anesthetic History No history of anesthetic complications Review of Systems / Medical History Patient summary reviewed, nursing notes reviewed and pertinent labs reviewed Pulmonary Within defined limits Neuro/Psych Psychiatric history Cardiovascular Hypertension CHF Dysrhythmias CAD Exercise tolerance: <4 METS Comments: Aortic stenosis moderate MR-moderate TR-moderate EF is WNL on las echo in [facility] stress test 2018 GI/Hepatic/Renal 
  
GERD Hiatal hernia Endo/Other Obesity and arthritis Other Findings Comments: Spinal stenosis Physical Exam 
 
Airway Mallampati: II 
TM Distance: 4 - 6 cm Neck ROM: normal range of motion Mouth opening: Normal 
 
 Cardiovascular Murmur: Grade 2 Dental 
No notable dental hx Pulmonary Breath sounds clear to auscultation Abdominal 
GI exam deferred Other Findings Anesthetic Plan ASA: 3 Anesthesia type: MAC Anesthetic plan and risks discussed with: Patient and Family

## 2019-02-27 NOTE — DISCHARGE INSTRUCTIONS
[facility]  [id number]  1935    EGD DISCHARGE INSTRUCTIONS  Discomfort:  Sore throat- throat lozenges or warm salt water gargle  redness at IV site- apply warm compress to area; if redness or soreness persist- contact your physician  Gaseous discomfort- walking, belching will help relieve any discomfort  You may not operate a vehicle for 12 hours  You may not engage in an occupation involving machinery or appliances for rest of today  You may not drink alcoholic beverages for at least 12 hours  Avoid making any critical decisions for at least 24 hour  DIET  You may have minimal sips at this time-- do not eat or drink for two hours. You may eat and drink after you wake up  You may resume your regular diet - however -  remember your colon is empty and a heavy meal will produce gas. Avoid these foods:  vegetables, fried / greasy foods, carbonated drinks    MEDICATIONS:  See attached    ACTIVITY  You may resume your normal daily activities until tomorrow AM;  Spend the remainder of the day resting -  avoid any strenuous activity. CALL M.D.   ANY SIGN OF   Increasing pain, nausea, vomiting  Abdominal distension (swelling)  New increased bleeding (oral or rectal)  Fever (chills)  Pain in chest area  Bloody discharge from nose or mouth  Shortness of breath    IMPRESSION:  -- mild stomach redness, which we did biopsy, but did not appear to be a likely cause of stomach discomfort  -- lastly, a small hiatal hernia was seen, which is when the very top of the stomach can slide up and down in the lower chest. It's fairly common, and you have likely had this small hernia for decades    Follow-up Instructions:   Call Dr. Jason Horta for the results of procedure / biopsy in 7-10 days   Telephone # [phone]  Additional instructions: after biopsies return, and if they look okay, next step is to meet with a surgeon to talk about the Shaista Stevens MD

## 2019-02-27 NOTE — PROGRESS NOTES
[facility] 1935 
[id number] Situation: 
Verbal report received from: [facility] Procedure: Procedure(s): ESOPHAGOGASTRODUODENOSCOPY (EGD) ESOPHAGOGASTRODUODENAL (EGD) BIOPSY Background: 
 
Preoperative diagnosis: ABDOMINAL PAIN Postoperative diagnosis: hiatal hernia and gastritis :  Dr. Leija Setting Assistant(s): Endoscopy Technician-1: Amy Luevano Endoscopy RN-1: Rain Sifuentes RN Specimens:  
ID Type Source Tests Collected by Time Destination 1 : Gastric bx Preservative Gastric  Daphnerebeca Magaña MD 2/27/2019 0842 Pathology H. Pylori  no Assessment: 
Intra-procedure medications Anesthesia gave intra-procedure sedation and medications, see anesthesia flow sheet yes Intravenous fluids: NS@ [place] Vital signs stable Abdominal assessment: round and soft Recommendation: 
Discharge patient per MD order. Family Daughter Minerva Ayala Permission to share finding with family or friend yes

## 2019-02-27 NOTE — PROCEDURES
NAME:  Suni Cadena   :   1935   MRN:   [id number]     Date/Time:  2019 9:52 AM    Esophagogastroduodenoscopy (EGD) Procedure Note    Procedure: Esophagogastroduodenoscopy with biopsy    Indication: epigastric pain  Pre-operative Diagnosis: see indication above  Post-operative Diagnosis: see findings below  :  Brenna Norman MD  Referring Provider:   Jordan Espinoza MD    Exam:  Airway: clear, no airway problems anticipated  Heart: RRR, without gallops or rubs  Lungs: clear bilaterally without wheezes, crackles, or rhonchi  Abdomen: soft, nontender, nondistended, bowel sounds present  Mental Status: awake, alert and oriented to person, place and time     Anethesia/Sedation:  MAC anesthesia Propofol  Procedure Details   After informed consent was obtained for the procedure, with all risks and benefits of procedure explained the patient was taken to the endoscopy suite and placed in the left lateral decubitus position. Following sequential administration of sedation as per above, the [id number] gastroscope was inserted into the mouth and advanced under direct vision to second portion of the duodenum. A careful inspection was made as the gastroscope was withdrawn, including a retroflexed view of the proximal stomach; findings and interventions are described below. Findings:   OROPHARYNX: Cords and pyriform recesses normal.   ESOPHAGUS: The esophagus is normal. The proximal, mid, and distal portions are normal. The Z-Line is intact. STOMACH: The fundus on antegrade and retroflex views reveals hiatal hernia. The body, antrum, and pylorus are erythematous, biopsied. DUODENUM: The bulb, second and third portions are normal.    Therapies:   biopsy of stomach body, antrum    Specimens: gastric    EBL:  None. Complications:   None; patient tolerated the procedure well.            Impression:  -- small hiatal hernia  -- mild gastritis, biopsied  -- no outright source of abdominal pain on today's exam      Recommendations:  -- await pathology results  -- consider surgical eval for post-prandial pain and suspected symptomatic cholelithiasis if pain persists    Discharge disposition:  Home in the company of  when able to ambulate    Estephania Nunez MD

## 2019-02-27 NOTE — PROGRESS NOTES
..Anesthesia reports 100mg Propofol, 100mg Lidocaine and 300mL NS given during procedure. Received report from anesthesia staff on vital signs and status of patient.

## 2019-02-27 NOTE — H&P
Gastroenterology Outpatient History and Physical 
 
Patient: Antony Kali Physician: Ricardo Roth MD 
 
Chief Complaint: epigastric abd pain History of Present Illness: 83 yo F with epigastric pain. History: 
Past Medical History:  
Diagnosis Date  Anxiety  Aortic stenosis 3/3/2010  Aortic stenosis  Arrhythmia MURMUR  Arthritis SPINAL STENOSIS  Atherosclerosis of coronary artery  Biliary dyskinesia  CHF (congestive heart failure) ([address] 3/3/2010  CHF (congestive heart failure) ([address] 01/2002  Chronic heart failure ([address] 1/2002; 3/3/2010  
 chronic diastolic heart failure  Chronic pain LOWER BACK, RIGHT KNEE  
 Environmental allergies 3/3/2010  GERD (gastroesophageal reflux disease) 3/3/2010  
 Hiatal hernia 3/3/2010  High cholesterol 3/3/2010  
 HTN (hypertension) 3/3/2010  Hypokalemia 3/3/2010  Ischemic heart disease, chronic  Obese  Psychiatric disorder   
 anxiety  S/P hysterectomy 3/3/2010  Spinal stenosis 3/3/2010 Past Surgical History:  
Procedure Laterality Date  CARDIAC CATHETERIZATION  01/2002  
 normal coronaries  DECOMPRESS DISC RF LUMBAR  07/2007  HX BACK SURGERY  5/1/2014  HX BREAST LUMPECTOMY Left 1989  
 benign left breast  
 HX BUNIONECTOMY  HX BUNIONECTOMY  HX HEART CATHETERIZATION  3/3/10  
[address]  HX LUMBAR DISKECTOMY  HX ORTHOPAEDIC Bilateral   
 BUNIONECTOMY  HX ORTHOPAEDIC Right   
 WRIST FX  
 HX OTHER SURGICAL  10/12/2015  
 mole removed from right side of face by Dr. Barney Sibley FLX [address] [facility] 1976 PFD  [id number] Dr Hernandez Forward Social History Socioeconomic History  Marital status:  Spouse name: Not on file  Number of children: Not on file  Years of education: Not on file  Highest education level: Not on file Tobacco Use  Smoking status: Never Smoker  Smokeless tobacco: Never Used Substance and Sexual Activity  Alcohol use: No  
  Alcohol/week: 0.0 oz  Drug use: No  
 Sexual activity: Not Currently Family History Problem Relation Age of Onset  Hypertension Mother  Heart Disease Father  Stroke Sister  Heart Disease Sister  Cancer Brother LUNG  
 Cancer Brother PROSTATE  Hypertension Brother  No Known Problems Brother  Lung Disease Brother  No Known Problems Brother  No Known Problems Brother  Stroke Daughter 47  No Known Problems Daughter  Anesth Problems Neg Hx Patient Active Problem List  
Diagnosis Code  Hypokalemia E87.6  Hiatal hernia K44.9  Anxiety F41.9  S/P hysterectomy Z90.710  Aortic stenosis, mild I35.0  Spinal stenosis M48.00  
 S/P foot surgery Z98.890  
 Environmental allergies Z91.09  
 S/P cardiac catheterization Z98.890  
 Hypovitaminosis D E55.9  Chronic ischemic heart disease I25.9  Mixed hyperlipidemia E78.2  Chronic diastolic heart failure (HCC) I50.32  Chest pain, unspecified R07.9  Chest pain at rest R07.9  Bifascicular bundle branch block--RBBB,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  Atypical chest pain R07.89  
 Essential hypertension I10  
 Gastroesophageal reflux disease without esophagitis K21.9  Atherosclerosis of native coronary artery without angina pectoris--diffuse small vsl disease via cath cath 2009--RCA PDA/ROSA I25.10  Chronic anxiety F41.9  
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 9) with comorbidity (HCC) E66.01  
 CKD (chronic kidney disease) stage 3, GFR 30-59 ml/min ([facility]) N18.3 Allergies: Allergies Allergen Reactions  Bactrim [Sulfamethoxazole-Trimethoprim] Unknown (comments) Pt does not recall  Darvocet A500 [Propoxyphene N-Acetaminophen] Itching Medications:  
Prior to Admission medications Medication Sig Start Date End Date Taking? Authorizing Provider  
metoprolol tartrate (LOPRESSOR) 25 mg tablet Take 1 tab by mouth at 9am and take 1 tab at 9pm.   Yes Provider, Historical  
isosorbide mononitrate ER (IMDUR) 30 mg tablet Take 1 Tab by mouth daily. 2/11/19  Yes Nicanor Arango MD  
cetirizine (ZYRTEC) 10 mg tablet Take 1 Tab by mouth daily as needed (and rash). 2/11/19  Yes Marge Barrios MD  
pantoprazole (PROTONIX) 40 mg tablet Take 1 Tab by mouth two (2) times a day. Take 30 minutes prior to breakfast and 30 minutes prior to dinner. 2/11/19  Yes Nicanor Arango MD  
amLODIPine (NORVASC) 5 mg tablet Take 1 Tab by mouth two (2) times a day. Take at 9am and 9pm every day. 2/11/19  Yes Marge Barrios MD  
losartan (COZAAR) 100 mg tablet TAKE 1 TABLET BY MOUTH EVERY DAY 12/24/18  Yes Emmanuelle Martinez MD  
furosemide (LASIX) 80 mg tablet TAKE 1 TABLET BY MOUTH EVERY MORNING AND TAKE 1/2 TABLET EVERY EVENING 11/23/18  Yes Nicanor Arango MD  
clonazePAM (KLONOPIN) 1 mg tablet TAKE1/2 TO 1 TAB BY MOUTH EVERY MORNING AS NEEDED ANXIETY & TAKE 1 TAB BY MOUTH AT BEDTIME FOR SLEEP 10/23/18  Yes Nicanor Arango MD  
simethicone (GAS RELIEF) 80 mg chewable tablet Take 80 mg by mouth every six (6) hours as needed for Flatulence. Yes Provider, Historical  
diclofenac (VOLTAREN) 1 % gel Apply 2 g to affected area four (4) times daily. 5/30/18  Yes Marge Barrios MD  
potassium chloride SR (KLOR-CON 10) 10 mEq tablet TAKE 3 TABLETS IN THE MORNING AND 2 TABLETS IN THE EVENING BY MOUTH EVERY DAY 3/8/18  Yes Nicanor Arango MD  
rosuvastatin (CRESTOR) 10 mg tablet Take 1 Tab by mouth nightly.  2/22/18  Yes Marge Barrios MD  
 hydrocortisone valerate (WEST-STEFFANIE) 0.2 % ointment APPLY TO AFFECTED AREA (FACE) TWICE A DAY AS NEEDED 10/18/17  Yes Provider, Historical  
mometasone (ELOCON) 0.1 % ointment APPLY TO AFFECTED AREA (SCALP) EVERY DAY AS NEEDED 10/18/17  Yes Provider, Historical  
cholecalciferol, vitamin D3, (VITAMIN D3) 2,000 unit tab Take 1 Tab by mouth daily. Yes Provider, Historical  
acetaminophen (TYLENOL EXTRA STRENGTH) 500 mg tablet Take 1,000 mg by mouth every six (6) hours as needed for Pain. Yes Provider, Historical  
Aspirin, Buffered 81 mg tab Take 81 mg by mouth daily. Yes Provider, Historical  
nitroglycerin (NITROSTAT) 0.4 mg SL tablet TAKE 1 TAB BY SUBLINGUAL ROUTE EVERY 5 MINUTES AS NEEDED. 12/13/18   Herbie Snider MD  
 
Physical Exam:  
Vital Signs: Blood pressure 146/79, pulse 72, temperature 97.8 °F (36.6 °C), resp. rate 20, height 5' 2\" (1.575 m), weight 84.8 kg (187 lb), SpO2 96 %, not currently breastfeeding. General: well developed, well nourished HEENT: unremarkable Heart: regular rhythm no mumur Lungs: clear Abdominal:  benign Neurological: unremarkable Extremities: no edema Findings/Diagnosis: epigastric pain Plan of Care/Planned Procedure: EGD with conscious/deep sedation Signed: 
Rosanna Machuca MD 2/27/2019

## 2019-03-01 ENCOUNTER — TELEPHONE (OUTPATIENT)
Dept: FAMILY MEDICINE CLINIC | Age: 84
End: 2019-03-01

## 2019-03-01 NOTE — TELEPHONE ENCOUNTER
Patient has a rash on her left arm and would like to know if Cetirizine 10mg will help with that. Please call patient at [phone].

## 2019-03-05 DIAGNOSIS — K21.9 GASTROESOPHAGEAL REFLUX DISEASE, ESOPHAGITIS PRESENCE NOT SPECIFIED: ICD-10-CM

## 2019-03-05 DIAGNOSIS — F41.9 ANXIETY: Chronic | ICD-10-CM

## 2019-03-05 RX ORDER — PANTOPRAZOLE SODIUM 40 MG/1
40 TABLET, DELAYED RELEASE ORAL 2 TIMES DAILY
Qty: 180 TAB | Refills: 1 | Status: CANCELLED | OUTPATIENT
Start: 2019-03-05

## 2019-03-05 RX ORDER — CLONAZEPAM 1 MG/1
TABLET ORAL
Qty: 60 TAB | Refills: 3 | OUTPATIENT
Start: 2019-03-05 | End: 2019-07-30 | Stop reason: SDUPTHER

## 2019-03-05 RX ORDER — PANTOPRAZOLE SODIUM 40 MG/1
40 TABLET, DELAYED RELEASE ORAL 2 TIMES DAILY
Qty: 60 TAB | Refills: 11 | Status: SHIPPED | OUTPATIENT
Start: 2019-03-05 | End: 2020-03-16 | Stop reason: SDUPTHER

## 2019-03-11 DIAGNOSIS — L23.9 ALLERGIC DERMATITIS: ICD-10-CM

## 2019-03-11 RX ORDER — CETIRIZINE HCL 10 MG
TABLET ORAL
Qty: 90 TAB | Refills: 1 | Status: SHIPPED | OUTPATIENT
Start: 2019-03-11 | End: 2019-11-04

## 2019-03-25 RX ORDER — LOSARTAN POTASSIUM 100 MG/1
TABLET ORAL
Qty: 90 TAB | Refills: 0 | Status: SHIPPED | OUTPATIENT
Start: 2019-03-25 | End: 2019-06-03 | Stop reason: SDUPTHER

## 2019-03-30 ENCOUNTER — HOSPITAL ENCOUNTER (EMERGENCY)
Age: 84
Discharge: HOME | End: 2019-03-30
Attending: EMERGENCY MEDICINE
Payer: MEDICARE

## 2019-03-30 ENCOUNTER — APPOINTMENT (OUTPATIENT)
Dept: GENERAL RADIOLOGY | Age: 84
End: 2019-03-30
Attending: EMERGENCY MEDICINE
Payer: MEDICARE

## 2019-03-30 VITALS
TEMPERATURE: 98.2 F | OXYGEN SATURATION: 97 % | HEART RATE: 66 BPM | RESPIRATION RATE: 16 BRPM | DIASTOLIC BLOOD PRESSURE: 76 MMHG | WEIGHT: 183 LBS | SYSTOLIC BLOOD PRESSURE: 131 MMHG | BODY MASS INDEX: 33.47 KG/M2

## 2019-03-30 DIAGNOSIS — R07.89 ATYPICAL CHEST PAIN: Primary | ICD-10-CM

## 2019-03-30 DIAGNOSIS — K82.8 BILIARY DYSKINESIA: ICD-10-CM

## 2019-03-30 LAB
ALBUMIN SERPL-MCNC: 3.4 G/DL (ref 3.5–5)
ALBUMIN/GLOB SERPL: 0.7 {RATIO} (ref 1.1–2.2)
ALP SERPL-CCNC: 86 U/L (ref 45–117)
ALT SERPL-CCNC: 19 U/L (ref 12–78)
ANION GAP SERPL CALC-SCNC: 8 MMOL/L (ref 5–15)
APTT PPP: 25.5 SEC (ref 22.1–32)
AST SERPL-CCNC: 19 U/L (ref 15–37)
BASOPHILS # BLD: 0 K/UL (ref 0–0.1)
BASOPHILS NFR BLD: 0 % (ref 0–1)
BILIRUB SERPL-MCNC: 0.6 MG/DL (ref 0.2–1)
BUN SERPL-MCNC: 6 MG/DL (ref 6–20)
BUN/CREAT SERPL: 6 (ref 12–20)
CALCIUM SERPL-MCNC: 8.9 MG/DL (ref 8.5–10.1)
CHLORIDE SERPL-SCNC: 103 MMOL/L (ref 97–108)
CO2 SERPL-SCNC: 32 MMOL/L (ref 21–32)
CREAT SERPL-MCNC: 1.04 MG/DL (ref 0.55–1.02)
DIFFERENTIAL METHOD BLD: NORMAL
EOSINOPHIL # BLD: 0.2 K/UL (ref 0–0.4)
EOSINOPHIL NFR BLD: 5 % (ref 0–7)
ERYTHROCYTE [DISTWIDTH] IN BLOOD BY AUTOMATED COUNT: 14.2 % (ref 11.5–14.5)
GLOBULIN SER CALC-MCNC: 4.8 G/DL (ref 2–4)
GLUCOSE SERPL-MCNC: 95 MG/DL (ref 65–100)
HCT VFR BLD AUTO: 43.1 % (ref 35–47)
HGB BLD-MCNC: 13.9 G/DL (ref 11.5–16)
IMM GRANULOCYTES # BLD AUTO: 0 K/UL (ref 0–0.04)
IMM GRANULOCYTES NFR BLD AUTO: 0 % (ref 0–0.5)
INR PPP: 1 (ref 0.9–1.1)
LIPASE SERPL-CCNC: 143 U/L (ref 73–393)
LYMPHOCYTES # BLD: 1.8 K/UL (ref 0.8–3.5)
LYMPHOCYTES NFR BLD: 38 % (ref 12–49)
MCH RBC QN AUTO: 28.8 PG (ref 26–34)
MCHC RBC AUTO-ENTMCNC: 32.3 G/DL (ref 30–36.5)
MCV RBC AUTO: 89.4 FL (ref 80–99)
MONOCYTES # BLD: 0.4 K/UL (ref 0–1)
MONOCYTES NFR BLD: 9 % (ref 5–13)
NEUTS SEG # BLD: 2.2 K/UL (ref 1.8–8)
NEUTS SEG NFR BLD: 48 % (ref 32–75)
NRBC # BLD: 0 K/UL (ref 0–0.01)
NRBC BLD-RTO: 0 PER 100 WBC
PLATELET # BLD AUTO: 221 K/UL (ref 150–400)
PMV BLD AUTO: 11.4 FL (ref 8.9–12.9)
POTASSIUM SERPL-SCNC: 3.4 MMOL/L (ref 3.5–5.1)
PROT SERPL-MCNC: 8.2 G/DL (ref 6.4–8.2)
PROTHROMBIN TIME: 10.4 SEC (ref 9–11.1)
RBC # BLD AUTO: 4.82 M/UL (ref 3.8–5.2)
SODIUM SERPL-SCNC: 143 MMOL/L (ref 136–145)
THERAPEUTIC RANGE,PTTT: NORMAL SECS (ref 58–77)
TROPONIN I SERPL-MCNC: <0.05 NG/ML
TROPONIN I SERPL-MCNC: <0.05 NG/ML
WBC # BLD AUTO: 4.7 K/UL (ref 3.6–11)

## 2019-03-30 PROCEDURE — 93005 ELECTROCARDIOGRAM TRACING: CPT

## 2019-03-30 PROCEDURE — 36415 COLL VENOUS BLD VENIPUNCTURE: CPT

## 2019-03-30 PROCEDURE — 74011250637 HC RX REV CODE- 250/637: Performed by: EMERGENCY MEDICINE

## 2019-03-30 PROCEDURE — 99284 EMERGENCY DEPT VISIT MOD MDM: CPT

## 2019-03-30 PROCEDURE — 85025 COMPLETE CBC W/AUTO DIFF WBC: CPT

## 2019-03-30 PROCEDURE — 74011000250 HC RX REV CODE- 250: Performed by: EMERGENCY MEDICINE

## 2019-03-30 PROCEDURE — 85730 THROMBOPLASTIN TIME PARTIAL: CPT

## 2019-03-30 PROCEDURE — 71045 X-RAY EXAM CHEST 1 VIEW: CPT

## 2019-03-30 PROCEDURE — 83690 ASSAY OF LIPASE: CPT

## 2019-03-30 PROCEDURE — 84484 ASSAY OF TROPONIN QUANT: CPT

## 2019-03-30 PROCEDURE — 85610 PROTHROMBIN TIME: CPT

## 2019-03-30 PROCEDURE — 80053 COMPREHEN METABOLIC PANEL: CPT

## 2019-03-30 RX ORDER — SODIUM CHLORIDE 0.9 % (FLUSH) 0.9 %
5-40 SYRINGE (ML) INJECTION EVERY 8 HOURS
Status: DISCONTINUED | OUTPATIENT
Start: 2019-03-30 | End: 2019-03-30 | Stop reason: HOSPADM

## 2019-03-30 RX ORDER — HYDROCODONE BITARTRATE AND ACETAMINOPHEN 5; 325 MG/1; MG/1
1 TABLET ORAL
Status: COMPLETED | OUTPATIENT
Start: 2019-03-30 | End: 2019-03-30

## 2019-03-30 RX ORDER — SODIUM CHLORIDE 0.9 % (FLUSH) 0.9 %
5-40 SYRINGE (ML) INJECTION AS NEEDED
Status: DISCONTINUED | OUTPATIENT
Start: 2019-03-30 | End: 2019-03-30 | Stop reason: HOSPADM

## 2019-03-30 RX ADMIN — LIDOCAINE HYDROCHLORIDE 40 ML: 20 SOLUTION ORAL; TOPICAL at 10:43

## 2019-03-30 RX ADMIN — HYDROCODONE BITARTRATE AND ACETAMINOPHEN 1 TABLET: 5; 325 TABLET ORAL at 13:00

## 2019-03-30 NOTE — ED PROVIDER NOTES
EMERGENCY DEPARTMENT HISTORY AND PHYSICAL EXAM 
 
 
Date: 3/30/2019 Patient Name: Arnold Alarcon History of Presenting Illness Chief Complaint Patient presents with  Abdominal Pain  Chest Pain History Provided By: Patient HPI: Arnold Alarcon, 82 y.o. female with PMHx significant for hypertension, heart disease, reflux presents complaining of intermittent left arm, neck and chest pain for 3 days. She states the pain worsened last night and was rated at 8/10. She took 1 sublingual nitroglycerin with some relief. Her pain has reoccurred this morning but did not take a nitroglycerin. Her findings are mixed as she complains of pain with moving of her arm as well as belching. She states she has seen her cardiologist in the last year and may have had a stress test.  She has not had a cardiac catheterization in years. She denies any history of pulmonary embolism or aneurysm. There are no other complaints, changes, or physical findings at this time. PCP: Desmond Anderson MD 
 
No current facility-administered medications on file prior to encounter. Current Outpatient Medications on File Prior to Encounter Medication Sig Dispense Refill  losartan (COZAAR) 100 mg tablet TAKE 1 TABLET BY MOUTH EVERY DAY 90 Tab 0  
 rosuvastatin (CRESTOR) 10 mg tablet TAKE 1 TAB BY MOUTH NIGHTLY. 30 Tab 11  
 cetirizine (ZYRTEC) 10 mg tablet Take 1 tablet by mouth daily as needed (and rash) 90 Tab 1  
 pantoprazole (PROTONIX) 40 mg tablet Take 1 Tab by mouth two (2) times a day. Take 30 minutes prior to breakfast and 30 minutes prior to dinner. 60 Tab 11  
 metoprolol tartrate (LOPRESSOR) 25 mg tablet Take 1 tab by mouth at 9am and take 1 tab at 9pm.    
 isosorbide mononitrate ER (IMDUR) 30 mg tablet Take 1 Tab by mouth daily. 90 Tab 3  
 amLODIPine (NORVASC) 5 mg tablet Take 1 Tab by mouth two (2) times a day. Take at 9am and 9pm every day.  180 Tab 1  
  nitroglycerin (NITROSTAT) 0.4 mg SL tablet TAKE 1 TAB BY SUBLINGUAL ROUTE EVERY 5 MINUTES AS NEEDED. 100 Tab 1  
 furosemide (LASIX) 80 mg tablet TAKE 1 TABLET BY MOUTH EVERY MORNING AND TAKE 1/2 TABLET EVERY EVENING 135 Tab 3  
 simethicone (GAS RELIEF) 80 mg chewable tablet Take 80 mg by mouth every six (6) hours as needed for Flatulence.  diclofenac (VOLTAREN) 1 % gel Apply 2 g to affected area four (4) times daily. 100 g 3  potassium chloride SR (KLOR-CON 10) 10 mEq tablet TAKE 3 TABLETS IN THE MORNING AND 2 TABLETS IN THE EVENING BY MOUTH EVERY  Tab 3  
 hydrocortisone valerate (WEST-STEFFANIE) 0.2 % ointment APPLY TO AFFECTED AREA (FACE) TWICE A DAY AS NEEDED  1  
 mometasone (ELOCON) 0.1 % ointment APPLY TO AFFECTED AREA (SCALP) EVERY DAY AS NEEDED  2  
 cholecalciferol, vitamin D3, (VITAMIN D3) 2,000 unit tab Take 1 Tab by mouth daily.  acetaminophen (TYLENOL EXTRA STRENGTH) 500 mg tablet Take 1,000 mg by mouth every six (6) hours as needed for Pain.  Aspirin, Buffered 81 mg tab Take 81 mg by mouth daily.  clonazePAM (KLONOPIN) 1 mg tablet TAKE 1/2 (HALF) TO 1 TABLET IN THE MORNING AS NEEDED FOR ANXIETY AND 1 TABLET AT BEDTIME FOR SLEEP 60 Tab 3 Past History Past Medical History: 
Past Medical History:  
Diagnosis Date  Anxiety  Aortic stenosis 3/3/2010  Aortic stenosis  Arrhythmia MURMUR  Arthritis SPINAL STENOSIS  Atherosclerosis of coronary artery  Biliary dyskinesia  CHF (congestive heart failure) ([facility] 3/3/2010  CHF (congestive heart failure) ([address] 01/2002  Chronic heart failure ([facility] 1/2002; 3/3/2010  
 chronic diastolic heart failure  Chronic pain LOWER BACK, RIGHT KNEE  
 Environmental allergies 3/3/2010  GERD (gastroesophageal reflux disease) 3/3/2010  
 Hiatal hernia 3/3/2010  High cholesterol 3/3/2010  
 HTN (hypertension) 3/3/2010  Hypokalemia 3/3/2010  Ischemic heart disease, chronic  Obese  Psychiatric disorder   
 anxiety  S/P hysterectomy 3/3/2010  Spinal stenosis 3/3/2010 Past Surgical History: 
Past Surgical History:  
Procedure Laterality Date  CARDIAC CATHETERIZATION  01/2002  
 normal coronaries  DECOMPRESS DISC RF LUMBAR  07/2007  HX BACK SURGERY  5/1/2014  HX BREAST LUMPECTOMY Left 1989  
 benign left breast  
 HX BUNIONECTOMY  HX BUNIONECTOMY  HX HEART CATHETERIZATION  3/3/10  
[address]  HX LUMBAR DISKECTOMY  HX ORTHOPAEDIC Bilateral   
 BUNIONECTOMY  HX ORTHOPAEDIC Right   
 WRIST FX  
 HX OTHER SURGICAL  10/12/2015  
 mole removed from right side of face by Dr. La Nena Bajwa FLX [address] 1976 [address]  [id number] Dr Jadon Victoria  UPPER GI ENDOSCOPY,BIOPSY  2/27/2019 Family History: 
Family History Problem Relation Age of Onset  Hypertension Mother  Heart Disease Father  Stroke Sister  Heart Disease Sister  Cancer Brother LUNG  
 Cancer Brother PROSTATE  Hypertension Brother  No Known Problems Brother  Lung Disease Brother  No Known Problems Brother  No Known Problems Brother  Stroke Daughter 47  No Known Problems Daughter  Anesth Problems Neg Hx Social History: 
Social History Tobacco Use  Smoking status: Never Smoker  Smokeless tobacco: Never Used Substance Use Topics  Alcohol use: No  
  Alcohol/week: 0.0 oz  Drug use: No  
 
 
Allergies: Allergies Allergen Reactions  Bactrim [Sulfamethoxazole-Trimethoprim] Unknown (comments) Pt does not recall  Darvocet A500 [Propoxyphene N-Acetaminophen] Itching Review of Systems Review of Systems Constitutional: Negative. Negative for chills and fever. HENT: Negative. Negative for congestion and rhinorrhea. Respiratory: Positive for chest tightness. Negative for cough and wheezing. Cardiovascular: Positive for chest pain. Negative for palpitations. Gastrointestinal: Positive for abdominal distention. Negative for abdominal pain, constipation, nausea and vomiting. Endocrine: Negative. Genitourinary: Negative. Negative for decreased urine volume, flank pain, hematuria and pelvic pain. Musculoskeletal: Positive for arthralgias and neck pain. Negative for back pain. Skin: Negative. Negative for color change, pallor and rash. Neurological: Negative. Negative for dizziness, seizures, weakness, numbness and headaches. Hematological: Negative. Negative for adenopathy. Psychiatric/Behavioral: Negative. All other systems reviewed and are negative. Physical Exam  
Physical Exam  
Constitutional: She is oriented to person, place, and time. She appears well-developed and well-nourished. No distress. HENT:  
Head: Normocephalic and atraumatic. Mouth/Throat: No oropharyngeal exudate. Eyes: Pupils are equal, round, and reactive to light. Conjunctivae are normal. Right eye exhibits no discharge. Left eye exhibits no discharge. No scleral icterus. Neck: Normal range of motion. Neck supple. No JVD present. Muscular tenderness present. No spinous process tenderness present. No neck rigidity. Normal range of motion present. Cardiovascular: Normal rate, regular rhythm, normal heart sounds and intact distal pulses. Exam reveals no gallop and no friction rub. No murmur heard. Pulmonary/Chest: Effort normal and breath sounds normal. No stridor. No respiratory distress. She has no wheezes. She has no rales. She exhibits no tenderness. Abdominal: Soft. Normal aorta and bowel sounds are normal. She exhibits no distension, no pulsatile midline mass and no mass. There is no hepatosplenomegaly. There is no tenderness. There is no rebound, no guarding and no CVA tenderness. No hernia. Musculoskeletal:  
     Left shoulder: She exhibits tenderness and pain.  She exhibits normal range of motion, no bony tenderness, no swelling, no spasm, normal pulse and normal strength. Neurological: She is alert and oriented to person, place, and time. She displays normal reflexes. No cranial nerve deficit. She exhibits normal muscle tone. Coordination normal.  
Skin: Skin is warm. No rash noted. She is not diaphoretic. No pallor. Vitals reviewed. 11:40 AM patient states she feels somewhat better after GI cocktail. She is a difficult historian and has difficulty describing her symptoms. She otherwise appears in no distress. 12:47 PM patient states she is now having some shoulder pain. Will repeat EKG check a second troponin and treat her pain. Pain still seems atypical.  She denies any pain going into her back and is not hypertensive suggesting this is a catastrophic aortic issue. Her chest x-ray shows a normal-sized mediastinum 2:46 PM patient reexamined and no distress. Daughter is at the bedside and states this is been an ongoing issue and in fact she is being followed by gastrointestinal doctor and general surgery. Review of the records reveals she had an abnormal HIDA scan. The patient was offered observation admission and prefers to go home. She declined pain medicine Diagnostic Study Results Labs - Recent Results (from the past 12 hour(s)) EKG, 12 LEAD, INITIAL Collection Time: 03/30/19 10:11 AM  
Result Value Ref Range Ventricular Rate 68 BPM  
 Atrial Rate 68 BPM  
 P-R Interval 206 ms QRS Duration 128 ms Q-T Interval 422 ms QTC Calculation (Bezet) 448 ms Calculated P Axis 77 degrees Calculated R Axis -13 degrees Calculated T Axis 22 degrees Diagnosis Sinus rhythm with premature atrial complexes Right bundle branch block Voltage criteria for left ventricular hypertrophy Abnormal ECG When compared with ECG of 07-MAR-2018 11:07, 
premature atrial complexes are now present Radiologic Studies -  
 XR CHEST PORT    (Results Pending) CT Results  (Last 48 hours) None CXR Results  (Last 48 hours) None Medical Decision Making I am the first provider for this patient. I reviewed the vital signs, available nursing notes, past medical history, past surgical history, family history and social history. Vital Signs-Reviewed the patient's vital signs. Patient Vitals for the past 12 hrs: 
 Temp Pulse Resp BP SpO2  
03/30/19 0955 98.2 °F (36.8 °C) 68 18 144/72 93 % Pulse Oximetry Analysis - 100% on RA Cardiac Monitor:  
Rate:68 bpm 
Rhythm: Normal Sinus Rhythm 68 EKG interpretation: (Preliminary) Rhythm: normal sinus rhythm; and regular . Rate (approx.): 68; Axis: normal; VT interval: normal; QRS interval: normal ; ST/T wave: LVH; Other findings: left ventricular hypertrophy and RBB comparison EKG to March 7, 2018 reveals no change EKG #2 12:46 PM: normal EKG, normal sinus rhythm, unchanged from previous tracings, normal sinus rhythm, LBBB. 10:49 AM-records reveal the patient had a stress test Lexiscan on May 4, 2018 that was low probability for coronary disease Records Reviewed: Nursing Notes, Old Medical Records and Previous electrocardiograms Provider Notes (Medical Decision Making):  
Differential diagnosis-musculoskeletal pain, aortic aneurysm, coronary syndrome, esophageal reflux, hiatal hernia, biliary colic, peptic ulcer disease, cervical spine disease Impression/plan-82year-old female with history of hypertension and heart disease presents with atypical chest pain for 3 days. She has mixed findings including reproducibility of movement with pain with her left arm as well as neck pain. She did take a sublingual nitroglycerin last night with some relief. Records reveal she had a Lexiscan stress test in the last year that was normal.  Her initial EKG is stable.   We will initially treat with GI cocktail and proceed with cardiac rule out. And result will be to discuss case with cardiology and probable discharge. The patient and daughter will contact the primary care physician to facilitate getting closer follow-up to general surgery and gastroenterology. ED Course:  
Initial assessment performed. The patients presenting problems have been discussed, and they are in agreement with the care plan formulated and outlined with them. I have encouraged them to ask questions as they arise throughout their visit. Critical Care Time:  
0 Disposition: 
Home PLAN: 
1. Current Discharge Medication List  
  
 
2. Follow-up Information None Return to ED if worse Diagnosis Clinical Impression: 1. Atypical chest pain 2. Biliary dyskinesia Attestations:

## 2019-03-30 NOTE — ED TRIAGE NOTES
Patient presents to ED with c/o abdominal and chest pain for 3 days. Patient states that she took 1 nitro pill today.

## 2019-03-30 NOTE — DISCHARGE INSTRUCTIONS

## 2019-03-30 NOTE — ED NOTES
Discharge instructions reviewed with patient. Patient able to verbalize events which would require immediate follow up. Patient discharged via wheel chair.

## 2019-04-01 LAB
ATRIAL RATE: 56 BPM
ATRIAL RATE: 68 BPM
CALCULATED P AXIS, ECG09: 70 DEGREES
CALCULATED P AXIS, ECG09: 77 DEGREES
CALCULATED R AXIS, ECG10: -13 DEGREES
CALCULATED R AXIS, ECG10: -17 DEGREES
CALCULATED T AXIS, ECG11: 16 DEGREES
CALCULATED T AXIS, ECG11: 22 DEGREES
DIAGNOSIS, 93000: NORMAL
DIAGNOSIS, 93000: NORMAL
P-R INTERVAL, ECG05: 206 MS
P-R INTERVAL, ECG05: 214 MS
Q-T INTERVAL, ECG07: 422 MS
Q-T INTERVAL, ECG07: 446 MS
QRS DURATION, ECG06: 128 MS
QRS DURATION, ECG06: 132 MS
QTC CALCULATION (BEZET), ECG08: 430 MS
QTC CALCULATION (BEZET), ECG08: 448 MS
VENTRICULAR RATE, ECG03: 56 BPM
VENTRICULAR RATE, ECG03: 68 BPM

## 2019-04-02 ENCOUNTER — TELEPHONE (OUTPATIENT)
Dept: FAMILY MEDICINE CLINIC | Age: 84
End: 2019-04-02

## 2019-04-02 NOTE — TELEPHONE ENCOUNTER
----- Message from Jovany Ordoñez sent at 4/1/2019  2:44 PM EDT -----  Regarding: Dr. Gladys Candelaria   Pt requests a [facility] appt . Pt went to [facility] for shoulder and chest pain on 03/30/2019. Pt also would like to discuss possible issues with her gall bladder. Best contact number is [phone].

## 2019-04-12 RX ORDER — POTASSIUM CHLORIDE 750 MG/1
TABLET, FILM COATED, EXTENDED RELEASE ORAL
Qty: 150 TAB | Refills: 0 | Status: SHIPPED | OUTPATIENT
Start: 2019-04-12 | End: 2019-05-20 | Stop reason: SDUPTHER

## 2019-04-14 RX ORDER — METOPROLOL TARTRATE 25 MG/1
TABLET, FILM COATED ORAL
Qty: 60 TAB | Refills: 11 | Status: SHIPPED | OUTPATIENT
Start: 2019-04-14 | End: 2019-04-15

## 2019-04-14 NOTE — PROGRESS NOTES
HISTORY OF PRESENT ILLNESS  Xu Mckeon is a 82 y.o. female. HPI   Follow up from the ER on 3/30 for atypical chest pain and abd pain. Told that it was not her heart. Had negative stress lexiscan on last year. Has follow up with cardiology at the end of this month. Had EGD also on last monthwith finding of mild gastritis. Told that her stomach pain is from the gallstone. Needs to follow up with surgery. C/o left shoulder pain for the past few weeks. Sx comes and goes. Radiates to the upper arm. Increase pain with ROM. No injury noted. 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 Pain is 5-7/10 when at its worse. Has not taken anything for the pain. Increase pain at night. 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    Atypical chest pain R07.89    Essential hypertension I10    Gastroesophageal reflux disease without esophagitis K21.9    Atherosclerosis of native coronary artery without angina pectoris--diffuse small vsl disease via cath cath 2009--RCA PDA/ROSA I25.10    Chronic anxiety F41.9    Encounter for medication monitoring Z51.81    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 9) with comorbidity (HCC) E66.01    CKD (chronic kidney disease) stage 3, GFR 30-59 ml/min ([facility]) N18.3       Current Outpatient Medications   Medication Sig Dispense Refill    predniSONE (DELTASONE) 10 mg tablet Take 10 mg by mouth two (2) times a day for 5 days. 10 Tab 0    potassium chloride SR (KLOR-CON 10) 10 mEq tablet TAKE 3 TABLETS IN THE MORNING AND 2 TABLETS IN THE EVENING BY MOUTH EVERY  Tab 0    losartan (COZAAR) 100 mg tablet TAKE 1 TABLET BY MOUTH EVERY DAY 90 Tab 0    rosuvastatin (CRESTOR) 10 mg tablet TAKE 1 TAB BY MOUTH NIGHTLY. 30 Tab 11    cetirizine (ZYRTEC) 10 mg tablet Take 1 tablet by mouth daily as needed (and rash) 90 Tab 1    clonazePAM (KLONOPIN) 1 mg tablet TAKE 1/2 (HALF) TO 1 TABLET IN THE MORNING AS NEEDED FOR ANXIETY AND 1 TABLET AT BEDTIME FOR SLEEP 60 Tab 3    pantoprazole (PROTONIX) 40 mg tablet Take 1 Tab by mouth two (2) times a day. Take 30 minutes prior to breakfast and 30 minutes prior to dinner. 60 Tab 11    metoprolol tartrate (LOPRESSOR) 25 mg tablet Take 1 tab by mouth at 9am and take 1 tab at 9pm.      isosorbide mononitrate ER (IMDUR) 30 mg tablet Take 1 Tab by mouth daily. 90 Tab 3    amLODIPine (NORVASC) 5 mg tablet Take 1 Tab by mouth two (2) times a day. Take at 9am and 9pm every day. 180 Tab 1    nitroglycerin (NITROSTAT) 0.4 mg SL tablet TAKE 1 TAB BY SUBLINGUAL ROUTE EVERY 5 MINUTES AS NEEDED. 100 Tab 1    furosemide (LASIX) 80 mg tablet TAKE 1 TABLET BY MOUTH EVERY MORNING AND TAKE 1/2 TABLET EVERY EVENING 135 Tab 3    simethicone (GAS RELIEF) 80 mg chewable tablet Take 80 mg by mouth every six (6) hours as needed for Flatulence.  diclofenac (VOLTAREN) 1 % gel Apply 2 g to affected area four (4) times daily. 100 g 3    mometasone (ELOCON) 0.1 % ointment APPLY TO AFFECTED AREA (SCALP) EVERY DAY AS NEEDED  2    cholecalciferol, vitamin D3, (VITAMIN D3) 2,000 unit tab Take 1 Tab by mouth daily.       acetaminophen (TYLENOL EXTRA STRENGTH) 500 mg tablet Take 1,000 mg by mouth every six (6) hours as needed for Pain.  Aspirin, Buffered 81 mg tab Take 81 mg by mouth daily.          Allergies   Allergen Reactions    Bactrim [Sulfamethoxazole-Trimethoprim] Unknown (comments)     Pt does not recall    Darvocet A500 [Propoxyphene N-Acetaminophen] Itching       Past Medical History:   Diagnosis Date    Anxiety     Aortic stenosis 3/3/2010    Aortic stenosis     Arrhythmia     MURMUR    Arthritis     SPINAL STENOSIS    Atherosclerosis of coronary artery     Biliary dyskinesia     CHF (congestive heart failure) ([facility] [address] 3/3/2010    CHF (congestive heart failure) ([facility] [address] 01/2002    Chronic heart failure ([facility] [address] 1/2002; 3/3/2010    chronic diastolic heart failure    Chronic pain     LOWER BACK, RIGHT KNEE    Environmental allergies 3/3/2010    GERD (gastroesophageal reflux disease) 3/3/2010    Hiatal hernia 3/3/2010    High cholesterol 3/3/2010    HTN (hypertension) 3/3/2010    Hypokalemia 3/3/2010    Ischemic heart disease, chronic     Obese     Psychiatric disorder     anxiety    S/P hysterectomy 3/3/2010    Spinal stenosis 3/3/2010       Past Surgical History:   Procedure Laterality Date    CARDIAC CATHETERIZATION  01/2002    normal coronaries    DECOMPRESS DISC RF LUMBAR  07/2007    HX BACK SURGERY  5/1/2014    HX BREAST LUMPECTOMY Left 1989    benign left breast    HX BUNIONECTOMY      HX BUNIONECTOMY      HX HEART CATHETERIZATION  3/3/10    HX HYSTERECTOMY  1978    HX LUMBAR DISKECTOMY      HX ORTHOPAEDIC Bilateral     BUNIONECTOMY    HX ORTHOPAEDIC Right     WRIST FX    HX OTHER SURGICAL  10/12/2015    mole removed from right side of face by Dr. Helene Espana FLX DX W/[facility]LLJ [facility] WHEN PFRMD  [id number]    Dr Eliel Villafana GI ENDOSCOPY,BIOPSY  2/27/2019            Family History   Problem Relation Age of Onset    Hypertension Mother     Heart Disease Father     Stroke Sister     Heart Disease Sister     Cancer Brother         LUNG    Cancer Brother PROSTATE    Hypertension Brother     No Known Problems Brother     Lung Disease Brother     No Known Problems Brother     No Known Problems Brother     Stroke Daughter 47    No Known Problems Daughter     Anesth Problems Neg Hx        Social History     Tobacco Use    Smoking status: Never Smoker    Smokeless tobacco: Never Used   Substance Use Topics    Alcohol use: No     Alcohol/week: 0.0 oz        Lab Results   Component Value Date/Time    WBC 4.7 03/30/2019 10:54 AM    HGB 13.9 03/30/2019 10:54 AM    HCT 43.1 03/30/2019 10:54 AM    PLATELET 890 88/61/9635 10:54 AM    MCV 89.4 03/30/2019 10:54 AM     Lab Results   Component Value Date/Time    Cholesterol, total 179 02/11/2019 11:24 AM    HDL Cholesterol 58 02/11/2019 11:24 AM    LDL, calculated 100 (H) 02/11/2019 11:24 AM    Triglyceride 103 02/11/2019 11:24 AM    CHOL/HDL Ratio 3.1 11/01/2010 05:26 PM     Lab Results   Component Value Date/Time    Sodium 143 03/30/2019 10:54 AM    Potassium 3.4 (L) 03/30/2019 10:54 AM    Chloride 103 03/30/2019 10:54 AM    CO2 32 03/30/2019 10:54 AM    Anion gap 8 03/30/2019 10:54 AM    Glucose 95 03/30/2019 10:54 AM    BUN 6 03/30/2019 10:54 AM    Creatinine 1.04 (H) 03/30/2019 10:54 AM    BUN/Creatinine ratio 6 (L) 03/30/2019 10:54 AM    GFR est AA >60 03/30/2019 10:54 AM    GFR est non-AA 51 (L) 03/30/2019 10:54 AM    Calcium 8.9 03/30/2019 10:54 AM    Bilirubin, total 0.6 03/30/2019 10:54 AM    ALT (SGPT) 19 03/30/2019 10:54 AM    AST (SGOT) 19 03/30/2019 10:54 AM    Alk. phosphatase 86 03/30/2019 10:54 AM    Protein, total 8.2 03/30/2019 10:54 AM    Albumin 3.4 (L) 03/30/2019 10:54 AM    Globulin 4.8 (H) 03/30/2019 10:54 AM    A-G Ratio 0.7 (L) 03/30/2019 10:54 AM         Review of Systems   Constitutional: Negative for malaise/fatigue. HENT: Negative for congestion. Eyes: Negative for blurred vision. Respiratory: Negative for cough and shortness of breath.     Cardiovascular: Negative for chest pain, palpitations and leg swelling. Gastrointestinal: Positive for abdominal pain. Negative for constipation and heartburn. Genitourinary: Negative for dysuria, frequency and urgency. Musculoskeletal: Positive for joint pain. Negative for back pain. Neurological: Negative for dizziness, tingling and headaches. Endo/Heme/Allergies: Negative for environmental allergies. Psychiatric/Behavioral: Negative for depression. The patient does not have insomnia. Physical Exam   Constitutional: She appears well-developed and well-nourished. /64 (BP 1 Location: Right arm, BP Patient Position: Sitting)   Pulse 60   Temp 97 °F (36.1 °C) (Oral)   Resp 16   Ht 5' 2\" (1.575 m)   Wt 180 lb 9.6 oz (81.9 kg)   LMP  (LMP Unknown)   SpO2 96%   BMI 33.03 kg/m²    HENT:   Right Ear: Tympanic membrane and ear canal normal.   Left Ear: Tympanic membrane and ear canal normal.   Nose: No mucosal edema or rhinorrhea. Mouth/Throat: Oropharynx is clear and moist and mucous membranes are normal.   Neck: Normal range of motion. Neck supple. No thyromegaly present. Cardiovascular: Normal rate, regular rhythm and normal heart sounds. Exam reveals no gallop. Pulmonary/Chest: Effort normal and breath sounds normal.   Abdominal: Soft. Normal appearance and bowel sounds are normal. She exhibits no mass. There is tenderness in the right upper quadrant. Musculoskeletal: She exhibits no edema. Left shoulder: She exhibits decreased range of motion, tenderness, bony tenderness and pain. She exhibits no spasm, normal pulse and normal strength. Lymphadenopathy:     She has no cervical adenopathy. Skin: Skin is warm and dry. Psychiatric: She has a normal mood and affect. Nursing note and vitals reviewed. ASSESSMENT and PLAN  Diagnoses and all orders for this visit:    1. Right upper quadrant abdominal pain  2. Gallstones  -     REFERRAL TO GENERAL SURGERY    3. Atypical chest pain  Improved.   Continue with her follow up with cardiology. 4. Arthralgia of shoulder region, left  -     predniSONE (DELTASONE) 10 mg tablet; Take 10 mg by mouth two (2) times a day for 5 days. Instructions for exercises given and reviewed with pt. Pt also to use heat to the area 3-4 times a day over the next several days until sx are improved. Follow-up and Dispositions    · Return in about 3 months (around 7/15/2019). reviewed diet, exercise and weight control  cardiovascular risk and specific lipid/LDL goals reviewed  reviewed medications and side effects in detail    I have discussed diagnosis listed in this note with pt and/or family. I have discussed treatment plans and options and the risk/benefit analysis of those options, including safe use of medications and possible medication side effects. Through the use of shared decision making we have agreed to the above plan. The patient has received an after-visit summary and questions were answered concerning future plans and follow up. Advise pt of any urgent changes then to proceed to the ER.

## 2019-04-15 ENCOUNTER — OFFICE VISIT (OUTPATIENT)
Dept: FAMILY MEDICINE CLINIC | Age: 84
End: 2019-04-15

## 2019-04-15 VITALS
HEART RATE: 60 BPM | RESPIRATION RATE: 16 BRPM | DIASTOLIC BLOOD PRESSURE: 64 MMHG | SYSTOLIC BLOOD PRESSURE: 123 MMHG | OXYGEN SATURATION: 96 % | TEMPERATURE: 97 F | BODY MASS INDEX: 33.23 KG/M2 | HEIGHT: 62 IN | WEIGHT: 180.6 LBS

## 2019-04-15 DIAGNOSIS — K80.20 GALLSTONES: ICD-10-CM

## 2019-04-15 DIAGNOSIS — R10.11 RIGHT UPPER QUADRANT ABDOMINAL PAIN: Primary | ICD-10-CM

## 2019-04-15 DIAGNOSIS — M25.512 ARTHRALGIA OF SHOULDER REGION, LEFT: ICD-10-CM

## 2019-04-15 DIAGNOSIS — R07.89 ATYPICAL CHEST PAIN: ICD-10-CM

## 2019-04-15 RX ORDER — PREDNISONE 10 MG/1
10 TABLET ORAL 2 TIMES DAILY
Qty: 10 TAB | Refills: 0 | Status: SHIPPED | OUTPATIENT
Start: 2019-04-15 | End: 2019-04-20

## 2019-04-15 NOTE — PROGRESS NOTES
Chief Complaint   Patient presents with   [facility] Follow Up     follow up [facility] 3/30/2019 for abd pain and chest pain     Patient states she was told by the ER MD that it was lela gallbladder. Patient reports she had been eating greasy foods. Colonoscopy 8/8/2002 by Dr. Zakia Jacobs. Patient states she does not want another colonoscopy. 1. Have you been to the ER, urgent care clinic since your last visit? Hospitalized since your last visit? Yes 3/30/2019 [facility]    2. Have you seen or consulted any other health care providers outside of the [address] [place] [address] since your last visit? Include any pap smears or colon screening.  No

## 2019-04-22 DIAGNOSIS — R07.9 CHEST PAIN, UNSPECIFIED TYPE: ICD-10-CM

## 2019-04-22 RX ORDER — NITROGLYCERIN 0.4 MG/1
TABLET SUBLINGUAL
Qty: 25 TAB | Refills: 3 | Status: SHIPPED | OUTPATIENT
Start: 2019-04-22 | End: 2020-06-10

## 2019-04-22 NOTE — TELEPHONE ENCOUNTER
Request for NTG 0.4mg SL PRN. Last office visit 4/15/19, next office visit 7/17/19.   Refill pending for MD approval.

## 2019-04-22 NOTE — TELEPHONE ENCOUNTER
----- Message from Carter Bliss sent at 4/22/2019  9:55 AM EDT -----  Regarding: Dr. Carr Dad Refill  Pt requested one month refill of \"Nitroglycerin 0.4mg\". Pt indicated that the pharmacy faxed the request last week and has not received a response. Pt best contact number is [phone].

## 2019-04-30 ENCOUNTER — OFFICE VISIT (OUTPATIENT)
Dept: CARDIOLOGY CLINIC | Age: 84
End: 2019-04-30

## 2019-04-30 VITALS
SYSTOLIC BLOOD PRESSURE: 89 MMHG | HEIGHT: 62 IN | OXYGEN SATURATION: 94 % | RESPIRATION RATE: 18 BRPM | HEART RATE: 68 BPM | WEIGHT: 182 LBS | DIASTOLIC BLOOD PRESSURE: 55 MMHG | BODY MASS INDEX: 33.49 KG/M2

## 2019-04-30 DIAGNOSIS — M48.00 SPINAL STENOSIS, UNSPECIFIED SPINAL REGION: ICD-10-CM

## 2019-04-30 DIAGNOSIS — E78.2 MIXED HYPERLIPIDEMIA: ICD-10-CM

## 2019-04-30 DIAGNOSIS — R07.9 CHEST PAIN WITH NORMAL ANGIOGRAPHY: ICD-10-CM

## 2019-04-30 DIAGNOSIS — I25.9 CHRONIC ISCHEMIC HEART DISEASE: ICD-10-CM

## 2019-04-30 DIAGNOSIS — I50.32 CHRONIC DIASTOLIC HEART FAILURE (HCC): ICD-10-CM

## 2019-04-30 DIAGNOSIS — E66.01 SEVERE OBESITY (BMI 35.0-39.9) WITH COMORBIDITY (HCC): ICD-10-CM

## 2019-04-30 DIAGNOSIS — I10 ESSENTIAL HYPERTENSION: Chronic | ICD-10-CM

## 2019-04-30 DIAGNOSIS — R07.9 CHEST PAIN AT REST: Primary | ICD-10-CM

## 2019-04-30 DIAGNOSIS — N18.30 CKD (CHRONIC KIDNEY DISEASE) STAGE 3, GFR 30-59 ML/MIN (HCC): ICD-10-CM

## 2019-04-30 DIAGNOSIS — K21.9 GASTROESOPHAGEAL REFLUX DISEASE WITHOUT ESOPHAGITIS: ICD-10-CM

## 2019-04-30 DIAGNOSIS — F41.9 CHRONIC ANXIETY: ICD-10-CM

## 2019-04-30 NOTE — PROGRESS NOTES
[facility]   [address] [facility], [address] [place] [address]                                          NEW PATIENT HPI/FOLLOW-UP      NAME:  Deo Bowling   :   1935   MRN:   [id number]   PCP:  Richard Wall MD           Subjective: The patient is a 82y.o. year old female  who returns for a routine follow-up and cardiac clearance prior to lap susanne. Since the last visit, patient reports no new symptoms. Intermittent atypical left costochondral and precordial chest pain/tenderness persists. Denies change in exercise tolerance, edema, medication intolerance, palpitations, shortness of breath, PND/orthopnea wheezing, sputum, syncope, dizziness or light headedness. Had EGD recently which revealed gastritis. Review of Systems  General: Pt denies excessive weight gain or loss. Pt is able to conduct ADL's. Respiratory: Denies shortness of breath, RICHARD, wheezing or stridor.   Cardiovascular:+ precordial pain, palpitations, edema or PND  Gastrointestinal: Denies poor appetite,++ indigestion, ++abdominal pain or blood in stool  Peripheral vascular: Denies claudication, leg cramps  Neuropsychiatric: Denies paresthesias,tingling,numbness,++anxiety,depression,fatigue  Musculoskeletal: Denies pain,tenderness, soreness,swelling      Past Medical History:   Diagnosis Date    Anxiety     Aortic stenosis 3/3/2010    Aortic stenosis     Arrhythmia     MURMUR    Arthritis     SPINAL STENOSIS    Atherosclerosis of coronary artery     Biliary dyskinesia     CHF (congestive heart failure) ([address] 3/3/2010    CHF (congestive heart failure) ([address] 2002    Chronic heart failure ([address] 2002; 3/3/2010    chronic diastolic heart failure    Chronic pain     LOWER BACK, RIGHT KNEE    Environmental allergies 3/3/2010    GERD (gastroesophageal reflux disease) 3/3/2010    Hiatal hernia 3/3/2010    High cholesterol 3/3/2010    HTN (hypertension) 3/3/2010    Hypokalemia 3/3/2010    Ischemic heart disease, chronic     Obese     Psychiatric disorder     anxiety    S/P hysterectomy 3/3/2010    Spinal stenosis 3/3/2010     Patient Active Problem List    Diagnosis Date Noted    Chest pain with normal angiography--2002;normal NST 2018 04/30/2019    CKD (chronic kidney disease) stage 3, GFR 30-59 ml/min ([facility]) 10/30/2018    Severe obesity (BMI 35.0-39. 9) with comorbidity (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 02/12/2016    Encounter for medication monitoring 11/29/2015    Essential hypertension 07/12/2015    Gastroesophageal reflux disease without esophagitis 07/12/2015    Atherosclerosis of native coronary artery without angina pectoris--diffuse small vsl disease via cath cath 2009--RCA PDA/ROSA 07/12/2015    Chronic anxiety 07/12/2015    Atypical chest pain 02/24/2014    Chest pain at rest 02/20/2014    Bifascicular bundle branch block--RBBB,IRVING 02/20/2014    Chest pain, unspecified 02/11/2013    Chronic ischemic heart disease 05/29/2012    Mixed hyperlipidemia 05/29/2012    Chronic diastolic heart failure ([facility] [address] [facility] 05/29/2012    Hypovitaminosis D 07/28/2010    Hypokalemia 03/03/2010    Hiatal hernia 03/03/2010    Anxiety 03/03/2010    S/P hysterectomy 03/03/2010    Aortic stenosis, mild 03/03/2010    Spinal stenosis 03/03/2010    S/P foot surgery 03/03/2010    Environmental allergies 03/03/2010    S/P cardiac catheterization 03/03/2010      Past Surgical History:   Procedure Laterality Date    CARDIAC CATHETERIZATION  01/2002    normal coronaries    DECOMPRESS DISC RF LUMBAR  07/2007    HX BACK SURGERY  5/1/2014    HX BREAST LUMPECTOMY Left 1989    benign left breast    HX BUNIONECTOMY      HX BUNIONECTOMY      HX HEART CATHETERIZATION  3/3/10    HX HYSTERECTOMY  1978    HX LUMBAR DISKECTOMY      HX ORTHOPAEDIC Bilateral     BUNIONECTOMY    HX ORTHOPAEDIC Right     WRIST FX    HX OTHER SURGICAL  10/12/2015    mole removed from right side of face by Dr. Jorge Luis Subramanian FLX DX Sherraul Andersgrim PFRMD  [id number]    Dr Ninoska Mondragon UPPER GI ENDOSCOPY,BIOPSY  2/27/2019          Allergies   Allergen Reactions    Bactrim [Sulfamethoxazole-Trimethoprim] Unknown (comments)     Pt does not recall    Darvocet A500 [Propoxyphene N-Acetaminophen] Itching      Family History   Problem Relation Age of Onset    Hypertension Mother     Heart Disease Father     Stroke Sister     Heart Disease Sister     Cancer Brother         LUNG    Cancer Brother         PROSTATE    Hypertension Brother     No Known Problems Brother     Lung Disease Brother     No Known Problems Brother     No Known Problems Brother     Stroke Daughter 47    No Known Problems Daughter     Anesth Problems Neg Hx       Social History     Socioeconomic History    Marital status:      Spouse name: Not on file    Number of children: Not on file    Years of education: Not on file    Highest education level: Not on file   Occupational History    Not on file   Social Needs    Financial resource strain: Not on file    Food insecurity:     Worry: Not on file     Inability: Not on file    Transportation needs:     Medical: Not on file     Non-medical: Not on file   Tobacco Use    Smoking status: Never Smoker    Smokeless tobacco: Never Used   Substance and Sexual Activity    Alcohol use: No     Alcohol/week: 0.0 oz    Drug use: No    Sexual activity: Not Currently   Lifestyle    Physical activity:     Days per week: Not on file     Minutes per session: Not on file    Stress: Not on file   Relationships    Social connections:     Talks on phone: Not on file     Gets together: Not on file     Attends Religion service: Not on file     Active member of club or organization: Not on file     Attends meetings of clubs or organizations: Not on file     Relationship status: Not on file    Intimate partner violence:     Fear of current or ex partner: Not on file     Emotionally abused: Not on file     Physically abused: Not on file     Forced sexual activity: Not on file   Other Topics Concern    Not on file   Social History Narrative    Not on file      Current Outpatient Medications   Medication Sig    nitroglycerin (NITROSTAT) 0.4 mg SL tablet TAKE 1 TAB BY SUBLINGUAL ROUTE EVERY 5 MINUTES AS NEEDED.  potassium chloride SR (KLOR-CON 10) 10 mEq tablet TAKE 3 TABLETS IN THE MORNING AND 2 TABLETS IN THE EVENING BY MOUTH EVERY DAY    losartan (COZAAR) 100 mg tablet TAKE 1 TABLET BY MOUTH EVERY DAY    rosuvastatin (CRESTOR) 10 mg tablet TAKE 1 TAB BY MOUTH NIGHTLY.  clonazePAM (KLONOPIN) 1 mg tablet TAKE 1/2 (HALF) TO 1 TABLET IN THE MORNING AS NEEDED FOR ANXIETY AND 1 TABLET AT BEDTIME FOR SLEEP    pantoprazole (PROTONIX) 40 mg tablet Take 1 Tab by mouth two (2) times a day. Take 30 minutes prior to breakfast and 30 minutes prior to dinner.  isosorbide mononitrate ER (IMDUR) 30 mg tablet Take 1 Tab by mouth daily.  amLODIPine (NORVASC) 5 mg tablet Take 1 Tab by mouth two (2) times a day. Take at 9am and 9pm every day.  furosemide (LASIX) 80 mg tablet TAKE 1 TABLET BY MOUTH EVERY MORNING AND TAKE 1/2 TABLET EVERY EVENING    simethicone (GAS RELIEF) 80 mg chewable tablet Take 80 mg by mouth every six (6) hours as needed for Flatulence.  diclofenac (VOLTAREN) 1 % gel Apply 2 g to affected area four (4) times daily.  mometasone (ELOCON) 0.1 % ointment APPLY TO AFFECTED AREA (SCALP) EVERY DAY AS NEEDED    cholecalciferol, vitamin D3, (VITAMIN D3) 2,000 unit tab Take 1 Tab by mouth daily.  acetaminophen (TYLENOL EXTRA STRENGTH) 500 mg tablet Take 1,000 mg by mouth every six (6) hours as needed for Pain.     Aspirin, Buffered 81 mg tab Take 81 mg by mouth daily.  cetirizine (ZYRTEC) 10 mg tablet Take 1 tablet by mouth daily as needed (and rash)    metoprolol tartrate (LOPRESSOR) 25 mg tablet Take 1 tab by mouth at 9am and take 1 tab at 9pm.     No current facility-administered medications for this visit. I have reviewed the nurses notes, vitals, problem list, allergy list, medical history, family medical, social history and medications. Objective:     Physical Exam:     Vitals:    04/30/19 1025 04/30/19 1035   BP: 112/55 (!) 89/55   Pulse: 68    Resp: 18    SpO2: 94%    Weight: 182 lb (82.6 kg)    Height: 5' 2\" (1.575 m)     Body mass index is 33.29 kg/m². General: Well developed, in no acute distress. HEENT: No carotid bruits, no JVD, trach is midline. Heart:  Normal S1/S2 negative S3 or S4. Regular, no murmur, gallop or rub.   Respiratory: Clear bilaterally, no wheezing or rales  Abdomen:   Soft, non-tender, bowel sounds are active.   Extremities:  No edema, normal cap refill, no cyanosis. Neuro: A&Ox3, speech clear, gait stable. Skin: Skin color is normal. No rashes or lesions. No diaphoresis.   Vascular: 2+ pulses symmetric in all extremities        Data Review:       Cardiographics:    EKG: NSR,RBBB,IRVING,1st degree AV HB    Cardiology Labs:    Results for orders placed or performed during the hospital encounter of 03/30/19   EKG, 12 LEAD, INITIAL   Result Value Ref Range    Ventricular Rate 56 BPM    Atrial Rate 56 BPM    P-R Interval 214 ms    QRS Duration 132 ms    Q-T Interval 446 ms    QTC Calculation (Bezet) 430 ms    Calculated P Axis 70 degrees    Calculated R Axis -17 degrees    Calculated T Axis 16 degrees    Diagnosis       Sinus bradycardia with 1st degree AV block  Right bundle branch block  Voltage criteria for left ventricular hypertrophy  Cannot rule out Septal infarct , age undetermined      Confirmed by Jamel Delacruz ([id number]) on 4/1/2019 8:42:23 AM         Lab Results   Component Value Date/Time Cholesterol, total 179 02/11/2019 11:24 AM    HDL Cholesterol 58 02/11/2019 11:24 AM    LDL, calculated 100 (H) 02/11/2019 11:24 AM    Triglyceride 103 02/11/2019 11:24 AM    CHOL/HDL Ratio 3.1 11/01/2010 05:26 PM       Lab Results   Component Value Date/Time    Sodium 143 03/30/2019 10:54 AM    Potassium 3.4 (L) 03/30/2019 10:54 AM    Chloride 103 03/30/2019 10:54 AM    CO2 32 03/30/2019 10:54 AM    Anion gap 8 03/30/2019 10:54 AM    Glucose 95 03/30/2019 10:54 AM    BUN 6 03/30/2019 10:54 AM    Creatinine 1.04 (H) 03/30/2019 10:54 AM    BUN/Creatinine ratio 6 (L) 03/30/2019 10:54 AM    GFR est AA >60 03/30/2019 10:54 AM    GFR est non-AA 51 (L) 03/30/2019 10:54 AM    Calcium 8.9 03/30/2019 10:54 AM    Bilirubin, total 0.6 03/30/2019 10:54 AM    AST (SGOT) 19 03/30/2019 10:54 AM    Alk. phosphatase 86 03/30/2019 10:54 AM    Protein, total 8.2 03/30/2019 10:54 AM    Albumin 3.4 (L) 03/30/2019 10:54 AM    Globulin 4.8 (H) 03/30/2019 10:54 AM    A-G Ratio 0.7 (L) 03/30/2019 10:54 AM    ALT (SGPT) 19 03/30/2019 10:54 AM          Assessment:       ICD-10-CM ICD-9-CM    1. Chest pain at rest R07.9 786.50 NUCLEAR CARDIAC STRESS TEST   2. Essential hypertension I10 401.9 AMB POC EKG ROUTINE W/ 12 LEADS, INTER & REP   3. Mixed hyperlipidemia E78.2 272.2    4. Gastroesophageal reflux disease without esophagitis K21.9 530.81    5. Chronic diastolic heart failure (HCC) I50.32 428.32 NUCLEAR CARDIAC STRESS TEST   6. Chronic anxiety F41.9 300.00    7. Spinal stenosis, unspecified spinal region M48.00 724.00    8. Chest pain with normal angiography--2002;normal NST 2018 R07.9 786.50 NUCLEAR CARDIAC STRESS TEST   9. CKD (chronic kidney disease) stage 3, GFR 30-59 ml/min ([facility]) N18.3 585.3    10. Chronic ischemic heart disease I25.9 414.9    11. Severe obesity (BMI 35.0-39. 9) with comorbidity ([facility] E66.01 278.01          Discussion: Patient presents at this time for lap susanne cardiac clearance in setting of ongoing atypical chest pain which has been thoroughly evaluated in past with numerous previous negative NST as recently as 2018. Has documented normal CA's via cardiac cath 2009 except for moderate-severe diffuse small vsl disease distal PDA/ROSA of RCA--too small for stenting/cabg. Will repeat NST to reassess coronary perfusion and establish surgical risk and obtain echo as well. Plan: 1. Continue same meds. Lipid profile and labs followed by PCP. 2.Encouraged to exercise to tolerance, lose weight and follow low fat, low carb, low cholesterol, low sodium predominantly Plant-based (consider Mediterranean) diet. Call with questions or concerns. Will follow up any test results by phone and/or f/u here in office if needed. Edin Daly 3.Follow up: 6 months    I have discussed the diagnosis with the patient and the intended plan as seen in the above orders. The patient has received an after-visit summary and questions were answered concerning future plans. I have discussed any concerning medication side effects and warnings with the patient as well.     Cris Pitts MD  4/30/2019

## 2019-04-30 NOTE — PROGRESS NOTES
Chief Complaint   Patient presents with    Follow-up    Hypertension     1. Have you been to the ER, urgent care clinic since your last visit? Hospitalized since your last visit? No    2. Have you seen or consulted any other health care providers outside of the [address] [place] [address] since your last visit? Include any pap smears or colon screening.  No

## 2019-05-02 ENCOUNTER — OFFICE VISIT (OUTPATIENT)
Dept: SURGERY | Age: 84
End: 2019-05-02

## 2019-05-02 VITALS
DIASTOLIC BLOOD PRESSURE: 90 MMHG | SYSTOLIC BLOOD PRESSURE: 145 MMHG | WEIGHT: 185.1 LBS | HEIGHT: 62 IN | OXYGEN SATURATION: 93 % | TEMPERATURE: 97.5 F | RESPIRATION RATE: 18 BRPM | HEART RATE: 67 BPM | BODY MASS INDEX: 34.06 KG/M2

## 2019-05-02 DIAGNOSIS — K81.1 CHRONIC CHOLECYSTITIS: Primary | ICD-10-CM

## 2019-05-02 NOTE — PROGRESS NOTES
Discussed advanced directive. Patient states that she does not have an advanced directive. 1. Have you been to the ER, urgent care clinic since your last visit? Hospitalized since your last visit? No    2. Have you seen or consulted any other health care providers outside of the [address] [place] [address] since your last visit? Include any pap smears or colon screening.  No

## 2019-05-02 NOTE — PROGRESS NOTES
To: Ean Tucker MD , Larry Calix MD    From: Donalee Leventhal, MD    Thank you for sending Malcolm Smith back to see us. Nice to see her again. Encounter Date: 5/2/2019    Subjective: Senthil Chavez is a 82 y.o. female presents for f/u of upper abdominal pain. Points to RUQ then across epigastrium to LUQ. Says it happens a few times per week. Worse with fried food. No n/v. We saw her in April of last year for similar symptoms and then got a HIDA which did show likely chronic cholecystitis. Surgery was scheduled but she cancelled because of URI then did not pursue rescheduling. See Dr. Kasie Santos and is have a stress test 5/14/19. Also admits to problems with constipation. Objective:     Visit Vitals  /90 (BP 1 Location: Left arm, BP Patient Position: Sitting)   Pulse 67   Temp 97.5 °F (36.4 °C)   Resp 18   Ht 5' 2\" (1.575 m)   Wt 84 kg (185 lb 1.6 oz)   SpO2 93%   BMI 33.86 kg/m²       General:  alert, cooperative, no distress, appears stated age   Abdomen: soft, bowel sounds active, non-tender    Lower midline scar. Assessment:     Chronic cholecystitis. Will await stress test and recs byt Dr. Kasie Santos. Plan:     If ok to proceed with plan lap susanne with cholangiogram.    Plan for 23hr admit.       Donalee Leventhal, MD

## 2019-05-15 ENCOUNTER — TELEPHONE (OUTPATIENT)
Dept: CARDIOLOGY CLINIC | Age: 84
End: 2019-05-15

## 2019-05-15 NOTE — TELEPHONE ENCOUNTER
----- Message from Jose Hill MD sent at 5/15/2019 12:18 AM EDT -----  Regarding: nuke  Normal    B  ----- Message -----  From: Lewis Kathleen MD  Sent: 5/14/2019  12:50 PM  To:  Jose Hill MD

## 2019-05-16 ENCOUNTER — TELEPHONE (OUTPATIENT)
Dept: CARDIOLOGY CLINIC | Age: 84
End: 2019-05-16

## 2019-05-16 NOTE — TELEPHONE ENCOUNTER
Spoke with patient. Verified patient with two patient identifiers. Advised Nuc EST normal.  Patient verbalized understanding.       NU   Received: Yesterday   Message Contents   Rian Griffin MD sent to Carroll Robins LPN           NORMAL     B

## 2019-05-20 RX ORDER — POTASSIUM CHLORIDE 750 MG/1
TABLET, FILM COATED, EXTENDED RELEASE ORAL
Qty: 150 TAB | Refills: 0 | Status: SHIPPED | OUTPATIENT
Start: 2019-05-20 | End: 2019-06-18 | Stop reason: SDUPTHER

## 2019-05-21 ENCOUNTER — TELEPHONE (OUTPATIENT)
Dept: FAMILY MEDICINE CLINIC | Age: 84
End: 2019-05-21

## 2019-05-21 NOTE — TELEPHONE ENCOUNTER
----- Message from Chandana Dugan sent at 5/21/2019 10:39 AM EDT -----  Regarding: DR Angela Bell / Gilbert Waller   Pt is returning call to office.    Best contact: [phone]

## 2019-05-21 NOTE — TELEPHONE ENCOUNTER
Patient has a rash on nose and face, stated we prescribed Zyrtec before for the rash but stopped taking when rash was gone, advised her to start taking again, she wanted to know about a cream for the face. Rash itches.

## 2019-05-24 ENCOUNTER — TELEPHONE (OUTPATIENT)
Dept: FAMILY MEDICINE CLINIC | Age: 84
End: 2019-05-24

## 2019-05-24 NOTE — TELEPHONE ENCOUNTER
Advised patient per Dr. Alejo Alvarado , it is ok to use Cortizone on face for 4 days, a very thin layer twice a day, call office on Friday to give update.      LEFT MESSAGE

## 2019-05-28 ENCOUNTER — TELEPHONE (OUTPATIENT)
Dept: FAMILY MEDICINE CLINIC | Age: 84
End: 2019-05-28

## 2019-05-28 NOTE — TELEPHONE ENCOUNTER
Spoke to patient , she stated she did not get cortizone or cortaid for face, face still has rash, Dr. Joshua Lofton office is too far for patient to go.

## 2019-05-28 NOTE — TELEPHONE ENCOUNTER
Patient called stating that she would like for the nurse to give her a call back about a medication and have the nurse relay the message to the doctor. Patient can be reached at [phone].

## 2019-05-29 ENCOUNTER — OFFICE VISIT (OUTPATIENT)
Dept: FAMILY MEDICINE CLINIC | Age: 84
End: 2019-05-29

## 2019-05-29 VITALS
DIASTOLIC BLOOD PRESSURE: 83 MMHG | SYSTOLIC BLOOD PRESSURE: 136 MMHG | TEMPERATURE: 97.2 F | BODY MASS INDEX: 34.16 KG/M2 | HEIGHT: 62 IN | OXYGEN SATURATION: 96 % | HEART RATE: 68 BPM | RESPIRATION RATE: 18 BRPM | WEIGHT: 185.6 LBS

## 2019-05-29 DIAGNOSIS — L98.9 SKIN LESION OF CHEEK: Primary | ICD-10-CM

## 2019-05-29 DIAGNOSIS — L85.3 DRY SKIN DERMATITIS: ICD-10-CM

## 2019-05-29 RX ORDER — CEPHALEXIN 500 MG/1
500 CAPSULE ORAL 3 TIMES DAILY
Qty: 15 CAP | Refills: 0 | Status: SHIPPED | OUTPATIENT
Start: 2019-05-29 | End: 2019-06-03

## 2019-05-29 RX ORDER — DESONIDE 0.5 MG/ML
LOTION TOPICAL
Qty: 59 ML | Refills: 0 | Status: SHIPPED | OUTPATIENT
Start: 2019-05-29 | End: 2019-10-21 | Stop reason: SDUPTHER

## 2019-05-29 RX ORDER — DICLOFENAC SODIUM 10 MG/G
2 GEL TOPICAL
COMMUNITY
End: 2019-07-12 | Stop reason: SDUPTHER

## 2019-05-29 NOTE — PROGRESS NOTES
Chief Complaint   Patient presents with    Rash     patinet c/o itchy rash on face for about 2 weeks         1. Have you been to the ER, urgent care clinic since your last visit? Hospitalized since your last visit? no    2. Have you seen or consulted any other health care providers outside of the [address] [place] [address] since your last visit? Include any pap smears or colon screening.  no

## 2019-05-29 NOTE — PROGRESS NOTES
HISTORY OF PRESENT ILLNESS  Rodríguez All is a 82 y.o. female. HPI   C/o rash on the face over the past week. Don't know what caused the irritation but skin seem to be dry and itching. Has area on the side of the left nostril that cracked open and caused a sore on the face. No drainage noted but sore to touch. No changes noted with her soap or lotions. 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    Atypical chest pain R07.89    Essential hypertension I10    Gastroesophageal reflux disease without esophagitis K21.9    Atherosclerosis of native coronary artery without angina pectoris--diffuse small vsl disease via cath cath 2009--RCA PDA/ROSA I25.10    Chronic anxiety F41.9    Encounter for medication monitoring Z51.81    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 9) with comorbidity (HCC) E66.01    CKD (chronic kidney disease) stage 3, GFR 30-59 ml/min ([facility]) N18.3    Chest pain with normal angiography--2002;normal NST 2018 R07.9       Current Outpatient Medications   Medication Sig Dispense Refill    potassium chloride SR (KLOR-CON 10) 10 mEq tablet TAKE 3 TABLETS IN THE MORNING AND 2 TABLETS IN THE EVENING BY MOUTH EVERY  Tab 0  nitroglycerin (NITROSTAT) 0.4 mg SL tablet TAKE 1 TAB BY SUBLINGUAL ROUTE EVERY 5 MINUTES AS NEEDED. 25 Tab 3    losartan (COZAAR) 100 mg tablet TAKE 1 TABLET BY MOUTH EVERY DAY 90 Tab 0    rosuvastatin (CRESTOR) 10 mg tablet TAKE 1 TAB BY MOUTH NIGHTLY. 30 Tab 11    cetirizine (ZYRTEC) 10 mg tablet Take 1 tablet by mouth daily as needed (and rash) 90 Tab 1    clonazePAM (KLONOPIN) 1 mg tablet TAKE 1/2 (HALF) TO 1 TABLET IN THE MORNING AS NEEDED FOR ANXIETY AND 1 TABLET AT BEDTIME FOR SLEEP 60 Tab 3    pantoprazole (PROTONIX) 40 mg tablet Take 1 Tab by mouth two (2) times a day. Take 30 minutes prior to breakfast and 30 minutes prior to dinner. 60 Tab 11    metoprolol tartrate (LOPRESSOR) 25 mg tablet Take 1 tab by mouth at 9am and take 1 tab at 9pm.      isosorbide mononitrate ER (IMDUR) 30 mg tablet Take 1 Tab by mouth daily. 90 Tab 3    amLODIPine (NORVASC) 5 mg tablet Take 1 Tab by mouth two (2) times a day. Take at 9am and 9pm every day. 180 Tab 1    furosemide (LASIX) 80 mg tablet TAKE 1 TABLET BY MOUTH EVERY MORNING AND TAKE 1/2 TABLET EVERY EVENING 135 Tab 3    simethicone (GAS RELIEF) 80 mg chewable tablet Take 80 mg by mouth every six (6) hours as needed for Flatulence.  diclofenac (VOLTAREN) 1 % gel Apply 2 g to affected area four (4) times daily. 100 g 3    mometasone (ELOCON) 0.1 % ointment APPLY TO AFFECTED AREA (SCALP) EVERY DAY AS NEEDED  2    cholecalciferol, vitamin D3, (VITAMIN D3) 2,000 unit tab Take 1 Tab by mouth daily.  acetaminophen (TYLENOL EXTRA STRENGTH) 500 mg tablet Take 1,000 mg by mouth every six (6) hours as needed for Pain.  Aspirin, Buffered 81 mg tab Take 81 mg by mouth daily.          Allergies   Allergen Reactions    Bactrim [Sulfamethoxazole-Trimethoprim] Unknown (comments)     Pt does not recall    Darvocet A500 [Propoxyphene N-Acetaminophen] Itching       Past Medical History:   Diagnosis Date    Anxiety     Aortic stenosis 3/3/2010    Aortic stenosis     Arrhythmia     MURMUR    Arthritis     SPINAL STENOSIS    Atherosclerosis of coronary artery     Biliary dyskinesia     CHF (congestive heart failure) ([facility] [address] 3/3/2010    CHF (congestive heart failure) ([facility] [address] 01/2002    Chronic heart failure ([facility] [address] 1/2002; 3/3/2010    chronic diastolic heart failure    Chronic pain     LOWER BACK, RIGHT KNEE    Environmental allergies 3/3/2010    GERD (gastroesophageal reflux disease) 3/3/2010    Hiatal hernia 3/3/2010    High cholesterol 3/3/2010    HTN (hypertension) 3/3/2010    Hypokalemia 3/3/2010    Ischemic heart disease, chronic     Obese     Psychiatric disorder     anxiety    S/P hysterectomy 3/3/2010    Spinal stenosis 3/3/2010       Past Surgical History:   Procedure Laterality Date    CARDIAC CATHETERIZATION  01/2002    normal coronaries    DECOMPRESS DISC RF LUMBAR  07/2007    HX BACK SURGERY  5/1/2014    HX BREAST LUMPECTOMY Left 1989    benign left breast    HX BUNIONECTOMY      HX BUNIONECTOMY      HX HEART CATHETERIZATION  3/3/10    HX HYSTERECTOMY  1978    HX LUMBAR DISKECTOMY      HX ORTHOPAEDIC Bilateral     BUNIONECTOMY    HX ORTHOPAEDIC Right     WRIST FX    HX OTHER SURGICAL  10/12/2015    mole removed from right side of face by Dr. Colletta Douglas FLX DX W/COLLJ [facility] WHEN PFRMD  [id number]    Dr Westley Garces GI ENDOSCOPY,BIOPSY  2/27/2019            Family History   Problem Relation Age of Onset    Hypertension Mother     Heart Disease Father     Stroke Sister     Heart Disease Sister     Cancer Brother         LUNG    Cancer Brother         PROSTATE    Hypertension Brother     No Known Problems Brother     Lung Disease Brother     No Known Problems Brother     No Known Problems Brother     Stroke Daughter 47    No Known Problems Daughter     Anesth Problems Neg Hx        Social History     Tobacco Use    Smoking status: Never Smoker    Smokeless tobacco: Never Used   Substance Use Topics    Alcohol use: No     Alcohol/week: 0.0 oz          Lab Results   Component Value Date/Time    WBC 4.7 03/30/2019 10:54 AM    HGB 13.9 03/30/2019 10:54 AM    HCT 43.1 03/30/2019 10:54 AM    PLATELET 153 00/95/9047 10:54 AM    MCV 89.4 03/30/2019 10:54 AM     Lab Results   Component Value Date/Time    Cholesterol, total 179 02/11/2019 11:24 AM    HDL Cholesterol 58 02/11/2019 11:24 AM    LDL, calculated 100 (H) 02/11/2019 11:24 AM    Triglyceride 103 02/11/2019 11:24 AM    CHOL/HDL Ratio 3.1 11/01/2010 05:26 PM     Lab Results   Component Value Date/Time    Sodium 143 03/30/2019 10:54 AM    Potassium 3.4 (L) 03/30/2019 10:54 AM    Chloride 103 03/30/2019 10:54 AM    CO2 32 03/30/2019 10:54 AM    Anion gap 8 03/30/2019 10:54 AM    Glucose 95 03/30/2019 10:54 AM    BUN 6 03/30/2019 10:54 AM    Creatinine 1.04 (H) 03/30/2019 10:54 AM    BUN/Creatinine ratio 6 (L) 03/30/2019 10:54 AM    GFR est AA >60 03/30/2019 10:54 AM    GFR est non-AA 51 (L) 03/30/2019 10:54 AM    Calcium 8.9 03/30/2019 10:54 AM    Bilirubin, total 0.6 03/30/2019 10:54 AM    ALT (SGPT) 19 03/30/2019 10:54 AM    AST (SGOT) 19 03/30/2019 10:54 AM    Alk. phosphatase 86 03/30/2019 10:54 AM    Protein, total 8.2 03/30/2019 10:54 AM    Albumin 3.4 (L) 03/30/2019 10:54 AM    Globulin 4.8 (H) 03/30/2019 10:54 AM    A-G Ratio 0.7 (L) 03/30/2019 10:54 AM         Review of Systems       Physical Exam   Skin:   Has open sore on the fold of the left nostril with slight redness. Has dryness noted on the forehead and along the side of the face. ASSESSMENT and PLAN  Diagnoses and all orders for this visit:    1. Skin lesion of cheek  -     cephALEXin (KEFLEX) 500 mg capsule; Take 1 Cap by mouth three (3) times daily for 5 days. Discussed keeping this area clean and dry. Can also add neosporin to the open sore. 2. Dry skin dermatitis  -     desonide (DESOWEN) 0.05 % topical lotion;  Apply  to affected area two (2) times daily as needed for Skin Irritation or Itching over the next week and then change to Cetaphil moisturizing lotion.             reviewed medications and side effects in detail

## 2019-06-03 RX ORDER — LOSARTAN POTASSIUM 100 MG/1
TABLET ORAL
Qty: 90 TAB | Refills: 0 | Status: SHIPPED | OUTPATIENT
Start: 2019-06-03 | End: 2019-09-26 | Stop reason: SDUPTHER

## 2019-06-18 RX ORDER — POTASSIUM CHLORIDE 750 MG/1
TABLET, FILM COATED, EXTENDED RELEASE ORAL
Qty: 150 TAB | Refills: 0 | Status: SHIPPED | OUTPATIENT
Start: 2019-06-18 | End: 2019-07-23 | Stop reason: SDUPTHER

## 2019-06-21 ENCOUNTER — TELEPHONE (OUTPATIENT)
Dept: SURGERY | Age: 84
End: 2019-06-21

## 2019-06-21 NOTE — TELEPHONE ENCOUNTER
Made an attempt to contact patient regarding Cardiac Clearance, for pending surgery Francy Simeon, but was unable to reach her. Left message for patient to contact office to discuss.

## 2019-06-28 ENCOUNTER — TELEPHONE (OUTPATIENT)
Dept: SURGERY | Age: 84
End: 2019-06-28

## 2019-06-28 ENCOUNTER — TELEPHONE (OUTPATIENT)
Dept: CARDIOLOGY CLINIC | Age: 84
End: 2019-06-28

## 2019-06-28 NOTE — TELEPHONE ENCOUNTER
I spoke with Leena and Dr. Phyllis Kathleen [facility] office. The patient is due to have a surgical procedure. It is still pending the stress test that Dr. Maxine Menjivar ordered. They would like to know if the patient is cleared for surgery. I told Leena that Dr. Maxine Menjivar is not in clinic today. I will route this to him. She informed me that all I would need to do is call her once Dr. Maxine Menjivar has documented the clearance in the chart.

## 2019-06-28 NOTE — TELEPHONE ENCOUNTER
Spoke to patient using 2 identifiers. Patient made aware that we awaiting pending cardiac clearance for surgery. Patient voiced understanding.

## 2019-06-29 DIAGNOSIS — Z01.818 PREOPERATIVE CLEARANCE: Primary | ICD-10-CM

## 2019-06-29 DIAGNOSIS — I35.0 AORTIC STENOSIS, MILD: ICD-10-CM

## 2019-07-01 ENCOUNTER — TELEPHONE (OUTPATIENT)
Dept: SURGERY | Age: 84
End: 2019-07-01

## 2019-07-01 NOTE — TELEPHONE ENCOUNTER
Patient is trying to reach you but her phone does not ring when there is an incoming call. She will try back this afternoon. If she does not reach you, please call her tomorrow. Her phone is being fixed at 9am tomorrow.

## 2019-07-02 NOTE — TELEPHONE ENCOUNTER
Spoke to patient using 2 identifiers. Patient stated Josi Solano will need an Echo, with Dr. Cherelle Collins, before having surgery. \"    Patient stated Josi Solano will contact office after test.\"

## 2019-07-02 NOTE — TELEPHONE ENCOUNTER
I spoke with Nas this morning in central scheduling. The patient can be scheduled for [employer] at 9 am tomorrow. She will reach out to the patient. She did inform me that Carmelita Coats reached out to the patient yesterday and left a message. I attempted to reach out to the patient and got her voice mail. I left a voice message.

## 2019-07-03 NOTE — TELEPHONE ENCOUNTER
Katherine Xavier,     She needs echo before surgery as she has mild AS as of 2017     Did not come in for 10/18n echo ordered.      Lets get it ASAP----I HAVE ordered again     STRESS TEST NORMAL BUT I WANT TO GET ECHO     [address] [facility]

## 2019-07-05 ENCOUNTER — TELEPHONE (OUTPATIENT)
Dept: CARDIOLOGY CLINIC | Age: 84
End: 2019-07-05

## 2019-07-05 ENCOUNTER — HOSPITAL ENCOUNTER (OUTPATIENT)
Dept: NON INVASIVE DIAGNOSTICS | Age: 84
Discharge: HOME | End: 2019-07-05
Attending: INTERNAL MEDICINE
Payer: MEDICARE

## 2019-07-05 DIAGNOSIS — I35.0 AORTIC STENOSIS, MILD: ICD-10-CM

## 2019-07-05 DIAGNOSIS — Z01.818 PREOPERATIVE CLEARANCE: ICD-10-CM

## 2019-07-05 LAB
ECHO PULMONARY ARTERY SYSTOLIC PRESSURE (PASP): 60 MMHG
ECHO RV TAPSE: 1.93 CM (ref 1.5–2)

## 2019-07-05 PROCEDURE — 93306 TTE W/DOPPLER COMPLETE: CPT

## 2019-07-05 NOTE — TELEPHONE ENCOUNTER
I did speak with the patient. I informed her Dr. Josh Lira sent me a message. Still mild Aortic Stenosis. Your lung pressure was moderately high. He wanted to know if you are using a CPAP machine at night and to you snore? She currently is not using a CPAP machine. Her   has informed her that she does snore. You have been cleared for surgery low-moderate risk from a cardiac standpoint. I told her I will route this to Dr. Josh Lira and upon his reply call you back.

## 2019-07-05 NOTE — TELEPHONE ENCOUNTER
----- Message from Gissell Denis MD sent at 7/5/2019 10:51 AM EDT -----  Regarding: aortic stenosis  Still mild aortic stenosis    Would you inquire when talking to her if she is using CPAP for sleep apnea. Lung pressure  moderately high---does she snore? ?? If so then will need to discuss with PCP.     Cleared for surgery at low -moderate surgical risk from cardiac standpoint    Thx    B  ----- Message -----  From: Payton Field MD  Sent: 7/5/2019  10:31 AM  To: Gissell Denis MD

## 2019-07-10 ENCOUNTER — TELEPHONE (OUTPATIENT)
Dept: FAMILY MEDICINE CLINIC | Age: 84
End: 2019-07-10

## 2019-07-10 NOTE — TELEPHONE ENCOUNTER
Patient wants to be seen on Friday , she had a fall yesterday and hurt her right knee.   Please give her a call @ [phone]

## 2019-07-10 NOTE — TELEPHONE ENCOUNTER
Patient has fallen x 2 , last time yesterday  while getting clothes out of the dryer. Patients knee and rib area is tender. Offered appointment for tomorrow with NP but patient declined due to ride issues. Will make an appointment for patient for Friday, patient advised that if she has more pain, SOB to go to ER, she agreed.

## 2019-07-12 ENCOUNTER — OFFICE VISIT (OUTPATIENT)
Dept: FAMILY MEDICINE CLINIC | Age: 84
End: 2019-07-12

## 2019-07-12 VITALS
RESPIRATION RATE: 16 BRPM | BODY MASS INDEX: 34.27 KG/M2 | DIASTOLIC BLOOD PRESSURE: 73 MMHG | HEART RATE: 62 BPM | OXYGEN SATURATION: 94 % | HEIGHT: 62 IN | TEMPERATURE: 96.7 F | WEIGHT: 186.2 LBS | SYSTOLIC BLOOD PRESSURE: 140 MMHG
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
## 2019-07-12 DIAGNOSIS — R07.81 RIB PAIN ON RIGHT SIDE: ICD-10-CM
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
## 2019-07-12 DIAGNOSIS — M25.561 RIGHT MEDIAL KNEE PAIN: ICD-10-CM

## 2019-07-12 NOTE — PROGRESS NOTES
HISTORY OF PRESENT ILLNESS  Miguel Benitez is a 82 y.o. female. HPI  This is a patient of Dr. Valentino Lopez with PMHx significant for CAD, CHF, CKD, spinal stenosis here today for a problem-focused visit with chief complaint of \"fall. \"  She reports that she fell 2 weeks ago. She was coming out of the bank and stumbled over her feet on the sidewalk as she was going down the curb. She landed on her right knee. Two good [employer]aritans helped her up and into the car. She put some heat on it when she got home and took some tylenol. And then on 7/9/19, she was putting some clothes in the dryer and she fell again. She called for her , who helped her up. She fell on her right knee and her right side. She slipped on a throw rug that was not secured to the ground. She has been using tylenol and a heating pad for the pain with some relief of symptoms. She walks with a cane but has a walker at home. No other symptoms, including numbness/tingling, shortness of breath, chest pain. Pain is worse with movement, improves with rest.     Unfortunately, she has not had a DEXA scan so it is unclear whether or not she has osteoporosis. Has an upcoming cholecystectomy scheduled; recently saw cardiology. Visit Vitals  /73 (BP 1 Location: Right arm, BP Patient Position: Sitting)   Pulse 62   Temp 96.7 °F (35.9 °C) (Oral)   Resp 16   Ht 5' 2\" (1.575 m)   Wt 186 lb 3.2 oz (84.5 kg)   LMP  (LMP Unknown)   SpO2 94%   BMI 34.06 kg/m²     Current Outpatient Medications on File Prior to Visit   Medication Sig Dispense Refill    potassium chloride SR (KLOR-CON 10) 10 mEq tablet TAKE 3 TABLETS BY MOUTH EVERY MORNING AND 2 TABLETS BY MOUTH EVERY EVENING 150 Tab 0    losartan (COZAAR) 100 mg tablet TAKE 1 TABLET BY MOUTH EVERY DAY 90 Tab 0    rosuvastatin (CRESTOR) 10 mg tablet TAKE 1 TAB BY MOUTH NIGHTLY.  30 Tab 11    clonazePAM (KLONOPIN) 1 mg tablet TAKE 1/2 (HALF) TO 1 TABLET IN THE MORNING AS NEEDED FOR ANXIETY AND 1 TABLET AT BEDTIME FOR SLEEP 60 Tab 3    pantoprazole (PROTONIX) 40 mg tablet Take 1 Tab by mouth two (2) times a day. Take 30 minutes prior to breakfast and 30 minutes prior to dinner. 60 Tab 11    metoprolol tartrate (LOPRESSOR) 25 mg tablet Take 1 tab by mouth at 9am and take 1 tab at 9pm.      isosorbide mononitrate ER (IMDUR) 30 mg tablet Take 1 Tab by mouth daily. 90 Tab 3    amLODIPine (NORVASC) 5 mg tablet Take 1 Tab by mouth two (2) times a day. Take at 9am and 9pm every day. 180 Tab 1    furosemide (LASIX) 80 mg tablet TAKE 1 TABLET BY MOUTH EVERY MORNING AND TAKE 1/2 TABLET EVERY EVENING 135 Tab 3    cholecalciferol, vitamin D3, (VITAMIN D3) 2,000 unit tab Take 1 Tab by mouth daily.  Aspirin, Buffered 81 mg tab Take 81 mg by mouth daily.  desonide (DESOWEN) 0.05 % topical lotion Apply  to affected area two (2) times daily as needed for Skin Irritation or Itching. 59 mL 0    nitroglycerin (NITROSTAT) 0.4 mg SL tablet TAKE 1 TAB BY SUBLINGUAL ROUTE EVERY 5 MINUTES AS NEEDED. 25 Tab 3    cetirizine (ZYRTEC) 10 mg tablet Take 1 tablet by mouth daily as needed (and rash) 90 Tab 1    simethicone (GAS RELIEF) 80 mg chewable tablet Take 80 mg by mouth every six (6) hours as needed for Flatulence.  diclofenac (VOLTAREN) 1 % gel Apply 2 g to affected area four (4) times daily. 100 g 3    mometasone (ELOCON) 0.1 % ointment APPLY TO AFFECTED AREA (SCALP) EVERY DAY AS NEEDED  2    acetaminophen (TYLENOL EXTRA STRENGTH) 500 mg tablet Take 1,000 mg by mouth every six (6) hours as needed for Pain. No current facility-administered medications on file prior to visit. Review of Systems   Constitutional: Negative for chills, fever and malaise/fatigue. Eyes: Negative for blurred vision and double vision. Respiratory: Negative for cough and shortness of breath. Cardiovascular: Negative for chest pain, palpitations, orthopnea and leg swelling.    Musculoskeletal: Positive for falls, joint pain and myalgias. Neurological: Negative for dizziness and headaches. Physical Exam   Constitutional: She is oriented to person, place, and time. She appears well-developed and well-nourished. No distress. HENT:   Head: Normocephalic and atraumatic. Cardiovascular: Normal rate and regular rhythm. Murmur heard. Pulmonary/Chest: Effort normal and breath sounds normal. No respiratory distress. She has no wheezes. Musculoskeletal:   Right anterior chest/lower rib pain, tender to palpation and with movement without any step-offs or bony abnormalities palpated; good air movement throughout  R knee has mild swelling without any bony abnormalities palpated; pain is primarily on medial aspect of patella; no ecchymosis noted; good ROM; no popping, locking, or joint instability   Neurological: She is alert and oriented to person, place, and time. Skin: Skin is warm and dry. She is not diaphoretic. Psychiatric: She has a normal mood and affect. Her behavior is normal. Judgment normal.   Nursing note and vitals reviewed. ASSESSMENT and PLAN    ICD-10-CM ICD-9-CM    1. Fall as cause of accidental injury in home as place of occurrence, initial encounter W19. XXXA E888.9     Y92.009 E849.0    2. Rib pain on right side R07.81 786.50 XR RIBS RT W PA CXR MIN 3 V   3. Right medial knee pain M25.561 719.46 XR KNEE RT 3 V     X-rays look okay today. She has chronic R knee pain and may need to see orthopedics in the future, but no signs of fracture on my exam today or by my read of the in-office X-ray. I taught her how to use her cane properly today as she was not stabilizing herself properly, and encouraged her to use the walker  has at home for increased support while she is healing. I encouraged her to use ice instead of heat, continue tylenol, and use a topical analgesic (ie. BioFreeze, [address]) for improved pain relief.  Discussed timeline during which to expect improvement in symptoms, and discussed fall prevention strategies. Follow up with me PRN and with PCP as recommended for routine care. Follow-up and Dispositions    · Return if symptoms worsen or fail to improve.

## 2019-07-12 NOTE — PROGRESS NOTES
Chief Complaint   Patient presents with    Fall     7/9/19     1. Have you been to the ER, urgent care clinic since your last visit? Hospitalized since your last visit? No    2. Have you seen or consulted any other health care providers outside of the [address] [place] [address] since your last visit? Include any pap smears or colon screening. No     Patient fell 7/9/19. Hitting right knee and ribs.

## 2019-07-17 ENCOUNTER — HOSPITAL ENCOUNTER (OUTPATIENT)
Dept: LAB | Age: 84
Discharge: HOME | End: 2019-07-17
Payer: MEDICARE

## 2019-07-17 ENCOUNTER — OFFICE VISIT (OUTPATIENT)
Dept: FAMILY MEDICINE CLINIC | Age: 84
End: 2019-07-17

## 2019-07-17 VITALS
BODY MASS INDEX: 34.23 KG/M2 | HEIGHT: 62 IN | WEIGHT: 186 LBS | DIASTOLIC BLOOD PRESSURE: 84 MMHG | RESPIRATION RATE: 18 BRPM | TEMPERATURE: 96 F | SYSTOLIC BLOOD PRESSURE: 134 MMHG | HEART RATE: 62 BPM | OXYGEN SATURATION: 95 %

## 2019-07-17 DIAGNOSIS — Z51.81 ENCOUNTER FOR MEDICATION MONITORING: ICD-10-CM

## 2019-07-17 DIAGNOSIS — M17.11 PRIMARY OSTEOARTHRITIS OF RIGHT KNEE: ICD-10-CM

## 2019-07-17 DIAGNOSIS — I25.10 ATHEROSCLEROSIS OF NATIVE CORONARY ARTERY OF NATIVE HEART WITHOUT ANGINA PECTORIS: ICD-10-CM

## 2019-07-17 DIAGNOSIS — F41.9 CHRONIC ANXIETY: ICD-10-CM

## 2019-07-17 DIAGNOSIS — K21.9 GASTROESOPHAGEAL REFLUX DISEASE WITHOUT ESOPHAGITIS: ICD-10-CM

## 2019-07-17 DIAGNOSIS — I50.32 CHRONIC DIASTOLIC HEART FAILURE (HCC): ICD-10-CM

## 2019-07-17 DIAGNOSIS — M15.9 PRIMARY OSTEOARTHRITIS INVOLVING MULTIPLE JOINTS: ICD-10-CM

## 2019-07-17 DIAGNOSIS — I10 ESSENTIAL HYPERTENSION: Primary | ICD-10-CM

## 2019-07-17 DIAGNOSIS — E78.2 MIXED HYPERLIPIDEMIA: ICD-10-CM

## 2019-07-17 DIAGNOSIS — Z23 ENCOUNTER FOR IMMUNIZATION: ICD-10-CM

## 2019-07-17 PROCEDURE — 80053 COMPREHEN METABOLIC PANEL: CPT

## 2019-07-17 PROCEDURE — 85027 COMPLETE CBC AUTOMATED: CPT

## 2019-07-17 PROCEDURE — 36415 COLL VENOUS BLD VENIPUNCTURE: CPT

## 2019-07-17 PROCEDURE — 80061 LIPID PANEL: CPT

## 2019-07-17 RX ORDER — DICLOFENAC SODIUM 10 MG/G
2 GEL TOPICAL
COMMUNITY
End: 2019-11-04

## 2019-07-17 NOTE — PROGRESS NOTES
Chief Complaint   Patient presents with    Hypertension     follow up    Knee Pain     pt c/o right knee pain    Ankle swelling     pt c/o right ankle swelling     Eye exam 1/10/2019 by Dr. Cachorro [place]. 1. Have you been to the ER, urgent care clinic since your last visit? Hospitalized since your last visit? No    2. Have you seen or consulted any other health care providers outside of the [address] [place] [address] since your last visit? Include any pap smears or colon screening. No    Verbal order received per Dr. Urban Ibrahim 13 IM-VORB. Pt received Prevnar 13 IM in left deltoid without any difficulty.

## 2019-07-17 NOTE — PROGRESS NOTES
HISTORY OF PRESENT ILLNESS  Gladis Paiz is a 82 y.o. female. HPI   Follow up on chronic medical problems. Seen on last week by NP for a fall she had. Overall the chest and rib pain is better. Cardiovascular Review:  The patient has hypertension, hyperlipidemia, chronic diastolic heart failure, ASCHD and obesity. Had eval for her SOB and told d/t aortic stenosis. Diet and Lifestyle: generally follows a low fat low cholesterol diet, generally follows a low sodium diet, sedentary. Pertinent ROS: taking medications as instructed, no medication side effects noted, no TIA's, no chest pain on exertion, mild swelling of ankles, no orthostatic dizziness or lightheadedness, no palpitations, no muscle aches or pain. Home BP Monitoring: is not measured at home. Osteoarthritis:  Patient has osteoarthritis. Overall doing better with back pain. Decided not to go thru with the back surgery just yet. Still having knee pain. Aching more in the right knee and increase pain with walking. Has had 2 injections in the knee which has not helped very much. Pain is 5-6/10. Taking tylenol for the pain which helps some. Symptoms onset: problem is longstanding. Rheumatological ROS: stable, mild-to-moderate joint symptoms intermittently, reasonably well controlled by PRN meds. Response to treatment plan: stable and intermittent. Anxiety Review:  Patient is seen for anxiety. Ongoing symptoms include: increased anxiety still related to family issues. Patient denies: palpitations, sweating, chest pain, shortness of breath. Reported side effects from the treatment: none.     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    Atypical chest pain R07.89    Essential hypertension I10    Gastroesophageal reflux disease without esophagitis K21.9    Atherosclerosis of native coronary artery without angina pectoris--diffuse small vsl disease via cath cath 2009--RCA PDA/ROSA I25.10    Chronic anxiety F41.9    Encounter for medication monitoring Z51.81    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 M47.815    Class 2 obesity due to excess calories with serious comorbidity and body mass index (BMI) of 38.0 to 38.9 in adult [id number]    Paroxysmal cardiac arrhythmia--PVC's,APC's,NSSVT I49.8    Severe obesity (BMI 35.0-39. 9) with comorbidity ([facility]) E66.01    CKD (chronic kidney disease) stage 3, GFR 30-59 ml/min ([facility]) N18.3    Chest pain with normal angiography--2002;normal NST 2018 R07.9       Current Outpatient Medications   Medication Sig Dispense Refill    diclofenac (VOLTAREN) 1 % gel Apply 2 g to affected area four (4) times daily as needed.  potassium chloride SR (KLOR-CON 10) 10 mEq tablet TAKE 3 TABLETS BY MOUTH EVERY MORNING AND 2 TABLETS BY MOUTH EVERY EVENING 150 Tab 0    losartan (COZAAR) 100 mg tablet TAKE 1 TABLET BY MOUTH EVERY DAY 90 Tab 0    desonide (DESOWEN) 0.05 % topical lotion Apply  to affected area two (2) times daily as needed for Skin Irritation or Itching. 59 mL 0    nitroglycerin (NITROSTAT) 0.4 mg SL tablet TAKE 1 TAB BY SUBLINGUAL ROUTE EVERY 5 MINUTES AS NEEDED. 25 Tab 3    rosuvastatin (CRESTOR) 10 mg tablet TAKE 1 TAB BY MOUTH NIGHTLY.  30 Tab 11    cetirizine (ZYRTEC) 10 mg tablet Take 1 tablet by mouth daily as needed (and rash) 90 Tab 1    clonazePAM (KLONOPIN) 1 mg tablet TAKE 1/2 (HALF) TO 1 TABLET IN THE MORNING AS NEEDED FOR ANXIETY AND 1 TABLET AT BEDTIME FOR SLEEP 60 Tab 3    pantoprazole (PROTONIX) 40 mg tablet Take 1 Tab by mouth two (2) times a day. Take 30 minutes prior to breakfast and 30 minutes prior to dinner. 60 Tab 11    metoprolol tartrate (LOPRESSOR) 25 mg tablet Take 1 tab by mouth at 9am and take 1 tab at 9pm.      isosorbide mononitrate ER (IMDUR) 30 mg tablet Take 1 Tab by mouth daily. 90 Tab 3    amLODIPine (NORVASC) 5 mg tablet Take 1 Tab by mouth two (2) times a day. Take at 9am and 9pm every day. 180 Tab 1    furosemide (LASIX) 80 mg tablet TAKE 1 TABLET BY MOUTH EVERY MORNING AND TAKE 1/2 TABLET EVERY EVENING 135 Tab 3    simethicone (GAS RELIEF) 80 mg chewable tablet Take 80 mg by mouth every six (6) hours as needed for Flatulence.  mometasone (ELOCON) 0.1 % ointment APPLY TO AFFECTED AREA (SCALP) EVERY DAY AS NEEDED  2    cholecalciferol, vitamin D3, (VITAMIN D3) 2,000 unit tab Take 1 Tab by mouth daily.  acetaminophen (TYLENOL EXTRA STRENGTH) 500 mg tablet Take 1,000 mg by mouth every six (6) hours as needed for Pain.  Aspirin, Buffered 81 mg tab Take 81 mg by mouth daily.  diclofenac (VOLTAREN) 1 % gel Apply 2 g to affected area four (4) times daily.  100 g 3       Allergies   Allergen Reactions    Bactrim [Sulfamethoxazole-Trimethoprim] Unknown (comments)     Pt does not recall    Darvocet A500 [Propoxyphene N-Acetaminophen] Itching       Past Medical History:   Diagnosis Date    Anxiety     Aortic stenosis 3/3/2010    Aortic stenosis     Arrhythmia     MURMUR    Arthritis     SPINAL STENOSIS    Atherosclerosis of coronary artery     Biliary dyskinesia     CHF (congestive heart failure) ([address] 3/3/2010    CHF (congestive heart failure) [facility] [address] 01/2002    Chronic heart failure ([facility] [address] 1/2002; 3/3/2010    chronic diastolic heart failure    Chronic pain     LOWER BACK, RIGHT KNEE    Environmental allergies 3/3/2010    GERD (gastroesophageal reflux disease) 3/3/2010    Hiatal hernia 3/3/2010    High cholesterol 3/3/2010    HTN (hypertension) 3/3/2010    Hypokalemia 3/3/2010    Ischemic heart disease, chronic     Obese     Psychiatric disorder     anxiety    S/P hysterectomy 3/3/2010    Spinal stenosis 3/3/2010       Past Surgical History:   Procedure Laterality Date    CARDIAC CATHETERIZATION  01/2002    normal coronaries    DECOMPRESS DISC RF LUMBAR  07/2007    HX BACK SURGERY  5/1/2014    HX BREAST LUMPECTOMY Left 1989    benign left breast    HX BUNIONECTOMY      HX BUNIONECTOMY      HX HEART CATHETERIZATION  3/3/10    HX HYSTERECTOMY  1978    HX LUMBAR DISKECTOMY      HX ORTHOPAEDIC Bilateral     BUNIONECTOMY    HX ORTHOPAEDIC Right     WRIST FX    HX OTHER SURGICAL  10/12/2015    mole removed from right side of face by Dr. Bharti Aguirre FLX DX W/COLLJ Selam Hippo PFRMD  [id number]    Dr Michelle Bain GI ENDOSCOPY,BIOPSY  2/27/2019            Family History   Problem Relation Age of Onset    Hypertension Mother     Heart Disease Father     Stroke Sister     Heart Disease Sister     Cancer Brother         LUNG    Cancer Brother         PROSTATE    Hypertension Brother     No Known Problems Brother     Lung Disease Brother     No Known Problems Brother     No Known Problems Brother     Stroke Daughter 47    No Known Problems Daughter     Anesth Problems Neg Hx        Social History     Tobacco Use    Smoking status: Never Smoker    Smokeless tobacco: Never Used   Substance Use Topics    Alcohol use: No     Alcohol/week: 0.0 standard drinks        Lab Results   Component Value Date/Time    WBC 4.7 03/30/2019 10:54 AM    HGB 13.9 03/30/2019 10:54 AM    HCT 43.1 03/30/2019 10:54 AM    PLATELET 515 08/10/1719 10:54 AM    MCV 89.4 03/30/2019 10:54 AM     Lab Results   Component Value Date/Time    Cholesterol, total 179 02/11/2019 11:24 AM    HDL Cholesterol 58 02/11/2019 11:24 AM    LDL, calculated 100 (H) 02/11/2019 11:24 AM    Triglyceride 103 02/11/2019 11:24 AM    CHOL/HDL Ratio 3.1 11/01/2010 05:26 PM     Lab Results   Component Value Date/Time    TSH 2.470 05/15/2017 03:04 PM      Lab Results   Component Value Date/Time    Sodium 143 03/30/2019 10:54 AM    Potassium 3.4 (L) 03/30/2019 10:54 AM    Chloride 103 03/30/2019 10:54 AM    CO2 32 03/30/2019 10:54 AM    Anion gap 8 03/30/2019 10:54 AM    Glucose 95 03/30/2019 10:54 AM    BUN 6 03/30/2019 10:54 AM    Creatinine 1.04 (H) 03/30/2019 10:54 AM    BUN/Creatinine ratio 6 (L) 03/30/2019 10:54 AM    GFR est AA >60 03/30/2019 10:54 AM    GFR est non-AA 51 (L) 03/30/2019 10:54 AM    Calcium 8.9 03/30/2019 10:54 AM    Bilirubin, total 0.6 03/30/2019 10:54 AM    ALT (SGPT) 19 03/30/2019 10:54 AM    AST (SGOT) 19 03/30/2019 10:54 AM    Alk. phosphatase 86 03/30/2019 10:54 AM    Protein, total 8.2 03/30/2019 10:54 AM    Albumin 3.4 (L) 03/30/2019 10:54 AM    Globulin 4.8 (H) 03/30/2019 10:54 AM    A-G Ratio 0.7 (L) 03/30/2019 10:54 AM      Lab Results   Component Value Date/Time    Hemoglobin A1c 5.4 04/16/2014 12:20 PM    Hemoglobin A1c (POC) 5.5 11/26/2014 12:12 PM         Review of Systems   Constitutional: Negative for malaise/fatigue. HENT: Negative for congestion. Eyes: Negative for blurred vision. Respiratory: Negative for cough and shortness of breath. Cardiovascular: Negative for chest pain, palpitations and leg swelling. Gastrointestinal: Positive for heartburn. Negative for abdominal pain and constipation. Genitourinary: Negative for dysuria, frequency and urgency. Musculoskeletal: Positive for back pain and joint pain. Neurological: Negative for dizziness, tingling and headaches. Endo/Heme/Allergies: Negative for environmental allergies. Psychiatric/Behavioral: Negative for depression. The patient does not have insomnia. Physical Exam   Constitutional: She appears well-developed and well-nourished.    /84   Pulse 62   Temp 96 °F (35.6 °C) (Oral)   Resp 18 Ht 5' 2\" (1.575 m)   Wt 186 lb (84.4 kg)   LMP  (LMP Unknown)   SpO2 95%   BMI 34.02 kg/m²      HENT:   Right Ear: Tympanic membrane and ear canal normal.   Left Ear: Tympanic membrane and ear canal normal.   Nose: No mucosal edema or rhinorrhea. Mouth/Throat: Oropharynx is clear and moist and mucous membranes are normal.   Neck: Normal range of motion. Neck supple. No thyromegaly present. Cardiovascular: Normal rate, regular rhythm and normal heart sounds. Exam reveals no gallop. Pulmonary/Chest: Effort normal and breath sounds normal.   Abdominal: Soft. Normal appearance and bowel sounds are normal. She exhibits no mass. There is no tenderness. Musculoskeletal: She exhibits no edema. Right knee: She exhibits decreased range of motion. She exhibits no swelling and no effusion. Tenderness found. Medial joint line tenderness noted. Lymphadenopathy:     She has no cervical adenopathy. Skin: Skin is warm and dry. Psychiatric: She has a normal mood and affect. Nursing note and vitals reviewed. ASSESSMENT and PLAN  Diagnoses and all orders for this visit:    1. Essential hypertension  Discussed sodium restriction, high k rich diet, maintaining ideal body weight and regular exercise program such as daily walking 30 min perday 4-5 times per week, as physiologic means to achieve blood pressure control.  Medication compliance advised. 2. Mixed hyperlipidemia  -     LIPID PANEL    3. Chronic diastolic heart failure (HCC)//  4. Atherosclerosis of native coronary artery of native heart without angina pectoris  Stable   As per cardiology    5. Chronic anxiety  Stable     6. Primary osteoarthritis involving multiple joints  Stable     7. Gastroesophageal reflux disease without esophagitis  Stable     8. Primary osteoarthritis of right knee  -     REFERRAL TO ORTHOPEDIC SURGERY    9. Encounter for medication monitoring  -     METABOLIC PANEL, COMPREHENSIVE  -     CBC W/O DIFF    10.  Encounter for immunization  -     PNEUMOCOCCAL CONJ VACCINE 13 VALENT IM  -     [place] IMMUNIZ ADMIN,1 SINGLE/COMB VAC/TOXOID      Follow-up and Dispositions    · Return in about 4 months (around 11/4/2019) for medicare wellness exam.       reviewed diet, exercise and weight control  cardiovascular risk and specific lipid/LDL goals reviewed  reviewed medications and side effects in detail

## 2019-07-18 LAB
ALBUMIN SERPL-MCNC: 4.3 G/DL (ref 3.5–4.7)
ALBUMIN/GLOB SERPL: 1.3 {RATIO} (ref 1.2–2.2)
ALP SERPL-CCNC: 87 IU/L (ref 39–117)
ALT SERPL-CCNC: 12 IU/L (ref 0–32)
AST SERPL-CCNC: 17 IU/L (ref 0–40)
BILIRUB SERPL-MCNC: 0.5 MG/DL (ref 0–1.2)
BUN SERPL-MCNC: 10 MG/DL (ref 8–27)
BUN/CREAT SERPL: 10 (ref 12–28)
CALCIUM SERPL-MCNC: 9.5 MG/DL (ref 8.7–10.3)
CHLORIDE SERPL-SCNC: 103 MMOL/L (ref 96–106)
CHOLEST SERPL-MCNC: 194 MG/DL (ref 100–199)
CO2 SERPL-SCNC: 27 MMOL/L (ref 20–29)
CREAT SERPL-MCNC: 0.97 MG/DL (ref 0.57–1)
ERYTHROCYTE [DISTWIDTH] IN BLOOD BY AUTOMATED COUNT: 14.7 % (ref 12.3–15.4)
GLOBULIN SER CALC-MCNC: 3.4 G/DL (ref 1.5–4.5)
GLUCOSE SERPL-MCNC: 94 MG/DL (ref 65–99)
HCT VFR BLD AUTO: 38.9 % (ref 34–46.6)
HDLC SERPL-MCNC: 59 MG/DL
HGB BLD-MCNC: 13.1 G/DL (ref 11.1–15.9)
INTERPRETATION, 910389: NORMAL
INTERPRETATION: NORMAL
LDLC SERPL CALC-MCNC: 113 MG/DL (ref 0–99)
MCH RBC QN AUTO: 29 PG (ref 26.6–33)
MCHC RBC AUTO-ENTMCNC: 33.7 G/DL (ref 31.5–35.7)
MCV RBC AUTO: 86 FL (ref 79–97)
PDF IMAGE, 910387: NORMAL
PLATELET # BLD AUTO: 191 X10E3/UL (ref 150–450)
POTASSIUM SERPL-SCNC: 4.3 MMOL/L (ref 3.5–5.2)
PROT SERPL-MCNC: 7.7 G/DL (ref 6–8.5)
RBC # BLD AUTO: 4.52 X10E6/UL (ref 3.77–5.28)
SODIUM SERPL-SCNC: 146 MMOL/L (ref 134–144)
TRIGL SERPL-MCNC: 111 MG/DL (ref 0–149)
VLDLC SERPL CALC-MCNC: 22 MG/DL (ref 5–40)
WBC # BLD AUTO: 4.7 X10E3/UL (ref 3.4–10.8)

## 2019-07-18 RX ORDER — ROSUVASTATIN CALCIUM 20 MG/1
20 TABLET, COATED ORAL
Qty: 30 TAB | Refills: 3 | Status: SHIPPED | OUTPATIENT
Start: 2019-07-18 | End: 2019-12-02 | Stop reason: SDUPTHER

## 2019-07-23 RX ORDER — POTASSIUM CHLORIDE 750 MG/1
TABLET, FILM COATED, EXTENDED RELEASE ORAL
Qty: 150 TAB | Refills: 0 | Status: SHIPPED | OUTPATIENT
Start: 2019-07-23 | End: 2019-09-10 | Stop reason: SDUPTHER

## 2019-07-30 ENCOUNTER — TELEPHONE (OUTPATIENT)
Dept: FAMILY MEDICINE CLINIC | Age: 84
End: 2019-07-30

## 2019-07-30 DIAGNOSIS — F41.9 ANXIETY: Chronic | ICD-10-CM

## 2019-07-30 RX ORDER — CLONAZEPAM 1 MG/1
TABLET ORAL
Qty: 60 TAB | Refills: 0 | Status: SHIPPED | OUTPATIENT
Start: 2019-07-30 | End: 2019-09-03 | Stop reason: SDUPTHER

## 2019-08-02 ENCOUNTER — TELEPHONE (OUTPATIENT)
Dept: FAMILY MEDICINE CLINIC | Age: 84
End: 2019-08-02

## 2019-08-02 NOTE — TELEPHONE ENCOUNTER
----- Message from Julianna Arellano sent at 8/2/2019  2:06 PM EDT -----  Regarding: Dr. Laurita Redmond Telephone  General Message/Vendor Calls    Caller's first and last name:      Reason for call:  Speak to nurse    Callback required yes/no and why:  Yes, needs to speak to nurse    Best contact number(s):  [phone]    Details to clarify the request:  Pt declined leaving a message states she just needs the nurse to give her a call back.       Julianna Arellano

## 2019-08-05 ENCOUNTER — HOSPITAL ENCOUNTER (EMERGENCY)
Age: 84
Discharge: HOME | End: 2019-08-05
Attending: EMERGENCY MEDICINE
Payer: MEDICARE

## 2019-08-05 VITALS
TEMPERATURE: 97.6 F | DIASTOLIC BLOOD PRESSURE: 77 MMHG | WEIGHT: 186 LBS | BODY MASS INDEX: 34.23 KG/M2 | HEART RATE: 70 BPM | RESPIRATION RATE: 20 BRPM | OXYGEN SATURATION: 92 % | HEIGHT: 62 IN | SYSTOLIC BLOOD PRESSURE: 137 MMHG

## 2019-08-05 DIAGNOSIS — S05.01XA ABRASION OF RIGHT CORNEA, INITIAL ENCOUNTER: ICD-10-CM

## 2019-08-05 DIAGNOSIS — L03.213 PRESEPTAL CELLULITIS OF RIGHT EYE: ICD-10-CM

## 2019-08-05 DIAGNOSIS — H10.9 CONJUNCTIVITIS OF RIGHT EYE, UNSPECIFIED CONJUNCTIVITIS TYPE: Primary | ICD-10-CM

## 2019-08-05 PROCEDURE — 99283 EMERGENCY DEPT VISIT LOW MDM: CPT

## 2019-08-05 PROCEDURE — 74011000250 HC RX REV CODE- 250: Performed by: PHYSICIAN ASSISTANT

## 2019-08-05 RX ORDER — CLINDAMYCIN HYDROCHLORIDE 300 MG/1
300 CAPSULE ORAL 4 TIMES DAILY
Qty: 28 CAP | Refills: 0 | Status: SHIPPED | OUTPATIENT
Start: 2019-08-05 | End: 2019-08-12

## 2019-08-05 RX ORDER — OFLOXACIN 3 MG/ML
2 SOLUTION/ DROPS OPHTHALMIC 4 TIMES DAILY
Qty: 5 ML | Refills: 0 | Status: SHIPPED | OUTPATIENT
Start: 2019-08-05 | End: 2019-08-10

## 2019-08-05 RX ORDER — TETRACAINE HYDROCHLORIDE 5 MG/ML
1 SOLUTION OPHTHALMIC
Status: COMPLETED | OUTPATIENT
Start: 2019-08-05 | End: 2019-08-05

## 2019-08-05 RX ADMIN — FLUORESCEIN SODIUM 1 STRIP: 1 STRIP OPHTHALMIC at 17:42

## 2019-08-05 RX ADMIN — TETRACAINE HYDROCHLORIDE 1 DROP: 5 SOLUTION OPHTHALMIC at 17:42

## 2019-08-05 NOTE — ED NOTES
Pt escorted to the bathroom via wheelchair and then to patient phone to call her daughter. Pt given printed discharge instructions and 2 script(s). Pt verbalized understanding of instructions and script(s). Pt verbalized importance of following up with eye doctor. Pt alert and oriented, in no acute distress, awaiting ride by ED doors.

## 2019-08-05 NOTE — PROGRESS NOTES
Patient ACO appointment schedule. PCP first available was 8/19/2019. Care Management Interventions  PCP Verified by CM:  Yes  Mode of Transport at Discharge: Self  Transition of Care Consult (CM Consult): Discharge Planning  Confirm Follow Up Transport: Self  Plan discussed with Pt/Family/Caregiver: Yes  Freedom of Choice Offered: Yes  Discharge Location  Discharge Placement: Home

## 2019-08-05 NOTE — DISCHARGE INSTRUCTIONS
Patient Education        Pinkeye: Care Instructions  Your Care Instructions    Pinkeye is redness and swelling of the eye surface and the conjunctiva (the lining of the eyelid and the covering of the white part of the eye). Pinkeye is also called conjunctivitis. Pinkeye is often caused by infection with bacteria or a virus. Dry air, allergies, smoke, and chemicals are other common causes. Pinkeye often clears on its own in 7 to 10 days. Antibiotics only help if the pinkeye is caused by bacteria. Pinkeye caused by infection spreads easily. If an allergy or chemical is causing pinkeye, it will not go away unless you can avoid whatever is causing it. Follow-up care is a key part of your treatment and safety. Be sure to make and go to all appointments, and call your doctor if you are having problems. It's also a good idea to know your test results and keep a list of the medicines you take. How can you care for yourself at home? · Wash your hands often. Always wash them before and after you treat pinkeye or touch your eyes or face. · Use moist cotton or a clean, wet cloth to remove crust. Wipe from the inside corner of the eye to the outside. Use a clean part of the cloth for each wipe. · Put cold or warm wet cloths on your eye a few times a day if the eye hurts. · Do not wear contact lenses or eye makeup until the pinkeye is gone. Throw away any eye makeup you were using when you got pinkeye. Clean your contacts and storage case. If you wear disposable contacts, use a new pair when your eye has cleared and it is safe to wear contacts again. · If the doctor gave you antibiotic ointment or eyedrops, use them as directed. Use the medicine for as long as instructed, even if your eye starts looking better soon. Keep the bottle tip clean, and do not let it touch the eye area. · To put in eyedrops or ointment:  ? Tilt your head back, and pull your lower eyelid down with one finger. ?  Drop or squirt the medicine inside the lower lid. ? Close your eye for 30 to 60 seconds to let the drops or ointment move around. ? Do not touch the ointment or dropper tip to your eyelashes or any other surface. · Do not share towels, pillows, or washcloths while you have pinkeye. When should you call for help? Call your doctor now or seek immediate medical care if:    · You have pain in your eye, not just irritation on the surface.     · You have a change in vision or loss of vision.     · You have an increase in discharge from the eye.     · Your eye has not started to improve or begins to get worse within 48 hours after you start using antibiotics.     · Pinkeye lasts longer than 7 days.    Watch closely for changes in your health, and be sure to contact your doctor if you have any problems. Where can you learn more? Go to http://anderson-jesús.info/. Enter Y392 in the search box to learn more about \"Pinkeye: Care Instructions. \"  Current as of: September 23, 2018  Content Version: 12.1  © 0418-9621 [employer]. Care instructions adapted under license by [employer] (which disclaims liability or warranty for this information). If you have questions about a medical condition or this instruction, always ask your healthcare professional. [address] any warranty or liability for your use of this information.

## 2019-08-05 NOTE — ED NOTES
Pt reports bilateral eye pain and itching, reports using [place] eye drops yesterday evening and waking up with right eyelid swelling at approximately 0300 this morning.

## 2019-08-05 NOTE — ED PROVIDER NOTES
EMERGENCY DEPARTMENT HISTORY AND PHYSICAL EXAM      Date: 8/5/2019  Patient Name: Saad Sanches    History of Presenting Illness     Chief Complaint   Patient presents with    Eye Pain       History Provided By: Patient    HPI: Saad Record, 82 y.o. female with PMHx significant for hypokalemia, hypercholesterolemia, GERD, hiatal hernia, aortic stenosis, spinal stenosis, CHF, arthritis, htn, anxiety, CAD presents ambulatory to the ED with cc of right eye pain and redness with assoc pruritis starting this am. Pt reports using allergy drops to eye which made irritation worse. Pt reports drainage upon waking this am. Denies fever/chills, photophobia, blurred vision. Denies known trauma/injury. Denies hx glaucoma. Rates pain 8/10. There are no other complaints, changes, or physical findings at this time. PCP: Shara Patel MD    No current facility-administered medications on file prior to encounter. Current Outpatient Medications on File Prior to Encounter   Medication Sig Dispense Refill    clonazePAM (KLONOPIN) 1 mg tablet TAKE 1/2 (HALF) TO 1 TABLET IN THE MORNING AS NEEDED FOR ANXIETY AND 1 TABLET AT BEDTIME FOR SLEEP 60 Tab 0    potassium chloride SR (KLOR-CON 10) 10 mEq tablet TAKE 3 TABLETS BY MOUTH EVERY MORNING AND 2 TABLETS BY MOUTH EVERY EVENING 150 Tab 0    rosuvastatin (CRESTOR) 20 mg tablet Take 1 Tab by mouth nightly. 30 Tab 3    diclofenac (VOLTAREN) 1 % gel Apply 2 g to affected area four (4) times daily as needed.  losartan (COZAAR) 100 mg tablet TAKE 1 TABLET BY MOUTH EVERY DAY 90 Tab 0    desonide (DESOWEN) 0.05 % topical lotion Apply  to affected area two (2) times daily as needed for Skin Irritation or Itching. 59 mL 0    nitroglycerin (NITROSTAT) 0.4 mg SL tablet TAKE 1 TAB BY SUBLINGUAL ROUTE EVERY 5 MINUTES AS NEEDED. 25 Tab 3    rosuvastatin (CRESTOR) 10 mg tablet TAKE 1 TAB BY MOUTH NIGHTLY.  30 Tab 11    cetirizine (ZYRTEC) 10 mg tablet Take 1 tablet by mouth daily as needed (and rash) 90 Tab 1    pantoprazole (PROTONIX) 40 mg tablet Take 1 Tab by mouth two (2) times a day. Take 30 minutes prior to breakfast and 30 minutes prior to dinner. 60 Tab 11    metoprolol tartrate (LOPRESSOR) 25 mg tablet Take 1 tab by mouth at 9am and take 1 tab at 9pm.      isosorbide mononitrate ER (IMDUR) 30 mg tablet Take 1 Tab by mouth daily. 90 Tab 3    amLODIPine (NORVASC) 5 mg tablet Take 1 Tab by mouth two (2) times a day. Take at 9am and 9pm every day. 180 Tab 1    furosemide (LASIX) 80 mg tablet TAKE 1 TABLET BY MOUTH EVERY MORNING AND TAKE 1/2 TABLET EVERY EVENING 135 Tab 3    simethicone (GAS RELIEF) 80 mg chewable tablet Take 80 mg by mouth every six (6) hours as needed for Flatulence.  diclofenac (VOLTAREN) 1 % gel Apply 2 g to affected area four (4) times daily. 100 g 3    mometasone (ELOCON) 0.1 % ointment APPLY TO AFFECTED AREA (SCALP) EVERY DAY AS NEEDED  2    cholecalciferol, vitamin D3, (VITAMIN D3) 2,000 unit tab Take 1 Tab by mouth daily.  acetaminophen (TYLENOL EXTRA STRENGTH) 500 mg tablet Take 1,000 mg by mouth every six (6) hours as needed for Pain.  Aspirin, Buffered 81 mg tab Take 81 mg by mouth daily.          Past History     Past Medical History:  Past Medical History:   Diagnosis Date    Anxiety     Aortic stenosis 3/3/2010    Aortic stenosis     Arrhythmia     MURMUR    Arthritis     SPINAL STENOSIS    Atherosclerosis of coronary artery     Biliary dyskinesia     CHF (congestive heart failure) ([facility] [address] 3/3/2010    CHF (congestive heart failure) ([facility] [address] 01/2002    Chronic heart failure ([facility] [address] 1/2002; 3/3/2010    chronic diastolic heart failure    Chronic pain     LOWER BACK, RIGHT KNEE    Environmental allergies 3/3/2010    GERD (gastroesophageal reflux disease) 3/3/2010    Hiatal hernia 3/3/2010    High cholesterol 3/3/2010    HTN (hypertension) 3/3/2010    Hypokalemia 3/3/2010    Ischemic heart disease, chronic     Obese  Psychiatric disorder     anxiety    S/P hysterectomy 3/3/2010    Spinal stenosis 3/3/2010       Past Surgical History:  Past Surgical History:   Procedure Laterality Date    CARDIAC CATHETERIZATION  01/2002    normal coronaries    DECOMPRESS DISC RF LUMBAR  07/2007    HX BACK SURGERY  5/1/2014    HX BREAST LUMPECTOMY Left 1989    benign left breast    HX BUNIONECTOMY      HX BUNIONECTOMY      HX HEART CATHETERIZATION  3/3/10    HX HYSTERECTOMY  1978    HX LUMBAR DISKECTOMY      HX ORTHOPAEDIC Bilateral     BUNIONECTOMY    HX ORTHOPAEDIC Right     WRIST FX    HX OTHER SURGICAL  10/12/2015    mole removed from right side of face by Dr. Bharti Aguirre FLX DX W/COLLJ Selam Hippo PFRMD  [id number]    Dr Michelle Bain GI ENDOSCOPY,BIOPSY  2/27/2019            Family History:  Family History   Problem Relation Age of Onset    Hypertension Mother     Heart Disease Father     Stroke Sister     Heart Disease Sister     Cancer Brother         LUNG    Cancer Brother         PROSTATE    Hypertension Brother     No Known Problems Brother     Lung Disease Brother     No Known Problems Brother     No Known Problems Brother     Stroke Daughter 47    No Known Problems Daughter     Anesth Problems Neg Hx        Social History:  Social History     Tobacco Use    Smoking status: Never Smoker    Smokeless tobacco: Never Used   Substance Use Topics    Alcohol use: No     Alcohol/week: 0.0 standard drinks    Drug use: No       Allergies: Allergies   Allergen Reactions    Bactrim [Sulfamethoxazole-Trimethoprim] Unknown (comments)     Pt does not recall    Darvocet A500 [Propoxyphene N-Acetaminophen] Itching         Review of Systems   Review of Systems   Constitutional: Negative for chills and fever. Eyes: Positive for pain and discharge. Negative for photophobia, redness, itching and visual disturbance. FB sensation to eye   Respiratory: Negative for shortness of breath. Cardiovascular: Negative for chest pain. Gastrointestinal: Negative for abdominal pain, nausea and vomiting. Genitourinary: Negative for flank pain. Musculoskeletal: Negative for back pain and myalgias. Skin: Negative for color change, pallor, rash and wound. Neurological: Negative for dizziness, weakness and light-headedness. All other systems reviewed and are negative. Physical Exam   Physical Exam   Constitutional: She is oriented to person, place, and time. She appears well-developed and well-nourished. No distress. HENT:   Head: Normocephalic and atraumatic. Eyes: Pupils are equal, round, and reactive to light. EOM are normal. Right eye exhibits discharge. Right eye exhibits no exudate and no hordeolum. No foreign body present in the right eye. No scleral icterus. Fundoscopic exam:       The right eye shows red reflex. Slit lamp exam:       The right eye shows corneal abrasion (Tiny at 2 o'clock). The right eye shows no corneal flare, no corneal ulcer, no foreign body, no hyphema, no hypopyon, no fluorescein uptake and no anterior chamber bulge. PERRL  EOM intact   Cardiovascular: Normal rate, regular rhythm and normal heart sounds. Pulmonary/Chest: Effort normal and breath sounds normal. No respiratory distress. Abdominal: Soft. Bowel sounds are normal. She exhibits no distension. Musculoskeletal: Normal range of motion. Neurological: She is alert and oriented to person, place, and time. Skin: Skin is warm. No rash noted. Psychiatric: She has a normal mood and affect. Her behavior is normal.   Nursing note and vitals reviewed. Diagnostic Study Results     Labs -   No results found for this or any previous visit (from the past 12 hour(s)). Radiologic Studies -   No orders to display     CT Results  (Last 48 hours)    None        CXR Results  (Last 48 hours)    None            Medical Decision Making   I am the first provider for this patient.     I reviewed the vital signs, available nursing notes, past medical history, past surgical history, family history and social history. Vital Signs-Reviewed the patient's vital signs. Patient Vitals for the past 12 hrs:   Temp Pulse Resp BP SpO2   08/05/19 1538 97.6 °F (36.4 °C) 70 20 137/77 92 %         Records Reviewed: Nursing Notes and Old Medical Records       Provider Notes (Medical Decision Making):   DDx: Conjunctivitis, Preseptal vs Periorbital cellulitis, Corneal Abrasion, FB in eye        ED Course:   Initial assessment performed. The patients presenting problems have been discussed, and they are in agreement with the care plan formulated and outlined with them. I have encouraged them to ask questions as they arise throughout their visit. ED Course as of Aug 05 2245   Mon Aug 05, 2019   1740 Spoke with Dr. Taylor Ulloa, consult for ophthalmology. Discussed pt's physical exam, vital signs, and PMHx. He recommends maxatrol+ dexamethasone, something for pain, and oral abx. He wants to see pt for follow up in his office at 9:30 tomorrow am.     [ET]   1818 Updated Dr. Taylor Ulloa and possible small corneal abrasion. He recommended a change in otic drops without dexamethasone and continued to recommend follow up tomorrow. [ET]      ED Course User Index  [ET] HELEN Ley       Disposition:  Discussed dx and treatment plan. Discussed importance of  phthalmology  follow up. All questions answered. Pt voiced they understood. Return if sx worsen. PLAN:  1. Discharge Medication List as of 8/5/2019  6:21 PM      START taking these medications    Details   ofloxacin (FLOXIN) 0.3 % ophthalmic solution Administer 2 Drops to right eye four (4) times daily for 5 days. , Normal, Disp-5 mL, R-0      clindamycin (CLEOCIN) 300 mg capsule Take 1 Cap by mouth four (4) times daily for 7 days. , Normal, Disp-28 Cap, R-0         CONTINUE these medications which have NOT CHANGED    Details   clonazePAM (KLONOPIN) 1 mg tablet TAKE 1/2 (HALF) TO 1 TABLET IN THE MORNING AS NEEDED FOR ANXIETY AND 1 TABLET AT BEDTIME FOR SLEEP, PrintThis request is for a new prescription for a controlled substance as required by Federal/State law. .Disp-60 Tab, R-0      potassium chloride SR (KLOR-CON 10) 10 mEq tablet TAKE 3 TABLETS BY MOUTH EVERY MORNING AND 2 TABLETS BY MOUTH EVERY EVENING, Normal, Disp-150 Tab, R-0      !! rosuvastatin (CRESTOR) 20 mg tablet Take 1 Tab by mouth nightly. , Print, Disp-30 Tab, R-3      !! diclofenac (VOLTAREN) 1 % gel Apply 2 g to affected area four (4) times daily as needed., Historical Med      losartan (COZAAR) 100 mg tablet TAKE 1 TABLET BY MOUTH EVERY DAY, Normal, Disp-90 Tab, R-0      desonide (DESOWEN) 0.05 % topical lotion Apply  to affected area two (2) times daily as needed for Skin Irritation or Itching., Normal, Disp-59 mL, R-0      nitroglycerin (NITROSTAT) 0.4 mg SL tablet TAKE 1 TAB BY SUBLINGUAL ROUTE EVERY 5 MINUTES AS NEEDED., Normal, Disp-25 Tab, R-3      !! rosuvastatin (CRESTOR) 10 mg tablet TAKE 1 TAB BY MOUTH NIGHTLY., Normal, Disp-30 Tab, R-11      cetirizine (ZYRTEC) 10 mg tablet Take 1 tablet by mouth daily as needed (and rash), Normal, Disp-90 Tab, R-1      pantoprazole (PROTONIX) 40 mg tablet Take 1 Tab by mouth two (2) times a day. Take 30 minutes prior to breakfast and 30 minutes prior to dinner., Normal, Disp-60 Tab, R-11      metoprolol tartrate (LOPRESSOR) 25 mg tablet Take 1 tab by mouth at 9am and take 1 tab at 9pm., Historical Med      isosorbide mononitrate ER (IMDUR) 30 mg tablet Take 1 Tab by mouth daily. , Normal, Disp-90 Tab, R-3      amLODIPine (NORVASC) 5 mg tablet Take 1 Tab by mouth two (2) times a day.  Take at 9am and 9pm every day., Normal, Disp-180 Tab, R-1      furosemide (LASIX) 80 mg tablet TAKE 1 TABLET BY MOUTH EVERY MORNING AND TAKE 1/2 TABLET EVERY EVENING, Normal, Disp-135 Tab, R-3      simethicone (GAS RELIEF) 80 mg chewable tablet Take 80 mg by mouth every six (6) hours as needed for Flatulence., Historical Med      !! diclofenac (VOLTAREN) 1 % gel Apply 2 g to affected area four (4) times daily. , Normal, Disp-100 g, R-3      mometasone (ELOCON) 0.1 % ointment APPLY TO AFFECTED AREA (SCALP) EVERY DAY AS NEEDED, Historical Med, R-2      cholecalciferol, vitamin D3, (VITAMIN D3) 2,000 unit tab Take 1 Tab by mouth daily. , Historical Med      acetaminophen (TYLENOL EXTRA STRENGTH) 500 mg tablet Take 1,000 mg by mouth every six (6) hours as needed for Pain., Historical Med      Aspirin, Buffered 81 mg tab Take 81 mg by mouth daily. , Historical Med       !! - Potential duplicate medications found. Please discuss with provider. 2.   Follow-up Information     Follow up With Specialties Details Why Contact Info    Deepak Owens MD Family Practice On 8/19/2019 Your appointment time is 11:00 [address]      Andre Grissom M.D. Follow up tomorrow (8/6) in his office at 9:30 am [address] [place] [address] [address] [facility]        Return to ED if worse     Diagnosis     Clinical Impression:   1. Conjunctivitis of right eye, unspecified conjunctivitis type    2. Preseptal cellulitis of right eye    3.  Abrasion of right cornea, initial encounter

## 2019-08-05 NOTE — ED NOTES
Emergency Department Nursing Plan of Care       The Nursing Plan of Care is developed from the Nursing assessment and Emergency Department Attending provider initial evaluation. The plan of care may be reviewed in the ED Provider note.     The Plan of Care was developed with the following considerations:   Patient / Family readiness to learn indicated by:verbalized understanding  Persons(s) to be included in education: patient  Barriers to Learning/Limitations:No    Signed     Himanshu Leal RN    8/5/2019   6:17 PM

## 2019-08-09 ENCOUNTER — TELEPHONE (OUTPATIENT)
Dept: FAMILY MEDICINE CLINIC | Age: 84
End: 2019-08-09

## 2019-08-09 NOTE — TELEPHONE ENCOUNTER
Patient advised nurse of ongoing problems with knee and her trip to ER for eye infection. Patient has a call into ortho doctor for knee pain.

## 2019-08-09 NOTE — TELEPHONE ENCOUNTER
Patient wants Jon Xie to know that she went to see Ingrid Jalloh on Aug.1 he gave her an injection in her (r) knee she says she has severe pain in that knee she would like for one of the nurses to give her a call @ [phone]

## 2019-09-03 DIAGNOSIS — F41.9 ANXIETY: Chronic | ICD-10-CM

## 2019-09-03 RX ORDER — CLONAZEPAM 1 MG/1
TABLET ORAL
Qty: 60 TAB | Refills: 1 | OUTPATIENT
Start: 2019-09-03 | End: 2019-11-11 | Stop reason: SDUPTHER

## 2019-09-03 NOTE — TELEPHONE ENCOUNTER
----- Message from Boogie Howard sent at 9/3/2019 10:05 AM EDT -----  Regarding: Dr. Vital Laredo  Pt requesting refill on \"Clonazepam 1 mg\" sent to [facility] on [address] [phone]  Pt best Contact:  [phone]

## 2019-09-10 RX ORDER — POTASSIUM CHLORIDE 750 MG/1
TABLET, FILM COATED, EXTENDED RELEASE ORAL
Qty: 150 TAB | Refills: 3 | Status: SHIPPED | OUTPATIENT
Start: 2019-09-10 | End: 2019-11-04 | Stop reason: SDUPTHER

## 2019-09-10 RX ORDER — POTASSIUM CHLORIDE 750 MG/1
TABLET, FILM COATED, EXTENDED RELEASE ORAL
Qty: 150 TAB | Refills: 0 | Status: SHIPPED | OUTPATIENT
Start: 2019-09-10 | End: 2020-02-17

## 2019-09-26 RX ORDER — LOSARTAN POTASSIUM 100 MG/1
TABLET ORAL
Qty: 30 TAB | Refills: 2 | Status: SHIPPED | OUTPATIENT
Start: 2019-09-26 | End: 2020-01-09

## 2019-10-21 DIAGNOSIS — L85.3 DRY SKIN DERMATITIS: ICD-10-CM

## 2019-10-22 ENCOUNTER — TELEPHONE (OUTPATIENT)
Dept: FAMILY MEDICINE CLINIC | Age: 84
End: 2019-10-22

## 2019-10-22 DIAGNOSIS — M25.471 ANKLE SWELLING, RIGHT: Primary | ICD-10-CM

## 2019-10-22 RX ORDER — DESONIDE 0.5 MG/ML
LOTION TOPICAL
Qty: 59 ML | Refills: 0 | Status: SHIPPED | OUTPATIENT
Start: 2019-10-22 | End: 2020-05-29 | Stop reason: SDUPTHER

## 2019-10-22 NOTE — TELEPHONE ENCOUNTER
Patient would like for Katie Angel to give her another referral to see the podiatrist Cici Otero she can be reached @ [phone]

## 2019-10-22 NOTE — TELEPHONE ENCOUNTER
Patient complaining of right ankle hurting and swelling x 4 days. Requesting a referral to podiatrist.     Notified patient referral placed.

## 2019-11-04 ENCOUNTER — OFFICE VISIT (OUTPATIENT)
Dept: FAMILY MEDICINE CLINIC | Age: 84
End: 2019-11-04

## 2019-11-04 ENCOUNTER — HOSPITAL ENCOUNTER (OUTPATIENT)
Dept: LAB | Age: 84
Discharge: HOME | End: 2019-11-04
Payer: MEDICARE

## 2019-11-04 VITALS
WEIGHT: 185.6 LBS | TEMPERATURE: 96.6 F | RESPIRATION RATE: 18 BRPM | HEART RATE: 61 BPM | HEIGHT: 62 IN | OXYGEN SATURATION: 96 % | SYSTOLIC BLOOD PRESSURE: 138 MMHG | DIASTOLIC BLOOD PRESSURE: 77 MMHG | BODY MASS INDEX: 34.16 KG/M2

## 2019-11-04 DIAGNOSIS — Z00.00 MEDICARE ANNUAL WELLNESS VISIT, SUBSEQUENT: ICD-10-CM

## 2019-11-04 DIAGNOSIS — Z23 ENCOUNTER FOR IMMUNIZATION: ICD-10-CM

## 2019-11-04 DIAGNOSIS — K21.9 GASTROESOPHAGEAL REFLUX DISEASE WITHOUT ESOPHAGITIS: ICD-10-CM

## 2019-11-04 DIAGNOSIS — E78.2 MIXED HYPERLIPIDEMIA: ICD-10-CM

## 2019-11-04 DIAGNOSIS — Z51.81 ENCOUNTER FOR MEDICATION MONITORING: ICD-10-CM

## 2019-11-04 DIAGNOSIS — M15.9 PRIMARY OSTEOARTHRITIS INVOLVING MULTIPLE JOINTS: ICD-10-CM

## 2019-11-04 DIAGNOSIS — I10 ESSENTIAL HYPERTENSION: Primary | ICD-10-CM

## 2019-11-04 DIAGNOSIS — I50.32 CHRONIC DIASTOLIC HEART FAILURE (HCC): ICD-10-CM

## 2019-11-04 DIAGNOSIS — F41.9 CHRONIC ANXIETY: ICD-10-CM

## 2019-11-04 DIAGNOSIS — E66.9 NON MORBID OBESITY: ICD-10-CM

## 2019-11-04 DIAGNOSIS — I25.10 ATHEROSCLEROSIS OF NATIVE CORONARY ARTERY OF NATIVE HEART WITHOUT ANGINA PECTORIS: ICD-10-CM

## 2019-11-04 PROCEDURE — 80061 LIPID PANEL: CPT

## 2019-11-04 PROCEDURE — 80048 BASIC METABOLIC PNL TOTAL CA: CPT

## 2019-11-04 NOTE — PROGRESS NOTES
HISTORY OF PRESENT ILLNESS  Get Rowland is a 82 y.o. female. HPI   Follow up on chronic medical problems. Increase family stress with her grandson in trouble and having to go to court. Her dtg is getting evicted and having to more back in with her. Cardiovascular Review:  The patient has hypertension, hyperlipidemia, chronic diastolic heart failure, ASCHD and obesity. Hx also of aortic stenosis. Diet and Lifestyle: generally follows a low fat low cholesterol diet, generally follows a low sodium diet, sedentary. Pertinent ROS: taking medications as instructed, no medication side effects noted, no TIA's, no chest pain on exertion, mild swelling of ankles, no orthostatic dizziness or lightheadedness, no palpitations, no muscle aches or pain. Home BP Monitoring: is not measured at home. Osteoarthritis:  Patient has osteoarthritis. Overall doing better with back pain. Decided not to go thru with the back surgery just yet. Still having knee pain. Aching more in the right knee and increase pain with walking. Has had 2 injections in the knee which has not helped very much. Pain is 5-6/10. Taking tylenol for the pain which helps some. Symptoms onset: problem is longstanding. Rheumatological ROS: stable, mild-to-moderate joint symptoms intermittently, reasonably well controlled by PRN meds. Response to treatment plan: stable and intermittent. Anxiety Review:  Patient is seen for anxiety. Ongoing symptoms include: increased anxiety still related to family issues. Patient denies: palpitations, sweating, chest pain, shortness of breath. Reported side effects from the treatment: none.     Patient Active Problem List   Diagnosis Code    Hypokalemia E87.6    Hiatal hernia K44.9    Anxiety F41.9    S/P hysterectomy Z90.710    Aortic stenosis, mild I35.0    Spinal stenosis M48.00    S/P foot surgery Z98.890    Environmental allergies Z91.09    S/P cardiac catheterization Z98.890  Hypovitaminosis D E55.9    Chronic ischemic heart disease I25.9    Mixed hyperlipidemia E78.2    Chronic diastolic heart failure (HCC) I50.32    Chest pain, unspecified R07.9    Chest pain at rest R07.9    Bifascicular bundle branch block--RBBB,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    Atypical chest pain R07.89    Essential hypertension I10    Gastroesophageal reflux disease without esophagitis K21.9    Atherosclerosis of native coronary artery without angina pectoris--diffuse small vsl disease via cath cath 2009--RCA PDA/ROSA I25.10    Chronic anxiety F41.9    Encounter for medication monitoring Z51.81    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 M47.815    Class 2 obesity due to excess calories with serious comorbidity and body mass index (BMI) of 38.0 to 38.9 in adult [id number]    Paroxysmal cardiac arrhythmia--PVC's,APC's,NSSVT I49.8    Severe obesity (BMI 35.0-39. 9) with comorbidity ([facility]) E66.01    CKD (chronic kidney disease) stage 3, GFR 30-59 ml/min ([facility]) N18.3    Chest pain with normal angiography--2002;normal NST 2018 R07.9       Current Outpatient Medications   Medication Sig Dispense Refill    amLODIPine (NORVASC) 5 mg tablet TAKE 1 TABLET BY MOUTH TWICE A DAY 60 Tab 11    desonide (TRIDESILON) 0.05 % topical lotion APPLY TO AFFECTED AREA TWO (2) TIMES DAILY AS NEEDED FOR SKIN IRRITATION OR ITCHING 59 mL 0    losartan (COZAAR) 100 mg tablet TAKE 1 TABLET BY MOUTH EVERY DAY 30 Tab 2    potassium chloride SR (KLOR-CON 10) 10 mEq tablet TAKE 3 TABLETS BY MOUTH EVERY MORNING AND 2 TABLETS BY MOUTH EVERY EVENING 150 Tab 0    potassium chloride SR (KLOR-CON 10) 10 mEq tablet Take 3 tablets every morning and take 2 tablets every evening. 150 Tab 3    clonazePAM (KLONOPIN) 1 mg tablet TAKE 1/2 (HALF) TO 1 TABLET IN THE MORNING AS NEEDED FOR ANXIETY AND 1 TABLET AT BEDTIME FOR SLEEP 60 Tab 1    rosuvastatin (CRESTOR) 20 mg tablet Take 1 Tab by mouth nightly.  30 Tab 3    diclofenac (VOLTAREN) 1 % gel Apply 2 g to affected area four (4) times daily as needed.  nitroglycerin (NITROSTAT) 0.4 mg SL tablet TAKE 1 TAB BY SUBLINGUAL ROUTE EVERY 5 MINUTES AS NEEDED. 25 Tab 3    rosuvastatin (CRESTOR) 10 mg tablet TAKE 1 TAB BY MOUTH NIGHTLY. 30 Tab 11    cetirizine (ZYRTEC) 10 mg tablet Take 1 tablet by mouth daily as needed (and rash) 90 Tab 1    pantoprazole (PROTONIX) 40 mg tablet Take 1 Tab by mouth two (2) times a day. Take 30 minutes prior to breakfast and 30 minutes prior to dinner. 60 Tab 11    metoprolol tartrate (LOPRESSOR) 25 mg tablet Take 1 tab by mouth at 9am and take 1 tab at 9pm.      isosorbide mononitrate ER (IMDUR) 30 mg tablet Take 1 Tab by mouth daily. 90 Tab 3    amLODIPine (NORVASC) 5 mg tablet Take 1 Tab by mouth two (2) times a day. Take at 9am and 9pm every day. 180 Tab 1    furosemide (LASIX) 80 mg tablet TAKE 1 TABLET BY MOUTH EVERY MORNING AND TAKE 1/2 TABLET EVERY EVENING 135 Tab 3    simethicone (GAS RELIEF) 80 mg chewable tablet Take 80 mg by mouth every six (6) hours as needed for Flatulence.  diclofenac (VOLTAREN) 1 % gel Apply 2 g to affected area four (4) times daily. 100 g 3    mometasone (ELOCON) 0.1 % ointment APPLY TO AFFECTED AREA (SCALP) EVERY DAY AS NEEDED  2    cholecalciferol, vitamin D3, (VITAMIN D3) 2,000 unit tab Take 1 Tab by mouth daily.  acetaminophen (TYLENOL EXTRA STRENGTH) 500 mg tablet Take 1,000 mg by mouth every six (6) hours as needed for Pain.  Aspirin, Buffered 81 mg tab Take 81 mg by mouth daily.          Allergies   Allergen Reactions    Bactrim [Sulfamethoxazole-Trimethoprim] Unknown (comments)     Pt does not recall    Darvocet A500 [Propoxyphene N-Acetaminophen] Itching         Past Medical History:   Diagnosis Date    Anxiety     Aortic stenosis 3/3/2010    Aortic stenosis     Arrhythmia     MURMUR    Arthritis     SPINAL STENOSIS    Atherosclerosis of coronary artery     Biliary dyskinesia     CHF [facility]congestive heart failure) ([facility] [address] 3/3/2010    CHF (congestive heart failure) ([facility] [address] 01/2002    [facility]; 3/3/2010    chronic diastolic heart failure    Chronic pain     LOWER BACK, RIGHT KNEE    Environmental allergies 3/3/2010    GERD (gastroesophageal reflux disease) 3/3/2010    Hiatal hernia 3/3/2010    High cholesterol 3/3/2010    HTN (hypertension) 3/3/2010    Hypokalemia 3/3/2010    Ischemic heart disease, chronic     Obese     Psychiatric disorder     anxiety    S/P hysterectomy 3/3/2010    Spinal stenosis 3/3/2010         Past Surgical History:   Procedure Laterality Date    CARDIAC CATHETERIZATION  01/2002    normal coronaries    DECOMPRESS DISC RF LUMBAR  07/2007    HX BACK SURGERY  5/1/2014    HX BREAST LUMPECTOMY Left 1989    benign left breast    HX BUNIONECTOMY      HX BUNIONECTOMY      HX HEART CATHETERIZATION  3/3/10    HX HYSTERECTOMY  1978    HX LUMBAR DISKECTOMY      HX ORTHOPAEDIC Bilateral     BUNIONECTOMY    HX ORTHOPAEDIC Right     WRIST FX    HX OTHER SURGICAL  10/12/2015    mole removed from right side of face by Dr. Marin Chaudhari FLX DX W/COLLJ Timur Pal PFRMD  [id number]    Dr Rain Medrano GI ENDOSCOPY,BIOPSY  2/27/2019              Family History   Problem Relation Age of Onset    Hypertension Mother     Heart Disease Father     Stroke Sister     Heart Disease Sister     Cancer Brother         LUNG    Cancer Brother         PROSTATE    Hypertension Brother     No Known Problems Brother     Lung Disease Brother     No Known Problems Brother     No Known Problems Brother     Stroke Daughter 47    No Known Problems Daughter     Anesth Problems Neg Hx        Social History     Tobacco Use    Smoking status: Never Smoker    Smokeless tobacco: Never Used   Substance Use Topics    Alcohol use: No     Alcohol/week: 0.0 standard drinks        Lab Results   Component Value Date/Time    WBC 4.7 07/17/2019 10:20 AM HGB 13.1 07/17/2019 10:20 AM    HCT 38.9 07/17/2019 10:20 AM    PLATELET 847 48/12/8709 10:20 AM    MCV 86 07/17/2019 10:20 AM     Lab Results   Component Value Date/Time    Cholesterol, total 194 07/17/2019 10:20 AM    HDL Cholesterol 59 07/17/2019 10:20 AM    LDL, calculated 113 (H) 07/17/2019 10:20 AM    Triglyceride 111 07/17/2019 10:20 AM    CHOL/HDL Ratio 3.1 11/01/2010 05:26 PM     Lab Results   Component Value Date/Time    TSH 2.470 05/15/2017 03:04 PM      Lab Results   Component Value Date/Time    Sodium 146 (H) 07/17/2019 10:20 AM    Potassium 4.3 07/17/2019 10:20 AM    Chloride 103 07/17/2019 10:20 AM    CO2 27 07/17/2019 10:20 AM    Anion gap 8 03/30/2019 10:54 AM    Glucose 94 07/17/2019 10:20 AM    BUN 10 07/17/2019 10:20 AM    Creatinine 0.97 07/17/2019 10:20 AM    BUN/Creatinine ratio 10 (L) 07/17/2019 10:20 AM    GFR est AA 63 07/17/2019 10:20 AM    GFR est non-AA 55 (L) 07/17/2019 10:20 AM    Calcium 9.5 07/17/2019 10:20 AM    Bilirubin, total 0.5 07/17/2019 10:20 AM    ALT (SGPT) 12 07/17/2019 10:20 AM    AST (SGOT) 17 07/17/2019 10:20 AM    Alk. phosphatase 87 07/17/2019 10:20 AM    Protein, total 7.7 07/17/2019 10:20 AM    Albumin 4.3 07/17/2019 10:20 AM    Globulin 4.8 (H) 03/30/2019 10:54 AM    A-G Ratio 1.3 07/17/2019 10:20 AM      Lab Results   Component Value Date/Time    Hemoglobin A1c 5.4 04/16/2014 12:20 PM    Hemoglobin A1c (POC) 5.5 11/26/2014 12:12 PM         Review of Systems   Constitutional: Negative for malaise/fatigue. HENT: Negative for congestion. Eyes: Negative for blurred vision. Respiratory: Negative for cough and shortness of breath. Cardiovascular: Negative for chest pain, palpitations and leg swelling. Gastrointestinal: Negative for abdominal pain, constipation and heartburn. Genitourinary: Negative for dysuria, frequency and urgency. Musculoskeletal: Positive for back pain and joint pain. Neurological: Negative for dizziness, tingling and headaches. Endo/Heme/Allergies: Negative for environmental allergies. Psychiatric/Behavioral: Negative for depression. The patient does not have insomnia. Physical Exam   Constitutional: She appears well-developed and well-nourished. /77 (BP 1 Location: Right arm, BP Patient Position: Sitting)   Pulse 61   Temp 96.6 °F (35.9 °C) (Oral)   Resp 18   Ht 5' 2\" (1.575 m)   Wt 185 lb 9.6 oz (84.2 kg)   LMP  (LMP Unknown)   SpO2 96%   BMI 33.95 kg/m²      HENT:   Right Ear: Tympanic membrane and ear canal normal.   Left Ear: Tympanic membrane and ear canal normal.   Nose: No mucosal edema or rhinorrhea. Mouth/Throat: Oropharynx is clear and moist and mucous membranes are normal.   Neck: Normal range of motion. Neck supple. No thyromegaly present. Cardiovascular: Normal rate, regular rhythm and normal heart sounds. Exam reveals no gallop. Pulmonary/Chest: Effort normal and breath sounds normal.   Abdominal: Soft. Normal appearance and bowel sounds are normal. She exhibits no mass. There is no tenderness. Musculoskeletal: She exhibits no edema. Right knee: She exhibits decreased range of motion. She exhibits no swelling and no effusion. Tenderness found. Medial joint line tenderness noted. Lymphadenopathy:     She has no cervical adenopathy. Skin: Skin is warm and dry. Psychiatric: She has a normal mood and affect. Nursing note and vitals reviewed. ASSESSMENT and PLAN  Diagnoses and all orders for this visit:    1. Essential hypertension  Discussed sodium restriction, high k rich diet, maintaining ideal body weight and regular exercise program such as daily walking 30 min perday 4-5 times per week, as physiologic means to achieve blood pressure control.  Medication compliance advised. 2. Mixed hyperlipidemia  -     LIPID PANEL    3. Chronic diastolic heart failure (HCC)//  4. Atherosclerosis of native coronary artery of native heart without angina pectoris  Stable     5. Chronic anxiety  We discussed stress reduction. 6. Primary osteoarthritis involving multiple joints  Stable     7. Gastroesophageal reflux disease without esophagitis  Stable     8. Encounter for medication monitoring  -     METABOLIC PANEL, BASIC  -     AMB POC COMPLETE CBC, AUTOMATED    9. Encounter for immunization  -     INFLUENZA VACCINE INACTIVATED (IIV), SUBUNIT, ADJUVANTED, IM  -     ME IMMUNIZ ADMIN,1 SINGLE/COMB VAC/TOXOID    10. Non morbid obesity      Follow-up and Dispositions    · Return in about 4 months (around 3/4/2020). reviewed diet, exercise and weight control  cardiovascular risk and specific lipid/LDL goals reviewed  reviewed medications and side effects in detail    I have discussed diagnosis listed in this note with pt and/or family. I have discussed treatment plans and options and the risk/benefit analysis of those options, including safe use of medications and possible medication side effects. Through the use of shared decision making we have agreed to the above plan. The patient has received an after-visit summary and questions were answered concerning future plans and follow up. Advise pt of any urgent changes then to proceed to the ER.

## 2019-11-04 NOTE — PROGRESS NOTES
Order placed for flu shot, per Verbal Order from Dr. Jb Lugo on 11/4/2019 due to need    Flu shot 0.5ml given in right arm IM, no reaction noted.

## 2019-11-04 NOTE — PROGRESS NOTES
This is a Subsequent Medicare Annual Wellness Exam (AWV) (Performed 12 months after IPPE or effective date of Medicare Part B enrollment)    I have reviewed the patient's medical history in detail and updated the computerized patient record. History     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    Atypical chest pain R07.89    Essential hypertension I10    Gastroesophageal reflux disease without esophagitis K21.9    Atherosclerosis of native coronary artery without angina pectoris--diffuse small vsl disease via cath cath 2009--RCA PDA/ROSA I25.10    Chronic anxiety F41.9    Encounter for medication monitoring Z51.81    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 M47.815    Class 2 obesity due to excess calories with serious comorbidity and body mass index (BMI) of 38.0 to 38.9 in adult [id number]    Paroxysmal cardiac arrhythmia--PVC's,APC's,NSSVT I49.8    Severe obesity (BMI 35.0-39. 9) with comorbidity ([facility]) E66.01    CKD (chronic kidney disease) stage 3, GFR 30-59 ml/min ([facility]) N18.3    Chest pain with normal angiography--2002;normal NST 2018 R07.9       Current Outpatient Medications   Medication Sig Dispense Refill    desonide (TRIDESILON) 0.05 % topical lotion APPLY TO AFFECTED AREA TWO (2) TIMES DAILY AS NEEDED FOR SKIN IRRITATION OR ITCHING 59 mL 0    losartan (COZAAR) 100 mg tablet TAKE 1 TABLET BY MOUTH EVERY DAY 30 Tab 2    potassium chloride SR (KLOR-CON 10) 10 mEq tablet TAKE 3 TABLETS BY MOUTH EVERY MORNING AND 2 TABLETS BY MOUTH EVERY EVENING 150 Tab 0    clonazePAM (KLONOPIN) 1 mg tablet TAKE 1/2 (HALF) TO 1 TABLET IN THE MORNING AS NEEDED FOR ANXIETY AND 1 TABLET AT BEDTIME FOR SLEEP 60 Tab 1    rosuvastatin (CRESTOR) 20 mg tablet Take 1 Tab by mouth nightly. 30 Tab 3    nitroglycerin (NITROSTAT) 0.4 mg SL tablet TAKE 1 TAB BY SUBLINGUAL ROUTE EVERY 5 MINUTES AS NEEDED. 25 Tab 3    pantoprazole (PROTONIX) 40 mg tablet Take 1 Tab by mouth two (2) times a day. Take 30 minutes prior to breakfast and 30 minutes prior to dinner. 60 Tab 11    metoprolol tartrate (LOPRESSOR) 25 mg tablet Take 1 tab by mouth at 9am and take 1 tab at 9pm.      isosorbide mononitrate ER (IMDUR) 30 mg tablet Take 1 Tab by mouth daily. 90 Tab 3    amLODIPine (NORVASC) 5 mg tablet Take 1 Tab by mouth two (2) times a day. Take at 9am and 9pm every day. 180 Tab 1    furosemide (LASIX) 80 mg tablet TAKE 1 TABLET BY MOUTH EVERY MORNING AND TAKE 1/2 TABLET EVERY EVENING 135 Tab 3    simethicone (GAS RELIEF) 80 mg chewable tablet Take 80 mg by mouth every six (6) hours as needed for Flatulence.  mometasone (ELOCON) 0.1 % ointment APPLY TO AFFECTED AREA (SCALP) EVERY DAY AS NEEDED  2    cholecalciferol, vitamin D3, (VITAMIN D3) 2,000 unit tab Take 1 Tab by mouth daily.  acetaminophen (TYLENOL EXTRA STRENGTH) 500 mg tablet Take 1,000 mg by mouth every six (6) hours as needed for Pain.  Aspirin, Buffered 81 mg tab Take 81 mg by mouth daily.          Allergies   Allergen Reactions    Bactrim [Sulfamethoxazole-Trimethoprim] Unknown (comments)     Pt does not recall    Darvocet A500 [Propoxyphene N-Acetaminophen] Itching       Past Medical History:   Diagnosis Date    Anxiety     Aortic stenosis 3/3/2010    Aortic stenosis     Arrhythmia     MURMUR    Arthritis     SPINAL STENOSIS    Atherosclerosis of coronary artery     Biliary dyskinesia     CHF [facility]congestive heart failure) ([facility] [address] 3/3/2010    CHF (congestive heart failure) ([facility] [address] 01/2002    [facility]; 3/3/2010    chronic diastolic heart failure    Chronic pain     LOWER BACK, RIGHT KNEE    Environmental allergies 3/3/2010    GERD (gastroesophageal reflux disease) 3/3/2010    Hiatal hernia 3/3/2010    High cholesterol 3/3/2010    HTN (hypertension) 3/3/2010    Hypokalemia 3/3/2010    Ischemic heart disease, chronic     Obese     Psychiatric disorder     anxiety    S/P hysterectomy 3/3/2010    Spinal stenosis 3/3/2010       Past Surgical History:   Procedure Laterality Date    CARDIAC CATHETERIZATION  01/2002    normal coronaries    DECOMPRESS DISC RF LUMBAR  07/2007    HX BACK SURGERY  5/1/2014    HX BREAST LUMPECTOMY Left 1989    benign left breast    HX BUNIONECTOMY      HX BUNIONECTOMY      HX HEART CATHETERIZATION  3/3/10    HX HYSTERECTOMY  1978    HX LUMBAR DISKECTOMY      HX ORTHOPAEDIC Bilateral     BUNIONECTOMY    HX ORTHOPAEDIC Right     WRIST FX    HX OTHER SURGICAL  10/12/2015    mole removed from right side of face by Dr. Yue Fitzpatrick FLX DX W/COLLJ Kate Brady PFRMD  [id number]    Dr Chambers Numbers GI ENDOSCOPY,BIOPSY  2/27/2019            Family History   Problem Relation Age of Onset    Hypertension Mother     Heart Disease Father     Stroke Sister     Heart Disease Sister     Cancer Brother         LUNG    Cancer Brother         PROSTATE    Hypertension Brother     No Known Problems Brother     Lung Disease Brother     No Known Problems Brother     No Known Problems Brother     Stroke Daughter 47    No Known Problems Daughter     Anesth Problems Neg Hx        Social History     Tobacco Use    Smoking status: Never Smoker    Smokeless tobacco: Never Used   Substance Use Topics    Alcohol use: No     Alcohol/week: 0.0 standard drinks            Depression Risk Factor Screening:     PHQ over the last two weeks 11/7/2017 Little interest or pleasure in doing things Not at all   Feeling down, depressed or hopeless Not at all   Total Score PHQ 2 0     Alcohol Risk Factor Screening: You do not drink alcohol or very rarely. Functional Ability and Level of Safety:   Hearing Loss  Hearing is good. Activities of Daily Living  The home contains: no safety equipment. Patient does total self care    Fall RiskFall Risk Assessment, last 12 mths 11/7/2017   Able to walk? Yes   Fall in past 12 months? No     Functional Ability:   Does the patient exhibit a steady gait? Wobbles with using a cane   How long did it take the patient to get up and walk from a sitting position? A 5-10 seconds    Is the patient self reliant? (ie can do own laundry, meals, household chores)  yes   Does the patient handle his/her own medications? yes   Does the patient handle his/her own money? yes   Is the patients home safe (ie good lighting, handrails on stairs and bath, etc.)? yes   Did you notice or did patient express any hearing difficulties? no   Did you notice or did patient express any vision difficulties? no        Advance Care Planning:   Patient was offered the opportunity to discuss advance care planning:  yes    Does patient have an Advance Directive:  no   If no, did you provide information on [facility]? yes      Abuse Screen  Patient is not abused    Cognitive Screening   Evaluation of Cognitive Function:  Has your family/caregiver stated any concerns about your memory: no      Patient Care Team   Patient Care Team:  Felecia Vasquez MD as PCP - General    Assessment/Plan   Education and counseling provided:  Are appropriate based on today's review and evaluation  End-of-Life planning (with patient's consent)    ASSESSMENT and PLAN    Medicare Annual Wellness  Continue current treatment plan. Continue annual follow up. I have discussed diagnosis listed in this note with pt and/or family.  I have discussed treatment plans and options and the risk/benefit analysis of those options, including safe use of medications and possible medication side effects. Through the use of shared decision making we have agreed to the above plan. The patient has received an after-visit summary and questions were answered concerning future plans and follow up. Advise pt of any urgent changes then to proceed to the ER.

## 2019-11-04 NOTE — PROGRESS NOTES
Chief Complaint   Patient presents with    Hypertension     follow up    Cholesterol Problem     follow up         Eye exam 1/10/2019 by Dr. Franchesca Garcia. Patient states she has an eye exam with Dr. Bang Munoz 11/19/2019. Patient states she is still having right knee pain and will be calling Dr. Damien Alpers who she has been seeing for her right knee pain. 1. Have you been to the ER, urgent care clinic since your last visit? Hospitalized since your last visit? No    2. Have you seen or consulted any other health care providers outside of the [facility] since your last visit? Include any pap smears or colon screening.  No

## 2019-11-05 LAB
BUN SERPL-MCNC: 11 MG/DL (ref 8–27)
BUN/CREAT SERPL: 12 (ref 12–28)
CALCIUM SERPL-MCNC: 9.6 MG/DL (ref 8.7–10.3)
CHLORIDE SERPL-SCNC: 101 MMOL/L (ref 96–106)
CHOLEST SERPL-MCNC: 173 MG/DL (ref 100–199)
CO2 SERPL-SCNC: 28 MMOL/L (ref 20–29)
CREAT SERPL-MCNC: 0.94 MG/DL (ref 0.57–1)
GLUCOSE SERPL-MCNC: 93 MG/DL (ref 65–99)
HDLC SERPL-MCNC: 59 MG/DL
INTERPRETATION, 910389: NORMAL
INTERPRETATION: NORMAL
LDLC SERPL CALC-MCNC: 93 MG/DL (ref 0–99)
PDF IMAGE, 910387: NORMAL
POTASSIUM SERPL-SCNC: 3.7 MMOL/L (ref 3.5–5.2)
SODIUM SERPL-SCNC: 144 MMOL/L (ref 134–144)
TRIGL SERPL-MCNC: 106 MG/DL (ref 0–149)
VLDLC SERPL CALC-MCNC: 21 MG/DL (ref 5–40)

## 2019-11-06 ENCOUNTER — OFFICE VISIT (OUTPATIENT)
Dept: CARDIOLOGY CLINIC | Age: 84
End: 2019-11-06

## 2019-11-06 VITALS
HEART RATE: 64 BPM | SYSTOLIC BLOOD PRESSURE: 130 MMHG | RESPIRATION RATE: 16 BRPM | DIASTOLIC BLOOD PRESSURE: 80 MMHG | WEIGHT: 185.2 LBS | BODY MASS INDEX: 34.08 KG/M2 | OXYGEN SATURATION: 97 % | HEIGHT: 62 IN

## 2019-11-06 DIAGNOSIS — E78.2 MIXED HYPERLIPIDEMIA: ICD-10-CM

## 2019-11-06 DIAGNOSIS — R07.9 CHEST PAIN WITH NORMAL ANGIOGRAPHY: ICD-10-CM

## 2019-11-06 DIAGNOSIS — I35.0 AORTIC STENOSIS, MILD: ICD-10-CM

## 2019-11-06 DIAGNOSIS — F41.9 CHRONIC ANXIETY: ICD-10-CM

## 2019-11-06 DIAGNOSIS — N18.30 CKD (CHRONIC KIDNEY DISEASE) STAGE 3, GFR 30-59 ML/MIN (HCC): ICD-10-CM

## 2019-11-06 DIAGNOSIS — I10 ESSENTIAL HYPERTENSION: ICD-10-CM

## 2019-11-06 DIAGNOSIS — R07.89 ATYPICAL CHEST PAIN: Primary | ICD-10-CM

## 2019-11-06 DIAGNOSIS — I49.8 PAROXYSMAL CARDIAC ARRHYTHMIA: ICD-10-CM

## 2019-11-06 DIAGNOSIS — I50.32 CHRONIC DIASTOLIC HEART FAILURE (HCC): ICD-10-CM

## 2019-11-06 NOTE — PROGRESS NOTES
Chief Complaint   Patient presents with    Cholesterol Problem     6 month follow up     1. Have you been to the ER, urgent care clinic since your last visit? Hospitalized since your last visit? No    2. Have you seen or consulted any other health care providers outside of the [address] [place] [address] since your last visit? Include any pap smears or colon screening.  No

## 2019-11-06 NOTE — PROGRESS NOTES
[facility]   [address] [facility], [address] [place] [address] Road                                          NEW PATIENT HPI/FOLLOW-UP      NAME:  Venkatesh Jessica   :   1935   MRN:   [id number]   PCP:  Phoebe Pickett MD           Subjective: The patient is a 82y.o. year old female  who returns for a routine follow-up and preop clearance prior to orthopedic surgery--? Right knee. Recent 2018 Lexiscan normal and echo revealed normal LVEF and mild AVS. Since the last visit, patient reports no new symptoms. Denies change in exercise tolerance, chest pain, edema, medication intolerance, palpitations, shortness of breath, PND/orthopnea wheezing, sputum, syncope, dizziness or light headedness. Review of Systems  General: Pt denies excessive weight gain or loss. Pt is hardly able to conduct ADL's. Respiratory: Denies shortness of breath, RICHARD, wheezing or stridor.   Cardiovascular: Denies precordial pain, palpitations, edema or PND  Gastrointestinal: Denies poor appetite, indigestion, abdominal pain or blood in stool  Peripheral vascular: Denies claudication, leg cramps  Neuropsychiatric: Denies paresthesias,tingling,numbness,anxiety,depression,fatigue  Musculoskeletal: ++ right pain,tenderness, soreness,swelling      Past Medical History:   Diagnosis Date    Anxiety     Aortic stenosis 3/3/2010    Aortic stenosis     Arrhythmia     MURMUR    Arthritis     SPINAL STENOSIS    Atherosclerosis of coronary artery     Biliary dyskinesia     CHF (congestive heart failure) ([address] 3/3/2010    CHF (congestive heart failure) ([address] 2002    Chronic heart failure ([address] 2002; 3/3/2010    chronic diastolic heart failure    Chronic pain     LOWER BACK, RIGHT KNEE    Environmental allergies 3/3/2010    GERD (gastroesophageal reflux disease) 3/3/2010    Hiatal hernia 3/3/2010    High cholesterol 3/3/2010    HTN (hypertension) 3/3/2010    Hypokalemia 3/3/2010    Ischemic heart disease, chronic     Obese     Psychiatric disorder     anxiety    S/P hysterectomy 3/3/2010    Spinal stenosis 3/3/2010     Patient Active Problem List    Diagnosis Date Noted    Chest pain with normal angiography--2002;normal NST 2018 04/30/2019    CKD (chronic kidney disease) stage 3, GFR 30-59 ml/min ([facility]) 10/30/2018    Severe obesity (BMI 35.0-39. 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 02/12/2016    Encounter for medication monitoring 11/29/2015    Essential hypertension 07/12/2015    Gastroesophageal reflux disease without esophagitis 07/12/2015    Atherosclerosis of native coronary artery without angina pectoris--diffuse small vsl disease via cath cath 2009--RCA PDA/ROSA 07/12/2015    Chronic anxiety 07/12/2015    Atypical chest pain 02/24/2014    Chest pain at rest 02/20/2014    Bifascicular bundle branch block--RBBB,IRVING 02/20/2014    Chest pain, unspecified 02/11/2013    Chronic ischemic heart disease 05/29/2012    Mixed hyperlipidemia 05/29/2012    Chronic diastolic heart failure ([facility] 05/29/2012    Hypovitaminosis D 07/28/2010    Hypokalemia 03/03/2010    Hiatal hernia 03/03/2010    Anxiety 03/03/2010    S/P hysterectomy 03/03/2010    Aortic stenosis, mild 03/03/2010    Spinal stenosis 03/03/2010    S/P foot surgery 03/03/2010    Environmental allergies 03/03/2010    S/P cardiac catheterization 03/03/2010      Past Surgical History:   Procedure Laterality Date    CARDIAC CATHETERIZATION  01/2002    normal coronaries    DECOMPRESS DISC RF LUMBAR  07/2007    HX BACK SURGERY  5/1/2014    HX BREAST LUMPECTOMY Left 1989    benign left breast    HX BUNIONECTOMY      HX BUNIONECTOMY      HX HEART CATHETERIZATION 3/3/10    HX HYSTERECTOMY  1978    HX LUMBAR DISKECTOMY      HX ORTHOPAEDIC Bilateral     BUNIONECTOMY    HX ORTHOPAEDIC Right     WRIST FX    HX OTHER SURGICAL  10/12/2015    mole removed from right side of face by Dr. Jojo Coulter FLX DX [facility]  [id number]    Dr Elkin Fletcher UPPER GI ENDOSCOPY,BIOPSY  2/27/2019          Allergies   Allergen Reactions    Bactrim [Sulfamethoxazole-Trimethoprim] Unknown (comments)     Pt does not recall    Darvocet A500 [Propoxyphene N-Acetaminophen] Itching      Family History   Problem Relation Age of Onset    Hypertension Mother     Heart Disease Father     Stroke Sister     Heart Disease Sister     Cancer Brother         LUNG    Cancer Brother         PROSTATE    Hypertension Brother     No Known Problems Brother     Lung Disease Brother     No Known Problems Brother     No Known Problems Brother     Stroke Daughter 47    No Known Problems Daughter     Anesth Problems Neg Hx       Social History     Socioeconomic History    Marital status:      Spouse name: Not on file    Number of children: Not on file    Years of education: Not on file    Highest education level: Not on file   Occupational History    Not on file   Social Needs    Financial resource strain: Not on file    Food insecurity:     Worry: Not on file     Inability: Not on file    Transportation needs:     Medical: Not on file     Non-medical: Not on file   Tobacco Use    Smoking status: Never Smoker    Smokeless tobacco: Never Used   Substance and Sexual Activity    Alcohol use: No     Alcohol/week: 0.0 standard drinks    Drug use: No    Sexual activity: Not Currently   Lifestyle    Physical activity:     Days per week: Not on file     Minutes per session: Not on file    Stress: Not on file   Relationships    Social connections:     Talks on phone: Not on file     Gets together: Not on file     Attends Gnosticism service: Not on file     Active member of club or organization: Not on file     Attends meetings of clubs or organizations: Not on file     Relationship status: Not on file    Intimate partner violence:     Fear of current or ex partner: Not on file     Emotionally abused: Not on file     Physically abused: Not on file     Forced sexual activity: Not on file   Other Topics Concern    Not on file   Social History Narrative    Not on file      Current Outpatient Medications   Medication Sig    desonide (TRIDESILON) 0.05 % topical lotion APPLY TO AFFECTED AREA TWO (2) TIMES DAILY AS NEEDED FOR SKIN IRRITATION OR ITCHING    losartan (COZAAR) 100 mg tablet TAKE 1 TABLET BY MOUTH EVERY DAY    potassium chloride SR (KLOR-CON 10) 10 mEq tablet TAKE 3 TABLETS BY MOUTH EVERY MORNING AND 2 TABLETS BY MOUTH EVERY EVENING    clonazePAM (KLONOPIN) 1 mg tablet TAKE 1/2 (HALF) TO 1 TABLET IN THE MORNING AS NEEDED FOR ANXIETY AND 1 TABLET AT BEDTIME FOR SLEEP    rosuvastatin (CRESTOR) 20 mg tablet Take 1 Tab by mouth nightly.  nitroglycerin (NITROSTAT) 0.4 mg SL tablet TAKE 1 TAB BY SUBLINGUAL ROUTE EVERY 5 MINUTES AS NEEDED.  pantoprazole (PROTONIX) 40 mg tablet Take 1 Tab by mouth two (2) times a day. Take 30 minutes prior to breakfast and 30 minutes prior to dinner.  metoprolol tartrate (LOPRESSOR) 25 mg tablet Take 1 tab by mouth at 9am and take 1 tab at 9pm.    isosorbide mononitrate ER (IMDUR) 30 mg tablet Take 1 Tab by mouth daily.  amLODIPine (NORVASC) 5 mg tablet Take 1 Tab by mouth two (2) times a day. Take at 9am and 9pm every day.  furosemide (LASIX) 80 mg tablet TAKE 1 TABLET BY MOUTH EVERY MORNING AND TAKE 1/2 TABLET EVERY EVENING    simethicone (GAS RELIEF) 80 mg chewable tablet Take 80 mg by mouth every six (6) hours as needed for Flatulence.  mometasone (ELOCON) 0.1 % ointment APPLY TO AFFECTED AREA (SCALP) EVERY DAY AS NEEDED    cholecalciferol, vitamin D3, (VITAMIN D3) 2,000 unit tab Take 1 Tab by mouth daily.     acetaminophen (TYLENOL EXTRA STRENGTH) 500 mg tablet Take 1,000 mg by mouth every six (6) hours as needed for Pain.  Aspirin, Buffered 81 mg tab Take 81 mg by mouth daily. No current facility-administered medications for this visit. I have reviewed the nurses notes, vitals, problem list, allergy list, medical history, family medical, social history and medications. Objective:     Physical Exam:     Vitals:    11/06/19 1126 11/06/19 1135   BP: 122/80 130/80   Pulse: 64    Resp: 16    SpO2: 97%    Weight: 185 lb 3.2 oz (84 kg)    Height: 5' 2\" (1.575 m)     Body mass index is 33.87 kg/m². General: Well developed,obese, in no acute distress. HEENT: No carotid bruits, no JVD, trach is midline. Heart:  Normal S1/S2 negative S3 or S4. Regular, Gr 2/6 SE murmur, -gallop or rub.   Respiratory: Clear bilaterally, no wheezing or rales  Abdomen:   Soft, non-tender, bowel sounds are active.   Extremities:  No edema, normal cap refill, no cyanosis. Neuro: A&Ox3, speech clear, gait stable. Skin: Skin color is normal. No rashes or lesions. No diaphoresis.   Vascular: 2+ pulses symmetric in all extremities        Data Review:       Cardiographics:    EKG: NSR,APC, RBBB    Cardiology Labs:    Results for orders placed or performed during the hospital encounter of 03/30/19   EKG, 12 LEAD, INITIAL   Result Value Ref Range    Ventricular Rate 56 BPM    Atrial Rate 56 BPM    P-R Interval 214 ms    QRS Duration 132 ms    Q-T Interval 446 ms    QTC Calculation (Bezet) 430 ms    Calculated P Axis 70 degrees    Calculated R Axis -17 degrees    Calculated T Axis 16 degrees    Diagnosis       Sinus bradycardia with 1st degree AV block  Right bundle branch block  Voltage criteria for left ventricular hypertrophy  Cannot rule out Septal infarct , age undetermined      Confirmed by Anais Elkins ([id number]) on 4/1/2019 8:42:23 AM         Lab Results   Component Value Date/Time    Cholesterol, total 173 11/04/2019 11:47 AM    HDL Cholesterol 59 11/04/2019 11:47 AM    LDL, calculated 93 11/04/2019 11:47 AM    Triglyceride 106 11/04/2019 11:47 AM    CHOL/HDL Ratio 3.1 11/01/2010 05:26 PM       Lab Results   Component Value Date/Time    Sodium 144 11/04/2019 11:47 AM    Potassium 3.7 11/04/2019 11:47 AM    Chloride 101 11/04/2019 11:47 AM    CO2 28 11/04/2019 11:47 AM    Anion gap 8 03/30/2019 10:54 AM    Glucose 93 11/04/2019 11:47 AM    BUN 11 11/04/2019 11:47 AM    Creatinine 0.94 11/04/2019 11:47 AM    BUN/Creatinine ratio 12 11/04/2019 11:47 AM    GFR est AA 65 11/04/2019 11:47 AM    GFR est non-AA 57 (L) 11/04/2019 11:47 AM    Calcium 9.6 11/04/2019 11:47 AM    Bilirubin, total 0.5 07/17/2019 10:20 AM    AST (SGOT) 17 07/17/2019 10:20 AM    Alk. phosphatase 87 07/17/2019 10:20 AM    Protein, total 7.7 07/17/2019 10:20 AM    Albumin 4.3 07/17/2019 10:20 AM    Globulin 4.8 (H) 03/30/2019 10:54 AM    A-G Ratio 1.3 07/17/2019 10:20 AM    ALT (SGPT) 12 07/17/2019 10:20 AM          Assessment:       ICD-10-CM ICD-9-CM    1. Atypical chest pain R07.89 786.59 AMB POC EKG ROUTINE W/ 12 LEADS, INTER & REP   2. Essential hypertension I10 401.9 AMB POC EKG ROUTINE W/ 12 LEADS, INTER & REP   3. CKD (chronic kidney disease) stage 3, GFR 30-59 ml/min (HCC) N18.3 585.3 AMB POC EKG ROUTINE W/ 12 LEADS, INTER & REP   4. Aortic stenosis, mild--mGr 20 mmHg and MAYNOR 1.6 cm2 7/19 I35.0 424.1 AMB POC EKG ROUTINE W/ 12 LEADS, INTER & REP   5. Chronic anxiety F41.9 300.00 AMB POC EKG ROUTINE W/ 12 LEADS, INTER & REP   6. Mixed hyperlipidemia E78.2 272.2 AMB POC EKG ROUTINE W/ 12 LEADS, INTER & REP   7. Chest pain with normal angiography--2002;normal NST 2018 and 5/19 R07.9 786.50 AMB POC EKG ROUTINE W/ 12 LEADS, INTER & REP   8. Paroxysmal cardiac arrhythmia--PVC's,APC's,NSSVT I49.8 427.89 AMB POC EKG ROUTINE W/ 12 LEADS, INTER & REP   9.  Chronic diastolic heart failure (HCC) I50.32 428.32 AMB POC EKG ROUTINE W/ 12 LEADS, INTER & REP Discussion: Patient presents at this time stable from a cardiac perspective. Is cleared for orthopedic surgery at low to moderate cardiac risk. Pleased with present cardiac status. Plan: 1. Continue same meds. Lipid profile and labs followed by PCP. 2.Encouraged to exercise to tolerance, lose weight and follow low fat, low cholesterol, low sodium predominantly Plant-based (consider Mediterranean) diet. Call with questions or concerns. Will follow up any test results by phone and/or f/u here in office if needed. Guy Card 3.Follow up: 6 months    I have discussed the diagnosis with the patient and the intended plan as seen in the above orders. The patient has received an after-visit summary and questions were answered concerning future plans. I have discussed any concerning medication side effects and warnings with the patient as well.     Florencia Lackey MD,FAC,[facility]  11/6/2019

## 2019-11-11 ENCOUNTER — HOSPITAL ENCOUNTER (OUTPATIENT)
Dept: VASCULAR SURGERY | Age: 84
Discharge: HOME | End: 2019-11-11
Attending: PODIATRIST
Payer: MEDICARE

## 2019-11-11 DIAGNOSIS — R60.9 EDEMA: ICD-10-CM

## 2019-11-11 DIAGNOSIS — F41.9 ANXIETY: Chronic | ICD-10-CM

## 2019-11-11 PROCEDURE — 93971 EXTREMITY STUDY: CPT

## 2019-11-11 RX ORDER — CLONAZEPAM 1 MG/1
TABLET ORAL
Qty: 60 TAB | Refills: 1 | OUTPATIENT
Start: 2019-11-11 | End: 2019-12-11 | Stop reason: SDUPTHER

## 2019-11-11 NOTE — TELEPHONE ENCOUNTER
----- Message from Isabela Ochoa sent at 11/11/2019  2:38 PM EST -----  Regarding: Dr. Kan Avina (if not patient):      Relationship of caller (if not patient):      Best contact number(s): [phone]      Name of medication and dosage if known:  clonazePAM (KLONOPIN) 1 mg tablet [[id number]]      Is patient out of this medication (yes/no): Y    Pharmacy name:[facility]/pharmacy [facility] [address] [place] [address] listed in chart? (yes/no): Y  Pharmacy phone number:      Details to clarify the request: Pt has been waiting for Rx since last week no response ans she also asking to speak to the nurse.       Channel Angelito Rowland

## 2019-11-11 NOTE — TELEPHONE ENCOUNTER
Left message for patient that refill was inadvertently sent to Dr. Vallejo. Refill now sent to Dr. James Soria and should be completed in the next 24 hours.

## 2019-12-02 RX ORDER — FUROSEMIDE 80 MG/1
TABLET ORAL
Qty: 45 TAB | Refills: 11 | Status: SHIPPED | OUTPATIENT
Start: 2019-12-02 | End: 2020-12-07

## 2019-12-11 DIAGNOSIS — F41.9 ANXIETY: Chronic | ICD-10-CM

## 2019-12-11 RX ORDER — CLONAZEPAM 1 MG/1
TABLET ORAL
Qty: 60 TAB | Refills: 1 | OUTPATIENT
Start: 2019-12-11 | End: 2020-02-26 | Stop reason: SDUPTHER

## 2019-12-11 NOTE — TELEPHONE ENCOUNTER
Patient wants to get the medication clonazePAM (KLONOPIN) 1 mg tablet.   If any questions please give her a call @ [phone]

## 2020-01-09 RX ORDER — LOSARTAN POTASSIUM 100 MG/1
TABLET ORAL
Qty: 30 TAB | Refills: 2 | Status: SHIPPED | OUTPATIENT
Start: 2020-01-09 | End: 2020-02-03

## 2020-01-28 ENCOUNTER — HOSPITAL ENCOUNTER (EMERGENCY)
Age: 85
Discharge: HOME | End: 2020-01-28
Attending: EMERGENCY MEDICINE
Payer: MEDICARE

## 2020-01-28 ENCOUNTER — APPOINTMENT (OUTPATIENT)
Dept: GENERAL RADIOLOGY | Age: 85
End: 2020-01-28
Attending: PHYSICIAN ASSISTANT
Payer: MEDICARE

## 2020-01-28 VITALS
HEIGHT: 62 IN | SYSTOLIC BLOOD PRESSURE: 120 MMHG | RESPIRATION RATE: 16 BRPM | HEART RATE: 60 BPM | OXYGEN SATURATION: 100 % | DIASTOLIC BLOOD PRESSURE: 79 MMHG | TEMPERATURE: 97.7 F | BODY MASS INDEX: 33.13 KG/M2 | WEIGHT: 180 LBS

## 2020-01-28 DIAGNOSIS — M19.012 ARTHRITIS OF SHOULDER REGION, LEFT: Primary | ICD-10-CM

## 2020-01-28 LAB — TROPONIN I SERPL-MCNC: <0.05 NG/ML

## 2020-01-28 PROCEDURE — 36415 COLL VENOUS BLD VENIPUNCTURE: CPT

## 2020-01-28 PROCEDURE — 71045 X-RAY EXAM CHEST 1 VIEW: CPT

## 2020-01-28 PROCEDURE — 73030 X-RAY EXAM OF SHOULDER: CPT

## 2020-01-28 PROCEDURE — 93005 ELECTROCARDIOGRAM TRACING: CPT

## 2020-01-28 PROCEDURE — 74011250637 HC RX REV CODE- 250/637: Performed by: PHYSICIAN ASSISTANT

## 2020-01-28 PROCEDURE — 84484 ASSAY OF TROPONIN QUANT: CPT

## 2020-01-28 PROCEDURE — 99284 EMERGENCY DEPT VISIT MOD MDM: CPT

## 2020-01-28 RX ORDER — ACETAMINOPHEN 325 MG/1
650 TABLET ORAL
Status: COMPLETED | OUTPATIENT
Start: 2020-01-28 | End: 2020-01-28

## 2020-01-28 RX ADMIN — ACETAMINOPHEN 650 MG: 325 TABLET ORAL at 14:50

## 2020-01-28 NOTE — ED PROVIDER NOTES
EMERGENCY DEPARTMENT HISTORY AND PHYSICAL EXAM      Date: 1/28/2020  Patient Name: Shelbi Mead    History of Presenting Illness     Chief Complaint   Patient presents with    Shoulder Pain     left x 3 weeks, denies injury     History Provided By: Patient    HPI: Shelbi Mead, 82 y.o. female with high cholesterol, hyperkalemia, hiatal hernia, GERD, hysterectomy, aortic stenosis, spinal stenosis, CHF, arthritis, obesity, hypertension, anxiety, atherosclerosis of coronary arteries, bunionectomy, heart catheterization who presents ambulatory to the ED with cc of acute moderate aching left shoulder pain X 3 weeks. Denies any injury or trauma. Endorses mild remittent shooting left breast/chest pain radiating to left shoulder. Denies fever, chills, nausea, vomiting, chest pain at present, shortness of breath, nausea, vomiting, numbness, tingling, limited range of motion, lightheadedness, dizziness. Denies any recent falls or injuries. Tylenol Sunday minimal relief. No medications or modifying factors prior to arrival.    PCP: Messi Arvizu MD    There are no other complaints, changes, or physical findings at this time. No current facility-administered medications on file prior to encounter.       Current Outpatient Medications on File Prior to Encounter   Medication Sig Dispense Refill    losartan (COZAAR) 100 mg tablet TAKE 1 TABLET BY MOUTH EVERY DAY 30 Tab 2    clonazePAM (KLONOPIN) 1 mg tablet TAKE 1/2 TO 1 TABLET EVERY MORNING AS NEEDED FOR ANXIETY AND 1 TABLET AT BEDTIME AS NEEDED FOR SLEEP 60 Tab 1    rosuvastatin (CRESTOR) 20 mg tablet TAKE 1 TABLET BY MOUTH NIGHTLY 30 Tab 11    furosemide (LASIX) 80 mg tablet TAKE 1 TABLET BY MOUTH EVERY MORNING AND TAKE 1/2 TABLET EVERY EVENING 45 Tab 11    desonide (TRIDESILON) 0.05 % topical lotion APPLY TO AFFECTED AREA TWO (2) TIMES DAILY AS NEEDED FOR SKIN IRRITATION OR ITCHING 59 mL 0    potassium chloride SR (KLOR-CON 10) 10 mEq tablet TAKE 3 TABLETS BY MOUTH EVERY MORNING AND 2 TABLETS BY MOUTH EVERY EVENING 150 Tab 0    nitroglycerin (NITROSTAT) 0.4 mg SL tablet TAKE 1 TAB BY SUBLINGUAL ROUTE EVERY 5 MINUTES AS NEEDED. 25 Tab 3    pantoprazole (PROTONIX) 40 mg tablet Take 1 Tab by mouth two (2) times a day. Take 30 minutes prior to breakfast and 30 minutes prior to dinner. 60 Tab 11    metoprolol tartrate (LOPRESSOR) 25 mg tablet Take 1 tab by mouth at 9am and take 1 tab at 9pm.      isosorbide mononitrate ER (IMDUR) 30 mg tablet Take 1 Tab by mouth daily. 90 Tab 3    amLODIPine (NORVASC) 5 mg tablet Take 1 Tab by mouth two (2) times a day. Take at 9am and 9pm every day. 180 Tab 1    simethicone (GAS RELIEF) 80 mg chewable tablet Take 80 mg by mouth every six (6) hours as needed for Flatulence.  mometasone (ELOCON) 0.1 % ointment APPLY TO AFFECTED AREA (SCALP) EVERY DAY AS NEEDED  2    cholecalciferol, vitamin D3, (VITAMIN D3) 2,000 unit tab Take 1 Tab by mouth daily.  acetaminophen (TYLENOL EXTRA STRENGTH) 500 mg tablet Take 1,000 mg by mouth every six (6) hours as needed for Pain.  Aspirin, Buffered 81 mg tab Take 81 mg by mouth daily.        Past History     Past Medical History:  Past Medical History:   Diagnosis Date    Anxiety     Aortic stenosis 3/3/2010    Aortic stenosis     Arrhythmia     MURMUR    Arthritis     SPINAL STENOSIS    Atherosclerosis of coronary artery     Biliary dyskinesia     CHF (congestive heart failure) ([facility] [address] 3/3/2010    CHF (congestive heart failure) ([facility] [address] 01/2002    Chronic heart failure ([facility] [address] 1/2002; 3/3/2010    chronic diastolic heart failure    Chronic pain     LOWER BACK, RIGHT KNEE    Environmental allergies 3/3/2010    GERD (gastroesophageal reflux disease) 3/3/2010    Hiatal hernia 3/3/2010    High cholesterol 3/3/2010    HTN (hypertension) 3/3/2010    Hypokalemia 3/3/2010    Ischemic heart disease, chronic     Obese     Psychiatric disorder     anxiety    S/P hysterectomy 3/3/2010    Spinal stenosis 3/3/2010     Past Surgical History:  Past Surgical History:   Procedure Laterality Date    CARDIAC CATHETERIZATION  01/2002    normal coronaries    DECOMPRESS DISC RF LUMBAR  07/2007    HX BACK SURGERY  5/1/2014    HX BREAST LUMPECTOMY Left 1989    benign left breast    HX BUNIONECTOMY      HX BUNIONECTOMY      HX HEART CATHETERIZATION  3/3/10    HX HYSTERECTOMY  1978    HX LUMBAR DISKECTOMY      HX ORTHOPAEDIC Bilateral     BUNIONECTOMY    HX ORTHOPAEDIC Right     WRIST FX    HX OTHER SURGICAL  10/12/2015    mole removed from right side of face by Dr. Bre Coburn FLX DX W/COLLJ Paola Pedroza PFRMD  [id number]    Dr Kassie Cushing GI ENDOSCOPY,BIOPSY  2/27/2019          Family History:  Family History   Problem Relation Age of Onset    Hypertension Mother     Heart Disease Father     Stroke Sister     Heart Disease Sister     Cancer Brother         LUNG    Cancer Brother         PROSTATE    Hypertension Brother     No Known Problems Brother     Lung Disease Brother     No Known Problems Brother     No Known Problems Brother     Stroke Daughter 47    No Known Problems Daughter     Anesth Problems Neg Hx      Social History:  Social History     Tobacco Use    Smoking status: Never Smoker    Smokeless tobacco: Never Used   Substance Use Topics    Alcohol use: No     Alcohol/week: 0.0 standard drinks    Drug use: No     Allergies: Allergies   Allergen Reactions    Bactrim [Sulfamethoxazole-Trimethoprim] Unknown (comments)     Pt does not recall    Darvocet A500 [Propoxyphene N-Acetaminophen] Itching     Review of Systems   Review of Systems   Constitutional: Negative. Negative for chills, fatigue and fever. Respiratory: Negative. Negative for cough and shortness of breath. Cardiovascular: Negative. Negative for chest pain, palpitations and leg swelling. Gastrointestinal: Negative.   Negative for abdominal pain, diarrhea, nausea and vomiting. Genitourinary: Negative. Negative for difficulty urinating and dysuria. Musculoskeletal: Positive for arthralgias. Negative for back pain, joint swelling, myalgias, neck pain and neck stiffness. Skin: Negative. Negative for color change, rash and wound. Neurological: Negative. Negative for dizziness, numbness and headaches. Psychiatric/Behavioral: Negative. Physical Exam   Physical Exam  Vitals signs and nursing note reviewed. Constitutional:       General: She is not in acute distress. Appearance: She is well-developed. She is obese. She is not diaphoretic. Comments: Elderly female lying semi-supine in no apparent distress. HENT:      Head: Normocephalic and atraumatic. Right Ear: Hearing and external ear normal.      Left Ear: Hearing and external ear normal.      Nose: Nose normal.   Eyes:      Conjunctiva/sclera: Conjunctivae normal.      Pupils: Pupils are equal, round, and reactive to light. Neck:      Musculoskeletal: Normal range of motion. Cardiovascular:      Rate and Rhythm: Normal rate and regular rhythm. Pulses:           Radial pulses are 2+ on the right side and 2+ on the left side. Heart sounds: Normal heart sounds. Pulmonary:      Effort: Pulmonary effort is normal. No respiratory distress. Breath sounds: Normal breath sounds. Musculoskeletal: Normal range of motion. Right shoulder: Normal.      Left shoulder: She exhibits tenderness, bony tenderness and pain. She exhibits normal range of motion, no swelling, no effusion, no crepitus, no deformity, no laceration, no spasm, normal pulse and normal strength. Left elbow: Normal.      Cervical back: Normal.   Skin:     General: Skin is warm and dry. Neurological:      Mental Status: She is alert and oriented to person, place, and time. Psychiatric:         Behavior: Behavior normal.         Thought Content:  Thought content normal.         Judgment: Judgment normal. Diagnostic Study Results   Labs -     Recent Results (from the past 12 hour(s))   EKG, 12 LEAD, INITIAL    Collection Time: 01/28/20  2:10 PM   Result Value Ref Range    Ventricular Rate 59 BPM    Atrial Rate 59 BPM    P-R Interval 220 ms    QRS Duration 132 ms    Q-T Interval 424 ms    QTC Calculation (Bezet) 419 ms    Calculated P Axis 70 degrees    Calculated R Axis -30 degrees    Calculated T Axis 26 degrees    Diagnosis       Sinus bradycardia with 1st degree AV block  Left axis deviation  Right bundle branch block  Voltage criteria for left ventricular hypertrophy  Abnormal ECG  When compared with ECG of 30-MAR-2019 12:46,  No significant change was found     TROPONIN I    Collection Time: 01/28/20  2:26 PM   Result Value Ref Range    Troponin-I, Qt. <0.05 <0.05 ng/mL       Radiologic Studies -   XR SHOULDER LT AP/LAT MIN 2 V   Final Result   IMPRESSION: New significant erosive left shoulder arthropathy without humeral   head collapse      XR CHEST PORT   Final Result   IMPRESSION: Mild central congestion. No infiltrate. Xr Shoulder Lt Ap/lat Min 2 V    Result Date: 1/28/2020  IMPRESSION: New significant erosive left shoulder arthropathy without humeral head collapse    Xr Chest Port    Result Date: 1/28/2020  IMPRESSION: Mild central congestion. No infiltrate. Medical Decision Making   I am the first provider for this patient. I reviewed the vital signs, available nursing notes, past medical history, past surgical history, family history and social history. Vital Signs-Reviewed the patient's vital signs. Patient Vitals for the past 12 hrs:   Temp Pulse Resp BP SpO2   01/28/20 1339 97.7 °F (36.5 °C) 61 18 121/77 96 %     Pulse Oximetry Analysis - 96% on RA    EKG interpretation: (Preliminary)  Rhythm: sinus bradycardia and RBB and 1st degree AV block; and regular . Rate (approx.): 59;  Axis: left axis deviation; TN interval: prolonged; QRS interval: normal ; ST/T wave: normal;  Other findings: unchanged from previous ekg. Records Reviewed: Nursing Notes, Old Medical Records, Previous electrocardiograms, Previous Radiology Studies and Previous Laboratory Studies    Provider Notes (Medical Decision Making):   Patient presents with shoulder pain. Ddx: dislocation, fracture, strain, AC separation, clavicle fracture. Will get XR to further evaluate and treat the pain. ED Course:   Initial assessment performed. The patients presenting problems have been discussed, and they are in agreement with the care plan formulated and outlined with them. I have encouraged them to ask questions as they arise throughout their visit. Progress Note:   Updated pt on all returned results and findings. Discussed the importance of proper follow up as referred below along with return precautions. Pt in agreement with the care plan and expresses agreement with and understanding of all items discussed. Disposition:  3:12 PM  I have discussed with patient their diagnosis, treatment, and follow up plan. The patient agrees to follow up as outlined in discharge paperwork and also to return to the ED with any worsening. Janiya Mcclendon PA-C      PLAN:  1.    Current Discharge Medication List      CONTINUE these medications which have NOT CHANGED    Details   losartan (COZAAR) 100 mg tablet TAKE 1 TABLET BY MOUTH EVERY DAY  Qty: 30 Tab, Refills: 2      clonazePAM (KLONOPIN) 1 mg tablet TAKE 1/2 TO 1 TABLET EVERY MORNING AS NEEDED FOR ANXIETY AND 1 TABLET AT BEDTIME AS NEEDED FOR SLEEP  Qty: 60 Tab, Refills: 1    Comments: Not to exceed 4 additional fills before 03/01/2020  Associated Diagnoses: Anxiety      rosuvastatin (CRESTOR) 20 mg tablet TAKE 1 TABLET BY MOUTH NIGHTLY  Qty: 30 Tab, Refills: 11      furosemide (LASIX) 80 mg tablet TAKE 1 TABLET BY MOUTH EVERY MORNING AND TAKE 1/2 TABLET EVERY EVENING  Qty: 45 Tab, Refills: 11      desonide (TRIDESILON) 0.05 % topical lotion APPLY TO AFFECTED AREA TWO (2) TIMES DAILY AS NEEDED FOR SKIN IRRITATION OR ITCHING  Qty: 59 mL, Refills: 0    Associated Diagnoses: Dry skin dermatitis      potassium chloride SR (KLOR-CON 10) 10 mEq tablet TAKE 3 TABLETS BY MOUTH EVERY MORNING AND 2 TABLETS BY MOUTH EVERY EVENING  Qty: 150 Tab, Refills: 0      nitroglycerin (NITROSTAT) 0.4 mg SL tablet TAKE 1 TAB BY SUBLINGUAL ROUTE EVERY 5 MINUTES AS NEEDED. Qty: 25 Tab, Refills: 3    Associated Diagnoses: Chest pain, unspecified type      pantoprazole (PROTONIX) 40 mg tablet Take 1 Tab by mouth two (2) times a day. Take 30 minutes prior to breakfast and 30 minutes prior to dinner. Qty: 60 Tab, Refills: 11    Associated Diagnoses: Gastroesophageal reflux disease, esophagitis presence not specified      metoprolol tartrate (LOPRESSOR) 25 mg tablet Take 1 tab by mouth at 9am and take 1 tab at 9pm.    Associated Diagnoses: Essential hypertension      isosorbide mononitrate ER (IMDUR) 30 mg tablet Take 1 Tab by mouth daily. Qty: 90 Tab, Refills: 3    Associated Diagnoses: Atherosclerosis of native coronary artery of native heart without angina pectoris      amLODIPine (NORVASC) 5 mg tablet Take 1 Tab by mouth two (2) times a day. Take at 9am and 9pm every day. Qty: 180 Tab, Refills: 1    Associated Diagnoses: Essential hypertension      simethicone (GAS RELIEF) 80 mg chewable tablet Take 80 mg by mouth every six (6) hours as needed for Flatulence. mometasone (ELOCON) 0.1 % ointment APPLY TO AFFECTED AREA (SCALP) EVERY DAY AS NEEDED  Refills: 2      cholecalciferol, vitamin D3, (VITAMIN D3) 2,000 unit tab Take 1 Tab by mouth daily. acetaminophen (TYLENOL EXTRA STRENGTH) 500 mg tablet Take 1,000 mg by mouth every six (6) hours as needed for Pain. Aspirin, Buffered 81 mg tab Take 81 mg by mouth daily.            2.   Follow-up Information     Follow up With Specialties Details Why Contact Info    [facility]  Schedule an appointment as soon as possible for a visit in 1 week As needed, If symptoms worsen [address]    [address]  Schedule an appointment as soon as possible for a visit in 1 week As needed, If symptoms worsen [address] [facility] [address]  [phone]        Return to ED if worse     Diagnosis     Clinical Impression:   1. Arthritis of shoulder region, left            Please note that this dictation was completed with Dragon, computer voice recognition software. Quite often unanticipated grammatical, syntax, homophones, and other interpretive errors are inadvertently transcribed by the computer software. Please disregard these errors. Additionally, please excuse any errors that have escaped final proofreading.

## 2020-01-28 NOTE — DISCHARGE INSTRUCTIONS
Patient Education        Shoulder Arthritis: Exercises  Introduction  Here are some examples of exercises for you to try. The exercises may be suggested for a condition or for rehabilitation. Start each exercise slowly. Ease off the exercises if you start to have pain. You will be told when to start these exercises and which ones will work best for you. How to do the exercises  Shoulder flexion (lying down)    1. Lie on your back, holding a wand with both hands. Your palms should face down as you hold the wand. 2. Keeping your elbows straight, slowly raise your arms over your head. Raise them until you feel a stretch in your shoulders, upper back, and chest.  3. Hold for 15 to 30 seconds. 4. Repeat 2 to 4 times. Shoulder rotation (lying down)    1. Lie on your back. Hold a wand with both hands with your elbows bent and palms up. 2. Keep your elbows close to your body, and move the wand across your body toward the sore arm. 3. Hold for 8 to 12 seconds. 4. Repeat 2 to 4 times. Shoulder internal rotation with towel    1. Hold a towel above and behind your head with the arm that is not sore. 2. With your sore arm, reach behind your back and grasp the towel. 3. With the arm above your head, pull the towel upward. Do this until you feel a stretch on the front and outside of your sore shoulder. 4. Hold 15 to 30 seconds. 5. Repeat 2 to 4 times. Shoulder blade squeeze    1. Stand with your arms at your sides, and squeeze your shoulder blades together. Do not raise your shoulders up as you squeeze. 2. Hold 6 seconds. 3. Repeat 8 to 12 times. Resisted rows    1. Put the band around a solid object at about waist level. (A bedpost will work well.) Each hand should hold an end of the band. 2. With your elbows at your sides and bent to 90 degrees, pull the band back. Your shoulder blades should move toward each other. Return to the starting position. 3. Repeat 8 to 12 times.     External rotator strengthening exercise    1. Start by tying a piece of elastic exercise material to a doorknob. You can use surgical tubing or Thera-Band. (You may also hold one end of the band in each hand.)  2. Stand or sit with your shoulder relaxed and your elbow bent 90 degrees. Your upper arm should rest comfortably against your side. Squeeze a rolled towel between your elbow and your body for comfort. This will help keep your arm at your side. 3. Hold one end of the elastic band with the hand of the painful arm. 4. Start with your forearm across your belly. Slowly rotate the forearm out away from your body. Keep your elbow and upper arm tucked against the towel roll or the side of your body until you begin to feel tightness in your shoulder. Slowly move your arm back to where you started. 5. Repeat 8 to 12 times. Internal rotator strengthening exercise    1. Start by tying a piece of elastic exercise material to a doorknob. You can use surgical tubing or Thera-Band. 2. Stand or sit with your shoulder relaxed and your elbow bent 90 degrees. Your upper arm should rest comfortably against your side. Squeeze a rolled towel between your elbow and your body for comfort. This will help keep your arm at your side. 3. Hold one end of the elastic band in the hand of the painful arm. 4. Slowly rotate your forearm toward your body until it touches your belly. Slowly move it back to where you started. 5. Keep your elbow and upper arm firmly tucked against the towel roll or at your side. 6. Repeat 8 to 12 times. Pendulum swing    1. Hold on to a table or the back of a chair with your good arm. Then bend forward a little and let your sore arm hang straight down. This exercise does not use the arm muscles. Rather, use your legs and your hips to create movement that makes your arm swing freely. 2. Use the movement from your hips and legs to guide the slightly swinging arm back and forth like a pendulum (or elephant trunk). Then guide it in circles that start small (about the size of a dinner plate). Make the circles a bit larger each day, as your pain allows. 3. Do this exercise for 5 minutes, 5 to 7 times each day. 4. As you have less pain, try bending over a little farther to do this exercise. This will increase the amount of movement at your shoulder. Follow-up care is a key part of your treatment and safety. Be sure to make and go to all appointments, and call your doctor if you are having problems. It's also a good idea to know your test results and keep a list of the medicines you take. Where can you learn more? Go to http://anderson-jesús.info/. Enter H562 in the search box to learn more about \"Shoulder Arthritis: Exercises. \"  Current as of: June 26, 2019  Content Version: 12.2  © 9433-4983 [employer]. Care instructions adapted under license by [employer] (which disclaims liability or warranty for this information). If you have questions about a medical condition or this instruction, always ask your healthcare professional. [address] any warranty or liability for your use of this information.

## 2020-01-28 NOTE — ED NOTES
Emergency Department Nursing Plan of Care       The Nursing Plan of Care is developed from the Nursing assessment and Emergency Department Attending provider initial evaluation. The plan of care may be reviewed in the ED Provider note.     The Plan of Care was developed with the following considerations:   Patient / Family readiness to learn indicated by:verbalized understanding  Persons(s) to be included in education: patient  Barriers to Learning/Limitations:No    Signed     Lucina Norton RN    1/28/2020   3:33 PM

## 2020-01-29 LAB
ATRIAL RATE: 59 BPM
CALCULATED P AXIS, ECG09: 70 DEGREES
CALCULATED R AXIS, ECG10: -30 DEGREES
CALCULATED T AXIS, ECG11: 26 DEGREES
DIAGNOSIS, 93000: NORMAL
P-R INTERVAL, ECG05: 220 MS
Q-T INTERVAL, ECG07: 424 MS
QRS DURATION, ECG06: 132 MS
QTC CALCULATION (BEZET), ECG08: 419 MS
VENTRICULAR RATE, ECG03: 59 BPM

## 2020-02-03 RX ORDER — LOSARTAN POTASSIUM 100 MG/1
TABLET ORAL
Qty: 30 TAB | Refills: 2 | Status: SHIPPED | OUTPATIENT
Start: 2020-02-03 | End: 2020-03-09 | Stop reason: SDUPTHER

## 2020-02-17 RX ORDER — POTASSIUM CHLORIDE 750 MG/1
TABLET, FILM COATED, EXTENDED RELEASE ORAL
Qty: 150 TAB | Refills: 0 | Status: SHIPPED | OUTPATIENT
Start: 2020-02-17 | End: 2020-03-19

## 2020-02-21 DIAGNOSIS — I25.10 ATHEROSCLEROSIS OF NATIVE CORONARY ARTERY OF NATIVE HEART WITHOUT ANGINA PECTORIS: ICD-10-CM

## 2020-02-21 RX ORDER — ISOSORBIDE MONONITRATE 30 MG/1
30 TABLET, EXTENDED RELEASE ORAL DAILY
Qty: 90 TAB | Refills: 3 | Status: SHIPPED | OUTPATIENT
Start: 2020-02-21 | End: 2021-02-02

## 2020-02-21 NOTE — TELEPHONE ENCOUNTER
----- Message from Jac Montanez sent at 2/21/2020 10:13 AM EST -----  Regarding: Dr. Katheryn Bustamante- Mckenzie/ Telephone  Contact: [phone] (if not patient): n/a  Relationship of caller (if not patient): n/a   Best contact number(s): [phone]  Name of medication and dosage if known: ISOSORBIDE 30 MG   Is patient out of this medication (yes/no):yes   Pharmacy name: [id number] [place] listed in chart? (yes/no): yes   Pharmacy phone number: in chart   Details to clarify the request: n/a

## 2020-02-26 DIAGNOSIS — F41.9 ANXIETY: Chronic | ICD-10-CM

## 2020-02-26 RX ORDER — CLONAZEPAM 1 MG/1
TABLET ORAL
Qty: 60 TAB | Refills: 1 | OUTPATIENT
Start: 2020-02-26 | End: 2020-04-21

## 2020-02-26 NOTE — TELEPHONE ENCOUNTER
----- Message from Mariia Phillips sent at 2/26/2020  9:34 AM EST -----  Regarding: Dr. Merlinda Horton: [phone]  [facility] (if not patient): N/A  Relationship of caller (if not patient): N/A  Best contact number(s): [phone]  Name of medication and dosage if known: \" Clonazepam 1mg \"  Is patient out of this medication (yes/no): Yes  Pharmacy name:  [address] listed in chart? (yes/no): Yes  Pharmacy phone number: [phone]  Date of last visit: Monday, November 04, 2019 09:45 AM  Details to clarify the request: Pt stated that she needs a medication refill.

## 2020-03-04 ENCOUNTER — OFFICE VISIT (OUTPATIENT)
Dept: FAMILY MEDICINE CLINIC | Age: 85
End: 2020-03-04

## 2020-03-04 ENCOUNTER — HOSPITAL ENCOUNTER (OUTPATIENT)
Dept: LAB | Age: 85
Discharge: HOME | End: 2020-03-04
Payer: MEDICARE

## 2020-03-04 VITALS
SYSTOLIC BLOOD PRESSURE: 129 MMHG | WEIGHT: 182.4 LBS | DIASTOLIC BLOOD PRESSURE: 81 MMHG | HEIGHT: 62 IN | BODY MASS INDEX: 33.57 KG/M2 | HEART RATE: 71 BPM | RESPIRATION RATE: 18 BRPM | TEMPERATURE: 96.1 F | OXYGEN SATURATION: 96 %

## 2020-03-04 DIAGNOSIS — Z51.81 ENCOUNTER FOR MEDICATION MONITORING: ICD-10-CM

## 2020-03-04 DIAGNOSIS — Z01.818 PRE-OP EXAM: Primary | ICD-10-CM

## 2020-03-04 DIAGNOSIS — R82.90 NONSPECIFIC FINDINGS ON EXAMINATION OF URINE: ICD-10-CM

## 2020-03-04 DIAGNOSIS — F41.9 CHRONIC ANXIETY: ICD-10-CM

## 2020-03-04 DIAGNOSIS — E78.2 MIXED HYPERLIPIDEMIA: ICD-10-CM

## 2020-03-04 DIAGNOSIS — M15.9 PRIMARY OSTEOARTHRITIS INVOLVING MULTIPLE JOINTS: ICD-10-CM

## 2020-03-04 DIAGNOSIS — M17.11 PRIMARY OSTEOARTHRITIS OF RIGHT KNEE: ICD-10-CM

## 2020-03-04 DIAGNOSIS — I10 ESSENTIAL HYPERTENSION: ICD-10-CM

## 2020-03-04 DIAGNOSIS — M19.012 PRIMARY OSTEOARTHRITIS OF LEFT SHOULDER: ICD-10-CM

## 2020-03-04 DIAGNOSIS — Z63.79 OTHER STRESSFUL LIFE EVENTS AFFECTING FAMILY AND HOUSEHOLD: ICD-10-CM

## 2020-03-04 DIAGNOSIS — K21.9 GASTROESOPHAGEAL REFLUX DISEASE WITHOUT ESOPHAGITIS: ICD-10-CM

## 2020-03-04 DIAGNOSIS — I50.32 CHRONIC DIASTOLIC HEART FAILURE (HCC): ICD-10-CM

## 2020-03-04 LAB
BILIRUB UR QL STRIP: NEGATIVE
GLUCOSE UR-MCNC: NEGATIVE MG/DL
KETONES P FAST UR STRIP-MCNC: NEGATIVE MG/DL
PH UR STRIP: 5.5 [PH] (ref 4.6–8)
PROT UR QL STRIP: NORMAL
SP GR UR STRIP: 1.01 (ref 1–1.03)
UA UROBILINOGEN AMB POC: NORMAL (ref 0.2–1)
URINALYSIS CLARITY POC: NORMAL
URINALYSIS COLOR POC: YELLOW
URINE BLOOD POC: NORMAL
URINE LEUKOCYTES POC: NORMAL
URINE NITRITES POC: NEGATIVE

## 2020-03-04 PROCEDURE — 80053 COMPREHEN METABOLIC PANEL: CPT

## 2020-03-04 PROCEDURE — 80061 LIPID PANEL: CPT

## 2020-03-04 PROCEDURE — 87088 URINE BACTERIA CULTURE: CPT

## 2020-03-04 PROCEDURE — 87077 CULTURE AEROBIC IDENTIFY: CPT

## 2020-03-04 PROCEDURE — 85027 COMPLETE CBC AUTOMATED: CPT

## 2020-03-04 PROCEDURE — 87086 URINE CULTURE/COLONY COUNT: CPT

## 2020-03-04 PROCEDURE — 36415 COLL VENOUS BLD VENIPUNCTURE: CPT

## 2020-03-04 NOTE — PROGRESS NOTES
HISTORY OF PRESENT ILLNESS  Monica Andrade is a 82 y.o. female. HPI   Follow up on chronic medical problems. Pre-op for right knee surgery. She has been cleared by cardiology. Continue with increase family stress with her \"grandson in trouble\". She did not want to talk about it today. Cardiovascular Review:  The patient has hypertension, hyperlipidemia, chronic diastolic heart failure, and obesity. Hx also of aortic stenosis. Diet and Lifestyle: generally follows a low fat low cholesterol diet, generally follows a low sodium diet, sedentary. Pertinent ROS: taking medications as instructed, no medication side effects noted, no TIA's, no chest pain on exertion, mild swelling of ankles, no orthostatic dizziness or lightheadedness, no palpitations,      Home BP Monitoring: is not measured at home. Osteoarthritis:  Patient has osteoarthritis. Overall doing better with back pain. Still having knee pain. Aching more in the right knee and increase pain with walking. Has had 2 injections in the knee which has not helped very much. Pain is 5-6/10. Taking tylenol for the pain which helps some. C/o increase left shoulder pain for the past few weeks. Sx comes and goes. Radiates to the upper arm. Increase pain with ROM. Has been to the ER twice for the shoulder pain. Told it was d/t arthrititis. Pain is 8/10 when at its worse. Increase pain at night. Symptoms onset: problem is longstanding. Rheumatological ROS: stable, mild-to-moderate joint symptoms intermittently, reasonably well controlled by PRN meds. Response to treatment plan: stable and intermittent. Anxiety Review:  Patient is seen for anxiety. Ongoing symptoms include: increased anxiety still related to family issues. Patient denies: palpitations, sweating, chest pain, shortness of breath. Reported side effects from the treatment: none.     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    Atypical chest pain R07.89    Essential hypertension I10    Gastroesophageal reflux disease without esophagitis K21.9    Atherosclerosis of native coronary artery without angina pectoris--diffuse small vsl disease via cath cath 2009--RCA PDA/ROSA I25.10    Chronic anxiety F41.9    Encounter for medication monitoring Z51.81    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 M47.815    Class 2 obesity due to excess calories with serious comorbidity and body mass index (BMI) of 38.0 to 38.9 in adult [id number]    Paroxysmal cardiac arrhythmia--PVC's,APC's,NSSVT I49.8    Severe obesity (BMI 35.0-39. 9) with comorbidity ([facility]) E66.01    CKD (chronic kidney disease) stage 3, GFR 30-59 ml/min ([facility]) N18.3    Chest pain with normal angiography--2002;normal NST 2018 R07.9       Current Outpatient Medications   Medication Sig Dispense Refill    clonazePAM (KLONOPIN) 1 mg tablet TAKE 1/2 TO 1 TABLET EVERY MORNING AS NEEDED FOR ANXIETY AND 1 TABLET AT BEDTIME AS NEEDED FOR SLEEP 60 Tab 1    isosorbide mononitrate ER (IMDUR) 30 mg tablet Take 1 Tab by mouth daily.  90 Tab 3    potassium chloride SR (KLOR-CON 10) 10 mEq tablet TAKE 3 TABLETS BY MOUTH EVERY MORNING AND 2 TABLETS EVERY EVENING 150 Tab 0    losartan (COZAAR) 100 mg tablet TAKE 1 TABLET BY MOUTH EVERY DAY 30 Tab 2    rosuvastatin (CRESTOR) 20 mg tablet TAKE 1 TABLET BY MOUTH NIGHTLY 30 Tab 11    furosemide (LASIX) 80 mg tablet TAKE 1 TABLET BY MOUTH EVERY MORNING AND TAKE 1/2 TABLET EVERY EVENING 45 Tab 11    desonide (TRIDESILON) 0.05 % topical lotion APPLY TO AFFECTED AREA TWO (2) TIMES DAILY AS NEEDED FOR SKIN IRRITATION OR ITCHING 59 mL 0    nitroglycerin (NITROSTAT) 0.4 mg SL tablet TAKE 1 TAB BY SUBLINGUAL ROUTE EVERY 5 MINUTES AS NEEDED. 25 Tab 3    pantoprazole (PROTONIX) 40 mg tablet Take 1 Tab by mouth two (2) times a day. Take 30 minutes prior to breakfast and 30 minutes prior to dinner. 60 Tab 11    metoprolol tartrate (LOPRESSOR) 25 mg tablet Take 1 tab by mouth at 9am and take 1 tab at 9pm.      amLODIPine (NORVASC) 5 mg tablet Take 1 Tab by mouth two (2) times a day. Take at 9am and 9pm every day. 180 Tab 1    simethicone (GAS RELIEF) 80 mg chewable tablet Take 80 mg by mouth every six (6) hours as needed for Flatulence.  mometasone (ELOCON) 0.1 % ointment APPLY TO AFFECTED AREA (SCALP) EVERY DAY AS NEEDED  2    cholecalciferol, vitamin D3, (VITAMIN D3) 2,000 unit tab Take 1 Tab by mouth daily.  acetaminophen (TYLENOL EXTRA STRENGTH) 500 mg tablet Take 1,000 mg by mouth every six (6) hours as needed for Pain.  Aspirin, Buffered 81 mg tab Take 81 mg by mouth daily.          Allergies   Allergen Reactions    Bactrim [Sulfamethoxazole-Trimethoprim] Unknown (comments)     Pt does not recall    Darvocet A500 [Propoxyphene N-Acetaminophen] Itching       Past Medical History:   Diagnosis Date    Anxiety     Aortic stenosis 3/3/2010    Aortic stenosis     Arrhythmia     MURMUR    Arthritis     SPINAL STENOSIS    Atherosclerosis of coronary artery     Biliary dyskinesia     CHF (congestive heart failure) ([address] 3/3/2010    CHF (congestive heart failure) ([address] 01/2002    Chronic heart failure ([address] 1/2002; 3/3/2010    chronic diastolic heart failure    Chronic pain     LOWER BACK, RIGHT KNEE    Environmental allergies 3/3/2010    GERD (gastroesophageal reflux disease) 3/3/2010    Hiatal hernia 3/3/2010    High cholesterol 3/3/2010    HTN (hypertension) 3/3/2010    Hypokalemia 3/3/2010    Ischemic heart disease, chronic     Obese     Psychiatric disorder     anxiety    S/P hysterectomy 3/3/2010    Spinal stenosis 3/3/2010       Past Surgical History:   Procedure Laterality Date    CARDIAC CATHETERIZATION  01/2002    normal coronaries    DECOMPRESS DISC RF LUMBAR  07/2007    HX BACK SURGERY  5/1/2014    HX BREAST LUMPECTOMY Left 1989    benign left breast    HX BUNIONECTOMY      HX BUNIONECTOMY      HX HEART CATHETERIZATION  3/3/10    HX HYSTERECTOMY  1978    HX LUMBAR DISKECTOMY      HX ORTHOPAEDIC Bilateral     BUNIONECTOMY    HX ORTHOPAEDIC Right     WRIST FX    HX OTHER SURGICAL  10/12/2015    mole removed from right side of face by Dr. Ivan Wu FLX DX W/COLLJ [address] 1976 PFRMD  [id number]    Dr Jeanna Hurt GI ENDOSCOPY,BIOPSY  2/27/2019            Family History   Problem Relation Age of Onset    Hypertension Mother     Heart Disease Father     Stroke Sister     Heart Disease Sister     Cancer Brother         LUNG    Cancer Brother         PROSTATE    Hypertension Brother     No Known Problems Brother     Lung Disease Brother     No Known Problems Brother     No Known Problems Brother     Stroke Daughter 47    No Known Problems Daughter     Anesth Problems Neg Hx        Social History     Tobacco Use    Smoking status: Never Smoker    Smokeless tobacco: Never Used   Substance Use Topics    Alcohol use: No     Alcohol/week: 0.0 standard drinks        Lab Results   Component Value Date/Time    WBC 4.7 07/17/2019 10:20 AM    HGB 13.1 07/17/2019 10:20 AM    HCT 38.9 07/17/2019 10:20 AM    PLATELET 293 10/26/7734 10:20 AM    MCV 86 07/17/2019 10:20 AM     Lab Results   Component Value Date/Time    Cholesterol, total 173 11/04/2019 11:47 AM    HDL Cholesterol 59 11/04/2019 11:47 AM    LDL, calculated 93 11/04/2019 11:47 AM    Triglyceride 106 11/04/2019 11:47 AM CHOL/HDL Ratio 3.1 11/01/2010 05:26 PM     Lab Results   Component Value Date/Time    TSH 2.470 05/15/2017 03:04 PM      Lab Results   Component Value Date/Time    Sodium 144 11/04/2019 11:47 AM    Potassium 3.7 11/04/2019 11:47 AM    Chloride 101 11/04/2019 11:47 AM    CO2 28 11/04/2019 11:47 AM    Anion gap 8 03/30/2019 10:54 AM    Glucose 93 11/04/2019 11:47 AM    BUN 11 11/04/2019 11:47 AM    Creatinine 0.94 11/04/2019 11:47 AM    BUN/Creatinine ratio 12 11/04/2019 11:47 AM    GFR est AA 65 11/04/2019 11:47 AM    GFR est non-AA 57 (L) 11/04/2019 11:47 AM    Calcium 9.6 11/04/2019 11:47 AM    Bilirubin, total 0.5 07/17/2019 10:20 AM    ALT (SGPT) 12 07/17/2019 10:20 AM    AST (SGOT) 17 07/17/2019 10:20 AM    Alk. phosphatase 87 07/17/2019 10:20 AM    Protein, total 7.7 07/17/2019 10:20 AM    Albumin 4.3 07/17/2019 10:20 AM    Globulin 4.8 (H) 03/30/2019 10:54 AM    A-G Ratio 1.3 07/17/2019 10:20 AM      Lab Results   Component Value Date/Time    Hemoglobin A1c 5.4 04/16/2014 12:20 PM    Hemoglobin A1c (POC) 5.5 11/26/2014 12:12 PM         Review of Systems   Constitutional: Negative for malaise/fatigue. HENT: Negative for congestion. Eyes: Negative for blurred vision. Respiratory: Negative for cough and shortness of breath. Cardiovascular: Negative for chest pain, palpitations and leg swelling. Gastrointestinal: Negative for abdominal pain, constipation and heartburn. Genitourinary: Negative for dysuria, frequency and urgency. Neurological: Negative for dizziness, tingling and headaches. Endo/Heme/Allergies: Negative for environmental allergies. Psychiatric/Behavioral: Negative for depression. The patient does not have insomnia. Physical Exam  Vitals signs and nursing note reviewed. Constitutional:       Appearance: Normal appearance. She is well-developed.    HENT:      Right Ear: Tympanic membrane and ear canal normal.      Left Ear: Tympanic membrane and ear canal normal.      Nose: No mucosal edema or rhinorrhea. Neck:      Musculoskeletal: Normal range of motion and neck supple. Thyroid: No thyromegaly. Cardiovascular:      Rate and Rhythm: Normal rate and regular rhythm. Heart sounds: Normal heart sounds. Pulmonary:      Effort: Pulmonary effort is normal.      Breath sounds: Normal breath sounds. Abdominal:      General: Bowel sounds are normal.      Palpations: Abdomen is soft. There is no mass. Tenderness: There is no abdominal tenderness. Musculoskeletal: Normal range of motion. Left shoulder: She exhibits tenderness, bony tenderness and pain. She exhibits normal range of motion, no spasm, normal pulse and normal strength. Lymphadenopathy:      Cervical: No cervical adenopathy. Skin:     General: Skin is warm and dry. Joint injection:  Pt has an understanding of this procedure including expected benefits, risks and alternative options for treatment. All questions have been answered     Procedure:  After consent was obtained, using sterile technique the left shoulder joint was prepped using Betadine. Local anesthetic used: ethyl chloride. depomedrol 40 mg was mixed with 2% lidocaine and injected into the joint and the needle withdrawn. The procedure was well tolerated. The patient is asked to continue to rest the joint for a few more days before resuming regular activities. It may be more painful for the first 1-2 days. Watch for fever, or increased swelling or persistent pain in the joint. Call or return to clinic prn if such. Pt has an understanding of instructions given. ASSESSMENT and PLAN  Diagnoses and all orders for this visit:    1. Pre-op exam  -     AMB POC URINALYSIS DIP STICK AUTO W/ MICRO  She was cleared by cardiology in November 2019.      2. Essential hypertension    3. Mixed hyperlipidemia  -     LIPID PANEL    4. Chronic diastolic heart failure (HCC)// Hx mild Aortic Stenosis  Stable as per cardiology    5. Gastroesophageal reflux disease without esophagitis  Stable on Protonix. 6. Chronic anxiety//  7. Other stressful life events affecting family and household  She does not want counseling. Overall stable as this has been a constant stressor for may years. 8. Primary osteoarthritis involving multiple joints    9. Primary osteoarthritis of right knee  Medically cleared to proceed with her knee surgery. 10. Primary osteoarthritis of left shoulder  Instructions for exercises given and reviewed with pt. Pt also to use ice for 2 days and then heat to the area 3-4 times a day over the next several days until sx are improved. Continue with tylenol as needed. 11. Encounter for medication monitoring  -     CBC W/O DIFF  -     METABOLIC PANEL, COMPREHENSIVE    12. Non Mobid Obesity   I have reviewed/discussed the above normal BMI with the patient. I have recommended the following interventions: dietary management education, guidance, and counseling . Follow up 4 months. reviewed diet, exercise and weight control  cardiovascular risk and specific lipid/LDL goals reviewed  reviewed medications and side effects in detail    I have discussed diagnosis listed in this note with pt and/or family. I have discussed treatment plans and options and the risk/benefit analysis of those options, including safe use of medications and possible medication side effects. Through the use of shared decision making we have agreed to the above plan. The patient has received an after-visit summary and questions were answered concerning future plans and follow up. Advise pt of any urgent changes then to proceed to the ER.

## 2020-03-04 NOTE — PROGRESS NOTES
Chief Complaint   Patient presents with    Pre-op Exam     patient is to have right knee surgery by Dr. Booth Child      Patient states she is having knee surgery. Dr. Bob August's office will schedule surgery after pre op is done. Patient c/o left shoulder pain and rates pain 10/10 at the present time. Eye exam by Dr. Giovanny Naidu 11/19/2019. [facility]  OFFICE PROCEDURE PROGRESS NOTE        Chart reviewed for the following:   Meghana SHRESTHA LPN, have reviewed the History, Physical and updated the Allergic reactions for [address] performed immediately prior to start of procedure:   Meghana SHRESTHA LPN, have performed the following reviews on Lorne Kinds prior to the start of the procedure:            * Patient was identified by name and date of birth   * Agreement on procedure being performed was verified  * Risks and Benefits explained to the patient  * Procedure site verified and marked as necessary  * Patient was positioned for comfort  * Consent was signed and verified     Time: 11:10am      Date of procedure: 3/4/2020    Procedure performed by:  Aurelio Malcolm MD    Provider assisted by: ADAL Ty    Patient assisted by: no one    How tolerated by patient: well    Post Procedural Pain Scale: 8    Comments: steroid injection into left shoulder                1. Have you been to the ER, urgent care clinic since your last visit? Hospitalized since your last visit? No    2. Have you seen or consulted any other health care providers outside of the [address] [place] [address] since your last visit? Include any pap smears or colon screening.  No

## 2020-03-05 LAB
ALBUMIN SERPL-MCNC: 4.6 G/DL (ref 3.6–4.6)
ALBUMIN/GLOB SERPL: 1.2 {RATIO} (ref 1.2–2.2)
ALP SERPL-CCNC: 87 IU/L (ref 39–117)
ALT SERPL-CCNC: 15 IU/L (ref 0–32)
AST SERPL-CCNC: 16 IU/L (ref 0–40)
BILIRUB SERPL-MCNC: 0.6 MG/DL (ref 0–1.2)
BUN SERPL-MCNC: 18 MG/DL (ref 8–27)
BUN/CREAT SERPL: 15 (ref 12–28)
CALCIUM SERPL-MCNC: 9.9 MG/DL (ref 8.7–10.3)
CHLORIDE SERPL-SCNC: 98 MMOL/L (ref 96–106)
CHOLEST SERPL-MCNC: 185 MG/DL (ref 100–199)
CO2 SERPL-SCNC: 24 MMOL/L (ref 20–29)
CREAT SERPL-MCNC: 1.17 MG/DL (ref 0.57–1)
ERYTHROCYTE [DISTWIDTH] IN BLOOD BY AUTOMATED COUNT: 13.5 % (ref 11.7–15.4)
GLOBULIN SER CALC-MCNC: 3.8 G/DL (ref 1.5–4.5)
GLUCOSE SERPL-MCNC: 93 MG/DL (ref 65–99)
HCT VFR BLD AUTO: 44.3 % (ref 34–46.6)
HDLC SERPL-MCNC: 63 MG/DL
HGB BLD-MCNC: 14.4 G/DL (ref 11.1–15.9)
INTERPRETATION, 910389: NORMAL
INTERPRETATION: NORMAL
LDLC SERPL CALC-MCNC: 99 MG/DL (ref 0–99)
MCH RBC QN AUTO: 27.9 PG (ref 26.6–33)
MCHC RBC AUTO-ENTMCNC: 32.5 G/DL (ref 31.5–35.7)
MCV RBC AUTO: 86 FL (ref 79–97)
PDF IMAGE, 910387: NORMAL
PLATELET # BLD AUTO: 171 X10E3/UL (ref 150–450)
POTASSIUM SERPL-SCNC: 3.7 MMOL/L (ref 3.5–5.2)
PROT SERPL-MCNC: 8.4 G/DL (ref 6–8.5)
RBC # BLD AUTO: 5.17 X10E6/UL (ref 3.77–5.28)
SODIUM SERPL-SCNC: 140 MMOL/L (ref 134–144)
TRIGL SERPL-MCNC: 117 MG/DL (ref 0–149)
VLDLC SERPL CALC-MCNC: 23 MG/DL (ref 5–40)
WBC # BLD AUTO: 5.3 X10E3/UL (ref 3.4–10.8)

## 2020-03-06 LAB — BACTERIA UR CULT: ABNORMAL

## 2020-03-09 DIAGNOSIS — I10 ESSENTIAL HYPERTENSION: Primary | ICD-10-CM

## 2020-03-09 RX ORDER — LOSARTAN POTASSIUM 100 MG/1
100 TABLET ORAL DAILY
Qty: 30 TAB | Refills: 2 | Status: SHIPPED | OUTPATIENT
Start: 2020-03-09 | End: 2020-07-07

## 2020-03-09 NOTE — TELEPHONE ENCOUNTER
Patient would like for  to know that [facility] would no longer have      losartan (COZAAR) 100 mg tablet please give patient a call @ [phone]

## 2020-03-16 DIAGNOSIS — K21.9 GASTROESOPHAGEAL REFLUX DISEASE, ESOPHAGITIS PRESENCE NOT SPECIFIED: ICD-10-CM

## 2020-03-16 RX ORDER — PANTOPRAZOLE SODIUM 40 MG/1
40 TABLET, DELAYED RELEASE ORAL 2 TIMES DAILY
Qty: 60 TAB | Refills: 11 | Status: SHIPPED | OUTPATIENT
Start: 2020-03-16 | End: 2021-04-26

## 2020-03-16 NOTE — TELEPHONE ENCOUNTER
----- Message from Humphrey Ormond sent at 3/16/2020 12:55 PM EDT -----  Regarding: Desmond ESTES  Medication Refill    Caller (if not patient):      Relationship of caller (if not patient):      Best contact number(s): [phone]      Name of medication and dosage if known: pantoprazole (PROTONIX) 40 mg tablet       Is patient out of this medication (yes/no): Yes      Pharmacy name: [facility]/pharmacy [facility] [address] listed in chart? (yes/no):  Pharmacy phone number:       Details to clarify the request:      Humphrey Ormond

## 2020-04-20 DIAGNOSIS — F41.9 ANXIETY: Chronic | ICD-10-CM

## 2020-04-21 RX ORDER — CLONAZEPAM 1 MG/1
TABLET ORAL
Qty: 60 TAB | Refills: 1 | Status: SHIPPED | OUTPATIENT
Start: 2020-04-21 | End: 2020-07-17

## 2020-05-29 DIAGNOSIS — L85.3 DRY SKIN DERMATITIS: ICD-10-CM

## 2020-05-29 RX ORDER — DESONIDE 0.5 MG/ML
LOTION TOPICAL
Qty: 59 ML | Refills: 0 | Status: SHIPPED | OUTPATIENT
Start: 2020-05-29 | End: 2020-10-20

## 2020-06-09 DIAGNOSIS — R07.9 CHEST PAIN, UNSPECIFIED TYPE: ICD-10-CM

## 2020-06-10 RX ORDER — NITROGLYCERIN 0.4 MG/1
TABLET SUBLINGUAL
Qty: 25 TAB | Refills: 3 | Status: SHIPPED | OUTPATIENT
Start: 2020-06-10 | End: 2021-10-11

## 2020-06-16 NOTE — PROGRESS NOTES
[facility]                                                                                  Tuan Pitt DNP, [facility]    Cardiology Telephone Encounter                                                         Pursuant to the emergency declaration under the [facility] authority and the [employer] Act, this phone visit was conducted, with patient's consent, to reduce the patient's risk of exposure to COVID-19 and provide continuity of care for an established patient. Subjective/HPI:   Babar Zurita is a 82 y.o. female who was evaluated via telephone encounter. Patient is a 82-year-old female previously followed by Dr. Ralph Hernandez for history of small vessel CAD, aortic stenosis, diastolic heart failure, hypertension and hyperlipidemia. On cardiac catheterization in 2009 noted to have small vessel CAD in the posterior lateral and posterior descending coronary arteries. Nuclear stress test last year negative for ischemia. Echocardiogram last year indicates mild aortic stenosis with mean gradient of 20 mmHg valve area 1.62 cm². Tricuspid and mitral valve have mild to moderate regurgitation. From a cardiac standpoint patient reports feeling well denies any chest pain shortness of breath dizziness or dyspnea on exertion. She reports intermittent knee pain at times limiting her activities and has been receiving treatment from orthopedics Dr. Carlo Cummings and there was discussion of knee replacement for later on this year. EREKG 1/2020 7/2019 2D ECHO  Left Ventricle Normal cavity size, systolic function (ejection fraction normal) and diastolic function. Severe concentric hypertrophy. The estimated ejection fraction is 51 - 55%. Left Atrium Moderately dilated left atrium. No atrial septal defect present. No patent foramen ovale visualized.    Right Ventricle Normal cavity size, wall thickness and global systolic function. Right Atrium Normal cavity size. Aortic Valve Normal valve structure and no regurgitation. Moderate aortic valve sclerosis with reduced excursion. Aortic valve peak gradient is 34 mmHg. Aortic valve mean gradient is 20 mmHg. Aortic valve area is 1.6 cm2. Aortic valve peak velocity is 2.9 cm/s. There is mild aortic stenosis. Mitral Valve Normal valve structure and no stenosis. Mild to moderate regurgitation. Tricuspid Valve Normal valve structure and no stenosis. Mild to moderate tricuspid valve regurgitation. Pulmonary arterial systolic pressure is 30.8 mmHg. Moderate pulmonary hypertension. Pulmonic Valve Normal valve structure, no stenosis and no regurgitation. Aorta Normal aortic root, ascending aortic, and aortic arch. IVC/Hepatic Veins Normal structure. Normal central venous pressure (0-5 mmHg); IVC diameter is less than 21 mm and collapses more than 50% with respiration. Pericardium No evidence of pericardial effusion     NST 5/2019  · Baseline ECG: , right bundle branch block. 1st degree AV block  · Negative stress test.  · Gated SPECT: Left ventricular function post-stress was normal. Calculated ejection fraction is 91%. There is no evidence of transient ischemic dilation (TID). · Left ventricular perfusion is normal.  · Normal myocardial perfusion imaging. Myocardial perfusion imaging supports a low risk stress test.     7/2009 CORONARY ANATOMY:  LEFT MAIN CORONARY ARTERY:  Left main coronary artery is large, normal, and  trifurcates into the left anterior descending, the ramus intermedius, and  the left circumflex coronary arteries. LEFT ANTERIOR DESCENDING:  The left anterior descending is large, tortuous,  and normal as are diagonal branches.     RAMUS INTERMEDIUS:  The ramus intermedius is large, tortuous, and normal.    LEFT CIRCUMFLEX CORONARY ARTERY:  The left circumflex coronary artery is  large and normal  as is its large, tortuous first obtuse marginal.    RIGHT CORONARY ARTERY:  Right coronary artery is large, dominant and  ectatic in the mid portion with mild irregularities.  The posterior  descending artery is large and tortuous proximally and tapers abruptly at  the distal PDA to a 1.0-cm vessel.  The vessel, at this point, is far too  small for stenting.  Posterolateral branch is very small with  moderate-to-severe diffuse disease throughout, and again is probably less  than a 1.0-mm vessel and not favorable for percutaneous coronary  intervention. LEFT VENTRICULOGRAM:  Reveals ejection fraction of 70% to 75% with  hyperdynamic left ventricular systolic function.  No focal wall motion  abnormalities and there is mild-to-moderate mitral regurgitation likely  overestimated due to the presence of premature ventricular contractions. CONCLUSIONS:  1.  Angiographically significant small vessel coronary artery disease  involving the posterolateral branches and the distal posterior descending  artery.  No major epicardial coronary artery disease. 2.  Hyperdynamic left ventricular systolic function. 3.  Mild-to-moderate mitral regurgitation, lately overestimated by  premature ventricular contractions. 9 Cardiac Cath:    PCP Provider  Juancarlos Xie MD  Past Medical History:   Diagnosis Date    Anxiety     Aortic stenosis 3/3/2010    Aortic stenosis     Arrhythmia     MURMUR    Arthritis     SPINAL STENOSIS    Atherosclerosis of coronary artery     Biliary dyskinesia     CHF (congestive heart failure) ([address] 3/3/2010    CHF (congestive heart failure) ([address] 01/2002    Chronic heart failure ([address] 1/2002; 3/3/2010    chronic diastolic heart failure    Chronic pain     LOWER BACK, RIGHT KNEE    Environmental allergies 3/3/2010    GERD (gastroesophageal reflux disease) 3/3/2010    Hiatal hernia 3/3/2010    High cholesterol 3/3/2010    HTN (hypertension) 3/3/2010    Hypokalemia 3/3/2010    Ischemic heart disease, chronic     Obese     Psychiatric disorder     anxiety    S/P hysterectomy 3/3/2010    Spinal stenosis 3/3/2010      Past Surgical History:   Procedure Laterality Date    CARDIAC CATHETERIZATION  01/2002    normal coronaries    DECOMPRESS DISC RF LUMBAR  07/2007    HX BACK SURGERY  5/1/2014    HX BREAST LUMPECTOMY Left 1989    benign left breast    HX BUNIONECTOMY      HX BUNIONECTOMY      HX HEART CATHETERIZATION  3/3/10    HX HYSTERECTOMY  1978    HX LUMBAR DISKECTOMY      HX ORTHOPAEDIC Bilateral     BUNIONECTOMY    HX ORTHOPAEDIC Right     WRIST FX    HX OTHER SURGICAL  10/12/2015    mole removed from right side of face by Dr. Hobbs Sic FLX DX W/COLLJ Meeraannaesther 1978 PFRMD  [id number]    Dr Avinash Samaniego GI ENDOSCOPY,BIOPSY  2/27/2019          Allergies   Allergen Reactions    Bactrim [Sulfamethoxazole-Trimethoprim] Unknown (comments)     Pt does not recall    Darvocet A500 [Propoxyphene N-Acetaminophen] Itching      Family History   Problem Relation Age of Onset    Hypertension Mother     Heart Disease Father     Stroke Sister     Heart Disease Sister     Heart Disease Sister     Cancer Brother         LUNG    Cancer Brother         PROSTATE    Hypertension Brother     No Known Problems Brother     Lung Disease Brother     Heart Attack Brother     Lung Disease Brother     Stroke Brother     Stroke Daughter 47    No Known Problems Daughter     Anesth Problems Neg Hx       Current Outpatient Medications   Medication Sig    nitroglycerin (NITROSTAT) 0.4 mg SL tablet TAKE 1 TAB BY SUBLINGUAL ROUTE EVERY 5 MINUTES AS NEEDED.  desonide (TRIDESILON) 0.05 % topical lotion Apply  to affected area two (2) times daily as needed for Skin Irritation or Itching.     clonazePAM (KlonoPIN) 1 mg tablet HALF TO 1 TABLET BY MOUTH EVERY MORNING AS NEEDED FOR ANXIETY AND 1 TABLET AT BEDTIME FOR SLEEP    metoprolol tartrate (LOPRESSOR) 25 mg tablet Take 1 Tab by mouth two (2) times a day. Take 1 tab by mouth at 9am and take 1 tab at 9pm.    potassium chloride SR (KLOR-CON 10) 10 mEq tablet TAKE 3 TABLETS BY MOUTH EVERY MORNING AND 2 TABLETS EVERY EVENING    pantoprazole (PROTONIX) 40 mg tablet Take 1 Tab by mouth two (2) times a day. Take 30 minutes prior to breakfast and 30 minutes prior to dinner.  losartan (COZAAR) 100 mg tablet Take 1 Tab by mouth daily.  isosorbide mononitrate ER (IMDUR) 30 mg tablet Take 1 Tab by mouth daily.  rosuvastatin (CRESTOR) 20 mg tablet TAKE 1 TABLET BY MOUTH NIGHTLY    furosemide (LASIX) 80 mg tablet TAKE 1 TABLET BY MOUTH EVERY MORNING AND TAKE 1/2 TABLET EVERY EVENING    amLODIPine (NORVASC) 5 mg tablet Take 1 Tab by mouth two (2) times a day. Take at 9am and 9pm every day.  simethicone (GAS RELIEF) 80 mg chewable tablet Take 80 mg by mouth every six (6) hours as needed for Flatulence.  mometasone (ELOCON) 0.1 % ointment APPLY TO AFFECTED AREA (SCALP) EVERY DAY AS NEEDED    cholecalciferol, vitamin D3, (VITAMIN D3) 2,000 unit tab Take 1 Tab by mouth daily.  acetaminophen (TYLENOL EXTRA STRENGTH) 500 mg tablet Take 1,000 mg by mouth every six (6) hours as needed for Pain.  Aspirin, Buffered 81 mg tab Take 81 mg by mouth daily. No current facility-administered medications for this visit. There were no vitals filed for this visit.   Social History     Socioeconomic History    Marital status:      Spouse name: Not on file    Number of children: Not on file    Years of education: Not on file    Highest education level: Not on file   Occupational History    Not on file   Social Needs    Financial resource strain: Not on file    Food insecurity     Worry: Not on file     Inability: Not on file    Transportation needs     Medical: Not on file     Non-medical: Not on file   Tobacco Use    Smoking status: Never Smoker    Smokeless tobacco: Never Used   Substance and Sexual Activity    Alcohol use: No     Alcohol/week: 0.0 standard drinks    Drug use: No    Sexual activity: Not Currently   Lifestyle    Physical activity     Days per week: Not on file     Minutes per session: Not on file    Stress: Not on file   Relationships    Social connections     Talks on phone: Not on file     Gets together: Not on file     Attends Christian service: Not on file     Active member of club or organization: Not on file     Attends meetings of clubs or organizations: Not on file     Relationship status: Not on file    Intimate partner violence     Fear of current or ex partner: Not on file     Emotionally abused: Not on file     Physically abused: Not on file     Forced sexual activity: Not on file   Other Topics Concern    Not on file   Social History Narrative    Not on file       Review of Symptoms  11 systems reviewed, negative other than as stated in the HPI    Physical Exam:    Due to this being a telephone encounter a very limited exam was performed  Neurological: A&Ox3, no slurred speech, answering questions appropriately  Respiratory: Non labored, talking in complete sentences, no audible wheeze over the phone       Cardiology Labs:  Lab Results   Component Value Date/Time    Cholesterol, total 185 03/04/2020 11:26 AM    HDL Cholesterol 63 03/04/2020 11:26 AM    LDL, calculated 99 03/04/2020 11:26 AM    Triglyceride 117 03/04/2020 11:26 AM    CHOL/HDL Ratio 3.1 11/01/2010 05:26 PM       Lab Results   Component Value Date/Time    Sodium 140 03/04/2020 11:26 AM    Potassium 3.7 03/04/2020 11:26 AM    Chloride 98 03/04/2020 11:26 AM    CO2 24 03/04/2020 11:26 AM    Anion gap 8 03/30/2019 10:54 AM    Glucose 93 03/04/2020 11:26 AM    BUN 18 03/04/2020 11:26 AM    Creatinine 1.17 (H) 03/04/2020 11:26 AM    BUN/Creatinine ratio 15 03/04/2020 11:26 AM    GFR est AA 50 (L) 03/04/2020 11:26 AM    GFR est non-AA 43 (L) 03/04/2020 11:26 AM    Calcium 9.9 03/04/2020 11:26 AM    Bilirubin, total 0.6 03/04/2020 11:26 AM    Alk.  phosphatase 87 03/04/2020 11:26 AM    Protein, total 8.4 03/04/2020 11:26 AM    Albumin 4.6 03/04/2020 11:26 AM    Globulin 4.8 (H) 03/30/2019 10:54 AM    A-G Ratio 1.2 03/04/2020 11:26 AM    ALT (SGPT) 15 03/04/2020 11:26 AM         Assessment:     Diagnoses and all orders for this visit:    1. Essential hypertension    2. Aortic stenosis, mild    3. Atherosclerosis of native coronary artery of native heart without angina pectoris    4. CKD (chronic kidney disease) stage 3, GFR 30-59 ml/min ([facility])    5. Mixed hyperlipidemia        ICD-10-CM ICD-9-CM    1. Essential hypertension I10 401.9    2. Aortic stenosis, mild I35.0 424.1    3. Atherosclerosis of native coronary artery of native heart without angina pectoris I25.10 414.01    4. CKD (chronic kidney disease) stage 3, GFR 30-59 ml/min ([facility]) N18.3 585.3    5. Mixed hyperlipidemia E78.2 272.2      No orders of the defined types were placed in this encounter. Plan:     1. Aortic stenosis: Asymptomatic, repeat ECHO in 2021  2. Tricuspid and mitral regurgitation:Asymptomatic,  Mild in 2019 repeat echocardiogram 2021  3. Atherosclerotic heart disease: Small vessel disease, continue medical treatment, asymptomatic. Continue aspirin, statin, beta-blocker. 4.  Hypertension: Appears controlled via flowsheet vital signs in [facility] care continue amlodipine losartan and beta-blocker. 5.  Hyperlipidemia: LDL 99, she is on a high intensity statin. Discussed starting Zetia vs reduction in saturated fat and increase walking/exercise. Pt prefers no additional medications at this time    Has possible upcomming TKR w/ Dr Ant Ward. No date given yet, will need onsite cardiology clearance appt and updated NST for clearance. Follow up in 6 months. TIME (Minutes) SPENT RELATED TO THIS PHONE ENCOUNTER:25    We discussed the expected course, resolution and complications of the diagnosis(es) in detail.   Medication risks, benefits, costs, interactions, and alternatives were discussed as indicated. I advised her to contact the office if her condition worsens, changes or fails to improve as anticipated.  She expressed understanding with the diagnosis(es) and plan  Clementina Pickett NP

## 2020-06-17 ENCOUNTER — OFFICE VISIT (OUTPATIENT)
Dept: CARDIOLOGY CLINIC | Age: 85
End: 2020-06-17

## 2020-06-17 VITALS — HEIGHT: 62 IN | BODY MASS INDEX: 33.49 KG/M2 | WEIGHT: 182 LBS

## 2020-06-17 DIAGNOSIS — I25.10 ATHEROSCLEROSIS OF NATIVE CORONARY ARTERY OF NATIVE HEART WITHOUT ANGINA PECTORIS: ICD-10-CM

## 2020-06-17 DIAGNOSIS — N18.30 CKD (CHRONIC KIDNEY DISEASE) STAGE 3, GFR 30-59 ML/MIN (HCC): ICD-10-CM

## 2020-06-17 DIAGNOSIS — E78.2 MIXED HYPERLIPIDEMIA: ICD-10-CM

## 2020-06-17 DIAGNOSIS — I10 ESSENTIAL HYPERTENSION: Primary | Chronic | ICD-10-CM

## 2020-06-17 DIAGNOSIS — I35.0 AORTIC STENOSIS, MILD: ICD-10-CM

## 2020-06-17 NOTE — PROGRESS NOTES
Chief Complaint   Patient presents with    Hypertension     6 month follow up, no cardiac concerns. Pt unable to check BP at home today     1. Have you been to the ER, urgent care clinic since your last visit? Hospitalized since your last visit? yes [facility]  1/28/2020    2. Have you seen or consulted any other health care providers outside of the [address] [place] [address] since your last visit? Include any pap smears or colon screening.  Yes Dermatology Dr Caitlyn Castaneda and Dr Soto Headings

## 2020-07-07 DIAGNOSIS — I10 ESSENTIAL HYPERTENSION: ICD-10-CM

## 2020-07-07 RX ORDER — LOSARTAN POTASSIUM 100 MG/1
TABLET ORAL
Qty: 30 TAB | Refills: 2 | Status: SHIPPED | OUTPATIENT
Start: 2020-07-07 | End: 2020-10-09

## 2020-07-17 DIAGNOSIS — F41.9 ANXIETY: Chronic | ICD-10-CM

## 2020-07-17 RX ORDER — CLONAZEPAM 1 MG/1
TABLET ORAL
Qty: 60 TAB | Refills: 1 | Status: SHIPPED | OUTPATIENT
Start: 2020-07-17 | End: 2020-09-30

## 2020-07-22 ENCOUNTER — OFFICE VISIT (OUTPATIENT)
Dept: FAMILY MEDICINE CLINIC | Age: 85
End: 2020-07-22

## 2020-07-22 ENCOUNTER — HOSPITAL ENCOUNTER (OUTPATIENT)
Dept: LAB | Age: 85
Discharge: HOME | End: 2020-07-22
Payer: MEDICARE

## 2020-07-22 VITALS
WEIGHT: 183.4 LBS | HEIGHT: 62 IN | OXYGEN SATURATION: 96 % | RESPIRATION RATE: 18 BRPM | DIASTOLIC BLOOD PRESSURE: 78 MMHG | SYSTOLIC BLOOD PRESSURE: 138 MMHG | BODY MASS INDEX: 33.75 KG/M2 | TEMPERATURE: 98.5 F | HEART RATE: 67 BPM

## 2020-07-22 DIAGNOSIS — F41.9 CHRONIC ANXIETY: ICD-10-CM

## 2020-07-22 DIAGNOSIS — K21.9 GASTROESOPHAGEAL REFLUX DISEASE WITHOUT ESOPHAGITIS: ICD-10-CM

## 2020-07-22 DIAGNOSIS — I10 ESSENTIAL HYPERTENSION: Primary | ICD-10-CM

## 2020-07-22 DIAGNOSIS — I50.32 CHRONIC DIASTOLIC HEART FAILURE (HCC): ICD-10-CM

## 2020-07-22 DIAGNOSIS — R82.90 ABNORMAL URINE ODOR: ICD-10-CM

## 2020-07-22 DIAGNOSIS — N30.90 CYSTITIS: ICD-10-CM

## 2020-07-22 DIAGNOSIS — M15.9 PRIMARY OSTEOARTHRITIS INVOLVING MULTIPLE JOINTS: ICD-10-CM

## 2020-07-22 DIAGNOSIS — B07.9 VERRUCOUS LESION OF SKIN: ICD-10-CM

## 2020-07-22 DIAGNOSIS — Z51.81 ENCOUNTER FOR MEDICATION MONITORING: ICD-10-CM

## 2020-07-22 DIAGNOSIS — E78.2 MIXED HYPERLIPIDEMIA: ICD-10-CM

## 2020-07-22 LAB
BILIRUB UR QL STRIP: NEGATIVE
GLUCOSE UR-MCNC: NEGATIVE MG/DL
KETONES P FAST UR STRIP-MCNC: NEGATIVE MG/DL
PH UR STRIP: 6 [PH] (ref 4.6–8)
PROT UR QL STRIP: NEGATIVE
SP GR UR STRIP: 1.01 (ref 1–1.03)
UA UROBILINOGEN AMB POC: NORMAL (ref 0.2–1)
URINALYSIS CLARITY POC: CLEAR
URINALYSIS COLOR POC: YELLOW
URINE BLOOD POC: NORMAL
URINE LEUKOCYTES POC: NORMAL
URINE NITRITES POC: NEGATIVE

## 2020-07-22 PROCEDURE — 80053 COMPREHEN METABOLIC PANEL: CPT

## 2020-07-22 PROCEDURE — 87086 URINE CULTURE/COLONY COUNT: CPT

## 2020-07-22 PROCEDURE — 87088 URINE BACTERIA CULTURE: CPT

## 2020-07-22 PROCEDURE — 88305 TISSUE EXAM BY PATHOLOGIST: CPT

## 2020-07-22 PROCEDURE — 87077 CULTURE AEROBIC IDENTIFY: CPT

## 2020-07-22 PROCEDURE — 80061 LIPID PANEL: CPT

## 2020-07-22 RX ORDER — CEPHALEXIN 500 MG/1
500 CAPSULE ORAL 3 TIMES DAILY
Qty: 21 CAP | Refills: 0 | Status: SHIPPED | OUTPATIENT
Start: 2020-07-22 | End: 2020-07-29

## 2020-07-22 NOTE — PROGRESS NOTES
Chief Complaint   Patient presents with    Hypertension     follow up    Cholesterol Problem     follow up         Eye exam 11/19/2019 by Dr. Lanny Stoddard. 1. Have you been to the ER, urgent care clinic since your last visit? Hospitalized since your last visit? No    2. Have you seen or consulted any other health care providers outside of the [address] [place] [address] since your last visit? Include any pap smears or colon screening.  No

## 2020-07-22 NOTE — PROGRESS NOTES
HISTORY OF PRESENT ILLNESS  Elvis Busch is a 82 y.o. female. HPI   Follow up on chronic medical problems. Continue with increase family stress with her \"grandson in trouble\". Now her 12year old granddtg is pregnant. She stays worries all the time about her grandkids and family. We discussed stress reduction. Cardiovascular Review:  The patient has hypertension, hyperlipidemia, chronic diastolic heart failure, and obesity. Hx also of aortic stenosis. Diet and Lifestyle: generally follows a low fat low cholesterol diet, generally follows a low sodium diet, sedentary. Pertinent ROS: taking medications as instructed, no medication side effects noted, no TIA's, no chest pain on exertion, mild swelling of ankles, no orthostatic dizziness or lightheadedness, no palpitations,      Home BP Monitoring: is not measured at home. Osteoarthritis:  Patient has osteoarthritis. Overall doing better with back pain. Still having knee pain. Aching more in the right knee and increase pain with walking. Has had 2 injections in the knee which has not helped very much. Was going for her knee surgery in March was was cancelled d/t the pandemic. Pain is 5-6/10. Taking tylenol for the pain which helps some. +Symptoms onset: problem is longstanding. Rheumatological ROS: stable, mild-to-moderate joint symptoms intermittently, reasonably well controlled by PRN meds. Response to treatment plan: stable and intermittent. Anxiety Review:  Patient is seen for anxiety. Ongoing symptoms include: increased anxiety still related to family issues. Patient denies: palpitations, sweating, chest pain, shortness of breath. Reported side effects from the treatment: none.     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    Atypical chest pain R07.89    Essential hypertension I10    Gastroesophageal reflux disease without esophagitis K21.9    Atherosclerosis of native coronary artery without angina pectoris--diffuse small vsl disease via cath cath 2009--RCA PDA/ROSA I25.10    Chronic anxiety F41.9    Encounter for medication monitoring Z51.81    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 M47.815    Class 2 obesity due to excess calories with serious comorbidity and body mass index (BMI) of 38.0 to 38.9 in adult [id number]    Paroxysmal cardiac arrhythmia--PVC's,APC's,NSSVT I49.8    Severe obesity (BMI 35.0-39. 9) with comorbidity ([facility]) E66.01    CKD (chronic kidney disease) stage 3, GFR 30-59 ml/min ([facility]) N18.3    Chest pain with normal angiography--2002;normal NST 2018 R07.9       Current Outpatient Medications   Medication Sig Dispense Refill    clonazePAM (KlonoPIN) 1 mg tablet TAKE 1/2-1 TABLET BY MOUTH EVERY MORNING AND AS NEEDED FOR ANXIETY AND 1 TABLET AT BEDTIME FOR SLEEP 60 Tab 1    losartan (COZAAR) 100 mg tablet TAKE 1 TABLET BY MOUTH EVERY DAY 30 Tab 2    nitroglycerin (NITROSTAT) 0.4 mg SL tablet TAKE 1 TAB BY SUBLINGUAL ROUTE EVERY 5 MINUTES AS NEEDED. 25 Tab 3    desonide (TRIDESILON) 0.05 % topical lotion Apply  to affected area two (2) times daily as needed for Skin Irritation or Itching. 59 mL 0    metoprolol tartrate (LOPRESSOR) 25 mg tablet Take 1 Tab by mouth two (2) times a day.  Take 1 tab by mouth at 9am and take 1 tab at 9pm. 60 Tab 11    potassium chloride SR (KLOR-CON 10) 10 mEq tablet TAKE 3 TABLETS BY MOUTH EVERY MORNING AND 2 TABLETS EVERY EVENING 150 Tab 11    pantoprazole (PROTONIX) 40 mg tablet Take 1 Tab by mouth two (2) times a day. Take 30 minutes prior to breakfast and 30 minutes prior to dinner. 60 Tab 11    isosorbide mononitrate ER (IMDUR) 30 mg tablet Take 1 Tab by mouth daily. 90 Tab 3    rosuvastatin (CRESTOR) 20 mg tablet TAKE 1 TABLET BY MOUTH NIGHTLY 30 Tab 11    furosemide (LASIX) 80 mg tablet TAKE 1 TABLET BY MOUTH EVERY MORNING AND TAKE 1/2 TABLET EVERY EVENING 45 Tab 11    amLODIPine (NORVASC) 5 mg tablet Take 1 Tab by mouth two (2) times a day. Take at 9am and 9pm every day. 180 Tab 1    simethicone (GAS RELIEF) 80 mg chewable tablet Take 80 mg by mouth every six (6) hours as needed for Flatulence.  mometasone (ELOCON) 0.1 % ointment APPLY TO AFFECTED AREA (SCALP) EVERY DAY AS NEEDED  2    cholecalciferol, vitamin D3, (VITAMIN D3) 2,000 unit tab Take 1 Tab by mouth daily.  acetaminophen (TYLENOL EXTRA STRENGTH) 500 mg tablet Take 1,000 mg by mouth every six (6) hours as needed for Pain.  Aspirin, Buffered 81 mg tab Take 81 mg by mouth daily.          Allergies   Allergen Reactions    Bactrim [Sulfamethoxazole-Trimethoprim] Unknown (comments)     Pt does not recall    Darvocet A500 [Propoxyphene N-Acetaminophen] Itching         Past Medical History:   Diagnosis Date    Anxiety     Aortic stenosis 3/3/2010    Aortic stenosis     Arrhythmia     MURMUR    Arthritis     SPINAL STENOSIS    Atherosclerosis of coronary artery     Biliary dyskinesia     CHF (congestive heart failure) ([facility] [address] 3/3/2010    CHF (congestive heart failure) ([address] 01/2002    Chronic heart failure ([address] 1/2002; 3/3/2010    chronic diastolic heart failure    Chronic pain     LOWER BACK, RIGHT KNEE    Environmental allergies 3/3/2010    GERD (gastroesophageal reflux disease) 3/3/2010    Hiatal hernia 3/3/2010    High cholesterol 3/3/2010    HTN (hypertension) 3/3/2010    Hypokalemia 3/3/2010    Ischemic heart disease, chronic     Obese     Psychiatric disorder     anxiety    S/P hysterectomy 3/3/2010    Spinal stenosis 3/3/2010         Past Surgical History:   Procedure Laterality Date    CARDIAC CATHETERIZATION  01/2002    normal coronaries    DECOMPRESS DISC RF LUMBAR  07/2007    HX BACK SURGERY  5/1/2014    HX BREAST LUMPECTOMY Left 1989    benign left breast    HX BUNIONECTOMY      HX BUNIONECTOMY      HX HEART CATHETERIZATION  3/3/10    HX HYSTERECTOMY  1978    HX LUMBAR DISKECTOMY      HX ORTHOPAEDIC Bilateral     BUNIONECTOMY    HX ORTHOPAEDIC Right     WRIST FX    HX OTHER SURGICAL  10/12/2015    mole removed from right side of face by Dr. Yue Fitzpatrick FLX DX Radha Ricketts PFRMD  [id number]    Dr Chambers Numbers GI ENDOSCOPY,BIOPSY  2/27/2019              Family History   Problem Relation Age of Onset    Hypertension Mother     Heart Disease Father     Stroke Sister     Heart Disease Sister     Heart Disease Sister     Cancer Brother         LUNG    Cancer Brother         PROSTATE    Hypertension Brother     No Known Problems Brother     Lung Disease Brother     Heart Attack Brother     Lung Disease Brother     Stroke Brother     Stroke Daughter 47    No Known Problems Daughter     Anesth Problems Neg Hx        Social History     Tobacco Use    Smoking status: Never Smoker    Smokeless tobacco: Never Used   Substance Use Topics    Alcohol use: No     Alcohol/week: 0.0 standard drinks        Lab Results   Component Value Date/Time    WBC 5.3 03/04/2020 11:26 AM    HGB 14.4 03/04/2020 11:26 AM    HCT 44.3 03/04/2020 11:26 AM    PLATELET 733 08/97/4175 11:26 AM    MCV 86 03/04/2020 11:26 AM     Lab Results   Component Value Date/Time    Cholesterol, total 185 03/04/2020 11:26 AM    HDL Cholesterol 63 03/04/2020 11:26 AM    LDL, calculated 99 03/04/2020 11:26 AM    Triglyceride 117 03/04/2020 11:26 AM    CHOL/HDL Ratio 3.1 11/01/2010 05:26 PM     Lab Results   Component Value Date/Time    TSH 2.470 05/15/2017 03:04 PM      Lab Results   Component Value Date/Time    Sodium 140 03/04/2020 11:26 AM    Potassium 3.7 03/04/2020 11:26 AM    Chloride 98 03/04/2020 11:26 AM    CO2 24 03/04/2020 11:26 AM    Anion gap 8 03/30/2019 10:54 AM    Glucose 93 03/04/2020 11:26 AM    BUN 18 03/04/2020 11:26 AM    Creatinine 1.17 (H) 03/04/2020 11:26 AM    BUN/Creatinine ratio 15 03/04/2020 11:26 AM    GFR est AA 50 (L) 03/04/2020 11:26 AM    GFR est non-AA 43 (L) 03/04/2020 11:26 AM    Calcium 9.9 03/04/2020 11:26 AM    Bilirubin, total 0.6 03/04/2020 11:26 AM    ALT (SGPT) 15 03/04/2020 11:26 AM    Alk. phosphatase 87 03/04/2020 11:26 AM    Protein, total 8.4 03/04/2020 11:26 AM    Albumin 4.6 03/04/2020 11:26 AM    Globulin 4.8 (H) 03/30/2019 10:54 AM    A-G Ratio 1.2 03/04/2020 11:26 AM      Lab Results   Component Value Date/Time    Hemoglobin A1c 5.4 04/16/2014 12:20 PM    Hemoglobin A1c (POC) 5.5 11/26/2014 12:12 PM         Review of Systems   Constitutional: Negative for malaise/fatigue. HENT: Negative for congestion. Eyes: Negative for blurred vision. Respiratory: Negative for cough and shortness of breath. Cardiovascular: Negative for chest pain and palpitations. Gastrointestinal: Negative for abdominal pain, constipation and heartburn. Genitourinary: Positive for frequency and urgency. Negative for dysuria. Urine has strong odor. .     Skin:        Has skin tag lesion in the fold of the lower abd that bleeds and has a odor. Neurological: Negative for dizziness, tingling and headaches. Endo/Heme/Allergies: Negative for environmental allergies. Psychiatric/Behavioral: Negative for depression. The patient does not have insomnia. Physical Exam  Vitals signs and nursing note reviewed. Constitutional:       Appearance: Normal appearance. She is well-developed.       Comments: /78 (BP 1 Location: Left arm, BP Patient Position: Sitting)   Pulse 67   Temp 98.5 °F (36.9 °C) (Oral)   Resp 18   Ht 5' 2\" (1.575 m)   Wt 183 lb 6.4 oz (83.2 kg)   LMP  (LMP Unknown)   SpO2 96%   BMI 33.54 kg/m²    HENT:      Right Ear: Tympanic membrane and ear canal normal.      Left Ear: Tympanic membrane and ear canal normal.      Nose: No mucosal edema or rhinorrhea. Neck:      Musculoskeletal: Normal range of motion and neck supple. Thyroid: No thyromegaly. Cardiovascular:      Rate and Rhythm: Normal rate and regular rhythm. Heart sounds: Normal heart sounds. No gallop. Pulmonary:      Effort: Pulmonary effort is normal.      Breath sounds: Normal breath sounds. Abdominal:      General: Bowel sounds are normal.      Palpations: Abdomen is soft. There is no mass. Tenderness: There is no abdominal tenderness. Musculoskeletal: Normal range of motion. General: Swelling (1+ swelling in the ankles) present. Lymphadenopathy:      Cervical: No cervical adenopathy. Skin:     General: Skin is warm and dry. Comments: verrucous skin lesion that pulled off when it was examined and had some bleeding noted that stopped with applying pressure in the lower fold of the abd. Neurological:      General: No focal deficit present. Mental Status: She is alert and oriented to person, place, and time. Psychiatric:         Mood and Affect: Mood normal.         ASSESSMENT and PLAN  Diagnoses and all orders for this visit:    1. Essential hypertension  Discussed sodium restriction, high k rich diet, maintaining ideal body weight and regular exercise program such as daily walking 30 min perday 4-5 times per week, as physiologic means to achieve blood pressure control.  Medication compliance advised. 2. Mixed hyperlipidemia  -     LIPID PANEL    3. Chronic diastolic heart failure (HCC)  Stable     4. Gastroesophageal reflux disease without esophagitis  Stable     5. Chronic anxiety  Stable     6.  Primary osteoarthritis involving multiple joints  Stable 7. Encounter for medication monitoring  -     METABOLIC PANEL, COMPREHENSIVE  -     COLLECTION VENOUS BLOOD,VENIPUNCTURE    8. Abnormal urine odor//  9. Cystitis  -     CULTURE, URINE  -     AMB POC URINALYSIS DIP STICK AUTO W/ MICRO  -     cephALEXin (KEFLEX) 500 mg capsule; Take 1 Cap by mouth three (3) times daily for 7 days. Take for infection in the urine    10. Verrucous lesion of skin  Local wound care reviewed. -     SURGICAL PATHOLOGY      Follow-up and Dispositions    · Return in about 4 months (around 11/9/2020) for medicare wellness exam.       reviewed diet, exercise and weight control  cardiovascular risk and specific lipid/LDL goals reviewed  reviewed medications and side effects in detail    I have discussed diagnosis listed in this note with pt and/or family. I have discussed treatment plans and options and the risk/benefit analysis of those options, including safe use of medications and possible medication side effects. Through the use of shared decision making we have agreed to the above plan. The patient has received an after-visit summary and questions were answered concerning future plans and follow up. Advise pt of any urgent changes then to proceed to the ER.

## 2020-07-23 LAB
ALBUMIN SERPL-MCNC: 4.2 G/DL (ref 3.6–4.6)
ALBUMIN/GLOB SERPL: 1.2 {RATIO} (ref 1.2–2.2)
ALP SERPL-CCNC: 75 IU/L (ref 39–117)
ALT SERPL-CCNC: 18 IU/L (ref 0–32)
AST SERPL-CCNC: 19 IU/L (ref 0–40)
BILIRUB SERPL-MCNC: 0.5 MG/DL (ref 0–1.2)
BUN SERPL-MCNC: 12 MG/DL (ref 8–27)
BUN/CREAT SERPL: 11 (ref 12–28)
CALCIUM SERPL-MCNC: 9.3 MG/DL (ref 8.7–10.3)
CHLORIDE SERPL-SCNC: 102 MMOL/L (ref 96–106)
CHOLEST SERPL-MCNC: 159 MG/DL (ref 100–199)
CO2 SERPL-SCNC: 26 MMOL/L (ref 20–29)
CREAT SERPL-MCNC: 1.06 MG/DL (ref 0.57–1)
GLOBULIN SER CALC-MCNC: 3.5 G/DL (ref 1.5–4.5)
GLUCOSE SERPL-MCNC: 88 MG/DL (ref 65–99)
HDLC SERPL-MCNC: 56 MG/DL
INTERPRETATION, 910389: NORMAL
INTERPRETATION: NORMAL
LDLC SERPL CALC-MCNC: 84 MG/DL (ref 0–99)
PDF IMAGE, 910387: NORMAL
POTASSIUM SERPL-SCNC: 3.9 MMOL/L (ref 3.5–5.2)
PROT SERPL-MCNC: 7.7 G/DL (ref 6–8.5)
SODIUM SERPL-SCNC: 144 MMOL/L (ref 134–144)
TRIGL SERPL-MCNC: 95 MG/DL (ref 0–149)
VLDLC SERPL CALC-MCNC: 19 MG/DL (ref 5–40)

## 2020-07-24 LAB — BACTERIA UR CULT: ABNORMAL

## 2020-08-04 LAB
CPT CODES, 490044: NORMAL
CPT DISCLAIMER: NORMAL
CYTOLOGY SPEC DOC CYTO: NORMAL
DIAGNOSIS SYNOPSIS:: NORMAL
DX ICD CODE: NORMAL
DX ICD CODE: NORMAL
PATH REPORT.GROSS SPEC: NORMAL
PATH REPORT.RELEVANT HX SPEC: NORMAL
PATHOLOGIST NAME: NORMAL
PDF IMAGE, 807507: NORMAL
SPECIMEN SOURCE: NORMAL

## 2020-09-30 DIAGNOSIS — F41.9 ANXIETY: Chronic | ICD-10-CM

## 2020-09-30 RX ORDER — CLONAZEPAM 1 MG/1
TABLET ORAL
Qty: 60 TAB | Refills: 0 | Status: SHIPPED | OUTPATIENT
Start: 2020-09-30 | End: 2020-11-13

## 2020-10-09 DIAGNOSIS — I10 ESSENTIAL HYPERTENSION: ICD-10-CM

## 2020-10-09 RX ORDER — LOSARTAN POTASSIUM 100 MG/1
TABLET ORAL
Qty: 30 TAB | Refills: 2 | Status: SHIPPED | OUTPATIENT
Start: 2020-10-09 | End: 2021-01-10

## 2020-10-20 DIAGNOSIS — L85.3 DRY SKIN DERMATITIS: ICD-10-CM

## 2020-10-20 RX ORDER — DESONIDE 0.5 MG/ML
LOTION TOPICAL
Qty: 59 ML | Refills: 0 | Status: SHIPPED | OUTPATIENT
Start: 2020-10-20 | End: 2020-11-24 | Stop reason: SDUPTHER

## 2020-10-28 DIAGNOSIS — I10 ESSENTIAL HYPERTENSION: ICD-10-CM

## 2020-10-28 RX ORDER — AMLODIPINE BESYLATE 5 MG/1
5 TABLET ORAL 2 TIMES DAILY
Qty: 180 TAB | Refills: 3 | Status: SHIPPED | OUTPATIENT
Start: 2020-10-28 | End: 2021-10-18

## 2020-10-28 NOTE — TELEPHONE ENCOUNTER
Last visit:7/22/20  Next visit:11/24/20  Previous refill 2/11/19(180+1R)    Requested Prescriptions     Pending Prescriptions Disp Refills    amLODIPine (NORVASC) 5 mg tablet 180 Tab 1     Sig: Take 1 Tab by mouth two (2) times a day. Take at 9am and 9pm every day.

## 2020-11-12 DIAGNOSIS — F41.9 ANXIETY: Chronic | ICD-10-CM

## 2020-11-13 RX ORDER — CLONAZEPAM 1 MG/1
TABLET ORAL
Qty: 60 TAB | Refills: 0 | Status: SHIPPED | OUTPATIENT
Start: 2020-11-13 | End: 2020-12-21

## 2020-11-24 ENCOUNTER — OFFICE VISIT (OUTPATIENT)
Dept: FAMILY MEDICINE CLINIC | Age: 85
End: 2020-11-24
Payer: MEDICARE

## 2020-11-24 VITALS
DIASTOLIC BLOOD PRESSURE: 84 MMHG | OXYGEN SATURATION: 97 % | HEART RATE: 62 BPM | WEIGHT: 183.2 LBS | RESPIRATION RATE: 20 BRPM | SYSTOLIC BLOOD PRESSURE: 138 MMHG | HEIGHT: 57 IN | BODY MASS INDEX: 39.52 KG/M2 | TEMPERATURE: 97.1 F

## 2020-11-24 DIAGNOSIS — Z51.81 ENCOUNTER FOR MEDICATION MONITORING: ICD-10-CM

## 2020-11-24 DIAGNOSIS — I10 ESSENTIAL HYPERTENSION: ICD-10-CM

## 2020-11-24 DIAGNOSIS — K21.9 GASTROESOPHAGEAL REFLUX DISEASE WITHOUT ESOPHAGITIS: ICD-10-CM

## 2020-11-24 DIAGNOSIS — E78.2 MIXED HYPERLIPIDEMIA: ICD-10-CM

## 2020-11-24 DIAGNOSIS — M15.9 PRIMARY OSTEOARTHRITIS INVOLVING MULTIPLE JOINTS: ICD-10-CM

## 2020-11-24 DIAGNOSIS — Z00.00 MEDICARE ANNUAL WELLNESS VISIT, SUBSEQUENT: Primary | ICD-10-CM

## 2020-11-24 DIAGNOSIS — L85.3 DRY SKIN DERMATITIS: ICD-10-CM

## 2020-11-24 DIAGNOSIS — F41.9 CHRONIC ANXIETY: ICD-10-CM

## 2020-11-24 DIAGNOSIS — Z23 ENCOUNTER FOR IMMUNIZATION: ICD-10-CM

## 2020-11-24 DIAGNOSIS — Z23 NEEDS FLU SHOT: ICD-10-CM

## 2020-11-24 DIAGNOSIS — I50.32 CHRONIC DIASTOLIC HEART FAILURE (HCC): ICD-10-CM

## 2020-11-24 PROCEDURE — G0439 PPPS, SUBSEQ VISIT: HCPCS | Performed by: FAMILY MEDICINE

## 2020-11-24 PROCEDURE — G0463 HOSPITAL OUTPT CLINIC VISIT: HCPCS | Performed by: FAMILY MEDICINE

## 2020-11-24 PROCEDURE — G8536 NO DOC ELDER MAL SCRN: HCPCS | Performed by: FAMILY MEDICINE

## 2020-11-24 PROCEDURE — 99214 OFFICE O/P EST MOD 30 MIN: CPT | Performed by: FAMILY MEDICINE

## 2020-11-24 PROCEDURE — 1101F PT FALLS ASSESS-DOCD LE1/YR: CPT | Performed by: FAMILY MEDICINE

## 2020-11-24 PROCEDURE — 90694 VACC AIIV4 NO PRSRV 0.5ML IM: CPT

## 2020-11-24 PROCEDURE — G8752 SYS BP LESS 140: HCPCS | Performed by: FAMILY MEDICINE

## 2020-11-24 PROCEDURE — G8510 SCR DEP NEG, NO PLAN REQD: HCPCS | Performed by: FAMILY MEDICINE

## 2020-11-24 PROCEDURE — 1090F PRES/ABSN URINE INCON ASSESS: CPT | Performed by: FAMILY MEDICINE

## 2020-11-24 PROCEDURE — 90732 PPSV23 VACC 2 YRS+ SUBQ/IM: CPT

## 2020-11-24 PROCEDURE — G8427 DOCREV CUR MEDS BY ELIG CLIN: HCPCS | Performed by: FAMILY MEDICINE

## 2020-11-24 PROCEDURE — G8400 PT W/DXA NO RESULTS DOC: HCPCS | Performed by: FAMILY MEDICINE

## 2020-11-24 PROCEDURE — G8417 CALC BMI ABV UP PARAM F/U: HCPCS | Performed by: FAMILY MEDICINE

## 2020-11-24 PROCEDURE — G8754 DIAS BP LESS 90: HCPCS | Performed by: FAMILY MEDICINE

## 2020-11-24 RX ORDER — METOPROLOL TARTRATE 25 MG/1
TABLET, FILM COATED ORAL
COMMUNITY
End: 2022-02-15

## 2020-11-24 RX ORDER — DESONIDE 0.5 MG/ML
LOTION TOPICAL
Qty: 59 ML | Refills: 0 | Status: SHIPPED | OUTPATIENT
Start: 2020-11-24 | End: 2021-08-30

## 2020-11-24 NOTE — PROGRESS NOTES
Order placed for Fluad and Pneumo 23 per Verbal Order from Dr. Yasmany Mancera on 11/24/2020 due to need    Flu injection 0.5 ml given in right deltoid and Pneumo 23 0.5 ml given in right arm also, patient requested, no reaction noted. Naeem Biggs

## 2020-11-24 NOTE — PROGRESS NOTES
HISTORY OF PRESENT ILLNESS  Chelo Hahn is a 82 y.o. female. HPI   Follow up on chronic medical problems. Overall feeling well. Has good days and bad days. Stress with her family is about the same. Cardiovascular Review:  The patient has hypertension, hyperlipidemia, chronic diastolic heart failure, and obesity. Hx also of aortic stenosis. Diet and Lifestyle: generally follows a low fat low cholesterol diet, generally follows a low sodium diet, sedentary. Pertinent ROS: taking medications as instructed, no medication side effects noted, no TIA's, no chest pain on exertion, mild swelling of ankles, no orthostatic dizziness or lightheadedness, no palpitations,      Home BP Monitoring: is not measured at home. Osteoarthritis:  Patient has osteoarthritis. Overall doing better with back pain. Still having knee pain. Aching more in the right knee and increase pain with walking. Has had 2 injections in the knee which has not helped very much. Was going for her knee surgery in March was was cancelled d/t the pandemic. Pain is 5-6/10. Taking tylenol for the pain which helps some. +Symptoms onset: problem is longstanding. Rheumatological ROS: stable, mild-to-moderate joint symptoms intermittently, reasonably well controlled by PRN meds. Response to treatment plan: stable and intermittent. Anxiety Review:  Patient is seen for anxiety. Ongoing symptoms include: increased anxiety still related to family issues. Patient denies: palpitations, sweating, chest pain, shortness of breath. Reported side effects from the treatment: none.     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    Atypical chest pain R07.89    Essential hypertension I10    Gastroesophageal reflux disease without esophagitis K21.9    Atherosclerosis of native coronary artery without angina pectoris--diffuse small vsl disease via cath cath 2009--RCA PDA/ROSA I25.10    Chronic anxiety F41.9    Encounter for medication monitoring Z51.81    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 M47.815    Class 2 obesity due to excess calories with serious comorbidity and body mass index (BMI) of 38.0 to 38.9 in adult [id number]    Paroxysmal cardiac arrhythmia--PVC's,APC's,NSSVT I49.8    Severe obesity (BMI 35.0-39. 9) with comorbidity (HCC) E66.01    CKD (chronic kidney disease) stage 3, GFR 30-59 ml/min N18.30    Chest pain with normal angiography--2002;normal NST 2018 R07.9       Current Outpatient Medications   Medication Sig Dispense Refill    clonazePAM (KlonoPIN) 1 mg tablet TAKE 1/2-1 TABLET BY MOUTH EVERY MORNING AND AS NEEDED FOR ANXIETY AND 1 TABLET AT BEDTIME FOR SLEEP 60 Tab 0    amLODIPine (NORVASC) 5 mg tablet Take 1 Tab by mouth two (2) times a day. Take at 9am and 9pm every day. 180 Tab 3    desonide (TRIDESILON) 0.05 % topical lotion APPLY TO AFFECTED AREA TWO (2) TIMES DAILY AS NEEDED FOR SKIN IRRITATION OR ITCHING. 59 mL 0    losartan (COZAAR) 100 mg tablet TAKE 1 TABLET BY MOUTH EVERY DAY 30 Tab 2    nitroglycerin (NITROSTAT) 0.4 mg SL tablet TAKE 1 TAB BY SUBLINGUAL ROUTE EVERY 5 MINUTES AS NEEDED. 25 Tab 3    metoprolol tartrate (LOPRESSOR) 25 mg tablet Take 1 Tab by mouth two (2) times a day.  Take 1 tab by mouth at 9am and take 1 tab at 9pm. 60 Tab 11    potassium chloride SR (KLOR-CON 10) 10 mEq tablet TAKE 3 TABLETS BY MOUTH EVERY MORNING AND 2 TABLETS EVERY EVENING 150 Tab 11    pantoprazole (PROTONIX) 40 mg tablet Take 1 Tab by mouth two (2) times a day. Take 30 minutes prior to breakfast and 30 minutes prior to dinner. 60 Tab 11    isosorbide mononitrate ER (IMDUR) 30 mg tablet Take 1 Tab by mouth daily. 90 Tab 3    rosuvastatin (CRESTOR) 20 mg tablet TAKE 1 TABLET BY MOUTH NIGHTLY 30 Tab 11    furosemide (LASIX) 80 mg tablet TAKE 1 TABLET BY MOUTH EVERY MORNING AND TAKE 1/2 TABLET EVERY EVENING 45 Tab 11    simethicone (GAS RELIEF) 80 mg chewable tablet Take 80 mg by mouth every six (6) hours as needed for Flatulence.  cholecalciferol, vitamin D3, (VITAMIN D3) 2,000 unit tab Take 1 Tab by mouth daily.  acetaminophen (TYLENOL EXTRA STRENGTH) 500 mg tablet Take 1,000 mg by mouth every six (6) hours as needed for Pain.  Aspirin, Buffered 81 mg tab Take 81 mg by mouth daily.          Allergies   Allergen Reactions    Bactrim [Sulfamethoxazole-Trimethoprim] Unknown (comments)     Pt does not recall    Darvocet A500 [Propoxyphene N-Acetaminophen] Itching       Past Medical History:   Diagnosis Date    Anxiety     Aortic stenosis 3/3/2010    Aortic stenosis     Arrhythmia     MURMUR    Arthritis     SPINAL STENOSIS    Atherosclerosis of coronary artery     Biliary dyskinesia     CHF (congestive heart failure) ([facility] [address] 3/3/2010    CHF (congestive heart failure) ([facility] [address] 01/2002    Chronic heart failure ([facility] [address] 1/2002; 3/3/2010    chronic diastolic heart failure    Chronic pain     LOWER BACK, RIGHT KNEE    Environmental allergies 3/3/2010    GERD (gastroesophageal reflux disease) 3/3/2010    Hiatal hernia 3/3/2010    High cholesterol 3/3/2010    HTN (hypertension) 3/3/2010    Hypokalemia 3/3/2010    Ischemic heart disease, chronic     Obese     Psychiatric disorder     anxiety    S/P hysterectomy 3/3/2010    Spinal stenosis 3/3/2010       Past Surgical History:   Procedure Laterality Date    CARDIAC CATHETERIZATION  01/2002    normal coronaries    DECOMPRESS DISC RF LUMBAR  07/2007    HX BACK SURGERY  5/1/2014    HX BREAST LUMPECTOMY Left 1989    benign left breast    HX BUNIONECTOMY      HX BUNIONECTOMY      HX HEART CATHETERIZATION  3/3/10    HX HYSTERECTOMY  1978    HX LUMBAR DISKECTOMY      HX ORTHOPAEDIC Bilateral     BUNIONECTOMY    HX ORTHOPAEDIC Right     WRIST FX    HX OTHER SURGICAL  10/12/2015    mole removed from right side of face by Dr. Gwendolyn Montano FLX DX W/COLLJ [place] PFRMD  [id number]    Dr Lamont Ray GI ENDOSCOPY,BIOPSY  2/27/2019            Family History   Problem Relation Age of Onset    Hypertension Mother     Heart Disease Father     Stroke Sister     Heart Disease Sister     Heart Disease Sister     Cancer Brother         LUNG    Cancer Brother         PROSTATE    Hypertension Brother     No Known Problems Brother     Lung Disease Brother     Heart Attack Brother     Lung Disease Brother     Stroke Brother     Stroke Daughter 47    No Known Problems Daughter     Anesth Problems Neg Hx        Social History     Tobacco Use    Smoking status: Never Smoker    Smokeless tobacco: Never Used   Substance Use Topics    Alcohol use: No     Alcohol/week: 0.0 standard drinks        Lab Results   Component Value Date/Time    WBC 5.3 03/04/2020 11:26 AM    HGB 14.4 03/04/2020 11:26 AM    HCT 44.3 03/04/2020 11:26 AM    PLATELET 888 84/73/3325 11:26 AM    MCV 86 03/04/2020 11:26 AM     Lab Results   Component Value Date/Time    Cholesterol, total 159 07/22/2020 10:20 AM    HDL Cholesterol 56 07/22/2020 10:20 AM    LDL, calculated 84 07/22/2020 10:20 AM    Triglyceride 95 07/22/2020 10:20 AM    CHOL/HDL Ratio 3.1 11/01/2010 05:26 PM     Lab Results   Component Value Date/Time    TSH 2.470 05/15/2017 03:04 PM      Lab Results   Component Value Date/Time    Sodium 144 07/22/2020 10:20 AM    Potassium 3.9 07/22/2020 10:20 AM    Chloride 102 07/22/2020 10:20 AM    CO2 26 07/22/2020 10:20 AM Anion gap 8 03/30/2019 10:54 AM    Glucose 88 07/22/2020 10:20 AM    BUN 12 07/22/2020 10:20 AM    Creatinine 1.06 (H) 07/22/2020 10:20 AM    BUN/Creatinine ratio 11 (L) 07/22/2020 10:20 AM    GFR est AA 57 (L) 07/22/2020 10:20 AM    GFR est non-AA 49 (L) 07/22/2020 10:20 AM    Calcium 9.3 07/22/2020 10:20 AM    Bilirubin, total 0.5 07/22/2020 10:20 AM    ALT (SGPT) 18 07/22/2020 10:20 AM    Alk. phosphatase 75 07/22/2020 10:20 AM    Protein, total 7.7 07/22/2020 10:20 AM    Albumin 4.2 07/22/2020 10:20 AM    Globulin 4.8 (H) 03/30/2019 10:54 AM    A-G Ratio 1.2 07/22/2020 10:20 AM      Lab Results   Component Value Date/Time    Hemoglobin A1c 5.4 04/16/2014 12:20 PM    Hemoglobin A1c (POC) 5.5 11/26/2014 12:12 PM         Review of Systems   Constitutional: Negative for malaise/fatigue. HENT: Negative for congestion. Eyes: Negative for blurred vision. Respiratory: Negative for cough and shortness of breath. Cardiovascular: Negative for chest pain, palpitations and leg swelling. Gastrointestinal: Negative for abdominal pain, constipation and heartburn. Genitourinary: Negative for dysuria, frequency and urgency. Neurological: Negative for dizziness, tingling and headaches. Endo/Heme/Allergies: Negative for environmental allergies. Psychiatric/Behavioral: Negative for depression. The patient does not have insomnia. Physical Exam  Vitals signs and nursing note reviewed. Constitutional:       Appearance: Normal appearance. She is well-developed. She is obese. Comments: /84 (BP 1 Location: Right arm, BP Patient Position: Sitting)   Pulse 62   Temp 97.1 °F (36.2 °C) (Temporal)   Resp 20   Ht 4' 9\" (1.448 m)   Wt 183 lb 3.2 oz (83.1 kg)   LMP  (LMP Unknown)   SpO2 97%   BMI 39.64 kg/m²        HENT:      Right Ear: Tympanic membrane and ear canal normal.      Left Ear: Tympanic membrane and ear canal normal.      Nose: No mucosal edema or rhinorrhea.    Neck: Musculoskeletal: Normal range of motion and neck supple. Thyroid: No thyromegaly. Cardiovascular:      Rate and Rhythm: Normal rate and regular rhythm. Heart sounds: Normal heart sounds. Pulmonary:      Effort: Pulmonary effort is normal.      Breath sounds: Normal breath sounds. Abdominal:      General: Bowel sounds are normal.      Palpations: Abdomen is soft. There is no mass. Tenderness: There is no abdominal tenderness. Musculoskeletal: Normal range of motion. General: No swelling. Lymphadenopathy:      Cervical: No cervical adenopathy. Skin:     General: Skin is warm and dry. Neurological:      General: No focal deficit present. Mental Status: She is alert and oriented to person, place, and time. Psychiatric:         Mood and Affect: Mood normal.         ASSESSMENT and PLAN  Diagnoses and all orders for this visit:    1. Medicare annual wellness visit, subsequent    2. Essential hypertension  Discussed sodium restriction, high k rich diet, maintaining ideal body weight and regular exercise program such as daily walking 30 min perday 4-5 times per week, as physiologic means to achieve blood pressure control. Medication compliance advised. 3. Mixed hyperlipidemia  Continue to monitor. Work on diet and exercise. 4. Chronic diastolic heart failure (HCC)  Stable     5. Gastroesophageal reflux disease without esophagitis  Stable     6. Chronic anxiety  Stable     7. Primary osteoarthritis involving multiple joints  Voltaren gel OTC as needed    8. Dry skin dermatitis  -    refilldesonide (TRIDESILON) 0.05 % topical lotion; Apply  to affected area two (2) times daily as needed for Skin Irritation or Itching. 9. Encounter for medication monitoring  Labs are stable    10. Needs flu shot  -     FLU (FLUAD QUAD INFLUENZA VACCINE,QUAD,ADJUVANTED)    11.  Encounter for immunization  -     PNEUMOCOCCAL POLYSACCHARIDE VACCINE, 23-VALENT, ADULT OR IMMUNOSUPPRESSED PT DOSE,  -     ID IMMUNIZ ADMIN,1 SINGLE/COMB VAC/TOXOID      Follow-up and Dispositions    · Return in about 4 months (around 4/1/2021). reviewed diet, exercise and weight control  cardiovascular risk and specific lipid/LDL goals reviewed  reviewed medications and side effects in detail    I have discussed diagnosis listed in this note with pt and/or family. I have discussed treatment plans and options and the risk/benefit analysis of those options, including safe use of medications and possible medication side effects. Through the use of shared decision making we have agreed to the above plan. The patient has received an after-visit summary and questions were answered concerning future plans and follow up. Advise pt of any urgent changes then to proceed to the ER.

## 2020-11-24 NOTE — PROGRESS NOTES
This is a Subsequent Medicare Annual Wellness Exam (AWV) (Performed 12 months after IPPE or effective date of Medicare Part B enrollment)    I have reviewed the patient's medical history in detail and updated the computerized patient record. History     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    Atypical chest pain R07.89    Essential hypertension I10    Gastroesophageal reflux disease without esophagitis K21.9    Atherosclerosis of native coronary artery without angina pectoris--diffuse small vsl disease via cath cath 2009--RCA PDA/ROSA I25.10    Chronic anxiety F41.9    Encounter for medication monitoring Z51.81    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 M47.815    Class 2 obesity due to excess calories with serious comorbidity and body mass index (BMI) of 38.0 to 38.9 in adult [id number]    Paroxysmal cardiac arrhythmia--PVC's,APC's,NSSVT I49.8    Severe obesity (BMI 35.0-39. 9) with comorbidity (HCC) E66.01    CKD (chronic kidney disease) stage 3, GFR 30-59 ml/min N18.30    Chest pain with normal angiography--2002;normal NST 2018 R07.9       Current Outpatient Medications   Medication Sig Dispense Refill    clonazePAM (KlonoPIN) 1 mg tablet TAKE 1/2-1 TABLET BY MOUTH EVERY MORNING AND AS NEEDED FOR ANXIETY AND 1 TABLET AT BEDTIME FOR SLEEP 60 Tab 0    amLODIPine (NORVASC) 5 mg tablet Take 1 Tab by mouth two (2) times a day.  Take at 9am and 9pm every day. 180 Tab 3    desonide (TRIDESILON) 0.05 % topical lotion APPLY TO AFFECTED AREA TWO (2) TIMES DAILY AS NEEDED FOR SKIN IRRITATION OR ITCHING. 59 mL 0    losartan (COZAAR) 100 mg tablet TAKE 1 TABLET BY MOUTH EVERY DAY 30 Tab 2    nitroglycerin (NITROSTAT) 0.4 mg SL tablet TAKE 1 TAB BY SUBLINGUAL ROUTE EVERY 5 MINUTES AS NEEDED. 25 Tab 3    metoprolol tartrate (LOPRESSOR) 25 mg tablet Take 1 Tab by mouth two (2) times a day. Take 1 tab by mouth at 9am and take 1 tab at 9pm. 60 Tab 11    potassium chloride SR (KLOR-CON 10) 10 mEq tablet TAKE 3 TABLETS BY MOUTH EVERY MORNING AND 2 TABLETS EVERY EVENING 150 Tab 11    pantoprazole (PROTONIX) 40 mg tablet Take 1 Tab by mouth two (2) times a day. Take 30 minutes prior to breakfast and 30 minutes prior to dinner. 60 Tab 11    isosorbide mononitrate ER (IMDUR) 30 mg tablet Take 1 Tab by mouth daily. 90 Tab 3    rosuvastatin (CRESTOR) 20 mg tablet TAKE 1 TABLET BY MOUTH NIGHTLY 30 Tab 11    furosemide (LASIX) 80 mg tablet TAKE 1 TABLET BY MOUTH EVERY MORNING AND TAKE 1/2 TABLET EVERY EVENING 45 Tab 11    simethicone (GAS RELIEF) 80 mg chewable tablet Take 80 mg by mouth every six (6) hours as needed for Flatulence.  cholecalciferol, vitamin D3, (VITAMIN D3) 2,000 unit tab Take 1 Tab by mouth daily.  acetaminophen (TYLENOL EXTRA STRENGTH) 500 mg tablet Take 1,000 mg by mouth every six (6) hours as needed for Pain.  Aspirin, Buffered 81 mg tab Take 81 mg by mouth daily.          Allergies   Allergen Reactions    Bactrim [Sulfamethoxazole-Trimethoprim] Unknown (comments)     Pt does not recall    Darvocet A500 [Propoxyphene N-Acetaminophen] Itching       Past Medical History:   Diagnosis Date    Anxiety     Aortic stenosis 3/3/2010    Aortic stenosis     Arrhythmia     MURMUR    Arthritis     SPINAL STENOSIS    Atherosclerosis of coronary artery     Biliary dyskinesia     CHF (congestive heart failure) ([facility] [address] 3/3/2010    CHF (congestive heart failure) ([facility] [address] 01/2002    Chronic heart failure ([facility] [address] 1/2002; 3/3/2010    chronic diastolic heart failure    Chronic pain     LOWER BACK, RIGHT KNEE    Environmental allergies 3/3/2010    GERD (gastroesophageal reflux disease) 3/3/2010    Hiatal hernia 3/3/2010    High cholesterol 3/3/2010    HTN (hypertension) 3/3/2010    Hypokalemia 3/3/2010    Ischemic heart disease, chronic     Obese     Psychiatric disorder     anxiety    S/P hysterectomy 3/3/2010    Spinal stenosis 3/3/2010       Past Surgical History:   Procedure Laterality Date    CARDIAC CATHETERIZATION  01/2002    normal coronaries    DECOMPRESS DISC RF LUMBAR  07/2007    HX BACK SURGERY  5/1/2014    HX BREAST LUMPECTOMY Left 1989    benign left breast    HX BUNIONECTOMY      HX BUNIONECTOMY      HX HEART CATHETERIZATION  3/3/10    HX HYSTERECTOMY  1978    HX LUMBAR DISKECTOMY      HX ORTHOPAEDIC Bilateral     BUNIONECTOMY    HX ORTHOPAEDIC Right     WRIST FX    HX OTHER SURGICAL  10/12/2015    mole removed from right side of face by Dr. Loya Aniak FLX DX W/COLLJ [facility] PFRMD  [id number]    Dr Rivera Marking GI ENDOSCOPY,BIOPSY  2/27/2019            Family History   Problem Relation Age of Onset    Hypertension Mother     Heart Disease Father     Stroke Sister     Heart Disease Sister     Heart Disease Sister     Cancer Brother         LUNG    Cancer Brother         PROSTATE    Hypertension Brother     No Known Problems Brother     Lung Disease Brother     Heart Attack Brother     Lung Disease Brother     Stroke Brother     Stroke Daughter 47    No Known Problems Daughter     Anesth Problems Neg Hx        Social History     Tobacco Use    Smoking status: Never Smoker    Smokeless tobacco: Never Used   Substance Use Topics    Alcohol use: No     Alcohol/week: 0.0 standard drinks         Depression Risk Factor Screening:     PHQ over the last two weeks    Little interest or pleasure in doing things Not at all   Feeling down, depressed or hopeless Not at all   Total Score PHQ 2 0     Alcohol Risk Factor Screening: You do not drink alcohol or very rarely. Functional Ability and Level of Safety:   Hearing Loss  Hearing is good. Activities of Daily Living  The home contains: no safety equipment. Patient does total self care    Fall RiskFall Risk Assessment, last 12 mths    Able to walk? Yes   Fall in past 12 months? No     Functional Ability:   Does the patient exhibit a steady gait? yes    How long did it take the patient to get up and walk from a sitting position? seconds    Is the patient self reliant? (ie can do own laundry, meals, household chores)  yes   Does the patient handle his/her own medications? yes   Does the patient handle his/her own money? yes   Is the patients home safe (ie good lighting, handrails on stairs and bath, etc.)? yes   Did you notice or did patient express any hearing difficulties? no   Did you notice or did patient express any vision difficulties? no        Advance Care Planning:   Patient was offered the opportunity to discuss advance care planning:  yes    Does patient have an Advance Directive:  no   If no, did you provide information on [facility]? yes      Abuse Screen  Patient is not abused    Cognitive Screening   Evaluation of Cognitive Function:  Has your family/caregiver stated any concerns about your memory: no      Patient Care Team   Patient Care Team:  Wilber Aquino MD as PCP - General    Assessment/Plan   Education and counseling provided:  Are appropriate based on today's review and evaluation  End-of-Life planning (with patient's consent)    ASSESSMENT and PLAN    Medicare Annual Wellness  Continue current treatment plan. Continue annual follow up. I have discussed diagnosis listed in this note with pt and/or family.  I have discussed treatment plans and options and the risk/benefit analysis of those options, including safe use of medications and possible medication side effects. Through the use of shared decision making we have agreed to the above plan. The patient has received an after-visit summary and questions were answered concerning future plans and follow up. Advise pt of any urgent changes then to proceed to the ER.

## 2020-11-24 NOTE — PROGRESS NOTES
Chief Complaint   Patient presents with   [facility] Annual Wellness Visit     Medicare       Patient states she had an eye exam 11/17/2020 by Dr. Lois Yap. 1. Have you been to the ER, urgent care clinic since your last visit? Hospitalized since your last visit? No    2. Have you seen or consulted any other health care providers outside of the [address] [place] [address] since your last visit? Include any pap smears or colon screening.  No

## 2020-12-07 RX ORDER — ROSUVASTATIN CALCIUM 20 MG/1
TABLET, COATED ORAL
Qty: 30 TAB | Refills: 11 | Status: SHIPPED | OUTPATIENT
Start: 2020-12-07 | End: 2021-12-29

## 2020-12-07 RX ORDER — FUROSEMIDE 80 MG/1
TABLET ORAL
Qty: 45 TAB | Refills: 11 | Status: SHIPPED | OUTPATIENT
Start: 2020-12-07 | End: 2021-12-15

## 2020-12-20 DIAGNOSIS — F41.9 ANXIETY: Chronic | ICD-10-CM

## 2020-12-21 RX ORDER — CLONAZEPAM 1 MG/1
TABLET ORAL
Qty: 60 TAB | Refills: 0 | Status: SHIPPED | OUTPATIENT
Start: 2020-12-21 | End: 2021-01-27 | Stop reason: SDUPTHER

## 2021-01-11 ENCOUNTER — OFFICE VISIT (OUTPATIENT)
Dept: CARDIOLOGY CLINIC | Age: 86
End: 2021-01-11
Payer: MEDICARE

## 2021-01-11 VITALS
HEIGHT: 57 IN | HEART RATE: 68 BPM | SYSTOLIC BLOOD PRESSURE: 124 MMHG | WEIGHT: 182 LBS | DIASTOLIC BLOOD PRESSURE: 74 MMHG | TEMPERATURE: 97.5 F | OXYGEN SATURATION: 95 % | BODY MASS INDEX: 39.26 KG/M2 | RESPIRATION RATE: 16 BRPM

## 2021-01-11 DIAGNOSIS — I10 ESSENTIAL HYPERTENSION: Chronic | ICD-10-CM

## 2021-01-11 DIAGNOSIS — I45.2 BIFASCICULAR BUNDLE BRANCH BLOCK: ICD-10-CM

## 2021-01-11 DIAGNOSIS — I25.10 ATHEROSCLEROSIS OF NATIVE CORONARY ARTERY OF NATIVE HEART WITHOUT ANGINA PECTORIS: Primary | ICD-10-CM

## 2021-01-11 DIAGNOSIS — I35.0 AORTIC STENOSIS, MILD: ICD-10-CM

## 2021-01-11 DIAGNOSIS — N18.30 STAGE 3 CHRONIC KIDNEY DISEASE, UNSPECIFIED WHETHER STAGE 3A OR 3B CKD (HCC): ICD-10-CM

## 2021-01-11 DIAGNOSIS — I50.32 CHRONIC DIASTOLIC HEART FAILURE (HCC): ICD-10-CM

## 2021-01-11 PROCEDURE — G8536 NO DOC ELDER MAL SCRN: HCPCS | Performed by: NURSE PRACTITIONER

## 2021-01-11 PROCEDURE — 1090F PRES/ABSN URINE INCON ASSESS: CPT | Performed by: NURSE PRACTITIONER

## 2021-01-11 PROCEDURE — G8417 CALC BMI ABV UP PARAM F/U: HCPCS | Performed by: NURSE PRACTITIONER

## 2021-01-11 PROCEDURE — G8432 DEP SCR NOT DOC, RNG: HCPCS | Performed by: NURSE PRACTITIONER

## 2021-01-11 PROCEDURE — G8752 SYS BP LESS 140: HCPCS | Performed by: NURSE PRACTITIONER

## 2021-01-11 PROCEDURE — G8754 DIAS BP LESS 90: HCPCS | Performed by: NURSE PRACTITIONER

## 2021-01-11 PROCEDURE — G8427 DOCREV CUR MEDS BY ELIG CLIN: HCPCS | Performed by: NURSE PRACTITIONER

## 2021-01-11 PROCEDURE — 93000 ELECTROCARDIOGRAM COMPLETE: CPT | Performed by: NURSE PRACTITIONER

## 2021-01-11 PROCEDURE — G8400 PT W/DXA NO RESULTS DOC: HCPCS | Performed by: NURSE PRACTITIONER

## 2021-01-11 PROCEDURE — 99214 OFFICE O/P EST MOD 30 MIN: CPT | Performed by: NURSE PRACTITIONER

## 2021-01-11 PROCEDURE — 1101F PT FALLS ASSESS-DOCD LE1/YR: CPT | Performed by: NURSE PRACTITIONER

## 2021-01-11 NOTE — PROGRESS NOTES
[facility] @ [address] [facility], [place]  Subjective/HPI:     Edie Zee is a 82 y.o. female history of small vessel CAD, aortic stenosis, diastolic heart failure, hypertension and hyperlipidemia. On cardiac catheterization in 2009 noted to have small vessel CAD in the posterior lateral and posterior descending coronary arteries. Nuclear stress test 5/2019 negative for ischemia. Echocardiogram 7/2019 mild aortic stenosis with mean gradient of 20 mmHg valve area 1.62 cm². Tricuspid and mitral valve have mild to moderate regurgitation is here for routine f/u. The patient denies chest pain/resting shortness of breath, orthopnea, PND,, palpitations, syncope. Patient reports some intermittent right ankle swelling with prolonged standing. Severe arthritic right knee was pending total knee replacement postponed secondary to Covid pandemic. 6/2020 Phone Visit  1. Aortic stenosis: Asymptomatic, repeat ECHO in 2021  2. Tricuspid and mitral regurgitation:Asymptomatic,  Mild in 2019 repeat echocardiogram 2021  3. Atherosclerotic heart disease: Small vessel disease, continue medical treatment, asymptomatic. Continue aspirin, statin, beta-blocker. 4.  Hypertension: Appears controlled via flowsheet vital signs in [facility] care continue amlodipine losartan and beta-blocker. 5.  Hyperlipidemia: LDL 99, she is on a high intensity statin. Discussed starting Zetia vs reduction in saturated fat and increase walking/exercise. Pt prefers no additional medications at this time     Has possible upcomming TKR w/ Dr Mariaelena Knox. No date given yet, will need onsite cardiology clearance appt and updated NST for clearance.        7/2019 2D ECHO  Left Ventricle Normal cavity size, systolic function (ejection fraction normal) and diastolic function. Severe concentric hypertrophy. The estimated ejection fraction is 51 - 55%. Left Atrium Moderately dilated left atrium.  No atrial septal defect present. No patent foramen ovale visualized.   Right Ventricle Normal cavity size, wall thickness and global systolic function.   Right Atrium Normal cavity size.   Aortic Valve Normal valve structure and no regurgitation. Moderate aortic valve sclerosis with reduced excursion. Aortic valve peak gradient is 34 mmHg. Aortic valve mean gradient is 20 mmHg. Aortic valve area is 1.6 cm2. Aortic valve peak velocity is 2.9 cm/s. There is mild aortic stenosis.   Mitral Valve Normal valve structure and no stenosis. Mild to moderate regurgitation.   Tricuspid Valve Normal valve structure and no stenosis. Mild to moderate tricuspid valve regurgitation. Pulmonary arterial systolic pressure is 60.0 mmHg. Moderate pulmonary hypertension.   Pulmonic Valve Normal valve structure, no stenosis and no regurgitation.   Aorta Normal aortic root, ascending aortic, and aortic arch.   IVC/Hepatic Veins Normal structure. Normal central venous pressure (0-5 mmHg); IVC diameter is less than 21 mm and collapses more than 50% with respiration.   Pericardium No evidence of pericardial effusion      NST 5/2019  · Baseline ECG: , right bundle branch block.1st degree AV block  · Negative stress test.  · Gated SPECT: Left ventricular function post-stress was normal. Calculated ejection fraction is 91%. There is no evidence of transient ischemic dilation (TID).  · Left ventricular perfusion is normal.  · Normal myocardial perfusion imaging. Myocardial perfusion imaging supports a low risk stress test.     7/2009 CORONARY ANATOMY:  LEFT MAIN CORONARY ARTERY:  Left main coronary artery is large, normal, and  trifurcates into the left anterior descending, the ramus intermedius, and  the left circumflex coronary arteries.    LEFT ANTERIOR DESCENDING:  The left anterior descending is large, tortuous,  and normal as are diagonal branches.    RAMUS INTERMEDIUS:  The ramus intermedius is large, tortuous, and normal.    LEFT CIRCUMFLEX CORONARY  ARTERY:  The left circumflex coronary artery is  large and normal  as is its large, tortuous first obtuse marginal.    RIGHT CORONARY ARTERY:  Right coronary artery is large, dominant and  ectatic in the mid portion with mild irregularities.  The posterior  descending artery is large and tortuous proximally and tapers abruptly at  the distal PDA to a 1.0-cm vessel.  The vessel, at this point, is far too  small for stenting.  Posterolateral branch is very small with  moderate-to-severe diffuse disease throughout, and again is probably less  than a 1.0-mm vessel and not favorable for percutaneous coronary  intervention. LEFT VENTRICULOGRAM:  Reveals ejection fraction of 70% to 75% with  hyperdynamic left ventricular systolic function.  No focal wall motion  abnormalities and there is mild-to-moderate mitral regurgitation likely  overestimated due to the presence of premature ventricular contractions. CONCLUSIONS:  1.  Angiographically significant small vessel coronary artery disease  involving the posterolateral branches and the distal posterior descending  artery.  No major epicardial coronary artery disease. 2.  Hyperdynamic left ventricular systolic function. 3.  Mild-to-moderate mitral regurgitation, lately overestimated by  premature ventricular contractions. 9 Cardiac Cath:    PCP Provider  Leo Valencia MD  Past Medical History:   Diagnosis Date    Anxiety     Aortic stenosis 3/3/2010    Aortic stenosis     Arrhythmia     MURMUR    Arthritis     SPINAL STENOSIS    Atherosclerosis of coronary artery     Biliary dyskinesia     CHF (congestive heart failure) ([address] 3/3/2010    CHF (congestive heart failure) ([address] 01/2002    Chronic heart failure ([address] 1/2002; 3/3/2010    chronic diastolic heart failure    Chronic pain     LOWER BACK, RIGHT KNEE    Environmental allergies 3/3/2010    GERD (gastroesophageal reflux disease) 3/3/2010    Hiatal hernia 3/3/2010    High cholesterol 3/3/2010    HTN (hypertension) 3/3/2010    Hypokalemia 3/3/2010    Ischemic heart disease, chronic     Obese     Psychiatric disorder     anxiety    S/P hysterectomy 3/3/2010    Spinal stenosis 3/3/2010      Past Surgical History:   Procedure Laterality Date    CARDIAC CATHETERIZATION  01/2002    normal coronaries    DECOMPRESS DISC RF LUMBAR  07/2007    HX BACK SURGERY  5/1/2014    HX BREAST LUMPECTOMY Left 1989    benign left breast    HX BUNIONECTOMY      HX BUNIONECTOMY      HX HEART CATHETERIZATION  3/3/10    HX HYSTERECTOMY  1978    HX LUMBAR DISKECTOMY      HX ORTHOPAEDIC Bilateral     BUNIONECTOMY    HX ORTHOPAEDIC Right     WRIST FX    HX OTHER SURGICAL  10/12/2015    mole removed from right side of face by Dr. Anjel Lopez FLX DX W/COLLJ Mirella Luu PFD  [id number]    Dr Avinash Samaniego GI ENDOSCOPY,BIOPSY  2/27/2019          Allergies   Allergen Reactions    Bactrim [Sulfamethoxazole-Trimethoprim] Unknown (comments)     Pt does not recall    Darvocet A500 [Propoxyphene N-Acetaminophen] Itching      Family History   Problem Relation Age of Onset    Hypertension Mother     Heart Disease Father     Stroke Sister     Heart Disease Sister     Heart Disease Sister     Cancer Brother         LUNG    Cancer Brother         PROSTATE    Hypertension Brother     No Known Problems Brother     Lung Disease Brother     Heart Attack Brother     Lung Disease Brother     Stroke Brother     Stroke Daughter 47    No Known Problems Daughter     Anesth Problems Neg Hx       Current Outpatient Medications   Medication Sig    losartan (COZAAR) 100 mg tablet TAKE 1 TABLET BY MOUTH EVERY DAY    clonazePAM (KlonoPIN) 1 mg tablet TAKE 1/2-1 TABLET BY MOUTH EVERY MORNING AND AS NEEDED FOR ANXIETY AND 1 TABLET AT BEDTIME FOR SLEEP    rosuvastatin (CRESTOR) 20 mg tablet TAKE 1 TABLET BY MOUTH EVERY DAY AT NIGHT    furosemide (LASIX) 80 mg tablet TAKE 1 TABLET BY MOUTH EVERY MORNING AND TAKE 1/2 TABLET EVERY EVENING    desonide (TRIDESILON) 0.05 % topical lotion Apply  to affected area two (2) times daily as needed for Skin Irritation or Itching.  amLODIPine (NORVASC) 5 mg tablet Take 1 Tab by mouth two (2) times a day. Take at 9am and 9pm every day.  nitroglycerin (NITROSTAT) 0.4 mg SL tablet TAKE 1 TAB BY SUBLINGUAL ROUTE EVERY 5 MINUTES AS NEEDED.  metoprolol tartrate (LOPRESSOR) 25 mg tablet Take 1 Tab by mouth two (2) times a day. Take 1 tab by mouth at 9am and take 1 tab at 9pm. (Patient taking differently: Take 1 tab by mouth at 9am and take 1 tab at 9pm.)    potassium chloride SR (KLOR-CON 10) 10 mEq tablet TAKE 3 TABLETS BY MOUTH EVERY MORNING AND 2 TABLETS EVERY EVENING    pantoprazole (PROTONIX) 40 mg tablet Take 1 Tab by mouth two (2) times a day. Take 30 minutes prior to breakfast and 30 minutes prior to dinner.  isosorbide mononitrate ER (IMDUR) 30 mg tablet Take 1 Tab by mouth daily.  simethicone (GAS RELIEF) 80 mg chewable tablet Take 80 mg by mouth every six (6) hours as needed for Flatulence.  cholecalciferol, vitamin D3, (VITAMIN D3) 2,000 unit tab Take 1 Tab by mouth daily.  acetaminophen (TYLENOL EXTRA STRENGTH) 500 mg tablet Take 1,000 mg by mouth every six (6) hours as needed for Pain.  Aspirin, Buffered 81 mg tab Take 81 mg by mouth daily.  metoprolol tartrate (LOPRESSOR) 25 mg tablet Take 1 tab at 9am and 1 tab at 9pm     No current facility-administered medications for this visit.        Vitals:    01/11/21 1457 01/11/21 1512 01/11/21 1542   BP: 98/61 94/67 124/74   Pulse: 68     Resp: 16     Temp: 97.5 °F (36.4 °C)     TempSrc: Temporal     SpO2: 95%     Weight: 182 lb (82.6 kg)     Height: 4' 9\" (1.448 m)       Social History     Socioeconomic History    Marital status:      Spouse name: Not on file    Number of children: Not on file    Years of education: Not on file    Highest education level: Not on file   Occupational History    Not on file   Social Needs    Financial resource strain: Not on file    Food insecurity     Worry: Not on file     Inability: Not on file    Transportation needs     Medical: Not on file     Non-medical: Not on file   Tobacco Use    Smoking status: Never Smoker    Smokeless tobacco: Never Used   Substance and Sexual Activity    Alcohol use: No     Alcohol/week: 0.0 standard drinks    Drug use: No    Sexual activity: Not Currently   Lifestyle    Physical activity     Days per week: Not on file     Minutes per session: Not on file    Stress: Not on file   Relationships    Social connections     Talks on phone: Not on file     Gets together: Not on file     Attends Anabaptist service: Not on file     Active member of club or organization: Not on file     Attends meetings of clubs or organizations: Not on file     Relationship status: Not on file    Intimate partner violence     Fear of current or ex partner: Not on file     Emotionally abused: Not on file     Physically abused: Not on file     Forced sexual activity: Not on file   Other Topics Concern    Not on file   Social History Narrative    Not on file       I have reviewed the nurses notes, vitals, problem list, allergy list, medical history, family, social history and medications. Review of Symptoms  11 systems reviewed, negative other than as stated in the HPI      Physical Exam:      General: Well developed, in no acute distress, cooperative and alert  HEENT: No carotid bruits, no JVD, trach is midline. Neck Supple, PERRL, EOM intact. Heart:  Normal S1/S2 negative S3 or S4. Regular, 2/6 systolic murmur, no gallop or rub. Respiratory: Clear bilaterally x 4, no wheezing or rales  Abdomen:   Soft, non-tender, no masses, bowel sounds are active. Extremities: Trivial amount of edema in the right ankle, normal left ankle, normal cap refill, no cyanosis, atraumatic.    Neuro: A&Ox3, speech clear, gait with moderate limp assisted with cane  Skin: Skin color is normal. Non diaphoretic, no visible rash  Vascular: 2+ pulses symmetric in all extremities    Cardiographics    ECG: Right bundle branch block        Cardiology Labs:  Lab Results   Component Value Date/Time    Cholesterol, total 159 07/22/2020 10:20 AM    HDL Cholesterol 56 07/22/2020 10:20 AM    LDL, calculated 84 07/22/2020 10:20 AM    Triglyceride 95 07/22/2020 10:20 AM    CHOL/HDL Ratio 3.1 11/01/2010 05:26 PM       Lab Results   Component Value Date/Time    Sodium 144 07/22/2020 10:20 AM    Potassium 3.9 07/22/2020 10:20 AM    Chloride 102 07/22/2020 10:20 AM    CO2 26 07/22/2020 10:20 AM    Anion gap 8 03/30/2019 10:54 AM    Glucose 88 07/22/2020 10:20 AM    BUN 12 07/22/2020 10:20 AM    Creatinine 1.06 (H) 07/22/2020 10:20 AM    BUN/Creatinine ratio 11 (L) 07/22/2020 10:20 AM    GFR est AA 57 (L) 07/22/2020 10:20 AM    GFR est non-AA 49 (L) 07/22/2020 10:20 AM    Calcium 9.3 07/22/2020 10:20 AM    Bilirubin, total 0.5 07/22/2020 10:20 AM    Alk. phosphatase 75 07/22/2020 10:20 AM    Protein, total 7.7 07/22/2020 10:20 AM    Albumin 4.2 07/22/2020 10:20 AM    Globulin 4.8 (H) 03/30/2019 10:54 AM    A-G Ratio 1.2 07/22/2020 10:20 AM    ALT (SGPT) 18 07/22/2020 10:20 AM           Assessment:     Assessment:     Diagnoses and all orders for this visit:    1. Atherosclerosis of native coronary artery of native heart without angina pectoris  -     ECHO ADULT COMPLETE; Future    2. Essential hypertension  -     AMB POC EKG ROUTINE W/ 12 LEADS, INTER & REP  -     ECHO ADULT COMPLETE; Future    3. Aortic stenosis, mild  -     ECHO ADULT COMPLETE; Future    4. Chronic diastolic heart failure (HCC)  -     ECHO ADULT COMPLETE; Future    5. Bifascicular bundle branch block--RBBB,IRVING  -     ECHO ADULT COMPLETE; Future    6. Stage 3 chronic kidney disease, unspecified whether stage 3a or 3b CKD  -     ECHO ADULT COMPLETE; Future        ICD-10-CM ICD-9-CM    1.  Atherosclerosis of native coronary artery of native heart without angina pectoris  I25.10 414.01 ECHO ADULT COMPLETE   2. Essential hypertension  I10 401.9 AMB POC EKG ROUTINE W/ 12 LEADS, INTER & REP      ECHO ADULT COMPLETE   3. Aortic stenosis, mild  I35.0 424.1 ECHO ADULT COMPLETE   4. Chronic diastolic heart failure (HCC)  I50.32 428.32 ECHO ADULT COMPLETE   5. Bifascicular bundle branch block--RBBB,IRVING  I45.2 426.53 ECHO ADULT COMPLETE   6. Stage 3 chronic kidney disease, unspecified whether stage 3a or 3b CKD  N18.30 585.3 ECHO ADULT COMPLETE     Orders Placed This Encounter    AMB POC EKG ROUTINE W/ 12 LEADS, INTER & REP     Order Specific Question:   Reason for Exam:     Answer:   screen        Plan:     1. Aortic stenosis: 2/6 systolic murmur mild gradient 2019 repeat echocardiogram for 2021.  2.  Tricuspid and mitral regurgitation: On ARB, echo for 2021  3. Atherosclerotic heart disease: Small vessel disease, continue medical treatment (isosorbide mononitrate, beta-blocker, aspirin, statin therapy) asymptomatic. Nuclear stress test negative for ischemia May 2019  4. Hypertension: Blood pressure with manual cuff 124/74 continue current medication  5. Hyperlipidemia: LDL 99, she is on a high intensity statin. Labs per primary care  6. Diastolic heart failure: Clinically euvolemic with current dosing of furosemide 80 in the a.m. 40 mg in the p.m. Creatinine 1.06  Stable from cardiac standpoint reassess aortic stenosis for this year. Follow up 6 months, ECHO just prior to visit.             Amarilis Carl NP      Please note that this dictation was completed with [employer]para, the computer voice recognition software. Quite often unanticipated grammatical, syntax, homophones, and other interpretive errors are inadvertently transcribed by the computer software. Please disregard these errors. Please excuse any errors that have escaped final proofreading. Thank you.

## 2021-01-11 NOTE — PROGRESS NOTES
Identified pt with two pt identifiers(name and ). Reviewed record in preparation for visit and have obtained necessary documentation. Chief Complaint   Patient presents with    Follow-up        Health Maintenance Due   Topic    GLAUCOMA SCREENING Q2Y         Visit Vitals  BP 94/67 (BP 1 Location: Right arm, BP Patient Position: Sitting)   Pulse 68   Temp 97.5 °F (36.4 °C) (Temporal)   Resp 16   Ht 4' 9\" (1.448 m)   Wt 182 lb (82.6 kg)   LMP  (LMP Unknown)   SpO2 95%   BMI 39.38 kg/m²     Pain Scale: 10 - Worst pain ever/10    Coordination of Care Questionnaire:  :   1. Have you been to the ER, urgent care clinic since your last visit? Hospitalized since your last visit? No    2. Have you seen or consulted any other health care providers outside of the [address] [place] [address] since your last visit? Include any pap smears or colon screening.  No

## 2021-01-21 NOTE — PROGRESS NOTES
Chief Complaint   Patient presents with    Transitions Of Care     follow up ER 2/5/2018     1. Have you been to the ER, urgent care clinic since your last visit? Hospitalized since your last visit? YES Patient stated she went to [facility] ER on 2/5/2018. 2. Have you seen or consulted any other health care providers outside of the [address] [place] [address] since your last visit? Include any pap smears or colon screening.  NO 1

## 2021-01-27 DIAGNOSIS — F41.9 ANXIETY: Chronic | ICD-10-CM

## 2021-01-27 RX ORDER — CLONAZEPAM 1 MG/1
TABLET ORAL
Qty: 60 TAB | Refills: 0 | OUTPATIENT
Start: 2021-01-27

## 2021-01-27 RX ORDER — CLONAZEPAM 1 MG/1
TABLET ORAL
Qty: 60 TAB | Refills: 1 | Status: SHIPPED | OUTPATIENT
Start: 2021-01-27 | End: 2021-04-26

## 2021-02-02 DIAGNOSIS — I25.10 ATHEROSCLEROSIS OF NATIVE CORONARY ARTERY OF NATIVE HEART WITHOUT ANGINA PECTORIS: ICD-10-CM

## 2021-02-02 DIAGNOSIS — I10 ESSENTIAL HYPERTENSION: ICD-10-CM

## 2021-02-02 RX ORDER — LOSARTAN POTASSIUM 100 MG/1
TABLET ORAL
Qty: 30 TAB | Refills: 0 | Status: SHIPPED | OUTPATIENT
Start: 2021-02-02 | End: 2021-02-28

## 2021-02-02 RX ORDER — METOPROLOL TARTRATE 25 MG/1
TABLET, FILM COATED ORAL
Qty: 60 TAB | Refills: 11 | Status: SHIPPED | OUTPATIENT
Start: 2021-02-02 | End: 2021-04-02 | Stop reason: SDUPTHER

## 2021-02-02 RX ORDER — ISOSORBIDE MONONITRATE 30 MG/1
TABLET, EXTENDED RELEASE ORAL
Qty: 30 TAB | Refills: 11 | Status: SHIPPED | OUTPATIENT
Start: 2021-02-02 | End: 2022-02-15

## 2021-02-24 NOTE — TELEPHONE ENCOUNTER
Brief Postoperative Note    Patient: Hugo Rinaldi  YOB: 1954  MRN: [id number]    Date of Procedure: 2/24/2021     Pre-Op Diagnosis: SPONDYLOLISTHESIS/STENOSIS/STATUS POST FUSION    Post-Op Diagnosis: Same as preoperative diagnosis. Procedure(s):  REVISION LAMINECTOMY L3-4 WITH REVISION FUSION L3-S1    Surgeon(s):  Rusty Dacosta MD    Surgical Assistant: Physician Assistant: CHERELLE Celaning    Anesthesia: General     Estimated Blood Loss (mL): 352AS    Complications: None    Specimens: * No specimens in log *     Implants:   Implant Name Type Inv. Item Serial No.  Lot No. LRB No. Used Action   IFACTOR   N/A CERAPEDICS BONE REPAIR [id number] N/A 2 Implanted   GRAFT BNE SUB [id number] SPNL DEFORMITY MAGNIFUSE SC - [id number]  GRAFT BNE SUB [id number] SPNL DEFORMITY MAGNIFUSE SC [id number] [employer] SPINALGRAFT TECH_WD N/A N/A 1 Implanted   SCREW SPNL L45MM OD6.5MM [address] SELF DRL NONCANNULATED FULL - SN/A  SCREW SPNL L45MM OD6.5MM RO[address] SELF DRL NONCANNULATED FULL N/A ORTHOFIX SPINAL IMPLANTS_WD N/A N/A 6 Implanted   SCREW SPNL L40MM OD7.5MM RO[address] ST SELF DRL NONCANNULATED FULL - SN/A  SCREW SPNL L40MM OD7.5MM ROSE MARIE ST SELF DRL NONCANNULATED FULL N/A ORTHOFIX SPINAL IMPLANTS_WD N/A N/A 2 Implanted   CROSSLINK SPNL BODY TOP LD NXG - SN/A  CROSSLINK SPNL BODY TOP LD NXG N/A ORTHOFIX SPINAL IMPLANTS_WD N/A N/A 8 Implanted   SET SCR SPNL DIA5.5-6MM STD BTM LEV FIREBIRD NXG - SN/A  SET SCR SPNL DIA5.5-6MM STD BTM LEV FIREBIRD NXG N/A ORTHOFIX SPINAL IMPLANTS_WD N/A N/A 8 Implanted   GRAFT BONE SUB SM [id number] MAGNIFUSE IB - [id number]  GRAFT BONE SUB SM [id number] MAGNIFUSE IB [id number] [employer] SPINALGRAFT TECH_WD N/A N/A 1 Implanted   FRIEDA SPNL L75MM DIA5. 5MM TI THORLUM PRELORDOSED FIREBIRD - SNA  FRIEDA SPNL L75MM DIA5. 5MM TI THORLUM PRELORDOSED FIREBIRD NA ORTHOFIX SPINAL IMPLANTS_WD NA N/A 2 Implanted   CAGE SPNL [id number] 7 12DEG LORD SELF EXP SYS FORZA XP - SNA  CAGE Pt would like a refill     desonide (TRIDESILON) 0.05 % topical lotion     [facility]    Best number to reach her is [phone] SPNL [id number] 7 12DEG LORD SELF EXP SYS FORZA XP NA ORTHOFIX SPINAL IMPLANTS_WD NA N/A 1 Implanted   FRIEDA SPNE CRV TI 5.5X65MM -- CREO - [id number]  FRIEDA SPNL L65MM DIA5. 5MM TI ALLY CRV CREO  [employer] N/A N/A 2 Explanted   6.5 x 45 mm screw, Creo Screw   [employer] N/A N/A 4 Explanted   6.5 x 40 mm screw, Creo Screw   [employer] N/A N/A 2 Explanted   CAP SPNE LCK CREO 5.5MM --  - [id number]  CAP SPNL DIA5. 5MM KARO CREO  [employer] N/A N/A 6 Explanted   Creo Tulip head Other   [employer] N/A N/A 6 Explanted   Creo Cross Connector Connector   [employer] N/A N/A 1 Explanted       Drains:   Hemovac Left;Posterior; Lower Back (Active)   Site Assessment Clean, dry, & intact 02/24/21 1238   Dressing Status Clean, dry, & intact 02/24/21 1238   Drainage Description Serosanguinous 02/24/21 1238   Status Patent; Charged;Draining 02/24/21 1238       Findings: stenosis    Electronically Signed by CHERELLE Berkowitz on 2/24/2021 at 1:09 PM

## 2021-02-28 DIAGNOSIS — I10 ESSENTIAL HYPERTENSION: ICD-10-CM

## 2021-02-28 RX ORDER — LOSARTAN POTASSIUM 100 MG/1
TABLET ORAL
Qty: 30 TAB | Refills: 0 | Status: SHIPPED | OUTPATIENT
Start: 2021-02-28 | End: 2021-03-25

## 2021-03-25 DIAGNOSIS — I10 ESSENTIAL HYPERTENSION: ICD-10-CM

## 2021-03-25 RX ORDER — LOSARTAN POTASSIUM 100 MG/1
TABLET ORAL
Qty: 30 TAB | Refills: 0 | Status: SHIPPED | OUTPATIENT
Start: 2021-03-25 | End: 2021-04-28

## 2021-03-29 RX ORDER — POTASSIUM CHLORIDE 750 MG/1
TABLET, FILM COATED, EXTENDED RELEASE ORAL
Qty: 150 TAB | Refills: 11 | Status: SHIPPED | OUTPATIENT
Start: 2021-03-29 | End: 2021-08-05 | Stop reason: SDUPTHER

## 2021-04-02 ENCOUNTER — OFFICE VISIT (OUTPATIENT)
Dept: FAMILY MEDICINE CLINIC | Age: 86
End: 2021-04-02
Payer: MEDICARE

## 2021-04-02 VITALS
WEIGHT: 182 LBS | DIASTOLIC BLOOD PRESSURE: 80 MMHG | SYSTOLIC BLOOD PRESSURE: 131 MMHG | HEIGHT: 57 IN | RESPIRATION RATE: 18 BRPM | HEART RATE: 65 BPM | TEMPERATURE: 96.8 F | OXYGEN SATURATION: 96 % | BODY MASS INDEX: 39.26 KG/M2

## 2021-04-02 DIAGNOSIS — I50.32 CHRONIC DIASTOLIC HEART FAILURE (HCC): ICD-10-CM

## 2021-04-02 DIAGNOSIS — R10.2 PELVIC PRESSURE IN FEMALE: ICD-10-CM

## 2021-04-02 DIAGNOSIS — E78.2 MIXED HYPERLIPIDEMIA: ICD-10-CM

## 2021-04-02 DIAGNOSIS — F41.9 CHRONIC ANXIETY: ICD-10-CM

## 2021-04-02 DIAGNOSIS — I25.10 ATHEROSCLEROSIS OF NATIVE CORONARY ARTERY OF NATIVE HEART WITHOUT ANGINA PECTORIS: ICD-10-CM

## 2021-04-02 DIAGNOSIS — Z51.81 ENCOUNTER FOR MEDICATION MONITORING: ICD-10-CM

## 2021-04-02 DIAGNOSIS — K21.9 GASTROESOPHAGEAL REFLUX DISEASE WITHOUT ESOPHAGITIS: ICD-10-CM

## 2021-04-02 DIAGNOSIS — I10 ESSENTIAL HYPERTENSION: Primary | ICD-10-CM

## 2021-04-02 DIAGNOSIS — M15.9 PRIMARY OSTEOARTHRITIS INVOLVING MULTIPLE JOINTS: ICD-10-CM

## 2021-04-02 LAB
ALBUMIN SERPL-MCNC: 3.3 G/DL (ref 3.5–5)
ALBUMIN/GLOB SERPL: 0.8 {RATIO} (ref 1.1–2.2)
ALP SERPL-CCNC: 85 U/L (ref 45–117)
ALT SERPL-CCNC: 21 U/L (ref 12–78)
ANION GAP SERPL CALC-SCNC: 5 MMOL/L (ref 5–15)
APPEARANCE UR: CLEAR
AST SERPL-CCNC: 17 U/L (ref 15–37)
BACTERIA URNS QL MICRO: ABNORMAL /HPF
BILIRUB SERPL-MCNC: 0.7 MG/DL (ref 0.2–1)
BILIRUB UR QL: NEGATIVE
BUN SERPL-MCNC: 13 MG/DL (ref 6–20)
BUN/CREAT SERPL: 17 (ref 12–20)
CALCIUM SERPL-MCNC: 9 MG/DL (ref 8.5–10.1)
CHLORIDE SERPL-SCNC: 104 MMOL/L (ref 97–108)
CHOLEST SERPL-MCNC: 177 MG/DL
CO2 SERPL-SCNC: 29 MMOL/L (ref 21–32)
COLOR UR: ABNORMAL
CREAT SERPL-MCNC: 0.77 MG/DL (ref 0.55–1.02)
EPITH CASTS URNS QL MICRO: ABNORMAL /LPF
ERYTHROCYTE [DISTWIDTH] IN BLOOD BY AUTOMATED COUNT: 14.5 % (ref 11.5–14.5)
GLOBULIN SER CALC-MCNC: 4 G/DL (ref 2–4)
GLUCOSE SERPL-MCNC: 87 MG/DL (ref 65–100)
GLUCOSE UR STRIP.AUTO-MCNC: NEGATIVE MG/DL
HCT VFR BLD AUTO: 38.3 % (ref 35–47)
HDLC SERPL-MCNC: 65 MG/DL
HDLC SERPL: 2.7 {RATIO} (ref 0–5)
HGB BLD-MCNC: 12.1 G/DL (ref 11.5–16)
HGB UR QL STRIP: ABNORMAL
HYALINE CASTS URNS QL MICRO: ABNORMAL /LPF (ref 0–5)
KETONES UR QL STRIP.AUTO: NEGATIVE MG/DL
LDLC SERPL CALC-MCNC: 94.4 MG/DL (ref 0–100)
LEUKOCYTE ESTERASE UR QL STRIP.AUTO: ABNORMAL
LIPID PROFILE,FLP: NORMAL
MCH RBC QN AUTO: 28 PG (ref 26–34)
MCHC RBC AUTO-ENTMCNC: 31.6 G/DL (ref 30–36.5)
MCV RBC AUTO: 88.7 FL (ref 80–99)
NITRITE UR QL STRIP.AUTO: NEGATIVE
NRBC # BLD: 0 K/UL (ref 0–0.01)
NRBC BLD-RTO: 0 PER 100 WBC
PH UR STRIP: 7 [PH] (ref 5–8)
PLATELET # BLD AUTO: 155 K/UL (ref 150–400)
PMV BLD AUTO: 11.5 FL (ref 8.9–12.9)
POTASSIUM SERPL-SCNC: 3.8 MMOL/L (ref 3.5–5.1)
PROT SERPL-MCNC: 7.3 G/DL (ref 6.4–8.2)
PROT UR STRIP-MCNC: ABNORMAL MG/DL
RBC # BLD AUTO: 4.32 M/UL (ref 3.8–5.2)
RBC #/AREA URNS HPF: ABNORMAL /HPF (ref 0–5)
SODIUM SERPL-SCNC: 138 MMOL/L (ref 136–145)
SP GR UR REFRACTOMETRY: 1.02 (ref 1–1.03)
TRIGL SERPL-MCNC: 88 MG/DL (ref ?–150)
UROBILINOGEN UR QL STRIP.AUTO: 1 EU/DL (ref 0.2–1)
VLDLC SERPL CALC-MCNC: 17.6 MG/DL
WBC # BLD AUTO: 4.8 K/UL (ref 3.6–11)
WBC URNS QL MICRO: ABNORMAL /HPF (ref 0–4)

## 2021-04-02 PROCEDURE — G8400 PT W/DXA NO RESULTS DOC: HCPCS | Performed by: FAMILY MEDICINE

## 2021-04-02 PROCEDURE — G8536 NO DOC ELDER MAL SCRN: HCPCS | Performed by: FAMILY MEDICINE

## 2021-04-02 PROCEDURE — G8752 SYS BP LESS 140: HCPCS | Performed by: FAMILY MEDICINE

## 2021-04-02 PROCEDURE — G8754 DIAS BP LESS 90: HCPCS | Performed by: FAMILY MEDICINE

## 2021-04-02 PROCEDURE — G0463 HOSPITAL OUTPT CLINIC VISIT: HCPCS | Performed by: FAMILY MEDICINE

## 2021-04-02 PROCEDURE — G8417 CALC BMI ABV UP PARAM F/U: HCPCS | Performed by: FAMILY MEDICINE

## 2021-04-02 PROCEDURE — 1101F PT FALLS ASSESS-DOCD LE1/YR: CPT | Performed by: FAMILY MEDICINE

## 2021-04-02 PROCEDURE — 1090F PRES/ABSN URINE INCON ASSESS: CPT | Performed by: FAMILY MEDICINE

## 2021-04-02 PROCEDURE — G8510 SCR DEP NEG, NO PLAN REQD: HCPCS | Performed by: FAMILY MEDICINE

## 2021-04-02 PROCEDURE — 99214 OFFICE O/P EST MOD 30 MIN: CPT | Performed by: FAMILY MEDICINE

## 2021-04-02 PROCEDURE — G8427 DOCREV CUR MEDS BY ELIG CLIN: HCPCS | Performed by: FAMILY MEDICINE

## 2021-04-02 RX ORDER — DICLOFENAC SODIUM 10 MG/G
GEL TOPICAL 4 TIMES DAILY
Qty: 100 G | Refills: 0 | Status: ON HOLD | OUTPATIENT
Start: 2021-04-02 | End: 2022-05-26

## 2021-04-02 NOTE — PROGRESS NOTES
Chief Complaint   Patient presents with    Hypertension     follow up    Cholesterol Problem     follow up    Knee Pain     patient c/o right knee pain             1. Have you been to the ER, urgent care clinic since your last visit? Hospitalized since your last visit? No    2. Have you seen or consulted any other health care providers outside of the [address] [place] [address] since your last visit? Include any pap smears or colon screening.  No

## 2021-04-02 NOTE — PROGRESS NOTES
HISTORY OF PRESENT ILLNESS  Andrea Thakur is a 82 y.o. female. HPI   Follow up on chronic medical problems. Overall feeling well. Has good days and bad days. Arthritis pain gets her down. Appetite is goods. Sleeping ok on some night. Cardiovascular Review:  The patient has hypertension, hyperlipidemia, chronic diastolic heart failure, and obesity. Hx also of aortic stenosis. Seen by cardiology last November. Diet and Lifestyle: generally follows a low fat low cholesterol diet, generally follows a low sodium diet, sedentary. Pertinent ROS: taking medications as instructed, no medication side effects noted, no TIA's, no chest pain on exertion, mild swelling of ankles, no orthostatic dizziness or lightheadedness, no palpitations,      Home BP Monitoring: is not measured at home. Osteoarthritis:  Patient has osteoarthritis. Overall doing better with back pain. Still having knee pain. Aching more in the right knee and increase pain with walking. Has had 2 injections in the knee which has not helped very much. Was going for her knee surgery in March was was cancelled d/t the pandemic. Pain is 5-6/10. Taking tylenol for the pain which helps some. +Symptoms onset: problem is longstanding. Rheumatological ROS: stable, mild-to-moderate joint symptoms intermittently, reasonably well controlled by PRN meds. Response to treatment plan: stable and intermittent. Anxiety Review:  Patient is seen for anxiety. Ongoing symptoms include: increased anxiety still related to family issues. Patient denies: palpitations, sweating, chest pain, shortness of breath. Reported side effects from the treatment: none.     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    Atypical chest pain R07.89    Essential hypertension I10    Gastroesophageal reflux disease without esophagitis K21.9    Atherosclerosis of native coronary artery without angina pectoris--diffuse small vsl disease via cath cath 2009--RCA PDA/ROSA I25.10    Chronic anxiety F41.9    Encounter for medication monitoring Z51.81    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 M47.815    Class 2 obesity due to excess calories with serious comorbidity and body mass index (BMI) of 38.0 to 38.9 in adult [id number]    Paroxysmal cardiac arrhythmia--PVC's,APC's,NSSVT I49.8    Severe obesity (BMI 35.0-39. 9) with comorbidity ([facility]) E66.01    CKD (chronic kidney disease) stage 3, GFR 30-59 ml/min ([facility]) N18.30    Chest pain with normal angiography--2002;normal NST 2018 R07.9       Current Outpatient Medications   Medication Sig Dispense Refill    potassium chloride SR (KLOR-CON 10) 10 mEq tablet TAKE 3 TABLETS BY MOUTH EVERY MORNING AND 2 TABLETS EVERY EVENING 150 Tab 11    losartan (COZAAR) 100 mg tablet TAKE 1 TABLET BY MOUTH EVERY DAY 30 Tab 0    isosorbide mononitrate ER (IMDUR) 30 mg tablet TAKE 1 TABLET BY MOUTH EVERY DAY 30 Tab 11    metoprolol tartrate (LOPRESSOR) 25 mg tablet TAKE 1 TABLET BY MOUTH TWICE A DAY 1 AT 9AM AND 1 AT 9PM 60 Tab 11    clonazePAM (KlonoPIN) 1 mg tablet TAKE 1/2-1 TABLET BY MOUTH EVERY MORNING AND AS NEEDED FOR ANXIETY AND 1 TABLET AT BEDTIME FOR SLEEP 60 Tab 1    rosuvastatin (CRESTOR) 20 mg tablet TAKE 1 TABLET BY MOUTH EVERY DAY AT NIGHT 30 Tab 11    furosemide (LASIX) 80 mg tablet TAKE 1 TABLET BY MOUTH EVERY MORNING AND TAKE 1/2 TABLET EVERY EVENING 45 Tab 11    metoprolol tartrate (LOPRESSOR) 25 mg tablet Take 1 tab at 9am and 1 tab at 9pm      desonide (TRIDESILON) 0.05 % topical lotion Apply  to affected area two (2) times daily as needed for Skin Irritation or Itching. 59 mL 0    amLODIPine (NORVASC) 5 mg tablet Take 1 Tab by mouth two (2) times a day. Take at 9am and 9pm every day. 180 Tab 3    nitroglycerin (NITROSTAT) 0.4 mg SL tablet TAKE 1 TAB BY SUBLINGUAL ROUTE EVERY 5 MINUTES AS NEEDED. 25 Tab 3    pantoprazole (PROTONIX) 40 mg tablet Take 1 Tab by mouth two (2) times a day. Take 30 minutes prior to breakfast and 30 minutes prior to dinner. 60 Tab 11    simethicone (GAS RELIEF) 80 mg chewable tablet Take 80 mg by mouth every six (6) hours as needed for Flatulence.  cholecalciferol, vitamin D3, (VITAMIN D3) 2,000 unit tab Take 1 Tab by mouth daily.  acetaminophen (TYLENOL EXTRA STRENGTH) 500 mg tablet Take 1,000 mg by mouth every six (6) hours as needed for Pain.  Aspirin, Buffered 81 mg tab Take 81 mg by mouth daily.          Allergies   Allergen Reactions    Bactrim [Sulfamethoxazole-Trimethoprim] Unknown (comments)     Pt does not recall    Darvocet A500 [Propoxyphene N-Acetaminophen] Itching         Past Medical History:   Diagnosis Date    Anxiety     Aortic stenosis 3/3/2010    Aortic stenosis     Arrhythmia     MURMUR    Arthritis     SPINAL STENOSIS    Atherosclerosis of coronary artery     Biliary dyskinesia     CHF (congestive heart failure) ([facility] [address] 3/3/2010    CHF (congestive heart failure) ([facility] [address] 01/2002    Chronic heart failure ([facility] [address] 1/2002; 3/3/2010    chronic diastolic heart failure    Chronic pain     LOWER BACK, RIGHT KNEE    Environmental allergies 3/3/2010    GERD (gastroesophageal reflux disease) 3/3/2010    Hiatal hernia 3/3/2010    High cholesterol 3/3/2010    HTN (hypertension) 3/3/2010    Hypokalemia 3/3/2010    Ischemic heart disease, chronic     Obese     Psychiatric disorder     anxiety    S/P hysterectomy 3/3/2010    Spinal stenosis 3/3/2010         Past Surgical History:   Procedure Laterality Date    CARDIAC CATHETERIZATION  01/2002    normal coronaries    DECOMPRESS DISC RF LUMBAR  07/2007    HX BACK SURGERY  5/1/2014    HX BREAST LUMPECTOMY Left 1989    benign left breast    HX BUNIONECTOMY      HX BUNIONECTOMY      HX HEART CATHETERIZATION  3/3/10    HX HYSTERECTOMY  1978    HX LUMBAR DISKECTOMY      HX ORTHOPAEDIC Bilateral     BUNIONECTOMY    HX ORTHOPAEDIC Right     WRIST FX    HX OTHER SURGICAL  10/12/2015    mole removed from right side of face by Dr. Andre Sow FLX DX W/COLLJ Ortega Interiano PFRMD  [id number]    Dr Gaurang Porras GI ENDOSCOPY,BIOPSY  2/27/2019              Family History   Problem Relation Age of Onset    Hypertension Mother     Heart Disease Father     Stroke Sister     Heart Disease Sister     Heart Disease Sister     Cancer Brother         LUNG    Cancer Brother         PROSTATE    Hypertension Brother     No Known Problems Brother     Lung Disease Brother     Heart Attack Brother     Lung Disease Brother     Stroke Brother     Stroke Daughter 47    No Known Problems Daughter     Anesth Problems Neg Hx        Social History     Tobacco Use    Smoking status: Never Smoker    Smokeless tobacco: Never Used   Substance Use Topics    Alcohol use: No     Alcohol/week: 0.0 standard drinks        Lab Results   Component Value Date/Time    WBC 5.3 03/04/2020 11:26 AM    HGB 14.4 03/04/2020 11:26 AM    HCT 44.3 03/04/2020 11:26 AM    PLATELET 323 91/91/5155 11:26 AM    MCV 86 03/04/2020 11:26 AM     Lab Results   Component Value Date/Time    Cholesterol, total 159 07/22/2020 10:20 AM    HDL Cholesterol 56 07/22/2020 10:20 AM    LDL, calculated 84 07/22/2020 10:20 AM    Triglyceride 95 07/22/2020 10:20 AM    CHOL/HDL Ratio 3.1 11/01/2010 05:26 PM     Lab Results   Component Value Date/Time    TSH 2.470 05/15/2017 03:04 PM      Lab Results Component Value Date/Time    Sodium 144 07/22/2020 10:20 AM    Potassium 3.9 07/22/2020 10:20 AM    Chloride 102 07/22/2020 10:20 AM    CO2 26 07/22/2020 10:20 AM    Anion gap 8 03/30/2019 10:54 AM    Glucose 88 07/22/2020 10:20 AM    BUN 12 07/22/2020 10:20 AM    Creatinine 1.06 (H) 07/22/2020 10:20 AM    BUN/Creatinine ratio 11 (L) 07/22/2020 10:20 AM    GFR est AA 57 (L) 07/22/2020 10:20 AM    GFR est non-AA 49 (L) 07/22/2020 10:20 AM    Calcium 9.3 07/22/2020 10:20 AM    Bilirubin, total 0.5 07/22/2020 10:20 AM    ALT (SGPT) 18 07/22/2020 10:20 AM    Alk. phosphatase 75 07/22/2020 10:20 AM    Protein, total 7.7 07/22/2020 10:20 AM    Albumin 4.2 07/22/2020 10:20 AM    Globulin 4.8 (H) 03/30/2019 10:54 AM    A-G Ratio 1.2 07/22/2020 10:20 AM      Lab Results   Component Value Date/Time    Hemoglobin A1c 5.4 04/16/2014 12:20 PM    Hemoglobin A1c (POC) 5.5 11/26/2014 12:12 PM         Review of Systems   Constitutional: Negative for malaise/fatigue. HENT: Negative for congestion. Eyes: Negative for blurred vision. Respiratory: Negative for cough and shortness of breath. Cardiovascular: Negative for chest pain, palpitations and leg swelling. Gastrointestinal: Negative for abdominal pain, constipation and heartburn. Genitourinary: Negative for dysuria, frequency and urgency. Pressures in the lower abd which comes and goes over the past few weeks. Sometime does increase with passing her urine. Neurological: Negative for dizziness, tingling and headaches. Endo/Heme/Allergies: Negative for environmental allergies. Psychiatric/Behavioral: Negative for depression. The patient does not have insomnia. Physical Exam  Vitals signs and nursing note reviewed. Constitutional:       Appearance: Normal appearance. She is well-developed. She is obese.       Comments: /80 (BP 1 Location: Left arm, BP Patient Position: Sitting)   Pulse 65   Temp 96.8 °F (36 °C) (Temporal)   Resp 18   Ht 4' 9\" (1.448 m)   Wt 182 lb (82.6 kg)   LMP  (LMP Unknown)   SpO2 96%   BMI 39.38 kg/m²          HENT:      Right Ear: Tympanic membrane and ear canal normal.      Left Ear: Tympanic membrane and ear canal normal.      Nose: No mucosal edema. Neck:      Musculoskeletal: Normal range of motion and neck supple. Thyroid: No thyromegaly. Cardiovascular:      Rate and Rhythm: Normal rate and regular rhythm. Heart sounds: Normal heart sounds. Pulmonary:      Effort: Pulmonary effort is normal.      Breath sounds: Normal breath sounds. Abdominal:      General: Bowel sounds are normal.      Palpations: Abdomen is soft. There is no mass. Tenderness: There is no abdominal tenderness. Musculoskeletal: Normal range of motion. Left shoulder: She exhibits tenderness, bony tenderness and pain. She exhibits normal range of motion, no spasm, normal pulse and normal strength. Right knee: She exhibits normal range of motion, no swelling and no effusion. Tenderness found. Medial joint line and lateral joint line tenderness noted. Right lower leg: No edema. Left lower leg: No edema. Lymphadenopathy:      Cervical: No cervical adenopathy. Skin:     General: Skin is warm and dry. Neurological:      General: No focal deficit present. Mental Status: She is alert and oriented to person, place, and time. Psychiatric:         Mood and Affect: Mood normal.       ASSESSMENT and PLAN  Diagnoses and all orders for this visit:    1. Essential hypertension  Stable   Discussed sodium restriction, high k rich diet, maintaining ideal body weight and regular exercise program such as daily walking 30 min perday 4-5 times per week, as physiologic means to achieve blood pressure control. Medication compliance advised. 2. Mixed hyperlipidemia  -     LIPID PANEL; Future    3. Atherosclerosis of native coronary artery of native heart without angina pectoris//  4.  Chronic diastolic heart failure (HCC)  Stable     5. Gastroesophageal reflux disease without esophagitis  Stable on protonix    6. Chronic anxiety  Stable     7. Primary osteoarthritis involving multiple joints  -     diclofenac (VOLTAREN) 1 % gel; Apply  to affected area four (4) times daily. 8. Pelvic pressure in female  -     URINALYSIS W/MICROSCOPIC; Future  -     CULTURE, URINE; Future    9. Encounter for medication monitoring  -     METABOLIC PANEL, COMPREHENSIVE; Future  -     CBC W/O DIFF; Future      Follow-up and Dispositions    · Return in about 4 months (around 8/2/2021). reviewed diet, exercise and weight control  cardiovascular risk and specific lipid/LDL goals reviewed  reviewed medications and side effects in detail    I have discussed diagnosis listed in this note with pt and/or family. I have discussed treatment plans and options and the risk/benefit analysis of those options, including safe use of medications and possible medication side effects. Through the use of shared decision making we have agreed to the above plan. The patient has received an after-visit summary and questions were answered concerning future plans and follow up. Advise pt of any urgent changes then to proceed to the ER.

## 2021-04-03 LAB
BACTERIA SPEC CULT: ABNORMAL
CC UR VC: ABNORMAL
SERVICE CMNT-IMP: ABNORMAL

## 2021-04-06 NOTE — PROGRESS NOTES
Please call pt and let her know that urine specimen had infection. Load amoxicillin 500 mg tid for 5 days.

## 2021-04-07 DIAGNOSIS — N30.00 ACUTE CYSTITIS WITHOUT HEMATURIA: Primary | ICD-10-CM

## 2021-04-07 RX ORDER — AMOXICILLIN 500 MG/1
500 CAPSULE ORAL 3 TIMES DAILY
Qty: 15 CAP | Refills: 0 | Status: SHIPPED | OUTPATIENT
Start: 2021-04-07 | End: 2021-04-12

## 2021-04-07 NOTE — TELEPHONE ENCOUNTER
----- Message from Micky Aragon MD sent at 4/6/2021  6:09 PM EDT -----  Please call pt and let her know that urine specimen had infection. Load amoxicillin 500 mg tid for 5 days.

## 2021-04-07 NOTE — TELEPHONE ENCOUNTER
Called patient LM, per Dr. Regan Fontaine, urine specimen showed infection and will be sending an antibiotic to the pharm. If any questions please call back.

## 2021-04-12 ENCOUNTER — TELEPHONE (OUTPATIENT)
Dept: FAMILY MEDICINE CLINIC | Age: 86
End: 2021-04-12

## 2021-04-12 NOTE — TELEPHONE ENCOUNTER
----- Message from April CYNTHIA Barbosa sent at 4/12/2021  1:13 PM EDT -----  Regarding: Michael Tellez MD/TELEPHONE  General Message/Vendor Calls    Caller's first and last name:      Reason for call: Requested a call back       Callback required yes/no and why: Yes      Best contact number(s): [phone]      Details to clarify the request: Patient stated she's still having bad pain in her left arm. She using the over the counter medication to rub on her arm but she still in pain. She would like for Nurse Pam Gunn to give her a call.      April [address] [facility] [place]

## 2021-04-12 NOTE — TELEPHONE ENCOUNTER
Left message for patient concerning arm pain, advised to call back and also making sure patient picked up antibiotic for infection in urine.

## 2021-04-25 DIAGNOSIS — K21.9 GASTROESOPHAGEAL REFLUX DISEASE: ICD-10-CM

## 2021-04-26 RX ORDER — PANTOPRAZOLE SODIUM 40 MG/1
TABLET, DELAYED RELEASE ORAL
Qty: 180 TAB | Refills: 3 | Status: ON HOLD | OUTPATIENT
Start: 2021-04-26 | End: 2022-05-26

## 2021-04-28 DIAGNOSIS — I10 ESSENTIAL HYPERTENSION: ICD-10-CM

## 2021-04-28 RX ORDER — LOSARTAN POTASSIUM 100 MG/1
TABLET ORAL
Qty: 30 TAB | Refills: 0 | Status: SHIPPED | OUTPATIENT
Start: 2021-04-28 | End: 2021-05-26

## 2021-04-30 ENCOUNTER — TELEPHONE (OUTPATIENT)
Dept: FAMILY MEDICINE CLINIC | Age: 86
End: 2021-04-30

## 2021-04-30 DIAGNOSIS — M19.012 PRIMARY OSTEOARTHRITIS OF LEFT SHOULDER: Primary | ICD-10-CM

## 2021-04-30 NOTE — TELEPHONE ENCOUNTER
Patient wants to be seen next week for pain in her left shoulder.   Please give her a call @ [phone]

## 2021-05-03 ENCOUNTER — TELEPHONE (OUTPATIENT)
Dept: FAMILY MEDICINE CLINIC | Age: 86
End: 2021-05-03

## 2021-05-03 NOTE — TELEPHONE ENCOUNTER
Patient states she switched soaps from [place] to [facility] and then noticed rash around neck. She will go back to [place], advised patient that referral for her shoulder has been placed and to expect a call this week.

## 2021-05-06 DIAGNOSIS — M25.512 LEFT SHOULDER PAIN, UNSPECIFIED CHRONICITY: Primary | ICD-10-CM

## 2021-05-07 ENCOUNTER — OFFICE VISIT (OUTPATIENT)
Dept: ORTHOPEDIC SURGERY | Age: 86
End: 2021-05-07
Payer: MEDICARE

## 2021-05-07 ENCOUNTER — HOSPITAL ENCOUNTER (OUTPATIENT)
Dept: GENERAL RADIOLOGY | Age: 86
Discharge: HOME | End: 2021-05-07
Payer: MEDICARE

## 2021-05-07 VITALS
SYSTOLIC BLOOD PRESSURE: 151 MMHG | BODY MASS INDEX: 39.05 KG/M2 | HEIGHT: 57 IN | TEMPERATURE: 97 F | DIASTOLIC BLOOD PRESSURE: 88 MMHG | OXYGEN SATURATION: 95 % | HEART RATE: 64 BPM | WEIGHT: 181 LBS

## 2021-05-07 DIAGNOSIS — M25.512 LEFT SHOULDER PAIN, UNSPECIFIED CHRONICITY: ICD-10-CM

## 2021-05-07 DIAGNOSIS — M19.012 PRIMARY OSTEOARTHRITIS OF LEFT SHOULDER: Primary | ICD-10-CM

## 2021-05-07 PROCEDURE — 1090F PRES/ABSN URINE INCON ASSESS: CPT | Performed by: ORTHOPAEDIC SURGERY

## 2021-05-07 PROCEDURE — G8536 NO DOC ELDER MAL SCRN: HCPCS | Performed by: ORTHOPAEDIC SURGERY

## 2021-05-07 PROCEDURE — G8510 SCR DEP NEG, NO PLAN REQD: HCPCS | Performed by: ORTHOPAEDIC SURGERY

## 2021-05-07 PROCEDURE — G8417 CALC BMI ABV UP PARAM F/U: HCPCS | Performed by: ORTHOPAEDIC SURGERY

## 2021-05-07 PROCEDURE — 73030 X-RAY EXAM OF SHOULDER: CPT

## 2021-05-07 PROCEDURE — G8400 PT W/DXA NO RESULTS DOC: HCPCS | Performed by: ORTHOPAEDIC SURGERY

## 2021-05-07 PROCEDURE — 1101F PT FALLS ASSESS-DOCD LE1/YR: CPT | Performed by: ORTHOPAEDIC SURGERY

## 2021-05-07 PROCEDURE — G8753 SYS BP > OR = 140: HCPCS | Performed by: ORTHOPAEDIC SURGERY

## 2021-05-07 PROCEDURE — G8427 DOCREV CUR MEDS BY ELIG CLIN: HCPCS | Performed by: ORTHOPAEDIC SURGERY

## 2021-05-07 PROCEDURE — G8754 DIAS BP LESS 90: HCPCS | Performed by: ORTHOPAEDIC SURGERY

## 2021-05-07 PROCEDURE — 99203 OFFICE O/P NEW LOW 30 MIN: CPT | Performed by: ORTHOPAEDIC SURGERY

## 2021-05-07 PROCEDURE — 20610 DRAIN/INJ JOINT/BURSA W/O US: CPT | Performed by: ORTHOPAEDIC SURGERY

## 2021-05-07 RX ORDER — BETAMETHASONE SODIUM PHOSPHATE AND BETAMETHASONE ACETATE 3; 3 MG/ML; MG/ML
6 INJECTION, SUSPENSION INTRA-ARTICULAR; INTRALESIONAL; INTRAMUSCULAR; SOFT TISSUE ONCE
Qty: 1 ML | Refills: 0
Start: 2021-05-07 | End: 2021-05-07

## 2021-05-07 NOTE — LETTER
5/7/2021 Patient: Anita Maldonado YOB: 1935 Date of Visit: 5/7/2021 Jeannette Smith MD 
[address] [place] [address] [address] [employer] Dear Jeannette Smith MD, Thank you for referring Ms. Sumi Pritchard to [facility] for evaluation. My notes for this consultation are attached. If you have questions, please do not hesitate to call me. I look forward to following your patient along with you.  
 
 
Sincerely, 
 
Devi Welch, DO

## 2021-05-07 NOTE — PROGRESS NOTES
Patient5/7/2021      CC: Left shoulder pain    HPI:      This is a 82y.o. year old patient who complains of left shoulder pain. This pain has been going on for years. This is activity dependent pain. The pain is in the shoulder, anteriorly as well as laterally. This pain is worse with activity and better with rest.  The pain is severe in nature. So far, the patient has tried tylenol. The patient is right hand dominant.       PMH:  Past Medical History:   Diagnosis Date    Anxiety     Aortic stenosis 3/3/2010    Aortic stenosis     Arrhythmia     MURMUR    Arthritis     SPINAL STENOSIS    Atherosclerosis of coronary artery     Biliary dyskinesia     CHF (congestive heart failure) ([facility] [address] 3/3/2010    CHF (congestive heart failure) ([facility] [address] 01/2002    Chronic heart failure ([facility] [address] 1/2002; 3/3/2010    chronic diastolic heart failure    Chronic pain     LOWER BACK, RIGHT KNEE    Environmental allergies 3/3/2010    GERD (gastroesophageal reflux disease) 3/3/2010    Hiatal hernia 3/3/2010    High cholesterol 3/3/2010    HTN (hypertension) 3/3/2010    Hypokalemia 3/3/2010    Ischemic heart disease, chronic     Obese     Psychiatric disorder     anxiety    S/P hysterectomy 3/3/2010    Spinal stenosis 3/3/2010       PSxHx:  Past Surgical History:   Procedure Laterality Date    CARDIAC CATHETERIZATION  01/2002    normal coronaries    DECOMPRESS DISC RF LUMBAR  07/2007    HX BACK SURGERY  5/1/2014    HX BREAST LUMPECTOMY Left 1989    benign left breast    HX BUNIONECTOMY      HX BUNIONECTOMY      HX HEART CATHETERIZATION  3/3/10    HX HYSTERECTOMY  1978    HX LUMBAR DISKECTOMY      HX ORTHOPAEDIC Bilateral     BUNIONECTOMY    HX ORTHOPAEDIC Right     WRIST FX    HX OTHER SURGICAL  10/12/2015    mole removed from right side of face by Dr. Tameka Jennings FLX DX W/COLLJ [id number] WHEN PFRMD  [id number]    Dr Carlos Richard UPPER GI ENDOSCOPY,BIOPSY  2/27/2019            Meds:    Current Outpatient Medications:     losartan (COZAAR) 100 mg tablet, TAKE 1 TABLET BY MOUTH EVERY DAY, Disp: 30 Tab, Rfl: 0    clonazePAM (KlonoPIN) 1 mg tablet, TAKE 1/2-1 TABLET BY MOUTH EVERY MORNING AND AS NEEDED FOR ANXIETY AND 1 TABLET AT BEDTIME FOR SLEEP, Disp: 60 Tab, Rfl: 0    pantoprazole (PROTONIX) 40 mg tablet, TAKE 1 TABLET BY MOUTH TWICE A DAY *30 MINUTES PRIOR TO BREAKFAST AND DINNER*, Disp: 180 Tab, Rfl: 3    diclofenac (VOLTAREN) 1 % gel, Apply  to affected area four (4) times daily. , Disp: 100 g, Rfl: 0    potassium chloride SR (KLOR-CON 10) 10 mEq tablet, TAKE 3 TABLETS BY MOUTH EVERY MORNING AND 2 TABLETS EVERY EVENING, Disp: 150 Tab, Rfl: 11    isosorbide mononitrate ER (IMDUR) 30 mg tablet, TAKE 1 TABLET BY MOUTH EVERY DAY, Disp: 30 Tab, Rfl: 11    rosuvastatin (CRESTOR) 20 mg tablet, TAKE 1 TABLET BY MOUTH EVERY DAY AT NIGHT, Disp: 30 Tab, Rfl: 11    furosemide (LASIX) 80 mg tablet, TAKE 1 TABLET BY MOUTH EVERY MORNING AND TAKE 1/2 TABLET EVERY EVENING, Disp: 45 Tab, Rfl: 11    metoprolol tartrate (LOPRESSOR) 25 mg tablet, Take 1 tab at 9am and 1 tab at 9pm, Disp: , Rfl:     desonide (TRIDESILON) 0.05 % topical lotion, Apply  to affected area two (2) times daily as needed for Skin Irritation or Itching., Disp: 59 mL, Rfl: 0    amLODIPine (NORVASC) 5 mg tablet, Take 1 Tab by mouth two (2) times a day. Take at 9am and 9pm every day., Disp: 180 Tab, Rfl: 3    nitroglycerin (NITROSTAT) 0.4 mg SL tablet, TAKE 1 TAB BY SUBLINGUAL ROUTE EVERY 5 MINUTES AS NEEDED., Disp: 25 Tab, Rfl: 3    simethicone (GAS RELIEF) 80 mg chewable tablet, Take 80 mg by mouth every six (6) hours as needed for Flatulence. , Disp: , Rfl:     cholecalciferol, vitamin D3, (VITAMIN D3) 2,000 unit tab, Take 1 Tab by mouth daily. , Disp: , Rfl:     acetaminophen (TYLENOL EXTRA STRENGTH) 500 mg tablet, Take 1,000 mg by mouth every six (6) hours as needed for Pain., Disp: , Rfl:     Aspirin, Buffered 81 mg tab, Take 81 mg by mouth daily., Disp: , Rfl:     All:  Allergies   Allergen Reactions    Bactrim [Sulfamethoxazole-Trimethoprim] Unknown (comments)     Pt does not recall    Darvocet A500 [Propoxyphene N-Acetaminophen] Itching       Social Hx:  Social History     Socioeconomic History    Marital status:      Spouse name: Not on file    Number of children: Not on file    Years of education: Not on file    Highest education level: Not on file   Tobacco Use    Smoking status: Never Smoker    Smokeless tobacco: Never Used   Substance and Sexual Activity    Alcohol use: No     Alcohol/week: 0.0 standard drinks    Drug use: No    Sexual activity: Not Currently       Family Hx:  Family History   Problem Relation Age of Onset    Hypertension Mother     Heart Disease Father     Stroke Sister     Heart Disease Sister     Heart Disease Sister     Cancer Brother         LUNG    Cancer Brother         PROSTATE    Hypertension Brother     No Known Problems Brother     Lung Disease Brother     Heart Attack Brother     Lung Disease Brother     Stroke Brother     Stroke Daughter 47    No Known Problems Daughter     Anesth Problems Neg Hx          Review of Systems:       General: Denies headache, lethargy, fever, weight loss  Ears/Nose/Throat: Denies ear discharge, drainage, nosebleeds, hoarse voice, dental problems  Cardiovascular: Denies chest pain, shortness of breath  Lungs: Denies chest pain, breathing problems, wheezing, pneumonia  Stomach: Denies stomach pain, heartburn, constipation, irritable bowel  Skin: Denies rash, sores, open wounds  Musculoskeletal: Left shoulder pain which is activity dependent, it is anteriorly on the shoulder  Genitourinary: Denies dysuria, hematuria, polyuria  Gastrointestinal: Denies constipation, obstipation, diarrhea  Neurological: Denies changes in sight, smell, hearing, taste, seizures. Denies loss of consciousness.   Psychiatric: Denies depression, sleep pattern changes, anxiety, change in personality  Endocrine: Denies mood swings, heat or cold intolerance  Hematologic/Lymphatic: Denies anemia, purpura, petechia  Allergic/Immunologic: Denies swelling of throat, pain or swelling at lymph nodes      Physical Examination:    Visit Vitals  BP (!) 151/88 (BP 1 Location: Right arm, BP Patient Position: Sitting, BP Cuff Size: Large adult)   Pulse 64   Temp 97 °F (36.1 °C) (Tympanic)   Ht 4' 9\" (1.448 m)   Wt 181 lb (82.1 kg)   SpO2 95%   BMI 39.17 kg/m²        General: AOX3, no apparent distress  Psychiatric: mood and affect appropriate  Lungs: breathing is symmetric and unlabored bilaterally  Heart: regular rate and rhythm  Abdomen: no guarding  Head: normocephalic, atraumatic  Skin: No significant abnormalities, good turgor  Sensation intact to light touch: C5-T1 dermatomes  Muscular exam: 5/5 strength in all major muscle groups unless noted in specialty exam.    Extremities      Right upper extremity: Full active and passive range of motion without pain, deformity, no open wound, strength 5/5 in all major muscle groups. Left upper extremity:  Full active and passive range of motion without pain, deformity, no open wound, strength 5/5 in all major muscle groups. Left lower extremity: Patient complains of mild tenderness at the lateral proximal humerus. Range of motion is limited secondary to pain, open can test is positive, impingement maneuver is positive, Speeds and Yergason's tests are equivocal.  Bearhug test is negative. There is no specific tenderness to palpation along the acromioclavicular joint. There is no gross deformity, no obvious muscular atrophy. Sensation is intact light touch in the C5-T1 dermatomes. Cervical range of motion is full and pain-free and does not reproduce any of the symptoms consistent with cervical pathology.   Strength is 4 out of 5 with abduction of the shoulder, 5 out of 5 with forward flexion, biceps and triceps as well as hand intrinsics are 5 out of 5 strength. Capillary refill is less than 2 seconds distally. Right lower extremity:  Full active and passive range of motion without pain, deformity, no open wound, strength 5/5 in all major muscle groups. Diagnostics:    Pertinent Xrays:  Xrays are available of the shoulder. These indicate severe and progressive glenohumeral osteoarthrtitis, no fractures, dislocations, or osseous abnormalities. Soft tissue is within expected limits. Assessment: Glenohumeral arthrosis, left shoulder    Plan: This patient and I did discuss options for this particular pathology. As there is no indication of overt tearing of the rotator cuff, and that in the setting conservative treatment is the standard of care, I have recommended that some combination of oral analgesics, ideally anti-inflammatories, physical therapy exercises, intra-articular injections perhaps other topical forms of treatment would be the first-line of treatment for this particular problem. Patient stated their understanding the pathology as well as the anatomy and treatment options. We have agreed to injection, increasing tylenol usage for now. The patient will follow-up approximately 2 weeks. No x-rays are necessary on follow-up. Procedures performed: PROCEDURE NOTE :    Consent was obtained from the patient. The correct site was identified after confirmation with the patient. Following identification and confirmation of the correct site with the patient, the area was prepped in the normal sterile fashion. An injection of a mixture of 6 mg of betamethasone and 1% lidocaine without epinephrine was administered to the left shoulder glenohumeral space. The needle was then withdrawn and the injection site dressed with a sterile bandage at the conclusion. The procedure was well tolerated by the patient. No immediate adverse reactions were noted. Post injection instructions were given.       Ms. Edmond Allison has a reminder for a \"due or due soon\" health maintenance. I have asked that she contact her primary care provider for follow-up on this health maintenance.

## 2021-05-07 NOTE — PROGRESS NOTES
Identified pt with two pt identifiers (name and ). Reviewed chart in preparation for visit and have obtained necessary documentation. Alena Garay is a 82 y.o. female  Chief Complaint   Patient presents with    Shoulder Pain     LT shoulder     Visit Vitals  BP (!) 151/88 (BP 1 Location: Right arm, BP Patient Position: Sitting, BP Cuff Size: Large adult)   Pulse 64   Temp 97 °F (36.1 °C) (Tympanic)   Ht 4' 9\" (1.448 m)   Wt 181 lb (82.1 kg)   LMP  (LMP Unknown)   SpO2 95%   BMI 39.17 kg/m²     1. Have you been to the ER, urgent care clinic since your last visit? Hospitalized since your last visit? No    2. Have you seen or consulted any other health care providers outside of the [address] [place] [address] since your last visit? Include any pap smears or colon screening.  No

## 2021-05-17 ENCOUNTER — TELEPHONE (OUTPATIENT)
Dept: FAMILY MEDICINE CLINIC | Age: 86
End: 2021-05-17

## 2021-05-17 NOTE — TELEPHONE ENCOUNTER
Left message informing patient and for patient to call with any questions.    Patient called to advise office needs patient to come bay and sign substance agreement, she stated she will.

## 2021-05-17 NOTE — TELEPHONE ENCOUNTER
----- Message from Margareth Lassiter MD sent at 4/26/2021  6:37 PM EDT -----  Please call pt and arrange a time for her to come sign her controlled substance contract. Was not done when she was here earlier this month.

## 2021-05-19 NOTE — ED NOTES
Discharge instructions were given to the patient by Governor Teodoro RN. Patient was given 1 prescriptions and was encouraged to call or return to the ED for worsening issues or problems and was encouraged to schedule a follow up appointment for continuing care. Patient given a current medication reconciliation form and verbalized understanding of their medications and importance of discussing medications at follow-up. The patient verbalized understanding of discharge instructions and prescriptions, all questions were answered. The patient has no further concerns at this time. Patient stable at time of discharge. The patient left the Emergency Department ambulatory, with family, and in no acute distress. Patient declined wheelchair transport out of Emergency Department. denies pain/discomfort

## 2021-05-26 DIAGNOSIS — I10 ESSENTIAL HYPERTENSION: ICD-10-CM

## 2021-05-26 RX ORDER — LOSARTAN POTASSIUM 100 MG/1
TABLET ORAL
Qty: 30 TABLET | Refills: 0 | Status: SHIPPED | OUTPATIENT
Start: 2021-05-26 | End: 2021-06-25

## 2021-06-25 DIAGNOSIS — I10 ESSENTIAL HYPERTENSION: ICD-10-CM

## 2021-06-25 RX ORDER — LOSARTAN POTASSIUM 100 MG/1
TABLET ORAL
Qty: 30 TABLET | Refills: 0 | Status: SHIPPED | OUTPATIENT
Start: 2021-06-25 | End: 2021-08-04 | Stop reason: SDUPTHER

## 2021-07-11 PROBLEM — N18.30 CKD (CHRONIC KIDNEY DISEASE) STAGE 3, GFR 30-59 ML/MIN (HCC): Status: RESOLVED | Noted: 2018-10-30 | Resolved: 2021-07-11

## 2021-07-20 DIAGNOSIS — F41.9 ANXIETY: Chronic | ICD-10-CM

## 2021-07-21 RX ORDER — CLONAZEPAM 1 MG/1
TABLET ORAL
Qty: 60 TABLET | Refills: 0 | Status: SHIPPED | OUTPATIENT
Start: 2021-07-21 | End: 2021-08-30

## 2021-08-02 ENCOUNTER — OFFICE VISIT (OUTPATIENT)
Dept: FAMILY MEDICINE CLINIC | Age: 86
End: 2021-08-02
Payer: MEDICARE

## 2021-08-02 VITALS
TEMPERATURE: 97.3 F | BODY MASS INDEX: 39.74 KG/M2 | SYSTOLIC BLOOD PRESSURE: 138 MMHG | RESPIRATION RATE: 18 BRPM | HEART RATE: 61 BPM | HEIGHT: 57 IN | DIASTOLIC BLOOD PRESSURE: 73 MMHG | WEIGHT: 184.2 LBS | OXYGEN SATURATION: 95 %

## 2021-08-02 DIAGNOSIS — I50.32 CHRONIC DIASTOLIC HEART FAILURE (HCC): ICD-10-CM

## 2021-08-02 DIAGNOSIS — F41.9 CHRONIC ANXIETY: ICD-10-CM

## 2021-08-02 DIAGNOSIS — Z51.81 ENCOUNTER FOR MEDICATION MONITORING: ICD-10-CM

## 2021-08-02 DIAGNOSIS — G89.29 ENCOUNTER FOR CHRONIC PAIN MANAGEMENT: ICD-10-CM

## 2021-08-02 DIAGNOSIS — I25.10 ATHEROSCLEROSIS OF NATIVE CORONARY ARTERY OF NATIVE HEART WITHOUT ANGINA PECTORIS: ICD-10-CM

## 2021-08-02 DIAGNOSIS — I10 ESSENTIAL HYPERTENSION: Primary | ICD-10-CM

## 2021-08-02 DIAGNOSIS — E78.2 MIXED HYPERLIPIDEMIA: ICD-10-CM

## 2021-08-02 DIAGNOSIS — K21.9 GASTROESOPHAGEAL REFLUX DISEASE WITHOUT ESOPHAGITIS: ICD-10-CM

## 2021-08-02 DIAGNOSIS — M15.9 PRIMARY OSTEOARTHRITIS INVOLVING MULTIPLE JOINTS: ICD-10-CM

## 2021-08-02 DIAGNOSIS — M19.012 ARTHROPATHY OF LEFT SHOULDER: ICD-10-CM

## 2021-08-02 DIAGNOSIS — M17.11 PRIMARY OSTEOARTHRITIS OF RIGHT KNEE: ICD-10-CM

## 2021-08-02 LAB
ANION GAP SERPL CALC-SCNC: 4 MMOL/L (ref 5–15)
APPEARANCE UR: CLEAR
BACTERIA URNS QL MICRO: NEGATIVE /HPF
BILIRUB UR QL: NEGATIVE
BUN SERPL-MCNC: 18 MG/DL (ref 6–20)
BUN/CREAT SERPL: 19 (ref 12–20)
CALCIUM SERPL-MCNC: 9.2 MG/DL (ref 8.5–10.1)
CHLORIDE SERPL-SCNC: 103 MMOL/L (ref 97–108)
CO2 SERPL-SCNC: 34 MMOL/L (ref 21–32)
COLOR UR: ABNORMAL
CREAT SERPL-MCNC: 0.95 MG/DL (ref 0.55–1.02)
EPITH CASTS URNS QL MICRO: ABNORMAL /LPF
ERYTHROCYTE [DISTWIDTH] IN BLOOD BY AUTOMATED COUNT: 14.3 % (ref 11.5–14.5)
GLUCOSE SERPL-MCNC: 78 MG/DL (ref 65–100)
GLUCOSE UR STRIP.AUTO-MCNC: NEGATIVE MG/DL
HCT VFR BLD AUTO: 42.3 % (ref 35–47)
HGB BLD-MCNC: 13.1 G/DL (ref 11.5–16)
HGB UR QL STRIP: ABNORMAL
HYALINE CASTS URNS QL MICRO: ABNORMAL /LPF (ref 0–5)
KETONES UR QL STRIP.AUTO: NEGATIVE MG/DL
LEUKOCYTE ESTERASE UR QL STRIP.AUTO: ABNORMAL
MCH RBC QN AUTO: 28.4 PG (ref 26–34)
MCHC RBC AUTO-ENTMCNC: 31 G/DL (ref 30–36.5)
MCV RBC AUTO: 91.8 FL (ref 80–99)
NITRITE UR QL STRIP.AUTO: NEGATIVE
NRBC # BLD: 0 K/UL (ref 0–0.01)
NRBC BLD-RTO: 0 PER 100 WBC
PH UR STRIP: 6 [PH] (ref 5–8)
PLATELET # BLD AUTO: 144 K/UL (ref 150–400)
PMV BLD AUTO: 12.2 FL (ref 8.9–12.9)
POTASSIUM SERPL-SCNC: 3.4 MMOL/L (ref 3.5–5.1)
PROT UR STRIP-MCNC: 30 MG/DL
RBC # BLD AUTO: 4.61 M/UL (ref 3.8–5.2)
RBC #/AREA URNS HPF: ABNORMAL /HPF (ref 0–5)
SODIUM SERPL-SCNC: 141 MMOL/L (ref 136–145)
SP GR UR REFRACTOMETRY: 1.02 (ref 1–1.03)
UROBILINOGEN UR QL STRIP.AUTO: 1 EU/DL (ref 0.2–1)
WBC # BLD AUTO: 4.5 K/UL (ref 3.6–11)
WBC URNS QL MICRO: ABNORMAL /HPF (ref 0–4)

## 2021-08-02 PROCEDURE — G8752 SYS BP LESS 140: HCPCS | Performed by: FAMILY MEDICINE

## 2021-08-02 PROCEDURE — G8536 NO DOC ELDER MAL SCRN: HCPCS | Performed by: FAMILY MEDICINE

## 2021-08-02 PROCEDURE — G8417 CALC BMI ABV UP PARAM F/U: HCPCS | Performed by: FAMILY MEDICINE

## 2021-08-02 PROCEDURE — 99214 OFFICE O/P EST MOD 30 MIN: CPT | Performed by: FAMILY MEDICINE

## 2021-08-02 PROCEDURE — 1090F PRES/ABSN URINE INCON ASSESS: CPT | Performed by: FAMILY MEDICINE

## 2021-08-02 PROCEDURE — G8754 DIAS BP LESS 90: HCPCS | Performed by: FAMILY MEDICINE

## 2021-08-02 PROCEDURE — G8427 DOCREV CUR MEDS BY ELIG CLIN: HCPCS | Performed by: FAMILY MEDICINE

## 2021-08-02 PROCEDURE — G8510 SCR DEP NEG, NO PLAN REQD: HCPCS | Performed by: FAMILY MEDICINE

## 2021-08-02 PROCEDURE — 1101F PT FALLS ASSESS-DOCD LE1/YR: CPT | Performed by: FAMILY MEDICINE

## 2021-08-02 PROCEDURE — G0463 HOSPITAL OUTPT CLINIC VISIT: HCPCS | Performed by: FAMILY MEDICINE

## 2021-08-02 PROCEDURE — G8400 PT W/DXA NO RESULTS DOC: HCPCS | Performed by: FAMILY MEDICINE

## 2021-08-02 RX ORDER — ACETAMINOPHEN AND CODEINE PHOSPHATE 300; 30 MG/1; MG/1
1 TABLET ORAL
Qty: 30 TABLET | Refills: 0 | Status: SHIPPED | OUTPATIENT
Start: 2021-08-02 | End: 2021-08-13 | Stop reason: ALTCHOICE

## 2021-08-02 RX ORDER — ACETAMINOPHEN AND CODEINE PHOSPHATE 300; 30 MG/1; MG/1
1 TABLET ORAL
Qty: 18 TABLET | Refills: 0 | Status: SHIPPED | OUTPATIENT
Start: 2021-08-02 | End: 2021-08-02 | Stop reason: SDUPTHER

## 2021-08-02 NOTE — LETTER
CONTROLLED SUBSTANCE MEDICATION AGREEMENT  Patient Name: Ness Carpenter  Patient YOB: 1935     I understand, that controlled substance medications may be used to help better manage my symptoms and to improve my ability to function at home, work and in social settings. However, I also understand that these medications do have risks, which have been discussed with me, including possible development of physical or psychological dependence. I understand that successful treatment requires mutual trust and honesty between me and my provider. I understand and agree that following this Medication Agreement is necessary in continuing my provider-patient relationship and the success of my treatment plan. Explanation from my Provider: Benefits and Goals of Controlled Substance Medications: There are two potential goals for your treatment: (1) decreased pain and suffering (2) improved daily life functions. There are many possible treatments for your chronic condition(s). Alternatives such as physical therapy, yoga, massage, home daily exercise, meditation, relaxation techniques, injections, chiropractic manipulations, surgery, cognitive therapy, hypnosis and many medications that are not habit-forming may be used. Use of controlled substance medications may be helpful, but they are unlikely to resolve all symptoms or restore all function. Explanation from my Provider: Risks of Controlled Substance Medications:   Opioid pain medications: These medications can lead to problems such as addiction/dependence, sedation, lightheadedness/dizziness, memory issues, falls, constipation, nausea, or vomiting. They may also impair the ability to drive or operate machinery. Additionally, these medications may lower testosterone levels, leading to loss of bone strength, stamina and sex drive.   They may cause problems with breathing, sleep apnea and reduced coughing, which is especially dangerous for patients with lung disease. Overdose or dangerous interactions with alcohol and other medications may occur, leading to death. Hyperalgesia may develop, which means patients receiving opioids for the treatment of pain may become more sensitive to certain painful stimuli, and in some cases, experience pain from ordinarily non-painful stimuli. Women between the ages of 14-53 who could become pregnant should carefully weigh the risks and benefits of opioids with their physicians, as these medications increase the risk of pregnancy complications, including miscarriage,  delivery and stillbirth. It is also possible for babies to be born addicted to opioids. Opioid dependence withdrawal symptoms may include; feelings of uneasiness, increased pain, irritability, belly pain, diarrhea, sweats and goose-flesh. Testosterone replacement therapy:  Potential side effects include increased risk of stroke and heart attack, blood clots, increased blood pressure, increased cholesterol, enlarged prostate, sleep apnea, irritability/aggression and other mood disorders, and decreased fertility. [facility] (1935)             Page 1 of 4    Initials:_______    Benzodiazepines and non-benzodiazepine sleep medications: These medications can lead to problems such as addiction/dependence, sedation, fatigue, lightheadedness, dizziness, incoordination, falls, depression, hallucinations, and impaired judgment, memory and concentration. The ability to drive and operate machinery may also be affected. Abnormal sleep-related behaviors have been reported, including sleepwalking, driving, making telephone calls, eating, or having sex while not fully awake. These medications can suppress breathing and worsen sleep apnea, particularly when combined with alcohol or other sedating medications, potentially leading to death. Dependence withdrawal symptoms may include tremors, anxiety, hallucinations and seizures.    Stimulants:  Common adverse effects include addiction/dependence, increased blood pressure and heart rate, decreased appetite, nausea, involuntary weight loss, insomnia,  irritability, and headaches. These risks may increase when these medications are combined with other stimulants, such as caffeine pills or energy drinks, certain weight loss supplements and oral decongestants. Dependence withdrawal symptoms may include depressed mood, loss of interest, suicidal thoughts, anxiety, fatigue, appetite changes and agitation. I agree and understand that I and my prescriber have the following rights and responsibilities regarding my treatment plan:   1. MY RIGHTS:  To be informed of my treatment and medication plan. To be an active participant in my health and wellbeing. 2. MY RESPONSIBILITY AND UNDERSTANDING FOR USE OF MEDICATIONS   I will take medications at the dose and frequency as directed. For my safety, I will not increase or change how I take my medications without the recommendation of my healthcare provider.  I will actively participate in any program recommended by my provider which may improve function, including social, physical, psychological programs.  I will not take my medications with alcohol or other drugs not prescribed to me. I understand that drinking alcohol with my medications increases the chances of side effects, including reduced breathing rate and could lead to personal injury when operating machinery.  I understand that if I have a history of substance use disorders, including alcohol or other illicit drugs, that I may be at increased risk of addiction to my medications.  I agree to notify my provider immediately if I should become pregnant so that my treatment plan can be adjusted.    I agree and understand that I shall only receive controlled substance medications from the prescriber that signed this agreement unless there is written agreement among other prescribers of controlled substances outlining the responsibility of the medications being prescribed.  I understand that the if the controlled medication is not helping to achieve goals, the dosage may be tapered and no longer prescribed. 3. MY RESPONSIBILITY FOR COMMUNICATION / PRESCRIPTION RENEWALS   I agree that all controlled substance medications that I take will be prescribed only by my provider. If another healthcare provider prescribes me medication in an emergency, I will notify my provider within seventy-two (72) hours. Aida August (1935)             Page 2 of 4    Initials:_______   I will arrange for refills at the prescribed interval ONLY during regular office hours. I will not ask for refills earlier than agreed, after-hours, on holidays or weekends. Refills may take up to 72 hours for processing and prescriptions to reach the pharmacy.  I will inform my other health care providers that I am taking these medications and of the existence of this [id number]. In the event of an emergency, I will provide the same information to the emergency department prescribers.  I will keep my provider updated on the pharmacy I am using for controlled medication prescription filling. 4. MY RESPONSIBILITY FOR PROTECTING MEDICATIONS   I will protect my prescriptions and medications. I understand that lost or misplaced prescriptions will not be replaced.  I will keep medications only for my own use and will not share them with others. I will keep all medications away from children.  I agree that if my medications are adjusted or discontinued, I will properly dispose of any remaining medications. I understand that I will be required to dispose of any remaining controlled medications as, directed by my prescriber, prior to being provided with any prescriptions for other controlled medications.   Medication drop box locations can be found at: [email or web address]  5. MY RESPONSIBILITY WITH ILLEGAL DRUGS    I will not use illegal or street drugs or another person's prescription medications not prescribed to me.  If there are identified addiction type symptoms, then referral to a program may be provided by my provider and I agree to follow through with this recommendation. 6. MY RESPONSIBILITY FOR COOPERATION WITH INVESTIGATIONS   I understand that my provider will comply with any applicable law and may discuss my use and/or possible misuse/abuse of controlled substances and alcohol, as appropriate, with any health care provider involved in my care, pharmacist, or legal authority.  I authorize my provider and pharmacy to cooperate fully with law enforcement agencies (as permitted by law) in the investigation of any possible misuse, sale, or other diversion of my controlled substances.  I agree to waive any applicable privilege or right of privacy or confidentiality with respect to these authorizations. 7. PROVIDERS RIGHT TO MONITOR FOR SAFETY: PRESCRIPTION MONITORING / DRUG TESTING   I consent to drug/toxicology screening and will submit to a drug screen upon my providers request to assure I am only taking the prescribed drugs for my safety monitoring. I understand that a drug screen is a laboratory test in which a sample of my urine, blood or saliva is checked to see what drugs I have been taking. This may entail an observed urine specimen, which means that a nurse or other health care provider may watch me provide urine, and I will cooperate if I am asked to provide an observed specimen. Yaneth Vieira (1935)             Page 3 of 4    Initials:_______  [facility] I understand that my provider will check a copy of my State Prescription Monitoring Program () Report in order to safely prescribe medications.      Pill Counts: I consent to pill counts when requested. I may be asked to bring all my prescribed controlled substance medications, in their original bottles, to all of my scheduled appointments. In addition, my provider may ask me to come to the practice at any time for a random pill count. 8. TERMINATION OF THIS AGREEMENT   For my safety, my prescriber has the right to stop prescribing controlled substance medications and may end this agreement.  Conditions that may result in termination of this agreement:  a. I do not show any improvement in pain, or my activity has not improved. b. I develop rapid tolerance or loss of improvement, as described in my treatment plan.  c. I develop significant side effects from the medication. d. My behavior is not consistent with the responsibilities outlined above, thereby causing safety concerns to continue prescribing controlled substance medications. e. I fail to follow the terms of this agreement. f. Other:____________________________     UNDERSTANDING THIS MEDICATION AGREEMENT:    I have read the above and have had all my questions answered. For chronic disease management, I know that my symptoms can be managed with many types of treatments. A chronic medication trial may be part of my treatment, but I must be an active participant in my care. Medication therapy is only one part of my symptom management plan. In some cases, there may be limited scientific evidence to support the chronic use of certain medications to improve symptoms and daily function. Furthermore, in certain circumstances, there may be scientific information that suggests that the use of chronic controlled substances may worsen my symptoms and increase my risk of unintentional death directly related to this medication therapy. I know that if my provider feels my risk from controlled medications is greater than my benefit, I will have my controlled substance medication(s) compassionately lowered or removed altogether. I further agree to allow this office to contact my HIPAA contact if there are concerns about my safety and use of the controlled medications. I have agreed to use the prescribed controlled substance medications to me as instructed by my provider and as stated in this Medication Agreement. My initial on each page and my signature below shows that I have read each page and I have had the opportunity to ask questions with answers provided by my provider.       Patient Name (Printed): _____________________________________    Patient Signature:  ______________________   Date: _____________      Prescriber Name (Printed): ___________________________________    Prescriber Signature: _____________________  Date: _____________     [facility] (1935)             Page 4 of 4

## 2021-08-02 NOTE — PROGRESS NOTES
Chief Complaint   Patient presents with    Hypertension     follow up    Cholesterol Problem     follow up             1. Have you been to the ER, urgent care clinic since your last visit? Hospitalized since your last visit? No    2. Have you seen or consulted any other health care providers outside of the [address] [place] [address] since your last visit? Include any pap smears or colon screening.  No

## 2021-08-02 NOTE — PROGRESS NOTES
HISTORY OF PRESENT ILLNESS  Tamara Hayes is a 82 y.o. female. HPI   Follow up on chronic medical problems. Overall feeling well. Has good days and bad days. Arthritis pain gets her down. Appetite is good. Sleeping ok on some night. Cardiovascular Review:  The patient has hypertension, hyperlipidemia, chronic diastolic heart failure, and obesity. Hx also of aortic stenosis. Seen by cardiology last November. Diet and Lifestyle: generally follows a low fat low cholesterol diet, generally follows a low sodium diet, sedentary. Pertinent ROS: taking medications as instructed, no medication side effects noted, no TIA's, no chest pain on exertion, mild swelling of ankles, no orthostatic dizziness or lightheadedness, no palpitations,      Home BP Monitoring: is not measured at home. Encounter for pain management. 1./2. Medical history/Past medical History  Chronic Pain//Osteoarthritis:  Patient has osteoarthritis. Overall doing better with back pain. Still having knee pain and increased left shoulder pain. Aching more in the right knee and increase pain with walking. Has had 2 injections in the knee which has not helped very much. Increased pain in the shoulder with ROM. Was going for her knee surgery in March was was cancelled d/t the pandemic. Pain is 5-6/10. Taking tylenol for the pain which helps some. Symptoms onset: problem is longstanding. Rheumatological ROS: stable, mild-to-moderate joint symptoms intermittently, reasonably well controlled by PRN meds. Response to treatment plan: stable and intermittent. 3. Applicable records from prior treatment providers are apart of [facility] under the media tab. 4. Diagnostic, therapeutic and laboratory results are available in the [address] [place] [address] chart. 5. Consultation notes are available for review in the media tab of the [address] [place] [address] chart.   6. Treatment goals include pain control so that the pt may be as active and function with her daily activities and improved comfort level. Previously pt has been limited by pain. 7. The risks and benefits of treatment has been discussed at this office visit with the pt. She understands that the medication has addicting potential.  Additionally the pt has been advised that narcotic pain medication may impair mental and/or physical ability required for performance of tasks such as driving or operating any other machinery. 8. Pt has an updated signed pain contract on file and can be found under the FYI section of the [facility] chart. 9. The pain contract is reviewed. Pain medication will be continued at the previous dosage. Urine drug screening will be done today. Diagnostic studies are not indicated at this time. Interventional procedure are not indicated at this time. 10. Medication prescibed is .  has been reviewed at this OV by me. 11. Patient instructions have been reviewed in detail as outlined above and in the pain contract. 12. Re-eval is planned for 3 months. He reports the following adverse side effects: none. Aberrant behaviors: None. Concomitant use of a benzodiazepine: no  Will continue on current dose of medications as coarse has been stable and there are no signs of overuse, misuese, diversion, or concerning side effects  Naloxone prescription is warranted. It has been provided or is already on file. Anxiety Review:  Patient is seen for anxiety. Ongoing symptoms include: increased anxiety still related to family issues. Patient denies: palpitations, sweating, chest pain, shortness of breath. Reported side effects from the treatment: none.     Patient Active Problem List   Diagnosis Code    Hypokalemia E87.6    Hiatal hernia K44.9    Anxiety F41.9    S/P hysterectomy Z90.710    Aortic stenosis, mild I35.0    Spinal stenosis M48.00    S/P foot surgery Z98.890    Environmental allergies Z91.09    S/P cardiac catheterization Z98.890  Hypovitaminosis D E55.9    Chronic ischemic heart disease I25.9    Mixed hyperlipidemia E78.2    Chronic diastolic heart failure (HCC) I50.32    Chest pain, unspecified R07.9    Chest pain at rest R07.9    Bifascicular bundle branch block--RBBB,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    Atypical chest pain R07.89    Essential hypertension I10    Gastroesophageal reflux disease without esophagitis K21.9    Atherosclerosis of native coronary artery without angina pectoris--diffuse small vsl disease via cath cath 2009--RCA PDA/ROSA I25.10    Chronic anxiety F41.9    Encounter for medication monitoring Z51.81    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 9) with comorbidity ([address] E66.01    Chest pain with normal angiography--2002;normal NST 2018 R07.9    Primary osteoarthritis of left shoulder M19.012       Current Outpatient Medications   Medication Sig Dispense Refill    clonazePAM (KlonoPIN) 1 mg tablet TAKE 1/2-1 TABLET BY MOUTH EVERY MORNING AND AS NEEDED FOR ANXIETY AND 1 TABLET AT BEDTIME FOR SLEEP 60 Tablet 0    losartan (COZAAR) 100 mg tablet TAKE 1 TABLET BY MOUTH EVERY DAY 30 Tablet 0    pantoprazole (PROTONIX) 40 mg tablet TAKE 1 TABLET BY MOUTH TWICE A DAY *30 MINUTES PRIOR TO BREAKFAST AND DINNER* 180 Tab 3    diclofenac (VOLTAREN) 1 % gel Apply  to affected area four (4) times daily.  100 g 0    potassium chloride SR (KLOR-CON 10) 10 mEq tablet TAKE 3 TABLETS BY MOUTH EVERY MORNING AND 2 TABLETS EVERY EVENING 150 Tab 11    isosorbide mononitrate ER (IMDUR) 30 mg tablet TAKE 1 TABLET BY MOUTH EVERY DAY 30 Tab 11    rosuvastatin (CRESTOR) 20 mg tablet TAKE 1 TABLET BY MOUTH EVERY DAY AT NIGHT 30 Tab 11    furosemide (LASIX) 80 mg tablet TAKE 1 TABLET BY MOUTH EVERY MORNING AND TAKE 1/2 TABLET EVERY EVENING 45 Tab 11    metoprolol tartrate (LOPRESSOR) 25 mg tablet Take 1 tab at 9am and 1 tab at 9pm      desonide (TRIDESILON) 0.05 % topical lotion Apply  to affected area two (2) times daily as needed for Skin Irritation or Itching. 59 mL 0    amLODIPine (NORVASC) 5 mg tablet Take 1 Tab by mouth two (2) times a day. Take at 9am and 9pm every day. 180 Tab 3    nitroglycerin (NITROSTAT) 0.4 mg SL tablet TAKE 1 TAB BY SUBLINGUAL ROUTE EVERY 5 MINUTES AS NEEDED. 25 Tab 3    simethicone (GAS RELIEF) 80 mg chewable tablet Take 80 mg by mouth every six (6) hours as needed for Flatulence.  cholecalciferol, vitamin D3, (VITAMIN D3) 2,000 unit tab Take 1 Tab by mouth daily.  acetaminophen (TYLENOL EXTRA STRENGTH) 500 mg tablet Take 1,000 mg by mouth every six (6) hours as needed for Pain.  Aspirin, Buffered 81 mg tab Take 81 mg by mouth daily.          Allergies   Allergen Reactions    Bactrim [Sulfamethoxazole-Trimethoprim] Unknown (comments)     Pt does not recall    Darvocet A500 [Propoxyphene N-Acetaminophen] Itching       Past Medical History:   Diagnosis Date    Anxiety     Aortic stenosis 3/3/2010    Aortic stenosis     Arrhythmia     MURMUR    Arthritis     SPINAL STENOSIS    Atherosclerosis of coronary artery     Biliary dyskinesia     CHF (congestive heart failure) ([facility] [address] 3/3/2010    CHF (congestive heart failure) ([address] 01/2002    Chronic heart failure ([facility] [address] 1/2002; 3/3/2010    chronic diastolic heart failure    Chronic pain     LOWER BACK, RIGHT KNEE    Environmental allergies 3/3/2010    GERD (gastroesophageal reflux disease) 3/3/2010    Hiatal hernia 3/3/2010    High cholesterol 3/3/2010    HTN (hypertension) 3/3/2010    Hypokalemia 3/3/2010    Ischemic heart disease, chronic     Obese     Psychiatric disorder     anxiety    S/P hysterectomy 3/3/2010    Spinal stenosis 3/3/2010       Past Surgical History:   Procedure Laterality Date    CARDIAC CATHETERIZATION  01/2002    normal coronaries    DECOMPRESS DISC RF LUMBAR  07/2007    HX BACK SURGERY  5/1/2014    HX BREAST LUMPECTOMY Left 1989    benign left breast    HX BUNIONECTOMY      HX BUNIONECTOMY      HX HEART CATHETERIZATION  3/3/10    HX HYSTERECTOMY  1978    HX LUMBAR DISKECTOMY      HX ORTHOPAEDIC Bilateral     BUNIONECTOMY    HX ORTHOPAEDIC Right     WRIST FX    HX OTHER SURGICAL  10/12/2015    mole removed from right side of face by Dr. Yuliya Sky FLX DX W/COLLJ Joseline Kaplan PFRMD  [id number]    Dr Kaelyn Guerra GI ENDOSCOPY,BIOPSY  2/27/2019            Family History   Problem Relation Age of Onset    Hypertension Mother     Heart Disease Father     Stroke Sister     Heart Disease Sister     Heart Disease Sister     Cancer Brother         LUNG    Cancer Brother         PROSTATE    Hypertension Brother     No Known Problems Brother     Lung Disease Brother     Heart Attack Brother     Lung Disease Brother     Stroke Brother     Stroke Daughter 47    No Known Problems Daughter     Anesth Problems Neg Hx        Social History     Tobacco Use    Smoking status: Never Smoker    Smokeless tobacco: Never Used   Substance Use Topics    Alcohol use: No     Alcohol/week: 0.0 standard drinks        Lab Results   Component Value Date/Time    WBC 4.8 04/02/2021 11:57 AM    HGB 12.1 04/02/2021 11:57 AM    HCT 38.3 04/02/2021 11:57 AM    PLATELET 726 62/43/8164 11:57 AM    MCV 88.7 04/02/2021 11:57 AM     Lab Results   Component Value Date/Time    Cholesterol, total 177 04/02/2021 11:57 AM    HDL Cholesterol 65 04/02/2021 11:57 AM    LDL, calculated 94.4 04/02/2021 11:57 AM    Triglyceride 88 04/02/2021 11:57 AM    CHOL/HDL Ratio 2.7 04/02/2021 11:57 AM     Lab Results   Component Value Date/Time    TSH 2.470 05/15/2017 03:04 PM      Lab Results   Component Value Date/Time    Sodium 138 04/02/2021 11:57 AM    Potassium 3.8 04/02/2021 11:57 AM Chloride 104 04/02/2021 11:57 AM    CO2 29 04/02/2021 11:57 AM    Anion gap 5 04/02/2021 11:57 AM    Glucose 87 04/02/2021 11:57 AM    BUN 13 04/02/2021 11:57 AM    Creatinine 0.77 04/02/2021 11:57 AM    BUN/Creatinine ratio 17 04/02/2021 11:57 AM    GFR est AA >60 04/02/2021 11:57 AM    GFR est non-AA >60 04/02/2021 11:57 AM    Calcium 9.0 04/02/2021 11:57 AM    Bilirubin, total 0.7 04/02/2021 11:57 AM    ALT (SGPT) 21 04/02/2021 11:57 AM    Alk. phosphatase 85 04/02/2021 11:57 AM    Protein, total 7.3 04/02/2021 11:57 AM    Albumin 3.3 (L) 04/02/2021 11:57 AM    Globulin 4.0 04/02/2021 11:57 AM    A-G Ratio 0.8 (L) 04/02/2021 11:57 AM      Lab Results   Component Value Date/Time    Hemoglobin A1c 5.4 04/16/2014 12:20 PM    Hemoglobin A1c (POC) 5.5 11/26/2014 12:12 PM         Review of Systems   Constitutional: Negative for malaise/fatigue. HENT: Negative for congestion. Eyes: Negative for blurred vision. Respiratory: Negative for cough and shortness of breath. Cardiovascular: Negative for chest pain, palpitations and leg swelling. Gastrointestinal: Negative for abdominal pain, constipation and heartburn. Genitourinary: Negative for dysuria, frequency and urgency. Neurological: Negative for dizziness, tingling and headaches. Endo/Heme/Allergies: Negative for environmental allergies. Psychiatric/Behavioral: Negative for depression. The patient does not have insomnia. Physical Exam  Vitals and nursing note reviewed. Constitutional:       Appearance: Normal appearance. She is well-developed. Comments: /73 (BP 1 Location: Right arm, BP Patient Position: Sitting)   Pulse 61   Temp 97.3 °F (36.3 °C) (Oral)   Resp 18   Ht 4' 9\" (1.448 m)   Wt 184 lb 3.2 oz (83.6 kg)   LMP  (LMP Unknown)   SpO2 95%   BMI 39.86 kg/m²    HENT:      Right Ear: Tympanic membrane and ear canal normal.      Left Ear: Tympanic membrane and ear canal normal.      Nose: No mucosal edema or rhinorrhea. Neck:      Thyroid: No thyromegaly. Cardiovascular:      Rate and Rhythm: Normal rate and regular rhythm. Heart sounds: Normal heart sounds. No gallop. Pulmonary:      Effort: Pulmonary effort is normal.      Breath sounds: Normal breath sounds. Abdominal:      General: Bowel sounds are normal.      Palpations: Abdomen is soft. There is no mass. Tenderness: There is no abdominal tenderness. Musculoskeletal:      Left shoulder: Tenderness and bony tenderness present. Decreased range of motion. Cervical back: Normal range of motion and neck supple. Right knee: Decreased range of motion. Tenderness present over the medial joint line and lateral joint line. Right lower leg: No edema. Left lower leg: No edema. Lymphadenopathy:      Cervical: No cervical adenopathy. Skin:     General: Skin is warm and dry. Neurological:      General: No focal deficit present. Mental Status: She is alert and oriented to person, place, and time. Psychiatric:         Mood and Affect: Mood normal.         ASSESSMENT and PLAN  Diagnoses and all orders for this visit:    1. Essential hypertension  Discussed sodium restriction, high k rich diet, maintaining ideal body weight and regular exercise program such as daily walking 30 min perday 4-5 times per week, as physiologic means to achieve blood pressure control. Medication compliance advised. 2. Mixed hyperlipidemia  Continue to monitor. Work on diet and exercise. 3. Atherosclerosis of native coronary artery of native heart without angina pectoris//  4. Chronic diastolic heart failure (HCC)  Stable     5. Gastroesophageal reflux disease without esophagitis  Stable     6. Chronic anxiety  Stable with prn anxiety. 7. Arthropathy of left shoulder  -     REFERRAL TO ORTHOPEDIC SURGERY    8. Primary osteoarthritis involving multiple joints  -     acetaminophen-codeine (Tylenol-Codeine #3) 300-30 mg per tablet;  Take 1 Tablet by mouth every six (6) hours as needed for Pain for up to 99 days. Max Daily Amount: 4 Tablets. 9. Primary osteoarthritis of right knee  -     REFERRAL TO ORTHOPEDIC SURGERY  -     acetaminophen-codeine (Tylenol-Codeine #3) 300-30 mg per tablet; Take 1 Tablet by mouth every six (6) hours as needed for Pain for up to 99 days. Max Daily Amount: 4 Tablets. 10. Encounter for chronic pain management  -     MONITOR SCREEN 10-DRUG CLASS PROFILE; Future  -     acetaminophen-codeine (Tylenol-Codeine #3) 300-30 mg per tablet; Take 1 Tablet by mouth every six (6) hours as needed for Pain for up to 99 days. Max Daily Amount: 4 Tablets. Pain contract is reviewed and is signed because of use of percocet for his chronic pain related to OA. Pt understands contract and agrees to the terms for continue use of narcotic pain medication. Attempt to wean to lesser addicting potential drug. 11. Encounter for medication monitoring  -     METABOLIC PANEL, BASIC; Future  -     CBC W/O DIFF; Future  -     URINALYSIS W/MICROSCOPIC; Future      Follow-up and Dispositions    · Return in about 4 months (around 12/2/2021) for medicare wellness exam.       reviewed diet, exercise and weight control  cardiovascular risk and specific lipid/LDL goals reviewed  reviewed medications and side effects in detail    I have discussed diagnosis listed in this note with pt and/or family. I have discussed treatment plans and options and the risk/benefit analysis of those options, including safe use of medications and possible medication side effects. Through the use of shared decision making we have agreed to the above plan. The patient has received an after-visit summary and questions were answered concerning future plans and follow up. Advise pt of any urgent changes then to proceed to the ER.

## 2021-08-04 DIAGNOSIS — I10 ESSENTIAL HYPERTENSION: ICD-10-CM

## 2021-08-04 LAB
AMPHETAMINES UR QL SCN: NEGATIVE NG/ML
BARBITURATES UR QL SCN: NEGATIVE NG/ML
BENZODIAZ UR QL SCN: NEGATIVE NG/ML
BZE UR QL SCN: NEGATIVE NG/ML
CANNABINOIDS UR QL SCN: NEGATIVE NG/ML
CREAT UR-MCNC: 159.2 MG/DL (ref 20–300)
METHADONE UR QL SCN: NEGATIVE NG/ML
OPIATES UR QL SCN: NEGATIVE NG/ML
OXYCODONE+OXYMORPHONE UR QL SCN: NEGATIVE NG/ML
PCP UR QL: NEGATIVE NG/ML
PH UR: 6.4 [PH] (ref 4.5–8.9)
PLEASE NOTE:, 733163: NORMAL
PROPOXYPH UR QL SCN: NEGATIVE NG/ML

## 2021-08-04 RX ORDER — LOSARTAN POTASSIUM 100 MG/1
TABLET ORAL
Qty: 90 TABLET | Refills: 3 | Status: SHIPPED | OUTPATIENT
Start: 2021-08-04 | End: 2022-03-07 | Stop reason: SDUPTHER

## 2021-08-04 RX ORDER — LOSARTAN POTASSIUM 100 MG/1
TABLET ORAL
Qty: 30 TABLET | Refills: 0 | Status: SHIPPED | OUTPATIENT
Start: 2021-08-04 | End: 2021-12-20 | Stop reason: SDUPTHER

## 2021-08-05 RX ORDER — POTASSIUM CHLORIDE 750 MG/1
30 TABLET, FILM COATED, EXTENDED RELEASE ORAL 2 TIMES DAILY
Qty: 180 TABLET | Refills: 11 | Status: SHIPPED | OUTPATIENT
Start: 2021-08-05 | End: 2022-08-08

## 2021-08-12 DIAGNOSIS — M25.561 RIGHT KNEE PAIN, UNSPECIFIED CHRONICITY: Primary | ICD-10-CM

## 2021-08-13 ENCOUNTER — OFFICE VISIT (OUTPATIENT)
Dept: ORTHOPEDIC SURGERY | Age: 86
End: 2021-08-13
Payer: MEDICARE

## 2021-08-13 ENCOUNTER — HOSPITAL ENCOUNTER (OUTPATIENT)
Dept: GENERAL RADIOLOGY | Age: 86
Discharge: HOME | End: 2021-08-13
Payer: MEDICARE

## 2021-08-13 VITALS
DIASTOLIC BLOOD PRESSURE: 101 MMHG | HEART RATE: 71 BPM | BODY MASS INDEX: 39.48 KG/M2 | TEMPERATURE: 96.1 F | WEIGHT: 183 LBS | OXYGEN SATURATION: 95 % | SYSTOLIC BLOOD PRESSURE: 166 MMHG | HEIGHT: 57 IN

## 2021-08-13 DIAGNOSIS — M17.11 UNILATERAL PRIMARY OSTEOARTHRITIS, RIGHT KNEE: ICD-10-CM

## 2021-08-13 DIAGNOSIS — F11.99 OPIOID USE, UNSPECIFIED WITH UNSPECIFIED OPIOID-INDUCED DISORDER (HCC): ICD-10-CM

## 2021-08-13 DIAGNOSIS — M25.561 RIGHT KNEE PAIN, UNSPECIFIED CHRONICITY: ICD-10-CM

## 2021-08-13 DIAGNOSIS — M19.012 PRIMARY OSTEOARTHRITIS OF LEFT SHOULDER: Primary | ICD-10-CM

## 2021-08-13 PROCEDURE — G8753 SYS BP > OR = 140: HCPCS | Performed by: ORTHOPAEDIC SURGERY

## 2021-08-13 PROCEDURE — 1090F PRES/ABSN URINE INCON ASSESS: CPT | Performed by: ORTHOPAEDIC SURGERY

## 2021-08-13 PROCEDURE — G8427 DOCREV CUR MEDS BY ELIG CLIN: HCPCS | Performed by: ORTHOPAEDIC SURGERY

## 2021-08-13 PROCEDURE — G8536 NO DOC ELDER MAL SCRN: HCPCS | Performed by: ORTHOPAEDIC SURGERY

## 2021-08-13 PROCEDURE — G8417 CALC BMI ABV UP PARAM F/U: HCPCS | Performed by: ORTHOPAEDIC SURGERY

## 2021-08-13 PROCEDURE — 99214 OFFICE O/P EST MOD 30 MIN: CPT | Performed by: ORTHOPAEDIC SURGERY

## 2021-08-13 PROCEDURE — 73564 X-RAY EXAM KNEE 4 OR MORE: CPT

## 2021-08-13 PROCEDURE — 1101F PT FALLS ASSESS-DOCD LE1/YR: CPT | Performed by: ORTHOPAEDIC SURGERY

## 2021-08-13 PROCEDURE — G8400 PT W/DXA NO RESULTS DOC: HCPCS | Performed by: ORTHOPAEDIC SURGERY

## 2021-08-13 PROCEDURE — G8755 DIAS BP > OR = 90: HCPCS | Performed by: ORTHOPAEDIC SURGERY

## 2021-08-13 PROCEDURE — G8510 SCR DEP NEG, NO PLAN REQD: HCPCS | Performed by: ORTHOPAEDIC SURGERY

## 2021-08-13 RX ORDER — TRAMADOL HYDROCHLORIDE 50 MG/1
50 TABLET ORAL
Qty: 28 TABLET | Refills: 0 | Status: SHIPPED | OUTPATIENT
Start: 2021-08-13 | End: 2021-08-20

## 2021-08-13 RX ORDER — NALOXONE HYDROCHLORIDE 4 MG/.1ML
SPRAY NASAL
Qty: 1 EACH | Refills: 0 | Status: SHIPPED | OUTPATIENT
Start: 2021-08-13 | End: 2021-12-20 | Stop reason: ALTCHOICE

## 2021-08-13 NOTE — LETTER
8/13/2021    Patient: Evelyn Aguilera   YOB: 1935   Date of Visit: 8/13/2021     Heidi Fregoso MD  [address] [place] [address]    Dear Heidi Fregoso MD,      Thank you for referring Ms. Nolan Tinsley to [facility] for evaluation. My notes for this consultation are attached. If you have questions, please do not hesitate to call me. I look forward to following your patient along with you.       Sincerely,    Arturo Gómez, DO

## 2021-08-13 NOTE — PROGRESS NOTES
Identified pt with two pt identifiers (name and ). Reviewed chart in preparation for visit and have obtained necessary documentation. Marely Chen is a 82 y.o. female  Chief Complaint   Patient presents with    Knee Pain     RT knee     Visit Vitals  BP (!) 166/101 (BP 1 Location: Right arm, BP Patient Position: Sitting, BP Cuff Size: Large adult) Comment: Pt hasn't taken BP meds   Pulse 71   Temp (!) 96.1 °F (35.6 °C) (Tympanic)   Ht 4' 9\" (1.448 m)   Wt 183 lb (83 kg)   LMP  (LMP Unknown)   SpO2 95%   BMI 39.60 kg/m²     1. Have you been to the ER, urgent care clinic since your last visit? Hospitalized since your last visit? No    2. Have you seen or consulted any other health care providers outside of the [address] [place] [address] since your last visit? Include any pap smears or colon screening.  No

## 2021-08-13 NOTE — PROGRESS NOTES
8/13/2021    Chief Complaint: Right knee pain    HPI: This is a 82 y.o. female who complains of right knee pain. Onset was gradual.  The patient has had activity dependent pain for many years. The patient has tried activity modification, physical therapy exercises, injections have given only temporary relief. The pain is in the medial knee, it is severe in intensity. The patient feels unstable with the knee, fears falling, and has significant limitation with activities of daily living, recreation, and walks with a limp.     Past Medical History:   Diagnosis Date    Anxiety     Aortic stenosis 3/3/2010    Aortic stenosis     Arrhythmia     MURMUR    Arthritis     SPINAL STENOSIS    Atherosclerosis of coronary artery     Biliary dyskinesia     CHF (congestive heart failure) ([facility] 3/3/2010    CHF (congestive heart failure) ([facility] 01/2002    Chronic heart failure ([facility] [address] 1/2002; 3/3/2010    chronic diastolic heart failure    Chronic pain     LOWER BACK, RIGHT KNEE    Environmental allergies 3/3/2010    GERD (gastroesophageal reflux disease) 3/3/2010    Hiatal hernia 3/3/2010    High cholesterol 3/3/2010    HTN (hypertension) 3/3/2010    Hypokalemia 3/3/2010    Ischemic heart disease, chronic     Obese     Psychiatric disorder     anxiety    S/P hysterectomy 3/3/2010    Spinal stenosis 3/3/2010       Past Surgical History:   Procedure Laterality Date    CARDIAC CATHETERIZATION  01/2002    normal coronaries    DECOMPRESS DISC RF LUMBAR  07/2007    HX BACK SURGERY  5/1/2014    HX BREAST LUMPECTOMY Left 1989    benign left breast    HX BUNIONECTOMY      HX BUNIONECTOMY      HX HEART CATHETERIZATION  3/3/10    HX HYSTERECTOMY  1978    HX LUMBAR DISKECTOMY      HX ORTHOPAEDIC Bilateral     BUNIONECTOMY    HX ORTHOPAEDIC Right     WRIST FX    HX OTHER SURGICAL  10/12/2015    mole [facility] from right side of face [facility] Dr[id number]     Maribell Nichols [facility] GI ENDOSCOPY,BIOPSY  2/27/2019            Current Outpatient Medications on File Prior to Visit   Medication Sig Dispense Refill    potassium chloride SR (KLOR-CON 10) 10 mEq tablet Take 3 Tablets by mouth two (2) times a day. 180 Tablet 11    losartan (COZAAR) 100 mg tablet TAKE 1 TABLET BY MOUTH EVERY DAY 30 Tablet 0    losartan (COZAAR) 100 mg tablet TAKE 1 TABLET BY MOUTH EVERY DAY 90 Tablet 3    acetaminophen-codeine (Tylenol-Codeine #3) 300-30 mg per tablet Take 1 Tablet by mouth every six (6) hours as needed for Pain for up to 99 days. Max Daily Amount: 4 Tablets. 30 Tablet 0    clonazePAM (KlonoPIN) 1 mg tablet TAKE 1/2-1 TABLET BY MOUTH EVERY MORNING AND AS NEEDED FOR ANXIETY AND 1 TABLET AT BEDTIME FOR SLEEP 60 Tablet 0    pantoprazole (PROTONIX) 40 mg tablet TAKE 1 TABLET BY MOUTH TWICE A DAY *30 MINUTES PRIOR TO BREAKFAST AND DINNER* 180 Tab 3    diclofenac (VOLTAREN) 1 % gel Apply  to affected area four (4) times daily. 100 g 0    isosorbide mononitrate ER (IMDUR) 30 mg tablet TAKE 1 TABLET BY MOUTH EVERY DAY 30 Tab 11    rosuvastatin (CRESTOR) 20 mg tablet TAKE 1 TABLET BY MOUTH EVERY DAY AT NIGHT 30 Tab 11    furosemide (LASIX) 80 mg tablet TAKE 1 TABLET BY MOUTH EVERY MORNING AND TAKE 1/2 TABLET EVERY EVENING 45 Tab 11    metoprolol tartrate (LOPRESSOR) 25 mg tablet Take 1 tab at 9am and 1 tab at 9pm      desonide (TRIDESILON) 0.05 % topical lotion Apply  to affected area two (2) times daily as needed for Skin Irritation or Itching. 59 mL 0    amLODIPine (NORVASC) 5 mg tablet Take 1 Tab by mouth two (2) times a day. Take at 9am and 9pm every day. 180 Tab 3    nitroglycerin (NITROSTAT) 0.4 mg SL tablet TAKE 1 TAB BY SUBLINGUAL ROUTE EVERY 5 MINUTES AS NEEDED. 25 Tab 3    simethicone (GAS RELIEF) 80 mg chewable tablet Take 80 mg by mouth every six (6) hours as needed for Flatulence.       cholecalciferol, vitamin D3, (VITAMIN D3) 2,000 unit tab Take 1 Tab by mouth daily.      acetaminophen (TYLENOL EXTRA STRENGTH) 500 mg tablet Take 1,000 mg by mouth every six (6) hours as needed for Pain.  Aspirin, Buffered 81 mg tab Take 81 mg by mouth daily. No current facility-administered medications on file prior to visit. Allergies   Allergen Reactions    Bactrim [Sulfamethoxazole-Trimethoprim] Unknown (comments)     Pt does not recall    Darvocet A500 [Propoxyphene N-Acetaminophen] Itching       Family History   Problem Relation Age of Onset    Hypertension Mother     Heart Disease Father     Stroke Sister     Heart Disease Sister     Heart Disease Sister     Cancer Brother         LUNG    Cancer Brother         PROSTATE    Hypertension Brother     No Known Problems Brother     Lung Disease Brother     Heart Attack Brother     Lung Disease Brother     Stroke Brother     Stroke Daughter 47    No Known Problems Daughter     Anesth Problems Neg Hx        Social History     Socioeconomic History    Marital status:      Spouse name: Not on file    Number of children: Not on file    Years of education: Not on file    Highest education level: Not on file   Tobacco Use    Smoking status: Never Smoker    Smokeless tobacco: Never Used   Vaping Use    Vaping Use: Never used   Substance and Sexual Activity    Alcohol use: No     Alcohol/week: 0.0 standard drinks    Drug use: No    Sexual activity: Not Currently     Social Determinants of Health     Financial Resource Strain:     Difficulty of Paying Living Expenses:    Food Insecurity:     Worried About Running Out of Food in the Last Year:     Ran Out of Food in the Last Year:    Transportation Needs:     Lack of Transportation (Medical):      Lack of Transportation (Non-Medical):    Physical Activity:     Days of Exercise per Week:     Minutes of Exercise per Session:    Stress:     Feeling of Stress :    Social Connections:     Frequency of Communication with Friends and Family:     Frequency of Social Gatherings with Friends and Family:     Attends Christianity Services:     Active Member of Clubs or Organizations:     Attends Club or Organization Meetings:     Marital Status:          Review of Systems:       General: Denies headache, lethargy, fever, weight loss  Ears/Nose/Throat: Denies ear discharge, drainage, nosebleeds, hoarse voice, dental problems  Cardiovascular: Denies chest pain, shortness of breath  Lungs: Denies chest pain, breathing problems, wheezing, pneumonia  Stomach: Denies stomach pain, heartburn, constipation, irritable bowel  Skin: Denies rash, sores, open wounds  Musculoskeletal: Admits to knee pain, no deformity. Genitourinary: Denies dysuria, hematuria, polyuria  Gastrointestinal: Denies constipation, obstipation, diarrhea  Neurological: Denies changes in sight, smell, hearing, taste, seizures. Denies loss of consciousness.   Psychiatric: Denies depression, sleep pattern changes, anxiety, change in personality  Endocrine: Denies mood swings, heat or cold intolerance  Hematologic/Lymphatic: Denies anemia, purpura, petechia  Allergic/Immunologic: Denies swelling of throat, pain or swelling at lymph nodes      Physical Examination:    Visit Vitals  BP (!) 166/101 (BP 1 Location: Right arm, BP Patient Position: Sitting, BP Cuff Size: Large adult)   Pulse 71   Temp (!) 96.1 °F (35.6 °C) (Tympanic)   Ht 4' 9\" (1.448 m)   Wt 183 lb (83 kg)   SpO2 95%   BMI 39.60 kg/m²        General: AOX3, no apparent distress  Psychiatric: mood and affect appropriate  Lungs: breathing is symmetric and unlabored bilaterally  Heart: regular rate and rhythm  Abdomen: no guarding  Head: normocephalic, atraumatic  Skin: No significant abnormalities, good turgor  Sensation intact to light touch: L1-S1 dermatomes  Muscular exam: 5/5 strength in all major muscle groups unless noted in specialty exam.    Extremities:      Left upper extremity: Full active and passive range of motion without pain, deformity, no open wound, strength 5/5 in all major muscle groups. Right upper extremity: Full active and passive range of motion without pain, deformity, no open wound, strength 5/5 in all major muscle groups. Left lower extremity: Full active and passive range of motion without pain, deformity, no open wound, strength 5/5 in all major muscle groups. Right lower extremity:  No deformity is noted. Range of motion of the knee is 0-95. Ligamentous testing of the knee indicates stability of the the MCL, LCL, PCL, and ACL. Lachman's, anterior and posterior drawer tests are specifically negative. Severe medial joint line tenderness to palpation is noted. Popliteal area is unremarkable. 1+  for effusion. Positive patellar crepitus. Patella tracks centrally. Pivot shift is negative. Strength testing is indicative of 5/5 strength at hip flexion, extension, knee flexion and extension, tibialis anterior, EHL, and FHL. Sensation is intact to light touch in the L1-S1 dermatomes. Capillary refill is less than 2 seconds in the toes. Diagnostics:    Pertinent Diagnostics:  Xrays are available of the right knee, they indicate severe bone-on-bone medial compartment osteoarthritis of the knee joint, no significant other findings, no other osseus abnormalities, fractures, or dislocations. Assessment: Osteoarthritis right knee    Plan: This patient and I did discuss the many options in treating knee osteoarthritis. We did discuss that we could continue to seek out nonoperative modalities, such as: NSAIDs, oral and topical analgesics, knee injections, knee braces, physical therapy, stretching, strengthening, and weight loss strategies, activity modification, ambulatory assistive devices. The patient stated their understanding with this, but and would like to proceed with nonsurgical management in the form of bracing, she was placed into a knee brace today in my office.   Additionally, I have coordinated care with her primary care physician regarding pain management referral..      Procedures: none today    Ms. Taylor Ortiz has a reminder for a \"due or due soon\" health maintenance. I have asked that she contact her primary care provider for follow-up on this health maintenance.

## 2021-08-30 DIAGNOSIS — L85.3 DRY SKIN DERMATITIS: ICD-10-CM

## 2021-08-30 DIAGNOSIS — F41.9 ANXIETY: Chronic | ICD-10-CM

## 2021-08-30 RX ORDER — CLONAZEPAM 1 MG/1
TABLET ORAL
Qty: 60 TABLET | Refills: 0 | OUTPATIENT
Start: 2021-08-30

## 2021-08-30 RX ORDER — DESONIDE 0.5 MG/ML
LOTION TOPICAL
Qty: 59 ML | Refills: 1 | Status: SHIPPED | OUTPATIENT
Start: 2021-08-30 | End: 2022-07-06 | Stop reason: ALTCHOICE

## 2021-08-30 RX ORDER — DESONIDE 0.5 MG/ML
LOTION TOPICAL
Qty: 59 ML | Refills: 0 | Status: SHIPPED | OUTPATIENT
Start: 2021-08-30 | End: 2021-08-30 | Stop reason: SDUPTHER

## 2021-08-30 RX ORDER — CLONAZEPAM 1 MG/1
TABLET ORAL
Qty: 60 TABLET | Refills: 1 | Status: SHIPPED | OUTPATIENT
Start: 2021-08-30 | End: 2021-11-22

## 2021-08-30 NOTE — TELEPHONE ENCOUNTER
----- Message from Lacey Ramsay sent at 8/30/2021 12:40 PM EDT -----  Regarding: Eda Beltran MD/refill  Medication Refill    Caller (if not patient):      Relationship of caller (if not patient):      Best contact number(s): [phone]      Name of medication and dosage if known: clonazepam 1 mg tablet, desonide 0.05 % topical lotion      Is patient out of this medication (yes/no): Yes       Pharmacy name: [address] listed in chart? (yes/no): Yes   Pharmacy phone number: [phone]        Details to clarify the request:      Lacey Ramsay

## 2021-10-11 DIAGNOSIS — R07.9 CHEST PAIN, UNSPECIFIED TYPE: ICD-10-CM

## 2021-10-11 RX ORDER — NITROGLYCERIN 0.4 MG/1
TABLET SUBLINGUAL
Qty: 25 TABLET | Refills: 0 | Status: SHIPPED | OUTPATIENT
Start: 2021-10-11 | End: 2021-11-01

## 2021-10-18 DIAGNOSIS — I10 ESSENTIAL HYPERTENSION: ICD-10-CM

## 2021-10-18 RX ORDER — AMLODIPINE BESYLATE 5 MG/1
5 TABLET ORAL 2 TIMES DAILY
Qty: 180 TABLET | Refills: 3 | Status: ON HOLD | OUTPATIENT
Start: 2021-10-18 | End: 2022-05-26

## 2021-10-28 DIAGNOSIS — M17.11 PRIMARY OSTEOARTHRITIS OF RIGHT KNEE: Primary | ICD-10-CM

## 2021-10-29 RX ORDER — ACETAMINOPHEN AND CODEINE PHOSPHATE 300; 30 MG/1; MG/1
1 TABLET ORAL
Qty: 18 TABLET | Refills: 0 | Status: SHIPPED | OUTPATIENT
Start: 2021-10-29 | End: 2021-11-05

## 2021-11-01 DIAGNOSIS — R07.9 CHEST PAIN, UNSPECIFIED TYPE: ICD-10-CM

## 2021-11-01 RX ORDER — NITROGLYCERIN 0.4 MG/1
TABLET SUBLINGUAL
Qty: 25 TABLET | Refills: 0 | Status: SHIPPED | OUTPATIENT
Start: 2021-11-01 | End: 2022-07-21

## 2021-11-10 ENCOUNTER — OFFICE VISIT (OUTPATIENT)
Dept: ORTHOPEDIC SURGERY | Age: 86
End: 2021-11-10
Payer: MEDICARE

## 2021-11-10 VITALS
HEIGHT: 57 IN | DIASTOLIC BLOOD PRESSURE: 91 MMHG | TEMPERATURE: 98.2 F | BODY MASS INDEX: 39.26 KG/M2 | OXYGEN SATURATION: 98 % | WEIGHT: 182 LBS | HEART RATE: 65 BPM | SYSTOLIC BLOOD PRESSURE: 142 MMHG

## 2021-11-10 DIAGNOSIS — M19.012 PRIMARY OSTEOARTHRITIS OF LEFT SHOULDER: Primary | ICD-10-CM

## 2021-11-10 DIAGNOSIS — M17.11 UNILATERAL PRIMARY OSTEOARTHRITIS, RIGHT KNEE: ICD-10-CM

## 2021-11-10 DIAGNOSIS — M17.0 BILATERAL PRIMARY OSTEOARTHRITIS OF KNEE: ICD-10-CM

## 2021-11-10 PROCEDURE — G8510 SCR DEP NEG, NO PLAN REQD: HCPCS | Performed by: ORTHOPAEDIC SURGERY

## 2021-11-10 PROCEDURE — 1101F PT FALLS ASSESS-DOCD LE1/YR: CPT | Performed by: ORTHOPAEDIC SURGERY

## 2021-11-10 PROCEDURE — G8427 DOCREV CUR MEDS BY ELIG CLIN: HCPCS | Performed by: ORTHOPAEDIC SURGERY

## 2021-11-10 PROCEDURE — 1090F PRES/ABSN URINE INCON ASSESS: CPT | Performed by: ORTHOPAEDIC SURGERY

## 2021-11-10 PROCEDURE — 20610 DRAIN/INJ JOINT/BURSA W/O US: CPT | Performed by: ORTHOPAEDIC SURGERY

## 2021-11-10 PROCEDURE — G8755 DIAS BP > OR = 90: HCPCS | Performed by: ORTHOPAEDIC SURGERY

## 2021-11-10 PROCEDURE — 99214 OFFICE O/P EST MOD 30 MIN: CPT | Performed by: ORTHOPAEDIC SURGERY

## 2021-11-10 PROCEDURE — G8536 NO DOC ELDER MAL SCRN: HCPCS | Performed by: ORTHOPAEDIC SURGERY

## 2021-11-10 PROCEDURE — G8400 PT W/DXA NO RESULTS DOC: HCPCS | Performed by: ORTHOPAEDIC SURGERY

## 2021-11-10 PROCEDURE — G8417 CALC BMI ABV UP PARAM F/U: HCPCS | Performed by: ORTHOPAEDIC SURGERY

## 2021-11-10 PROCEDURE — G8753 SYS BP > OR = 140: HCPCS | Performed by: ORTHOPAEDIC SURGERY

## 2021-11-10 RX ORDER — BETAMETHASONE SODIUM PHOSPHATE AND BETAMETHASONE ACETATE 3; 3 MG/ML; MG/ML
6 INJECTION, SUSPENSION INTRA-ARTICULAR; INTRALESIONAL; INTRAMUSCULAR; SOFT TISSUE ONCE
Status: COMPLETED | OUTPATIENT
Start: 2021-11-10 | End: 2021-11-10

## 2021-11-10 RX ORDER — ACETAMINOPHEN AND CODEINE PHOSPHATE 300; 30 MG/1; MG/1
1 TABLET ORAL
COMMUNITY
End: 2021-12-20 | Stop reason: ALTCHOICE

## 2021-11-10 RX ADMIN — BETAMETHASONE SODIUM PHOSPHATE AND BETAMETHASONE ACETATE 6 MG: 3; 3 INJECTION, SUSPENSION INTRA-ARTICULAR; INTRALESIONAL; INTRAMUSCULAR; SOFT TISSUE at 11:39

## 2021-11-10 NOTE — PROGRESS NOTES
11/10/2021      CC: Left shoulder, right knee pain    HPI:      This is a 82y.o. year old female who presents for a follow up visit. The patient was last seen and diagnosed with osteoarthritis left shoulder and right knee. The patient's treatments since the most recent visit have comprised of injections which have given her some relief. The patient has had no recent relief of the chief complaint.          PMH:  Past Medical History:   Diagnosis Date    Anxiety     Aortic stenosis 3/3/2010    Aortic stenosis     Arrhythmia     MURMUR    Arthritis     SPINAL STENOSIS    Atherosclerosis of coronary artery     Biliary dyskinesia     CHF (congestive heart failure) ([address] 3/3/2010    CHF (congestive heart failure) ([address] 01/2002    Chronic heart failure ([address] 1/2002; 3/3/2010    chronic diastolic heart failure    Chronic pain     LOWER BACK, RIGHT KNEE    Environmental allergies 3/3/2010    GERD (gastroesophageal reflux disease) 3/3/2010    Hiatal hernia 3/3/2010    High cholesterol 3/3/2010    HTN (hypertension) 3/3/2010    Hypokalemia 3/3/2010    Ischemic heart disease, chronic     Obese     Psychiatric disorder     anxiety    S/P hysterectomy 3/3/2010    Spinal stenosis 3/3/2010       PSxHx:  Past Surgical History:   Procedure Laterality Date    CARDIAC CATHETERIZATION  01/2002    normal coronaries    DECOMPRESS DISC RF LUMBAR  07/2007    HX BACK SURGERY  5/1/2014    HX BREAST LUMPECTOMY Left 1989    benign left breast    HX BUNIONECTOMY      HX BUNIONECTOMY      HX HEART CATHETERIZATION  3/3/10    HX HYSTERECTOMY  1978    HX LUMBAR DISKECTOMY      HX ORTHOPAEDIC Bilateral     BUNIONECTOMY    HX ORTHOPAEDIC Right     WRIST FX    HX OTHER SURGICAL  10/12/2015    mole removed from right side of face by Dr. Recio [place] FLX DX W/[facility] WHEN PFRMD  [id number]    Dr Fazal Majano GI ENDOSCOPY,BIOPSY  2/27/2019            Meds:    Current Outpatient Medications:    acetaminophen-codeine (Tylenol-Codeine #3) 300-30 mg per tablet, Take 1 Tablet by mouth every four (4) hours as needed for Pain., Disp: , Rfl:     nitroglycerin (NITROSTAT) 0.4 mg SL tablet, DISSOLVE 1 TAB BY SUBLINGUAL ROUTE EVERY 5 MINUTES AS NEEDED., Disp: 25 Tablet, Rfl: 0    amLODIPine (NORVASC) 5 mg tablet, TAKE 1 TAB BY MOUTH TWO (2) TIMES A DAY. TAKE AT 9AM AND 9PM EVERY DAY., Disp: 180 Tablet, Rfl: 3    clonazePAM (KlonoPIN) 1 mg tablet, TAKE 1/2 TO 1 TABLET EVERY MORNING AND AS NEEDED FOR ANXIETY, AND TAKE 1 TABLET AT BEDTIME FOR SLEEP, Disp: 60 Tablet, Rfl: 1    desonide (TRIDESILON) 0.05 % topical lotion, Apply  to affected area two (2) times daily as needed for Skin Irritation or Itching., Disp: 59 mL, Rfl: 1    naloxone (NARCAN) 4 mg/actuation nasal spray, Use 1 spray intranasally, then discard. Repeat with new spray every 2 min as needed for opioid overdose symptoms, alternating nostrils. , Disp: 1 Each, Rfl: 0    potassium chloride SR (KLOR-CON 10) 10 mEq tablet, Take 3 Tablets by mouth two (2) times a day., Disp: 180 Tablet, Rfl: 11    losartan (COZAAR) 100 mg tablet, TAKE 1 TABLET BY MOUTH EVERY DAY, Disp: 30 Tablet, Rfl: 0    losartan (COZAAR) 100 mg tablet, TAKE 1 TABLET BY MOUTH EVERY DAY, Disp: 90 Tablet, Rfl: 3    pantoprazole (PROTONIX) 40 mg tablet, TAKE 1 TABLET BY MOUTH TWICE A DAY *30 MINUTES PRIOR TO BREAKFAST AND DINNER*, Disp: 180 Tab, Rfl: 3    diclofenac (VOLTAREN) 1 % gel, Apply  to affected area four (4) times daily. , Disp: 100 g, Rfl: 0    isosorbide mononitrate ER (IMDUR) 30 mg tablet, TAKE 1 TABLET BY MOUTH EVERY DAY, Disp: 30 Tab, Rfl: 11    rosuvastatin (CRESTOR) 20 mg tablet, TAKE 1 TABLET BY MOUTH EVERY DAY AT NIGHT, Disp: 30 Tab, Rfl: 11    furosemide (LASIX) 80 mg tablet, TAKE 1 TABLET BY MOUTH EVERY MORNING AND TAKE 1/2 TABLET EVERY EVENING, Disp: 45 Tab, Rfl: 11    metoprolol tartrate (LOPRESSOR) 25 mg tablet, Take 1 tab at 9am and 1 tab at 9pm, Disp: , Rfl:   simethicone (GAS RELIEF) 80 mg chewable tablet, Take 80 mg by mouth every six (6) hours as needed for Flatulence. , Disp: , Rfl:     cholecalciferol, vitamin D3, (VITAMIN D3) 2,000 unit tab, Take 1 Tab by mouth daily. , Disp: , Rfl:     acetaminophen (TYLENOL EXTRA STRENGTH) 500 mg tablet, Take 1,000 mg by mouth every six (6) hours as needed for Pain., Disp: , Rfl:     Aspirin, Buffered 81 mg tab, Take 81 mg by mouth daily. , Disp: , Rfl:   No current facility-administered medications for this visit.     All:  Allergies   Allergen Reactions    Bactrim [Sulfamethoxazole-Trimethoprim] Unknown (comments)     Pt does not recall    Darvocet A500 [Propoxyphene N-Acetaminophen] Itching       Social Hx:  Social History     Socioeconomic History    Marital status:    Tobacco Use    Smoking status: Never Smoker    Smokeless tobacco: Never Used   Vaping Use    Vaping Use: Never used   Substance and Sexual Activity    Alcohol use: No     Alcohol/week: 0.0 standard drinks    Drug use: No    Sexual activity: Not Currently       Family Hx:  Family History   Problem Relation Age of Onset    Hypertension Mother     Heart Disease Father     Stroke Sister     Heart Disease Sister     Heart Disease Sister     Cancer Brother         LUNG    Cancer Brother         PROSTATE    Hypertension Brother     No Known Problems Brother     Lung Disease Brother     Heart Attack Brother     Lung Disease Brother     Stroke Brother     Stroke Daughter 47    No Known Problems Daughter     Anesth Problems Neg Hx          Review of Systems:       General: Denies headache, lethargy, fever, weight loss  Ears/Nose/Throat: Denies ear discharge, drainage, nosebleeds, hoarse voice, dental problems  Cardiovascular: Denies chest pain, shortness of breath  Lungs: Denies chest pain, breathing problems, wheezing, pneumonia  Stomach: Denies stomach pain, heartburn, constipation, irritable bowel  Skin: Denies rash, sores, open wounds  Musculoskeletal: Left shoulder and right knee pain  Genitourinary: Denies dysuria, hematuria, polyuria  Gastrointestinal: Denies constipation, obstipation, diarrhea  Neurological: Denies changes in sight, smell, hearing, taste, seizures. Denies loss of consciousness. Psychiatric: Denies depression, sleep pattern changes, anxiety, change in personality  Endocrine: Denies mood swings, heat or cold intolerance  Hematologic/Lymphatic: Denies anemia, purpura, petechia  Allergic/Immunologic: Denies swelling of throat, pain or swelling at lymph nodes      Physical Examination:    Visit Vitals  BP (!) 142/91 (BP 1 Location: Right arm, BP Patient Position: Sitting, BP Cuff Size: Large adult)   Pulse 65   Temp 98.2 °F (36.8 °C) (Tympanic)   Ht 4' 9\" (1.448 m)   Wt 182 lb (82.6 kg)   SpO2 98%   BMI 39.38 kg/m²        General: AOX3, no apparent distress  Psychiatric: mood and affect appropriate  Lungs: breathing is symmetric and unlabored bilaterally  Heart: regular rate and rhythm  Abdomen: no guarding  Head: normocephalic, atraumatic  Skin: No significant abnormalities, good turgor  Sensation intact to light touch: C5-T1 dermatomes  Muscular exam: 5/5 strength in all major muscle groups unless noted in specialty exam.    Extremities      Right upper extremity: No gross deformity. No restriction to range of motion of the shoulder, elbow, wrist.  Neck range of motion is full and pain free. Muscle bulk is appropriate without wasting. Sensation is intact to light touch in the C5-T1 dermatomes. Capillary refill is less than 2 seconds in the fingers. Strength testing is 5/5 at the major muscle groups of the shoulder, elbow, and wrist.    .  Left upper extremity:  No gross deformity. No restriction to passive range of motion of the shoulder, elbow, wrist.  Positive impingement maneuver. Open can test is positive. Negative hornblower's maneuver.   Belly press is negative for weakness, and internal rotation is 0 degrees. Neck range of motion is full and pain free. Muscle bulk is appropriate without wasting. Sensation is intact to light touch in the C5-T1 dermatomes. Capillary refill is less than 2 seconds in the fingers. Strength testing is 5/5 at the major muscle groups of the shoulder, elbow, and wrist.        Right lower extremity: Knee is noted to have a mild effusion. Medial joint line tenderness to palpation with a varus deformity. Patellar crepitus with range of motion is noted. Range of motion is 0 to 90. Sensation is intact to light touch L1-S1 dermatomes. Knee flexion and extension strength is 5/5 with Tibialis anterior, EHL, FHL being 5/5 as well. No other gross deformity or deficit is noted. Left lower extremity:  No gross deformity. No restriction to range of motion of the hip, knee, ankle. Muscle bulk is appropriate without wasting. Sensation is intact to light touch in the L1-S1 dermatomes. Capillary refill is less than 2 seconds in the fingers. Strength testing is 5/5 at the major muscle groups of the hip, knee, ankle. Diagnostics:    Pertinent Diagnostics: None today    Assessment: Osteoarthritis, left shoulder and right knee  Plan: This patient has the above-mentioned issues, she has been treated moderately successfully with injections in the past.  She feels as though she is not a great surgical candidate, she is also older and therefore chronic medications may be dangerous for her depending on their chemical make-up. We discussed the treatment options going forward. We agreed on injections into the left shoulder joint as well as into the right knee, I will also prescribe her her a referral for pain management as based on her age, a gentle feathering and careful pain management regimen should be developed by pain specialist.  She will follow-up with me as needed.     Date of Procedure: 11/10/2021  PROCEDURE NOTE: Right knee injection of Celestone    Consent was obtained from the patient. The correct site was identified after confirmation with the patient. Following identification and confirmation of the correct site with the patient, the superolateral right knee was prepped in the normal sterile fashion. A local anesthetic of 1% lidocaine without epinephrine was then administered to the local tissues. Following, an injection of a mixture of  6 mg Celestone and 1% lidocaine without epinephrine was administered to the right knee. The needle was then withdrawn and the injection site dressed with a sterile bandage at the conclusion. The procedure was well tolerated by the patient. No immediate adverse reactions were noted. Post injection instructions were given. PROCEDURE NOTE :    Consent was obtained from the patient. The correct site was identified after confirmation with the patient. Following identification and confirmation of the correct site with the patient, the area was prepped in the normal sterile fashion. An injection of a mixture of 6 mg of betamethasone and 1% lidocaine without epinephrine was administered to the left shoulder glenohumeral space. The needle was then withdrawn and the injection site dressed with a sterile bandage at the conclusion. The procedure was well tolerated by the patient. No immediate adverse reactions were noted. Post injection instructions were given. Ms. Gregorio Roger has a reminder for a \"due or due soon\" health maintenance. I have asked that she contact her primary care provider for follow-up on this health maintenance.

## 2021-11-10 NOTE — LETTER
11/10/2021    Patient: Sohail Tang   YOB: 1935   Date of Visit: 11/10/2021     Ramez Knott MD  [address] [place] [id number]    Dear Ramez Knott MD,      Thank you for referring Ms. Miya Cabrera to [facility] for evaluation. My notes for this consultation are attached. If you have questions, please do not hesitate to call me. I look forward to following your patient along with you.       Sincerely,    Juwan Conteh, DO

## 2021-11-10 NOTE — PROGRESS NOTES
Identified pt with two pt identifiers (name and ). Reviewed chart in preparation for visit and have obtained necessary documentation. Megan Cee is a 82 y.o. female  Chief Complaint   Patient presents with    Shoulder Pain     LT shoulder     Visit Vitals  BP (!) 142/91 (BP 1 Location: Right arm, BP Patient Position: Sitting, BP Cuff Size: Large adult)   Pulse 65   Temp 98.2 °F (36.8 °C) (Tympanic)   Ht 4' 9\" (1.448 m)   Wt 182 lb (82.6 kg)   LMP  (LMP Unknown)   SpO2 98%   BMI 39.38 kg/m²     1. Have you been to the ER, urgent care clinic since your last visit? Hospitalized since your last visit? No    2. Have you seen or consulted any other health care providers outside of the [address] [place] [address] since your last visit? Include any pap smears or colon screening.  No

## 2021-11-22 DIAGNOSIS — F41.9 ANXIETY: Chronic | ICD-10-CM

## 2021-11-22 RX ORDER — CLONAZEPAM 1 MG/1
TABLET ORAL
Qty: 60 TABLET | Refills: 1 | Status: SHIPPED | OUTPATIENT
Start: 2021-11-22 | End: 2022-02-11

## 2021-12-03 ENCOUNTER — TELEPHONE (OUTPATIENT)
Dept: FAMILY MEDICINE CLINIC | Age: 86
End: 2021-12-03

## 2021-12-03 NOTE — TELEPHONE ENCOUNTER
Verified patient with two type of identifiers. Pt states has not been eating \"so great\" and has been eating more grease than usual and has been having increased gas and gas pain. Pt states has been taking gas-x but with minimal relief. Pt wondering what else she could try and understands she does need to eat better.

## 2021-12-03 NOTE — TELEPHONE ENCOUNTER
Patient wants a return call regarding her stomach not doing well.   Please give her a call @ [phone]

## 2021-12-15 RX ORDER — FUROSEMIDE 80 MG/1
TABLET ORAL
Qty: 135 TABLET | Refills: 3 | Status: ON HOLD | OUTPATIENT
Start: 2021-12-15 | End: 2022-11-03

## 2021-12-20 ENCOUNTER — OFFICE VISIT (OUTPATIENT)
Dept: FAMILY MEDICINE CLINIC | Age: 86
End: 2021-12-20
Payer: MEDICARE

## 2021-12-20 VITALS
DIASTOLIC BLOOD PRESSURE: 78 MMHG | SYSTOLIC BLOOD PRESSURE: 126 MMHG | WEIGHT: 179.6 LBS | BODY MASS INDEX: 38.75 KG/M2 | TEMPERATURE: 97.6 F | HEART RATE: 64 BPM | HEIGHT: 57 IN | RESPIRATION RATE: 18 BRPM | OXYGEN SATURATION: 95 %

## 2021-12-20 DIAGNOSIS — K21.9 GASTROESOPHAGEAL REFLUX DISEASE WITHOUT ESOPHAGITIS: ICD-10-CM

## 2021-12-20 DIAGNOSIS — F41.9 CHRONIC ANXIETY: ICD-10-CM

## 2021-12-20 DIAGNOSIS — G89.29 CHRONIC BILATERAL LOW BACK PAIN WITHOUT SCIATICA: ICD-10-CM

## 2021-12-20 DIAGNOSIS — M54.50 CHRONIC BILATERAL LOW BACK PAIN WITHOUT SCIATICA: ICD-10-CM

## 2021-12-20 DIAGNOSIS — I10 ESSENTIAL HYPERTENSION: ICD-10-CM

## 2021-12-20 DIAGNOSIS — E78.2 MIXED HYPERLIPIDEMIA: ICD-10-CM

## 2021-12-20 DIAGNOSIS — M15.9 GENERALIZED OSTEOARTHRITIS: ICD-10-CM

## 2021-12-20 DIAGNOSIS — I25.10 ATHEROSCLEROSIS OF NATIVE CORONARY ARTERY OF NATIVE HEART WITHOUT ANGINA PECTORIS: ICD-10-CM

## 2021-12-20 DIAGNOSIS — M17.11 PRIMARY OSTEOARTHRITIS OF RIGHT KNEE: ICD-10-CM

## 2021-12-20 DIAGNOSIS — Z23 NEEDS FLU SHOT: ICD-10-CM

## 2021-12-20 DIAGNOSIS — G89.29 ENCOUNTER FOR CHRONIC PAIN MANAGEMENT: ICD-10-CM

## 2021-12-20 DIAGNOSIS — I50.32 CHRONIC DIASTOLIC HEART FAILURE (HCC): ICD-10-CM

## 2021-12-20 DIAGNOSIS — Z51.81 ENCOUNTER FOR MEDICATION MONITORING: ICD-10-CM

## 2021-12-20 DIAGNOSIS — Z00.00 MEDICARE ANNUAL WELLNESS VISIT, SUBSEQUENT: Primary | ICD-10-CM

## 2021-12-20 LAB
ALBUMIN SERPL-MCNC: 3.7 G/DL (ref 3.5–5)
ALBUMIN/GLOB SERPL: 0.9 {RATIO} (ref 1.1–2.2)
ALP SERPL-CCNC: 85 U/L (ref 45–117)
ALT SERPL-CCNC: 16 U/L (ref 12–78)
ANION GAP SERPL CALC-SCNC: 4 MMOL/L (ref 5–15)
AST SERPL-CCNC: 14 U/L (ref 15–37)
BILIRUB SERPL-MCNC: 0.5 MG/DL (ref 0.2–1)
BUN SERPL-MCNC: 11 MG/DL (ref 6–20)
BUN/CREAT SERPL: 10 (ref 12–20)
CALCIUM SERPL-MCNC: 9.6 MG/DL (ref 8.5–10.1)
CHLORIDE SERPL-SCNC: 104 MMOL/L (ref 97–108)
CHOLEST SERPL-MCNC: 176 MG/DL
CO2 SERPL-SCNC: 32 MMOL/L (ref 21–32)
CREAT SERPL-MCNC: 1.05 MG/DL (ref 0.55–1.02)
ERYTHROCYTE [DISTWIDTH] IN BLOOD BY AUTOMATED COUNT: 14.1 % (ref 11.5–14.5)
GLOBULIN SER CALC-MCNC: 4 G/DL (ref 2–4)
GLUCOSE SERPL-MCNC: 101 MG/DL (ref 65–100)
HCT VFR BLD AUTO: 44.5 % (ref 35–47)
HDLC SERPL-MCNC: 61 MG/DL
HDLC SERPL: 2.9 {RATIO} (ref 0–5)
HGB BLD-MCNC: 13.5 G/DL (ref 11.5–16)
LDLC SERPL CALC-MCNC: 92.6 MG/DL (ref 0–100)
MCH RBC QN AUTO: 28 PG (ref 26–34)
MCHC RBC AUTO-ENTMCNC: 30.3 G/DL (ref 30–36.5)
MCV RBC AUTO: 92.3 FL (ref 80–99)
NRBC # BLD: 0 K/UL (ref 0–0.01)
NRBC BLD-RTO: 0 PER 100 WBC
PLATELET # BLD AUTO: 159 K/UL (ref 150–400)
PMV BLD AUTO: 12.4 FL (ref 8.9–12.9)
POTASSIUM SERPL-SCNC: 3.6 MMOL/L (ref 3.5–5.1)
PROT SERPL-MCNC: 7.7 G/DL (ref 6.4–8.2)
RBC # BLD AUTO: 4.82 M/UL (ref 3.8–5.2)
SODIUM SERPL-SCNC: 140 MMOL/L (ref 136–145)
TRIGL SERPL-MCNC: 112 MG/DL (ref ?–150)
VLDLC SERPL CALC-MCNC: 22.4 MG/DL
WBC # BLD AUTO: 5.5 K/UL (ref 3.6–11)

## 2021-12-20 PROCEDURE — G0463 HOSPITAL OUTPT CLINIC VISIT: HCPCS | Performed by: FAMILY MEDICINE

## 2021-12-20 PROCEDURE — G8427 DOCREV CUR MEDS BY ELIG CLIN: HCPCS | Performed by: FAMILY MEDICINE

## 2021-12-20 PROCEDURE — 90694 VACC AIIV4 NO PRSRV 0.5ML IM: CPT | Performed by: FAMILY MEDICINE

## 2021-12-20 PROCEDURE — G8536 NO DOC ELDER MAL SCRN: HCPCS | Performed by: FAMILY MEDICINE

## 2021-12-20 PROCEDURE — 1090F PRES/ABSN URINE INCON ASSESS: CPT | Performed by: FAMILY MEDICINE

## 2021-12-20 PROCEDURE — G8400 PT W/DXA NO RESULTS DOC: HCPCS | Performed by: FAMILY MEDICINE

## 2021-12-20 PROCEDURE — G8752 SYS BP LESS 140: HCPCS | Performed by: FAMILY MEDICINE

## 2021-12-20 PROCEDURE — 1101F PT FALLS ASSESS-DOCD LE1/YR: CPT | Performed by: FAMILY MEDICINE

## 2021-12-20 PROCEDURE — 99214 OFFICE O/P EST MOD 30 MIN: CPT | Performed by: FAMILY MEDICINE

## 2021-12-20 PROCEDURE — G8510 SCR DEP NEG, NO PLAN REQD: HCPCS | Performed by: FAMILY MEDICINE

## 2021-12-20 PROCEDURE — G8417 CALC BMI ABV UP PARAM F/U: HCPCS | Performed by: FAMILY MEDICINE

## 2021-12-20 PROCEDURE — G8754 DIAS BP LESS 90: HCPCS | Performed by: FAMILY MEDICINE

## 2021-12-20 PROCEDURE — G0439 PPPS, SUBSEQ VISIT: HCPCS | Performed by: FAMILY MEDICINE

## 2021-12-20 RX ORDER — DESONIDE 0.5 MG/G
OINTMENT TOPICAL
COMMUNITY
Start: 2021-09-28 | End: 2022-03-21 | Stop reason: SDUPTHER

## 2021-12-20 RX ORDER — ACETAMINOPHEN AND CODEINE PHOSPHATE 300; 30 MG/1; MG/1
1 TABLET ORAL
Qty: 30 TABLET | Refills: 0 | Status: SHIPPED | OUTPATIENT
Start: 2021-12-20 | End: 2022-02-04
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
## 2021-12-20 NOTE — PROGRESS NOTES
HISTORY OF PRESENT ILLNESS  Gen Bradley is a 82 y.o. female. HPI   Follow up on chronic medical problems. Overall feeling well. Has good days and bad days. Stress with her family has her feeling anxious most of the time. Cardiovascular Review:  The patient has hypertension, hyperlipidemia, chronic diastolic heart failure, and obesity. Hx also of aortic stenosis. Diet and Lifestyle: generally follows a low fat low cholesterol diet, generally follows a low sodium diet, sedentary. Pertinent ROS: taking medications as instructed, no medication side effects noted, no TIA's, no chest pain on exertion, mild swelling of ankles, no orthostatic dizziness or lightheadedness, no palpitations,      Home BP Monitoring: is not measured at home. Anxiety Review:  Patient is seen for anxiety. Ongoing symptoms include: increased anxiety still related to family issues. Patient denies: palpitations, sweating, chest pain, shortness of breath. Reported side effects from the treatment: none. Encounter for pain management. 1./2. Medical history/Past medical History  Chronic Pain:  Osteoarthritis:  Patient has osteoarthritis. Overall doing better with back pain. Still having knee pain. Ortho told that they would refer to pain management. Aching more in the right knee and increase pain with walking. Has had 2 injections in the knee which has not helped very much. She has decided to not to pursue surgery. Pain is 5-6/10. Taking tylenol for the pain which helps some. Symptoms onset: problem is longstanding. Rheumatological ROS: stable, mild-to-moderate joint symptoms intermittently, reasonably well controlled by PRN meds. Response to treatment plan: stable and intermittent. 3. Applicable records from prior treatment providers are apart of [facility] under the media tab. 4. Diagnostic, therapeutic and laboratory results are available in the [address] [place] [address] chart.     5. Consultation notes are available for review in the media tab of the [facility] chart. 6. Treatment goals include pain control so that the pt may be as active and function with her daily activities and improved comfort level. Previously pt has been limited by pain. 7. The risks and benefits of treatment has been discussed at this office visit with the pt. She understands that the medication has addicting potential.  Additionally the pt has been advised that narcotic pain medication may impair mental and/or physical ability required for performance of tasks such as driving or operating any other machinery. 8. Pt has an updated signed pain contract on file and can be found under the FYI section of the [facility] chart. 9. The pain contract is reviewed. Pain medication will be continued at the previous dosage. Urine drug screening will not be done today. Diagnostic studies are not indicated at this time. Interventional procedure are not indicated at this time. 10. Medication prescibed is tylenol #3.  has been reviewed at this OV by me. 11. Patient instructions have been reviewed in detail as outlined above and in the pain contract. 12. Re-eval is planned for 3 months. He reports the following adverse side effects: none. Aberrant behaviors: None. Concomitant use of a benzodiazepine: yes  Will continue on current dose of medications as coarse has been stable and there are no signs of overuse, misuese, diversion, or concerning side effects  Naloxone prescription is warranted. It has been provided or is already on file.    Patient Active Problem List   Diagnosis Code    Hypokalemia E87.6    Hiatal hernia K44.9    Anxiety F41.9    S/P hysterectomy Z90.710    Aortic stenosis, mild I35.0    Spinal stenosis M48.00    S/P foot surgery Z98.890    Environmental allergies Z91.09    S/P cardiac catheterization Z98.890    Hypovitaminosis D E55.9    Chronic ischemic heart disease I25.9    Mixed hyperlipidemia E78.2    Chronic diastolic heart failure (HCC) I50.32    Chest pain at rest R07.9    Bifascicular bundle branch block--RBBB,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    Atypical chest pain R07.89    Essential hypertension I10    Gastroesophageal reflux disease without esophagitis K21.9    Atherosclerosis of native coronary artery without angina pectoris--diffuse small vsl disease via cath cath 2009--RCA PDA/ROSA I25.10    Chronic anxiety F41.9    Encounter for medication monitoring Z51.81    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 M47.815    Class 2 obesity due to excess calories with serious comorbidity and body mass index (BMI) of 38.0 to 38.9 in adult [id number]    Paroxysmal cardiac arrhythmia--PVC's,APC's,NSSVT I49.8    Severe obesity (BMI 35.0-39. 9) with comorbidity ([facility] [address] E66.01    Chest pain with normal angiography--2002;normal NST 2018 R07.9    Primary osteoarthritis of left shoulder M19.012    Opioid use, unspecified with unspecified opioid-induced disorder F11.99       Current Outpatient Medications   Medication Sig Dispense Refill    desonide (DESOWEN) 0.05 % topical ointment Apply  to affected area two (2) times daily as needed.  furosemide (LASIX) 80 mg tablet TAKE 1 TABLET BY MOUTH EVERY MORNING AND TAKE 1/2 TABLET EVERY EVENING 135 Tablet 3    clonazePAM (KlonoPIN) 1 mg tablet TAKE 1/2 TO 1 TABLET EVERY MORNING AND AS NEEDED FOR ANXIETY, AND TAKE 1 TABLET AT BEDTIME FOR SLEEP 60 Tablet 1    nitroglycerin (NITROSTAT) 0.4 mg SL tablet DISSOLVE 1 TAB BY SUBLINGUAL ROUTE EVERY 5 MINUTES AS NEEDED. 25 Tablet 0    amLODIPine (NORVASC) 5 mg tablet TAKE 1 TAB BY MOUTH TWO (2) TIMES A DAY. TAKE AT 9AM AND 9PM EVERY DAY. 180 Tablet 3    desonide (TRIDESILON) 0.05 % topical lotion Apply  to affected area two (2) times daily as needed for Skin Irritation or Itching. 59 mL 1    potassium chloride SR (KLOR-CON 10) 10 mEq tablet Take 3 Tablets by mouth two (2) times a day.  [address] [address] Tablet 11    losartan (COZAAR) 100 mg tablet TAKE 1 TABLET BY MOUTH EVERY DAY 90 Tablet 3    pantoprazole (PROTONIX) 40 mg tablet TAKE 1 TABLET BY MOUTH TWICE A DAY *30 MINUTES PRIOR TO BREAKFAST AND DINNER* 180 Tab 3    diclofenac (VOLTAREN) 1 % gel Apply  to affected area four (4) times daily. 100 g 0    isosorbide mononitrate ER (IMDUR) 30 mg tablet TAKE 1 TABLET BY MOUTH EVERY DAY 30 Tab 11    rosuvastatin (CRESTOR) 20 mg tablet TAKE 1 TABLET BY MOUTH EVERY DAY AT NIGHT 30 Tab 11    metoprolol tartrate (LOPRESSOR) 25 mg tablet Take 1 tab at 9am and 1 tab at 9pm      simethicone (GAS RELIEF) 80 mg chewable tablet Take 80 mg by mouth every six (6) hours as needed for Flatulence.  cholecalciferol, vitamin D3, (VITAMIN D3) 2,000 unit tab Take 1 Tab by mouth daily.  acetaminophen (TYLENOL EXTRA STRENGTH) 500 mg tablet Take 1,000 mg by mouth every six (6) hours as needed for Pain.  Aspirin, Buffered 81 mg tab Take 81 mg by mouth daily.          Allergies   Allergen Reactions    Bactrim [Sulfamethoxazole-Trimethoprim] Unknown (comments)     Pt does not recall    Darvocet A500 [Propoxyphene N-Acetaminophen] Itching         Past Medical History:   Diagnosis Date    Anxiety     Aortic stenosis 3/3/2010    Aortic stenosis     Arrhythmia     MURMUR    Arthritis     SPINAL STENOSIS    Atherosclerosis of coronary artery     Biliary dyskinesia     CHF (congestive heart failure) ([facility] [address] 3/3/2010    CHF (congestive heart failure) ([facility] [address] 01/2002    Chronic heart failure ([facility] [address] 1/2002; 3/3/2010    chronic diastolic heart failure    Chronic pain     LOWER BACK, RIGHT KNEE    Environmental allergies 3/3/2010    GERD (gastroesophageal reflux disease) 3/3/2010    Hiatal hernia 3/3/2010    High cholesterol 3/3/2010    HTN (hypertension) 3/3/2010    Hypokalemia 3/3/2010    Ischemic heart disease, chronic     Obese     Psychiatric disorder     anxiety    S/P hysterectomy 3/3/2010    Spinal stenosis 3/3/2010         Past Surgical History:   Procedure Laterality Date    CARDIAC CATHETERIZATION  01/2002    normal coronaries    DECOMPRESS DISC RF LUMBAR  07/2007    HX BACK SURGERY  5/1/2014    HX BREAST LUMPECTOMY Left 1989    benign left breast    HX BUNIONECTOMY      HX BUNIONECTOMY      HX HEART CATHETERIZATION  3/3/10    HX HYSTERECTOMY  1978    HX LUMBAR DISKECTOMY      HX ORTHOPAEDIC Bilateral     BUNIONECTOMY    HX ORTHOPAEDIC Right     WRIST FX    HX OTHER SURGICAL  10/12/2015    mole removed from right side of face by Dr. Janel Henry FLX DX W/COLLJ Yair 1978 PFRMD  [id number]    Dr Jose Sandoval GI ENDOSCOPY,BIOPSY  2/27/2019              Family History   Problem Relation Age of Onset    Hypertension Mother     Heart Disease Father     Stroke Sister     Heart Disease Sister     Heart Disease Sister     Cancer Brother         LUNG    Cancer Brother         PROSTATE    Hypertension Brother     No Known Problems Brother     Lung Disease Brother     Heart Attack Brother     Lung Disease Brother     Stroke Brother     Stroke Daughter 47    No Known Problems Daughter     Anesth Problems Neg Hx        Social History     Tobacco Use    Smoking status: Never Smoker    Smokeless tobacco: Never Used   Substance Use Topics    Alcohol use: No     Alcohol/week: 0.0 standard drinks        Lab Results   Component Value Date/Time    WBC 4.5 08/02/2021 12:00 PM    HGB 13.1 08/02/2021 12:00 PM    HCT 42.3 08/02/2021 12:00 PM    PLATELET 018 (L) 24/65/3324 12:00 PM    MCV 91.8 08/02/2021 12:00 PM     Lab Results   Component Value Date/Time    Cholesterol, total 177 04/02/2021 11:57 AM    HDL Cholesterol 65 04/02/2021 11:57 AM    LDL, calculated 94.4 04/02/2021 11:57 AM    Triglyceride 88 04/02/2021 11:57 AM    CHOL/HDL Ratio 2.7 04/02/2021 11:57 AM     Lab Results   Component Value Date/Time    TSH 2.470 05/15/2017 03:04 PM      Lab Results   Component Value Date/Time Sodium 141 08/02/2021 12:00 PM    Potassium 3.4 (L) 08/02/2021 12:00 PM    Chloride 103 08/02/2021 12:00 PM    CO2 34 (H) 08/02/2021 12:00 PM    Anion gap 4 (L) 08/02/2021 12:00 PM    Glucose 78 08/02/2021 12:00 PM    BUN 18 08/02/2021 12:00 PM    Creatinine 0.95 08/02/2021 12:00 PM    BUN/Creatinine ratio 19 08/02/2021 12:00 PM    GFR est AA >60 08/02/2021 12:00 PM    GFR est non-AA 56 (L) 08/02/2021 12:00 PM    Calcium 9.2 08/02/2021 12:00 PM    Bilirubin, total 0.7 04/02/2021 11:57 AM    ALT (SGPT) 21 04/02/2021 11:57 AM    Alk. phosphatase 85 04/02/2021 11:57 AM    Protein, total 7.3 04/02/2021 11:57 AM    Albumin 3.3 (L) 04/02/2021 11:57 AM    Globulin 4.0 04/02/2021 11:57 AM    A-G Ratio 0.8 (L) 04/02/2021 11:57 AM      Lab Results   Component Value Date/Time    Hemoglobin A1c 5.4 04/16/2014 12:20 PM    Hemoglobin A1c (POC) 5.5 11/26/2014 12:12 PM         Review of Systems   Constitutional: Negative for malaise/fatigue. HENT: Negative for congestion. Eyes: Negative for blurred vision. Respiratory: Negative for cough and shortness of breath. Cardiovascular: Negative for chest pain, palpitations and leg swelling. Gastrointestinal: Negative for abdominal pain, constipation and heartburn. Genitourinary: Negative for dysuria, frequency and urgency. Neurological: Negative for dizziness, tingling and headaches. Endo/Heme/Allergies: Negative for environmental allergies. Psychiatric/Behavioral: Negative for depression. The patient does not have insomnia. Physical Exam  Vitals and nursing note reviewed. Constitutional:       Appearance: Normal appearance. She is well-developed. She is obese.       Comments: /78 (BP 1 Location: Left arm, BP Patient Position: Sitting)   Pulse 64   Temp 97.6 °F (36.4 °C) (Oral)   Resp 18   Ht 4' 9\" (1.448 m)   Wt 179 lb 9.6 oz (81.5 kg)   LMP  (LMP Unknown)   SpO2 95%   BMI 38.87 kg/m²          HENT:      Right Ear: Tympanic membrane and ear canal normal.      Left Ear: Tympanic membrane and ear canal normal.      Nose: No mucosal edema. Neck:      Thyroid: No thyromegaly. Cardiovascular:      Rate and Rhythm: Normal rate and regular rhythm. Heart sounds: Normal heart sounds. Pulmonary:      Effort: Pulmonary effort is normal.      Breath sounds: Normal breath sounds. Abdominal:      General: Bowel sounds are normal.      Palpations: Abdomen is soft. There is no mass. Tenderness: There is no abdominal tenderness. Musculoskeletal:      Cervical back: Normal range of motion and neck supple. Lumbar back: Tenderness and bony tenderness present. Decreased range of motion. Right knee: Deformity and bony tenderness present. No effusion. Tenderness present over the medial joint line and lateral joint line. Left knee: Deformity and bony tenderness present. No swelling or effusion. Tenderness present over the medial joint line and lateral joint line. Right lower leg: No edema. Left lower leg: No edema. Lymphadenopathy:      Cervical: No cervical adenopathy. Skin:     General: Skin is warm and dry. Neurological:      General: No focal deficit present. Mental Status: She is alert and oriented to person, place, and time. Psychiatric:         Mood and Affect: Mood normal.         ASSESSMENT and PLAN  Diagnoses and all orders for this visit:    1. Medicare annual wellness visit, subsequent    2. Essential hypertension  Stable at goal.     3. Mixed hyperlipidemia  -     LIPID PANEL; Future    4. Atherosclerosis of native coronary artery of native heart without angina pectoris//  5. Chronic diastolic heart failure (HCC)  Stable     6. Gastroesophageal reflux disease without esophagitis  Stable on protonix    7. Chronic anxiety  Declines counseling for family stress. Denies SI/HI.     Stable on prn clonazepam.      8. Primary osteoarthritis of right knee  -     acetaminophen-codeine (Tylenol-Codeine #3) 300-30 mg per tablet; Take 1 Tablet by mouth every six (6) hours as needed for Pain for up to 30 days. Max Daily Amount: 4 Tablets. 9. Chronic bilateral low back pain without sciatica//  10. Generalized osteoarthritis  -     acetaminophen-codeine (Tylenol-Codeine #3) 300-30 mg per tablet; Take 1 Tablet by mouth every six (6) hours as needed for Pain for up to 30 days. Max Daily Amount: 4 Tablets. 11. Encounter for chronic pain management  The pt has signed medication agreement. Pain contract is reviewed. Pain medications will be continued at the previous dosage. Urine drug screening will not be done today. Diagnostic  studies are not indicated at this time. Interventional procedure are not indicated at this time. Re-eval in 3 months. 12. Encounter for medication monitoring  -     METABOLIC PANEL, COMPREHENSIVE; Future  -     CBC W/O DIFF; Future    13. Needs flu shot  -     FLU (FLUAD QUAD INFLUENZA VACCINE,QUAD,ADJUVANTED)      Follow-up and Dispositions    · Return in about 4 months (around 4/20/2022). current treatment plan is effective, no change in therapy  reviewed diet, exercise and weight control  cardiovascular risk and specific lipid/LDL goals reviewed  reviewed medications and side effects in detail    I have discussed diagnosis listed in this note with pt and/or family. I have discussed treatment plans and options and the risk/benefit analysis of those options, including safe use of medications and possible medication side effects. Through the use of shared decision making we have agreed to the above plan. The patient has received an after-visit summary and questions were answered concerning future plans and follow up. Advise pt of any urgent changes then to proceed to the ER.

## 2021-12-20 NOTE — PROGRESS NOTES
Chief Complaint   Patient presents with   [facility] Wellness Visit       Verbal order received by Dr. Lucas Grissomus dose flu vaccine IM. Pt received high dose flu vaccine IM in right deltoid without any difficulty. 1. Have you been to the ER, urgent care clinic since your last visit? Hospitalized since your last visit? No    2. Have you seen or consulted any other health care providers outside of the [address] [place] [address] since your last visit? Include any pap smears or colon screening.  No

## 2021-12-29 RX ORDER — ROSUVASTATIN CALCIUM 20 MG/1
TABLET, COATED ORAL
Qty: 30 TABLET | Refills: 11 | Status: ON HOLD | OUTPATIENT
Start: 2021-12-29

## 2022-02-04 DIAGNOSIS — M17.11 PRIMARY OSTEOARTHRITIS OF RIGHT KNEE: ICD-10-CM

## 2022-02-04 DIAGNOSIS — M15.9 GENERALIZED OSTEOARTHRITIS: ICD-10-CM

## 2022-02-04 RX ORDER — ACETAMINOPHEN AND CODEINE PHOSPHATE 300; 30 MG/1; MG/1
TABLET ORAL
Qty: 30 TABLET | Refills: 0 | Status: SHIPPED | OUTPATIENT
Start: 2022-02-04 | End: 2022-05-14

## 2022-02-15 DIAGNOSIS — I10 ESSENTIAL HYPERTENSION: ICD-10-CM

## 2022-02-15 DIAGNOSIS — I25.10 ATHEROSCLEROSIS OF NATIVE CORONARY ARTERY OF NATIVE HEART WITHOUT ANGINA PECTORIS: ICD-10-CM

## 2022-02-15 RX ORDER — METOPROLOL TARTRATE 25 MG/1
TABLET, FILM COATED ORAL
Qty: 180 TABLET | Refills: 3 | Status: ON HOLD | OUTPATIENT
Start: 2022-02-15

## 2022-02-15 RX ORDER — ISOSORBIDE MONONITRATE 30 MG/1
TABLET, EXTENDED RELEASE ORAL
Qty: 90 TABLET | Refills: 3 | Status: ON HOLD | OUTPATIENT
Start: 2022-02-15

## 2022-03-07 DIAGNOSIS — I10 ESSENTIAL HYPERTENSION: ICD-10-CM

## 2022-03-07 NOTE — TELEPHONE ENCOUNTER
Patient states Losartan is on backorder at [facility]. Patient wants to try to get prescription from [place].

## 2022-03-08 RX ORDER — LOSARTAN POTASSIUM 100 MG/1
TABLET ORAL
Qty: 90 TABLET | Refills: 3 | Status: ON HOLD | OUTPATIENT
Start: 2022-03-08

## 2022-03-09 ENCOUNTER — TELEPHONE (OUTPATIENT)
Dept: FAMILY MEDICINE CLINIC | Age: 87
End: 2022-03-09

## 2022-03-09 NOTE — TELEPHONE ENCOUNTER
Spoke to patient and prescription for Losartan went to [place] as per conversation yesterday. Patient stated she would check [facility]. Informed patient to call back if not there. Patient verbalized understanding.

## 2022-03-09 NOTE — TELEPHONE ENCOUNTER
----- Message from Reg Cornejo sent at 3/9/2022 12:56 PM EST -----  Subject: Medication Problem    QUESTIONS  Name of Medication? losartan (COZAAR) 100 mg tablet  Patient-reported dosage and instructions? 100mg  What question or problem do you have with the medication? Patient is   waiting on a call back regarding her pharmacy not carrying this medication   anymore  Preferred Pharmacy? [id number] [place], [address] [facility] phone number (if available)? [phone]  Additional Information for Provider?   ---------------------------------------------------------------------------  --------------  [address]  What is the best way for the office to contact you? OK to leave message on   voicemail  Preferred Call Back Phone Number? [phone]  ---------------------------------------------------------------------------  --------------  SCRIPT ANSWERS  Relationship to Patient?  Self

## 2022-03-18 PROBLEM — M19.012 PRIMARY OSTEOARTHRITIS OF LEFT SHOULDER: Status: ACTIVE | Noted: 2021-05-07

## 2022-03-19 PROBLEM — E66.01 SEVERE OBESITY (BMI 35.0-39.9) WITH COMORBIDITY (HCC): Status: ACTIVE | Noted: 2018-03-28

## 2022-03-19 PROBLEM — R07.9 CHEST PAIN WITH NORMAL ANGIOGRAPHY: Status: ACTIVE | Noted: 2019-04-30

## 2022-03-19 PROBLEM — I49.8 PAROXYSMAL CARDIAC ARRHYTHMIA: Status: ACTIVE | Noted: 2018-02-20

## 2022-03-20 PROBLEM — F11.99 OPIOID USE, UNSPECIFIED WITH UNSPECIFIED OPIOID-INDUCED DISORDER (HCC): Status: ACTIVE | Noted: 2021-08-13

## 2022-03-21 ENCOUNTER — OFFICE VISIT (OUTPATIENT)
Dept: FAMILY MEDICINE CLINIC | Age: 87
End: 2022-03-21
Payer: MEDICARE

## 2022-03-21 VITALS
WEIGHT: 177.6 LBS | TEMPERATURE: 96.7 F | OXYGEN SATURATION: 95 % | RESPIRATION RATE: 18 BRPM | HEIGHT: 57 IN | SYSTOLIC BLOOD PRESSURE: 128 MMHG | BODY MASS INDEX: 38.32 KG/M2 | HEART RATE: 68 BPM | DIASTOLIC BLOOD PRESSURE: 70 MMHG

## 2022-03-21 DIAGNOSIS — I50.32 CHRONIC DIASTOLIC HEART FAILURE (HCC): ICD-10-CM

## 2022-03-21 DIAGNOSIS — G89.29 CHRONIC BILATERAL LOW BACK PAIN WITHOUT SCIATICA: ICD-10-CM

## 2022-03-21 DIAGNOSIS — Z51.81 ENCOUNTER FOR MEDICATION MONITORING: ICD-10-CM

## 2022-03-21 DIAGNOSIS — I25.10 ATHEROSCLEROSIS OF NATIVE CORONARY ARTERY OF NATIVE HEART WITHOUT ANGINA PECTORIS: ICD-10-CM

## 2022-03-21 DIAGNOSIS — K21.9 GASTROESOPHAGEAL REFLUX DISEASE WITHOUT ESOPHAGITIS: ICD-10-CM

## 2022-03-21 DIAGNOSIS — I10 ESSENTIAL HYPERTENSION: Primary | ICD-10-CM

## 2022-03-21 DIAGNOSIS — G89.29 ENCOUNTER FOR CHRONIC PAIN MANAGEMENT: ICD-10-CM

## 2022-03-21 DIAGNOSIS — M54.50 CHRONIC BILATERAL LOW BACK PAIN WITHOUT SCIATICA: ICD-10-CM

## 2022-03-21 DIAGNOSIS — E78.2 MIXED HYPERLIPIDEMIA: ICD-10-CM

## 2022-03-21 DIAGNOSIS — F41.9 CHRONIC ANXIETY: ICD-10-CM

## 2022-03-21 DIAGNOSIS — M17.11 PRIMARY OSTEOARTHRITIS OF RIGHT KNEE: ICD-10-CM

## 2022-03-21 PROBLEM — F11.99 OPIOID USE, UNSPECIFIED WITH UNSPECIFIED OPIOID-INDUCED DISORDER (HCC): Status: RESOLVED | Noted: 2021-08-13 | Resolved: 2022-03-21

## 2022-03-21 PROCEDURE — G0463 HOSPITAL OUTPT CLINIC VISIT: HCPCS | Performed by: FAMILY MEDICINE

## 2022-03-21 PROCEDURE — 99214 OFFICE O/P EST MOD 30 MIN: CPT | Performed by: FAMILY MEDICINE

## 2022-03-21 PROCEDURE — G8427 DOCREV CUR MEDS BY ELIG CLIN: HCPCS | Performed by: FAMILY MEDICINE

## 2022-03-21 PROCEDURE — 1090F PRES/ABSN URINE INCON ASSESS: CPT | Performed by: FAMILY MEDICINE

## 2022-03-21 PROCEDURE — G8752 SYS BP LESS 140: HCPCS | Performed by: FAMILY MEDICINE

## 2022-03-21 PROCEDURE — G8400 PT W/DXA NO RESULTS DOC: HCPCS | Performed by: FAMILY MEDICINE

## 2022-03-21 PROCEDURE — G8417 CALC BMI ABV UP PARAM F/U: HCPCS | Performed by: FAMILY MEDICINE

## 2022-03-21 PROCEDURE — G8510 SCR DEP NEG, NO PLAN REQD: HCPCS | Performed by: FAMILY MEDICINE

## 2022-03-21 PROCEDURE — G8754 DIAS BP LESS 90: HCPCS | Performed by: FAMILY MEDICINE

## 2022-03-21 PROCEDURE — G8536 NO DOC ELDER MAL SCRN: HCPCS | Performed by: FAMILY MEDICINE

## 2022-03-21 PROCEDURE — 1101F PT FALLS ASSESS-DOCD LE1/YR: CPT | Performed by: FAMILY MEDICINE

## 2022-03-21 RX ORDER — KETOROLAC TROMETHAMINE 30 MG/ML
30 INJECTION, SOLUTION INTRAMUSCULAR; INTRAVENOUS ONCE
Qty: 1 ML | Refills: 0
Start: 2022-03-21 | End: 2022-03-21

## 2022-03-21 NOTE — PROGRESS NOTES
HISTORY OF PRESENT ILLNESS  Erika Sutton is a 82 y.o. female. HPI   Follow up on chronic medical problems. Overall feeling well. Has good days and bad days. Stress with her family has her feeling anxious most of the time still. Cardiovascular Review:  The patient has hypertension, hyperlipidemia, chronic diastolic heart failure, and obesity. Hx also of aortic stenosis. Diet and Lifestyle: generally follows a low fat low cholesterol diet, generally follows a low sodium diet, sedentary. Pertinent ROS: taking medications as instructed, no medication side effects noted, no TIA's, no chest pain on exertion, mild swelling of ankles, no orthostatic dizziness or lightheadedness, no palpitations,      Home BP Monitoring: is not measured at home. Anxiety Review:  Patient is seen for anxiety. Ongoing symptoms include: increased anxiety still related to family issues. Patient denies: palpitations, sweating, chest pain, shortness of breath. Reported side effects from the treatment: none. Encounter for pain management. 1./2. Medical history/Past medical History  Chronic Pain:  Osteoarthritis:  Patient has osteoarthritis. Overall doing better with back pain. Still having knee pain. Ortho told that they would refer to pain management but she has not yet gone for this consult. Aching more in the right knee and increase pain with walking. Has had 2 injections in the knee which has not helped very much. She has decided to not to pursue surgery. Pain in the knee is \"bad today\"  Discussed getting toradol injection. Pain is 5-6/10. Taking tylenol for the pain which helps some. Symptoms onset: problem is longstanding. Rheumatological ROS: stable, mild-to-moderate joint symptoms intermittently, reasonably well controlled by PRN meds. Response to treatment plan: stable and intermittent. 3. Applicable records from prior treatment providers are apart of [facility] under the media tab.   4. Diagnostic, therapeutic and laboratory results are available in the [address] [place] [address] chart. 5. Consultation notes are available for review in the media tab of the [address] [place] [address] chart. 6. Treatment goals include pain control so that the pt may be as active and function with her daily activities and improved comfort level. Previously pt has been limited by pain. 7. The risks and benefits of treatment has been discussed at this office visit with the pt. She understands that the medication has addicting potential.  Additionally the pt has been advised that narcotic pain medication may impair mental and/or physical ability required for performance of tasks such as driving or operating any other machinery. 8. Pt has an updated signed pain contract on file and can be found under the FYI section of the [facility] chart. 9. The pain contract is reviewed. Pain medication will be continued at the previous dosage. Urine drug screening will not be done today. Diagnostic studies are not indicated at this time. Interventional procedure are not indicated at this time. 10. Medication prescibed is tylenol #3.  has been reviewed at this OV by me. 11. Patient instructions have been reviewed in detail as outlined above and in the pain contract. 12. Re-eval is planned for 3 months. He reports the following adverse side effects: none. Aberrant behaviors: None. Concomitant use of a benzodiazepine: yes  Will continue on current dose of medications as coarse has been stable and there are no signs of overuse, misuese, diversion, or concerning side effects  Naloxone prescription is warranted. It has been provided or is already on file.      Patient Active Problem List   Diagnosis Code    Hypokalemia E87.6    Hiatal hernia K44.9    Anxiety F41.9    S/P hysterectomy Z90.710    Aortic stenosis, mild I35.0    Spinal stenosis M48.00    S/P foot surgery Z98.890    Environmental allergies Z91.09    S/P cardiac catheterization Z98.890    Hypovitaminosis D E55.9    Chronic ischemic heart disease I25.9    Mixed hyperlipidemia E78.2    Chronic diastolic heart failure (HCC) I50.32    Chest pain at rest R07.9    Bifascicular bundle branch block--RBBB,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    Atypical chest pain R07.89    Essential hypertension I10    Gastroesophageal reflux disease without esophagitis K21.9    Atherosclerosis of native coronary artery without angina pectoris--diffuse small vsl disease via cath cath 2009--RCA PDA/ROSA I25.10    Chronic anxiety F41.9    Encounter for medication monitoring Z51.81    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 9) with comorbidity ([facility] [address] E66.01    Chest pain with normal angiography--2002;normal NST 2018 R07.9    Primary osteoarthritis of left shoulder M19.012       Current Outpatient Medications   Medication Sig Dispense Refill    losartan (COZAAR) 100 mg tablet TAKE 1 TABLET BY MOUTH EVERY DAY 90 Tablet 3    metoprolol tartrate (LOPRESSOR) 25 mg tablet TAKE 1 TABLET BY MOUTH TWICE A DAY 1 AT 9AM AND 1 AT 9PM 180 Tablet 3    isosorbide mononitrate ER (IMDUR) 30 mg tablet TAKE 1 TABLET BY MOUTH EVERY DAY 90 Tablet 3    clonazePAM (KlonoPIN) 1 mg tablet TAKE 1/2 TO 1 TABLET EVERY MORNING AND AS NEEDED FOR ANXIETY, AND TAKE 1 TABLET AT BEDTIME FOR SLEEP 60 Tablet 1    acetaminophen-codeine (TYLENOL #3) 300-30 mg per tablet TAKE 1 TABLET BY MOUTH EVERY 6 HOURS AS NEEDED FOR PAIN FOR UP TO 30 DAYS. *MAX DAILY 4 TABLETS* 30 Tablet 0    rosuvastatin (CRESTOR) 20 mg tablet TAKE 1 TABLET BY MOUTH EVERY DAY AT NIGHT 30 Tablet 11    desonide (DESOWEN) 0.05 % topical ointment Apply  to affected area two (2) times daily as needed.       furosemide (LASIX) 80 mg tablet TAKE 1 TABLET BY MOUTH EVERY MORNING AND TAKE 1/2 TABLET EVERY EVENING 135 Tablet 3    nitroglycerin (NITROSTAT) 0.4 mg SL tablet DISSOLVE 1 TAB BY SUBLINGUAL ROUTE EVERY 5 MINUTES AS NEEDED. 25 Tablet 0    amLODIPine (NORVASC) 5 mg tablet TAKE 1 TAB BY MOUTH TWO (2) TIMES A DAY. TAKE AT 9AM AND 9PM EVERY DAY. 180 Tablet 3    desonide (TRIDESILON) 0.05 % topical lotion Apply  to affected area two (2) times daily as needed for Skin Irritation or Itching. 59 mL 1    potassium chloride SR (KLOR-CON 10) 10 mEq tablet Take 3 Tablets by mouth two (2) times a day. 180 Tablet 11    pantoprazole (PROTONIX) 40 mg tablet TAKE 1 TABLET BY MOUTH TWICE A DAY *30 MINUTES PRIOR TO BREAKFAST AND DINNER* 180 Tab 3    diclofenac (VOLTAREN) 1 % gel Apply  to affected area four (4) times daily. 100 g 0    simethicone (GAS RELIEF) 80 mg chewable tablet Take 80 mg by mouth every six (6) hours as needed for Flatulence.  cholecalciferol, vitamin D3, (VITAMIN D3) 2,000 unit tab Take 1 Tab by mouth daily.  acetaminophen (TYLENOL EXTRA STRENGTH) 500 mg tablet Take 1,000 mg by mouth every six (6) hours as needed for Pain.  Aspirin, Buffered 81 mg tab Take 81 mg by mouth daily.          Allergies   Allergen Reactions    Bactrim [Sulfamethoxazole-Trimethoprim] Unknown (comments)     Pt does not recall    Darvocet A500 [Propoxyphene N-Acetaminophen] Itching       Past Medical History:   Diagnosis Date    Anxiety     Aortic stenosis 3/3/2010    Aortic stenosis     Arrhythmia     MURMUR    Arthritis     SPINAL STENOSIS    Atherosclerosis of coronary artery     Biliary dyskinesia     CHF (congestive heart failure) ([facility] [address] 3/3/2010    CHF (congestive heart failure) ([facility] [address] 01/2002    Chronic heart failure ([address] 1/2002; 3/3/2010    chronic diastolic heart failure    Chronic pain     LOWER BACK, RIGHT KNEE    Environmental allergies 3/3/2010    GERD (gastroesophageal reflux disease) 3/3/2010    Hiatal hernia 3/3/2010    High cholesterol 3/3/2010    HTN (hypertension) 3/3/2010    Hypokalemia 3/3/2010    Ischemic heart disease, chronic     Obese     Psychiatric disorder     anxiety    S/P hysterectomy 3/3/2010    Spinal stenosis 3/3/2010       Past Surgical History:   Procedure Laterality Date    CARDIAC CATHETERIZATION  01/2002    normal coronaries    DECOMPRESS DISC RF LUMBAR  07/2007    HX BACK SURGERY  5/1/2014    HX BREAST LUMPECTOMY Left 1989    benign left breast    HX BUNIONECTOMY      HX BUNIONECTOMY      HX HEART CATHETERIZATION  3/3/10    HX HYSTERECTOMY  1978    HX LUMBAR DISKECTOMY      HX ORTHOPAEDIC Bilateral     BUNIONECTOMY    HX ORTHOPAEDIC Right     WRIST FX    HX OTHER SURGICAL  10/12/2015    mole removed from right side of face by Dr. Sam Coto FLX DX W/COLLJ [place] 1978 PFRMD  [id number]    Dr Bernabe Gandhi GI ENDOSCOPY,BIOPSY  2/27/2019            Family History   Problem Relation Age of Onset    Hypertension Mother     Heart Disease Father     Stroke Sister     Heart Disease Sister     Heart Disease Sister     Cancer Brother         LUNG    Cancer Brother         PROSTATE    Hypertension Brother     No Known Problems Brother     Lung Disease Brother     Heart Attack Brother     Lung Disease Brother     Stroke Brother     Stroke Daughter 47    No Known Problems Daughter     Anesth Problems Neg Hx        Social History     Tobacco Use    Smoking status: Never Smoker    Smokeless tobacco: Never Used   Substance Use Topics    Alcohol use: No     Alcohol/week: 0.0 standard drinks        Lab Results   Component Value Date/Time    WBC 5.5 12/20/2021 11:05 AM    HGB 13.5 12/20/2021 11:05 AM    HCT 44.5 12/20/2021 11:05 AM    PLATELET 081 97/27/7985 11:05 AM    MCV 92.3 12/20/2021 11:05 AM     Lab Results   Component Value Date/Time    Cholesterol, total 176 12/20/2021 11:05 AM    HDL Cholesterol 61 12/20/2021 11:05 AM    LDL, calculated 92.6 12/20/2021 11:05 AM    Triglyceride 112 12/20/2021 11:05 AM    CHOL/HDL Ratio 2.9 12/20/2021 11:05 AM     Lab Results   Component Value Date/Time    TSH 2.470 05/15/2017 03:04 PM      Lab Results   Component Value Date/Time    Sodium 140 12/20/2021 11:05 AM    Potassium 3.6 12/20/2021 11:05 AM    Chloride 104 12/20/2021 11:05 AM    CO2 32 12/20/2021 11:05 AM    Anion gap 4 (L) 12/20/2021 11:05 AM    Glucose 101 (H) 12/20/2021 11:05 AM    BUN 11 12/20/2021 11:05 AM    Creatinine 1.05 (H) 12/20/2021 11:05 AM    BUN/Creatinine ratio 10 (L) 12/20/2021 11:05 AM    GFR est AA >60 12/20/2021 11:05 AM    GFR est non-AA 50 (L) 12/20/2021 11:05 AM    Calcium 9.6 12/20/2021 11:05 AM    Bilirubin, total 0.5 12/20/2021 11:05 AM    ALT (SGPT) 16 12/20/2021 11:05 AM    Alk. phosphatase 85 12/20/2021 11:05 AM    Protein, total 7.7 12/20/2021 11:05 AM    Albumin 3.7 12/20/2021 11:05 AM    Globulin 4.0 12/20/2021 11:05 AM    A-G Ratio 0.9 (L) 12/20/2021 11:05 AM      Lab Results   Component Value Date/Time    Hemoglobin A1c 5.4 04/16/2014 12:20 PM    Hemoglobin A1c (POC) 5.5 11/26/2014 12:12 PM         Review of Systems   Constitutional: Negative for malaise/fatigue. HENT: Negative for congestion. Eyes: Negative for blurred vision. Respiratory: Negative for cough and shortness of breath. Cardiovascular: Negative for chest pain, palpitations and leg swelling. Gastrointestinal: Negative for abdominal pain, constipation and heartburn. Genitourinary: Negative for dysuria, frequency and urgency. Neurological: Negative for dizziness, tingling and headaches. Endo/Heme/Allergies: Negative for environmental allergies. Psychiatric/Behavioral: Negative for depression. The patient does not have insomnia. Physical Exam  Vitals and nursing note reviewed. Constitutional:       Appearance: Normal appearance. She is well-developed.       Comments: /70 (BP 1 Location: Right arm, BP Patient Position: Sitting, BP Cuff Size: Adult)   Pulse 68   Temp (!) 96.7 °F (35.9 °C) (Oral)   Resp 18   Ht 4' 9\" (1.448 m)   Wt 177 lb 9.6 oz (80.6 kg)   LMP  (LMP Unknown)   SpO2 95%   BMI 38.43 kg/m²    HENT:      Right Ear: Tympanic membrane and ear canal normal.      Left Ear: Tympanic membrane and ear canal normal.   Neck:      Thyroid: No thyromegaly. Cardiovascular:      Rate and Rhythm: Normal rate and regular rhythm. Heart sounds: Normal heart sounds. Pulmonary:      Effort: Pulmonary effort is normal.      Breath sounds: Normal breath sounds. Abdominal:      General: Bowel sounds are normal.      Palpations: Abdomen is soft. There is no mass. Tenderness: There is no abdominal tenderness. Musculoskeletal:      Cervical back: Normal range of motion and neck supple. Right knee: Swelling present. Decreased range of motion. Tenderness present over the medial joint line and lateral joint line. Right lower leg: No edema. Left lower leg: No edema. Lymphadenopathy:      Cervical: No cervical adenopathy. Skin:     General: Skin is warm and dry. Neurological:      General: No focal deficit present. Mental Status: She is alert and oriented to person, place, and time. Psychiatric:         Mood and Affect: Mood normal.         ASSESSMENT and PLAN  Diagnoses and all orders for this visit:    1. Essential hypertension  Stable     2. Mixed hyperlipidemia  Stable on crestor. Continue to monitor. Work on diet and exercise. 3. Atherosclerosis of native coronary artery of native heart without angina pectoris//  4. Chronic diastolic heart failure (HCC)  Stable     5. Gastroesophageal reflux disease without esophagitis  Stable on protonix. 6. Chronic anxiety  Stable     7. Chronic bilateral low back pain without sciatica  Stable     8. Primary osteoarthritis of right knee  -     ketorolac (TORADOL) 30 mg/mL (1 mL) injection; 1 mL by IntraMUSCular route once for 1 dose.   -     KETOROLAC TROMETHAMINE INJ  -     [place] THER/PROPH/DIAG INJECTION, SUBCUT/IM  Instructions for exercises given and reviewed with pt. Pt also to use heat to the area 3-4 times a day over the next several days until sx are improved. 9. Encounter for chronic pain management  -     MONITOR SCREEN 10-DRUG CLASS PROFILE; Future    10. Encounter for medication monitoring      Follow-up and Dispositions    · Return in about 2 months (around 5/21/2022). reviewed diet, exercise and weight control  cardiovascular risk and specific lipid/LDL goals reviewed  reviewed medications and side effects in detail    I have discussed diagnosis listed in this note with pt and/or family. I have discussed treatment plans and options and the risk/benefit analysis of those options, including safe use of medications and possible medication side effects. Through the use of shared decision making we have agreed to the above plan. The patient has received an after-visit summary and questions were answered concerning future plans and follow up. Advise pt of any urgent changes then to proceed to the ER.

## 2022-03-21 NOTE — PROGRESS NOTES
Chief Complaint   Patient presents with    Hypertension     follow up    Cholesterol Problem     follow up       1. \"Have you been to the ER, urgent care clinic since your last visit? Hospitalized since your last visit? \" No    2. \"Have you seen or consulted any other health care providers outside of the [address] [place] [address] since your last visit? \" No     3. For patients aged 39-70: Has the patient had a colonoscopy / FIT/ Cologuard? NA - based on age      If the patient is female:    4. For patients aged 41-77: Has the patient had a mammogram within the past 2 years? NA - based on age or sex      11. For patients aged 21-65: Has the patient had a pap smear?  NA - based on age or sex

## 2022-03-21 NOTE — PROGRESS NOTES
Verbal order received per Dr. Jack Chilel- Toradol 30mg IM IM-VORB. Pt received Toradol 30mg 1ml in left deltoid without any difficulty.

## 2022-03-22 LAB
AMPHETAMINES UR QL SCN: NEGATIVE NG/ML
BARBITURATES UR QL SCN: NEGATIVE NG/ML
BENZODIAZ UR QL SCN: NEGATIVE NG/ML
BZE UR QL SCN: NEGATIVE NG/ML
CANNABINOIDS UR QL SCN: NEGATIVE NG/ML
CREAT UR-MCNC: 162.6 MG/DL (ref 20–300)
METHADONE UR QL SCN: NEGATIVE NG/ML
OPIATES UR QL SCN: NEGATIVE NG/ML
OXYCODONE+OXYMORPHONE UR QL SCN: NEGATIVE NG/ML
PCP UR QL: NEGATIVE NG/ML
PH UR: 7.1 [PH] (ref 4.5–8.9)
PLEASE NOTE:, 733163: NORMAL
PROPOXYPH UR QL SCN: NEGATIVE NG/ML

## 2022-03-24 PROBLEM — F11.99 OPIOID USE, UNSPECIFIED WITH UNSPECIFIED OPIOID-INDUCED DISORDER (HCC): Status: RESOLVED | Noted: 2021-08-13 | Resolved: 2022-03-21

## 2022-04-22 ENCOUNTER — TELEPHONE (OUTPATIENT)
Dept: ORTHOPEDIC SURGERY | Age: 87
End: 2022-04-22

## 2022-04-28 DIAGNOSIS — F41.9 ANXIETY: Chronic | ICD-10-CM

## 2022-04-28 RX ORDER — CLONAZEPAM 1 MG/1
TABLET ORAL
Qty: 60 TABLET | Refills: 1 | Status: SHIPPED | OUTPATIENT
Start: 2022-04-28 | End: 2022-07-26 | Stop reason: SDUPTHER

## 2022-05-02 DIAGNOSIS — M19.012 PRIMARY OSTEOARTHRITIS OF LEFT SHOULDER: Primary | ICD-10-CM

## 2022-05-03 ENCOUNTER — OFFICE VISIT (OUTPATIENT)
Dept: FAMILY MEDICINE CLINIC | Age: 87
End: 2022-05-03
Payer: MEDICARE

## 2022-05-03 VITALS
TEMPERATURE: 97.4 F | HEIGHT: 57 IN | SYSTOLIC BLOOD PRESSURE: 139 MMHG | WEIGHT: 177.4 LBS | RESPIRATION RATE: 16 BRPM | BODY MASS INDEX: 38.27 KG/M2 | DIASTOLIC BLOOD PRESSURE: 78 MMHG | HEART RATE: 66 BPM | OXYGEN SATURATION: 96 %

## 2022-05-03 DIAGNOSIS — R06.02 SOB (SHORTNESS OF BREATH): ICD-10-CM

## 2022-05-03 DIAGNOSIS — I25.10 ATHEROSCLEROSIS OF NATIVE CORONARY ARTERY OF NATIVE HEART WITHOUT ANGINA PECTORIS: ICD-10-CM

## 2022-05-03 DIAGNOSIS — J40 BRONCHITIS WITH WHEEZING: Primary | ICD-10-CM

## 2022-05-03 DIAGNOSIS — K21.9 GASTROESOPHAGEAL REFLUX DISEASE WITHOUT ESOPHAGITIS: ICD-10-CM

## 2022-05-03 DIAGNOSIS — E78.2 MIXED HYPERLIPIDEMIA: ICD-10-CM

## 2022-05-03 DIAGNOSIS — M17.11 PRIMARY OSTEOARTHRITIS OF RIGHT KNEE: ICD-10-CM

## 2022-05-03 DIAGNOSIS — G89.29 ENCOUNTER FOR CHRONIC PAIN MANAGEMENT: ICD-10-CM

## 2022-05-03 DIAGNOSIS — R53.83 FATIGUE, UNSPECIFIED TYPE: ICD-10-CM

## 2022-05-03 DIAGNOSIS — M15.9 PRIMARY OSTEOARTHRITIS INVOLVING MULTIPLE JOINTS: ICD-10-CM

## 2022-05-03 DIAGNOSIS — Z51.81 ENCOUNTER FOR MEDICATION MONITORING: ICD-10-CM

## 2022-05-03 DIAGNOSIS — G89.29 CHRONIC BILATERAL LOW BACK PAIN WITHOUT SCIATICA: ICD-10-CM

## 2022-05-03 DIAGNOSIS — M54.50 CHRONIC BILATERAL LOW BACK PAIN WITHOUT SCIATICA: ICD-10-CM

## 2022-05-03 DIAGNOSIS — I50.32 CHRONIC DIASTOLIC HEART FAILURE (HCC): ICD-10-CM

## 2022-05-03 DIAGNOSIS — I10 ESSENTIAL HYPERTENSION: ICD-10-CM

## 2022-05-03 LAB — SARS-COV-2 POC: NEGATIVE

## 2022-05-03 PROCEDURE — G0463 HOSPITAL OUTPT CLINIC VISIT: HCPCS | Performed by: FAMILY MEDICINE

## 2022-05-03 PROCEDURE — 93010 ELECTROCARDIOGRAM REPORT: CPT | Performed by: FAMILY MEDICINE

## 2022-05-03 PROCEDURE — G8427 DOCREV CUR MEDS BY ELIG CLIN: HCPCS | Performed by: FAMILY MEDICINE

## 2022-05-03 PROCEDURE — G8400 PT W/DXA NO RESULTS DOC: HCPCS | Performed by: FAMILY MEDICINE

## 2022-05-03 PROCEDURE — 1090F PRES/ABSN URINE INCON ASSESS: CPT | Performed by: FAMILY MEDICINE

## 2022-05-03 PROCEDURE — 87426 SARSCOV CORONAVIRUS AG IA: CPT | Performed by: FAMILY MEDICINE

## 2022-05-03 PROCEDURE — G8417 CALC BMI ABV UP PARAM F/U: HCPCS | Performed by: FAMILY MEDICINE

## 2022-05-03 PROCEDURE — G8754 DIAS BP LESS 90: HCPCS | Performed by: FAMILY MEDICINE

## 2022-05-03 PROCEDURE — G8536 NO DOC ELDER MAL SCRN: HCPCS | Performed by: FAMILY MEDICINE

## 2022-05-03 PROCEDURE — 1101F PT FALLS ASSESS-DOCD LE1/YR: CPT | Performed by: FAMILY MEDICINE

## 2022-05-03 PROCEDURE — G8510 SCR DEP NEG, NO PLAN REQD: HCPCS | Performed by: FAMILY MEDICINE

## 2022-05-03 PROCEDURE — 93005 ELECTROCARDIOGRAM TRACING: CPT | Performed by: FAMILY MEDICINE

## 2022-05-03 PROCEDURE — G8752 SYS BP LESS 140: HCPCS | Performed by: FAMILY MEDICINE

## 2022-05-03 PROCEDURE — 99214 OFFICE O/P EST MOD 30 MIN: CPT | Performed by: FAMILY MEDICINE

## 2022-05-03 RX ORDER — PREDNISONE 10 MG/1
10 TABLET ORAL 2 TIMES DAILY
Qty: 10 TABLET | Refills: 0 | Status: SHIPPED | OUTPATIENT
Start: 2022-05-03 | End: 2022-05-08

## 2022-05-03 RX ORDER — DOXYCYCLINE 100 MG/1
100 CAPSULE ORAL 2 TIMES DAILY
Qty: 20 CAPSULE | Refills: 0 | Status: SHIPPED | OUTPATIENT
Start: 2022-05-03 | End: 2022-05-13

## 2022-05-03 NOTE — PROGRESS NOTES
HISTORY OF PRESENT ILLNESS  Shaista Mae is a 82 y.o. female. Follow up on chronic medical problems. Has good days and bad days. Stress with her family has her feeling anxious most of the time still. C/o wheezing and cough for the past few days. Coughing up phlegm. No fever or chills noted. Has malaise. Throat is scratchy. No chest pain but has felt more SOB with the wheezing. No increase swelling noted. Has been compliant with taking medications. Cardiovascular Review:  The patient has hypertension, hyperlipidemia, chronic diastolic heart failure, and obesity. Hx also of aortic stenosis. Diet and Lifestyle: generally follows a low fat low cholesterol diet, generally follows a low sodium diet, sedentary. Pertinent ROS: taking medications as instructed, no medication side effects noted, no TIA's, no chest pain on exertion, mild swelling of ankles, no orthostatic dizziness or lightheadedness, no palpitations,      Home BP Monitoring: is not measured at home. Anxiety Review:  Patient is seen for anxiety. Ongoing symptoms include: increased anxiety still related to family issues. Patient denies: palpitations, sweating, chest pain, shortness of breath. Reported side effects from the treatment: none. Encounter for pain management. 1./2. Medical history/Past medical History  Chronic Pain:  Osteoarthritis:  Patient has osteoarthritis. Overall doing better with back pain. Still having knee pain. Has appt with ortho on tomorrow. Also has been having increased pain in the left shoulder. Ortho told that they would refer to pain management but she has not yet gone for this consult. Aching more in the right knee and increase pain with walking. Has had 2 injections in the knee which has not helped very much. She has decided to not to pursue surgery. Pain in the knee is \"bad today\". Pain is 5-6/10. Taking tylenol for the pain which helps some.        Symptoms onset: problem is longstanding. Rheumatological ROS: stable, mild-to-moderate joint symptoms intermittently, reasonably well controlled by PRN meds. Response to treatment plan: stable and intermittent. 3. Applicable records from prior treatment providers are apart of [facility] under the media tab. 4. Diagnostic, therapeutic and laboratory results are available in the [facility] chart. 5. Consultation notes are available for review in the media tab of the [facility] chart. 6. Treatment goals include pain control so that the pt may be as active and function with her daily activities and improved comfort level. Previously pt has been limited by pain. 7. The risks and benefits of treatment has been discussed at this office visit with the pt. She understands that the medication has addicting potential.  Additionally the pt has been advised that narcotic pain medication may impair mental and/or physical ability required for performance of tasks such as driving or operating any other machinery. 8. Pt has an updated signed pain contract on file and can be found under the FYI section of the [facility] chart. 9. The pain contract is reviewed. Pain medication will be continued at the previous dosage. Urine drug screening will not be done today. Diagnostic studies are not indicated at this time. Interventional procedure are not indicated at this time. 10. Medication prescibed is tylenol #3.  has been reviewed at this OV by me. 11. Patient instructions have been reviewed in detail as outlined above and in the pain contract. 12. Re-eval is planned for 3 months. He reports the following adverse side effects: none. Aberrant behaviors: None. Concomitant use of a benzodiazepine: yes  Will continue on current dose of medications as coarse has been stable and there are no signs of overuse, misuese, diversion, or concerning side effects  Naloxone prescription is warranted.   It has been provided or is already on file. Patient Active Problem List   Diagnosis Code    Hypokalemia E87.6    Hiatal hernia K44.9    Anxiety F41.9    S/P hysterectomy Z90.710    Aortic stenosis, mild I35.0    Spinal stenosis M48.00    S/P foot surgery Z98.890    Environmental allergies Z91.09    S/P cardiac catheterization Z98.890    Hypovitaminosis D E55.9    Chronic ischemic heart disease I25.9    Mixed hyperlipidemia E78.2    Chronic diastolic heart failure (HCC) I50.32    Chest pain at rest R07.9    Bifascicular bundle branch block--RBBB,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    Atypical chest pain R07.89    Essential hypertension I10    Gastroesophageal reflux disease without esophagitis K21.9    Atherosclerosis of native coronary artery without angina pectoris--diffuse small vsl disease via cath cath 2009--RCA PDA/ROSA I25.10    Chronic anxiety F41.9    Encounter for medication monitoring Z51.81    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 9) with comorbidity ([facility] [address] E66.01    Chest pain with normal angiography--2002;normal NST 2018 R07.9    Primary osteoarthritis of left shoulder M19.012       Current Outpatient Medications   Medication Sig Dispense Refill    clonazePAM (KlonoPIN) 1 mg tablet TAKE 1/2 TO 1 TABLET EVERY MORNING AND AS NEEDED FOR ANXIETY, AND TAKE 1 TABLET AT BEDTIME FOR SLEEP 60 Tablet 1    losartan (COZAAR) 100 mg tablet TAKE 1 TABLET BY MOUTH EVERY DAY 90 Tablet 3    metoprolol tartrate (LOPRESSOR) 25 mg tablet TAKE 1 TABLET BY MOUTH TWICE A DAY 1 AT 9AM AND 1 AT 9PM 180 Tablet 3    isosorbide mononitrate ER (IMDUR) 30 mg tablet TAKE 1 TABLET BY MOUTH EVERY DAY 90 Tablet 3    acetaminophen-codeine (TYLENOL #3) 300-30 mg per tablet TAKE 1 TABLET BY MOUTH EVERY 6 HOURS AS NEEDED FOR PAIN FOR UP TO 30 DAYS. *MAX DAILY 4 TABLETS* 30 Tablet 0    rosuvastatin (CRESTOR) 20 mg tablet TAKE 1 TABLET BY MOUTH EVERY DAY AT NIGHT 30 Tablet 11    furosemide (LASIX) 80 mg tablet TAKE 1 TABLET BY MOUTH EVERY MORNING AND TAKE 1/2 TABLET EVERY EVENING 135 Tablet 3    nitroglycerin (NITROSTAT) 0.4 mg SL tablet DISSOLVE 1 TAB BY SUBLINGUAL ROUTE EVERY 5 MINUTES AS NEEDED. 25 Tablet 0    amLODIPine (NORVASC) 5 mg tablet TAKE 1 TAB BY MOUTH TWO (2) TIMES A DAY. TAKE AT 9AM AND 9PM EVERY DAY. 180 Tablet 3    desonide (TRIDESILON) 0.05 % topical lotion Apply  to affected area two (2) times daily as needed for Skin Irritation or Itching. 59 mL 1    potassium chloride SR (KLOR-CON 10) 10 mEq tablet Take 3 Tablets by mouth two (2) times a day. 180 Tablet 11    pantoprazole (PROTONIX) 40 mg tablet TAKE 1 TABLET BY MOUTH TWICE A DAY *30 MINUTES PRIOR TO BREAKFAST AND DINNER* 180 Tab 3    simethicone (GAS RELIEF) 80 mg chewable tablet Take 80 mg by mouth every six (6) hours as needed for Flatulence.  cholecalciferol, vitamin D3, (VITAMIN D3) 2,000 unit tab Take 1 Tab by mouth daily.  acetaminophen (TYLENOL EXTRA STRENGTH) 500 mg tablet Take 1,000 mg by mouth every six (6) hours as needed for Pain.  Aspirin, Buffered 81 mg tab Take 81 mg by mouth daily.  diclofenac (VOLTAREN) 1 % gel Apply  to affected area four (4) times daily.  100 g 0       Allergies   Allergen Reactions    Bactrim [Sulfamethoxazole-Trimethoprim] Unknown (comments)     Pt does not recall    Darvocet A500 [Propoxyphene N-Acetaminophen] Itching         Past Medical History:   Diagnosis Date    Anxiety     Aortic stenosis 3/3/2010    Aortic stenosis     Arrhythmia     MURMUR    Arthritis     SPINAL STENOSIS    Atherosclerosis of coronary artery     Biliary dyskinesia     CHF (congestive heart failure) ([facility] [address] 3/3/2010    CHF (congestive heart failure) ([facility] [address] 01/2002    Chronic heart failure ([facility] [address] 1/2002; 3/3/2010    chronic diastolic heart failure    Chronic pain     LOWER BACK, RIGHT KNEE    Environmental allergies 3/3/2010    GERD (gastroesophageal reflux disease) 3/3/2010    Hiatal hernia 3/3/2010    High cholesterol 3/3/2010    HTN (hypertension) 3/3/2010    Hypokalemia 3/3/2010    Ischemic heart disease, chronic     Obese     Psychiatric disorder     anxiety    S/P hysterectomy 3/3/2010    Spinal stenosis 3/3/2010         Past Surgical History:   Procedure Laterality Date    CARDIAC CATHETERIZATION  01/2002    normal coronaries    DECOMPRESS DISC RF LUMBAR  07/2007    HX BACK SURGERY  5/1/2014    HX BREAST LUMPECTOMY Left 1989    benign left breast    HX BUNIONECTOMY      HX BUNIONECTOMY      HX HEART CATHETERIZATION  3/3/10    HX HYSTERECTOMY  1978    HX LUMBAR DISKECTOMY      HX ORTHOPAEDIC Bilateral     BUNIONECTOMY    HX ORTHOPAEDIC Right     WRIST FX    HX OTHER SURGICAL  10/12/2015    mole removed from right side of face by Dr. Tipton Hand FLX DX W/COLLJ Malen Pear PFRMD  [id number]    Dr Khushi Yadav GI ENDOSCOPY,BIOPSY  2/27/2019              Family History   Problem Relation Age of Onset    Hypertension Mother     Heart Disease Father     Stroke Sister     Heart Disease Sister     Heart Disease Sister     Cancer Brother         LUNG    Cancer Brother         PROSTATE    Hypertension Brother     No Known Problems Brother     Lung Disease Brother     Heart Attack Brother     Lung Disease Brother     Stroke Brother     Stroke Daughter 47    No Known Problems Daughter     Anesth Problems Neg Hx        Social History     Tobacco Use    Smoking status: Never Smoker    Smokeless tobacco: Never Used   Substance Use Topics    Alcohol use: No     Alcohol/week: 0.0 standard drinks        Lab Results   Component Value Date/Time    WBC 5.5 12/20/2021 11:05 AM    HGB 13.5 12/20/2021 11:05 AM    HCT 44.5 12/20/2021 11:05 AM    PLATELET 309 12/38/5528 11:05 AM    MCV 92.3 12/20/2021 11:05 AM     Lab Results   Component Value Date/Time    Cholesterol, total 176 12/20/2021 11:05 AM    HDL Cholesterol 61 12/20/2021 11:05 AM    LDL, calculated 92.6 12/20/2021 11:05 AM    Triglyceride 112 12/20/2021 11:05 AM    CHOL/HDL Ratio 2.9 12/20/2021 11:05 AM     Lab Results   Component Value Date/Time    TSH 2.470 05/15/2017 03:04 PM      Lab Results   Component Value Date/Time    Sodium 140 12/20/2021 11:05 AM    Potassium 3.6 12/20/2021 11:05 AM    Chloride 104 12/20/2021 11:05 AM    CO2 32 12/20/2021 11:05 AM    Anion gap 4 (L) 12/20/2021 11:05 AM    Glucose 101 (H) 12/20/2021 11:05 AM    BUN 11 12/20/2021 11:05 AM    Creatinine 1.05 (H) 12/20/2021 11:05 AM    BUN/Creatinine ratio 10 (L) 12/20/2021 11:05 AM    GFR est AA >60 12/20/2021 11:05 AM    GFR est non-AA 50 (L) 12/20/2021 11:05 AM    Calcium 9.6 12/20/2021 11:05 AM    Bilirubin, total 0.5 12/20/2021 11:05 AM    ALT (SGPT) 16 12/20/2021 11:05 AM    Alk. phosphatase 85 12/20/2021 11:05 AM    Protein, total 7.7 12/20/2021 11:05 AM    Albumin 3.7 12/20/2021 11:05 AM    Globulin 4.0 12/20/2021 11:05 AM    A-G Ratio 0.9 (L) 12/20/2021 11:05 AM      Lab Results   Component Value Date/Time    Hemoglobin A1c 5.4 04/16/2014 12:20 PM    Hemoglobin A1c (POC) 5.5 11/26/2014 12:12 PM         Review of Systems   Constitutional: Negative for malaise/fatigue. HENT: Negative for congestion. Eyes: Negative for blurred vision. Respiratory: Negative for cough and shortness of breath. Cardiovascular: Negative for chest pain, palpitations and leg swelling. Gastrointestinal: Negative for abdominal pain, constipation and heartburn. Genitourinary: Negative for dysuria, frequency and urgency. Neurological: Negative for dizziness, tingling and headaches. Endo/Heme/Allergies: Negative for environmental allergies. Psychiatric/Behavioral: Negative for depression. The patient does not have insomnia.         Physical Exam  Vitals and nursing note reviewed. Constitutional:       Appearance: Normal appearance. She is well-developed. Comments: /78   Pulse 66   Temp 97.4 °F (36.3 °C) (Oral)   Resp 16   Ht 4' 9\" (1.448 m)   Wt 177 lb 6.4 oz (80.5 kg)   LMP  (LMP Unknown)   SpO2 96%   BMI 38.39 kg/m²      HENT:      Right Ear: Tympanic membrane and ear canal normal.      Left Ear: Tympanic membrane and ear canal normal.   Neck:      Thyroid: No thyromegaly. Cardiovascular:      Rate and Rhythm: Normal rate and regular rhythm. Heart sounds: Normal heart sounds. Pulmonary:      Effort: Pulmonary effort is normal.      Breath sounds: Wheezing and rales present. Abdominal:      General: Bowel sounds are normal.      Palpations: Abdomen is soft. There is no mass. Tenderness: There is no abdominal tenderness. Musculoskeletal:      Cervical back: Normal range of motion and neck supple. Right knee: Swelling present. Decreased range of motion. Tenderness present over the medial joint line and lateral joint line. Right lower leg: Edema (mild) present. Left lower leg: Edema (mild) present. Lymphadenopathy:      Cervical: No cervical adenopathy. Skin:     General: Skin is warm and dry. Neurological:      General: No focal deficit present. Mental Status: She is alert and oriented to person, place, and time. Psychiatric:         Mood and Affect: Mood normal.         ASSESSMENT and PLAN  Diagnoses and all orders for this visit:    1. Bronchitis with wheezing  -     AMB POC SARS-COV-2  -     XR CHEST PA LAT; Future  -     doxycycline (VIBRAMYCIN) 100 mg capsule; Take 1 Capsule by mouth two (2) times a day for 10 days. -     predniSONE (DELTASONE) 10 mg tablet; Take 10 mg by mouth two (2) times a day for 5 days. 2. Fatigue, unspecified type  -     AMB POC EKG ROUTINE W/ 12 LEADS, INTER & REP  -  No acute changes noted. 3. SOB (shortness of breath)  -     NT-PRO BNP; Future    4.  Essential hypertension  Stable     5. Mixed hyperlipidemia  Continue to monitor. Work on diet and exercise. 6. Atherosclerosis of native coronary artery of native heart without angina pectoris  7. Chronic diastolic heart failure (HCC)  Stable     8. Gastroesophageal reflux disease without esophagitis  Stable on protonix    9. Primary osteoarthritis of right knee//  10. Chronic bilateral low back pain without sciatica//  11. Primary osteoarthritis involving multiple joints  Follow up with ortho as planned. 12. Encounter for chronic pain management  The pt has signed medication agreement. Pain contract is reviewed. Pain medications will be continued at the previous dosage. 13. Encounter for medication monitoring  -     METABOLIC PANEL, BASIC; Future  -     CBC W/O DIFF; Future      Follow-up and Dispositions    · Return in about 2 weeks (around 5/17/2022). reviewed diet, exercise and weight control  cardiovascular risk and specific lipid/LDL goals reviewed  reviewed medications and side effects in detail    I have discussed diagnosis listed in this note with pt and/or family. I have discussed treatment plans and options and the risk/benefit analysis of those options, including safe use of medications and possible medication side effects. Through the use of shared decision making we have agreed to the above plan. The patient has received an after-visit summary and questions were answered concerning future plans and follow up. Advise pt of any urgent changes then to proceed to the ER.

## 2022-05-03 NOTE — PROGRESS NOTES
Chief Complaint   Patient presents with    Hypertension     Follow up    Cholesterol Problem     Follow up     [id number]/16/15    Colonoscopy 08/08/02 dr. Alba Park    1. \"Have you been to the ER, urgent care clinic since your last visit? Hospitalized since your last visit? \" No    2. \"Have you seen or consulted any other health care providers outside of the [address] [place] [address] since your last visit? \" No     3. For patients aged 39-70: Has the patient had a colonoscopy / FIT/ Cologuard? Yes - no Care Gap present      If the patient is female:    4. For patients aged 41-77: Has the patient had a mammogram within the past 2 years? Yes - no Care Gap present      5. For patients aged 21-65: Has the patient had a pap smear? NA - based on age or sex    There are no preventive care reminders to display for this patient.

## 2022-05-04 ENCOUNTER — HOSPITAL ENCOUNTER (OUTPATIENT)
Dept: GENERAL RADIOLOGY | Age: 87
Discharge: HOME | End: 2022-05-04
Payer: MEDICARE

## 2022-05-04 ENCOUNTER — OFFICE VISIT (OUTPATIENT)
Dept: ORTHOPEDIC SURGERY | Age: 87
End: 2022-05-04

## 2022-05-04 VITALS
OXYGEN SATURATION: 95 % | DIASTOLIC BLOOD PRESSURE: 97 MMHG | TEMPERATURE: 98.2 F | WEIGHT: 177 LBS | HEART RATE: 70 BPM | HEIGHT: 57 IN | BODY MASS INDEX: 38.19 KG/M2 | SYSTOLIC BLOOD PRESSURE: 157 MMHG

## 2022-05-04 DIAGNOSIS — M19.012 PRIMARY OSTEOARTHRITIS OF LEFT SHOULDER: ICD-10-CM

## 2022-05-04 DIAGNOSIS — M17.11 UNILATERAL PRIMARY OSTEOARTHRITIS, RIGHT KNEE: ICD-10-CM

## 2022-05-04 DIAGNOSIS — M19.012 PRIMARY OSTEOARTHRITIS OF LEFT SHOULDER: Primary | ICD-10-CM

## 2022-05-04 DIAGNOSIS — M17.11 UNILATERAL PRIMARY OSTEOARTHRITIS, RIGHT KNEE: Primary | ICD-10-CM

## 2022-05-04 LAB
ANION GAP SERPL CALC-SCNC: 6 MMOL/L (ref 5–15)
BNP SERPL-MCNC: 127 PG/ML
BUN SERPL-MCNC: 15 MG/DL (ref 6–20)
BUN/CREAT SERPL: 15 (ref 12–20)
CALCIUM SERPL-MCNC: 9.2 MG/DL (ref 8.5–10.1)
CHLORIDE SERPL-SCNC: 105 MMOL/L (ref 97–108)
CO2 SERPL-SCNC: 31 MMOL/L (ref 21–32)
CREAT SERPL-MCNC: 0.97 MG/DL (ref 0.55–1.02)
ERYTHROCYTE [DISTWIDTH] IN BLOOD BY AUTOMATED COUNT: 14.1 % (ref 11.5–14.5)
GLUCOSE SERPL-MCNC: 98 MG/DL (ref 65–100)
HCT VFR BLD AUTO: 43.2 % (ref 35–47)
HGB BLD-MCNC: 13.3 G/DL (ref 11.5–16)
MCH RBC QN AUTO: 29 PG (ref 26–34)
MCHC RBC AUTO-ENTMCNC: 30.8 G/DL (ref 30–36.5)
MCV RBC AUTO: 94.1 FL (ref 80–99)
NRBC # BLD: 0 K/UL (ref 0–0.01)
NRBC BLD-RTO: 0 PER 100 WBC
PLATELET # BLD AUTO: 147 K/UL (ref 150–400)
PMV BLD AUTO: 12.5 FL (ref 8.9–12.9)
POTASSIUM SERPL-SCNC: 3.8 MMOL/L (ref 3.5–5.1)
RBC # BLD AUTO: 4.59 M/UL (ref 3.8–5.2)
SODIUM SERPL-SCNC: 142 MMOL/L (ref 136–145)
WBC # BLD AUTO: 5.8 K/UL (ref 3.6–11)

## 2022-05-04 PROCEDURE — 73030 X-RAY EXAM OF SHOULDER: CPT

## 2022-05-04 PROCEDURE — 20610 DRAIN/INJ JOINT/BURSA W/O US: CPT | Performed by: ORTHOPAEDIC SURGERY

## 2022-05-04 RX ORDER — BETAMETHASONE SODIUM PHOSPHATE AND BETAMETHASONE ACETATE 3; 3 MG/ML; MG/ML
6 INJECTION, SUSPENSION INTRA-ARTICULAR; INTRALESIONAL; INTRAMUSCULAR; SOFT TISSUE ONCE
Status: COMPLETED | OUTPATIENT
Start: 2022-05-04 | End: 2022-05-04

## 2022-05-04 RX ADMIN — BETAMETHASONE SODIUM PHOSPHATE AND BETAMETHASONE ACETATE 6 MG: 3; 3 INJECTION, SUSPENSION INTRA-ARTICULAR; INTRALESIONAL; INTRAMUSCULAR; SOFT TISSUE at 10:57

## 2022-05-04 NOTE — PROGRESS NOTES
Identified pt with two pt identifiers (name and ). Reviewed chart in preparation for visit and have obtained necessary documentation. Jakob Minor is a 82 y.o. female  Chief Complaint   Patient presents with    Joint Or Tendon Injection     RT Knee, LT Shoulder     Visit Vitals  BP (!) 157/97 (BP 1 Location: Right arm, BP Patient Position: Sitting, BP Cuff Size: Large adult)   Pulse 70   Temp 98.2 °F (36.8 °C) (Tympanic)   Ht 4' 9\" (1.448 m)   Wt 177 lb (80.3 kg)   LMP  (LMP Unknown)   SpO2 95%   BMI 38.30 kg/m²     1. Have you been to the ER, urgent care clinic since your last visit? Hospitalized since your last visit? No    2. Have you seen or consulted any other health care providers outside of the [address] [place] [address] since your last visit? Include any pap smears or colon screening.  No

## 2022-05-04 NOTE — PROGRESS NOTES
5/4/2022      CC: left shoulder, right knee pain    HPI:      This is a 82y.o. year old female who presents for a follow up visit. The patient was last seen and diagnosed with left should and right knee osteoarthritis. The patient's treatments since the most recent visit have comprised of injections. The patient has had no long term relief of the chief complaint.         PMH:  Past Medical History:   Diagnosis Date    Anxiety     Aortic stenosis 3/3/2010    Aortic stenosis     Arrhythmia     MURMUR    Arthritis     SPINAL STENOSIS    Atherosclerosis of coronary artery     Biliary dyskinesia     CHF (congestive heart failure) ([address] 3/3/2010    CHF (congestive heart failure) ([address] 01/2002    Chronic heart failure ([address] 1/2002; 3/3/2010    chronic diastolic heart failure    Chronic pain     LOWER BACK, RIGHT KNEE    Environmental allergies 3/3/2010    GERD (gastroesophageal reflux disease) 3/3/2010    Hiatal hernia 3/3/2010    High cholesterol 3/3/2010    HTN (hypertension) 3/3/2010    Hypokalemia 3/3/2010    Ischemic heart disease, chronic     Obese     Psychiatric disorder     anxiety    S/P hysterectomy 3/3/2010    Spinal stenosis 3/3/2010       PSxHx:  Past Surgical History:   Procedure Laterality Date    CARDIAC CATHETERIZATION  01/2002    normal coronaries    DECOMPRESS DISC RF LUMBAR  07/2007    HX BACK SURGERY  5/1/2014    HX BREAST LUMPECTOMY Left 1989    benign left breast    HX BUNIONECTOMY      HX BUNIONECTOMY      HX HEART CATHETERIZATION  3/3/10    HX HYSTERECTOMY  1978    HX LUMBAR DISKECTOMY      HX ORTHOPAEDIC Bilateral     BUNIONECTOMY    HX ORTHOPAEDIC Right     WRIST FX    HX OTHER SURGICAL  10/12/2015    mole removed from right side of face by Dr. Aleah Coles FLX DX W/COLLJ [facility] INPATIENT REHABILITATION WHEN PFRMD  [id number]    Dr Jocelyne Castillo GI ENDOSCOPY,BIOPSY  2/27/2019            Meds:    Current Outpatient Medications:     doxycycline (VIBRAMYCIN) 100 mg capsule, Take 1 Capsule by mouth two (2) times a day for 10 days. , Disp: 20 Capsule, Rfl: 0    predniSONE (DELTASONE) 10 mg tablet, Take 10 mg by mouth two (2) times a day for 5 days. , Disp: 10 Tablet, Rfl: 0    clonazePAM (KlonoPIN) 1 mg tablet, TAKE 1/2 TO 1 TABLET EVERY MORNING AND AS NEEDED FOR ANXIETY, AND TAKE 1 TABLET AT BEDTIME FOR SLEEP, Disp: 60 Tablet, Rfl: 1    losartan (COZAAR) 100 mg tablet, TAKE 1 TABLET BY MOUTH EVERY DAY, Disp: 90 Tablet, Rfl: 3    metoprolol tartrate (LOPRESSOR) 25 mg tablet, TAKE 1 TABLET BY MOUTH TWICE A DAY 1 AT 9AM AND 1 AT 9PM, Disp: 180 Tablet, Rfl: 3    isosorbide mononitrate ER (IMDUR) 30 mg tablet, TAKE 1 TABLET BY MOUTH EVERY DAY, Disp: 90 Tablet, Rfl: 3    acetaminophen-codeine (TYLENOL #3) 300-30 mg per tablet, TAKE 1 TABLET BY MOUTH EVERY 6 HOURS AS NEEDED FOR PAIN FOR UP TO 30 DAYS. *MAX DAILY 4 TABLETS*, Disp: 30 Tablet, Rfl: 0    rosuvastatin (CRESTOR) 20 mg tablet, TAKE 1 TABLET BY MOUTH EVERY DAY AT NIGHT, Disp: 30 Tablet, Rfl: 11    furosemide (LASIX) 80 mg tablet, TAKE 1 TABLET BY MOUTH EVERY MORNING AND TAKE 1/2 TABLET EVERY EVENING, Disp: 135 Tablet, Rfl: 3    nitroglycerin (NITROSTAT) 0.4 mg SL tablet, DISSOLVE 1 TAB BY SUBLINGUAL ROUTE EVERY 5 MINUTES AS NEEDED., Disp: 25 Tablet, Rfl: 0    amLODIPine (NORVASC) 5 mg tablet, TAKE 1 TAB BY MOUTH TWO (2) TIMES A DAY. TAKE AT 9AM AND 9PM EVERY DAY., Disp: 180 Tablet, Rfl: 3    desonide (TRIDESILON) 0.05 % topical lotion, Apply  to affected area two (2) times daily as needed for Skin Irritation or Itching., Disp: 59 mL, Rfl: 1    potassium chloride SR (KLOR-CON 10) 10 mEq tablet, Take 3 Tablets by mouth two (2) times a day., Disp: 180 Tablet, Rfl: 11    pantoprazole (PROTONIX) 40 mg tablet, TAKE 1 TABLET BY MOUTH TWICE A DAY *30 MINUTES PRIOR TO BREAKFAST AND DINNER*, Disp: 180 Tab, Rfl: 3    diclofenac (VOLTAREN) 1 % gel, Apply  to affected area four (4) times daily. , Disp: 100 g, Rfl: 0   simethicone (GAS RELIEF) 80 mg chewable tablet, Take 80 mg by mouth every six (6) hours as needed for Flatulence. , Disp: , Rfl:     cholecalciferol, vitamin D3, (VITAMIN D3) 2,000 unit tab, Take 1 Tab by mouth daily. , Disp: , Rfl:     acetaminophen (TYLENOL EXTRA STRENGTH) 500 mg tablet, Take 1,000 mg by mouth every six (6) hours as needed for Pain., Disp: , Rfl:     Aspirin, Buffered 81 mg tab, Take 81 mg by mouth daily. , Disp: , Rfl:     All:  Allergies   Allergen Reactions    Bactrim [Sulfamethoxazole-Trimethoprim] Unknown (comments)     Pt does not recall    Darvocet A500 [Propoxyphene N-Acetaminophen] Itching       Social Hx:  Social History     Socioeconomic History    Marital status:    Tobacco Use    Smoking status: Never Smoker    Smokeless tobacco: Never Used   Vaping Use    Vaping Use: Never used   Substance and Sexual Activity    Alcohol use: No     Alcohol/week: 0.0 standard drinks    Drug use: No    Sexual activity: Not Currently       Family Hx:  Family History   Problem Relation Age of Onset    Hypertension Mother     Heart Disease Father     Stroke Sister     Heart Disease Sister     Heart Disease Sister     Cancer Brother         LUNG    Cancer Brother         PROSTATE    Hypertension Brother     No Known Problems Brother     Lung Disease Brother     Heart Attack Brother     Lung Disease Brother     Stroke Brother     Stroke Daughter 47    No Known Problems Daughter     Anesth Problems Neg Hx          Review of Systems:       General: Denies headache, lethargy, fever, weight loss  Ears/Nose/Throat: Denies ear discharge, drainage, nosebleeds, hoarse voice, dental problems  Cardiovascular: Denies chest pain, shortness of breath  Lungs: Denies chest pain, breathing problems, wheezing, pneumonia  Stomach: Denies stomach pain, heartburn, constipation, irritable bowel  Skin: Denies rash, sores, open wounds  Musculoskeletal: left shoulder, right knee pain  Genitourinary: Denies dysuria, hematuria, polyuria  Gastrointestinal: Denies constipation, obstipation, diarrhea  Neurological: Denies changes in sight, smell, hearing, taste, seizures. Denies loss of consciousness. Psychiatric: Denies depression, sleep pattern changes, anxiety, change in personality  Endocrine: Denies mood swings, heat or cold intolerance  Hematologic/Lymphatic: Denies anemia, purpura, petechia  Allergic/Immunologic: Denies swelling of throat, pain or swelling at lymph nodes      Physical Examination:    Visit Vitals  BP (!) 157/97 (BP 1 Location: Right arm, BP Patient Position: Sitting, BP Cuff Size: Large adult)   Pulse 70   Temp 98.2 °F (36.8 °C) (Tympanic)   Ht 4' 9\" (1.448 m)   Wt 177 lb (80.3 kg)   SpO2 95%   BMI 38.30 kg/m²        General: AOX3, no apparent distress  Psychiatric: mood and affect appropriate  Lungs: breathing is symmetric and unlabored bilaterally  Heart: regular rate and rhythm  Abdomen: no guarding  Head: normocephalic, atraumatic  Skin: No significant abnormalities, good turgor  Sensation intact to light touch: C5-T1 dermatomes  Muscular exam: 5/5 strength in all major muscle groups unless noted in specialty exam.    Extremities          Right lower extremity: Knee is noted to have a mild effusion. Medial joint line tenderness to palpation with a varus deformity. Patellar crepitus with range of motion is noted. Range of motion is 0-120. Sensation is intact to light touch L1-S1 dermatomes. Knee flexion and extension strength is 5/5 with Tibialis anterior, EHL, FHL being 5/5 as well. No other gross deformity or deficit is noted. Left upper: No gross deformity. No restriction to passive range of motion of the shoulder, elbow, wrist.  Positive impingement maneuver. Open can test is positive. Negative hornblower's maneuver. Belly press is negative for weakness, and internal rotation is to L5. Neck range of motion is full and pain free. Muscle bulk is appropriate without wasting. Sensation is intact to light touch in the C5-T1 dermatomes. Capillary refill is less than 2 seconds in the fingers. Strength testing is 5/5 at the major muscle groups of the shoulder, elbow, and wrist.              Diagnostics:    Pertinent Diagnostics: none today    Assessment: left shoulder and right knee osteoarthritis  Plan:    We discussed her options, pain management, tylenol and other modalities, she is looking for injections today. They have had some limited effect in the past, but she is against surgical intervention, and we will work to keep her as comfortable as possible. PROCEDURE NOTE: Right knee injection of Celestone     Consent was obtained from the patient. The correct site was identified after confirmation with the patient. Following identification and confirmation of the correct site with the patient, the superolateral right knee was prepped in the normal sterile fashion. A local anesthetic of 1% lidocaine without epinephrine was then administered to the local tissues. Following, an injection of a mixture of  6 mg Celestone and 1% lidocaine without epinephrine was administered to the right knee. The needle was then withdrawn and the injection site dressed with a sterile bandage at the conclusion. The procedure was well tolerated by the patient. No immediate adverse reactions were noted. Post injection instructions were given. PROCEDURE NOTE :     Consent was obtained from the patient. The correct site was identified after confirmation with the patient. Following identification and confirmation of the correct site with the patient, the area was prepped in the normal sterile fashion. An injection of a mixture of 6 mg of betamethasone and 1% lidocaine without epinephrine was administered to the left shoulder glenohumeral space. The needle was then withdrawn and the injection site dressed with a sterile bandage at the conclusion. The procedure was well tolerated by the patient.   No immediate adverse reactions were noted. Post injection instructions were given. Ms. La Wilkins has a reminder for a \"due or due soon\" health maintenance. I have asked that she contact her primary care provider for follow-up on this health maintenance.

## 2022-05-04 NOTE — LETTER
5/4/2022    Patient: Carlos Mcgarry   YOB: 1935   Date of Visit: Celina Lal MD  [address] [place] [address]    Dear Mary Motley MD,      Thank you for referring Ms. Sonal Liriano to [facility] for evaluation. My notes for this consultation are attached. If you have questions, please do not hesitate to call me. I look forward to following your patient along with you.       Sincerely,    Herve Richards, DO

## 2022-05-26 ENCOUNTER — APPOINTMENT (OUTPATIENT)
Dept: GENERAL RADIOLOGY | Age: 87
DRG: 193 | End: 2022-05-26
Attending: EMERGENCY MEDICINE
Payer: MEDICARE

## 2022-05-26 ENCOUNTER — APPOINTMENT (OUTPATIENT)
Dept: NON INVASIVE DIAGNOSTICS | Age: 87
DRG: 193 | End: 2022-05-26
Attending: STUDENT IN AN ORGANIZED HEALTH CARE EDUCATION/TRAINING PROGRAM
Payer: MEDICARE

## 2022-05-26 ENCOUNTER — HOSPITAL ENCOUNTER (INPATIENT)
Age: 87
LOS: 5 days | Discharge: HOME HEALTH | DRG: 193 | End: 2022-05-31
Attending: EMERGENCY MEDICINE | Admitting: STUDENT IN AN ORGANIZED HEALTH CARE EDUCATION/TRAINING PROGRAM
Payer: MEDICARE

## 2022-05-26 DIAGNOSIS — J18.9 COMMUNITY ACQUIRED PNEUMONIA OF RIGHT UPPER LOBE OF LUNG: Primary | ICD-10-CM

## 2022-05-26 PROBLEM — R09.02 HYPOXIA: Status: ACTIVE | Noted: 2022-05-26

## 2022-05-26 LAB
ALBUMIN SERPL-MCNC: 3.4 G/DL (ref 3.5–5)
ALBUMIN/GLOB SERPL: 0.7 {RATIO} (ref 1.1–2.2)
ALP SERPL-CCNC: 86 U/L (ref 45–117)
ALT SERPL-CCNC: 22 U/L (ref 12–78)
ANION GAP SERPL CALC-SCNC: 6 MMOL/L (ref 5–15)
APPEARANCE UR: CLEAR
AST SERPL-CCNC: 20 U/L (ref 15–37)
BACTERIA URNS QL MICRO: ABNORMAL /HPF
BASOPHILS # BLD: 0 K/UL (ref 0–0.1)
BASOPHILS NFR BLD: 0 % (ref 0–1)
BILIRUB SERPL-MCNC: 0.5 MG/DL (ref 0.2–1)
BILIRUB UR QL: NEGATIVE
BNP SERPL-MCNC: 197 PG/ML (ref 0–450)
BUN SERPL-MCNC: 14 MG/DL (ref 6–20)
BUN/CREAT SERPL: 14 (ref 12–20)
CALCIUM SERPL-MCNC: 8.9 MG/DL (ref 8.5–10.1)
CHLORIDE SERPL-SCNC: 101 MMOL/L (ref 97–108)
CO2 SERPL-SCNC: 34 MMOL/L (ref 21–32)
COLOR UR: ABNORMAL
CREAT SERPL-MCNC: 1.03 MG/DL (ref 0.55–1.02)
DIFFERENTIAL METHOD BLD: ABNORMAL
ECHO AO ROOT DIAM: 2.1 CM
ECHO AO ROOT INDEX: 1.23 CM/M2
ECHO AR MAX VEL PISA: 3.6 M/S
ECHO AV AREA PEAK VELOCITY: 1.1 CM2
ECHO AV AREA PLAN: 1.2 CM2
ECHO AV AREA PLAN: 1.2 CM2
ECHO AV AREA/BSA PEAK VELOCITY: 0.6 CM2/M2
ECHO AV MEAN GRADIENT: 10 MMHG
ECHO AV MEAN VELOCITY: 1.4 M/S
ECHO AV PEAK GRADIENT: 22 MMHG
ECHO AV PEAK VELOCITY: 2.3 M/S
ECHO AV REGURGITANT PHT: 484.6 MILLISECOND
ECHO AV VELOCITY RATIO: 0.7
ECHO AV VTI: 37.2 CM
ECHO LA DIAMETER INDEX: 1.52 CM/M2
ECHO LA DIAMETER: 2.6 CM
ECHO LA TO AORTIC ROOT RATIO: 1.24
ECHO LA VOL 4C: 34 ML (ref 22–52)
ECHO LA VOLUME INDEX A4C: 20 ML/M2 (ref 16–34)
ECHO LV EDV A4C: 65 ML
ECHO LV EDV INDEX A4C: 38 ML/M2
ECHO LV EJECTION FRACTION A4C: 58 %
ECHO LV ESV A4C: 28 ML
ECHO LV ESV INDEX A4C: 16 ML/M2
ECHO LV FRACTIONAL SHORTENING: 45 % (ref 28–44)
ECHO LV INTERNAL DIMENSION DIASTOLE INDEX: 2.34 CM/M2
ECHO LV INTERNAL DIMENSION DIASTOLIC: 4 CM (ref 3.9–5.3)
ECHO LV INTERNAL DIMENSION SYSTOLIC INDEX: 1.29 CM/M2
ECHO LV INTERNAL DIMENSION SYSTOLIC: 2.2 CM
ECHO LV IVSD: 1 CM (ref 0.6–0.9)
ECHO LV MASS 2D: 145.6 G (ref 67–162)
ECHO LV MASS INDEX 2D: 85.2 G/M2 (ref 43–95)
ECHO LV POSTERIOR WALL DIASTOLIC: 1.2 CM (ref 0.6–0.9)
ECHO LV RELATIVE WALL THICKNESS RATIO: 0.6
ECHO LVOT AREA: 1.5 CM2
ECHO LVOT DIAM: 1.4 CM
ECHO LVOT PEAK GRADIENT: 10 MMHG
ECHO LVOT PEAK VELOCITY: 1.6 M/S
ECHO MV A VELOCITY: 0.41 M/S
ECHO MV AREA PLAN: 4 CM2
ECHO MV AREA PLAN: 4 CM2
ECHO MV E DECELERATION TIME (DT): 131.1 MS
ECHO MV E VELOCITY: 0.4 M/S
ECHO MV E/A RATIO: 0.98
ECHO MV MAX VELOCITY: 0.8 M/S
ECHO MV MEAN GRADIENT: 2 MMHG
ECHO MV MEAN VELOCITY: 0.6 M/S
ECHO MV PEAK GRADIENT: 3 MMHG
ECHO MV PRESSURE HALF TIME (PHT): 38 MS
ECHO MV PRESSURE HALF TIME (PHT): 41.9 MS
ECHO MV VTI: 21.2 CM
ECHO PV MAX VELOCITY: 1.5 M/S
ECHO PV PEAK GRADIENT: 9 MMHG
EOSINOPHIL # BLD: 0.3 K/UL (ref 0–0.4)
EOSINOPHIL NFR BLD: 6 % (ref 0–7)
EPITH CASTS URNS QL MICRO: ABNORMAL /LPF
ERYTHROCYTE [DISTWIDTH] IN BLOOD BY AUTOMATED COUNT: 14.3 % (ref 11.5–14.5)
FLUAV RNA SPEC QL NAA+PROBE: NOT DETECTED
FLUBV RNA SPEC QL NAA+PROBE: NOT DETECTED
GLOBULIN SER CALC-MCNC: 4.8 G/DL (ref 2–4)
GLUCOSE SERPL-MCNC: 102 MG/DL (ref 65–100)
GLUCOSE UR STRIP.AUTO-MCNC: NEGATIVE MG/DL
HCT VFR BLD AUTO: 45.3 % (ref 35–47)
HGB BLD-MCNC: 14 G/DL (ref 11.5–16)
HGB UR QL STRIP: ABNORMAL
IMM GRANULOCYTES # BLD AUTO: 0 K/UL (ref 0–0.04)
IMM GRANULOCYTES NFR BLD AUTO: 0 % (ref 0–0.5)
KETONES UR QL STRIP.AUTO: NEGATIVE MG/DL
LEUKOCYTE ESTERASE UR QL STRIP.AUTO: ABNORMAL
LYMPHOCYTES # BLD: 0.8 K/UL (ref 0.8–3.5)
LYMPHOCYTES NFR BLD: 17 % (ref 12–49)
MCH RBC QN AUTO: 28.2 PG (ref 26–34)
MCHC RBC AUTO-ENTMCNC: 30.9 G/DL (ref 30–36.5)
MCV RBC AUTO: 91.3 FL (ref 80–99)
MONOCYTES # BLD: 0.6 K/UL (ref 0–1)
MONOCYTES NFR BLD: 12 % (ref 5–13)
NEUTS SEG # BLD: 3.2 K/UL (ref 1.8–8)
NEUTS SEG NFR BLD: 65 % (ref 32–75)
NITRITE UR QL STRIP.AUTO: NEGATIVE
NRBC # BLD: 0 K/UL (ref 0–0.01)
NRBC BLD-RTO: 0 PER 100 WBC
PH UR STRIP: 6.5 [PH] (ref 5–8)
PLATELET # BLD AUTO: 148 K/UL (ref 150–400)
PMV BLD AUTO: 11.7 FL (ref 8.9–12.9)
POTASSIUM SERPL-SCNC: 3.4 MMOL/L (ref 3.5–5.1)
PROT SERPL-MCNC: 8.2 G/DL (ref 6.4–8.2)
PROT UR STRIP-MCNC: 30 MG/DL
RBC # BLD AUTO: 4.96 M/UL (ref 3.8–5.2)
RBC #/AREA URNS HPF: ABNORMAL /HPF (ref 0–5)
SARS-COV-2, COV2: NOT DETECTED
SODIUM SERPL-SCNC: 141 MMOL/L (ref 136–145)
SP GR UR REFRACTOMETRY: 1.02
TROPONIN-HIGH SENSITIVITY: 14 NG/L (ref 0–51)
UA: UC IF INDICATED,UAUC: ABNORMAL
UROBILINOGEN UR QL STRIP.AUTO: 1 EU/DL (ref 0.2–1)
WBC # BLD AUTO: 4.9 K/UL (ref 3.6–11)
WBC URNS QL MICRO: ABNORMAL /HPF (ref 0–4)

## 2022-05-26 PROCEDURE — 94640 AIRWAY INHALATION TREATMENT: CPT

## 2022-05-26 PROCEDURE — 74011250637 HC RX REV CODE- 250/637: Performed by: STUDENT IN AN ORGANIZED HEALTH CARE EDUCATION/TRAINING PROGRAM

## 2022-05-26 PROCEDURE — 74011250636 HC RX REV CODE- 250/636: Performed by: EMERGENCY MEDICINE

## 2022-05-26 PROCEDURE — 96374 THER/PROPH/DIAG INJ IV PUSH: CPT

## 2022-05-26 PROCEDURE — 85025 COMPLETE CBC W/AUTO DIFF WBC: CPT

## 2022-05-26 PROCEDURE — 77010033678 HC OXYGEN DAILY

## 2022-05-26 PROCEDURE — 99285 EMERGENCY DEPT VISIT HI MDM: CPT

## 2022-05-26 PROCEDURE — 84484 ASSAY OF TROPONIN QUANT: CPT

## 2022-05-26 PROCEDURE — 94760 N-INVAS EAR/PLS OXIMETRY 1: CPT

## 2022-05-26 PROCEDURE — 74011000250 HC RX REV CODE- 250: Performed by: STUDENT IN AN ORGANIZED HEALTH CARE EDUCATION/TRAINING PROGRAM

## 2022-05-26 PROCEDURE — 2709999900 HC NON-CHARGEABLE SUPPLY

## 2022-05-26 PROCEDURE — 87636 SARSCOV2 & INF A&B AMP PRB: CPT

## 2022-05-26 PROCEDURE — 77030029684 HC NEB SM VOL KT MONA -A

## 2022-05-26 PROCEDURE — 87449 NOS EACH ORGANISM AG IA: CPT

## 2022-05-26 PROCEDURE — 93306 TTE W/DOPPLER COMPLETE: CPT

## 2022-05-26 PROCEDURE — 81001 URINALYSIS AUTO W/SCOPE: CPT

## 2022-05-26 PROCEDURE — 74011000250 HC RX REV CODE- 250: Performed by: EMERGENCY MEDICINE

## 2022-05-26 PROCEDURE — 93005 ELECTROCARDIOGRAM TRACING: CPT

## 2022-05-26 PROCEDURE — 93306 TTE W/DOPPLER COMPLETE: CPT | Performed by: SPECIALIST

## 2022-05-26 PROCEDURE — 80053 COMPREHEN METABOLIC PANEL: CPT

## 2022-05-26 PROCEDURE — 71045 X-RAY EXAM CHEST 1 VIEW: CPT

## 2022-05-26 PROCEDURE — 83880 ASSAY OF NATRIURETIC PEPTIDE: CPT

## 2022-05-26 PROCEDURE — 96375 TX/PRO/DX INJ NEW DRUG ADDON: CPT

## 2022-05-26 PROCEDURE — 65270000032 HC RM SEMIPRIVATE

## 2022-05-26 PROCEDURE — 36415 COLL VENOUS BLD VENIPUNCTURE: CPT

## 2022-05-26 RX ORDER — IPRATROPIUM BROMIDE AND ALBUTEROL SULFATE 2.5; .5 MG/3ML; MG/3ML
3 SOLUTION RESPIRATORY (INHALATION)
Status: COMPLETED | OUTPATIENT
Start: 2022-05-26 | End: 2022-05-26

## 2022-05-26 RX ORDER — GUAIFENESIN 100 MG/5ML
81 LIQUID (ML) ORAL DAILY
Status: DISCONTINUED | OUTPATIENT
Start: 2022-05-27 | End: 2022-05-31 | Stop reason: HOSPADM

## 2022-05-26 RX ORDER — SODIUM CHLORIDE 0.9 % (FLUSH) 0.9 %
5-40 SYRINGE (ML) INJECTION EVERY 8 HOURS
Status: DISCONTINUED | OUTPATIENT
Start: 2022-05-26 | End: 2022-05-31 | Stop reason: HOSPADM

## 2022-05-26 RX ORDER — PANTOPRAZOLE SODIUM 40 MG/1
40 TABLET, DELAYED RELEASE ORAL
Status: DISCONTINUED | OUTPATIENT
Start: 2022-05-27 | End: 2022-05-31 | Stop reason: HOSPADM

## 2022-05-26 RX ORDER — NITROGLYCERIN 0.4 MG/1
0.4 TABLET SUBLINGUAL AS NEEDED
Status: DISCONTINUED | OUTPATIENT
Start: 2022-05-26 | End: 2022-05-31 | Stop reason: HOSPADM

## 2022-05-26 RX ORDER — CLONAZEPAM 0.5 MG/1
1 TABLET ORAL
Status: DISCONTINUED | OUTPATIENT
Start: 2022-05-26 | End: 2022-05-31 | Stop reason: HOSPADM

## 2022-05-26 RX ORDER — SIMETHICONE 80 MG
80 TABLET,CHEWABLE ORAL
Status: DISCONTINUED | OUTPATIENT
Start: 2022-05-26 | End: 2022-05-31 | Stop reason: HOSPADM

## 2022-05-26 RX ORDER — AMLODIPINE BESYLATE 5 MG/1
5 TABLET ORAL DAILY
Status: DISCONTINUED | OUTPATIENT
Start: 2022-05-27 | End: 2022-05-26

## 2022-05-26 RX ORDER — ONDANSETRON 2 MG/ML
4 INJECTION INTRAMUSCULAR; INTRAVENOUS
Status: DISCONTINUED | OUTPATIENT
Start: 2022-05-26 | End: 2022-05-31 | Stop reason: HOSPADM

## 2022-05-26 RX ORDER — IPRATROPIUM BROMIDE AND ALBUTEROL SULFATE 2.5; .5 MG/3ML; MG/3ML
3 SOLUTION RESPIRATORY (INHALATION)
Status: DISCONTINUED | OUTPATIENT
Start: 2022-05-26 | End: 2022-05-27

## 2022-05-26 RX ORDER — AMLODIPINE BESYLATE 5 MG/1
5 TABLET ORAL DAILY
Status: DISCONTINUED | OUTPATIENT
Start: 2022-05-26 | End: 2022-05-31 | Stop reason: HOSPADM

## 2022-05-26 RX ORDER — AMLODIPINE BESYLATE 5 MG/1
5 TABLET ORAL 2 TIMES DAILY
COMMUNITY
End: 2022-10-31

## 2022-05-26 RX ORDER — METOPROLOL TARTRATE 25 MG/1
25 TABLET, FILM COATED ORAL EVERY 12 HOURS
Status: DISCONTINUED | OUTPATIENT
Start: 2022-05-26 | End: 2022-05-31 | Stop reason: HOSPADM

## 2022-05-26 RX ORDER — POLYETHYLENE GLYCOL 3350 17 G/17G
17 POWDER, FOR SOLUTION ORAL DAILY PRN
Status: DISCONTINUED | OUTPATIENT
Start: 2022-05-26 | End: 2022-05-31 | Stop reason: HOSPADM

## 2022-05-26 RX ORDER — AZITHROMYCIN 250 MG/1
500 TABLET, FILM COATED ORAL DAILY
Status: DISCONTINUED | OUTPATIENT
Start: 2022-05-27 | End: 2022-05-29 | Stop reason: ALTCHOICE

## 2022-05-26 RX ORDER — ONDANSETRON 4 MG/1
4 TABLET, ORALLY DISINTEGRATING ORAL
Status: DISCONTINUED | OUTPATIENT
Start: 2022-05-26 | End: 2022-05-31 | Stop reason: HOSPADM

## 2022-05-26 RX ORDER — ROSUVASTATIN CALCIUM 10 MG/1
20 TABLET, COATED ORAL
Status: DISCONTINUED | OUTPATIENT
Start: 2022-05-26 | End: 2022-05-31 | Stop reason: HOSPADM

## 2022-05-26 RX ORDER — FUROSEMIDE 40 MG/1
40 TABLET ORAL
Status: DISCONTINUED | OUTPATIENT
Start: 2022-05-26 | End: 2022-05-31 | Stop reason: HOSPADM

## 2022-05-26 RX ORDER — PANTOPRAZOLE SODIUM 40 MG/1
40 TABLET, DELAYED RELEASE ORAL 2 TIMES DAILY
COMMUNITY
End: 2022-08-01

## 2022-05-26 RX ORDER — ENOXAPARIN SODIUM 100 MG/ML
40 INJECTION SUBCUTANEOUS DAILY
Status: DISCONTINUED | OUTPATIENT
Start: 2022-05-27 | End: 2022-05-31 | Stop reason: HOSPADM

## 2022-05-26 RX ORDER — SODIUM CHLORIDE 0.9 % (FLUSH) 0.9 %
5-40 SYRINGE (ML) INJECTION AS NEEDED
Status: DISCONTINUED | OUTPATIENT
Start: 2022-05-26 | End: 2022-05-31 | Stop reason: HOSPADM

## 2022-05-26 RX ORDER — GUAIFENESIN/DEXTROMETHORPHAN 100-10MG/5
5 SYRUP ORAL
Status: DISCONTINUED | OUTPATIENT
Start: 2022-05-26 | End: 2022-05-31 | Stop reason: HOSPADM

## 2022-05-26 RX ORDER — CLONAZEPAM 0.5 MG/1
0.5 TABLET ORAL
Status: DISCONTINUED | OUTPATIENT
Start: 2022-05-26 | End: 2022-05-31 | Stop reason: HOSPADM

## 2022-05-26 RX ADMIN — WATER 2 G: 1 INJECTION INTRAMUSCULAR; INTRAVENOUS; SUBCUTANEOUS at 12:00

## 2022-05-26 RX ADMIN — IPRATROPIUM BROMIDE AND ALBUTEROL SULFATE 3 ML: 2.5; .5 SOLUTION RESPIRATORY (INHALATION) at 12:07

## 2022-05-26 RX ADMIN — AMLODIPINE BESYLATE 5 MG: 5 TABLET ORAL at 16:16

## 2022-05-26 RX ADMIN — SODIUM CHLORIDE, PRESERVATIVE FREE 10 ML: 5 INJECTION INTRAVENOUS at 14:03

## 2022-05-26 RX ADMIN — IPRATROPIUM BROMIDE AND ALBUTEROL SULFATE 3 ML: 2.5; .5 SOLUTION RESPIRATORY (INHALATION) at 19:56

## 2022-05-26 RX ADMIN — IPRATROPIUM BROMIDE AND ALBUTEROL SULFATE 3 ML: 2.5; .5 SOLUTION RESPIRATORY (INHALATION) at 12:23

## 2022-05-26 RX ADMIN — METOPROLOL TARTRATE 25 MG: 25 TABLET, FILM COATED ORAL at 22:09

## 2022-05-26 RX ADMIN — ROSUVASTATIN CALCIUM 20 MG: 10 TABLET, FILM COATED ORAL at 22:09

## 2022-05-26 RX ADMIN — IPRATROPIUM BROMIDE AND ALBUTEROL SULFATE 3 ML: 2.5; .5 SOLUTION RESPIRATORY (INHALATION) at 12:38

## 2022-05-26 RX ADMIN — AZITHROMYCIN MONOHYDRATE 500 MG: 500 INJECTION, POWDER, LYOPHILIZED, FOR SOLUTION INTRAVENOUS at 12:00

## 2022-05-26 RX ADMIN — SODIUM CHLORIDE, PRESERVATIVE FREE 10 ML: 5 INJECTION INTRAVENOUS at 22:00

## 2022-05-26 RX ADMIN — FUROSEMIDE 40 MG: 40 TABLET ORAL at 16:16

## 2022-05-26 RX ADMIN — IPRATROPIUM BROMIDE AND ALBUTEROL SULFATE 3 ML: 2.5; .5 SOLUTION RESPIRATORY (INHALATION) at 15:01

## 2022-05-26 NOTE — ED NOTES
TRANSFER - OUT REPORT:    Verbal report given to Santa Martinez RN on Aggie Luis  being transferred to [facility] for further care. Report consisted of patients Situation, Background, Assessment and   Recommendations(SBAR). Information from the following report(s) SBAR, ED Summary and MAR was reviewed with the receiving nurse. Lines:   Peripheral IV 05/26/22 Right Antecubital (Active)   Site Assessment Clean, dry, & intact 05/26/22 1109   Phlebitis Assessment 0 05/26/22 1109   Infiltration Assessment 0 05/26/22 1109   Dressing Status Clean, dry, & intact 05/26/22 1109   Hub Color/Line Status Pink 05/26/22 1109   Action Taken Blood drawn 05/26/22 1109        Opportunity for questions and clarification was provided. Patient transported with: oxygen, monitor, and RN.

## 2022-05-26 NOTE — PROGRESS NOTES
[facility]: MED RECONCILIATION    Comments/Recommendations:   Patient interviewed on phone and was a good historian. Patient reports not taking any OTC medications and preferred pharmacy is [facility] [address]    Medications added:     None    Medications removed:    Diclofenac gel 1%  Simethicone 80 mg     Medications adjusted:    Pantoprazole 40 mg from 1 BID to 1 every day  Amlodipine 5 mg from 1 bid to 1 every day     Information obtained from: Patient, Rx query refill history,       Allergies: Bactrim [sulfamethoxazole-trimethoprim] and Darvocet a500 [propoxyphene n-acetaminophen]    Prior to Admission Medications:     Prior to Admission Medications   Prescriptions Last Dose Informant Patient Reported? Taking? Aspirin, Buffered 81 mg tab 5/25/2022 at Unknown time Self Yes Yes   Sig: Take 81 mg by mouth daily. acetaminophen (TYLENOL EXTRA STRENGTH) 500 mg tablet  Self Yes Yes   Sig: Take 1,000 mg by mouth every six (6) hours as needed for Pain. amLODIPine (NORVASC) 5 mg tablet 5/25/2022 at Unknown time Self Yes Yes   Sig: Take 5 mg by mouth daily. Indications: high blood pressure   cholecalciferol, vitamin D3, (Vitamin D3) 50 mcg (2,000 unit) tab 5/25/2022 at Unknown time Self Yes Yes   Sig: Take 1 Tablet by mouth daily. clonazePAM (KlonoPIN) 1 mg tablet 5/25/2022 at Unknown time Self No Yes   Sig: TAKE 1/2 TO 1 TABLET EVERY MORNING AND AS NEEDED FOR ANXIETY, AND TAKE 1 TABLET AT BEDTIME FOR SLEEP   desonide (TRIDESILON) 0.05 % topical lotion  Self No Yes   Sig: Apply  to affected area two (2) times daily as needed for Skin Irritation or Itching. furosemide (LASIX) 80 mg tablet 5/25/2022 at Unknown time Self No Yes   Sig: TAKE 1 TABLET BY MOUTH EVERY MORNING AND TAKE 1/2 TABLET EVERY EVENING   isosorbide mononitrate ER (IMDUR) 30 mg tablet 5/25/2022 at Unknown time Self No Yes   Sig: TAKE 1 TABLET BY MOUTH EVERY DAY   Patient taking differently: Take 30 mg by mouth daily.  Indications: prevention of anginal chest pain associated with coronary artery disease   losartan (COZAAR) 100 mg tablet 5/25/2022 at Unknown time Self No Yes   Sig: TAKE 1 TABLET BY MOUTH EVERY DAY   Patient taking differently: Take 100 mg by mouth daily. metoprolol tartrate (LOPRESSOR) 25 mg tablet 5/25/2022 at Unknown time Self No Yes   Sig: TAKE 1 TABLET BY MOUTH TWICE A DAY 1 AT 9AM AND 1 AT 9PM   Patient taking differently: Take 25 mg by mouth two (2) times a day. Indications: high blood pressure   nitroglycerin (NITROSTAT) 0.4 mg SL tablet  Self No Yes   Sig: DISSOLVE 1 TAB BY SUBLINGUAL ROUTE EVERY 5 MINUTES AS NEEDED. pantoprazole (PROTONIX) 40 mg tablet 5/25/2022 at Unknown time Self Yes Yes   Sig: Take 40 mg by mouth daily. potassium chloride SR (KLOR-CON 10) 10 mEq tablet 5/25/2022 at Unknown time Self No Yes   Sig: Take 3 Tablets by mouth two (2) times a day. rosuvastatin (CRESTOR) 20 mg tablet 5/25/2022 at Unknown time Self No Yes   Sig: TAKE 1 TABLET BY MOUTH EVERY DAY AT NIGHT   Patient taking differently: Take 20 mg by mouth nightly.  Indications: excessive fat in the blood      Facility-Administered Medications: None           NEREIDA Martinez   Contact: [phone]

## 2022-05-26 NOTE — H&P
Hospitalist Admission Note    NAME: Kizzy Perea   :  1935   MRN:  [id number]   Room Number: 500/74  @ [facility]     Date/Time:  2022 2:14 PM    Patient PCP: Huma Hogue MD    Please note that this dictation was completed with [employer], the computer voice recognition software. Quite often unanticipated grammatical, syntax, homophones, and other interpretive errors are inadvertently transcribed by the computer software. Please disregard these errors. Please excuse any errors that have escaped final proofreading.  ______________________________________________________________________  Given the patient's current clinical presentation, I have a high level of concern for decompensation if discharged from the emergency department. Complex decision making was performed, which includes reviewing the patient's available past medical records, laboratory results, and x-ray films. My assessment of this patient's clinical condition and my plan of care is as follows. Assessment / Plan: Active Problems:    Pneumonia (2022)      Hypoxia (2022)      #Acute hypoxic respiratory failure POA  #Right upper lobe pneumonia POA  #Acute bronchitis due to pneumonia POA  -Chest x-ray reviewed dependently with patchy airspace disease right upper lobe  -Patient oxygen saturation dropped to 82% on room air on admission  -Patient received ceftriaxone azithromycin in the ED      -Check procalcitonin  -Continue ceftriaxone azithromycin for community-acquired pneumonia  -Supplemental oxygen  -DuoNeb every 6 scheduled  -Check urine Legionella and strep pneumo antigen    #Hypertension  #Insomnia  #Anxiety  #Hyperlipidemia  #Aortic stenosis  #Small vessel coronary artery disease  -Resume home meds as patient tolerates      Body mass index is 38.3 kg/m².   Code Status: DNR  Surrogate Decision Maker:    DVT Prophylaxis: Lovenox  GI Prophylaxis: not indicated Subjective:   CHIEF COMPLAINT:     HISTORY OF PRESENT ILLNESS:     Haley Castellano is a 82 y.o.  female with PMH of above-mentioned problems who presents to ED with c/o difficulty breathing and cough for past 2 days. Patient states that she has progressive shortness of breath along with some dry cough since Tuesday. Patient also endorsed some nasal congestion. Denies any fever chest pain belly pain difficulty with bowel movements or bladder. We were asked to admit for work up and evaluation of the above problems.      Past Medical History:   Diagnosis Date    Anxiety     Aortic stenosis 3/3/2010    Aortic stenosis     Arrhythmia     MURMUR    Arthritis     SPINAL STENOSIS    Atherosclerosis of coronary artery     Biliary dyskinesia     CHF (congestive heart failure) ([facility] [address] 3/3/2010    CHF (congestive heart failure) ([facility] [address] 01/2002    Chronic heart failure ([facility] [address] 1/2002; 3/3/2010    chronic diastolic heart failure    Chronic pain     LOWER BACK, RIGHT KNEE    Environmental allergies 3/3/2010    GERD (gastroesophageal reflux disease) 3/3/2010    Hiatal hernia 3/3/2010    High cholesterol 3/3/2010    HTN (hypertension) 3/3/2010    Hypokalemia 3/3/2010    Ischemic heart disease, chronic     Obese     Psychiatric disorder     anxiety    S/P hysterectomy 3/3/2010    Spinal stenosis 3/3/2010        Past Surgical History:   Procedure Laterality Date    CARDIAC CATHETERIZATION  01/2002    normal coronaries    DECOMPRESS DISC RF LUMBAR  07/2007    HX BACK SURGERY  5/1/2014    HX BREAST LUMPECTOMY Left 1989    benign left breast    HX BUNIONECTOMY      HX BUNIONECTOMY      HX HEART CATHETERIZATION  3/3/10    HX HYSTERECTOMY  1978    HX LUMBAR DISKECTOMY      HX ORTHOPAEDIC Bilateral     BUNIONECTOMY    HX ORTHOPAEDIC Right     WRIST FX    HX OTHER SURGICAL  10/12/2015    mole removed from right side of face by Dr. Adamson Estimable FLX DX W/COLLENRIQUE Mazariegos 1976 PFRMD [id number]    Dr Beatrice Munoz UPPER GI ENDOSCOPY,BIOPSY  2/27/2019            Social History     Tobacco Use    Smoking status: Never Smoker    Smokeless tobacco: Never Used   Substance Use Topics    Alcohol use: No     Alcohol/week: 0.0 standard drinks        Family History   Problem Relation Age of Onset    Hypertension Mother     Heart Disease Father     Stroke Sister     Heart Disease Sister     Heart Disease Sister     Cancer Brother         LUNG    Cancer Brother         PROSTATE    Hypertension Brother     No Known Problems Brother     Lung Disease Brother     Heart Attack Brother     Lung Disease Brother     Stroke Brother     Stroke Daughter 47    No Known Problems Daughter     Anesth Problems Neg Hx      Allergies   Allergen Reactions    Bactrim [Sulfamethoxazole-Trimethoprim] Unknown (comments)     Pt does not recall    Darvocet A500 [Propoxyphene N-Acetaminophen] Itching        Prior to Admission medications    Medication Sig Start Date End Date Taking?  Authorizing Provider   clonazePAM (KlonoPIN) 1 mg tablet TAKE 1/2 TO 1 TABLET EVERY MORNING AND AS NEEDED FOR ANXIETY, AND TAKE 1 TABLET AT BEDTIME FOR SLEEP 4/28/22   Gustavo Merida MD   losartan (COZAAR) 100 mg tablet TAKE 1 TABLET BY MOUTH EVERY DAY 3/8/22   Jj Lawson MD   metoprolol tartrate (LOPRESSOR) 25 mg tablet TAKE 1 TABLET BY MOUTH TWICE A DAY 1 AT 9AM AND 1 AT 9PM 2/15/22   Jj GRAVES MD   isosorbide mononitrate ER (IMDUR) 30 mg tablet TAKE 1 TABLET BY MOUTH EVERY DAY 2/15/22   Jj Lawson MD   rosuvastatin (CRESTOR) 20 mg tablet TAKE 1 TABLET BY MOUTH EVERY DAY AT NIGHT 12/29/21   Jj Lawson MD   furosemide (LASIX) 80 mg tablet TAKE 1 TABLET BY MOUTH EVERY MORNING AND TAKE 1/2 TABLET EVERY EVENING 12/15/21   Jj GRAVES MD   nitroglycerin (NITROSTAT) 0.4 mg SL tablet DISSOLVE 1 TAB BY SUBLINGUAL ROUTE EVERY 5 MINUTES AS NEEDED. 11/1/21   Kristine Mckeon Romy Aguilar MD   amLODIPine (NORVASC) 5 mg tablet TAKE 1 TAB BY MOUTH TWO (2) TIMES A DAY. TAKE AT 9AM AND 9PM EVERY DAY. 10/18/21   Marietta Arango MD   desonide (TRIDESILON) 0.05 % topical lotion Apply  to affected area two (2) times daily as needed for Skin Irritation or Itching. 8/30/21   Tamara Varela MD   potassium chloride SR (KLOR-CON 10) 10 mEq tablet Take 3 Tablets by mouth two (2) times a day. 8/5/21   Marietta Arango MD   pantoprazole (PROTONIX) 40 mg tablet TAKE 1 TABLET BY MOUTH TWICE A DAY *30 MINUTES PRIOR TO BREAKFAST AND DINNER* 4/26/21   Marietta Arango MD   diclofenac (VOLTAREN) 1 % gel Apply  to affected area four (4) times daily. 4/2/21   Tamara Varela MD   simethicone (GAS RELIEF) 80 mg chewable tablet Take 80 mg by mouth every six (6) hours as needed for Flatulence. Provider, Historical   cholecalciferol, vitamin D3, (VITAMIN D3) 2,000 unit tab Take 1 Tab by mouth daily. Provider, Historical   acetaminophen (TYLENOL EXTRA STRENGTH) 500 mg tablet Take 1,000 mg by mouth every six (6) hours as needed for Pain. Provider, Historical   Aspirin, Buffered 81 mg tab Take 81 mg by mouth daily. Provider, Historical       REVIEW OF SYSTEMS:     I am not able to complete the review of systems because:    The patient is intubated and sedated    The patient has altered mental status due to his acute medical problems    The patient has baseline aphasia from prior stroke(s)    The patient has baseline dementia and is not reliable historian    The patient is in acute medical distress and unable to provide information           Total of 12 systems reviewed as follows:       POSITIVE= underlined text  Negative = text not underlined  General:  fever, chills, sweats, generalized weakness, weight loss/gain,      loss of appetite   Eyes:    blurred vision, eye pain, loss of vision, double vision  ENT:    rhinorrhea, pharyngitis   Respiratory:   cough, sputum production, SOB, RICHARD, wheezing, pleuritic pain   Cardiology:   chest pain, palpitations, orthopnea, PND, edema, syncope   Gastrointestinal:  abdominal pain , N/V, diarrhea, dysphagia, constipation, bleeding   Genitourinary:  frequency, urgency, dysuria, hematuria, incontinence   Muskuloskeletal :  arthralgia, myalgia, back pain  Hematology:  easy bruising, nose or gum bleeding, lymphadenopathy   Dermatological: rash, ulceration, pruritis, color change / jaundice  Endocrine:   hot flashes or polydipsia   Neurological:  headache, dizziness, confusion, focal weakness, paresthesia,     Speech difficulties, memory loss, gait difficulty  Psychological: Feelings of anxiety, depression, agitation    Objective:   VITALS:    Visit Vitals  BP (!) 150/98 (BP 1 Location: Left upper arm, BP Patient Position: At rest)   Pulse 88   Temp 97.3 °F (36.3 °C)   Resp 19   Ht 4' 9\" (1.448 m)   Wt 80.3 kg (177 lb)   SpO2 92%   BMI 38.30 kg/m²       PHYSICAL EXAM:    General:    Alert, cooperative, no distress, appears stated age. HEENT: Atraumatic, anicteric sclerae, pink conjunctivae     No oral ulcers, mucosa moist, throat clear, dentition fair  Neck:  Supple, symmetrical,  thyroid: non tender  Lungs:   Mild expiratory wheeze bilaterally  Chest wall:  No tenderness  No Accessory muscle use. Heart:   Regular  rhythm,  No  murmur   No edema  Abdomen:   Soft, non-tender. Not distended. Bowel sounds normal  Extremities: No cyanosis. No clubbing,      Skin turgor normal, Capillary refill normal, Radial dial pulse 2+  Skin:     Not pale. Not Jaundiced  No rashes   Psych:  Good insight. Not depressed. Not anxious or agitated. Neurologic: EOMs intact. No facial asymmetry. No aphasia or slurred speech. Symmetrical strength, Sensation grossly intact.  Alert and oriented X 4.     ______________________________________________________________________    Care Plan discussed with:  Patient/Family    Expected  Disposition: SNF/LTC  ________________________________________________________________________  TOTAL TIME:  54 Minutes    Critical Care Provided     Minutes non procedure based      Comments    x Reviewed previous records   >50% of visit spent in counseling and coordination of care x Discussion with patient and/or family and questions answered       ________________________________________________________________________  Signed: Shannan August MD    Procedures: see electronic medical records for all procedures/Xrays and details which were not copied into this note but were reviewed prior to creation of Plan. LAB DATA REVIEWED:    Recent Results (from the past 24 hour(s))   CBC WITH AUTOMATED DIFF    Collection Time: 05/26/22 11:00 AM   Result Value Ref Range    WBC 4.9 3.6 - 11.0 K/uL    RBC 4.96 3.80 - 5.20 M/uL    HGB 14.0 11.5 - 16.0 g/dL    HCT 45.3 35.0 - 47.0 %    MCV 91.3 80.0 - 99.0 FL    MCH 28.2 26.0 - 34.0 PG    MCHC 30.9 30.0 - 36.5 g/dL    RDW 14.3 11.5 - 14.5 %    PLATELET 612 (L) 109 - 400 K/uL    MPV 11.7 8.9 - 12.9 FL    NRBC 0.0 0  WBC    ABSOLUTE NRBC 0.00 0.00 - 0.01 K/uL    NEUTROPHILS 65 32 - 75 %    LYMPHOCYTES 17 12 - 49 %    MONOCYTES 12 5 - 13 %    EOSINOPHILS 6 0 - 7 %    BASOPHILS 0 0 - 1 %    IMMATURE GRANULOCYTES 0 0.0 - 0.5 %    ABS. NEUTROPHILS 3.2 1.8 - 8.0 K/UL    ABS. LYMPHOCYTES 0.8 0.8 - 3.5 K/UL    ABS. MONOCYTES 0.6 0.0 - 1.0 K/UL    ABS. EOSINOPHILS 0.3 0.0 - 0.4 K/UL    ABS. BASOPHILS 0.0 0.0 - 0.1 K/UL    ABS. IMM.  GRANS. 0.0 0.00 - 0.04 K/UL    DF AUTOMATED     METABOLIC PANEL, COMPREHENSIVE    Collection Time: 05/26/22 11:00 AM   Result Value Ref Range    Sodium 141 136 - 145 mmol/L    Potassium 3.4 (L) 3.5 - 5.1 mmol/L    Chloride 101 97 - 108 mmol/L    CO2 34 (H) 21 - 32 mmol/L    Anion gap 6 5 - 15 mmol/L    Glucose 102 (H) 65 - 100 mg/dL    BUN 14 6 - 20 MG/DL    Creatinine 1.03 (H) 0.55 - 1.02 MG/DL    BUN/Creatinine ratio 14 12 - 20      GFR est AA >60 >60 ml/min/1.73m2    GFR est non-AA 51 (L) >60 ml/min/1.73m2    Calcium 8.9 8.5 - 10.1 MG/DL    Bilirubin, total 0.5 0.2 - 1.0 MG/DL    ALT (SGPT) 22 12 - 78 U/L    AST (SGOT) 20 15 - 37 U/L    Alk.  phosphatase 86 45 - 117 U/L    Protein, total 8.2 6.4 - 8.2 g/dL    Albumin 3.4 (L) 3.5 - 5.0 g/dL    Globulin 4.8 (H) 2.0 - 4.0 g/dL    A-G Ratio 0.7 (L) 1.1 - 2.2     NT-PRO BNP    Collection Time: 05/26/22 11:00 AM   Result Value Ref Range    NT pro- 0 - 450 PG/ML   TROPONIN-HIGH SENSITIVITY    Collection Time: 05/26/22 11:00 AM   Result Value Ref Range    Troponin-High Sensitivity 14 0 - 51 ng/L   COVID-19 WITH INFLUENZA A/B    Collection Time: 05/26/22 11:00 AM   Result Value Ref Range    SARS-CoV-2 by PCR Not detected NOTD      Influenza A by PCR Not detected      Influenza B by PCR Not detected     EKG, 12 LEAD, INITIAL    Collection Time: 05/26/22 11:04 AM   Result Value Ref Range    Ventricular Rate 76 BPM    Atrial Rate 76 BPM    P-R Interval 198 ms    QRS Duration 130 ms    Q-T Interval 386 ms    QTC Calculation (Bezet) 434 ms    Calculated P Axis 66 degrees    Calculated R Axis -3 degrees    Calculated T Axis 44 degrees    Diagnosis       Sinus rhythm with premature atrial complexes  Right bundle branch block  When compared with ECG of 28-JAN-2020 14:10,  premature atrial complexes are now present

## 2022-05-26 NOTE — PROGRESS NOTES
TRANSFER - IN REPORT:    Verbal report received from MARYSE CHARLES RN (name) on Regan Coughlin  being received from ED (unit) for routine progression of care      Report consisted of patients Situation, Background, Assessment and   Recommendations(SBAR). Information from the following report(s) SBAR, Kardex, ED Summary, Intake/Output, MAR and Recent Results was reviewed with the receiving nurse. Opportunity for questions and clarification was provided. Assessment completed upon patients arrival to unit and care assumed. 1350 Pt arrived on unit via stretcher from ED     1400 VSS w/ exception of elevated BP. Pt stated arthritic pain 9/10 in L shoulder. Heat applied per pt request. Admission assessment & database complete. PIV access flushed & capped. [address], RT at bedside to complete breathing tx. Cynthia July, echo tech at bedside to complete echo test     1530 Urine sample obtained & taken down to lab     1551 VSS w/ exception of elevated BP. Pt stated pain 8/10 in L shoulder. Reassessment complete, no changes to note. Pt left completing med rec w/ pharmacy on phone     [phone] Pt still on the phone at this time     [phone] Scheduled meds admin. Pt denied any heat pack or any further needs     1651 Visitor at bedside     1703 Pt set up w/ dinner tray. Denies any further needs     1810 Standby assist while pt ambulated to [facility]. Pt voided 300mL CYU. Pt pulled up in bed. Pt consumed 30% of dinner tray. Denied any further needs    1915 Shift report given to Debi Carbone (oncoming) from [address] (offgoing). Report included the following: SBAR, MAR, Kardex, Intake/Output and Cardiac Rhythm of NSR.

## 2022-05-26 NOTE — ED TRIAGE NOTES
Pt arrives to ED with c/o nasal congestion and associated wheezing x 1 week. States she was recently at PCP for medication, but doesn't know what it is. Pt denies any other symptoms.  Pt has hx of HTN and didn't take meds this am.

## 2022-05-26 NOTE — ED PROVIDER NOTES
EMERGENCY DEPARTMENT HISTORY AND PHYSICAL EXAM      Date: 5/26/2022  Patient Name: Kerry Coleman  Patient Age and Sex: 82 y.o. female     History of Presenting Illness     Chief Complaint   Patient presents with    Wheezing    Nasal Congestion       History Provided By: Patient    HPI: Kerry Coleman is a 82-year-old history heart failure, CAD, hypertension hyperlipidemia presenting with dyspnea, wheezing and cough for the past 2 days. She reports associated nasal congestion at that time. Of time. Dyspnea is worse with exertion, moderate in severity. She has no history of COPD no tobacco abuse history, does not take any home albuterol. No fever her, her cough is dry. No associated chest pain. No nausea or vomiting diaphoresis myalgias or headache. She is vaccinated x2 against COVID. There are no other complaints, changes, or physical findings at this time. PCP: Augusta Sanchez MD    No current facility-administered medications on file prior to encounter.      Current Outpatient Medications on File Prior to Encounter   Medication Sig Dispense Refill    clonazePAM (KlonoPIN) 1 mg tablet TAKE 1/2 TO 1 TABLET EVERY MORNING AND AS NEEDED FOR ANXIETY, AND TAKE 1 TABLET AT BEDTIME FOR SLEEP 60 Tablet 1    losartan (COZAAR) 100 mg tablet TAKE 1 TABLET BY MOUTH EVERY DAY 90 Tablet 3    metoprolol tartrate (LOPRESSOR) 25 mg tablet TAKE 1 TABLET BY MOUTH TWICE A DAY 1 AT 9AM AND 1 AT 9PM 180 Tablet 3    isosorbide mononitrate ER (IMDUR) 30 mg tablet TAKE 1 TABLET BY MOUTH EVERY DAY 90 Tablet 3    rosuvastatin (CRESTOR) 20 mg tablet TAKE 1 TABLET BY MOUTH EVERY DAY AT NIGHT 30 Tablet 11    furosemide (LASIX) 80 mg tablet TAKE 1 TABLET BY MOUTH EVERY MORNING AND TAKE 1/2 TABLET EVERY EVENING 135 Tablet 3    nitroglycerin (NITROSTAT) 0.4 mg SL tablet DISSOLVE 1 TAB BY SUBLINGUAL ROUTE EVERY 5 MINUTES AS NEEDED. 25 Tablet 0    amLODIPine (NORVASC) 5 mg tablet TAKE 1 TAB BY MOUTH TWO (2) TIMES A DAY. TAKE AT 9AM AND 9PM EVERY DAY. 180 Tablet 3    desonide (TRIDESILON) 0.05 % topical lotion Apply  to affected area two (2) times daily as needed for Skin Irritation or Itching. 59 mL 1    potassium chloride SR (KLOR-CON 10) 10 mEq tablet Take 3 Tablets by mouth two (2) times a day. 180 Tablet 11    pantoprazole (PROTONIX) 40 mg tablet TAKE 1 TABLET BY MOUTH TWICE A DAY *30 MINUTES PRIOR TO BREAKFAST AND DINNER* 180 Tab 3    diclofenac (VOLTAREN) 1 % gel Apply  to affected area four (4) times daily. 100 g 0    simethicone (GAS RELIEF) 80 mg chewable tablet Take 80 mg by mouth every six (6) hours as needed for Flatulence.  cholecalciferol, vitamin D3, (VITAMIN D3) 2,000 unit tab Take 1 Tab by mouth daily.  acetaminophen (TYLENOL EXTRA STRENGTH) 500 mg tablet Take 1,000 mg by mouth every six (6) hours as needed for Pain.  Aspirin, Buffered 81 mg tab Take 81 mg by mouth daily.          Past History     Past Medical History:  Past Medical History:   Diagnosis Date    Anxiety     Aortic stenosis 3/3/2010    Aortic stenosis     Arrhythmia     MURMUR    Arthritis     SPINAL STENOSIS    Atherosclerosis of coronary artery     Biliary dyskinesia     CHF (congestive heart failure) ([facility] [address] 3/3/2010    CHF (congestive heart failure) ([facility] [address] 01/2002    Chronic heart failure ([facility] [address] 1/2002; 3/3/2010    chronic diastolic heart failure    Chronic pain     LOWER BACK, RIGHT KNEE    Environmental allergies 3/3/2010    GERD (gastroesophageal reflux disease) 3/3/2010    Hiatal hernia 3/3/2010    High cholesterol 3/3/2010    HTN (hypertension) 3/3/2010    Hypokalemia 3/3/2010    Ischemic heart disease, chronic     Obese     Psychiatric disorder     anxiety    S/P hysterectomy 3/3/2010    Spinal stenosis 3/3/2010       Past Surgical History:  Past Surgical History:   Procedure Laterality Date    CARDIAC CATHETERIZATION  01/2002    normal coronaries    DECOMPRESS DISC RF LUMBAR  07/2007    HX BACK SURGERY  5/1/2014    HX BREAST LUMPECTOMY Left 1989    benign left breast    HX BUNIONECTOMY      HX BUNIONECTOMY      HX HEART CATHETERIZATION  3/3/10    HX HYSTERECTOMY  1978    HX LUMBAR DISKECTOMY      HX ORTHOPAEDIC Bilateral     BUNIONECTOMY    HX ORTHOPAEDIC Right     WRIST FX    HX OTHER SURGICAL  10/12/2015    mole removed from right side of face by Dr. Shayy Man FLX DX W/COLLJ Clayton Curry PFRMD  [id number]    Dr Olivia Gilliland GI ENDOSCOPY,BIOPSY  2/27/2019            Family History:  Family History   Problem Relation Age of Onset    Hypertension Mother     Heart Disease Father     Stroke Sister     Heart Disease Sister     Heart Disease Sister     Cancer Brother         LUNG    Cancer Brother         PROSTATE    Hypertension Brother     No Known Problems Brother     Lung Disease Brother     Heart Attack Brother     Lung Disease Brother     Stroke Brother     Stroke Daughter 47    No Known Problems Daughter     Anesth Problems Neg Hx        Social History:  Social History     Tobacco Use    Smoking status: Never Smoker    Smokeless tobacco: Never Used   Vaping Use    Vaping Use: Never used   Substance Use Topics    Alcohol use: No     Alcohol/week: 0.0 standard drinks    Drug use: No       Allergies: Allergies   Allergen Reactions    Bactrim [Sulfamethoxazole-Trimethoprim] Unknown (comments)     Pt does not recall    Darvocet A500 [Propoxyphene N-Acetaminophen] Itching         Review of Systems   Review of Systems   Constitutional: Negative for chills and fever. HENT: Negative for congestion and rhinorrhea. Respiratory: Positive for cough, shortness of breath and wheezing. Cardiovascular: Negative for chest pain, palpitations and leg swelling. Gastrointestinal: Negative for abdominal pain, nausea and vomiting. Genitourinary: Negative for dysuria and frequency. Musculoskeletal: Negative for myalgias.    All other systems reviewed and are negative. Physical Exam   Physical Exam  Vitals and nursing note reviewed. Constitutional:       General: She is not in acute distress. Appearance: Normal appearance. She is not ill-appearing. HENT:      Head: Normocephalic. Mouth/Throat:      Mouth: Mucous membranes are moist.   Eyes:      Conjunctiva/sclera: Conjunctivae normal.   Cardiovascular:      Rate and Rhythm: Normal rate and regular rhythm. Pulses: Normal pulses. Pulmonary:      Effort: Pulmonary effort is normal. No tachypnea or accessory muscle usage. Breath sounds: Normal air entry. No stridor or decreased air movement. Examination of the right-upper field reveals wheezing. Examination of the left-upper field reveals wheezing. Examination of the right-middle field reveals wheezing. Examination of the left-middle field reveals wheezing. Examination of the right-lower field reveals wheezing. Examination of the left-lower field reveals wheezing. Wheezing present. Comments: 5 word conversational dyspnea  Abdominal:      General: Abdomen is flat. Palpations: Abdomen is soft. Musculoskeletal:         General: No deformity. Skin:     General: Skin is warm and dry. Neurological:      Mental Status: She is alert and oriented to person, place, and time. Mental status is at baseline. Psychiatric:         Behavior: Behavior normal.         Thought Content:  Thought content normal.        Diagnostic Study Results   Labs  Recent Results (from the past 12 hour(s))   CBC WITH AUTOMATED DIFF    Collection Time: 05/26/22 11:00 AM   Result Value Ref Range    WBC 4.9 3.6 - 11.0 K/uL    RBC 4.96 3.80 - 5.20 M/uL    HGB 14.0 11.5 - 16.0 g/dL    HCT 45.3 35.0 - 47.0 %    MCV 91.3 80.0 - 99.0 FL    MCH 28.2 26.0 - 34.0 PG    MCHC 30.9 30.0 - 36.5 g/dL    RDW 14.3 11.5 - 14.5 %    PLATELET 254 (L) 341 - 400 K/uL    MPV 11.7 8.9 - 12.9 FL    NRBC 0.0 0  WBC    ABSOLUTE NRBC 0.00 0.00 - 0.01 K/uL    NEUTROPHILS 65 32 - 75 % LYMPHOCYTES 17 12 - 49 %    MONOCYTES 12 5 - 13 %    EOSINOPHILS 6 0 - 7 %    BASOPHILS 0 0 - 1 %    IMMATURE GRANULOCYTES 0 0.0 - 0.5 %    ABS. NEUTROPHILS 3.2 1.8 - 8.0 K/UL    ABS. LYMPHOCYTES 0.8 0.8 - 3.5 K/UL    ABS. MONOCYTES 0.6 0.0 - 1.0 K/UL    ABS. EOSINOPHILS 0.3 0.0 - 0.4 K/UL    ABS. BASOPHILS 0.0 0.0 - 0.1 K/UL    ABS. IMM. GRANS. 0.0 0.00 - 0.04 K/UL    DF AUTOMATED     METABOLIC PANEL, COMPREHENSIVE    Collection Time: 05/26/22 11:00 AM   Result Value Ref Range    Sodium 141 136 - 145 mmol/L    Potassium 3.4 (L) 3.5 - 5.1 mmol/L    Chloride 101 97 - 108 mmol/L    CO2 34 (H) 21 - 32 mmol/L    Anion gap 6 5 - 15 mmol/L    Glucose 102 (H) 65 - 100 mg/dL    BUN 14 6 - 20 MG/DL    Creatinine 1.03 (H) 0.55 - 1.02 MG/DL    BUN/Creatinine ratio 14 12 - 20      GFR est AA >60 >60 ml/min/1.73m2    GFR est non-AA 51 (L) >60 ml/min/1.73m2    Calcium 8.9 8.5 - 10.1 MG/DL    Bilirubin, total 0.5 0.2 - 1.0 MG/DL    ALT (SGPT) 22 12 - 78 U/L    AST (SGOT) 20 15 - 37 U/L    Alk.  phosphatase 86 45 - 117 U/L    Protein, total 8.2 6.4 - 8.2 g/dL    Albumin 3.4 (L) 3.5 - 5.0 g/dL    Globulin 4.8 (H) 2.0 - 4.0 g/dL    A-G Ratio 0.7 (L) 1.1 - 2.2     NT-PRO BNP    Collection Time: 05/26/22 11:00 AM   Result Value Ref Range    NT pro- 0 - 450 PG/ML   TROPONIN-HIGH SENSITIVITY    Collection Time: 05/26/22 11:00 AM   Result Value Ref Range    Troponin-High Sensitivity 14 0 - 51 ng/L   COVID-19 WITH INFLUENZA A/B    Collection Time: 05/26/22 11:00 AM   Result Value Ref Range    SARS-CoV-2 by PCR Not detected NOTD      Influenza A by PCR Not detected      Influenza B by PCR Not detected     EKG, 12 LEAD, INITIAL    Collection Time: 05/26/22 11:04 AM   Result Value Ref Range    Ventricular Rate 76 BPM    Atrial Rate 76 BPM    P-R Interval 198 ms    QRS Duration 130 ms    Q-T Interval 386 ms    QTC Calculation (Bezet) 434 ms    Calculated P Axis 66 degrees    Calculated R Axis -3 degrees    Calculated T Axis 44 degrees Diagnosis       Sinus rhythm with premature atrial complexes  Right bundle branch block  When compared with ECG of 28-JAN-2020 14:10,  premature atrial complexes are now present       Radiologic Studies   XR CHEST PORT   Final Result   1. Enlarged cardiac silhouette,      Patchy airspace disease right upper lobe adjacent to the horizontal fissure           CT Results  (Last 48 hours)    None        CXR Results  (Last 48 hours)               05/26/22 1100  XR CHEST PORT Final result    Impression:  1. Enlarged cardiac silhouette,       Patchy airspace disease right upper lobe adjacent to the horizontal fissure           Narrative:  INDICATION:  dyspnea, cough        EXAM: Single portable view of chest 1036 . Comparison:None       Findings: Cardiac silhouette is enlarged. Pulmonary vasculature is not engorged. Patchy airspace disease right upper lobe adjacent to the horizontal fissure. No   pneumothorax or effusion. Medical Decision Making   I am the first provider for this patient. I reviewed the vital signs, available nursing notes, past medical history, past surgical history, family history and social history. Vital Signs-Reviewed the patient's vital signs. Patient Vitals for the past 12 hrs:   Temp Pulse Resp BP SpO2   05/26/22 1206 -- -- -- -- 96 %   05/26/22 1116 -- -- -- -- 96 %   05/26/22 1027 97.4 °F (36.3 °C) 74 18 (!) 170/87 93 %       Records Reviewed: Nursing Notes and Old Medical Records    Provider Notes (Medical Decision Making):   DDx acute bronchitis, cardiac wheeze with decompensated heart failure, COVID    Will obtain stat chest x-ray to evaluate for pneumonia, fluid overload., EKG troponin evaluate for ischemia, BNP. Tachypneic however does have some conversational dyspnea on arrival.  Following COVID test, will give DuoNeb. ED Course:   Initial assessment performed.  The patients presenting problems have been discussed, and they are in agreement with the care plan formulated and outlined with them. I have encouraged them to ask questions as they arise throughout their visit. ED Course as of 05/26/22 1253   u May 26, 2022   1110 Chest x-ray today demonstrates cardiomegaly in the setting of her known heart failure, some fluid collecting in the fissure on the right, otherwise sharp cardiomediastinal and cardiophrenic angles on the right [WB]   1111 EKG demonstrates normal sinus rhythm at a rate of 76, bundle branch block morphology no STEMI no peak T waves QRS of 130 [WB]   1113 Documented right bundle branch block in March 2019. Echocardiogram reviewed from July 2019 shows a normal EF with severe LVH, mild aortic stenosis [WB]   1130 I was alerted by nursing that they found her oxygen saturation of 82% on room air, placed on 6 L and 86%. [WB]   1130 Patient with significant wheezing on my exam, she is still pending a COVID test, will give serial DuoNeb treatments and reevaluate [WB]   1132 CBC with normal white count, afebrile [WB]   1132 However given significant hypoxia, will treat as pneumonia with IV antibiotics, plan admission [WB]   1134 Will give ceftriaxone azithromycin for community acquired pneumonia coverage pending further work-up [WB]   1140 I did reevaluate the patient, she is on 4 L nasal cannula satting 96% with ongoing wheezing in all lung fields. [WB]   56 Hospitalist paged for admission for community-acquired pneumonia complicated by heart failure [WB]   1233 Patient with ongoing wheezing, receiving DuoNeb treatment at this time.   Satting 94% on 4 L [WB]   1251 Reevaluated following DuoNeb treatments, wheezing is now resolved, she feels more comfortable satting 92% on 4 L. [WB]   1252 CRITICAL CARE NOTE :    12:52 PM    IMPENDING DETERIORATION -Respiratory  ASSOCIATED RISK FACTORS - Hypoxia  MANAGEMENT- Bedside Assessment and Supervision of Care  INTERPRETATION -  CXR, EKG  INTERVENTIONS - Multiple duoneb treatments  CASE REVIEW - Hospitalist/Intensivist and Nursing  TREATMENT RESPONSE -Improved  PERFORMED BY - Self    NOTES   :  I have spent 45 minutes of critical care time involved in lab review, consultations with specialist, family decision- making, bedside attention and documentation. This time excludes time spent in any separate billed procedures. During this entire length of time I was immediately available to the patient . Marva Wyatt MD [WB]      ED Course User Index  [WB] Val Poole MD     Disposition:    Admission Note:  Patient is being admitted to the hospital by Dr. Chani Neal, Service: Hospitalist.  The results of their tests and reasons for their admission have been discussed with them and available family. They convey agreement and understanding for the need to be admitted and for their admission diagnosis. Diagnosis     Clinical Impression:   1. Community acquired pneumonia of right upper lobe of lung        Attestations:    Marva Wyatt M.D. Please note that this dictation was completed with [employer], the computer voice recognition software. Quite often unanticipated grammatical, syntax, homophones, and other interpretive errors are inadvertently transcribed by the computer software. Please disregard these errors. Please excuse any errors that have escaped final proofreading. Thank you.

## 2022-05-26 NOTE — PROGRESS NOTES
Occupational Therapy    Order's acknowledged, chart reviewed in prep for skilled OT treatment; however, pt undergoing bedside echocardiogram; therefore, will hold evaluation and follow up tomorrow as able and appropriate.     Thank you,  Savannah Denny, OT

## 2022-05-26 NOTE — PROGRESS NOTES
Physical Therapy    Order's acknowledged, chart reviewed in prep for skilled PT treatment; however, pt undergoing bedside echocardiogram; therefore, will hold evaluation and follow up tomorrow as able and appropriate.     Thank you,  Giles Manriquez, PT, DPT

## 2022-05-26 NOTE — PROGRESS NOTES
Advance Care Planning     Advance Care Planning (ACP) Physician/NP/PA Conversation      Date of Conversation: 5/26/2022  Conducted with: Patient with Decision Making Capacity    Healthcare Decision Maker:   No healthcare decision makers have been documented. Click here to complete [address] including selection of the Healthcare Decision Maker Relationship (ie \"Primary\")    Today we documented Decision Maker(s). The patient will provide ACP documents. Care Preferences:    Hospitalization: \"If your health worsens and it becomes clear that your chance of recovery is unlikely, what would be your preference regarding hospitalization? \"  The patient would prefer hospitalization. Ventilation: \"If you were unable to breathe on your own and your chance of recovery was unlikely, what would be your preference about the use of a ventilator (breathing machine) if it was available to you? \"   The patient would NOT desire the use of a ventilator. Resuscitation: \"In the event your heart stopped as a result of an underlying serious health condition, would you want attempts to be made to restart your heart, or would you prefer a natural death? \"   No, do NOT attempt to resuscitate.     Additional topics discussed: benefit/burden of treatment options, ventilation preferences, hospitalization preferences, resuscitation preferences and end of life care preferences (vegetative state/imminent death)    Conversation Outcomes / Follow-Up Plan:   ACP in process - information provided, considering goals and options  Reviewed DNR/DNI and patient confirms current DNR status - completed forms on file (place new order if needed)     Length of Voluntary ACP Conversation in minutes:  20 minutes    Aris Grubbs MD

## 2022-05-27 LAB
ALBUMIN SERPL-MCNC: 3 G/DL (ref 3.5–5)
ALBUMIN/GLOB SERPL: 0.7 {RATIO} (ref 1.1–2.2)
ALP SERPL-CCNC: 83 U/L (ref 45–117)
ALT SERPL-CCNC: 19 U/L (ref 12–78)
ANION GAP SERPL CALC-SCNC: 7 MMOL/L (ref 5–15)
AST SERPL-CCNC: 17 U/L (ref 15–37)
BASOPHILS # BLD: 0.1 K/UL (ref 0–0.1)
BASOPHILS NFR BLD: 1 % (ref 0–1)
BILIRUB SERPL-MCNC: 0.4 MG/DL (ref 0.2–1)
BUN SERPL-MCNC: 9 MG/DL (ref 6–20)
BUN/CREAT SERPL: 10 (ref 12–20)
CALCIUM SERPL-MCNC: 8.4 MG/DL (ref 8.5–10.1)
CHLORIDE SERPL-SCNC: 101 MMOL/L (ref 97–108)
CO2 SERPL-SCNC: 32 MMOL/L (ref 21–32)
CREAT SERPL-MCNC: 0.9 MG/DL (ref 0.55–1.02)
DIFFERENTIAL METHOD BLD: ABNORMAL
EOSINOPHIL # BLD: 0.2 K/UL (ref 0–0.4)
EOSINOPHIL NFR BLD: 3 % (ref 0–7)
ERYTHROCYTE [DISTWIDTH] IN BLOOD BY AUTOMATED COUNT: 14.2 % (ref 11.5–14.5)
GLOBULIN SER CALC-MCNC: 4.6 G/DL (ref 2–4)
GLUCOSE SERPL-MCNC: 111 MG/DL (ref 65–100)
HCT VFR BLD AUTO: 43.2 % (ref 35–47)
HGB BLD-MCNC: 13.2 G/DL (ref 11.5–16)
IMM GRANULOCYTES # BLD AUTO: 0 K/UL
IMM GRANULOCYTES NFR BLD AUTO: 0 %
L PNEUMO1 AG UR QL IA: NEGATIVE
LYMPHOCYTES # BLD: 0.8 K/UL (ref 0.8–3.5)
LYMPHOCYTES NFR BLD: 15 % (ref 12–49)
MCH RBC QN AUTO: 28 PG (ref 26–34)
MCHC RBC AUTO-ENTMCNC: 30.6 G/DL (ref 30–36.5)
MCV RBC AUTO: 91.7 FL (ref 80–99)
MONOCYTES # BLD: 0.4 K/UL (ref 0–1)
MONOCYTES NFR BLD: 7 % (ref 5–13)
NEUTS BAND NFR BLD MANUAL: 13 % (ref 0–6)
NEUTS SEG # BLD: 3.6 K/UL (ref 1.8–8)
NEUTS SEG NFR BLD: 61 % (ref 32–75)
NRBC # BLD: 0 K/UL (ref 0–0.01)
NRBC BLD-RTO: 0 PER 100 WBC
PLATELET # BLD AUTO: 131 K/UL (ref 150–400)
PMV BLD AUTO: 11.4 FL (ref 8.9–12.9)
POTASSIUM SERPL-SCNC: 3.1 MMOL/L (ref 3.5–5.1)
PROCALCITONIN SERPL-MCNC: <0.05 NG/ML
PROT SERPL-MCNC: 7.6 G/DL (ref 6.4–8.2)
RBC # BLD AUTO: 4.71 M/UL (ref 3.8–5.2)
RBC MORPH BLD: ABNORMAL
SODIUM SERPL-SCNC: 140 MMOL/L (ref 136–145)
SPECIMEN SOURCE: NORMAL
WBC # BLD AUTO: 5.1 K/UL (ref 3.6–11)

## 2022-05-27 PROCEDURE — 85025 COMPLETE CBC W/AUTO DIFF WBC: CPT

## 2022-05-27 PROCEDURE — 74011250636 HC RX REV CODE- 250/636: Performed by: STUDENT IN AN ORGANIZED HEALTH CARE EDUCATION/TRAINING PROGRAM

## 2022-05-27 PROCEDURE — 84145 PROCALCITONIN (PCT): CPT

## 2022-05-27 PROCEDURE — 97535 SELF CARE MNGMENT TRAINING: CPT

## 2022-05-27 PROCEDURE — 77010033678 HC OXYGEN DAILY

## 2022-05-27 PROCEDURE — 36415 COLL VENOUS BLD VENIPUNCTURE: CPT

## 2022-05-27 PROCEDURE — 97530 THERAPEUTIC ACTIVITIES: CPT

## 2022-05-27 PROCEDURE — 97165 OT EVAL LOW COMPLEX 30 MIN: CPT

## 2022-05-27 PROCEDURE — 80053 COMPREHEN METABOLIC PANEL: CPT

## 2022-05-27 PROCEDURE — 77030027138 HC INCENT SPIROMETER -A

## 2022-05-27 PROCEDURE — 74011250637 HC RX REV CODE- 250/637: Performed by: STUDENT IN AN ORGANIZED HEALTH CARE EDUCATION/TRAINING PROGRAM

## 2022-05-27 PROCEDURE — 74011000258 HC RX REV CODE- 258: Performed by: STUDENT IN AN ORGANIZED HEALTH CARE EDUCATION/TRAINING PROGRAM

## 2022-05-27 PROCEDURE — 65270000032 HC RM SEMIPRIVATE

## 2022-05-27 PROCEDURE — 94640 AIRWAY INHALATION TREATMENT: CPT

## 2022-05-27 PROCEDURE — 74011250637 HC RX REV CODE- 250/637: Performed by: HOSPITALIST

## 2022-05-27 PROCEDURE — 74011000250 HC RX REV CODE- 250: Performed by: STUDENT IN AN ORGANIZED HEALTH CARE EDUCATION/TRAINING PROGRAM

## 2022-05-27 PROCEDURE — 97161 PT EVAL LOW COMPLEX 20 MIN: CPT

## 2022-05-27 RX ORDER — IPRATROPIUM BROMIDE AND ALBUTEROL SULFATE 2.5; .5 MG/3ML; MG/3ML
3 SOLUTION RESPIRATORY (INHALATION)
Status: DISCONTINUED | OUTPATIENT
Start: 2022-05-27 | End: 2022-05-31 | Stop reason: HOSPADM

## 2022-05-27 RX ORDER — IBUPROFEN 400 MG/1
400 TABLET ORAL ONCE
Status: COMPLETED | OUTPATIENT
Start: 2022-05-27 | End: 2022-05-27

## 2022-05-27 RX ADMIN — ENOXAPARIN SODIUM 40 MG: 100 INJECTION SUBCUTANEOUS at 08:24

## 2022-05-27 RX ADMIN — IPRATROPIUM BROMIDE AND ALBUTEROL SULFATE 3 ML: 2.5; .5 SOLUTION RESPIRATORY (INHALATION) at 04:01

## 2022-05-27 RX ADMIN — ASPIRIN 81 MG 81 MG: 81 TABLET ORAL at 08:24

## 2022-05-27 RX ADMIN — ROSUVASTATIN CALCIUM 20 MG: 10 TABLET, FILM COATED ORAL at 21:50

## 2022-05-27 RX ADMIN — IPRATROPIUM BROMIDE AND ALBUTEROL SULFATE 3 ML: 2.5; .5 SOLUTION RESPIRATORY (INHALATION) at 09:01

## 2022-05-27 RX ADMIN — IPRATROPIUM BROMIDE AND ALBUTEROL SULFATE 3 ML: 2.5; .5 SOLUTION RESPIRATORY (INHALATION) at 20:11

## 2022-05-27 RX ADMIN — METOPROLOL TARTRATE 25 MG: 25 TABLET, FILM COATED ORAL at 08:25

## 2022-05-27 RX ADMIN — AZITHROMYCIN 500 MG: 250 TABLET, FILM COATED ORAL at 08:25

## 2022-05-27 RX ADMIN — METOPROLOL TARTRATE 25 MG: 25 TABLET, FILM COATED ORAL at 21:50

## 2022-05-27 RX ADMIN — SODIUM CHLORIDE, PRESERVATIVE FREE 10 ML: 5 INJECTION INTRAVENOUS at 06:00

## 2022-05-27 RX ADMIN — SODIUM CHLORIDE, PRESERVATIVE FREE 10 ML: 5 INJECTION INTRAVENOUS at 22:00

## 2022-05-27 RX ADMIN — AMLODIPINE BESYLATE 5 MG: 5 TABLET ORAL at 08:25

## 2022-05-27 RX ADMIN — IBUPROFEN 400 MG: 400 TABLET, FILM COATED ORAL at 01:35

## 2022-05-27 RX ADMIN — POTASSIUM BICARBONATE 40 MEQ: 782 TABLET, EFFERVESCENT ORAL at 11:44

## 2022-05-27 RX ADMIN — PANTOPRAZOLE SODIUM 40 MG: 40 TABLET, DELAYED RELEASE ORAL at 08:24

## 2022-05-27 RX ADMIN — FUROSEMIDE 40 MG: 40 TABLET ORAL at 08:24

## 2022-05-27 RX ADMIN — IPRATROPIUM BROMIDE AND ALBUTEROL SULFATE 3 ML: 2.5; .5 SOLUTION RESPIRATORY (INHALATION) at 14:07

## 2022-05-27 RX ADMIN — CEFTRIAXONE SODIUM 1 G: 1 INJECTION, POWDER, FOR SOLUTION INTRAMUSCULAR; INTRAVENOUS at 11:46

## 2022-05-27 RX ADMIN — FUROSEMIDE 40 MG: 40 TABLET ORAL at 15:51

## 2022-05-27 NOTE — PROGRESS NOTES
Reason for Admission:    Pt was admitted with chief complaint of hypoxia and diagnosis of pneumonia. Also has co-morbid conditions of heart failure; CAD and HTN. RUR Score:   8%                  Plan for utilizing home health:      May need [facility] for PT/OT. Waiting on PT/OT recommendations. PCP: First and Last name:  Argyle Epley, MD     Name of Practice:  [facility]   Are you a current patient: Yes/No:  Yes   Approximate date of last visit:  May 2, 2022   Can you participate in a virtual visit with your PCP:  No                    Current Advanced Directive/Advance Care Plan: DNR      Healthcare Decision Maker:   Click here to complete [address] including selection of the Healthcare Decision Maker Relationship (ie \"Primary\")           Patient identified her daughter, Hettie Homans as her primary surrogate decision maker and another daughter, Alexys Mejia as the secondary surrogate decision maker. Transition of Care Plan:                      CM met with patient, identified self and role, and verified information on the face sheet. .  Patient was admitted with diagnosis of pneumonia  Her PCP is Dr. Lilibeth Soto who she last saw in May 2022. She fills her prescriptions at [facility] on [facility] and [address] [place] [address]. Pt is  (for 77 years) and lives with her . Her daughter, Kd Jarrett lives with them and pt identifies her as a source of support and her emergency contact. Also identified Hettie Homans ([phone]), her daughter, as her primary surrogate decision maker for health care and her daughterWinnie as the secondary decision maker. Discharge plan is for her to return to her home with Kd Jarrett who will provide her transportation. Kd Jarrett also takes patient to her medical appointments. Pt has Medicare and Medicaid and CM reviewed the IMM letter with her. Patient verbalized understanding and signed it.   The original was given to patient and a copy placed on chart to be scanned. PCP follow up is on June 8, 2022 @ 11:20 am   PT/OT will complete evaluations and their recommendations for therapy and DME are pending. Care Management Interventions  PCP Verified by CM: Yes  Last Visit to PCP: 05/02/22  Palliative Care Criteria Met (RRAT>21 & CHF Dx)?: No  Mode of Transport at Discharge:  Other (see comment) (Pt said her daughter, [facility] will pick her up.)  Transition of Care Consult (CM Consult): Discharge Planning  MyChart Signup: No  Discharge Durable Medical Equipment: No  Physical Therapy Consult: Yes  Occupational Therapy Consult: Yes  Speech Therapy Consult: No  Support Systems: Other Family Member(s)  Confirm Follow Up Transport: Self  The Plan for Transition of Care is Related to the Following Treatment Goals : medical stability  The Patient and/or Patient Representative was Provided with a Choice of Provider and Agrees with the Discharge Plan?: No  Freedom of Choice List was Provided with Basic Dialogue that Supports the Patient's Individualized Plan of Care/Goals, Treatment Preferences and Shares the Quality Data Associated with the Providers?: No  [place] Resource Information Provided?: No  Discharge Location  Patient Expects to be Discharged to[de-identified] Home with outpatient services

## 2022-05-27 NOTE — PROGRESS NOTES
Sent message to hospitalist in regards to patient pain in left shoulder and left and right knees at a 8/10. Awaiting for a response.

## 2022-05-27 NOTE — PROGRESS NOTES
Problem: General Medical Care Plan  Goal: *Vital signs within specified parameters  Outcome: Progressing Towards Goal  Goal: *Labs within defined limits  Outcome: Progressing Towards Goal  Goal: *Absence of infection signs and symptoms  Outcome: Progressing Towards Goal  Goal: *Optimal pain control at patient's stated goal  Outcome: Progressing Towards Goal  Goal: *Skin integrity maintained  Outcome: Progressing Towards Goal  Goal: *Fluid volume balance  Outcome: Progressing Towards Goal  Goal: *Optimize nutritional status  Outcome: Progressing Towards Goal  Goal: *Anxiety reduced or absent  Outcome: Progressing Towards Goal  Goal: *Progressive mobility and function (eg: ADL's)  Outcome: Progressing Towards Goal     Problem: Patient Education: Go to Patient Education Activity  Goal: Patient/Family Education  Outcome: Progressing Towards Goal     Problem: Breathing Pattern - Ineffective  Goal: *Absence of hypoxia  Outcome: Progressing Towards Goal  Goal: *Use of effective breathing techniques  Outcome: Progressing Towards Goal  Goal: *PALLIATIVE CARE:  Alleviation of Dyspnea  Outcome: Progressing Towards Goal     Problem: Patient Education: Go to Patient Education Activity  Goal: Patient/Family Education  Outcome: Progressing Towards Goal     Problem: Falls - Risk of  Goal: *Absence of Falls  Description: Document Jad Fall Risk and appropriate interventions in the flowsheet.   Outcome: Progressing Towards Goal  Note: Fall Risk Interventions:  Mobility Interventions: Communicate number of staff needed for ambulation/transfer         Medication Interventions: Teach patient to arise slowly         History of Falls Interventions: Consult care management for discharge planning         Problem: Patient Education: Go to Patient Education Activity  Goal: Patient/Family Education  Outcome: Progressing Towards Goal     Problem: Patient Education: Go to Patient Education Activity  Goal: Patient/Family Education  Outcome: Progressing Towards Goal     Problem: Patient Education: Go to Patient Education Activity  Goal: Patient/Family Education  Outcome: Progressing Towards Goal     Problem: Breathing Pattern - Ineffective  Goal: *Absence of hypoxia  Outcome: Progressing Towards Goal     Problem: Falls - Risk of  Goal: *Absence of Falls  Description: Document Pietro Tamayo Fall Risk and appropriate interventions in the flowsheet.   Outcome: Progressing Towards Goal  Note: Fall Risk Interventions:  Mobility Interventions: Communicate number of staff needed for ambulation/transfer         Medication Interventions: Teach patient to arise slowly         History of Falls Interventions: Consult care management for discharge planning         Problem: Patient Education: Go to Patient Education Activity  Goal: Patient/Family Education  Outcome: Progressing Towards Goal     Problem: Patient Education: Go to Patient Education Activity  Goal: Patient/Family Education  Outcome: Progressing Towards Goal     Problem: Patient Education: Go to Patient Education Activity  Goal: Patient/Family Education  Outcome: Progressing Towards Goal

## 2022-05-27 NOTE — PROGRESS NOTES
Problem: Self Care Deficits Care Plan (Adult)  Goal: *Acute Goals and Plan of Care (Insert Text)  Description: FUNCTIONAL STATUS PRIOR TO ADMISSION: Patient was modified independent using a single point cane for (primarily) household-distance functional mobility. Patient reports difficulty with tub transfers, now performing bathing via seated \"wash-ups\" at the sink. Her  assists with socks and tying tennis shoes prn. Patient participates in some household cleaning but does not cook. No supplemental O2 at baseline. HOME SUPPORT: The patient lived with her , daughter, and grandson. Occupational Therapy Goals  Initiated 5/27/2022  1. Patient will perform standing grooming with modified independence and SpO2 > 90% within 7 day(s). 2.  Patient will perform seated / standing bathing with modified independence and SpO2 > 90% within 7 day(s). 3.  Patient will perform simple home management with supervision/set-up using SPC prn and maintaining SpO2 > 90% within 7 day(s). 4.  Patient will perform toilet transfers with modified independence and SpO2 > 90% within 7 day(s). 5.  Patient will perform all aspects of toileting with modified independence and SpO2 > 90% within 7 day(s). 6.  Patient will participate in upper extremity therapeutic exercise/activities with supervision/set-up for 10 minutes with rest breaks prn within 7 day(s). 7.  Patient will utilize energy conservation and pursed lip breathing techniques during functional activities with minimal cues within 7 day(s). Outcome: Progressing Towards Goal   OCCUPATIONAL THERAPY EVALUATION  Patient:  Heather Patel (10 y.o. female)  Date: 5/27/2022  Primary Diagnosis: Hypoxia [R09.02]  Pneumonia [J18.9]        Precautions:        ASSESSMENT  Based on the objective data described below, the patient presents with generalized deconditioning, impaired higher level balance, dyspnea with mild exertion, decreased activity tolerance, and new use supplemental O2 s/p admission for hypoxia and PNA. Patient is received on 3L O2 with resting SpO2 fluctuating 89-92%. Patient participates in EOB dressing, benefiting from up to mod assist for lower body clothing management 2/2 diminished lower body reach and dyspnea. Patient transfers to bedside chair with personal cane and CGA, then toileting at [facility] with up to min assist for clothing management. With transfers and conversation, patient SpO2 as low as 84%. Patient benefits from multimodal cues and reminders for pursed lip breathing, recovering up to 90% within approx 2 minutes. Per patient, she has supportive spouse who can assist with ADLs as needed. Pending progress, anticipate HH OT vs no follow-up with increased family supervision/assist.    Current Level of Function Impacting Discharge (ADLs/self-care): up to mod A due to dyspnea, hypoxia, and decreased activity tolerance    Functional Outcome Measure: The patient scored 60/100 on the Barthel Index. Other factors to consider for discharge:      Patient will benefit from skilled therapy intervention to address the above noted impairments. PLAN :  Recommendations and Planned Interventions: self care training, functional mobility training, therapeutic exercise, balance training, therapeutic activities, endurance activities, patient education, and home safety training    Frequency/Duration: Patient will be followed by occupational therapy 3 times a week to address goals. Recommendation for discharge: (in order for the patient to meet his/her long term goals)  To be determined: HH vs none pending progress    This discharge recommendation:  Has not yet been discussed the attending provider and/or case management    IF patient discharges home will need the following DME: TBD       SUBJECTIVE:   Patient stated Hans clement for such a beautiful outfit\" in reference to hospital scrubs.     OBJECTIVE DATA SUMMARY:   HISTORY:   Past Medical History:   Diagnosis Date    Anxiety     Aortic stenosis 3/3/2010    Aortic stenosis     Arrhythmia     MURMUR    Arthritis     SPINAL STENOSIS    Atherosclerosis of coronary artery     Biliary dyskinesia     CHF (congestive heart failure) ([facility] [address] 3/3/2010    CHF (congestive heart failure) ([facility] [address] 01/2002    Chronic heart failure ([facility] [address] 1/2002; 3/3/2010    chronic diastolic heart failure    Chronic pain     LOWER BACK, RIGHT KNEE    Environmental allergies 3/3/2010    GERD (gastroesophageal reflux disease) 3/3/2010    Hiatal hernia 3/3/2010    High cholesterol 3/3/2010    HTN (hypertension) 3/3/2010    Hypokalemia 3/3/2010    Ischemic heart disease, chronic     Obese     Psychiatric disorder     anxiety    S/P hysterectomy 3/3/2010    Spinal stenosis 3/3/2010     Past Surgical History:   Procedure Laterality Date    CARDIAC CATHETERIZATION  01/2002    normal coronaries    DECOMPRESS DISC RF LUMBAR  07/2007    HX BACK SURGERY  5/1/2014    HX BREAST LUMPECTOMY Left 1989    benign left breast    HX BUNIONECTOMY      HX BUNIONECTOMY      HX HEART CATHETERIZATION  3/3/10    HX HYSTERECTOMY  1978    HX LUMBAR DISKECTOMY      HX ORTHOPAEDIC Bilateral     BUNIONECTOMY    HX ORTHOPAEDIC Right     WRIST FX    HX OTHER SURGICAL  10/12/2015    mole removed from right side of face by Dr. Casey Baird FLX DX W/COLLJ [facility] INPATIENT REH[facility]IL[facility] WHEN PFRMD  [id number]    Dr Roman Royal ENDOSCOPY,BIOPSY  2/27/2019            Expanded or extensive additional review of patient history:     Home Situation  Home Environment: Private residence  # Steps to Enter: 2  Rails to Enter: Yes  Hand Rails : Bilateral  One/Two Story Residence: One story  Living Alone: No  Support Systems: Spouse/Significant Other,Child(jorge),Other Family Member(s)  Patient Expects to be Discharged to[de-identified] Home  Current DME Used/Available at Home: Cane, straight,Wheelchair,Walker, rolling    Hand dominance: Right    EXAMINATION OF PERFORMANCE DEFICITS:  Cognitive/Behavioral Status:  Neurologic State: Alert  Orientation Level: Oriented X4  Cognition: Follows commands  Perception: Appears intact  Perseveration: No perseveration noted  Safety/Judgement: Awareness of environment; Fall prevention;Home safety;Decreased insight into deficits; Decreased awareness of need for safety    Skin:     Edema:     Hearing: Auditory  Auditory Impairment: None    Vision/Perceptual:        Range of Motion:  AROM: Generally decreased, functional     Strength:  Strength: Generally decreased, functional     Coordination:  Coordination: Within functional limits  Fine Motor Skills-Upper: Left Intact; Right Intact    Gross Motor Skills-Upper: Left Intact; Right Intact (L shldr limited by arthritic pain)    Tone & Sensation:  Tone: Normal    Balance:  Sitting: Intact  Standing: Impaired  Standing - Static: Good  Standing - Dynamic : Fair    Functional Mobility and Transfers for ADLs:  Bed Mobility:  Supine to Sit: Stand-by assistance; Additional time;Bed Modified  Scooting: Stand-by assistance    Transfers:  Sit to Stand: Stand-by assistance  Stand to Sit: Stand-by assistance  Bed to Chair: Stand-by assistance  Bathroom Mobility: Contact guard assistance  Toilet Transfer : Contact guard assistance    ADL Assessment:  Feeding: Setup    Oral Facial Hygiene/Grooming: Setup    Bathing: Moderate assistance    Upper Body Dressing: Setup    Lower Body Dressing: Moderate assistance    Toileting: Minimum assistance    ADL Intervention and task modifications:  Feeding  Feeding Assistance: Set-up  Utensil Management: Independent  Food to Mouth: Independent  Drink to Mouth: Independent    Lower Body Dressing Assistance  Dressing Assistance:  Moderate assistance  Pants With Elastic Waist: Moderate assistance (w/ dyspnea, decreased activity tolerance)  Leg Crossed Method Used: No  Position Performed: Seated edge of bed;Standing  Cues: Verbal cues provided;Visual cues provided;Physical assistance    Toileting  Toileting Assistance: Minimum assistance  Bladder Hygiene: Stand-by assistance  Clothing Management: Minimum assistance  Cues: Verbal cues provided;Visual cues provided  Adaptive Equipment:  (BSC, SPC)    Cognitive Retraining  Organizing/Sequencing: Prioritizing  Attention to Task: Single task (for pursed lip breathing + recovery)  Safety/Judgement: Awareness of environment; Fall prevention;Home safety;Decreased insight into deficits; Decreased awareness of need for safety    Therapeutic Exercise:  Issued incentive spirometer. Per discussion with PT, PT to educate patient on use. Functional Measure:    Barthel Index:  Bathin  Bladder: 10  Bowels: 10  Groomin  Dressin  Feeding: 10  Mobility: 5  Stairs: 0  Toilet Use: 5  Transfer (Bed to Chair and Back): 10  Total: 60/100      The Barthel ADL Index: Guidelines  1. The index should be used as a record of what a patient does, not as a record of what a patient could do. 2. The main aim is to establish degree of independence from any help, physical or verbal, however minor and for whatever reason. 3. The need for supervision renders the patient not independent. 4. A patient's performance should be established using the best available evidence. Asking the patient, friends/relatives and nurses are the usual sources, but direct observation and common sense are also important. However direct testing is not needed. 5. Usually the patient's performance over the preceding 24-48 hours is important, but occasionally longer periods will be relevant. 6. Middle categories imply that the patient supplies over 50 per cent of the effort. 7. Use of aids to be independent is allowed. Score Interpretation (from [address] [address])    Independent   60-79 Minimally independent   40-59 Partially dependent   20-39 Very dependent   <20 Totally dependent     -gK Ramirse., Barthel, D.W. (1965). Functional evaluation: the Barthel Index.  Josefa EARL Jo 94 J 142.  -CHARMAINE Dasilva, [address] (1997). The Barthel activities of daily living index: self-reporting versus actual performance in the old (> or = 75 years). Journal [address] [address]), 14 [facility], DEVONTE, Shelley Gustafson., Robert Parker. (1999). Measuring the change in disability after inpatient rehabilitation; comparison of the responsiveness of the Barthel Index and Functional [place] Measure. Journal of Neurology, Neurosurgery, and Psychiatry, 66(4), 333-830. PAULA Grajeda, SIDDHARTH Cole, & Caterina Ayala M.A. (2004) Assessment of post-stroke quality of life in cost-effectiveness studies: The usefulness of the Barthel Index and the EuroQoL-5D. Quality of Life Research, 15, 601-51         Occupational Therapy Evaluation Charge Determination   History Examination Decision-Making   LOW Complexity : Brief history review  LOW Complexity : 1-3 performance deficits relating to physical, cognitive , or psychosocial skils that result in activity limitations and / or participation restrictions  LOW Complexity : No comorbidities that affect functional and no verbal or physical assistance needed to complete eval tasks       Based on the above components, the patient evaluation is determined to be of the following complexity level: LOW   Pain Rating:  Reports chronic L shoulder and R knee pain, patient attributes to arthritis. Activity Tolerance:   Fair, desaturates with exertion and requires oxygen, requires frequent rest breaks, and observed SOB with activity    After treatment patient left in no apparent distress:    Sitting in chair and PT taking over patient care    COMMUNICATION/EDUCATION:   The patients plan of care was discussed with: Physical therapist and Registered nurse. Home safety education was provided and the patient/caregiver indicated understanding., Patient/family have participated as able in goal setting and plan of care. , and Patient/family agree to work toward stated goals and plan of care.     Thank you for this referral.  Chaitanya Villaseñor, OT  Time Calculation: 48 mins

## 2022-05-27 NOTE — PROGRESS NOTES
Hospitalist Progress Note    NAME: Jakob Minor   :  1935   MRN:  [id number]   Room Number:  208  @ 1400 [facility]     Please note that this dictation was completed with Carlota Ivory, the computer voice recognition software. Quite often unanticipated grammatical, syntax, homophones, and other interpretive errors are inadvertently transcribed by the computer software. Please disregard these errors. Please excuse any errors that have escaped final proofreading. Interim Hospital Summary: 82 y.o. female whom presented on 2022 with      Assessment / Plan:        #Acute hypoxic respiratory failure POA  #Right upper lobe pneumonia POA  #Acute bronchitis due to pneumonia POA  -Chest x-ray reviewed dependently with patchy airspace disease right upper lobe  -Patient oxygen saturation dropped to 82% on room air on admission  -Patient received ceftriaxone azithromycin in the ED        -procalcitonin pending   -Continue ceftriaxone azithromycin for community-acquired pneumonia  -Supplemental oxygen  -DuoNeb every 6 scheduled  - urine Legionella and strep pneumo antigen pending      #Hypertension  #Insomnia  #Anxiety  #Hyperlipidemia  #Aortic stenosis latest AV gradient 10 mhg  #Small vessel coronary artery disease  -Resume home meds as patient tolerates        Body mass index is 38.3 kg/m². Code Status: DNR  Surrogate Decision Maker:     DVT Prophylaxis: Lovenox  GI Prophylaxis: not indicated       Subjective:     Chief Complaint / Reason for Physician Visit  Feeling better   Discussed with RN events overnight. Review of Systems:  No fevers, chills, appetite change, cough, sputum production, shortness of breath, dyspnea on exertion, nausea, vomitting, diarrhea, constipation, chest pain, leg edema, abdominal pain, joint pain, rash, itching. Tolerating PT/OT. Tolerating diet. Objective:     VITALS:   Last 24hrs VS reviewed since prior progress note.  Most recent are:  Patient Vitals for the past 24 hrs:   Temp Pulse Resp BP SpO2   05/27/22 0758 98 °F (36.7 °C) 65 16 (!) 138/100 97 %   05/27/22 0315 98.6 °F (37 °C) 88 16 (!) 148/98 95 %   05/26/22 2334 97.8 °F (36.6 °C) 92 18 (!) 154/105 98 %   05/26/22 2018 98 °F (36.7 °C) 87 18 (!) 138/107 98 %   05/26/22 1551 98 °F (36.7 °C) 90 18 (!) 166/97 92 %   05/26/22 1500 -- -- -- -- 92 %   05/26/22 1359 97.3 °F (36.3 °C) 88 19 (!) 150/98 92 %   05/26/22 1257 -- 87 21 (!) 132/99 --   05/26/22 1206 -- -- -- -- 96 %   05/26/22 1116 -- -- -- -- 96 %   05/26/22 1027 97.4 °F (36.3 °C) 74 18 (!) 170/87 93 %       Intake/Output Summary (Last 24 hours) at 5/27/2022 0839  Last data filed at 5/26/2022 2018  Gross per 24 hour   Intake 0 ml   Output 300 ml   Net -300 ml        PHYSICAL EXAM:  General: WD, WN. Alert, cooperative, no acute distress    EENT:  EOMI. Anicteric sclerae. MMM  Resp:  Mild wheezing B/l   CV:  Regular  rhythm,  normal S1/S2, no murmurs rubs gallops, No edema  GI:  Soft, Non distended, Non tender. +Bowel sounds  Neurologic:  Alert and oriented X 3, normal speech,   Psych:   Good insight. Not anxious nor agitated  Skin:  No rashes. No jaundice    Reviewed most current lab test results and cultures  YES  Reviewed most current radiology test results   YES  Review and summation of old records today    NO  Reviewed patient's current orders and MAR    YES  PMH/SH reviewed - no change compared to H&P  ________________________________________________________________________  Care Plan discussed with:    Comments   Patient x    Family      RN x    Care Manager x    Consultant                       x Multidiciplinary team rounds were held today with , nursing, pharmacist and clinical coordinator. Patient's plan of care was discussed; medications were reviewed and discharge planning was addressed.      ________________________________________________________________________  Total NON critical care TIME:  35   Minutes    Total CRITICAL CARE TIME Spent:   Minutes non procedure based      Comments   >50% of visit spent in counseling and coordination of care x    ________________________________________________________________________  Baron Lauren MD     Procedures: see electronic medical records for all procedures/Xrays and details which were not copied into this note but were reviewed prior to creation of Plan. LABS:  I reviewed today's most current labs and imaging studies.   Pertinent labs include:  Recent Labs     05/27/22  0054 05/26/22  1100   WBC 5.1 4.9   HGB 13.2 14.0   HCT 43.2 45.3   * 148*     Recent Labs     05/27/22 0054 05/26/22  1100    141   K 3.1* 3.4*    101   CO2 32 34*   * 102*   BUN 9 14   CREA 0.90 1.03*   CA 8.4* 8.9   ALB 3.0* 3.4*   TBILI 0.4 0.5   ALT 19 22       Signed: Baron Lauren MD

## 2022-05-27 NOTE — PROGRESS NOTES
Patient is currently on oxygen and effort is being made to wean her off of it prior to discharge. CM spoke with physician to determine if home oxygen will be needed but he is unable to say at this time. Her expected discharge is expected to be over the week-end. If home O2 is needed, on call CM will address. CM received a consult for home health for PT/OT and skilled nursing.

## 2022-05-27 NOTE — PROGRESS NOTES
ADULT PROTOCOL: JET AEROSOL ASSESSMENT    Patient  Maycol Larkin     82 y.o.   female     5/27/2022  9:19 AM    Breath Sounds Pre Procedure: Right Breath Sounds: Diminished                               Left Breath Sounds: Diminished    Breath Sounds Post Procedure: Right Breath Sounds: Diminished                                 Left Breath Sounds: Diminished    Breathing pattern: Pre procedure Breathing Pattern: Regular          Post procedure Breathing Pattern: Regular    Heart Rate: Pre procedure Pulse: 77           Post procedure Pulse: 71    Resp Rate: Pre procedure Respirations: 20           Post procedure Respirations: 20      Cough: Pre procedure Cough: Non-productive               Post procedure Cough: Non-productive    Suctioned: NO      Oxygen: O2 Device: Nasal cannula   3L     Changed: NO    SpO2: Pre procedure SpO2: 92 %   with oxygen              Post procedure SpO2: 99 %  with oxygen    Nebulizer Therapy: Current medications Aerosolized Medications: DuoNeb      Changed: NO    Smoking History: never    Problem List:   Patient Active Problem List   Diagnosis Code    Hypokalemia E87.6    Hiatal hernia K44.9    Anxiety F41.9    S/P hysterectomy Z90.710    Aortic stenosis, mild I35.0    Spinal stenosis M48.00    S/P foot surgery Z98.890    Environmental allergies Z91.09    S/P cardiac catheterization Z98.890    Hypovitaminosis D E55.9    Chronic ischemic heart disease I25.9    Mixed hyperlipidemia E78.2    Chronic diastolic heart failure (HCC) I50.32    Chest pain at rest R07.9    Bifascicular bundle branch block--RBBB,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    Atypical chest pain R07.89    Essential hypertension I10    Gastroesophageal reflux disease without esophagitis K21.9    Atherosclerosis of native coronary artery without angina pectoris--diffuse small vsl disease via cath cath 2009--RCA PDA/ROSA I25.10    Chronic anxiety F41.9    Encounter for medication monitoring Z51.81    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

## 2022-05-27 NOTE — PROGRESS NOTES
Problem: Mobility Impaired (Adult and Pediatric)  Goal: *Acute Goals and Plan of Care (Insert Text)  Description: FUNCTIONAL STATUS PRIOR TO ADMISSION: Patient was modified independent using a single point cane for functional mobility. W/c for longer distances and community use. No home O2 use. HOME SUPPORT PRIOR TO ADMISSION: The patient lived with , daughter and grandson. Assist as needed for ADLs and home chores. Physical Therapy Goals  Initiated 5/27/2022  1. Patient will move from supine to sit and sit to supine , scoot up and down, and roll side to side in bed with modified independence within 7 day(s). 2.  Patient will transfer from bed to chair and chair to bed with modified independence using the least restrictive device within 7 day(s). 3.  Patient will perform sit to stand with modified independence within 7 day(s). 4.  Patient will ambulate with modified independence for 75 feet with the least restrictive device within 7 day(s). 5.  Patient will ascend/descend 2 stairs with 2 handrail(s) with supervision/set-up within 7 day(s). Outcome: Progressing Towards Goal   PHYSICAL THERAPY EVALUATION  Patient: Radha Scott (82 y.o. female)  Date: 5/27/2022  Primary Diagnosis: Hypoxia [R09.02]  Pneumonia [J18.9]       Precautions:        ASSESSMENT  Based on the objective data described below, the patient presents with arthritic joint pain, decreased activity tolerance, limited endurance and functional mobility below baseline following admission for RICHARD and pneumonia. Received on 3L/min supplemental O2 and desaturates as low as 85% on 3L/min with activity. Pt received up in chair and agreeable to short distance ambulation with SPC. Largely CGA-SBA for transfers and ambulation of 12ft x 2 with three minute rest break. Extensive education on proper use of IS though despite verbal, visual and teach back method pt only able to pull 125 at the most. Goal marker set to 500.  Pt with poor pulmonary reserve and at this time would require home O2 use. Pt up in chair at end of session with all needs in reach. Current Level of Function Impacting Discharge (mobility/balance): decreased endurance, new home O2 needs    Functional Outcome Measure: The patient scored 11/28 on the Tinetti outcome measure which is indicative of high fall risk. Other factors to consider for discharge: weakness, good family support     Patient will benefit from skilled therapy intervention to address the above noted impairments. PLAN :  Recommendations and Planned Interventions: bed mobility training, transfer training, gait training, therapeutic exercises, neuromuscular re-education, patient and family training/education and therapeutic activities      Frequency/Duration: Patient will be followed by physical therapy:  5x/week to address goals. Recommendation for discharge: (in order for the patient to meet his/her long term goals)  Physical therapy at least 2 days/week in the home     This discharge recommendation:  Has been made in collaboration with the attending provider and/or case management    IF patient discharges home will need the following DME: portable oxygen         SUBJECTIVE:   Patient stated I am glad I came in here.     OBJECTIVE DATA SUMMARY:   HISTORY:    Past Medical History:   Diagnosis Date    Anxiety     Aortic stenosis 3/3/2010    Aortic stenosis     Arrhythmia     MURMUR    Arthritis     SPINAL STENOSIS    Atherosclerosis of coronary artery     Biliary dyskinesia     CHF (congestive heart failure) ([address] 3/3/2010    CHF (congestive heart failure) ([address] 01/2002    Chronic heart failure ([address] 1/2002; 3/3/2010    chronic diastolic heart failure    Chronic pain     LOWER BACK, RIGHT KNEE    Environmental allergies 3/3/2010    GERD (gastroesophageal reflux disease) 3/3/2010    Hiatal hernia 3/3/2010    High cholesterol 3/3/2010    HTN (hypertension) 3/3/2010    Hypokalemia 3/3/2010    Ischemic heart disease, chronic     Obese     Psychiatric disorder     anxiety    S/P hysterectomy 3/3/2010    Spinal stenosis 3/3/2010     Past Surgical History:   Procedure Laterality Date    CARDIAC CATHETERIZATION  01/2002    normal coronaries    DECOMPRESS DISC RF LUMBAR  07/2007    HX BACK SURGERY  5/1/2014    HX BREAST LUMPECTOMY Left 1989    benign left breast    HX BUNIONECTOMY      HX BUNIONECTOMY      HX HEART CATHETERIZATION  3/3/10    HX HYSTERECTOMY  1978    HX LUMBAR DISKECTOMY      HX ORTHOPAEDIC Bilateral     BUNIONECTOMY    HX ORTHOPAEDIC Right     WRIST FX    HX OTHER SURGICAL  10/12/2015    mole removed from right side of face by Dr. Oj Plaza FLX DX W/COLLENRIQUE Blackwood Talia PFRMD  [phone]    Dr Laura Ramirez GI ENDOSCOPY,BIOPSY  2/27/2019            Personal factors and/or comorbidities impacting plan of care: HTN, chroninc pain, heart failure, arthritis    Home Situation  Home Environment: Private residence  # Steps to Enter: 2  Rails to Enter: Yes  Hand Rails : Bilateral  One/Two Story Residence: One story  Living Alone: No  Support Systems: Other Family Member(s)  Patient Expects to be Discharged to[de-identified] Home with outpatient services  Current DME Used/Available at Home: Cane, straight,Wheelchair,Walker, rolling    EXAMINATION/PRESENTATION/DECISION MAKING:   Critical Behavior:  Neurologic State: Alert  Orientation Level: Oriented X4  Cognition: Follows commands  Safety/Judgement: Awareness of environment,Fall prevention,Home safety,Decreased insight into deficits,Decreased awareness of need for safety  Hearing:   Auditory  Auditory Impairment: None  Skin:    Edema:   Range Of Motion:  AROM: Generally decreased, functional                       Strength:    Strength: Generally decreased, functional                    Tone & Sensation:   Tone: Normal                              Coordination:  Coordination: Within functional limits  Vision:      Functional Mobility:  Bed Mobility:     Supine to Sit: Stand-by assistance; Additional time;Bed Modified     Scooting: Stand-by assistance  Transfers:  Sit to Stand: Stand-by assistance  Stand to Sit: Stand-by assistance        Bed to Chair: Stand-by assistance              Balance:   Sitting: Intact  Standing: Impaired  Standing - Static: Good  Standing - Dynamic : Fair  Ambulation/Gait Training:  Distance (ft): 12 Feet (ft) (x 2)     Ambulation - Level of Assistance: Stand-by assistance     Gait Description (WDL): Exceptions to WDL  Gait Abnormalities: Decreased step clearance;Trunk sway increased; Antalgic        Base of Support: Widened     Speed/Debbie: Pace decreased (<100 feet/min); Slow;Shuffled  Step Length: Right shortened;Left shortened                    Functional Measure:  Tinetti test:    Sitting Balance: 1  Arises: 1  Attempts to Rise: 1  Immediate Standing Balance: 1  Standing Balance: 0  Nudged: 0  Eyes Closed: 0  Turn 360 Degrees - Continuous/Discontinuous: 0  Turn 360 Degrees - Steady/Unsteady: 0  Sitting Down: 1  Balance Score: 5 Balance total score  Indication of Gait: 1  R Step Length/Height: 1  L Step Length/Height: 1  R Foot Clearance: 1  L Foot Clearance: 1  Step Symmetry: 1  Step Continuity: 0  Path: 0  Trunk: 0  Walking Time: 0  Gait Score: 6 Gait total score  Total Score: 11/28 Overall total score         Tinetti Tool Score Risk of Falls  <19 = High Fall Risk  19-24 = Moderate Fall Risk  25-28 = Low Fall Risk  Tinetti ME. Performance-Oriented Assessment of Mobility Problems in Elderly Patients. Elizabeth 68; [id number].  (Scoring Description: PT Bulletin Feb. 10, 1993)    Older adults: Terry Perez et al, 2009; n = 1000 [facility] elderly evaluated with ABC, ANGEL, ADL, and IADL)  · Mean ANGEL score for males aged 69-68 years = 26.21(3.40)  · Mean ANGEL score for females age 69-68 years = 25.16(4.30)  · Mean ANGEL score for males over 80 years = 23.29(6.02)  · Mean ANGEL score for females over 80 years = 17.20(8.32) Physical Therapy Evaluation Charge Determination   History Examination Presentation Decision-Making   MEDIUM  Complexity : 1-2 comorbidities / personal factors will impact the outcome/ POC  MEDIUM Complexity : 3 Standardized tests and measures addressing body structure, function, activity limitation and / or participation in recreation  LOW Complexity : Stable, uncomplicated  Other outcome measures Tinetti  LOW       Based on the above components, the patient evaluation is determined to be of the following complexity level: LOW     Pain Rating:  3/10 in shoulders    Activity Tolerance:   Fair, desaturates with exertion and requires oxygen and requires rest breaks    After treatment patient left in no apparent distress:   Sitting in chair and Call bell within reach    COMMUNICATION/EDUCATION:   The patients plan of care was discussed with: Registered nurse. Fall prevention education was provided and the patient/caregiver indicated understanding., Patient/family have participated as able in goal setting and plan of care. and Patient/family agree to work toward stated goals and plan of care.     Thank you for this referral.  Melvin Franks, PT   Time Calculation: 25 mins

## 2022-05-27 NOTE — PROGRESS NOTES
9961 Patient administered scheduled medications. Patient has no other needs at this time. 1453 Patient O2 decreased to 2.5L. 1545 O2 94% on 2.5 L    1601 O2 patient 93% 2.5 L    1735 Patient O2 decreased to 2.0 by respiratory because patient is tolerating 2.5 L and patient 92.

## 2022-05-28 ENCOUNTER — HOME HEALTH ADMISSION (OUTPATIENT)
Dept: HOME HEALTH SERVICES | Facility: HOME HEALTH | Age: 87
End: 2022-05-28
Payer: MEDICARE

## 2022-05-28 LAB
ALBUMIN SERPL-MCNC: 2.7 G/DL (ref 3.5–5)
ALBUMIN/GLOB SERPL: 0.6 {RATIO} (ref 1.1–2.2)
ALP SERPL-CCNC: 69 U/L (ref 45–117)
ALT SERPL-CCNC: 14 U/L (ref 12–78)
ANION GAP SERPL CALC-SCNC: 1 MMOL/L (ref 5–15)
AST SERPL-CCNC: 16 U/L (ref 15–37)
BASOPHILS # BLD: 0 K/UL (ref 0–0.1)
BASOPHILS NFR BLD: 0 % (ref 0–1)
BILIRUB SERPL-MCNC: 0.4 MG/DL (ref 0.2–1)
BUN SERPL-MCNC: 12 MG/DL (ref 6–20)
BUN/CREAT SERPL: 12 (ref 12–20)
CALCIUM SERPL-MCNC: 8.5 MG/DL (ref 8.5–10.1)
CHLORIDE SERPL-SCNC: 101 MMOL/L (ref 97–108)
CO2 SERPL-SCNC: 37 MMOL/L (ref 21–32)
CREAT SERPL-MCNC: 0.98 MG/DL (ref 0.55–1.02)
DIFFERENTIAL METHOD BLD: ABNORMAL
EOSINOPHIL # BLD: 0.2 K/UL (ref 0–0.4)
EOSINOPHIL NFR BLD: 5 % (ref 0–7)
ERYTHROCYTE [DISTWIDTH] IN BLOOD BY AUTOMATED COUNT: 14.1 % (ref 11.5–14.5)
GLOBULIN SER CALC-MCNC: 4.3 G/DL (ref 2–4)
GLUCOSE SERPL-MCNC: 96 MG/DL (ref 65–100)
HCT VFR BLD AUTO: 41.3 % (ref 35–47)
HGB BLD-MCNC: 12.7 G/DL (ref 11.5–16)
IMM GRANULOCYTES # BLD AUTO: 0 K/UL
IMM GRANULOCYTES NFR BLD AUTO: 0 %
LYMPHOCYTES # BLD: 1.3 K/UL (ref 0.8–3.5)
LYMPHOCYTES NFR BLD: 28 % (ref 12–49)
MCH RBC QN AUTO: 28.3 PG (ref 26–34)
MCHC RBC AUTO-ENTMCNC: 30.8 G/DL (ref 30–36.5)
MCV RBC AUTO: 92 FL (ref 80–99)
MONOCYTES # BLD: 0.5 K/UL (ref 0–1)
MONOCYTES NFR BLD: 10 % (ref 5–13)
NEUTS SEG # BLD: 2.6 K/UL (ref 1.8–8)
NEUTS SEG NFR BLD: 57 % (ref 32–75)
NRBC # BLD: 0 K/UL (ref 0–0.01)
NRBC BLD-RTO: 0 PER 100 WBC
PLATELET # BLD AUTO: 123 K/UL (ref 150–400)
PMV BLD AUTO: 11.4 FL (ref 8.9–12.9)
POTASSIUM SERPL-SCNC: 3.4 MMOL/L (ref 3.5–5.1)
PROT SERPL-MCNC: 7 G/DL (ref 6.4–8.2)
RBC # BLD AUTO: 4.49 M/UL (ref 3.8–5.2)
RBC MORPH BLD: ABNORMAL
SODIUM SERPL-SCNC: 139 MMOL/L (ref 136–145)
WBC # BLD AUTO: 4.6 K/UL (ref 3.6–11)

## 2022-05-28 PROCEDURE — 74011250637 HC RX REV CODE- 250/637: Performed by: STUDENT IN AN ORGANIZED HEALTH CARE EDUCATION/TRAINING PROGRAM

## 2022-05-28 PROCEDURE — 85025 COMPLETE CBC W/AUTO DIFF WBC: CPT

## 2022-05-28 PROCEDURE — 74011250636 HC RX REV CODE- 250/636: Performed by: STUDENT IN AN ORGANIZED HEALTH CARE EDUCATION/TRAINING PROGRAM

## 2022-05-28 PROCEDURE — 94640 AIRWAY INHALATION TREATMENT: CPT

## 2022-05-28 PROCEDURE — 74011000250 HC RX REV CODE- 250: Performed by: STUDENT IN AN ORGANIZED HEALTH CARE EDUCATION/TRAINING PROGRAM

## 2022-05-28 PROCEDURE — 77010033678 HC OXYGEN DAILY

## 2022-05-28 PROCEDURE — 80053 COMPREHEN METABOLIC PANEL: CPT

## 2022-05-28 PROCEDURE — 36415 COLL VENOUS BLD VENIPUNCTURE: CPT

## 2022-05-28 PROCEDURE — 65270000032 HC RM SEMIPRIVATE

## 2022-05-28 PROCEDURE — 74011000258 HC RX REV CODE- 258: Performed by: STUDENT IN AN ORGANIZED HEALTH CARE EDUCATION/TRAINING PROGRAM

## 2022-05-28 PROCEDURE — 94760 N-INVAS EAR/PLS OXIMETRY 1: CPT

## 2022-05-28 RX ADMIN — ENOXAPARIN SODIUM 40 MG: 100 INJECTION SUBCUTANEOUS at 09:48

## 2022-05-28 RX ADMIN — IPRATROPIUM BROMIDE AND ALBUTEROL SULFATE 3 ML: 2.5; .5 SOLUTION RESPIRATORY (INHALATION) at 08:02

## 2022-05-28 RX ADMIN — SODIUM CHLORIDE, PRESERVATIVE FREE 10 ML: 5 INJECTION INTRAVENOUS at 14:49

## 2022-05-28 RX ADMIN — CEFTRIAXONE SODIUM 1 G: 1 INJECTION, POWDER, FOR SOLUTION INTRAMUSCULAR; INTRAVENOUS at 12:07

## 2022-05-28 RX ADMIN — PANTOPRAZOLE SODIUM 40 MG: 40 TABLET, DELAYED RELEASE ORAL at 09:49

## 2022-05-28 RX ADMIN — ASPIRIN 81 MG 81 MG: 81 TABLET ORAL at 09:48

## 2022-05-28 RX ADMIN — METOPROLOL TARTRATE 25 MG: 25 TABLET, FILM COATED ORAL at 09:49

## 2022-05-28 RX ADMIN — IPRATROPIUM BROMIDE AND ALBUTEROL SULFATE 3 ML: 2.5; .5 SOLUTION RESPIRATORY (INHALATION) at 14:05

## 2022-05-28 RX ADMIN — FUROSEMIDE 40 MG: 40 TABLET ORAL at 09:48

## 2022-05-28 RX ADMIN — AMLODIPINE BESYLATE 5 MG: 5 TABLET ORAL at 09:49

## 2022-05-28 RX ADMIN — SODIUM CHLORIDE, PRESERVATIVE FREE 10 ML: 5 INJECTION INTRAVENOUS at 21:49

## 2022-05-28 RX ADMIN — IPRATROPIUM BROMIDE AND ALBUTEROL SULFATE 3 ML: 2.5; .5 SOLUTION RESPIRATORY (INHALATION) at 19:48

## 2022-05-28 RX ADMIN — METOPROLOL TARTRATE 25 MG: 25 TABLET, FILM COATED ORAL at 21:49

## 2022-05-28 RX ADMIN — ROSUVASTATIN CALCIUM 20 MG: 10 TABLET, FILM COATED ORAL at 21:49

## 2022-05-28 RX ADMIN — FUROSEMIDE 40 MG: 40 TABLET ORAL at 15:24

## 2022-05-28 RX ADMIN — AZITHROMYCIN 500 MG: 250 TABLET, FILM COATED ORAL at 09:49

## 2022-05-28 RX ADMIN — SODIUM CHLORIDE, PRESERVATIVE FREE 10 ML: 5 INJECTION INTRAVENOUS at 06:00

## 2022-05-28 NOTE — PROGRESS NOTES
Problem: General Medical Care Plan  Goal: *Skin integrity maintained  Outcome: Progressing Towards Goal     Problem: General Medical Care Plan  Goal: *Anxiety reduced or absent  Outcome: Progressing Towards Goal     Problem: Breathing Pattern - Ineffective  Goal: *Absence of hypoxia  Outcome: Progressing Towards Goal     Problem: Breathing Pattern - Ineffective  Goal: *Use of effective breathing techniques  Outcome: Progressing Towards Goal     Problem: Falls - Risk of  Goal: *Absence of Falls  Description: Document Jad Fall Risk and appropriate interventions in the flowsheet.   Outcome: Progressing Towards Goal  Note: Fall Risk Interventions:  Mobility Interventions: Bed/chair exit alarm,Patient to call before getting OOB,Strengthening exercises (ROM-active/passive),Utilize walker, cane, or other assistive device       Medication Interventions: Assess postural VS orthostatic hypotension,Patient to call before getting OOB,Teach patient to arise slowly    Elimination Interventions: Bed/chair exit alarm,Call light in reach,Patient to call for help with toileting needs    History of Falls Interventions: Bed/chair exit alarm,Door open when patient unattended,Room close to nurse's station

## 2022-05-28 NOTE — PROGRESS NOTES
Hospitalist Progress Note    NAME: Kerry Coleman   :  1935   MRN:  [id number]   Room Number:    @ [facility]     Please note that this dictation was completed with Scot Rodriguez, the computer voice recognition software. Quite often unanticipated grammatical, syntax, homophones, and other interpretive errors are inadvertently transcribed by the computer software. Please disregard these errors. Please excuse any errors that have escaped final proofreading. Interim Hospital Summary: 82 y.o. female whom presented on 2022 with      Assessment / Plan:        Acute hypoxic respiratory failure POA  Right upper lobe pneumonia POA  Acute bronchitis due to pneumonia POA  -Chest x-ray reviewed dependently with patchy airspace disease right upper lobe  -Patient oxygen saturation dropped to 82% on room air on admission  -Patient received ceftriaxone azithromycin in the ED     -Continue ceftriaxone azithromycin for community-acquired pneumonia  -Supplemental oxygen  -DuoNeb every 6 scheduled  - urine Legionella and strep pneumo antigen pending      Hypertension  Insomnia  Anxiety  Hyperlipidemia  Aortic stenosis latest AV gradient 10 mhg  Small vessel coronary artery disease  -Resume home meds as patient tolerates        Body mass index is 38.3 kg/m². Code Status: DNR  Surrogate Decision Maker:     DVT Prophylaxis: Lovenox  GI Prophylaxis: not indicated       Subjective:     Chief Complaint / Reason for Physician Visit  No acute issues Discussed with RN events overnight. Review of Systems:  No fevers, chills, appetite change, cough, sputum production, shortness of breath, dyspnea on exertion, nausea, vomitting, diarrhea, constipation, chest pain, leg edema, abdominal pain, joint pain, rash, itching. Tolerating PT/OT. Tolerating diet. Objective:     VITALS:   Last 24hrs VS reviewed since prior progress note.  Most recent are:  Patient Vitals for the past 24 hrs:   Temp Pulse Resp BP SpO2   05/28/22 0930 97.6 °F (36.4 °C) 71 -- 132/78 (!) 89 %   05/28/22 0415 98 °F (36.7 °C) 82 -- (!) 145/84 96 %   05/28/22 0020 98 °F (36.7 °C) 83 -- 139/75 (!) 88 %   05/27/22 2150 -- 78 -- (!) 142/79 (!) 84 %   05/27/22 2008 97.9 °F (36.6 °C) 74 -- 125/81 93 %   05/27/22 1737 -- -- -- -- 92 %   05/27/22 1601 -- -- -- -- 93 %   05/27/22 1600 -- 74 -- -- --   05/27/22 1551 98.5 °F (36.9 °C) 74 20 (!) 141/81 94 %   05/27/22 1545 -- -- -- -- 94 %   05/27/22 1405 -- -- -- -- 96 %   05/27/22 1200 -- 73 -- -- --       Intake/Output Summary (Last 24 hours) at 5/28/2022 1039  Last data filed at 5/28/2022 0133  Gross per 24 hour   Intake 572 ml   Output 750 ml   Net -178 ml        PHYSICAL EXAM:  General: WD, WN. Alert, cooperative, no acute distress    EENT:  EOMI. Anicteric sclerae. MMM  Resp:  Mild wheezing B/l   CV:  Regular  rhythm,  normal S1/S2, no murmurs rubs gallops, No edema  GI:  Soft, Non distended, Non tender. +Bowel sounds  Neurologic:  Alert and oriented X 3, normal speech,   Psych:   Good insight. Not anxious nor agitated  Skin:  No rashes. No jaundice    Reviewed most current lab test results and cultures  YES  Reviewed most current radiology test results   YES  Review and summation of old records today    NO  Reviewed patient's current orders and MAR    YES  PMH/SH reviewed - no change compared to H&P  ________________________________________________________________________  Care Plan discussed with:    Comments   Patient x    Family      RN x    Care Manager x    Consultant                       x Multidiciplinary team rounds were held today with , nursing, pharmacist and clinical coordinator. Patient's plan of care was discussed; medications were reviewed and discharge planning was addressed.      ________________________________________________________________________  Total NON critical care TIME:  35   Minutes    Total CRITICAL CARE TIME Spent:   Minutes non procedure based Comments   >50% of visit spent in counseling and coordination of care x    ________________________________________________________________________  Susanna Fowler MD     Procedures: see electronic medical records for all procedures/Xrays and details which were not copied into this note but were reviewed prior to creation of Plan. LABS:  I reviewed today's most current labs and imaging studies.   Pertinent labs include:  Recent Labs     05/28/22  0414 05/27/22  0054 05/26/22  1100   WBC 4.6 5.1 4.9   HGB 12.7 13.2 14.0   HCT 41.3 43.2 45.3   * 131* 148*     Recent Labs     05/28/22  0414 05/27/22  0054 05/26/22  1100    140 141   K 3.4* 3.1* 3.4*    101 101   CO2 37* 32 34*   GLU 96 111* 102*   BUN 12 9 14   CREA 0.98 0.90 1.03*   CA 8.5 8.4* 8.9   ALB 2.7* 3.0* 3.4*   TBILI 0.4 0.4 0.5   ALT 14 19 22       Signed: Susanna Fowler MD

## 2022-05-28 NOTE — PROGRESS NOTES
1040 Call to CM from [employer], [facility]. She reported she received the  MD order, from Dr. Chani Neal, for PT and [address] [place] [address] [facility] [place] services sent yesterday [5/27]. She stated, [facility] would not be able to provide this service until 6/1/22. She requested for Dr. Chani Neal to document a delay order for the [facility] PT and OT service if the pt should discharge before 5/31. She also reported an MD order for [facility] needs to be sent to them If this service is needed. 1050 CM met with Dr. Oswald Chauhan and reported the above information from [facility]. Dr. Oswald Chauhan reported she did not expect the pt to discharge this weekend. She reported she would pass this information onto Dr. Chani Neal since he will be working on Monday 5/30.

## 2022-05-28 NOTE — PROGRESS NOTES
1920 - Bedside and Verbal shift change report given to Mary Carmen Forrest RN (oncoming nurse) by Devon Pichardo RN (offgoing nurse). Report included the following information SBAR, Kardex, MAR and Recent Results. 2011 - Pt getting Neb Tx at present, administered by ANN Thomas - labs drawn and taken to laboratory. Pt remains on 2L O2 via NC w/ O2 Sats @ 96% at present. Pt has been assisted x1 staff to [facility] throughout the night w/ slow and unsteady gait. Tolerated fairly. Continues to get dyspneic w/ exertion but denies dyspnea at rest.     0720 - Bedside and Verbal shift change report given to Nereida Miranda RN (oncoming nurse) by Mary Carmen Forrest RN (offgoing nurse). Report included the following information SBAR, Kardex, MAR and Recent Results.

## 2022-05-28 NOTE — PROGRESS NOTES
Problem: General Medical Care Plan  Goal: *Vital signs within specified parameters  Outcome: Progressing Towards Goal  Goal: *Labs within defined limits  Outcome: Progressing Towards Goal  Goal: *Absence of infection signs and symptoms  Outcome: Progressing Towards Goal  Goal: *Optimal pain control at patient's stated goal  Outcome: Progressing Towards Goal  Goal: *Skin integrity maintained  Outcome: Progressing Towards Goal  Goal: *Fluid volume balance  Outcome: Progressing Towards Goal  Goal: *Optimize nutritional status  Outcome: Progressing Towards Goal  Goal: *Anxiety reduced or absent  Outcome: Progressing Towards Goal  Goal: *Progressive mobility and function (eg: ADL's)  Outcome: Progressing Towards Goal     Problem: Breathing Pattern - Ineffective  Goal: *Absence of hypoxia  Outcome: Progressing Towards Goal  Goal: *Use of effective breathing techniques  Outcome: Progressing Towards Goal  Goal: *PALLIATIVE CARE:  Alleviation of Dyspnea  Outcome: Progressing Towards Goal     Problem: Falls - Risk of  Goal: *Absence of Falls  Description: Document Jad Fall Risk and appropriate interventions in the flowsheet.   Outcome: Progressing Towards Goal  Note: Fall Risk Interventions:  Mobility Interventions: Bed/chair exit alarm,Patient to call before getting OOB,Strengthening exercises (ROM-active/passive),Utilize walker, cane, or other assistive device         Medication Interventions: Assess postural VS orthostatic hypotension,Patient to call before getting OOB,Teach patient to arise slowly    Elimination Interventions: Bed/chair exit alarm,Call light in reach,Patient to call for help with toileting needs    History of Falls Interventions: Bed/chair exit alarm,Door open when patient unattended,Room close to nurse's station

## 2022-05-29 LAB
ALBUMIN SERPL-MCNC: 2.7 G/DL (ref 3.5–5)
ALBUMIN/GLOB SERPL: 0.6 {RATIO} (ref 1.1–2.2)
ALP SERPL-CCNC: 71 U/L (ref 45–117)
ALT SERPL-CCNC: 16 U/L (ref 12–78)
ANION GAP SERPL CALC-SCNC: 0 MMOL/L (ref 5–15)
AST SERPL-CCNC: 18 U/L (ref 15–37)
BASOPHILS # BLD: 0 K/UL (ref 0–0.1)
BASOPHILS NFR BLD: 0 % (ref 0–1)
BILIRUB SERPL-MCNC: 0.4 MG/DL (ref 0.2–1)
BUN SERPL-MCNC: 13 MG/DL (ref 6–20)
BUN/CREAT SERPL: 13 (ref 12–20)
CALCIUM SERPL-MCNC: 8.7 MG/DL (ref 8.5–10.1)
CHLORIDE SERPL-SCNC: 101 MMOL/L (ref 97–108)
CO2 SERPL-SCNC: 39 MMOL/L (ref 21–32)
CREAT SERPL-MCNC: 1.02 MG/DL (ref 0.55–1.02)
DIFFERENTIAL METHOD BLD: ABNORMAL
EOSINOPHIL # BLD: 0.1 K/UL (ref 0–0.4)
EOSINOPHIL NFR BLD: 3 % (ref 0–7)
ERYTHROCYTE [DISTWIDTH] IN BLOOD BY AUTOMATED COUNT: 14.1 % (ref 11.5–14.5)
GLOBULIN SER CALC-MCNC: 4.5 G/DL (ref 2–4)
GLUCOSE SERPL-MCNC: 98 MG/DL (ref 65–100)
HCT VFR BLD AUTO: 42.5 % (ref 35–47)
HGB BLD-MCNC: 12.8 G/DL (ref 11.5–16)
IMM GRANULOCYTES # BLD AUTO: 0 K/UL
IMM GRANULOCYTES NFR BLD AUTO: 0 %
LYMPHOCYTES # BLD: 1.9 K/UL (ref 0.8–3.5)
LYMPHOCYTES NFR BLD: 45 % (ref 12–49)
MCH RBC QN AUTO: 28 PG (ref 26–34)
MCHC RBC AUTO-ENTMCNC: 30.1 G/DL (ref 30–36.5)
MCV RBC AUTO: 93 FL (ref 80–99)
MONOCYTES # BLD: 0.3 K/UL (ref 0–1)
MONOCYTES NFR BLD: 6 % (ref 5–13)
NEUTS SEG # BLD: 1.9 K/UL (ref 1.8–8)
NEUTS SEG NFR BLD: 46 % (ref 32–75)
NRBC # BLD: 0 K/UL (ref 0–0.01)
NRBC BLD-RTO: 0 PER 100 WBC
PLATELET # BLD AUTO: 122 K/UL (ref 150–400)
PMV BLD AUTO: 11.1 FL (ref 8.9–12.9)
POTASSIUM SERPL-SCNC: 3.7 MMOL/L (ref 3.5–5.1)
PROT SERPL-MCNC: 7.2 G/DL (ref 6.4–8.2)
RBC # BLD AUTO: 4.57 M/UL (ref 3.8–5.2)
RBC MORPH BLD: ABNORMAL
SODIUM SERPL-SCNC: 140 MMOL/L (ref 136–145)
WBC # BLD AUTO: 4.2 K/UL (ref 3.6–11)

## 2022-05-29 PROCEDURE — 74011250636 HC RX REV CODE- 250/636: Performed by: STUDENT IN AN ORGANIZED HEALTH CARE EDUCATION/TRAINING PROGRAM

## 2022-05-29 PROCEDURE — 77010033678 HC OXYGEN DAILY

## 2022-05-29 PROCEDURE — 74011000250 HC RX REV CODE- 250: Performed by: STUDENT IN AN ORGANIZED HEALTH CARE EDUCATION/TRAINING PROGRAM

## 2022-05-29 PROCEDURE — 80053 COMPREHEN METABOLIC PANEL: CPT

## 2022-05-29 PROCEDURE — 94760 N-INVAS EAR/PLS OXIMETRY 1: CPT

## 2022-05-29 PROCEDURE — 74011250637 HC RX REV CODE- 250/637: Performed by: STUDENT IN AN ORGANIZED HEALTH CARE EDUCATION/TRAINING PROGRAM

## 2022-05-29 PROCEDURE — 65270000032 HC RM SEMIPRIVATE

## 2022-05-29 PROCEDURE — 94640 AIRWAY INHALATION TREATMENT: CPT

## 2022-05-29 PROCEDURE — 36415 COLL VENOUS BLD VENIPUNCTURE: CPT

## 2022-05-29 PROCEDURE — 74011000258 HC RX REV CODE- 258: Performed by: STUDENT IN AN ORGANIZED HEALTH CARE EDUCATION/TRAINING PROGRAM

## 2022-05-29 PROCEDURE — 74011250637 HC RX REV CODE- 250/637: Performed by: FAMILY MEDICINE

## 2022-05-29 PROCEDURE — 85025 COMPLETE CBC W/AUTO DIFF WBC: CPT

## 2022-05-29 RX ORDER — AZITHROMYCIN 250 MG/1
500 TABLET, FILM COATED ORAL DAILY
Status: COMPLETED | OUTPATIENT
Start: 2022-05-29 | End: 2022-05-29

## 2022-05-29 RX ORDER — ACETAMINOPHEN 500 MG
500 TABLET ORAL
Status: COMPLETED | OUTPATIENT
Start: 2022-05-29 | End: 2022-05-29

## 2022-05-29 RX ORDER — MELATONIN
2000 DAILY
Status: DISCONTINUED | OUTPATIENT
Start: 2022-05-29 | End: 2022-05-31 | Stop reason: HOSPADM

## 2022-05-29 RX ADMIN — ACETAMINOPHEN 500 MG: 500 TABLET ORAL at 05:44

## 2022-05-29 RX ADMIN — METOPROLOL TARTRATE 25 MG: 25 TABLET, FILM COATED ORAL at 08:10

## 2022-05-29 RX ADMIN — AMLODIPINE BESYLATE 5 MG: 5 TABLET ORAL at 08:10

## 2022-05-29 RX ADMIN — IPRATROPIUM BROMIDE AND ALBUTEROL SULFATE 3 ML: 2.5; .5 SOLUTION RESPIRATORY (INHALATION) at 08:20

## 2022-05-29 RX ADMIN — METOPROLOL TARTRATE 25 MG: 25 TABLET, FILM COATED ORAL at 20:23

## 2022-05-29 RX ADMIN — PANTOPRAZOLE SODIUM 40 MG: 40 TABLET, DELAYED RELEASE ORAL at 08:10

## 2022-05-29 RX ADMIN — ROSUVASTATIN CALCIUM 20 MG: 10 TABLET, FILM COATED ORAL at 22:02

## 2022-05-29 RX ADMIN — AZITHROMYCIN 500 MG: 250 TABLET, FILM COATED ORAL at 10:17

## 2022-05-29 RX ADMIN — IPRATROPIUM BROMIDE AND ALBUTEROL SULFATE 3 ML: 2.5; .5 SOLUTION RESPIRATORY (INHALATION) at 19:55

## 2022-05-29 RX ADMIN — SODIUM CHLORIDE, PRESERVATIVE FREE 10 ML: 5 INJECTION INTRAVENOUS at 05:47

## 2022-05-29 RX ADMIN — ENOXAPARIN SODIUM 40 MG: 100 INJECTION SUBCUTANEOUS at 08:11

## 2022-05-29 RX ADMIN — IPRATROPIUM BROMIDE AND ALBUTEROL SULFATE 3 ML: 2.5; .5 SOLUTION RESPIRATORY (INHALATION) at 13:09

## 2022-05-29 RX ADMIN — CLONAZEPAM 0.5 MG: 0.5 TABLET ORAL at 15:11

## 2022-05-29 RX ADMIN — FUROSEMIDE 40 MG: 40 TABLET ORAL at 16:51

## 2022-05-29 RX ADMIN — SODIUM CHLORIDE, PRESERVATIVE FREE 10 ML: 5 INJECTION INTRAVENOUS at 13:21

## 2022-05-29 RX ADMIN — Medication 2000 UNITS: at 11:28

## 2022-05-29 RX ADMIN — IPRATROPIUM BROMIDE AND ALBUTEROL SULFATE 3 ML: 2.5; .5 SOLUTION RESPIRATORY (INHALATION) at 00:44

## 2022-05-29 RX ADMIN — SODIUM CHLORIDE, PRESERVATIVE FREE 10 ML: 5 INJECTION INTRAVENOUS at 22:02

## 2022-05-29 RX ADMIN — FUROSEMIDE 40 MG: 40 TABLET ORAL at 08:10

## 2022-05-29 RX ADMIN — ASPIRIN 81 MG 81 MG: 81 TABLET ORAL at 08:10

## 2022-05-29 RX ADMIN — CEFTRIAXONE SODIUM 1 G: 1 INJECTION, POWDER, FOR SOLUTION INTRAMUSCULAR; INTRAVENOUS at 11:28

## 2022-05-29 NOTE — PROGRESS NOTES
Hospitalist Progress Note    NAME: Duncan Bess   :  1935   MRN:  [id number]   Room Number:    @ [facility]     Please note that this dictation was completed with [email or web address], the computer voice recognition software. Quite often unanticipated grammatical, syntax, homophones, and other interpretive errors are inadvertently transcribed by the computer software. Please disregard these errors. Please excuse any errors that have escaped final proofreading. Interim Hospital Summary: 82 y.o. female whom presented on 2022 with      Assessment / Plan:        Acute hypoxic respiratory failure POA  Right upper lobe pneumonia POA  Acute bronchitis due to pneumonia POA  -Chest x-ray reviewed dependently with patchy airspace disease right upper lobe  -Patient oxygen saturation dropped to 82% on room air on admission  -Patient received ceftriaxone azithromycin in the ED. -Urine legionella negative     -Continue ceftriaxone azithromycin for community-acquired pneumonia  -Supplemental oxygen  -DuoNeb every 6 scheduled  - urine strep pneumo antigen pending      Hypertension  Insomnia  Anxiety  Hyperlipidemia  Aortic stenosis latest AV gradient 10 mhg  Small vessel coronary artery disease  -Resume home meds as patient tolerates        Body mass index is 38.3 kg/m². Code Status: DNR  Surrogate Decision Maker:     DVT Prophylaxis: Lovenox  GI Prophylaxis: not indicated       Subjective:     Chief Complaint / Reason for Physician Visit  No acute issues Discussed with RN events overnight. Review of Systems:  No fevers, chills, appetite change, cough, sputum production, shortness of breath, dyspnea on exertion, nausea, vomitting, diarrhea, constipation, chest pain, leg edema, abdominal pain, joint pain, rash, itching. Tolerating PT/OT. Tolerating diet. Objective:     VITALS:   Last 24hrs VS reviewed since prior progress note.  Most recent are:  Patient Vitals for the past 24 hrs: Temp Pulse Resp BP SpO2   05/29/22 0820 -- -- -- -- 93 %   05/29/22 0800 98.2 °F (36.8 °C) 63 18 133/85 93 %   05/29/22 0333 98.2 °F (36.8 °C) (!) 59 16 (!) 145/81 95 %   05/29/22 0043 -- -- -- -- 96 %   05/28/22 2334 98.5 °F (36.9 °C) 61 18 (!) 147/90 96 %   05/28/22 2149 -- 69 -- (!) 146/92 --   05/28/22 1946 -- -- -- -- 96 %   05/28/22 1940 98.7 °F (37.1 °C) 65 18 125/67 96 %   05/28/22 1600 -- 68 -- -- --   05/28/22 1523 98 °F (36.7 °C) 68 19 137/88 91 %   05/28/22 1200 -- 63 17 -- 94 %       Intake/Output Summary (Last 24 hours) at 5/29/2022 0959  Last data filed at 5/29/2022 0739  Gross per 24 hour   Intake 240 ml   Output 250 ml   Net -10 ml        PHYSICAL EXAM:  General: WD, WN. Alert, cooperative, no acute distress    EENT:  EOMI. Anicteric sclerae. MMM  Resp:  Mild wheezing B/l   CV:  Regular  rhythm,  normal S1/S2, no murmurs rubs gallops, No edema  GI:  Soft, Non distended, Non tender. +Bowel sounds  Neurologic:  Alert and oriented X 3, normal speech,   Psych:   Good insight. Not anxious nor agitated  Skin:  No rashes. No jaundice    Reviewed most current lab test results and cultures  YES  Reviewed most current radiology test results   YES  Review and summation of old records today    NO  Reviewed patient's current orders and MAR    YES  PMH/SH reviewed - no change compared to H&P  ________________________________________________________________________  Care Plan discussed with:    Comments   Patient x    Family      RN x    Care Manager x    Consultant                       x Multidiciplinary team rounds were held today with , nursing, pharmacist and clinical coordinator. Patient's plan of care was discussed; medications were reviewed and discharge planning was addressed.      ________________________________________________________________________  Total NON critical care TIME:  35   Minutes    Total CRITICAL CARE TIME Spent:   Minutes non procedure based      Comments   >50% of visit spent in counseling and coordination of care x    ________________________________________________________________________  Radha Stafford MD     Procedures: see electronic medical records for all procedures/Xrays and details which were not copied into this note but were reviewed prior to creation of Plan. LABS:  I reviewed today's most current labs and imaging studies.   Pertinent labs include:  Recent Labs     05/29/22  0339 05/28/22  0414 05/27/22  0054   WBC 4.2 4.6 5.1   HGB 12.8 12.7 13.2   HCT 42.5 41.3 43.2   * 123* 131*     Recent Labs     05/29/22  0339 05/28/22  0414 05/27/22  0054    139 140   K 3.7 3.4* 3.1*    101 101   CO2 39* 37* 32   GLU 98 96 111*   BUN 13 12 9   CREA 1.02 0.98 0.90   CA 8.7 8.5 8.4*   ALB 2.7* 2.7* 3.0*   TBILI 0.4 0.4 0.4   ALT 16 14 19       Signed: Radha Stafford MD

## 2022-05-29 NOTE — PROGRESS NOTES
Problem: General Medical Care Plan  Goal: *Vital signs within specified parameters  Outcome: Progressing Towards Goal  Goal: *Labs within defined limits  Outcome: Progressing Towards Goal  Goal: *Absence of infection signs and symptoms  Outcome: Progressing Towards Goal  Goal: *Optimal pain control at patient's stated goal  Outcome: Progressing Towards Goal  Goal: *Anxiety reduced or absent  Outcome: Progressing Towards Goal     Problem: Breathing Pattern - Ineffective  Goal: *Absence of hypoxia  Outcome: Progressing Towards Goal  Goal: *Use of effective breathing techniques  Outcome: Progressing Towards Goal  Goal: *PALLIATIVE CARE:  Alleviation of Dyspnea  Outcome: Progressing Towards Goal     Problem: Falls - Risk of  Goal: *Absence of Falls  Description: Document Jad Fall Risk and appropriate interventions in the flowsheet.   Outcome: Progressing Towards Goal  Note: Fall Risk Interventions:  Mobility Interventions: Communicate number of staff needed for ambulation/transfer,Patient to call before getting OOB,Utilize walker, cane, or other assistive device         Medication Interventions: Teach patient to arise slowly,Patient to call before getting OOB    Elimination Interventions: Call light in reach,Toileting schedule/hourly rounds    History of Falls Interventions: Bed/chair exit alarm,Door open when patient unattended

## 2022-05-29 NOTE — PROGRESS NOTES
ADULT PROTOCOL: JET AEROSOL ASSESSMENT    Patient  Lupe Rey     82 y.o.   female     5/28/2022  10:56 PM    Breath Sounds Pre Procedure: Right Breath Sounds: Lower,Posterior,Diminished,Scattered wheezing                               Left Breath Sounds: Lower,Posterior,Diminished,Scattered wheezing    Breath Sounds Post Procedure: Right Breath Sounds: Diminished (slight Wheezes bases)                                 Left Breath Sounds: Diminished (slight Wheezes bases)    Breathing pattern: Pre procedure Breathing Pattern: Regular          Post procedure Breathing Pattern: Regular    Heart Rate: Pre procedure Pulse: 65           Post procedure Pulse: 68    Resp Rate: Pre procedure Respirations: 20           Post procedure Respirations: 20    Peak Flow: Pre bronchodilator             Post bronchodilator       FVC/FEV1:  N/A    Incentive Spirometry:             Cough: Pre procedure Cough: Non-productive               Post procedure Cough: Non-productive    Suctioned: NO    Sputum: Pre procedure                   Post procedure      Oxygen: O2 Device: Nasal cannula   3L NC     Changed: NO    SpO2: Pre procedure SpO2: 96 %   with oxygen              Post procedure SpO2: 100 %  with oxygen    Nebulizer Therapy: Current medications Aerosolized Medications: DuoNeb      Changed: NO    Smoking History:    Smoking status: Never Smoker    Smokeless tobacco: Never Used         Problem List:   Patient Active Problem List   Diagnosis Code    Hypokalemia E87.6    Hiatal hernia K44.9    Anxiety F41.9    S/P hysterectomy Z90.710    Aortic stenosis, mild I35.0    Spinal stenosis M48.00    S/P foot surgery Z98.890    Environmental allergies Z91.09    S/P cardiac catheterization Z98.890    Hypovitaminosis D E55.9    Chronic ischemic heart disease I25.9    Mixed hyperlipidemia E78.2    Chronic diastolic heart failure (HCC) I50.32    Chest pain at rest R07.9    Bifascicular bundle branch block--RBBB,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    Atypical chest pain R07.89    Essential hypertension I10    Gastroesophageal reflux disease without esophagitis K21.9    Atherosclerosis of native coronary artery without angina pectoris--diffuse small vsl disease via cath cath 2009--RCA PDA/ROSA I25.10    Chronic anxiety F41.9    Encounter for medication monitoring Z51.81    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 9) with comorbidity ([address] E66.01    Chest pain with normal angiography--2002;normal NST 2018 R07.9    Primary osteoarthritis of left shoulder M19.012    Pneumonia J18.9    Hypoxia R09.02       Respiratory Therapist: Annie Crooks RT

## 2022-05-29 NOTE — PROGRESS NOTES
Bedside shift change report given to Oscar Deluca (oncoming nurse) by Andrea Mcnally (offgoing nurse). Report included the following information SBAR, Kardex, Intake/Output, MAR, Recent Results and Cardiac Rhythm SR,BBB,w/ occ PVC'S and PAC's.

## 2022-05-29 NOTE — PROGRESS NOTES
6852) Bedside shift change report given to Jodie Jimenez RN (oncoming nurse) by Larry Pretty RN (offgoing nurse). Report included the following information SBAR, Kardex, Intake/Output, MAR, Recent Results and Cardiac Rhythm NS with BBB and PVC and PAC. Pt ambulated to bedside commode 250 ml or urine output. Daily weight obtained. Pt ambulated back to bed and resting at this time. 0800) Pt assessed and vital signs stable. Pt has no c/o pain at this time. Pt satting 93% at this time on 3 l/min of O2. Pt c/o SOB on exertion. Scheduled meds given. Pt left resting in bed. RT at the bedside at this time. 0945) Pt asleep in bed at this time. 1015) PO antibiotic given. Pt resting in bed at this time and stated no needs. 56) Interdisciplinary team rounds were held 5/29/2022 with the following team members:Nursing and Physician.     Plan of care discussed. See clinical pathway and/or care plan for interventions and desired outcomes    1135) IV antibiotic hung and infusing. Scheduled meds given. Pt stated no additional needs and left resting in bed at this time. 1230) IV antibiotic completed. IV flushed and patent. Pt reassessed and no changes to note. Pt satting 94% on 2 L/min at this time. Pt repositioned in bed and sitting up eating lunch at this time. 1320) Pt called out to ambualted to bedside commode. This writer assisted. Pt satting 95% during ambulation. O2 decreased to 1 L/min. IV flushed and patent. Pt ambulated back to bed and left resting at this time. 1420) CHG wipes provided for self care. Pt c/o dizziness at this time. Vital signs obtained and stable. Pt encouraged to drink water. Lights dimmed. 1510) Klonopin given PRN to assist with sleep. Pt repositioned in bed and stated no additional needs at this time. 66 [phone] 21) Pt reassessed and no changes to note. Vital signs obtained and stable. Pt has no c/o pain at this time. Scheduled lasix given. Pt taken off 1 L/min r/t good O2 sat.  Pt stated no addional needs and left eating dinner in bed.     1750) Pt assisted to bedside commode and back to bed. O2 sat dropped to 78 while pt ambulating. 1 L/min O2 placed back on via NC. Pt satting 95 at this time and resting in bed. Water pitcher refilled per request.    6771) Pt repositioned in bed and stated no additional needs at this time. 1910) Bedside shift change report given to Serafin Clayton RN (oncoming nurse) by Digna Rueda RN (offgoing nurse). Report included the following information SBAR, Kardex, Intake/Output, MAR and Cardiac Rhythm NSR with BBB PVC and PAC.

## 2022-05-29 NOTE — PROGRESS NOTES
0340-Obtained blood work for am labs and sent to lab.    0355-This writer's message to on call provider:  Patient admitted for hypoxia and PNA. She is requesting Tylenol for her arthritic pain, however Darvocet is listed as an allergy but patient reports she takes Tylenol \"all the time at home\" and she does not know why Darvocet is listed as an allergy. May I have a PRN order for Tylenol please?    9321-This writer's second message to on call provider:  Patient admitted for hypoxia and PNA. She is requesting Tylenol for her arthritic pain, however Darvocet is listed as an allergy but patient reports she takes Tylenol \"all the time at home\" and she does not know why Darvocet is listed as an allergy. May I have a PRN order for Tylenol please? ( Second request)    6609-New order received from Dr. Marilynn Matos.

## 2022-05-30 PROCEDURE — 74011250636 HC RX REV CODE- 250/636: Performed by: STUDENT IN AN ORGANIZED HEALTH CARE EDUCATION/TRAINING PROGRAM

## 2022-05-30 PROCEDURE — 74011000250 HC RX REV CODE- 250: Performed by: STUDENT IN AN ORGANIZED HEALTH CARE EDUCATION/TRAINING PROGRAM

## 2022-05-30 PROCEDURE — 65270000032 HC RM SEMIPRIVATE

## 2022-05-30 PROCEDURE — 93005 ELECTROCARDIOGRAM TRACING: CPT

## 2022-05-30 PROCEDURE — 94640 AIRWAY INHALATION TREATMENT: CPT

## 2022-05-30 PROCEDURE — 74011250637 HC RX REV CODE- 250/637: Performed by: STUDENT IN AN ORGANIZED HEALTH CARE EDUCATION/TRAINING PROGRAM

## 2022-05-30 PROCEDURE — 74011000258 HC RX REV CODE- 258: Performed by: STUDENT IN AN ORGANIZED HEALTH CARE EDUCATION/TRAINING PROGRAM

## 2022-05-30 PROCEDURE — 74011636637 HC RX REV CODE- 636/637: Performed by: STUDENT IN AN ORGANIZED HEALTH CARE EDUCATION/TRAINING PROGRAM

## 2022-05-30 RX ORDER — ISOSORBIDE MONONITRATE 30 MG/1
30 TABLET, EXTENDED RELEASE ORAL DAILY
Status: DISCONTINUED | OUTPATIENT
Start: 2022-05-30 | End: 2022-05-31 | Stop reason: HOSPADM

## 2022-05-30 RX ORDER — ARFORMOTEROL TARTRATE 15 UG/2ML
15 SOLUTION RESPIRATORY (INHALATION)
Status: DISCONTINUED | OUTPATIENT
Start: 2022-05-30 | End: 2022-05-31 | Stop reason: HOSPADM

## 2022-05-30 RX ORDER — BUDESONIDE 0.5 MG/2ML
500 INHALANT ORAL
Status: DISCONTINUED | OUTPATIENT
Start: 2022-05-30 | End: 2022-05-31 | Stop reason: HOSPADM

## 2022-05-30 RX ORDER — IPRATROPIUM BROMIDE AND ALBUTEROL SULFATE 2.5; .5 MG/3ML; MG/3ML
3 SOLUTION RESPIRATORY (INHALATION)
Status: DISCONTINUED | OUTPATIENT
Start: 2022-05-30 | End: 2022-05-31 | Stop reason: HOSPADM

## 2022-05-30 RX ADMIN — SODIUM CHLORIDE, PRESERVATIVE FREE 10 ML: 5 INJECTION INTRAVENOUS at 08:08

## 2022-05-30 RX ADMIN — ARFORMOTEROL TARTRATE 15 MCG: 15 SOLUTION RESPIRATORY (INHALATION) at 11:20

## 2022-05-30 RX ADMIN — SODIUM CHLORIDE, PRESERVATIVE FREE 10 ML: 5 INJECTION INTRAVENOUS at 13:45

## 2022-05-30 RX ADMIN — CEFTRIAXONE SODIUM 1 G: 1 INJECTION, POWDER, FOR SOLUTION INTRAMUSCULAR; INTRAVENOUS at 11:35

## 2022-05-30 RX ADMIN — PANTOPRAZOLE SODIUM 40 MG: 40 TABLET, DELAYED RELEASE ORAL at 08:08

## 2022-05-30 RX ADMIN — METOPROLOL TARTRATE 25 MG: 25 TABLET, FILM COATED ORAL at 22:41

## 2022-05-30 RX ADMIN — ARFORMOTEROL TARTRATE 15 MCG: 15 SOLUTION RESPIRATORY (INHALATION) at 19:51

## 2022-05-30 RX ADMIN — ASPIRIN 81 MG 81 MG: 81 TABLET ORAL at 08:08

## 2022-05-30 RX ADMIN — CLONAZEPAM 1 MG: 0.5 TABLET ORAL at 01:48

## 2022-05-30 RX ADMIN — IPRATROPIUM BROMIDE AND ALBUTEROL SULFATE 3 ML: 2.5; .5 SOLUTION RESPIRATORY (INHALATION) at 07:41

## 2022-05-30 RX ADMIN — SODIUM CHLORIDE, PRESERVATIVE FREE 10 ML: 5 INJECTION INTRAVENOUS at 22:10

## 2022-05-30 RX ADMIN — IPRATROPIUM BROMIDE AND ALBUTEROL SULFATE 3 ML: 2.5; .5 SOLUTION RESPIRATORY (INHALATION) at 19:51

## 2022-05-30 RX ADMIN — PREDNISONE 50 MG: 5 TABLET ORAL at 11:34

## 2022-05-30 RX ADMIN — FUROSEMIDE 40 MG: 40 TABLET ORAL at 15:44

## 2022-05-30 RX ADMIN — METOPROLOL TARTRATE 25 MG: 25 TABLET, FILM COATED ORAL at 08:09

## 2022-05-30 RX ADMIN — IPRATROPIUM BROMIDE AND ALBUTEROL SULFATE 3 ML: 2.5; .5 SOLUTION RESPIRATORY (INHALATION) at 01:42

## 2022-05-30 RX ADMIN — Medication 2000 UNITS: at 08:08

## 2022-05-30 RX ADMIN — BUDESONIDE 500 MCG: 0.5 SUSPENSION RESPIRATORY (INHALATION) at 19:52

## 2022-05-30 RX ADMIN — AMLODIPINE BESYLATE 5 MG: 5 TABLET ORAL at 08:08

## 2022-05-30 RX ADMIN — ISOSORBIDE MONONITRATE 30 MG: 30 TABLET, EXTENDED RELEASE ORAL at 10:38

## 2022-05-30 RX ADMIN — SODIUM CHLORIDE, PRESERVATIVE FREE 10 ML: 5 INJECTION INTRAVENOUS at 06:14

## 2022-05-30 RX ADMIN — ENOXAPARIN SODIUM 40 MG: 100 INJECTION SUBCUTANEOUS at 08:08

## 2022-05-30 RX ADMIN — IPRATROPIUM BROMIDE AND ALBUTEROL SULFATE 3 ML: 2.5; .5 SOLUTION RESPIRATORY (INHALATION) at 14:25

## 2022-05-30 RX ADMIN — FUROSEMIDE 40 MG: 40 TABLET ORAL at 08:09

## 2022-05-30 RX ADMIN — BUDESONIDE 500 MCG: 0.5 SUSPENSION RESPIRATORY (INHALATION) at 11:20

## 2022-05-30 RX ADMIN — ROSUVASTATIN CALCIUM 20 MG: 10 TABLET, FILM COATED ORAL at 22:30

## 2022-05-30 NOTE — PROGRESS NOTES
Bedside shift change report given to [facility] (oncoming nurse) by Ricardo Kerns (offgoing nurse). Report included the following information SBAR, Kardex, Intake/Output, MAR, Recent Results and Cardiac Rhythm SR w/ BBB occ PVC's PAC's.

## 2022-05-30 NOTE — PROGRESS NOTES
ELIER   RUR  9 %     IDR rounds this am again with MD and team   Six Min Walk test - needs home oxygen   VIOLETTA anticipate tomorrow with [facility] Home [facility]     Talked to Daughter Ms. Keila Santos today regarding the above  Discussed Home Oxygen and choice of agency. Verbal ok - to seen to [facility] once orders finalized   Asked about Home Care- sent message to [address] to clarify orders   Asked about Medicaid  Not at this time. Patient has Medicare and [employer]. The spouse in the home with Daughter to help with care. She had questions about whether patient will need oxygen permanently. She will follow-up with PCP post discharge for monitoring. Audelia Cottrell MSW RN   [phone]       Addendum:12:49PM   Sending information via Scheduling [employer]. Concerned with Dx of PNA - may need more documentation she has been treated and stabilized. Acute hypoxic respiratory failure POA  Right upper lobe pneumonia POA  Acute bronchitis due to pneumonia POA    Addendum 1:56PM   Sent to [facility]    Discussed with MD for additional documentation

## 2022-05-30 NOTE — PROGRESS NOTES
Six Minute Walk Test     Data Measured Before the Walk  Heart Rate: 70    O2 Saturation: 84%  Room Air    Kendy Dyspnea Rate: 5  Patient setting on side of bed. 6L added: 90% and HR of 68    Data Measured During the Walk  Heart Rate: 80    O2 Flow Rate: 6L  O2 Saturation: 90%  O2 Flow Rate: 5L  O2 Saturation: 90%  O2 Flow Rate: 5L  O2 Saturation: 90%  O2 Flow Rate: 5L  O2 Saturation: 90%  O2 Flow Rate: 5L  O2 Saturation: 90%    Symptoms: patient getting tired and dyspnea. Walking very slow with cane. Any Problems While Exercising:   Kendy Dyspnea Rate: 8      Data Measured Immediately After Walk  Did Patient Stop or Pause Before 6 Minutes: yes many times. Why Patient Stopped or Paused: Dyspnea and unstable with cane. Predicted Distance: 303 Meters  Total Distance Walked: 24.1 Feet    Heart Rate: 88    O2 Flow Rate: 5L  O2 Saturation: 90%  Kendy Dyspnea Rate: 9      Data Measured 5 Minutes After Walk  Heart Rate: 88    O2 Saturation: 94%  Comments: titrated O2 to 3L.         Additional Comments: 5-6 L with exercise  Resting 1- 3L     Electronically signed by RT Olive on 5/30/2022 at 11:42 AM

## 2022-05-30 NOTE — PROGRESS NOTES
Spiritual Care Partner Volunteer visited patient at [facility] in [address] on 5/30/2022   Documented by:     Zaid Tejada MPS, [address], Staff [address] [place] [address] Avenue    [address] Paging Service  [phone]

## 2022-05-30 NOTE — PROGRESS NOTES
Problem: General Medical Care Plan  Goal: *Vital signs within specified parameters  Outcome: Progressing Towards Goal  Goal: *Absence of infection signs and symptoms  Outcome: Progressing Towards Goal  Goal: *Optimal pain control at patient's stated goal  Outcome: Progressing Towards Goal  Goal: *Skin integrity maintained  Outcome: Progressing Towards Goal  Goal: *Anxiety reduced or absent  Outcome: Progressing Towards Goal     Problem: Breathing Pattern - Ineffective  Goal: *Absence of hypoxia  Outcome: Progressing Towards Goal  Goal: *Use of effective breathing techniques  Outcome: Progressing Towards Goal     Problem: Falls - Risk of  Goal: *Absence of Falls  Description: Document Jad Fall Risk and appropriate interventions in the flowsheet.   Outcome: Progressing Towards Goal  Note: Fall Risk Interventions:  Mobility Interventions: Bed/chair exit alarm,Patient to call before getting OOB,Utilize walker, cane, or other assistive device         Medication Interventions: Teach patient to arise slowly    Elimination Interventions: Call light in reach    History of Falls Interventions: Bed/chair exit alarm,Door open when patient unattended

## 2022-05-30 NOTE — PROGRESS NOTES
Hospitalist Progress Note    NAME: Rivera Oconnor   :  1935   MRN:  [id number]   Room Number:    @ [facility]     Please note that this dictation was completed with [employer], the computer voice recognition software. Quite often unanticipated grammatical, syntax, homophones, and other interpretive errors are inadvertently transcribed by the computer software. Please disregard these errors. Please excuse any errors that have escaped final proofreading. Interim Hospital Summary: 82 y.o. female whom presented on 2022 with      Assessment / Plan:        Acute hypoxic respiratory failure POA  Right upper lobe pneumonia POA resolving stable   Acute bronchitis due to pneumonia POA resolving   -Chest x-ray reviewed dependently with patchy airspace disease right upper lobe  -Patient oxygen saturation dropped to 82% on room air on admission  -Patient received ceftriaxone azithromycin in the ED. -Urine legionella negative     -Completed azithromycin therapy for CAP  -Continue ceftriaxone for CAP  -6-minute walk test showed patient requiring oxygen  -A formoterol and budesonide  -Prednisone 50 mg for 5 days  -DuoNeb every 6 scheduled  - urine strep pneumo antigen pending      Hypertension  Insomnia  Anxiety  Hyperlipidemia  Aortic stenosis latest AV gradient 10 mhg  Small vessel coronary artery disease  -Resume home meds as patient tolerates        Body mass index is 38.3 kg/m². Code Status: DNR  Surrogate Decision Maker:     DVT Prophylaxis: Lovenox  GI Prophylaxis: not indicated       Subjective:     Chief Complaint / Reason for Physician Visit  No acute issues Discussed with RN events overnight. Review of Systems:  No fevers, chills, appetite change, cough, sputum production, shortness of breath, dyspnea on exertion, nausea, vomitting, diarrhea, constipation, chest pain, leg edema, abdominal pain, joint pain, rash, itching. Tolerating PT/OT. Tolerating diet.        Objective: VITALS:   Last 24hrs VS reviewed since prior progress note. Most recent are:  Patient Vitals for the past 24 hrs:   Temp Pulse Resp BP SpO2   05/30/22 0800 98.3 °F (36.8 °C) 70 18 (!) 143/79 92 %   05/30/22 0759 -- -- -- -- (!) 85 %   05/30/22 0737 -- -- -- -- 92 %   05/30/22 0330 98.6 °F (37 °C) 66 18 137/82 91 %   05/30/22 0132 -- -- -- -- 91 %   05/29/22 2304 97.9 °F (36.6 °C) 70 18 (!) 140/85 92 %   05/29/22 2015 97.7 °F (36.5 °C) 76 17 (!) 149/81 100 %   05/29/22 1954 -- -- -- -- 91 %   05/29/22 1750 -- -- -- -- (!) 78 %   05/29/22 1624 98.8 °F (37.1 °C) 83 17 (!) 140/86 94 %   05/29/22 1600 -- 83 -- -- --   05/29/22 1415 97.3 °F (36.3 °C) 70 18 125/75 91 %   05/29/22 1319 -- 74 -- -- 95 %   05/29/22 1226 99.1 °F (37.3 °C) 63 16 132/71 94 %   05/29/22 1200 -- 63 -- -- --       Intake/Output Summary (Last 24 hours) at 5/30/2022 0834  Last data filed at 5/30/2022 0609  Gross per 24 hour   Intake 900 ml   Output 1350 ml   Net -450 ml        PHYSICAL EXAM:  General: WD, WN. Alert, cooperative, no acute distress    EENT:  EOMI. Anicteric sclerae. MMM  Resp:  Mild wheezing B/l   CV:  Regular  rhythm,  normal S1/S2, no murmurs rubs gallops, No edema  GI:  Soft, Non distended, Non tender. +Bowel sounds  Neurologic:  Alert and oriented X 3, normal speech,   Psych:   Good insight. Not anxious nor agitated  Skin:  No rashes. No jaundice    Reviewed most current lab test results and cultures  YES  Reviewed most current radiology test results   YES  Review and summation of old records today    NO  Reviewed patient's current orders and MAR    YES  PMH/SH reviewed - no change compared to H&P  ________________________________________________________________________  Care Plan discussed with:    Comments   Patient x    Family      RN x    Care Manager x    Consultant                       x Multidiciplinary team rounds were held today with , nursing, pharmacist and clinical coordinator.   Patient's plan of care was discussed; medications were reviewed and discharge planning was addressed. ________________________________________________________________________  Total NON critical care TIME:  35   Minutes    Total CRITICAL CARE TIME Spent:   Minutes non procedure based      Comments   >50% of visit spent in counseling and coordination of care x    ________________________________________________________________________  Josselyn Singh MD     Procedures: see electronic medical records for all procedures/Xrays and details which were not copied into this note but were reviewed prior to creation of Plan. LABS:  I reviewed today's most current labs and imaging studies.   Pertinent labs include:  Recent Labs     05/29/22 0339 05/28/22 0414   WBC 4.2 4.6   HGB 12.8 12.7   HCT 42.5 41.3   * 123*     Recent Labs     05/29/22 0339 05/28/22 0414    139   K 3.7 3.4*    101   CO2 39* 37*   GLU 98 96   BUN 13 12   CREA 1.02 0.98   CA 8.7 8.5   ALB 2.7* 2.7*   TBILI 0.4 0.4   ALT 16 14       Signed: Josselyn Singh MD

## 2022-05-30 NOTE — PROGRESS NOTES
ADULT PROTOCOL: JET AEROSOL  REASSESSMENT    Patient  Wong Francois     82 y.o.   female     5/30/2022  8:08 AM    Breath Sounds Pre Procedure: Right Breath Sounds: Diminished                               Left Breath Sounds: Diminished    Breath Sounds Post Procedure: Right Breath Sounds: Scattered wheezing                                 Left Breath Sounds: Scattered wheezing    Breathing pattern: Pre procedure Breathing Pattern: Regular          Post procedure Breathing Pattern: Regular    Heart Rate: Pre procedure Pulse: 66           Post procedure Pulse: 72    Resp Rate: Pre procedure Respirations: 18           Post procedure Respirations: 18      Incentive Spirometry:  Actual Volume (ml): 250 ml          Cough: Pre procedure Cough: Non-productive               Post procedure Cough: Non-productive    Suctioned: NO      Oxygen: O2 Device: Nasal cannula   1L     Changed: YES    SpO2: Pre procedure SpO2: 92 %   with oxygen              Post procedure SpO2: 99 %  with oxygen    Nebulizer Therapy: Current medications Aerosolized Medications: DuoNeb      Changed: NO    Smoking History: never    Problem List:   Patient Active Problem List   Diagnosis Code    Hypokalemia E87.6    Hiatal hernia K44.9    Anxiety F41.9    S/P hysterectomy Z90.710    Aortic stenosis, mild I35.0    Spinal stenosis M48.00    S/P foot surgery Z98.890    Environmental allergies Z91.09    S/P cardiac catheterization Z98.890    Hypovitaminosis D E55.9    Chronic ischemic heart disease I25.9    Mixed hyperlipidemia E78.2    Chronic diastolic heart failure (HCC) I50.32    Chest pain at rest R07.9    Bifascicular bundle branch block--RBBB,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    Atypical chest pain R07.89    Essential hypertension I10    Gastroesophageal reflux disease without esophagitis K21.9    Atherosclerosis of native coronary artery without angina pectoris--diffuse small vsl disease via cath cath 2009--RCA PDA/ROSA I25.10    Chronic anxiety F41.9    Encounter for medication monitoring Z51.81    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

## 2022-05-30 NOTE — PROGRESS NOTES
2520) Bedside shift change report given to EDMAR Ventura (oncoming nurse) by Nehal Cole RN (offgoing nurse). Report included the following information SBAR, Kardex, Intake/Output, MAR, Recent Results and Cardiac Rhythm NSR with BBB. Pt asleep in bed at this time. Rise and fall of chest noted. Side rails x3.    0800) Pt assessed and vital signs stable. Pt has no c/o pain at this time. O2 increased to 2 L/min r/t pt O2 sat 85%. Pt now 93% on 2 L/min. Scheduled meds given. Trace edema noted in BLE. Lungs coarse with expiratory wheezes. Attempted to flush IV and infiltrated. Will start new PIV. Pt repositioned in bed and awaiting breakfast.     0900) Pt resting in bed at this time eating breakfast. O2 sat 91% at this time. 56) Interdisciplinary team rounds were held 5/30/2022 with the following team members:Care Management, Nursing and Physician. Plan of care discussed. See clinical pathway and/or care plan for interventions and desired outcomes. 1040) Scheduled med given. EKG obtained and pt showing NSR with BBB. Assisted RT in 6 min walk. Pt now on 3 L/min    1130) New 20 G placed on right forearm. IV antibiotic hung and infusing. Scheduled PO meds given. Pt left resting in bed at this time and stated no additional needs. 1215) Pt repositioned in bed for lunch. New socks provided. 1345) Pt asleep in bed at this time. IV flushed and patent. Pt stated no additional needs and left resting in bed. 1440) Pt asleep in bed at this time. Rise and fall of chest noted. 1540) Pt reassessed and no changes to note. Vital signs obtained and stable. O2 sat 93% on 2 L/min NC. Pt has no c/o pain at this time. Scheduled lasix given. Water refilled. Pt left resting in bed at this time. 1615) Pt resting in bed at this time and stated no needs. 1745) Pt ambulated to bedside commode. 275 ml of output. Pt ambulated back to bed and resting in bed at this time. 1815) Pt stated she did not like the dinner. Frozen meal provided per request. Pt sitting up in bed eating at this time. 1910) Bedside shift change report given to En Brown RN (oncoming nurse) by Evie Ernst RN (offgoing nurse). Report included the following information SBAR, Kardex, Intake/Output, MAR and Recent Results.

## 2022-05-31 ENCOUNTER — APPOINTMENT (OUTPATIENT)
Dept: GENERAL RADIOLOGY | Age: 87
DRG: 193 | End: 2022-05-31
Attending: STUDENT IN AN ORGANIZED HEALTH CARE EDUCATION/TRAINING PROGRAM
Payer: MEDICARE

## 2022-05-31 VITALS
WEIGHT: 169.9 LBS | HEIGHT: 57 IN | RESPIRATION RATE: 16 BRPM | SYSTOLIC BLOOD PRESSURE: 116 MMHG | HEART RATE: 80 BPM | OXYGEN SATURATION: 92 % | BODY MASS INDEX: 36.66 KG/M2 | TEMPERATURE: 98.8 F | DIASTOLIC BLOOD PRESSURE: 95 MMHG

## 2022-05-31 PROCEDURE — 74011000250 HC RX REV CODE- 250: Performed by: STUDENT IN AN ORGANIZED HEALTH CARE EDUCATION/TRAINING PROGRAM

## 2022-05-31 PROCEDURE — 74011636637 HC RX REV CODE- 636/637: Performed by: STUDENT IN AN ORGANIZED HEALTH CARE EDUCATION/TRAINING PROGRAM

## 2022-05-31 PROCEDURE — 94640 AIRWAY INHALATION TREATMENT: CPT

## 2022-05-31 PROCEDURE — 74011250637 HC RX REV CODE- 250/637: Performed by: STUDENT IN AN ORGANIZED HEALTH CARE EDUCATION/TRAINING PROGRAM

## 2022-05-31 PROCEDURE — 77010033678 HC OXYGEN DAILY

## 2022-05-31 PROCEDURE — 97116 GAIT TRAINING THERAPY: CPT

## 2022-05-31 PROCEDURE — 71045 X-RAY EXAM CHEST 1 VIEW: CPT

## 2022-05-31 PROCEDURE — 74011000258 HC RX REV CODE- 258: Performed by: STUDENT IN AN ORGANIZED HEALTH CARE EDUCATION/TRAINING PROGRAM

## 2022-05-31 PROCEDURE — 74011250636 HC RX REV CODE- 250/636: Performed by: STUDENT IN AN ORGANIZED HEALTH CARE EDUCATION/TRAINING PROGRAM

## 2022-05-31 RX ORDER — ALBUTEROL SULFATE 90 UG/1
1 AEROSOL, METERED RESPIRATORY (INHALATION)
Qty: 18 G | Refills: 1 | Status: ON HOLD | OUTPATIENT
Start: 2022-05-31 | End: 2022-11-03

## 2022-05-31 RX ORDER — CEFDINIR 300 MG/1
300 CAPSULE ORAL 2 TIMES DAILY
Qty: 10 CAPSULE | Refills: 0 | Status: SHIPPED | OUTPATIENT
Start: 2022-05-31 | End: 2022-06-05

## 2022-05-31 RX ORDER — PREDNISONE 50 MG/1
50 TABLET ORAL
Qty: 3 TABLET | Refills: 0 | Status: SHIPPED | OUTPATIENT
Start: 2022-06-01 | End: 2022-06-04

## 2022-05-31 RX ADMIN — ISOSORBIDE MONONITRATE 30 MG: 30 TABLET, EXTENDED RELEASE ORAL at 08:24

## 2022-05-31 RX ADMIN — SODIUM CHLORIDE, PRESERVATIVE FREE 10 ML: 5 INJECTION INTRAVENOUS at 13:00

## 2022-05-31 RX ADMIN — AMLODIPINE BESYLATE 5 MG: 5 TABLET ORAL at 08:24

## 2022-05-31 RX ADMIN — PREDNISONE 50 MG: 5 TABLET ORAL at 08:24

## 2022-05-31 RX ADMIN — IPRATROPIUM BROMIDE AND ALBUTEROL SULFATE 3 ML: 2.5; .5 SOLUTION RESPIRATORY (INHALATION) at 07:52

## 2022-05-31 RX ADMIN — Medication 2000 UNITS: at 08:24

## 2022-05-31 RX ADMIN — SODIUM CHLORIDE, PRESERVATIVE FREE 10 ML: 5 INJECTION INTRAVENOUS at 06:50

## 2022-05-31 RX ADMIN — SIMETHICONE 80 MG: 80 TABLET, CHEWABLE ORAL at 00:53

## 2022-05-31 RX ADMIN — ARFORMOTEROL TARTRATE 15 MCG: 15 SOLUTION RESPIRATORY (INHALATION) at 07:55

## 2022-05-31 RX ADMIN — ASPIRIN 81 MG 81 MG: 81 TABLET ORAL at 08:24

## 2022-05-31 RX ADMIN — BUDESONIDE 500 MCG: 0.5 SUSPENSION RESPIRATORY (INHALATION) at 07:55

## 2022-05-31 RX ADMIN — CLONAZEPAM 1 MG: 0.5 TABLET ORAL at 00:56

## 2022-05-31 RX ADMIN — IPRATROPIUM BROMIDE AND ALBUTEROL SULFATE 3 ML: 2.5; .5 SOLUTION RESPIRATORY (INHALATION) at 15:26

## 2022-05-31 RX ADMIN — METOPROLOL TARTRATE 25 MG: 25 TABLET, FILM COATED ORAL at 08:24

## 2022-05-31 RX ADMIN — CEFTRIAXONE SODIUM 1 G: 1 INJECTION, POWDER, FOR SOLUTION INTRAMUSCULAR; INTRAVENOUS at 12:56

## 2022-05-31 RX ADMIN — ENOXAPARIN SODIUM 40 MG: 100 INJECTION SUBCUTANEOUS at 08:24

## 2022-05-31 RX ADMIN — FUROSEMIDE 40 MG: 40 TABLET ORAL at 16:31

## 2022-05-31 RX ADMIN — PANTOPRAZOLE SODIUM 40 MG: 40 TABLET, DELAYED RELEASE ORAL at 08:24

## 2022-05-31 RX ADMIN — FUROSEMIDE 40 MG: 40 TABLET ORAL at 08:24

## 2022-05-31 NOTE — PROGRESS NOTES
Comprehensive Nutrition Assessment    Type and Reason for Visit: (P) Initial    Nutrition Recommendations/Plan:   Diet as tolerated  Vit D3 2000 IUs daily       Nutrition Assessment:    (P) Pt admitted with RUL PNA POA. Hx notable for CAD, CHF, cardiac cath, HTN, HLD, GERD, low vitamin D    Nutrition Related Findings:    (P) Pt tolerating diet Wound Type: (P) None    Current Nutrition Intake & Therapies:  Average Meal Intake: (P) 51-75%  Average Supplement Intake: (P) None ordered  ADULT DIET Easy to Chew; no pork, BBQ or spicy food    Anthropometric Measures:  Height: (P) 4' 9\" (144.8 cm)  Ideal Body Weight (IBW): (P) 85 lbs ((P) 39 kg)     Current Body Wt:  (P) 77.1 kg (169 lb 14.4 oz), (P) 199.9 % IBW. (P) Standing scale  Current BMI (kg/m2): (P) 36.8                          BMI Category: (P) Obese class 2 (BMI 35.0-39. 9)    Estimated Daily Nutrient Needs:     Weight Used for Energy Requirements: (P) Ideal  Energy (kcal/day): (P) 1715  Weight Used for Protein Requirements: (P) Ideal  Protein (g/day): (P) 55  Method Used for Fluid Requirements: (P) 1 ml/kcal  Fluid (ml/day):      Nutrition Diagnosis:   (P) Limited adherence to nutrition-related recommendations,Overweight/obesity related to   as evidenced by      Nutrition Interventions:   Food and/or Nutrient Delivery: (P) Continue current diet  Nutrition Education/Counseling: (P) No recommendations at this time  Coordination of Nutrition Care: (P) Continue to monitor while inpatient       Goals:  Previous Goal Met: (P) Progressing toward goal(s)  Goals: (P) PO intake 50% or greater,prior to discharge       Nutrition Monitoring and Evaluation:   Behavioral-Environmental Outcomes: (P) None identified  Food/Nutrient Intake Outcomes: (P) Food and nutrient intake  Physical Signs/Symptoms Outcomes: (P) None identified    Discharge Planning:    (P) Too soon to determine    Heather Minor, PhD, [address]   Contact: [phone]

## 2022-05-31 NOTE — PROGRESS NOTES
Problem: General Medical Care Plan  Goal: *Vital signs within specified parameters  Outcome: Progressing Towards Goal  Goal: *Labs within defined limits  Outcome: Progressing Towards Goal  Goal: *Absence of infection signs and symptoms  Outcome: Progressing Towards Goal  Goal: *Skin integrity maintained  Outcome: Progressing Towards Goal  Goal: *Anxiety reduced or absent  Outcome: Progressing Towards Goal  Goal: *Progressive mobility and function (eg: ADL's)  Outcome: Progressing Towards Goal     Problem: Falls - Risk of  Goal: *Absence of Falls  Description: Document Jad Fall Risk and appropriate interventions in the flowsheet. Outcome: Progressing Towards Goal  Note: Fall Risk Interventions:  Mobility Interventions: Communicate number of staff needed for ambulation/transfer,Patient to call before getting OOB,Utilize walker, cane, or other assistive device         Medication Interventions: Teach patient to arise slowly    Elimination Interventions: Call light in reach    History of Falls Interventions: Bed/chair exit alarm,Door open when patient unattended         Problem: Pressure Injury - Risk of  Goal: *Prevention of pressure injury  Description: Document Merrill Scale and appropriate interventions in the flowsheet.   Outcome: Progressing Towards Goal  Note: Pressure Injury Interventions:  Sensory Interventions: Float heels,Maintain/enhance activity level    Moisture Interventions: Check for incontinence Q2 hours and as needed,Offer toileting Q_hr    Activity Interventions: Increase time out of bed,PT/OT evaluation    Mobility Interventions: Float heels,PT/OT evaluation    Nutrition Interventions: Offer support with meals,snacks and hydration    Friction and Shear Interventions: Apply protective barrier, creams and emollients,HOB 30 degrees or less

## 2022-05-31 NOTE — DISCHARGE SUMMARY
Hospitalist Discharge Summary     Patient ID:  Radha Scott  [id number]  79 y.o.  1935    PCP on record: Xuan Thompson MD    Admit date: 5/26/2022  Discharge date and time: 5/31/2022    Please note that this dictation was completed with [employer] the [employer] voice recognition software. Quite often unanticipated grammatical, syntax, homophones, and other interpretive errors are inadvertently transcribed by the computer software. Please disregard these errors. Please excuse any errors that have escaped final proofreading. Admission Diagnoses: Hypoxia [R09.02]  Pneumonia [J18.9]    Discharge Diagnoses: Active Problems:    Pneumonia (5/26/2022)      Hypoxia (5/26/2022)           Hospital Course:     Acute hypoxic respiratory failure POA stable w/ supplemental oxygen   Right upper lobe pneumonia POA resolving stable   Acute bronchitis due to pneumonia POA resolving   -Chest x-ray reviewed independently on admission with patchy airspace disease right upper lobe  -Repeat chest x-ray with radiographical blooming of pneumonia  -Urine legionella negative  -COVID-19 negative  -TTE with EF 60-65%. Mildly increased left wall thickness. Normal diastolic function. Mild stenosis of aortic valve.   -Patient states she feels much better since the day of admission  -Patient completed azithromycin course to be discharged home on Omnicef to complete 7-day course for community-acquired pneumonia  -Patient failed 6-minute walk test and would be going home on oxygen at rest and exertion  -Patient discharged home to complete 5-day course of steroid inhaled corticosteroid and DuoNeb as needed  -Patient to follow-up with primary appointment for management of bronchitis and respiratory failure  -Patient agreed and verbalized understanding        Hypertension  Insomnia  Anxiety  Hyperlipidemia  Aortic stenosis latest AV gradient 10 mhg  Small vessel coronary artery disease  -Resume home meds as patient tolerates      CONSULTATIONS:  None    Excerpted HPI from H&P of Tiki Farley MD:  Scott Arellano is a 82 y.o.  female with PMH of above-mentioned problems who presents to ED with c/o difficulty breathing and cough for past 2 days. Patient states that she has progressive shortness of breath along with some dry cough since Tuesday. Patient also endorsed some nasal congestion. Denies any fever chest pain belly pain difficulty with bowel movements or bladder.       ______________________________________________________________________  DISCHARGE SUMMARY/HOSPITAL COURSE:  for full details see H&P, daily progress notes, labs, consult notes. _______________________________________________________________________  Patient seen and examined by me on discharge day. Pertinent Findings:  Gen:    Not in distress  Chest: Clear lungs  CVS:   Regular rhythm. No edema  Abd:  Soft, not distended, not tender  Neuro:  Alert with good insight. Oriented to person, place, and time   _______________________________________________________________________  DISCHARGE MEDICATIONS:   Current Discharge Medication List      START taking these medications    Details   predniSONE (DELTASONE) 50 mg tablet Take 1 Tablet by mouth daily (with breakfast) for 3 doses. Qty: 3 Tablet, Refills: 0  Start date: 6/1/2022, End date: 6/4/2022      albuterol (Ventolin HFA) 90 mcg/actuation inhaler Take 1 Puff by inhalation every four (4) hours as needed for Wheezing or Shortness of Breath. Qty: 18 g, Refills: 1  Start date: 5/31/2022      cefdinir (OMNICEF) 300 mg capsule Take 1 Capsule by mouth two (2) times a day for 5 days. Qty: 10 Capsule, Refills: 0  Start date: 5/31/2022, End date: 6/5/2022         CONTINUE these medications which have NOT CHANGED    Details   amLODIPine (NORVASC) 5 mg tablet Take 5 mg by mouth daily.  Indications: high blood pressure      pantoprazole (PROTONIX) 40 mg tablet Take 40 mg by mouth daily. clonazePAM (KlonoPIN) 1 mg tablet TAKE 1/2 TO 1 TABLET EVERY MORNING AND AS NEEDED FOR ANXIETY, AND TAKE 1 TABLET AT BEDTIME FOR SLEEP  Qty: 60 Tablet, Refills: 1    Comments: Not to exceed 2 additional fills before 08/10/2022 DX Code Needed  . Associated Diagnoses: Anxiety      losartan (COZAAR) 100 mg tablet TAKE 1 TABLET BY MOUTH EVERY DAY  Qty: 90 Tablet, Refills: 3    Associated Diagnoses: Essential hypertension      metoprolol tartrate (LOPRESSOR) 25 mg tablet TAKE 1 TABLET BY MOUTH TWICE A DAY 1 AT 9AM AND 1 AT 9PM  Qty: 180 Tablet, Refills: 3    Associated Diagnoses: Essential hypertension      isosorbide mononitrate ER (IMDUR) 30 mg tablet TAKE 1 TABLET BY MOUTH EVERY DAY  Qty: 90 Tablet, Refills: 3    Associated Diagnoses: Atherosclerosis of native coronary artery of native heart without angina pectoris      rosuvastatin (CRESTOR) 20 mg tablet TAKE 1 TABLET BY MOUTH EVERY DAY AT NIGHT  Qty: 30 Tablet, Refills: 11      furosemide (LASIX) 80 mg tablet TAKE 1 TABLET BY MOUTH EVERY MORNING AND TAKE 1/2 TABLET EVERY EVENING  Qty: 135 Tablet, Refills: 3      nitroglycerin (NITROSTAT) 0.4 mg SL tablet DISSOLVE 1 TAB BY SUBLINGUAL ROUTE EVERY 5 MINUTES AS NEEDED. Qty: 25 Tablet, Refills: 0    Associated Diagnoses: Chest pain, unspecified type      desonide (TRIDESILON) 0.05 % topical lotion Apply  to affected area two (2) times daily as needed for Skin Irritation or Itching. Qty: 59 mL, Refills: 1    Comments: DX Code Needed  . Associated Diagnoses: Dry skin dermatitis      potassium chloride SR (KLOR-CON 10) 10 mEq tablet Take 3 Tablets by mouth two (2) times a day. Qty: 180 Tablet, Refills: 11      cholecalciferol, vitamin D3, (Vitamin D3) 50 mcg (2,000 unit) tab Take 1 Tablet by mouth daily. acetaminophen (TYLENOL EXTRA STRENGTH) 500 mg tablet Take 1,000 mg by mouth every six (6) hours as needed for Pain. Aspirin, Buffered 81 mg tab Take 81 mg by mouth daily.          STOP taking these medications       simethicone (GAS RELIEF) 80 mg chewable tablet Comments:   Reason for Stopping:               My Recommended Diet, Activity, Wound Care, and follow-up labs are listed in the patient's Discharge Insturctions which I have personally completed and reviewed. _______________________________________________________________________  DISPOSITION:     Home with Family:    Home with HH/PT/OT/RN: x   SNF/LTC:    [facility]:    OTHER:        Condition at Discharge:  Stable  _______________________________________________________________________  Follow up with:   PCP : Nai Franklin MD  Follow-up Information     Follow up With Specialties Details Why Contact Info    Nai Franklin MD Family Medicine On 6/8/2022 Appointment is on 6/8/22 at 11:20 am.  Please arrive 15 minutes prior to the appointment and wear a mask.  Sarah PORTER. 68.  [phone]                Total time in minutes spent coordinating this discharge (includes going over instructions, follow-up, prescriptions, and preparing report for sign off to her PCP) : 55minutes    Signed:  Martha Ferguson MD

## 2022-05-31 NOTE — DISCHARGE INSTRUCTIONS
Patient Education        Pneumonia: Care Instructions  Overview     Pneumonia is an infection of the lungs. Most cases are caused by infections from bacteria or viruses. Pneumonia may be mild or very severe. If it is caused by bacteria, you will be treated with antibiotics. It may take a few weeks to a few months to recover fully from pneumonia, depending on how sick you were and whether your overall health is good. Follow-up care is a key part of your treatment and safety. Be sure to make and go to all appointments, and call your doctor if you are having problems. It's also a good idea to know your test results and keep a list of the medicines you take. How can you care for yourself at home? · Take your antibiotics exactly as directed. Do not stop taking the medicine just because you are feeling better. You need to take the full course of antibiotics. · Take your medicines exactly as prescribed. Call your doctor if you think you are having a problem with your medicine. · Get plenty of rest and sleep. You may feel weak and tired for a while, but your energy level will improve with time. · To prevent dehydration, drink plenty of fluids. Choose water and other clear liquids. If you have kidney, heart, or liver disease and have to limit fluids, talk with your doctor before you increase the amount of fluids you drink. · Take care of your cough so you can rest. A cough that brings up mucus from your lungs is common with pneumonia. It is one way your body gets rid of the infection. But if coughing keeps you from resting or causes severe fatigue and chest-wall pain, talk to your doctor. Your doctor may suggest that you take a medicine to reduce the cough. · Use a vaporizer or humidifier to add moisture to your bedroom. Follow the directions for cleaning the machine. · Do not smoke or allow others to smoke around you. Smoke will make your cough last longer.  If you need help quitting, talk to your doctor about stop-smoking programs and medicines. These can increase your chances of quitting for good. · Take an over-the-counter pain medicine, such as acetaminophen (Tylenol), ibuprofen (Advil, Motrin), or naproxen (Aleve). Read and follow all instructions on the label. · Do not take two or more pain medicines at the same time unless the doctor told you to. Many pain medicines have acetaminophen, which is Tylenol. Too much acetaminophen (Tylenol) can be harmful. · If you were given a spirometer to measure how well your lungs are working, use it as instructed. This can help your doctor tell how your recovery is going. · To prevent pneumonia in the future, talk to your doctor about getting a yearly flu vaccine and a pneumococcal vaccine. When should you call for help? Call 911 anytime you think you may need emergency care. For example, call if:    · You have severe trouble breathing. Call your doctor now or seek immediate medical care if:    · You cough up dark brown or bloody mucus (sputum).     · You have new or worse trouble breathing.     · You are dizzy or lightheaded, or you feel like you may faint. Watch closely for changes in your health, and be sure to contact your doctor if:    · You have a new or higher fever.     · You are coughing more deeply or more often.     · You are not getting better after 2 days (48 hours).     · You do not get better as expected. Where can you learn more? Go to http://www.[employer].com/  Enter D336 in the search box to learn more about \"Pneumonia: Care Instructions. \"  Current as of: July 6, 2021               Content Version: 13.2  © 9152-6092 [employer]. Care instructions adapted under license by [employer] (which disclaims liability or warranty for this information).  If you have questions about a medical condition or this instruction, always ask your healthcare professional. Ann Patel disclaims any warranty or liability for your use of this information.       - Please complete your antibiotic course   - Please complete your prednisone course   - Please follow up PCP and or pulmonology to wean off from oxygen

## 2022-05-31 NOTE — PROGRESS NOTES
ELIER   RUR 9 %      Received message from [facility]  And talked to them   They are requesting per Medicare guidelines patient has to be tried on  4 liters of oxygen and show dropped- needs to have Resp redo this part of the test.    Asked them to be called. Will fax as soon as done     [employer] MSW RN       Addendum : 10:35AM     Talked to Resp- did not understand the reason  Why since patient was only  90% on  4 Liters. Discussed the above and indicates this has never happen to her before. I explained about the guideline and dx - this is one that normally do not qualify for oxygen. Recommend try another agency. If this does not work  Will follow-up with MD and Nursing for next step for required documentation. Addendum:  Fax the Additional Orders to [facility] for process     Addendum: 2:33PM   Received call from [facility] and they have the information- they have processed and approved. They are making arrangements to deliver the portable tank to the hospital   And contact the daughter for the larger tank    [phone]    Addendum: 4:30PM   Just talked to [facility]- they will be delivering the Portable tank to the hospital soon.   Patient will need to call once on the way home to deliver the [facility]

## 2022-05-31 NOTE — PROGRESS NOTES
Spiritual Care Partner Volunteer visited patient at [facility] in [address] on 5/31/2022   Documented by: Jose Loyd.    Paging Service: [phone])

## 2022-05-31 NOTE — PROGRESS NOTES
Problem: Mobility Impaired (Adult and Pediatric)  Goal: *Acute Goals and Plan of Care (Insert Text)  Description: FUNCTIONAL STATUS PRIOR TO ADMISSION: Patient was modified independent using a single point cane for functional mobility. W/c for longer distances and community use. No home O2 use. HOME SUPPORT PRIOR TO ADMISSION: The patient lived with , daughter and grandson. Assist as needed for ADLs and home chores. Physical Therapy Goals  Initiated 5/27/2022  1. Patient will move from supine to sit and sit to supine , scoot up and down, and roll side to side in bed with modified independence within 7 day(s). 2.  Patient will transfer from bed to chair and chair to bed with modified independence using the least restrictive device within 7 day(s). 3.  Patient will perform sit to stand with modified independence within 7 day(s). 4.  Patient will ambulate with modified independence for 75 feet with the least restrictive device within 7 day(s). 5.  Patient will ascend/descend 2 stairs with 2 handrail(s) with supervision/set-up within 7 day(s). Outcome: Progressing Towards Goal   PHYSICAL THERAPY TREATMENT  Patient: Chanelle Oscar (88 y.o. female)  Date: 5/31/2022  Diagnosis: Hypoxia [R09.02]  Pneumonia [J18.9] <principal problem not specified>       Precautions:    Chart, physical therapy assessment, plan of care and goals were reviewed. ASSESSMENT  Patient continues with skilled PT services and is progressing towards goals. Pt with decreased endurance and balance compared to initial evaluation. Pt reports minimal time OOB since Friday. Required use of RW for gait training this day d/t minimal foot clearance and elevated fall risk with SPC. Tolerated gait distance x 30ft with one standing rest break. Respiratory therapist in room to conduct walking oximetry test(see their note for home O2 needs).  Pt requires at minimal 4L/min supplemental O2 during activity to maintain saturations >88%.    Current Level of Function Impacting Discharge (mobility/balance): needs RW and home O2    Other factors to consider for discharge: generalized weakness, new home O2 needs         PLAN :  Patient continues to benefit from skilled intervention to address the above impairments. Continue treatment per established plan of care. to address goals. Recommendation for discharge: (in order for the patient to meet his/her long term goals)  Physical therapy at least 2 days/week in the home     This discharge recommendation:  Has been made in collaboration with the attending provider and/or case management    IF patient discharges home will need the following DME: portable oxygen and rolling walker       SUBJECTIVE:   Patient stated I was being a good Alevism and gave my walker to a friend.     OBJECTIVE DATA SUMMARY:   Critical Behavior:  Neurologic State: Alert  Orientation Level: Oriented X4  Cognition: Appropriate decision making  Safety/Judgement: Awareness of environment,Fall prevention,Home safety,Decreased insight into deficits,Decreased awareness of need for safety  Functional Mobility Training:  Bed Mobility:     Supine to Sit: Stand-by assistance              Transfers:  Sit to Stand: Stand-by assistance  Stand to Sit: Supervision                             Balance:  Sitting: Intact  Standing: Impaired  Standing - Static: Fair  Standing - Dynamic : Fair  Ambulation/Gait Training:  Distance (ft): 30 Feet (ft)  Assistive Device: Gait belt;Walker, rolling  Ambulation - Level of Assistance: Contact guard assistance        Gait Abnormalities: Decreased step clearance; Step to gait        Base of Support: Widened     Speed/Debbie: Pace decreased (<100 feet/min); Slow  Step Length: Right shortened;Left shortened                  Activity Tolerance:   Fair, desaturates with exertion and requires oxygen, and requires rest breaks    After treatment patient left in no apparent distress:   Sitting in chair and Call bell within reach    COMMUNICATION/COLLABORATION:   The patients plan of care was discussed with: Registered nurse.      Paloma Sibley PT   Time Calculation: 10 mins

## 2022-05-31 NOTE — PROGRESS NOTES
Patient on 2L in bed: sats- 88%/HR- 95  Walking on 2L with walker very slow: sat- 87%/ HR- 85.   O2 increased to 3L , O2 sats- 87 %/ HR- 87.  4L O2 sats- 87%/ HR- 82  O2 sats dropped to 86%/ HR-87. Dyspnea, patient setting in chair: 4L O2 sats 88%/ HR- 78 after 4mins. Increased 5L at 5mins: sat- 89%/ HR- 76  After 10 min: sats- 90%/ HR- 76.  Decreased to 4L:  Sats: 91%/ HR- 80. After 5 mins:  3L, sat-91%/ HR- 77.   Total time- about 23mins

## 2022-05-31 NOTE — PROGRESS NOTES
0710) Bedside shift change report given to Heidi Lujan RN (oncoming nurse) by Lynne Kauffman RN (offgoing nurse). Report included the following information SBAR, Kardex, Intake/Output, MAR and Recent Results. Pt resting in bed at this time. OJ provided per request.     5294) Pt assessed and vital signs stable. Pt has no c/o pain at this time. Pt still SOB on exertion and expiratory wheezes noted bilaterally. Pt on 2 L/min O2 NC and satting 93.%. Scheduled meds given. Ms. Yuki Munguia at the bedside assisting with self care. 0935) Daily weight obtained. Pt resting in bed eating breakfast at this time    1025) Interdisciplinary team rounds were held 5/31/2022 with the following team members:Care Management, Nursing, Pharmacy and Physician.     Plan of care discussed. See clinical pathway and/or care plan for interventions and desired outcomes. 1040) Pt resting in bed at this time and stated no needs. 1130) RRT at the bedside. Pt completed 6 min walk test and desatted to 89%. Pt currently sitting up in chair on 5 l/min for recovery. Pt stated no additional needs at this time. 12) Messaged MD regarding IV antibiotic prior to D/C. MD stated to infuse antibiotic. IV flushed and patent. IV antibiotic hung and infusing. Pt on the phone with her daughter. This writer spoke with daughter and updated her on discharge planning, home O2 and barriers. Daughter stated understanding. Pt left resting in chair watching TV at this time. 1355) Pt removed from IV antibiotic. PIV flushed and patent. 1415) Pt ambulated to bedside commode and back to bed.     1620) Pt reassessed and no changes to note. Vital signs obtained by Dale Dominguez RN and stable. Pt has no c/o pain at this time but questioning discharge. Pt stated understanding about O2 delivery. Lasix given by Dale Dominguez RN. Pt left resting in bed at this time. 1725) Pt resting in bed eating dinner at this time. Family at the bedside. Home O2 not delivered yet.      1840) Pt daughter called and stated that she has both O2 tanks at home and is able to come  the pt at this time. CM notified and stated if they have both tanks pt is ready for d/c    1845) Reviewed discharge instructions with pt including follow-up appointments, new medications and side effects, medications to continue, medications to discontinue, Pneumonia and O2 education, signs/symptoms of stroke and heart attack, and MyChart information. Pt expressed understanding. IV was removed. Belongings sent home with pt.     1910) Bedside shift change report given to Bryce Adame RN (oncoming nurse) by Marely Rodriguez RN (offgoing nurse). Report included the following information SBAR, Kardex, Intake/Output and MAR.

## 2022-06-01 ENCOUNTER — PATIENT OUTREACH (OUTPATIENT)
Dept: CASE MANAGEMENT | Age: 87
End: 2022-06-01

## 2022-06-01 NOTE — PROGRESS NOTES
Care Transitions Initial Call    Call within 2 business days of discharge: Yes     Patient: Lawanda Calixto Patient : 1935 MRN: [id number]    Last Discharge [facility]       Complaint Diagnosis Description Type Department Provider    22 Wheezing; Nasal Congestion Community acquired pneumonia of right upper lobe of lung ED to [facility]; Talisha Pereira .. Was this an external facility discharge? No     Challenges to be reviewed by the provider   Additional needs identified to be addressed with provider:       Method of communication with provider : none    Discussed COVID-19 related testing which was available at this time. Test results were negative. Patient informed of results, if available? yes     Advance Care Planning:   Does patient have an Advance Directive: patient declined education. Inpatient Readmission Risk score: Unplanned Readmit Risk Score: 11.5 ( )    Was this a readmission? no   Patient stated reason for the admission: congestion    Patients top risk factors for readmission: medical condition-PNA   Interventions to address risk factors: Scheduled appointment with PCP-, Obtained and reviewed discharge summary and/or continuity of care documents and Education of patient/family/caregiver/guardian to support self-management-PNA-complete abx, steroid    Care Transition Nurse (CTN) contacted the patient by telephone to perform post hospital discharge assessment. Verified name and  with patient as identifiers. Provided introduction to self, and explanation of the CTN role. Reports she did not sleep well last night. Was tossing and turning. Reports cough, sob, wearing oxygen, could not see how much oxygen she is on. Denies fever, chest pain. Reviewed fall risk with oxygen tubing. Reports she ambulates with cane. Tolerating PO. No bowel/bladder concerns. Does not routinely checking BPs.     CTN reviewed discharge instructions, medical action plan and red flags with patient who verbalized understanding. Were discharge instructions available to patient? yes. Reviewed appropriate site of care based on symptoms and resources available to patient including: PCP and Home Health. Patient given an opportunity to ask questions and does not have any further questions or concerns at this time. The patient agrees to contact the PCP office for questions related to their healthcare. Medication reconciliation was performed with patient, who verbalizes understanding of administration of home medications. Referral to Pharm D needed: no     Home Health/Outpatient orders at discharge: home health care, PT, OT and [address]: [facility]  Date of initial visit: 6/2    Durable Medical Equipment ordered at discharge: [address] received: yes    Was patient discharged with a pulse oximeter? no    Discussed follow-up appointments. If no appointment was previously scheduled, appointment scheduling offered: no. Is follow up appointment scheduled within 7 days of discharge? no.   [facility] follow up appointment(s):   Future Appointments   Date Time Provider Kita Kim   6/2/2022 To Be Determined Eunice Molina RN [address] [place] [address]   6/8/2022 11:20 AM Faye Clinton MD [facility] BS AMB   8/5/2022 10:45 AM Sean Melgar DO [facility] BS AMB     Non-[facility] follow up appointment(s): NA    Plan for follow-up call in 5-7 days based on severity of symptoms and risk factors. Plan for next call: self management-cough improved, follow up appointment-pcp and community resources-  CTN provided contact information for future needs. Goals Addressed                 This Visit's Progress     Prevent complications post hospitalization.           06/01/22   Pt will not fall during ELIER episode   Will complete abx/steroid therapy   Will participate in [facility] PT to improve strength and mobility   Will report compliance with oxygen use   Will attend follow up appt with PCP scheduled 6/8   Pt will remain out of the hospital or ER for remainder of ELIER period       Understands red flags post discharge.

## 2022-06-02 ENCOUNTER — HOME CARE VISIT (OUTPATIENT)
Dept: SCHEDULING | Facility: HOME HEALTH | Age: 87
End: 2022-06-02
Payer: MEDICARE

## 2022-06-02 VITALS
SYSTOLIC BLOOD PRESSURE: 132 MMHG | TEMPERATURE: 98 F | RESPIRATION RATE: 18 BRPM | DIASTOLIC BLOOD PRESSURE: 82 MMHG | HEART RATE: 70 BPM | OXYGEN SATURATION: 90 %

## 2022-06-02 PROCEDURE — 3331090002 HH PPS REVENUE DEBIT

## 2022-06-02 PROCEDURE — 400013 HH SOC

## 2022-06-02 PROCEDURE — 3331090001 HH PPS REVENUE CREDIT

## 2022-06-02 PROCEDURE — G0299 HHS/HOSPICE OF RN EA 15 MIN: HCPCS

## 2022-06-02 PROCEDURE — 400018 HH-NO PAY CLAIM PROCEDURE

## 2022-06-03 ENCOUNTER — HOME CARE VISIT (OUTPATIENT)
Dept: SCHEDULING | Facility: HOME HEALTH | Age: 87
End: 2022-06-03
Payer: MEDICARE

## 2022-06-03 ENCOUNTER — TELEPHONE (OUTPATIENT)
Dept: CASE MANAGEMENT | Age: 87
End: 2022-06-03

## 2022-06-03 VITALS
SYSTOLIC BLOOD PRESSURE: 142 MMHG | OXYGEN SATURATION: 96 % | RESPIRATION RATE: 20 BRPM | TEMPERATURE: 97.1 F | HEART RATE: 64 BPM | DIASTOLIC BLOOD PRESSURE: 86 MMHG

## 2022-06-03 LAB
ATRIAL RATE: 68 BPM
ATRIAL RATE: 76 BPM
CALCULATED P AXIS, ECG09: 66 DEGREES
CALCULATED P AXIS, ECG09: 69 DEGREES
CALCULATED R AXIS, ECG10: -16 DEGREES
CALCULATED R AXIS, ECG10: -3 DEGREES
CALCULATED T AXIS, ECG11: 44 DEGREES
CALCULATED T AXIS, ECG11: 46 DEGREES
DIAGNOSIS, 93000: NORMAL
DIAGNOSIS, 93000: NORMAL
P-R INTERVAL, ECG05: 198 MS
P-R INTERVAL, ECG05: 202 MS
Q-T INTERVAL, ECG07: 386 MS
Q-T INTERVAL, ECG07: 404 MS
QRS DURATION, ECG06: 130 MS
QRS DURATION, ECG06: 146 MS
QTC CALCULATION (BEZET), ECG08: 429 MS
QTC CALCULATION (BEZET), ECG08: 434 MS
VENTRICULAR RATE, ECG03: 68 BPM
VENTRICULAR RATE, ECG03: 76 BPM

## 2022-06-03 PROCEDURE — 3331090001 HH PPS REVENUE CREDIT

## 2022-06-03 PROCEDURE — 3331090002 HH PPS REVENUE DEBIT

## 2022-06-03 PROCEDURE — G0151 HHCP-SERV OF PT,EA 15 MIN: HCPCS

## 2022-06-03 NOTE — TELEPHONE ENCOUNTER
CM called patient by telephone to perform post discharge assessment and for the purpose of follow up call from inpatient discharge to check on environmental challenges/medications/appointment follow up/and questions/concerns. Care Transition Nurse contacted the patient . Call within 2 business days of discharge: no additional follow-up from CM.     [address] [facility], [address] [phone]

## 2022-06-04 PROCEDURE — 3331090002 HH PPS REVENUE DEBIT

## 2022-06-04 PROCEDURE — 3331090001 HH PPS REVENUE CREDIT

## 2022-06-05 PROCEDURE — 3331090001 HH PPS REVENUE CREDIT

## 2022-06-05 PROCEDURE — 3331090002 HH PPS REVENUE DEBIT

## 2022-06-06 ENCOUNTER — HOME CARE VISIT (OUTPATIENT)
Dept: SCHEDULING | Facility: HOME HEALTH | Age: 87
End: 2022-06-06
Payer: MEDICARE

## 2022-06-06 VITALS
DIASTOLIC BLOOD PRESSURE: 82 MMHG | OXYGEN SATURATION: 96 % | HEART RATE: 63 BPM | SYSTOLIC BLOOD PRESSURE: 122 MMHG | RESPIRATION RATE: 20 BRPM | TEMPERATURE: 96.8 F

## 2022-06-06 PROCEDURE — 3331090001 HH PPS REVENUE CREDIT

## 2022-06-06 PROCEDURE — 3331090002 HH PPS REVENUE DEBIT

## 2022-06-06 PROCEDURE — G0151 HHCP-SERV OF PT,EA 15 MIN: HCPCS

## 2022-06-07 ENCOUNTER — HOME CARE VISIT (OUTPATIENT)
Dept: SCHEDULING | Facility: HOME HEALTH | Age: 87
End: 2022-06-07
Payer: MEDICARE

## 2022-06-07 ENCOUNTER — HOME CARE VISIT (OUTPATIENT)
Dept: HOME HEALTH SERVICES | Facility: HOME HEALTH | Age: 87
End: 2022-06-07
Payer: MEDICARE

## 2022-06-07 ENCOUNTER — TELEPHONE (OUTPATIENT)
Dept: FAMILY MEDICINE CLINIC | Age: 87
End: 2022-06-07

## 2022-06-07 VITALS
DIASTOLIC BLOOD PRESSURE: 70 MMHG | SYSTOLIC BLOOD PRESSURE: 138 MMHG | RESPIRATION RATE: 18 BRPM | TEMPERATURE: 97.9 F | HEART RATE: 65 BPM | OXYGEN SATURATION: 98 %

## 2022-06-07 PROCEDURE — 3331090002 HH PPS REVENUE DEBIT

## 2022-06-07 PROCEDURE — G0299 HHS/HOSPICE OF RN EA 15 MIN: HCPCS

## 2022-06-07 PROCEDURE — 3331090001 HH PPS REVENUE CREDIT

## 2022-06-07 NOTE — TELEPHONE ENCOUNTER
----- Message from Deonna Farzadsamir sent at 6/7/2022 11:22 AM EDT -----  Subject: Message to Provider    QUESTIONS  Information for Provider? Patients Rea Peguero would like to know if   for appt scheduled 6/8 patient would be able to get in/out as she is   currently on oxygen and tanks only last one hour, please contact patient   to advise.   ---------------------------------------------------------------------------  --------------  [address]  What is the best way for the office to contact you? OK to leave message on   voicemail  Preferred Call Back Phone Number? [phone]  ---------------------------------------------------------------------------  --------------  SCRIPT ANSWERS  Relationship to Patient? Other  Representative Name? Rea Dickerson   Is the Representative on the appropriate HIPAA document in Epic?  Yes
----- Message from Jon Severe sent at 6/6/2022 11:23 AM EDT -----  Subject: Message to Provider    QUESTIONS  Information for Provider? Josefina Dotyazan has an appointment on 6/8 with Dr. Aidan Huang she stated her oxygen only last 1 hour what should she do? Please call her back. ---------------------------------------------------------------------------  --------------  Vandana TRIANA  What is the best way for the office to contact you? OK to leave message on   voicemail  Preferred Call Back Phone Number? [phone]  ---------------------------------------------------------------------------  --------------  SCRIPT ANSWERS  Relationship to Patient?  Self
Patient was told to come we will hook her up to our oxygen once she gets here
Patient was told to get to the office and we can use [facility] 02 tank.
Statement Selected

## 2022-06-08 ENCOUNTER — OFFICE VISIT (OUTPATIENT)
Dept: FAMILY MEDICINE CLINIC | Age: 87
End: 2022-06-08
Payer: MEDICARE

## 2022-06-08 VITALS
WEIGHT: 173.4 LBS | BODY MASS INDEX: 37.41 KG/M2 | DIASTOLIC BLOOD PRESSURE: 79 MMHG | HEART RATE: 66 BPM | SYSTOLIC BLOOD PRESSURE: 125 MMHG | OXYGEN SATURATION: 95 % | RESPIRATION RATE: 17 BRPM | TEMPERATURE: 98.1 F | HEIGHT: 57 IN

## 2022-06-08 DIAGNOSIS — I25.10 ATHEROSCLEROSIS OF NATIVE CORONARY ARTERY OF NATIVE HEART WITHOUT ANGINA PECTORIS: ICD-10-CM

## 2022-06-08 DIAGNOSIS — I50.32 CHRONIC DIASTOLIC HEART FAILURE (HCC): ICD-10-CM

## 2022-06-08 DIAGNOSIS — I10 ESSENTIAL HYPERTENSION: ICD-10-CM

## 2022-06-08 DIAGNOSIS — Z99.81 CHRONIC RESPIRATORY FAILURE WITH HYPOXIA, ON HOME O2 THERAPY (HCC): ICD-10-CM

## 2022-06-08 DIAGNOSIS — K21.9 GASTROESOPHAGEAL REFLUX DISEASE WITHOUT ESOPHAGITIS: ICD-10-CM

## 2022-06-08 DIAGNOSIS — E78.2 MIXED HYPERLIPIDEMIA: ICD-10-CM

## 2022-06-08 DIAGNOSIS — J18.9 PNEUMONIA OF RIGHT UPPER LOBE DUE TO INFECTIOUS ORGANISM: ICD-10-CM

## 2022-06-08 DIAGNOSIS — Z51.81 ENCOUNTER FOR MEDICATION MONITORING: ICD-10-CM

## 2022-06-08 DIAGNOSIS — Z09 ENCOUNTER FOR EXAMINATION FOLLOWING TREATMENT AT HOSPITAL: Primary | ICD-10-CM

## 2022-06-08 DIAGNOSIS — J96.11 CHRONIC RESPIRATORY FAILURE WITH HYPOXIA, ON HOME O2 THERAPY (HCC): ICD-10-CM

## 2022-06-08 PROCEDURE — 99495 TRANSJ CARE MGMT MOD F2F 14D: CPT | Performed by: FAMILY MEDICINE

## 2022-06-08 PROCEDURE — G8427 DOCREV CUR MEDS BY ELIG CLIN: HCPCS | Performed by: FAMILY MEDICINE

## 2022-06-08 PROCEDURE — 3331090001 HH PPS REVENUE CREDIT

## 2022-06-08 PROCEDURE — 3331090002 HH PPS REVENUE DEBIT

## 2022-06-08 RX ORDER — CEFDINIR 300 MG/1
300 CAPSULE ORAL 2 TIMES DAILY
COMMUNITY
Start: 2022-06-08 | End: 2022-07-06 | Stop reason: ALTCHOICE

## 2022-06-08 NOTE — PROGRESS NOTES
Patient is accompanied by patient I have received verbal consent from David Pitts to discuss any/all medical information while they are present in the room. Chief Complaint   Patient presents with   [facility] Follow Up     pnuemonia      Visit Vitals  /79 (BP 1 Location: Right arm, BP Patient Position: Sitting, BP Cuff Size: Adult)   Pulse 66   Temp 98.1 °F (36.7 °C) (Oral)   Resp 17   Ht 4' 9\" (1.448 m)   Wt 173 lb 6.4 oz (78.7 kg)   LMP  (LMP Unknown)   SpO2 (!) 87%   BMI 37.52 kg/m²     1. Have you been to the ER, urgent care clinic since your last visit? Hospitalized since your last visit? [facility] 4 days dx pneumonia     2. Have you seen or consulted any other health care providers outside of the [address] [place] [address] since your last visit? Include any pap smears or colon screening.  No

## 2022-06-08 NOTE — PROGRESS NOTES
HISTORY OF PRESENT ILLNESS  Sofia Valencia is a 82 y.o. female. HPI   This is a transition in care offtami kernt.  Pt is in the office with her daughter. Admitted on 5/26 thru 5/31. Pt was contacted in 2 days by NN. Follow up in the office from hospital care now on day 6 since discharge. Hospital records reviewed with Pt and all questions were answered and discussed. Patient states she feels much better since the day of admission. Discharge diagnosis:  Acute hypoxic respiratory failure POA stable w/ supplemental oxygen   Right upper lobe pneumonia POA resolving stable   Acute bronchitis due to pneumonia POA resolving   -Chest x-ray reviewed with patchy airspace disease right upper lobe  -Urine legionella negative  -COVID-19 negative  -TTE with EF 60-65%. Mildly increased left wall thickness. Normal diastolic function. Mild stenosis of aortic valve. -Patient completed azithromycin course to be discharged home on Omnicef to complete 7-day course for community-acquired pneumonia  -Patient failed 6-minute walk test and has home on oxygen  -Patient discharged home to complete 5-day course of steroid inhaled corticosteroid and DuoNeb as needed  Cardiovascular Review:  The patient has hypertension, hyperlipidemia, chronic diastolic heart failure, and obesity. Hx also of aortic stenosis. Diet and Lifestyle: generally follows a low fat low cholesterol diet, generally follows a low sodium diet, sedentary. Pertinent ROS: taking medications as instructed, no medication side effects noted, no TIA's, no chest pain on exertion, mild swelling of ankles, no orthostatic dizziness or lightheadedness, no palpitations,      Home BP Monitoring: is not measured at home. Anxiety Review:  Patient is seen for anxiety. Ongoing symptoms include: increased anxiety still related to family issues. Patient denies: palpitations, sweating, chest pain, shortness of breath.    Reported side effects from the treatment: none.    Patient Active Problem List   Diagnosis Code    Hypokalemia E87.6    Hiatal hernia K44.9    Anxiety F41.9    S/P hysterectomy Z90.710    Aortic stenosis, mild I35.0    Spinal stenosis M48.00    S/P foot surgery Z98.890    Environmental allergies Z91.09    S/P cardiac catheterization Z98.890    Hypovitaminosis D E55.9    Chronic ischemic heart disease I25.9    Mixed hyperlipidemia E78.2    Chronic diastolic heart failure (HCC) I50.32    Chest pain at rest R07.9    Bifascicular bundle branch block--RBBB,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    Atypical chest pain R07.89    Essential hypertension I10    Gastroesophageal reflux disease without esophagitis K21.9    Atherosclerosis of native coronary artery without angina pectoris--diffuse small vsl disease via cath cath 2009--RCA PDA/ROSA I25.10    Chronic anxiety F41.9    Encounter for medication monitoring Z51.81    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 M47.815    Class 2 obesity due to excess calories with serious comorbidity and body mass index (BMI) of 38.0 to 38.9 in adult [id number]    Paroxysmal cardiac arrhythmia--PVC's,APC's,NSSVT I49.8    Severe obesity (BMI 35.0-39. 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 Take 1 capsule two times daily for 5 days.  cefdinir (OMNICEF) 300 mg capsule Take 300 mg by mouth two (2) times a day.  aspirin delayed-release 81 mg tablet Take 81 mg by mouth daily.  OXYGEN-AIR DELIVERY SYSTEMS 5 L/min by Nasal route continuous.  albuterol (Ventolin HFA) 90 mcg/actuation inhaler Take 1 Puff by inhalation every four (4) hours as needed for Wheezing or Shortness of Breath.  18 g 1    amLODIPine (NORVASC) 5 mg tablet Take 5 mg by mouth two (2) times a day. Indications: high blood pressure      pantoprazole (PROTONIX) 40 mg tablet Take 40 mg by mouth two (2) times a day.  clonazePAM (KlonoPIN) 1 mg tablet TAKE 1/2 TO 1 TABLET EVERY MORNING AND AS NEEDED FOR ANXIETY, AND TAKE 1 TABLET AT BEDTIME FOR SLEEP 60 Tablet 1    losartan (COZAAR) 100 mg tablet TAKE 1 TABLET BY MOUTH EVERY DAY (Patient taking differently: Take 100 mg by mouth daily.) 90 Tablet 3    metoprolol tartrate (LOPRESSOR) 25 mg tablet TAKE 1 TABLET BY MOUTH TWICE A DAY 1 AT 9AM AND 1 AT 9PM (Patient taking differently: Take 25 mg by mouth two (2) times a day. Indications: high blood pressure) 180 Tablet 3    isosorbide mononitrate ER (IMDUR) 30 mg tablet TAKE 1 TABLET BY MOUTH EVERY DAY (Patient taking differently: Take 30 mg by mouth daily. Indications: prevention of anginal chest pain associated with coronary artery disease) 90 Tablet 3    rosuvastatin (CRESTOR) 20 mg tablet TAKE 1 TABLET BY MOUTH EVERY DAY AT NIGHT (Patient taking differently: Take 20 mg by mouth nightly. Indications: excessive fat in the blood) 30 Tablet 11    furosemide (LASIX) 80 mg tablet TAKE 1 TABLET BY MOUTH EVERY MORNING AND TAKE 1/2 TABLET EVERY EVENING 135 Tablet 3    nitroglycerin (NITROSTAT) 0.4 mg SL tablet DISSOLVE 1 TAB BY SUBLINGUAL ROUTE EVERY 5 MINUTES AS NEEDED. (Patient taking differently: DISSOLVE 1 TAB BY SUBLINGUAL ROUTE EVERY 5 MINUTES AS NEEDED FOR CHEST PAIN) 25 Tablet 0    desonide (TRIDESILON) 0.05 % topical lotion Apply  to affected area two (2) times daily as needed for Skin Irritation or Itching. 59 mL 1    potassium chloride SR (KLOR-CON 10) 10 mEq tablet Take 3 Tablets by mouth two (2) times a day. 180 Tablet 11    cholecalciferol, vitamin D3, (Vitamin D3) 50 mcg (2,000 unit) tab Take 1 Tablet by mouth daily.       acetaminophen (TYLENOL EXTRA STRENGTH) 500 mg tablet Take 1,000 mg by mouth every six (6) hours as needed for Pain.  Aspirin, Buffered 81 mg tab Take 81 mg by mouth daily.          Allergies   Allergen Reactions    Bactrim [Sulfamethoxazole-Trimethoprim] Unknown (comments)     Pt does not recall    Darvocet A500 [Propoxyphene N-Acetaminophen] Itching       Past Medical History:   Diagnosis Date    Anxiety     Aortic stenosis 3/3/2010    Aortic stenosis     Arrhythmia     MURMUR    Arthritis     SPINAL STENOSIS    Atherosclerosis of coronary artery     Biliary dyskinesia     CHF (congestive heart failure) ([facility] [address] 3/3/2010    CHF (congestive heart failure) ([facility] [address] 01/2002    Chronic heart failure ([facility] [address] 1/2002; 3/3/2010    chronic diastolic heart failure    Chronic pain     LOWER BACK, RIGHT KNEE    Environmental allergies 3/3/2010    GERD (gastroesophageal reflux disease) 3/3/2010    Hiatal hernia 3/3/2010    High cholesterol 3/3/2010    HTN (hypertension) 3/3/2010    Hypokalemia 3/3/2010    Ischemic heart disease, chronic     Obese     Psychiatric disorder     anxiety    S/P hysterectomy 3/3/2010    Spinal stenosis 3/3/2010       Past Surgical History:   Procedure Laterality Date    CARDIAC CATHETERIZATION  01/2002    normal coronaries    DECOMPRESS DISC RF LUMBAR  07/2007    HX BACK SURGERY  5/1/2014    HX BREAST LUMPECTOMY Left 1989    benign left breast    HX BUNIONECTOMY      HX BUNIONECTOMY      HX HEART CATHETERIZATION  3/3/10    HX HYSTERECTOMY  1978    HX LUMBAR DISKECTOMY      HX ORTHOPAEDIC Bilateral     BUNIONECTOMY    HX ORTHOPAEDIC Right     WRIST FX    HX OTHER SURGICAL  10/12/2015    mole removed from right side of face by Dr. Kaushik Contreras FLX DX W/[facility]PATIENT [facility] WHEN PFRMD  [id number]    Dr Dontae Abreu GI ENDOSCOPY,BIOPSY  2/27/2019            Family History   Problem Relation Age of Onset    Hypertension Mother     Heart Disease Father     Stroke Sister     Heart Disease Sister     Heart Disease Sister     Cancer Brother         LUNG    Cancer Brother         PROSTATE    Hypertension Brother     No Known Problems Brother     Lung Disease Brother     Heart Attack Brother     Lung Disease Brother     Stroke Brother     Stroke Daughter 47    No Known Problems Daughter     Anesth Problems Neg Hx        Social History     Tobacco Use    Smoking status: Never Smoker    Smokeless tobacco: Never Used   Substance Use Topics    Alcohol use: No     Alcohol/week: 0.0 standard drinks        Lab Results   Component Value Date/Time    WBC 4.2 05/29/2022 03:39 AM    HGB 12.8 05/29/2022 03:39 AM    HCT 42.5 05/29/2022 03:39 AM    PLATELET 740 (L) 85/56/2966 03:39 AM    MCV 93.0 05/29/2022 03:39 AM     Lab Results   Component Value Date/Time    Cholesterol, total 176 12/20/2021 11:05 AM    HDL Cholesterol 61 12/20/2021 11:05 AM    LDL, calculated 92.6 12/20/2021 11:05 AM    Triglyceride 112 12/20/2021 11:05 AM    CHOL/HDL Ratio 2.9 12/20/2021 11:05 AM     Lab Results   Component Value Date/Time    TSH 2.470 05/15/2017 03:04 PM      Lab Results   Component Value Date/Time    Sodium 140 05/29/2022 03:39 AM    Potassium 3.7 05/29/2022 03:39 AM    Chloride 101 05/29/2022 03:39 AM    CO2 39 (H) 05/29/2022 03:39 AM    Anion gap 0 (L) 05/29/2022 03:39 AM    Glucose 98 05/29/2022 03:39 AM    BUN 13 05/29/2022 03:39 AM    Creatinine 1.02 05/29/2022 03:39 AM    BUN/Creatinine ratio 13 05/29/2022 03:39 AM    GFR est AA >60 05/29/2022 03:39 AM    GFR est non-AA 52 (L) 05/29/2022 03:39 AM    Calcium 8.7 05/29/2022 03:39 AM    Bilirubin, total 0.4 05/29/2022 03:39 AM    ALT (SGPT) 16 05/29/2022 03:39 AM    Alk.  phosphatase 71 05/29/2022 03:39 AM    Protein, total 7.2 05/29/2022 03:39 AM    Albumin 2.7 (L) 05/29/2022 03:39 AM    Globulin 4.5 (H) 05/29/2022 03:39 AM    A-G Ratio 0.6 (L) 05/29/2022 03:39 AM      Lab Results   Component Value Date/Time    Hemoglobin A1c 5.4 04/16/2014 12:20 PM    Hemoglobin A1c (POC) 5.5 11/26/2014 12:12 PM         Review of Systems Constitutional: Negative for malaise/fatigue. HENT: Negative for congestion. Eyes: Negative for blurred vision. Respiratory: Positive for cough (slight) and shortness of breath (stable on O2). Negative for hemoptysis and sputum production. Cardiovascular: Negative for chest pain, palpitations and leg swelling. Gastrointestinal: Negative for abdominal pain, constipation and heartburn. Genitourinary: Negative for dysuria, frequency and urgency. Musculoskeletal: Negative for back pain and joint pain. Neurological: Negative for dizziness, tingling and headaches. Endo/Heme/Allergies: Negative for environmental allergies. Psychiatric/Behavioral: Negative for depression. The patient does not have insomnia. Physical Exam  Vitals and nursing note reviewed. Constitutional:       Appearance: Normal appearance. She is well-developed. HENT:      Right Ear: Tympanic membrane and ear canal normal.      Left Ear: Tympanic membrane and ear canal normal.   Neck:      Thyroid: No thyromegaly. Cardiovascular:      Rate and Rhythm: Normal rate and regular rhythm. Heart sounds: Normal heart sounds. Pulmonary:      Effort: Pulmonary effort is normal. No respiratory distress. Breath sounds: Normal breath sounds. No wheezing or rhonchi. Abdominal:      General: Bowel sounds are normal.      Palpations: Abdomen is soft. There is no mass. Tenderness: There is no abdominal tenderness. Musculoskeletal:         General: Normal range of motion. Cervical back: Normal range of motion and neck supple. Right lower leg: No edema. Left lower leg: No edema. Lymphadenopathy:      Cervical: No cervical adenopathy. Skin:     General: Skin is warm and dry. Neurological:      General: No focal deficit present. Mental Status: She is alert and oriented to person, place, and time.    Psychiatric:         Mood and Affect: Mood normal.         ASSESSMENT and PLAN  Diagnoses and all orders for this visit:    1. Encounter for examination following treatment at hospital//  2. Pneumonia of right upper lobe due to infectious organism//  3. Chronic respiratory failure with hypoxia, on home O2 therapy ([address]  Continue with current therapy. Has HH coming in also for PT and skilled nursing to monitor. No follow up was arrange yet with pulmonary. 4. Essential hypertension  Stable     5. Mixed hyperlipidemia  Continue to monitor. Work on diet and exercise. 6. Atherosclerosis of native coronary artery of native heart without angina pectoris//  7. Chronic diastolic heart failure (HCC)  Stable     8. Gastroesophageal reflux disease without esophagitis  Stable     9. Encounter for medication monitoring  Labs are stable       Follow-up and Dispositions    · Return in about 23 days (around 7/1/2022). reviewed diet, exercise and weight control  cardiovascular risk and specific lipid/LDL goals reviewed  reviewed medications and side effects in detail    I have discussed diagnosis listed in this note with pt and/or family. I have discussed treatment plans and options and the risk/benefit analysis of those options, including safe use of medications and possible medication side effects. Through the use of shared decision making we have agreed to the above plan. The patient has received an after-visit summary and questions were answered concerning future plans and follow up. Advise pt of any urgent changes then to proceed to the ER.

## 2022-06-09 ENCOUNTER — HOME CARE VISIT (OUTPATIENT)
Dept: SCHEDULING | Facility: HOME HEALTH | Age: 87
End: 2022-06-09
Payer: MEDICARE

## 2022-06-09 ENCOUNTER — HOME CARE VISIT (OUTPATIENT)
Dept: HOME HEALTH SERVICES | Facility: HOME HEALTH | Age: 87
End: 2022-06-09
Payer: MEDICARE

## 2022-06-09 ENCOUNTER — PATIENT OUTREACH (OUTPATIENT)
Dept: CASE MANAGEMENT | Age: 87
End: 2022-06-09

## 2022-06-09 PROCEDURE — 3331090002 HH PPS REVENUE DEBIT

## 2022-06-09 PROCEDURE — 3331090001 HH PPS REVENUE CREDIT

## 2022-06-09 NOTE — PROGRESS NOTES
Contacted the patient for a follow up. Patient was sleeping and requested return call around noon. [address] [facility] patient    Care Transitions Follow Up Call    Method of communication with provider : none    Care Transition Nurse (CTN) contacted the patient by telephone to follow up after admission on 5/26/2022. Addressed changes since last contact: none  Follow up appointment completed? yes. Was follow up appointment scheduled within 7 days of discharge? no.     CTN reviewed medical action plan and red flags with patient and discussed any barriers to care and/or understanding of plan of care after discharge. Discussed appropriate site of care based on symptoms and resources available to patient including: PCP. The patient agrees to contact the PCP office for questions related to their healthcare.      Patients top risk factors for readmission: NA   Interventions to address risk factors: CLAUDE    [address] follow up appointment(s):   Future Appointments   Date Time Provider Kita Kim   6/13/2022  9:00 AM Lina Osullivan OT [facility]   6/13/2022 To Be Determined Alvarez Folds, PT [facility]   6/14/2022 [facility]   6/15/2022 To Be Determined Alvarez Folds, PT [facility]   6/16/2022 To Be Determined Juan Plant, [facility]   6/20/2022 To Be Determined Cindynis Libman [facility] [place] [facility]   6/21/2022 To Be Determined Cathrine Lanes, RN [facility]   6/22/2022 [facility] Be Determined [facility]   6/28/2022 To Be Determined Juan Plant, [facility]   7/5/2022 To Be Determined Juan Plant,  [place] [id number]   7/6/2022 12:00 PM Augusta Sanchez MD [facility] BS AMB   7/12/2022 To Be Determined Juan Plant, LPN [place]   7/19/2022 To Be Determined Juan Plant, LPN [place]   7/27/2022 To Be Determined Troy Nellie Batista RN [facility] [place] [address]   7/31/2022 To Be Determined Fritz Nichols, RN [address]   8/5/2022 10:45 AM Rose Sethi DO [facility] BS AMB     Non-[facility] follow up appointment(s): CLAUDE    CTN provided contact information for future needs. Plan for follow-up call in 5-7 days based on severity of symptoms and risk factors. Plan for next call: self management-R knee pain, L shoulder pain     Goals Addressed                 This Visit's Progress     Prevent complications post hospitalization. On track       06/01/22   Pt will not fall during ELIER episode   Will complete abx/steroid therapy   Will participate in [facility] PT to improve strength and mobility   Will report compliance with oxygen use   Will attend follow up appt with PCP scheduled 6/8   Pt will remain out of the hospital or ER for remainder of ELIER period    06/09/22   No fall   Completed abx therapy   Participating in [facility] PT   [address] with 5L of oxygen   Attended follow up appt with PCP       COMPLETED: Understands red flags post discharge.

## 2022-06-10 PROCEDURE — 3331090001 HH PPS REVENUE CREDIT

## 2022-06-10 PROCEDURE — 3331090002 HH PPS REVENUE DEBIT

## 2022-06-11 PROCEDURE — 3331090001 HH PPS REVENUE CREDIT

## 2022-06-11 PROCEDURE — 3331090002 HH PPS REVENUE DEBIT

## 2022-06-12 PROCEDURE — 3331090002 HH PPS REVENUE DEBIT

## 2022-06-12 PROCEDURE — 3331090001 HH PPS REVENUE CREDIT

## 2022-06-13 ENCOUNTER — HOME CARE VISIT (OUTPATIENT)
Dept: SCHEDULING | Facility: HOME HEALTH | Age: 87
End: 2022-06-13
Payer: MEDICARE

## 2022-06-13 VITALS
OXYGEN SATURATION: 94 % | TEMPERATURE: 97.8 F | SYSTOLIC BLOOD PRESSURE: 128 MMHG | HEART RATE: 68 BPM | DIASTOLIC BLOOD PRESSURE: 76 MMHG

## 2022-06-13 VITALS
HEART RATE: 72 BPM | SYSTOLIC BLOOD PRESSURE: 110 MMHG | DIASTOLIC BLOOD PRESSURE: 78 MMHG | OXYGEN SATURATION: 95 % | RESPIRATION RATE: 20 BRPM | TEMPERATURE: 96.8 F

## 2022-06-13 PROCEDURE — 3331090002 HH PPS REVENUE DEBIT

## 2022-06-13 PROCEDURE — G0151 HHCP-SERV OF PT,EA 15 MIN: HCPCS

## 2022-06-13 PROCEDURE — G0152 HHCP-SERV OF OT,EA 15 MIN: HCPCS

## 2022-06-13 PROCEDURE — 3331090001 HH PPS REVENUE CREDIT

## 2022-06-14 ENCOUNTER — HOME CARE VISIT (OUTPATIENT)
Dept: SCHEDULING | Facility: HOME HEALTH | Age: 87
End: 2022-06-14
Payer: MEDICARE

## 2022-06-14 PROCEDURE — 3331090002 HH PPS REVENUE DEBIT

## 2022-06-14 PROCEDURE — 3331090001 HH PPS REVENUE CREDIT

## 2022-06-14 PROCEDURE — G0300 HHS/HOSPICE OF LPN EA 15 MIN: HCPCS

## 2022-06-15 ENCOUNTER — HOME CARE VISIT (OUTPATIENT)
Dept: SCHEDULING | Facility: HOME HEALTH | Age: 87
End: 2022-06-15
Payer: MEDICARE

## 2022-06-15 VITALS
OXYGEN SATURATION: 95 % | TEMPERATURE: 97.1 F | SYSTOLIC BLOOD PRESSURE: 122 MMHG | DIASTOLIC BLOOD PRESSURE: 80 MMHG | HEART RATE: 87 BPM

## 2022-06-15 VITALS
DIASTOLIC BLOOD PRESSURE: 72 MMHG | TEMPERATURE: 97.3 F | SYSTOLIC BLOOD PRESSURE: 118 MMHG | HEART RATE: 77 BPM | OXYGEN SATURATION: 98 %

## 2022-06-15 PROCEDURE — G0158 HHC OT ASSISTANT EA 15: HCPCS

## 2022-06-15 PROCEDURE — 3331090002 HH PPS REVENUE DEBIT

## 2022-06-15 PROCEDURE — 3331090001 HH PPS REVENUE CREDIT

## 2022-06-16 ENCOUNTER — PATIENT OUTREACH (OUTPATIENT)
Dept: CASE MANAGEMENT | Age: 87
End: 2022-06-16

## 2022-06-16 ENCOUNTER — HOME CARE VISIT (OUTPATIENT)
Dept: SCHEDULING | Facility: HOME HEALTH | Age: 87
End: 2022-06-16
Payer: MEDICARE

## 2022-06-16 VITALS
TEMPERATURE: 98 F | DIASTOLIC BLOOD PRESSURE: 76 MMHG | HEART RATE: 70 BPM | OXYGEN SATURATION: 95 % | SYSTOLIC BLOOD PRESSURE: 114 MMHG | RESPIRATION RATE: 18 BRPM

## 2022-06-16 VITALS
HEART RATE: 71 BPM | OXYGEN SATURATION: 97 % | DIASTOLIC BLOOD PRESSURE: 60 MMHG | SYSTOLIC BLOOD PRESSURE: 120 MMHG | TEMPERATURE: 97.6 F

## 2022-06-16 PROCEDURE — G0157 HHC PT ASSISTANT EA 15: HCPCS

## 2022-06-16 PROCEDURE — 3331090001 HH PPS REVENUE CREDIT

## 2022-06-16 PROCEDURE — G0300 HHS/HOSPICE OF LPN EA 15 MIN: HCPCS

## 2022-06-16 PROCEDURE — 3331090002 HH PPS REVENUE DEBIT

## 2022-06-16 NOTE — PROGRESS NOTES
Care Transitions Follow Up Call    Care Transition Nurse (CTN) contacted the patient by telephone to follow up after admission on 5/26/2022. Addressed changes since last contact: stuffy nose    CTN reviewed medical action plan and red flags with patient and discussed any barriers to care and/or understanding of plan of care after discharge. Discussed appropriate site of care based on symptoms and resources available to patient including: PCP. Patients top risk factors for readmission: NA   Interventions to address risk factors: NA    [facility] follow up appointment(s):   Future Appointments   Date Time Provider Kita Kim   6/16/2022  3:30 PM José Miguel 36, [address] [facility] [address]   6/20/2022 To Be Determined Yarelis Dixon, PT [facility]   6/21/2022 To Be Determined Arvin Aly RN [facility]   6/21/2022 10:30 AM Rene Powers [facility]   6/22/2022 To Be Determined Yarelis Dixon PT [place] [address] [address] [facility]   6/23/2022 10:30 AM Rubio [facility]   6/28/2022 To Be Determined Rene Poewrs [facility]   6/28/2022 To Be Determined Seferinoethea Persons, Critical access hospital   6/30/2022 To Be Determined Rene [facility]   7/5/2022 To Be Determined Rene [facility]   7/5/2022 To Be Determined Dorethea Persons,  [address] [facility]   7/6/2022 12:00 PM Lester Vasquez MD [facility]   7/7/2022 To Be Determined Amarilis Craig OT [place]   7/12/2022 To Be Determined Dorethea Persons, LPN [facility] [address]   7/19/2022 To Be Determined Dorethea Persons, [place] [address]   7/27/2022 To Be Determined Nnamdi Reeves RN [facility] [address]   7/31/2022 To Be Determined Joyce Liu RN [facility] [address]   8/5/2022 10:45 AM Chris Sainz DO [facility] AMB     Non-[facility] follow up appointment(s): CLAUDE    CTN provided contact information for future needs.  Plan for follow-up call in 10-14 days based on severity of symptoms and risk factors. Goals Addressed                 This Visit's Progress     Prevent complications post hospitalization.           06/01/22   Pt will not fall during ELIER episode   Will complete abx/steroid therapy   Will participate in [facility] PT to improve strength and mobility   Will report compliance with oxygen use   Will attend follow up appt with PCP scheduled 6/8   Pt will remain out of the hospital or ER for remainder of ELIER period    06/09/22   No fall   Completed abx therapy   Participating in [facility] PT   [address] [facility] with 5L of oxygen   Attended follow up appt with PCP    06/16/22   No falls, reviewed fall prevention, safety precaution

## 2022-06-17 PROCEDURE — 3331090001 HH PPS REVENUE CREDIT

## 2022-06-17 PROCEDURE — 3331090002 HH PPS REVENUE DEBIT

## 2022-06-18 PROCEDURE — 3331090002 HH PPS REVENUE DEBIT

## 2022-06-18 PROCEDURE — 3331090001 HH PPS REVENUE CREDIT

## 2022-06-19 PROCEDURE — 3331090001 HH PPS REVENUE CREDIT

## 2022-06-19 PROCEDURE — 3331090002 HH PPS REVENUE DEBIT

## 2022-06-20 ENCOUNTER — HOME CARE VISIT (OUTPATIENT)
Dept: SCHEDULING | Facility: HOME HEALTH | Age: 87
End: 2022-06-20
Payer: MEDICARE

## 2022-06-20 VITALS
OXYGEN SATURATION: 98 % | SYSTOLIC BLOOD PRESSURE: 118 MMHG | HEART RATE: 91 BPM | TEMPERATURE: 97.7 F | RESPIRATION RATE: 18 BRPM | DIASTOLIC BLOOD PRESSURE: 72 MMHG

## 2022-06-20 PROCEDURE — 3331090001 HH PPS REVENUE CREDIT

## 2022-06-20 PROCEDURE — G0151 HHCP-SERV OF PT,EA 15 MIN: HCPCS

## 2022-06-20 PROCEDURE — 3331090002 HH PPS REVENUE DEBIT

## 2022-06-21 ENCOUNTER — HOME CARE VISIT (OUTPATIENT)
Dept: HOME HEALTH SERVICES | Facility: HOME HEALTH | Age: 87
End: 2022-06-21
Payer: MEDICARE

## 2022-06-21 ENCOUNTER — HOME CARE VISIT (OUTPATIENT)
Dept: SCHEDULING | Facility: HOME HEALTH | Age: 87
End: 2022-06-21
Payer: MEDICARE

## 2022-06-21 VITALS
BODY MASS INDEX: 37.65 KG/M2 | OXYGEN SATURATION: 98 % | TEMPERATURE: 97.6 F | WEIGHT: 174 LBS | DIASTOLIC BLOOD PRESSURE: 68 MMHG | HEART RATE: 77 BPM | SYSTOLIC BLOOD PRESSURE: 104 MMHG

## 2022-06-21 VITALS
BODY MASS INDEX: 37.65 KG/M2 | SYSTOLIC BLOOD PRESSURE: 114 MMHG | TEMPERATURE: 97.9 F | OXYGEN SATURATION: 98 % | RESPIRATION RATE: 20 BRPM | WEIGHT: 174 LBS | HEART RATE: 64 BPM | DIASTOLIC BLOOD PRESSURE: 76 MMHG

## 2022-06-21 PROCEDURE — 3331090002 HH PPS REVENUE DEBIT

## 2022-06-21 PROCEDURE — G0158 HHC OT ASSISTANT EA 15: HCPCS

## 2022-06-21 PROCEDURE — 3331090001 HH PPS REVENUE CREDIT

## 2022-06-21 PROCEDURE — G0299 HHS/HOSPICE OF RN EA 15 MIN: HCPCS

## 2022-06-22 PROCEDURE — 3331090002 HH PPS REVENUE DEBIT

## 2022-06-22 PROCEDURE — 3331090001 HH PPS REVENUE CREDIT

## 2022-06-23 ENCOUNTER — HOME CARE VISIT (OUTPATIENT)
Dept: SCHEDULING | Facility: HOME HEALTH | Age: 87
End: 2022-06-23
Payer: MEDICARE

## 2022-06-23 VITALS
DIASTOLIC BLOOD PRESSURE: 90 MMHG | SYSTOLIC BLOOD PRESSURE: 110 MMHG | OXYGEN SATURATION: 98 % | TEMPERATURE: 97.7 F | HEART RATE: 66 BPM

## 2022-06-23 PROCEDURE — 3331090001 HH PPS REVENUE CREDIT

## 2022-06-23 PROCEDURE — 3331090002 HH PPS REVENUE DEBIT

## 2022-06-23 PROCEDURE — G0158 HHC OT ASSISTANT EA 15: HCPCS

## 2022-06-24 ENCOUNTER — HOME CARE VISIT (OUTPATIENT)
Dept: SCHEDULING | Facility: HOME HEALTH | Age: 87
End: 2022-06-24
Payer: MEDICARE

## 2022-06-24 VITALS
WEIGHT: 176.6 LBS | DIASTOLIC BLOOD PRESSURE: 82 MMHG | SYSTOLIC BLOOD PRESSURE: 120 MMHG | TEMPERATURE: 97.4 F | HEART RATE: 64 BPM | RESPIRATION RATE: 20 BRPM | OXYGEN SATURATION: 98 % | BODY MASS INDEX: 38.22 KG/M2

## 2022-06-24 PROCEDURE — G0151 HHCP-SERV OF PT,EA 15 MIN: HCPCS

## 2022-06-24 PROCEDURE — 3331090001 HH PPS REVENUE CREDIT

## 2022-06-24 PROCEDURE — 3331090002 HH PPS REVENUE DEBIT

## 2022-06-25 PROCEDURE — 3331090001 HH PPS REVENUE CREDIT

## 2022-06-25 PROCEDURE — 3331090002 HH PPS REVENUE DEBIT

## 2022-06-26 PROCEDURE — 3331090002 HH PPS REVENUE DEBIT

## 2022-06-26 PROCEDURE — 3331090001 HH PPS REVENUE CREDIT

## 2022-06-27 PROCEDURE — 3331090001 HH PPS REVENUE CREDIT

## 2022-06-27 PROCEDURE — 3331090002 HH PPS REVENUE DEBIT

## 2022-06-28 ENCOUNTER — HOME CARE VISIT (OUTPATIENT)
Dept: HOME HEALTH SERVICES | Facility: HOME HEALTH | Age: 87
End: 2022-06-28
Payer: MEDICARE

## 2022-06-28 ENCOUNTER — HOME CARE VISIT (OUTPATIENT)
Dept: SCHEDULING | Facility: HOME HEALTH | Age: 87
End: 2022-06-28
Payer: MEDICARE

## 2022-06-28 PROCEDURE — 3331090001 HH PPS REVENUE CREDIT

## 2022-06-28 PROCEDURE — G0300 HHS/HOSPICE OF LPN EA 15 MIN: HCPCS

## 2022-06-28 PROCEDURE — 3331090002 HH PPS REVENUE DEBIT

## 2022-06-29 VITALS
RESPIRATION RATE: 18 BRPM | SYSTOLIC BLOOD PRESSURE: 122 MMHG | DIASTOLIC BLOOD PRESSURE: 80 MMHG | OXYGEN SATURATION: 98 % | HEART RATE: 60 BPM | WEIGHT: 177 LBS | BODY MASS INDEX: 38.3 KG/M2

## 2022-06-29 PROCEDURE — 3331090001 HH PPS REVENUE CREDIT

## 2022-06-29 PROCEDURE — 3331090002 HH PPS REVENUE DEBIT

## 2022-06-30 ENCOUNTER — HOME CARE VISIT (OUTPATIENT)
Dept: SCHEDULING | Facility: HOME HEALTH | Age: 87
End: 2022-06-30
Payer: MEDICARE

## 2022-06-30 ENCOUNTER — PATIENT OUTREACH (OUTPATIENT)
Dept: CASE MANAGEMENT | Age: 87
End: 2022-06-30

## 2022-06-30 VITALS
SYSTOLIC BLOOD PRESSURE: 118 MMHG | BODY MASS INDEX: 37.44 KG/M2 | DIASTOLIC BLOOD PRESSURE: 64 MMHG | HEART RATE: 66 BPM | TEMPERATURE: 98.6 F | OXYGEN SATURATION: 98 % | WEIGHT: 173 LBS

## 2022-06-30 PROCEDURE — 3331090002 HH PPS REVENUE DEBIT

## 2022-06-30 PROCEDURE — G0158 HHC OT ASSISTANT EA 15: HCPCS

## 2022-06-30 PROCEDURE — 3331090001 HH PPS REVENUE CREDIT

## 2022-06-30 NOTE — PROGRESS NOTES
Patient has graduated from the Transitions of Care Coordination  program on 6/30/2022. Patient/family has the ability to self-manage at this time Care management goals have been completed. Patient was not referred to the [facility] team for further management. Goals Addressed                 This Visit's Progress     COMPLETED: Prevent complications post hospitalization. 06/01/22   Pt will not fall during ELIER episode   Will complete abx/steroid therapy   Will participate in [facility] PT to improve strength and mobility   Will report compliance with oxygen use   Will attend follow up appt with PCP scheduled 6/8   Pt will remain out of the hospital or ER for remainder of ELIER period    06/09/22   No fall   Completed abx therapy   Participating in [facility] PT   [address] with 5L of oxygen   Attended follow up appt with PCP    06/16/22   No falls, reviewed fall prevention, safety precaution              Patient has Care Transition Nurse's contact information for any further questions, concerns, or needs.   Patients upcoming visits:    Future Appointments   Date Time Provider Kita Kim   6/30/2022  1:00 PM Ferna Minus [facility]   7/5/2022 To Be Determined Ary Thomson LPN [facility]   7/6/2022 To Be Determined Ferna Minus [place] [address] [address] [facility]   7/6/2022  9:00 AM David Padgett OT [address]   7/6/2022 12:00 PM Loreto Guevara MD [facility] BS AMB   7/12/2022 To Be Determined Ary Thomson LPN [facility]   7/19/2022 To Be Determined Ary Thomson LPN [facility]   7/27/2022 To Be Determined Glenda Curtis RN [place] [address] [address] [facility]   7/31/2022 To Be Determined Sita Olivo, EDMAR [place] [address] [address] [facility]   8/5/2022 10:45 AM Lexie Castillo DO [facility] AMB

## 2022-07-01 PROCEDURE — 3331090001 HH PPS REVENUE CREDIT

## 2022-07-01 PROCEDURE — 3331090002 HH PPS REVENUE DEBIT

## 2022-07-02 PROCEDURE — 3331090001 HH PPS REVENUE CREDIT

## 2022-07-02 PROCEDURE — 3331090002 HH PPS REVENUE DEBIT

## 2022-07-03 PROCEDURE — 3331090002 HH PPS REVENUE DEBIT

## 2022-07-03 PROCEDURE — 3331090001 HH PPS REVENUE CREDIT

## 2022-07-04 PROCEDURE — 3331090002 HH PPS REVENUE DEBIT

## 2022-07-04 PROCEDURE — 3331090001 HH PPS REVENUE CREDIT

## 2022-07-05 ENCOUNTER — HOME CARE VISIT (OUTPATIENT)
Dept: HOME HEALTH SERVICES | Facility: HOME HEALTH | Age: 87
End: 2022-07-05
Payer: MEDICARE

## 2022-07-05 ENCOUNTER — TELEPHONE (OUTPATIENT)
Dept: FAMILY MEDICINE CLINIC | Age: 87
End: 2022-07-05

## 2022-07-05 PROCEDURE — 3331090001 HH PPS REVENUE CREDIT

## 2022-07-05 PROCEDURE — 3331090002 HH PPS REVENUE DEBIT

## 2022-07-05 NOTE — TELEPHONE ENCOUNTER
Spoke with patient. She has recently gone on oxygen and was concerned about plugging in her portable unit when she presents for appointment tomorrow. Assured patient that there are multiple electrical outlets to plug into once she is here.

## 2022-07-05 NOTE — TELEPHONE ENCOUNTER
Patient is coming in for an appt tomorrow, and needs to bring her oxygen with her. She has questions about it.   Please call @[phone]

## 2022-07-06 ENCOUNTER — HOME CARE VISIT (OUTPATIENT)
Dept: SCHEDULING | Facility: HOME HEALTH | Age: 87
End: 2022-07-06
Payer: MEDICARE

## 2022-07-06 ENCOUNTER — HOME CARE VISIT (OUTPATIENT)
Dept: HOME HEALTH SERVICES | Facility: HOME HEALTH | Age: 87
End: 2022-07-06
Payer: MEDICARE

## 2022-07-06 ENCOUNTER — OFFICE VISIT (OUTPATIENT)
Dept: FAMILY MEDICINE CLINIC | Age: 87
End: 2022-07-06
Payer: MEDICARE

## 2022-07-06 VITALS
OXYGEN SATURATION: 96 % | DIASTOLIC BLOOD PRESSURE: 76 MMHG | TEMPERATURE: 97.8 F | SYSTOLIC BLOOD PRESSURE: 120 MMHG | HEART RATE: 76 BPM

## 2022-07-06 DIAGNOSIS — I50.32 CHRONIC DIASTOLIC HEART FAILURE (HCC): ICD-10-CM

## 2022-07-06 DIAGNOSIS — I10 ESSENTIAL HYPERTENSION: ICD-10-CM

## 2022-07-06 DIAGNOSIS — K21.9 GASTROESOPHAGEAL REFLUX DISEASE WITHOUT ESOPHAGITIS: ICD-10-CM

## 2022-07-06 DIAGNOSIS — Z99.81 CHRONIC RESPIRATORY FAILURE WITH HYPOXIA, ON HOME O2 THERAPY (HCC): Primary | ICD-10-CM

## 2022-07-06 DIAGNOSIS — I25.10 ATHEROSCLEROSIS OF NATIVE CORONARY ARTERY OF NATIVE HEART WITHOUT ANGINA PECTORIS: ICD-10-CM

## 2022-07-06 DIAGNOSIS — Z51.81 ENCOUNTER FOR MEDICATION MONITORING: ICD-10-CM

## 2022-07-06 DIAGNOSIS — J96.11 CHRONIC RESPIRATORY FAILURE WITH HYPOXIA, ON HOME O2 THERAPY (HCC): Primary | ICD-10-CM

## 2022-07-06 DIAGNOSIS — E78.2 MIXED HYPERLIPIDEMIA: ICD-10-CM

## 2022-07-06 DIAGNOSIS — Z87.01 HISTORY OF PNEUMONIA: ICD-10-CM

## 2022-07-06 PROCEDURE — G8400 PT W/DXA NO RESULTS DOC: HCPCS | Performed by: FAMILY MEDICINE

## 2022-07-06 PROCEDURE — 3331090001 HH PPS REVENUE CREDIT

## 2022-07-06 PROCEDURE — G0463 HOSPITAL OUTPT CLINIC VISIT: HCPCS | Performed by: FAMILY MEDICINE

## 2022-07-06 PROCEDURE — G8536 NO DOC ELDER MAL SCRN: HCPCS | Performed by: FAMILY MEDICINE

## 2022-07-06 PROCEDURE — 1123F ACP DISCUSS/DSCN MKR DOCD: CPT | Performed by: FAMILY MEDICINE

## 2022-07-06 PROCEDURE — 99214 OFFICE O/P EST MOD 30 MIN: CPT | Performed by: FAMILY MEDICINE

## 2022-07-06 PROCEDURE — G0152 HHCP-SERV OF OT,EA 15 MIN: HCPCS

## 2022-07-06 PROCEDURE — 1090F PRES/ABSN URINE INCON ASSESS: CPT | Performed by: FAMILY MEDICINE

## 2022-07-06 PROCEDURE — 1101F PT FALLS ASSESS-DOCD LE1/YR: CPT | Performed by: FAMILY MEDICINE

## 2022-07-06 PROCEDURE — 3331090002 HH PPS REVENUE DEBIT

## 2022-07-06 PROCEDURE — G8510 SCR DEP NEG, NO PLAN REQD: HCPCS | Performed by: FAMILY MEDICINE

## 2022-07-06 PROCEDURE — G8754 DIAS BP LESS 90: HCPCS | Performed by: FAMILY MEDICINE

## 2022-07-06 PROCEDURE — G8417 CALC BMI ABV UP PARAM F/U: HCPCS | Performed by: FAMILY MEDICINE

## 2022-07-06 PROCEDURE — G8427 DOCREV CUR MEDS BY ELIG CLIN: HCPCS | Performed by: FAMILY MEDICINE

## 2022-07-06 PROCEDURE — 400013 HH SOC

## 2022-07-06 PROCEDURE — G8752 SYS BP LESS 140: HCPCS | Performed by: FAMILY MEDICINE

## 2022-07-06 NOTE — PROGRESS NOTES
Chief Complaint   Patient presents with   [facility] Follow Up     Here for hospital follow up to PNA. 1. Have you been to the ER, urgent care clinic since your last visit? Hospitalized since your last visit? No    2. Have you seen or consulted any other health care providers outside of the [address] [place] [address] since your last visit? Include any pap smears or colon screening.  No

## 2022-07-06 NOTE — PROGRESS NOTES
HISTORY OF PRESENT ILLNESS  Will Valles is a 82 y.o. female. HPI   Follow up on chronic medical problems. Patient states she feels much better since her recent bout of PNA at the end of May. However she is still on oxygen at 5L. No follow up with [facility] was set up at the time of her discharge. Discharge diagnosis:  Acute hypoxic respiratory failure POA stable w/ supplemental oxygen   Right upper lobe pneumonia POA resolving stable   Acute bronchitis due to pneumonia POA resolving   -Chest x-ray reviewed with patchy airspace disease right upper lobe. Unable to repeat in the office today d/t tech being out.    -Urine legionella negative  -COVID-19 negative  -TTE with EF 60-65%. Mildly increased left wall thickness. Normal diastolic function. Mild stenosis of aortic valve. -Patient completed azithromycin course and was discharged home on Omnicef to complete 7-day course for community-acquired pneumonia  -Patient failed 6-minute walk test and has home on oxygen  -Patient discharged home to complete 5-day course of steroid inhaled corticosteroid and DuoNeb as needed  Cardiovascular Review:  The patient has hypertension, hyperlipidemia, chronic diastolic heart failure, and obesity. Hx also of aortic stenosis. Diet and Lifestyle: generally follows a low fat low cholesterol diet, generally follows a low sodium diet, sedentary. Pertinent ROS: taking medications as instructed, no medication side effects noted, no TIA's, no chest pain on exertion, mild swelling of ankles, no orthostatic dizziness or lightheadedness, no palpitations,      Home BP Monitoring: is not measured at home. Anxiety Review:  Patient is seen for anxiety. Ongoing symptoms include: increased anxiety still related to family issues. Patient denies: palpitations, sweating, chest pain, shortness of breath. Reported side effects from the treatment: none.     Patient Active Problem List   Diagnosis Code    Hypokalemia E87.6    Hiatal hernia K44.9    Anxiety F41.9    S/P hysterectomy Z90.710    Aortic stenosis, mild I35.0    Spinal stenosis M48.00    S/P foot surgery Z98.890    Environmental allergies Z91.09    S/P cardiac catheterization Z98.890    Hypovitaminosis D E55.9    Chronic ischemic heart disease I25.9    Mixed hyperlipidemia E78.2    Chronic diastolic heart failure (HCC) I50.32    Chest pain at rest R07.9    Bifascicular bundle branch block--RBBB,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    Atypical chest pain R07.89    Essential hypertension I10    Gastroesophageal reflux disease without esophagitis K21.9    Atherosclerosis of native coronary artery without angina pectoris--diffuse small vsl disease via cath cath 2009--RCA PDA/ROSA I25.10    Chronic anxiety F41.9    Encounter for medication monitoring Z51.81    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 M47.815    Class 2 obesity due to excess calories with serious comorbidity and body mass index (BMI) of 38.0 to 38.9 in adult [id number]    Paroxysmal cardiac arrhythmia--PVC's,APC's,NSSVT I49.8    Severe obesity (BMI 35.0-39. 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  aspirin delayed-release 81 mg tablet Take 81 mg by mouth daily.  OXYGEN-AIR DELIVERY SYSTEMS 5 L/min by Nasal route continuous.  albuterol (Ventolin HFA) 90 mcg/actuation inhaler Take 1 Puff by inhalation every four (4) hours as needed for Wheezing or Shortness of Breath. 18 g 1    amLODIPine (NORVASC) 5 mg tablet Take 5 mg by mouth two (2) times a day. Indications: high blood pressure      pantoprazole (PROTONIX) 40 mg tablet Take 40 mg by mouth two (2) times a day.       clonazePAM (KlonoPIN) 1 mg tablet TAKE 1/2 TO 1 TABLET EVERY MORNING AND AS NEEDED FOR ANXIETY, AND TAKE 1 TABLET AT BEDTIME FOR SLEEP (Patient taking differently: TAKE 1/2 TO 1 TABLET by mouth EVERY MORNING AND AS NEEDED FOR ANXIETY, AND TAKE 1 TABLET by mouth  AT BEDTIME FOR SLEEP) 60 Tablet 1    losartan (COZAAR) 100 mg tablet TAKE 1 TABLET BY MOUTH EVERY DAY (Patient taking differently: Take 100 mg by mouth daily.) 90 Tablet 3    metoprolol tartrate (LOPRESSOR) 25 mg tablet TAKE 1 TABLET BY MOUTH TWICE A DAY 1 AT 9AM AND 1 AT 9PM (Patient taking differently: Take 25 mg by mouth two (2) times a day. Indications: high blood pressure) 180 Tablet 3    isosorbide mononitrate ER (IMDUR) 30 mg tablet TAKE 1 TABLET BY MOUTH EVERY DAY (Patient taking differently: Take 30 mg by mouth daily. Indications: prevention of anginal chest pain associated with coronary artery disease) 90 Tablet 3    rosuvastatin (CRESTOR) 20 mg tablet TAKE 1 TABLET BY MOUTH EVERY DAY AT NIGHT (Patient taking differently: Take 20 mg by mouth nightly. Indications: excessive fat in the blood) 30 Tablet 11    furosemide (LASIX) 80 mg tablet TAKE 1 TABLET BY MOUTH EVERY MORNING AND TAKE 1/2 TABLET EVERY EVENING 135 Tablet 3    nitroglycerin (NITROSTAT) 0.4 mg SL tablet DISSOLVE 1 TAB BY SUBLINGUAL ROUTE EVERY 5 MINUTES AS NEEDED. (Patient taking differently: DISSOLVE 1 TAB BY SUBLINGUAL ROUTE EVERY 5 MINUTES AS NEEDED FOR CHEST PAIN) 25 Tablet 0    desonide (TRIDESILON) 0.05 % topical lotion Apply  to affected area two (2) times daily as needed for Skin Irritation or Itching. 59 mL 1    potassium chloride SR (KLOR-CON 10) 10 mEq tablet Take 3 Tablets by mouth two (2) times a day. 180 Tablet 11    cholecalciferol, vitamin D3, (Vitamin D3) 50 mcg (2,000 unit) tab Take 1 Tablet by mouth daily.  acetaminophen (TYLENOL EXTRA STRENGTH) 500 mg tablet Take 1,000 mg by mouth every six (6) hours as needed for Pain.       Aspirin, Buffered 81 mg tab Take 81 mg by mouth daily.          Allergies   Allergen Reactions    Bactrim [Sulfamethoxazole-Trimethoprim] Unknown (comments)     Pt does not recall    Darvocet A500 [Propoxyphene N-Acetaminophen] Itching         Past Medical History:   Diagnosis Date    Anxiety     Aortic stenosis 3/3/2010    Aortic stenosis     Arrhythmia     MURMUR    Arthritis     SPINAL STENOSIS    Atherosclerosis of coronary artery     Biliary dyskinesia     CHF (congestive heart failure) ([address] 3/3/2010    CHF (congestive heart failure) ([address] 01/2002    Chronic heart failure ([address] 1/2002; 3/3/2010    chronic diastolic heart failure    Chronic pain     LOWER BACK, RIGHT KNEE    Environmental allergies 3/3/2010    GERD (gastroesophageal reflux disease) 3/3/2010    Hiatal hernia 3/3/2010    High cholesterol 3/3/2010    HTN (hypertension) 3/3/2010    Hypokalemia 3/3/2010    Ischemic heart disease, chronic     Obese     Psychiatric disorder     anxiety    S/P hysterectomy 3/3/2010    Spinal stenosis 3/3/2010         Past Surgical History:   Procedure Laterality Date    CARDIAC CATHETERIZATION  01/2002    normal coronaries    DECOMPRESS DISC RF LUMBAR  07/2007    HX BACK SURGERY  5/1/2014    HX BREAST LUMPECTOMY Left 1989    benign left breast    HX BUNIONECTOMY      HX BUNIONECTOMY      HX HEART CATHETERIZATION  3/3/10    HX HYSTERECTOMY  1978    HX LUMBAR DISKECTOMY      HX ORTHOPAEDIC Bilateral     BUNIONECTOMY    HX ORTHOPAEDIC Right     WRIST FX    HX OTHER SURGICAL  10/12/2015    mole removed from right side of face by Dr. Radha Cook FLX DX W/COLLJ [facility] WHEN PFRMD  [id number]    Dr Kierra Ryan GI ENDOSCOPY,BIOPSY  2/27/2019              Family History   Problem Relation Age of Onset    Hypertension Mother     Heart Disease Father     Stroke Sister     Heart Disease Sister     Heart Disease Sister     Cancer Brother         LUNG    Cancer Brother         PROSTATE    Hypertension Brother    Shannan Curl No Known Problems Brother     Lung Disease Brother     Heart Attack Brother     Lung Disease Brother     Stroke Brother     Stroke Daughter 47    No Known Problems Daughter     Anesth Problems Neg Hx        Social History     Tobacco Use    Smoking status: Never Smoker    Smokeless tobacco: Never Used   Substance Use Topics    Alcohol use: No     Alcohol/week: 0.0 standard drinks        Lab Results   Component Value Date/Time    WBC 4.2 05/29/2022 03:39 AM    HGB 12.8 05/29/2022 03:39 AM    HCT 42.5 05/29/2022 03:39 AM    PLATELET 558 (L) 16/10/9192 03:39 AM    MCV 93.0 05/29/2022 03:39 AM     Lab Results   Component Value Date/Time    Cholesterol, total 176 12/20/2021 11:05 AM    HDL Cholesterol 61 12/20/2021 11:05 AM    LDL, calculated 92.6 12/20/2021 11:05 AM    Triglyceride 112 12/20/2021 11:05 AM    CHOL/HDL Ratio 2.9 12/20/2021 11:05 AM     Lab Results   Component Value Date/Time    TSH 2.470 05/15/2017 03:04 PM      Lab Results   Component Value Date/Time    Sodium 140 05/29/2022 03:39 AM    Potassium 3.7 05/29/2022 03:39 AM    Chloride 101 05/29/2022 03:39 AM    CO2 39 (H) 05/29/2022 03:39 AM    Anion gap 0 (L) 05/29/2022 03:39 AM    Glucose 98 05/29/2022 03:39 AM    BUN 13 05/29/2022 03:39 AM    Creatinine 1.02 05/29/2022 03:39 AM    BUN/Creatinine ratio 13 05/29/2022 03:39 AM    GFR est AA >60 05/29/2022 03:39 AM    GFR est non-AA 52 (L) 05/29/2022 03:39 AM    Calcium 8.7 05/29/2022 03:39 AM    Bilirubin, total 0.4 05/29/2022 03:39 AM    ALT (SGPT) 16 05/29/2022 03:39 AM    Alk. phosphatase 71 05/29/2022 03:39 AM    Protein, total 7.2 05/29/2022 03:39 AM    Albumin 2.7 (L) 05/29/2022 03:39 AM    Globulin 4.5 (H) 05/29/2022 03:39 AM    A-G Ratio 0.6 (L) 05/29/2022 03:39 AM      Lab Results   Component Value Date/Time    Hemoglobin A1c 5.4 04/16/2014 12:20 PM    Hemoglobin A1c (POC) 5.5 11/26/2014 12:12 PM         Review of Systems   Constitutional: Negative for malaise/fatigue.    HENT: Negative for congestion. Eyes: Negative for blurred vision. Respiratory: Positive for cough (slight) and shortness of breath (stable on O2). Negative for hemoptysis and sputum production. Cardiovascular: Negative for chest pain, palpitations and leg swelling. Gastrointestinal: Negative for abdominal pain, constipation and heartburn. Genitourinary: Negative for dysuria, frequency and urgency. Musculoskeletal: Negative for back pain and joint pain. Neurological: Negative for dizziness, tingling and headaches. Endo/Heme/Allergies: Negative for environmental allergies. Psychiatric/Behavioral: Negative for depression. The patient does not have insomnia. Physical Exam  Vitals and nursing note reviewed. Constitutional:       Appearance: Normal appearance. She is well-developed. Comments: /63 (BP 1 Location: Right arm, BP Patient Position: Sitting, BP Cuff Size: Adult)   Pulse 69   Temp 98.1 °F (36.7 °C)   Resp 18   Ht 5' (1.524 m)   Wt 177 lb 3.2 oz (80.4 kg)   LMP  (LMP Unknown)   SpO2 91%   BMI 34.61 kg/m²    HENT:      Right Ear: Tympanic membrane and ear canal normal.      Left Ear: Tympanic membrane and ear canal normal.   Neck:      Thyroid: No thyromegaly. Cardiovascular:      Rate and Rhythm: Normal rate and regular rhythm. Heart sounds: Normal heart sounds. Pulmonary:      Effort: Pulmonary effort is normal. No respiratory distress. Breath sounds: Normal breath sounds. No wheezing or rhonchi. Abdominal:      General: Bowel sounds are normal.      Palpations: Abdomen is soft. There is no mass. Tenderness: There is no abdominal tenderness. Musculoskeletal:         General: Normal range of motion. Cervical back: Normal range of motion and neck supple. Right lower leg: No edema. Left lower leg: No edema. Lymphadenopathy:      Cervical: No cervical adenopathy. Skin:     General: Skin is warm and dry.    Neurological:      General: No focal deficit present. Mental Status: She is alert and oriented to person, place, and time. Psychiatric:         Mood and Affect: Mood normal.       ASSESSMENT and PLAN  Diagnoses and all orders for this visit:    1. Chronic respiratory failure with hypoxia, on home O2 therapy (HCC)//  2. History of pneumonia  She is still requiring high level of oxygen. Unable to repeat chest xray today but she will follow up in 1 week to get repeat xray done.    -     REFERRAL TO PULMONARY DISEASE  Has appt set up for the end of this sara. Continue with home PT/OT. 3. Essential hypertension  Stable     4. Mixed hyperlipidemia  Continue to monitor. Work on diet and exercise. 5. Atherosclerosis of native coronary artery of native heart without angina pectoris//  6. Chronic diastolic heart failure (HCC)  Stable     7. Gastroesophageal reflux disease without esophagitis  Stable on protonix. 8. Encounter for medication monitoring  -     METABOLIC PANEL, BASIC; Future  -     MAGNESIUM; Future      Follow-up and Dispositions    · Return in about 1 month (around 8/6/2022). reviewed diet, exercise and weight control  cardiovascular risk and specific lipid/LDL goals reviewed  reviewed medications and side effects in detail    I have discussed diagnosis listed in this note with pt and/or family. I have discussed treatment plans and options and the risk/benefit analysis of those options, including safe use of medications and possible medication side effects. Through the use of shared decision making we have agreed to the above plan. The patient has received an after-visit summary and questions were answered concerning future plans and follow up. Advise pt of any urgent changes then to proceed to the ER.

## 2022-07-07 ENCOUNTER — HOME CARE VISIT (OUTPATIENT)
Dept: SCHEDULING | Facility: HOME HEALTH | Age: 87
End: 2022-07-07
Payer: MEDICARE

## 2022-07-07 VITALS
OXYGEN SATURATION: 91 % | RESPIRATION RATE: 18 BRPM | HEART RATE: 69 BPM | WEIGHT: 177.2 LBS | BODY MASS INDEX: 34.79 KG/M2 | DIASTOLIC BLOOD PRESSURE: 63 MMHG | TEMPERATURE: 98.1 F | SYSTOLIC BLOOD PRESSURE: 135 MMHG | HEIGHT: 60 IN

## 2022-07-07 LAB
ANION GAP SERPL CALC-SCNC: 2 MMOL/L (ref 5–15)
BUN SERPL-MCNC: 7 MG/DL (ref 6–20)
BUN/CREAT SERPL: 8 (ref 12–20)
CALCIUM SERPL-MCNC: 9.4 MG/DL (ref 8.5–10.1)
CHLORIDE SERPL-SCNC: 101 MMOL/L (ref 97–108)
CO2 SERPL-SCNC: 35 MMOL/L (ref 21–32)
CREAT SERPL-MCNC: 0.91 MG/DL (ref 0.55–1.02)
GLUCOSE SERPL-MCNC: 96 MG/DL (ref 65–100)
MAGNESIUM SERPL-MCNC: 2.2 MG/DL (ref 1.6–2.4)
POTASSIUM SERPL-SCNC: 4.1 MMOL/L (ref 3.5–5.1)
SODIUM SERPL-SCNC: 138 MMOL/L (ref 136–145)

## 2022-07-07 PROCEDURE — G0300 HHS/HOSPICE OF LPN EA 15 MIN: HCPCS

## 2022-07-07 PROCEDURE — 3331090001 HH PPS REVENUE CREDIT

## 2022-07-07 PROCEDURE — 3331090002 HH PPS REVENUE DEBIT

## 2022-07-08 PROCEDURE — 3331090002 HH PPS REVENUE DEBIT

## 2022-07-08 PROCEDURE — 3331090001 HH PPS REVENUE CREDIT

## 2022-07-09 PROCEDURE — 3331090001 HH PPS REVENUE CREDIT

## 2022-07-09 PROCEDURE — 3331090002 HH PPS REVENUE DEBIT

## 2022-07-10 PROCEDURE — 3331090002 HH PPS REVENUE DEBIT

## 2022-07-10 PROCEDURE — 3331090001 HH PPS REVENUE CREDIT

## 2022-07-11 VITALS
DIASTOLIC BLOOD PRESSURE: 78 MMHG | OXYGEN SATURATION: 95 % | RESPIRATION RATE: 18 BRPM | SYSTOLIC BLOOD PRESSURE: 130 MMHG | HEART RATE: 60 BPM | TEMPERATURE: 98 F

## 2022-07-11 PROCEDURE — 3331090001 HH PPS REVENUE CREDIT

## 2022-07-11 PROCEDURE — 3331090002 HH PPS REVENUE DEBIT

## 2022-07-12 ENCOUNTER — HOME CARE VISIT (OUTPATIENT)
Dept: HOME HEALTH SERVICES | Facility: HOME HEALTH | Age: 87
End: 2022-07-12
Payer: MEDICARE

## 2022-07-12 PROCEDURE — 3331090002 HH PPS REVENUE DEBIT

## 2022-07-12 PROCEDURE — 3331090001 HH PPS REVENUE CREDIT

## 2022-07-13 PROCEDURE — 3331090002 HH PPS REVENUE DEBIT

## 2022-07-13 PROCEDURE — 3331090001 HH PPS REVENUE CREDIT

## 2022-07-14 ENCOUNTER — HOME CARE VISIT (OUTPATIENT)
Dept: HOME HEALTH SERVICES | Facility: HOME HEALTH | Age: 87
End: 2022-07-14
Payer: MEDICARE

## 2022-07-14 PROCEDURE — 3331090002 HH PPS REVENUE DEBIT

## 2022-07-14 PROCEDURE — 3331090001 HH PPS REVENUE CREDIT

## 2022-07-15 PROCEDURE — 3331090001 HH PPS REVENUE CREDIT

## 2022-07-15 PROCEDURE — 3331090002 HH PPS REVENUE DEBIT

## 2022-07-16 ENCOUNTER — HOME CARE VISIT (OUTPATIENT)
Dept: SCHEDULING | Facility: HOME HEALTH | Age: 87
End: 2022-07-16
Payer: MEDICARE

## 2022-07-16 VITALS
HEART RATE: 63 BPM | WEIGHT: 170 LBS | RESPIRATION RATE: 16 BRPM | BODY MASS INDEX: 33.2 KG/M2 | OXYGEN SATURATION: 98 % | SYSTOLIC BLOOD PRESSURE: 122 MMHG | TEMPERATURE: 98 F | DIASTOLIC BLOOD PRESSURE: 64 MMHG

## 2022-07-16 PROCEDURE — G0299 HHS/HOSPICE OF RN EA 15 MIN: HCPCS

## 2022-07-16 PROCEDURE — 3331090002 HH PPS REVENUE DEBIT

## 2022-07-16 PROCEDURE — 3331090001 HH PPS REVENUE CREDIT

## 2022-07-16 NOTE — CASE COMMUNICATION
I also would like to see if we can order PT, they were there in the begining but have backed out on June 28th I believe. Only if you feel it necessary to do so.

## 2022-07-16 NOTE — CASE COMMUNICATION
Pt told me today Sat 7/16/22 at a routine visit that she fell on wed from standing to her knees. No dizziness, LOC or injury reported that may have caused the fall she states she doesnt know what happened she just fell. She states that her right knee was hurting but states that it isnt now. No deformity swelling or bruising noted to right knee. Patient said her son and grandson helped her up.  If you have any question please call [phone] RN BSN

## 2022-07-17 PROCEDURE — 3331090002 HH PPS REVENUE DEBIT

## 2022-07-17 PROCEDURE — 3331090001 HH PPS REVENUE CREDIT

## 2022-07-18 PROCEDURE — 3331090001 HH PPS REVENUE CREDIT

## 2022-07-18 PROCEDURE — 3331090002 HH PPS REVENUE DEBIT

## 2022-07-19 ENCOUNTER — TELEPHONE (OUTPATIENT)
Dept: FAMILY MEDICINE CLINIC | Age: 87
End: 2022-07-19

## 2022-07-19 PROCEDURE — 3331090002 HH PPS REVENUE DEBIT

## 2022-07-19 PROCEDURE — 3331090001 HH PPS REVENUE CREDIT

## 2022-07-19 NOTE — TELEPHONE ENCOUNTER
Patient would like a call from [address] [place] [address] regarding her oxygen she can be reached @ [phone]

## 2022-07-20 PROCEDURE — 3331090002 HH PPS REVENUE DEBIT

## 2022-07-20 PROCEDURE — 3331090001 HH PPS REVENUE CREDIT

## 2022-07-20 RX ORDER — ALBUTEROL SULFATE 0.83 MG/ML
2.5 SOLUTION RESPIRATORY (INHALATION)
Qty: 90 NEBULE | Refills: 5 | Status: ON HOLD | OUTPATIENT
Start: 2022-07-20

## 2022-07-20 NOTE — TELEPHONE ENCOUNTER
Patient would like a call from [address] [place] [address] regarding her oxygen she can be reached @ [phone]. Spoke to the pt and she stated her nose has been running for about 4 days. She also wants her albuterol nebulizer refilled, but you have never filled before. It was discontinued by another physician.

## 2022-07-21 ENCOUNTER — HOME CARE VISIT (OUTPATIENT)
Dept: SCHEDULING | Facility: HOME HEALTH | Age: 87
End: 2022-07-21
Payer: MEDICARE

## 2022-07-21 ENCOUNTER — TELEPHONE (OUTPATIENT)
Dept: FAMILY MEDICINE CLINIC | Age: 87
End: 2022-07-21

## 2022-07-21 DIAGNOSIS — R07.9 CHEST PAIN, UNSPECIFIED TYPE: ICD-10-CM

## 2022-07-21 PROCEDURE — 3331090001 HH PPS REVENUE CREDIT

## 2022-07-21 PROCEDURE — G0300 HHS/HOSPICE OF LPN EA 15 MIN: HCPCS

## 2022-07-21 PROCEDURE — 3331090002 HH PPS REVENUE DEBIT

## 2022-07-21 RX ORDER — NITROGLYCERIN 0.4 MG/1
TABLET SUBLINGUAL
Qty: 25 TABLET | Refills: 0 | Status: ON HOLD | OUTPATIENT
Start: 2022-07-21

## 2022-07-21 NOTE — TELEPHONE ENCOUNTER
Duane West, from [facility] called. They need chest x-rays, labs, and office notes. Please call [phone]. Fax #[phone].

## 2022-07-22 ENCOUNTER — TELEPHONE (OUTPATIENT)
Dept: FAMILY MEDICINE CLINIC | Age: 87
End: 2022-07-22

## 2022-07-22 VITALS
WEIGHT: 176.2 LBS | HEART RATE: 68 BPM | BODY MASS INDEX: 34.41 KG/M2 | RESPIRATION RATE: 18 BRPM | DIASTOLIC BLOOD PRESSURE: 80 MMHG | SYSTOLIC BLOOD PRESSURE: 128 MMHG | TEMPERATURE: 98.5 F

## 2022-07-22 PROCEDURE — 3331090001 HH PPS REVENUE CREDIT

## 2022-07-22 PROCEDURE — 3331090002 HH PPS REVENUE DEBIT

## 2022-07-22 NOTE — TELEPHONE ENCOUNTER
Patient called saying she wanted Dr. Mcallister to know shes still having nose drainage and disconnected the device from her oxygen tank.  She would like a call back at [phone]

## 2022-07-23 PROCEDURE — 3331090001 HH PPS REVENUE CREDIT

## 2022-07-23 PROCEDURE — 3331090002 HH PPS REVENUE DEBIT

## 2022-07-24 PROCEDURE — 3331090002 HH PPS REVENUE DEBIT

## 2022-07-24 PROCEDURE — 3331090001 HH PPS REVENUE CREDIT

## 2022-07-25 ENCOUNTER — OFFICE VISIT (OUTPATIENT)
Dept: FAMILY MEDICINE CLINIC | Age: 87
End: 2022-07-25

## 2022-07-25 DIAGNOSIS — Z87.01 HISTORY OF PNEUMONIA: Primary | ICD-10-CM

## 2022-07-25 DIAGNOSIS — J96.11 CHRONIC RESPIRATORY FAILURE WITH HYPOXIA, ON HOME O2 THERAPY (HCC): ICD-10-CM

## 2022-07-25 DIAGNOSIS — Z99.81 CHRONIC RESPIRATORY FAILURE WITH HYPOXIA, ON HOME O2 THERAPY (HCC): ICD-10-CM

## 2022-07-25 PROCEDURE — 3331090001 HH PPS REVENUE CREDIT

## 2022-07-25 PROCEDURE — 3331090002 HH PPS REVENUE DEBIT

## 2022-07-25 NOTE — TELEPHONE ENCOUNTER
Called patient to discuss but noticed she has an appointment for today at 11am.  Patient will discuss with MD at time of appointment.

## 2022-07-26 DIAGNOSIS — F41.9 ANXIETY: Chronic | ICD-10-CM

## 2022-07-26 PROCEDURE — 3331090002 HH PPS REVENUE DEBIT

## 2022-07-26 PROCEDURE — 3331090001 HH PPS REVENUE CREDIT

## 2022-07-26 RX ORDER — CLONAZEPAM 1 MG/1
TABLET ORAL
Qty: 60 TABLET | Refills: 1 | Status: SHIPPED | OUTPATIENT
Start: 2022-07-26 | End: 2022-10-17

## 2022-07-26 NOTE — TELEPHONE ENCOUNTER
----- Message from Ronald Hidalgo sent at 7/26/2022 10:29 AM EDT -----  Subject: Refill Request    QUESTIONS  Name of Medication? clonazePAM (KlonoPIN) 1 mg tablet  Patient-reported dosage and instructions? 1mg 1/2 tablet in morning and 1   tablet at bedtime  How many days do you have left? 4  Preferred Pharmacy? [id number]  Pharmacy phone number (if available)? [phone]  ---------------------------------------------------------------------------  --------------  CALL BACK INFO  What is the best way for the office to contact you? OK to leave message on   voicemail  Preferred Call Back Phone Number? [phone]  ---------------------------------------------------------------------------  --------------  SCRIPT ANSWERS  Relationship to Patient?  Self

## 2022-07-27 PROCEDURE — 3331090001 HH PPS REVENUE CREDIT

## 2022-07-27 PROCEDURE — 3331090002 HH PPS REVENUE DEBIT

## 2022-07-28 PROCEDURE — 3331090001 HH PPS REVENUE CREDIT

## 2022-07-28 PROCEDURE — 3331090002 HH PPS REVENUE DEBIT

## 2022-07-29 ENCOUNTER — HOME CARE VISIT (OUTPATIENT)
Dept: SCHEDULING | Facility: HOME HEALTH | Age: 87
End: 2022-07-29
Payer: MEDICARE

## 2022-07-29 VITALS
SYSTOLIC BLOOD PRESSURE: 132 MMHG | RESPIRATION RATE: 18 BRPM | TEMPERATURE: 97.9 F | OXYGEN SATURATION: 94 % | DIASTOLIC BLOOD PRESSURE: 78 MMHG | HEART RATE: 70 BPM

## 2022-07-29 PROCEDURE — 3331090002 HH PPS REVENUE DEBIT

## 2022-07-29 PROCEDURE — 3331090001 HH PPS REVENUE CREDIT

## 2022-07-29 PROCEDURE — G0299 HHS/HOSPICE OF RN EA 15 MIN: HCPCS

## 2022-07-30 PROCEDURE — 3331090002 HH PPS REVENUE DEBIT

## 2022-07-30 PROCEDURE — 3331090001 HH PPS REVENUE CREDIT

## 2022-07-31 PROCEDURE — 3331090002 HH PPS REVENUE DEBIT

## 2022-07-31 PROCEDURE — 3331090001 HH PPS REVENUE CREDIT

## 2022-08-11 ENCOUNTER — APPOINTMENT (OUTPATIENT)
Dept: CT IMAGING | Age: 87
End: 2022-08-11
Attending: NURSE PRACTITIONER
Payer: MEDICARE

## 2022-08-11 ENCOUNTER — APPOINTMENT (OUTPATIENT)
Dept: GENERAL RADIOLOGY | Age: 87
End: 2022-08-11
Attending: NURSE PRACTITIONER
Payer: MEDICARE

## 2022-08-11 ENCOUNTER — HOSPITAL ENCOUNTER (EMERGENCY)
Age: 87
Discharge: HOME | End: 2022-08-11
Attending: EMERGENCY MEDICINE
Payer: MEDICARE

## 2022-08-11 VITALS
OXYGEN SATURATION: 97 % | WEIGHT: 173 LBS | DIASTOLIC BLOOD PRESSURE: 72 MMHG | BODY MASS INDEX: 37.32 KG/M2 | HEIGHT: 57 IN | SYSTOLIC BLOOD PRESSURE: 129 MMHG | TEMPERATURE: 97.8 F | HEART RATE: 57 BPM | RESPIRATION RATE: 20 BRPM

## 2022-08-11 DIAGNOSIS — J98.11 ATELECTASIS, LEFT: ICD-10-CM

## 2022-08-11 DIAGNOSIS — I50.9 ACUTE ON CHRONIC CONGESTIVE HEART FAILURE, UNSPECIFIED HEART FAILURE TYPE (HCC): ICD-10-CM

## 2022-08-11 DIAGNOSIS — K80.20 CALCULUS OF GALLBLADDER WITHOUT CHOLECYSTITIS WITHOUT OBSTRUCTION: Primary | ICD-10-CM

## 2022-08-11 DIAGNOSIS — R09.02 HYPOXIA: ICD-10-CM

## 2022-08-11 DIAGNOSIS — E87.6 HYPOKALEMIA: ICD-10-CM

## 2022-08-11 LAB
ALBUMIN SERPL-MCNC: 3.5 G/DL (ref 3.5–5)
ALBUMIN/GLOB SERPL: 0.8 {RATIO} (ref 1.1–2.2)
ALP SERPL-CCNC: 84 U/L (ref 45–117)
ALT SERPL-CCNC: 14 U/L (ref 12–78)
ANION GAP SERPL CALC-SCNC: 4 MMOL/L (ref 5–15)
AST SERPL-CCNC: 18 U/L (ref 15–37)
BASOPHILS # BLD: 0 K/UL (ref 0–0.1)
BASOPHILS NFR BLD: 1 % (ref 0–1)
BILIRUB SERPL-MCNC: 0.6 MG/DL (ref 0.2–1)
BNP SERPL-MCNC: 477 PG/ML (ref 0–450)
BUN SERPL-MCNC: 6 MG/DL (ref 6–20)
BUN/CREAT SERPL: 6 (ref 12–20)
CALCIUM SERPL-MCNC: 9.1 MG/DL (ref 8.5–10.1)
CHLORIDE SERPL-SCNC: 102 MMOL/L (ref 97–108)
CO2 SERPL-SCNC: 36 MMOL/L (ref 21–32)
CREAT SERPL-MCNC: 1 MG/DL (ref 0.55–1.02)
DIFFERENTIAL METHOD BLD: NORMAL
EOSINOPHIL # BLD: 0.3 K/UL (ref 0–0.4)
EOSINOPHIL NFR BLD: 6 % (ref 0–7)
ERYTHROCYTE [DISTWIDTH] IN BLOOD BY AUTOMATED COUNT: 14.1 % (ref 11.5–14.5)
GLOBULIN SER CALC-MCNC: 4.3 G/DL (ref 2–4)
GLUCOSE SERPL-MCNC: 95 MG/DL (ref 65–100)
HCT VFR BLD AUTO: 38.8 % (ref 35–47)
HGB BLD-MCNC: 12.1 G/DL (ref 11.5–16)
IMM GRANULOCYTES # BLD AUTO: 0 K/UL (ref 0–0.04)
IMM GRANULOCYTES NFR BLD AUTO: 0 % (ref 0–0.5)
LACTATE SERPL-SCNC: 0.9 MMOL/L (ref 0.4–2)
LIPASE SERPL-CCNC: 164 U/L (ref 73–393)
LYMPHOCYTES # BLD: 1.1 K/UL (ref 0.8–3.5)
LYMPHOCYTES NFR BLD: 23 % (ref 12–49)
MCH RBC QN AUTO: 28.7 PG (ref 26–34)
MCHC RBC AUTO-ENTMCNC: 31.2 G/DL (ref 30–36.5)
MCV RBC AUTO: 92.2 FL (ref 80–99)
MONOCYTES # BLD: 0.5 K/UL (ref 0–1)
MONOCYTES NFR BLD: 9 % (ref 5–13)
NEUTS SEG # BLD: 3.1 K/UL (ref 1.8–8)
NEUTS SEG NFR BLD: 61 % (ref 32–75)
NRBC # BLD: 0 K/UL (ref 0–0.01)
NRBC BLD-RTO: 0 PER 100 WBC
PLATELET # BLD AUTO: 191 K/UL (ref 150–400)
PMV BLD AUTO: 11.4 FL (ref 8.9–12.9)
POTASSIUM SERPL-SCNC: 3.1 MMOL/L (ref 3.5–5.1)
PROT SERPL-MCNC: 7.8 G/DL (ref 6.4–8.2)
RBC # BLD AUTO: 4.21 M/UL (ref 3.8–5.2)
SODIUM SERPL-SCNC: 142 MMOL/L (ref 136–145)
TROPONIN-HIGH SENSITIVITY: 14 NG/L (ref 0–51)
TROPONIN-HIGH SENSITIVITY: 18 NG/L (ref 0–51)
WBC # BLD AUTO: 5 K/UL (ref 3.6–11)

## 2022-08-11 PROCEDURE — 83880 ASSAY OF NATRIURETIC PEPTIDE: CPT

## 2022-08-11 PROCEDURE — 83690 ASSAY OF LIPASE: CPT

## 2022-08-11 PROCEDURE — 85025 COMPLETE CBC W/AUTO DIFF WBC: CPT

## 2022-08-11 PROCEDURE — 36415 COLL VENOUS BLD VENIPUNCTURE: CPT

## 2022-08-11 PROCEDURE — 93005 ELECTROCARDIOGRAM TRACING: CPT

## 2022-08-11 PROCEDURE — 71045 X-RAY EXAM CHEST 1 VIEW: CPT

## 2022-08-11 PROCEDURE — 74011250637 HC RX REV CODE- 250/637: Performed by: NURSE PRACTITIONER

## 2022-08-11 PROCEDURE — 80053 COMPREHEN METABOLIC PANEL: CPT

## 2022-08-11 PROCEDURE — 96374 THER/PROPH/DIAG INJ IV PUSH: CPT

## 2022-08-11 PROCEDURE — 83605 ASSAY OF LACTIC ACID: CPT

## 2022-08-11 PROCEDURE — 99285 EMERGENCY DEPT VISIT HI MDM: CPT

## 2022-08-11 PROCEDURE — 74011000636 HC RX REV CODE- 636: Performed by: EMERGENCY MEDICINE

## 2022-08-11 PROCEDURE — 84484 ASSAY OF TROPONIN QUANT: CPT

## 2022-08-11 PROCEDURE — 74011250636 HC RX REV CODE- 250/636: Performed by: NURSE PRACTITIONER

## 2022-08-11 PROCEDURE — 74011000250 HC RX REV CODE- 250: Performed by: NURSE PRACTITIONER

## 2022-08-11 PROCEDURE — 74177 CT ABD & PELVIS W/CONTRAST: CPT

## 2022-08-11 PROCEDURE — 96375 TX/PRO/DX INJ NEW DRUG ADDON: CPT

## 2022-08-11 RX ORDER — POTASSIUM CHLORIDE 750 MG/1
40 TABLET, FILM COATED, EXTENDED RELEASE ORAL
Status: COMPLETED | OUTPATIENT
Start: 2022-08-11 | End: 2022-08-11

## 2022-08-11 RX ORDER — FUROSEMIDE 10 MG/ML
40 INJECTION INTRAMUSCULAR; INTRAVENOUS
Status: COMPLETED | OUTPATIENT
Start: 2022-08-11 | End: 2022-08-11

## 2022-08-11 RX ADMIN — IOPAMIDOL 100 ML: 755 INJECTION, SOLUTION INTRAVENOUS at 15:19

## 2022-08-11 RX ADMIN — POTASSIUM CHLORIDE 40 MEQ: 750 TABLET, EXTENDED RELEASE ORAL at 16:43

## 2022-08-11 RX ADMIN — ALUMINUM HYDROXIDE, MAGNESIUM HYDROXIDE, AND SIMETHICONE 40 ML: 200; 200; 20 SUSPENSION ORAL at 16:42

## 2022-08-11 RX ADMIN — FUROSEMIDE 40 MG: 10 INJECTION, SOLUTION INTRAMUSCULAR; INTRAVENOUS at 18:00

## 2022-08-11 RX ADMIN — FAMOTIDINE 20 MG: 10 INJECTION, SOLUTION INTRAVENOUS at 12:57

## 2022-08-11 NOTE — ED NOTES
Pt arrived to ED with c/o L upper side abd pain, R upper back pain, worse after eating x 1 week. Pt also c/o having \"a lot of gas. \"  Pt states everytime she eats the food \"stays in her chest.\" Pt states LBM was yesterday. Denies shortness of breath at this time. Pt is in no acute distress. Will continue to monitor. See nursing assessment. Safety precautions in place; call light within reach. Emergency Department Nursing Plan of Care       The Nursing Plan of Care is developed from the Nursing assessment and Emergency Department Attending provider initial evaluation. The plan of care may be reviewed in the ED Provider note.     The Plan of Care was developed with the following considerations:   Patient / Family readiness to learn indicated by:verbalized understanding  Persons(s) to be included in education: patient  Barriers to Learning/Limitations:No    Signed     Kita Spears RN    8/11/2022   12:28 PM

## 2022-08-11 NOTE — PROGRESS NOTES
ELIER     Patient known to me from previous inpatient admit  back in May 31, 2022   At that time were  barley able to get  the patient qualified for oxygen   It was arranged through [address] [place] [address].   [id number]   Called the office and talked  to the on- the need for the oxygen. - she will have the on-call person call me back. Received call back and discussed the above-   Agreed to accept for oxygen. Will need the test to show need for oxygen based on Medicare guideline. Stats on Room Air and with Activity and need for specific liters of oxygen. To send this via [employer]. Ms Paola Obando Figures will coordinate with ER and Family for transition home.      She already has a PCP appointment     Aug30 Office Visit 11:00 AM   Lashonda Sequeira MD  [facility] [address] [id number] [phone]    Arrive 15 minutes   [address] ESTRELLA RN   [phone]

## 2022-08-11 NOTE — ED NOTES
Patient  given copy of dc instructions and 0 script(s). Patient  verbalized understanding of instructions and script (s). Patient given a current medication reconciliation form and verbalized understanding of their medications. Patient  verbalized understanding of the importance of discussing medications with  his or her physician or clinic they will be following up with. Patient alert and oriented and in no acute distress. Patient discharged home with wheelchair and home o2 tank.

## 2022-08-11 NOTE — PROGRESS NOTES
Date of previous inpatient admission/ ED visit? Not applicable     What brought the patient back to ED? Patient presented to ED with shortness of breath. Did patient decline recommended services during last admission/ ED visit (if yes, what)? No    Has patient seen a provider since their last inpatient admission/ED visit (if yes, when)? PCP: First and Last name:  Queen Chaya MD   Name of Practice: [id number] [facility]   Are you a current patient: Yes/No:  No   Approximate date of last visit: 7-    CM Interventions:  Pt seen in ED with complaints of hip pain, excessive gas and shortness of breath. She was discharged from [facility] with home health and home oxygen in May 2022. She was seen by PCP on 7/25/22 and [facility] where she was seen on 7/28/22. The pulmonologist discontinued the oxygen on 7/28/22 and she has a follow up appointment scheduled with them for 11/1/22. Pt was evaluated by respiratory therapist and it was determined that she needs 4L of oxygen. She is being discharged home on oxygen. [employer] delivered an tank to ED for patient's transport home from ED and a concentrator is being delivered to her home today. CM discussed the plan with patient's daughter, Esteban Degroot ([phone]). She lives with her daughter. Patient's other daughter, Talya Bridges ([phone]) is picking patient up to transport her home. PCP follow-up is on AVS and family was instructed to call [facility] for follow up with them.

## 2022-08-11 NOTE — ED TRIAGE NOTES
Pt reports having left upper abdominal pain with indigestion and shortness of breath x 1 week. Pt also reports mid right back pain x 1 week.

## 2022-08-11 NOTE — ED PROVIDER NOTES
EMERGENCY DEPARTMENT HISTORY AND PHYSICAL EXAM      Date: 8/11/2022  Patient Name: Mohit Henriquez    History of Presenting Illness     Chief Complaint   Patient presents with    Shortness of Breath    Abdominal Pain    Back Pain       History Provided By: Patient    Additional History (Context): Mohit Henriquez is a 82 y.o. female with  GERD, Aortic Stenosis, CHF, Arthritis, HTN, Anxiety,   who presents with abd pain and shortness of breath. Sx onset 1 week ago. Reports pain located to the upper abd and radiating to the back. Associated with decreased appetite, bloating and gas. Denies nausea, vomiting, diarrhea or constipation. Denies fever, chills, cough or wheezing. She reports recent diagnosis of PNA 2 months ago and required hospitalization. She reports being discharged on home oxygen with continuous follow up with her PCP. She reports pulmonologist discontinued her oxygen 1 week ago. In addition, she reports missing a few doses of her lasix and taking 40 mg at times instead of the prescribed 80 mg. Denies weight gain or leg swelling. PCP: Teresa Naranjo MD    Current Outpatient Medications   Medication Sig Dispense Refill    potassium chloride SR (KLOR-CON 10) 10 mEq tablet TAKE 3 TABS BY MOUTH 2 TIMES PER  Tablet 3    pantoprazole (PROTONIX) 40 mg tablet TAKE 1 TABLET BY MOUTH EVERY DAY IN THE MORNING 90 Tablet 3    clonazePAM (KlonoPIN) 1 mg tablet TAKE 1/2 TO 1 TABLET EVERY MORNING AND AS NEEDED FOR ANXIETY, AND TAKE 1 TABLET AT BEDTIME FOR SLEEP 60 Tablet 1    nitroglycerin (NITROSTAT) 0.4 mg SL tablet DISSOLVE 1 TAB BY SUBLINGUAL ROUTE EVERY 5 MINUTES AS NEEDED. 25 Tablet 0    albuterol (PROVENTIL VENTOLIN) 2.5 mg /3 mL (0.083 %) nebu 3 mL by Nebulization route every four (4) hours as needed for Shortness of Breath or Wheezing. 90 Nebule 5    aspirin delayed-release 81 mg tablet Take 81 mg by mouth daily. amLODIPine (NORVASC) 5 mg tablet Take 5 mg by mouth two (2) times a day. Indications: high blood pressure      losartan (COZAAR) 100 mg tablet TAKE 1 TABLET BY MOUTH EVERY DAY (Patient taking differently: Take 100 mg by mouth in the morning.) 90 Tablet 3    metoprolol tartrate (LOPRESSOR) 25 mg tablet TAKE 1 TABLET BY MOUTH TWICE A DAY 1 AT 9AM AND 1 AT 9PM (Patient taking differently: Take 25 mg by mouth two (2) times a day. Indications: high blood pressure) 180 Tablet 3    isosorbide mononitrate ER (IMDUR) 30 mg tablet TAKE 1 TABLET BY MOUTH EVERY DAY (Patient taking differently: Take 30 mg by mouth in the morning. Indications: prevention of anginal chest pain associated with coronary artery disease) 90 Tablet 3    rosuvastatin (CRESTOR) 20 mg tablet TAKE 1 TABLET BY MOUTH EVERY DAY AT NIGHT (Patient taking differently: Take 20 mg by mouth nightly. Indications: excessive fat in the blood) 30 Tablet 11    furosemide (LASIX) 80 mg tablet TAKE 1 TABLET BY MOUTH EVERY MORNING AND TAKE 1/2 TABLET EVERY EVENING 135 Tablet 3    cholecalciferol, vitamin D3, (Vitamin D3) 50 mcg (2,000 unit) tab Take 1 Tablet by mouth daily. OXYGEN-AIR DELIVERY SYSTEMS 5 L/min by Nasal route continuous. (Patient not taking: Reported on 8/11/2022)      albuterol (Ventolin HFA) 90 mcg/actuation inhaler Take 1 Puff by inhalation every four (4) hours as needed for Wheezing or Shortness of Breath. (Patient not taking: Reported on 8/11/2022) 18 g 1    acetaminophen (TYLENOL) 500 mg tablet Take 1,000 mg by mouth every six (6) hours as needed for Pain.  (Patient not taking: Reported on 8/11/2022)         Past History     Past Medical History:  Past Medical History:   Diagnosis Date    Anxiety     Aortic stenosis 3/3/2010    Aortic stenosis     Arrhythmia     MURMUR    Arthritis     SPINAL STENOSIS    Atherosclerosis of coronary artery     Biliary dyskinesia     CHF (congestive heart failure) ([facility] [address] 3/3/2010    CHF (congestive heart failure) ([facility] [address] 01/2002    Chronic heart failure ([facility] [address] 1/2002; 3/3/2010    chronic diastolic heart failure    Chronic pain     LOWER BACK, RIGHT KNEE    Environmental allergies 3/3/2010    GERD (gastroesophageal reflux disease) 3/3/2010    Hiatal hernia 3/3/2010    High cholesterol 3/3/2010    HTN (hypertension) 3/3/2010    Hypokalemia 3/3/2010    Ischemic heart disease, chronic     Obese     Psychiatric disorder     anxiety    S/P hysterectomy 3/3/2010    Spinal stenosis 3/3/2010       Past Surgical History:  Past Surgical History:   Procedure Laterality Date    CARDIAC CATHETERIZATION  01/2002    normal coronaries    DECOMPRESS DISC RF LUMBAR  07/2007    HX BACK SURGERY  5/1/2014    HX BREAST LUMPECTOMY Left 1989    benign left breast    HX BUNIONECTOMY      HX BUNIONECTOMY      HX HEART CATHETERIZATION  3/3/10    HX HYSTERECTOMY  1978    HX LUMBAR DISKECTOMY      HX ORTHOPAEDIC Bilateral     BUNIONECTOMY    HX ORTHOPAEDIC Right     WRIST FX    HX OTHER SURGICAL  10/12/2015    mole removed from right side of face by Dr. Heidi Nguyen FLX DX W/COLLJ Lafrances Pae PFRMD  [id number]    Dr Zabrina De La Vega GI ENDOSCOPY,BIOPSY  2/27/2019            Family History:  Family History   Problem Relation Age of Onset    Hypertension Mother     Heart Disease Father     Stroke Sister     Heart Disease Sister     Heart Disease Sister     Cancer Brother         LUNG    Cancer Brother         PROSTATE    Hypertension Brother     No Known Problems Brother     Lung Disease Brother     Heart Attack Brother     Lung Disease Brother     Stroke Brother     Stroke Daughter 47    No Known Problems Daughter     Anesth Problems Neg Hx        Social History:  Social History     Tobacco Use    Smoking status: Never    Smokeless tobacco: Never   Vaping Use    Vaping Use: Never used   Substance Use Topics    Alcohol use: No     Alcohol/week: 0.0 standard drinks    Drug use: No       Allergies:   Allergies   Allergen Reactions    Bactrim [Sulfamethoxazole-Trimethoprim] Unknown (comments)     Pt does not recall    Darvocet A500 [Propoxyphene N-Acetaminophen] Itching         Review of Systems   Review of Systems   Constitutional:  Negative for appetite change, chills, fatigue and fever. HENT:  Negative for congestion, ear pain and rhinorrhea. Eyes:  Negative for pain and itching. Respiratory:  Positive for shortness of breath. Negative for cough, chest tightness and wheezing. Cardiovascular:  Negative for chest pain, palpitations and leg swelling. Gastrointestinal:  Positive for abdominal pain. Negative for blood in stool, constipation, diarrhea, nausea and vomiting. Genitourinary:  Negative for dysuria, frequency and urgency. Musculoskeletal:  Positive for back pain. Negative for arthralgias and joint swelling. Skin:  Negative for color change and rash. Neurological:  Negative for dizziness, weakness, numbness and headaches. All other systems reviewed and are negative. Physical Exam     Vitals:    08/11/22 1643 08/11/22 1708 08/11/22 1709 08/11/22 1730   BP:  (!) 140/89  129/72   Pulse:    (!) 57   Resp:    20   Temp:       SpO2: 96%  96% 97%   Weight:       Height:         Physical Exam  Vitals and nursing note reviewed. Constitutional:       General: She is not in acute distress. Appearance: She is well-developed. She is not ill-appearing. HENT:      Head: Normocephalic and atraumatic. Right Ear: Tympanic membrane and ear canal normal.      Left Ear: Tympanic membrane and ear canal normal.      Nose: Nose normal.      Mouth/Throat:      Mouth: Mucous membranes are moist.      Pharynx: Oropharynx is clear. No oropharyngeal exudate or posterior oropharyngeal erythema. Eyes:      Extraocular Movements: Extraocular movements intact. Conjunctiva/sclera: Conjunctivae normal.      Pupils: Pupils are equal, round, and reactive to light. Cardiovascular:      Rate and Rhythm: Normal rate and regular rhythm. Pulses: Normal pulses. Heart sounds: Normal heart sounds.    Pulmonary: Effort: Pulmonary effort is normal.      Breath sounds: Normal breath sounds and air entry. Abdominal:      General: Bowel sounds are normal. There is no distension. Palpations: Abdomen is soft. There is no mass. Tenderness: There is abdominal tenderness in the right upper quadrant and periumbilical area. There is no right CVA tenderness, left CVA tenderness or guarding. Musculoskeletal:      Cervical back: Normal range of motion and neck supple. Skin:     General: Skin is warm and dry. Neurological:      Mental Status: She is alert and oriented to person, place, and time. Diagnostic Study Results     Labs -   No results found for this or any previous visit (from the past 12 hour(s)). Radiologic Studies -   CT ABD PELV W CONT   Final Result      Cholelithiasis with dilated CBD   Low-density area within the liver, not fully characterized. Recommend   three-phase CT   Incidental left ovarian 4 cm simple cyst      XR CHEST PORT   Final Result      Mild interstitial edema   Left basilar subsegmental atelectasis   Left humeral sclerosis is not specifically evaluated. CT Results  (Last 48 hours)                 08/11/22 1518  CT ABD PELV W CONT Final result    Impression:      Cholelithiasis with dilated CBD   Low-density area within the liver, not fully characterized. Recommend   three-phase CT   Incidental left ovarian 4 cm simple cyst       Narrative:  EXAM: CT ABD PELV W CONT       INDICATION: Abdominal pain with decreased appetite       COMPARISON: 11/15/2018        CONTRAST: 100 mL of Isovue-370. ORAL CONTRAST:            TECHNIQUE:    Following the uneventful intravenous administration of contrast, thin axial   images were obtained through the abdomen and pelvis. Coronal and sagittal   reconstructions were generated. CT dose reduction was achieved through use of a   standardized protocol tailored for this examination and automatic exposure   control for dose modulation. FINDINGS:    LOWER THORAX: Cardiomegaly. Left lower lobe subsegmental atelectasis (series 3,   image 5). LIVER: 2 cm area of low density in hepatic segment 7 (image axial 9 and coronal   60). BILIARY TREE: Multiple layering gallstones . CBD measures 8 mm (image 26). SPLEEN: within normal limits. PANCREAS: No mass or ductal dilatation. ADRENALS: Unremarkable. KIDNEYS: No mass, calculus, or hydronephrosis. Bilateral renal cyst, for which   no additional evaluation is recommended. STOMACH: Unremarkable. SMALL BOWEL: No dilatation or wall thickening. COLON: No dilatation or wall thickening. Multiple sigmoid diverticula. APPENDIX: Normal on axial image 48   PERITONEUM: No ascites or pneumoperitoneum. RETROPERITONEUM: No lymphadenopathy or aortic aneurysm. REPRODUCTIVE ORGANS: Prior hysterectomy. Left ovarian 4.2 cm cyst with density   measurement of 1 HU (series 3, image 53). Additional left ovarian subcentimeter   cyst (image 52). URINARY BLADDER: No mass or calculus. BONES: No destructive bone lesion. Posterior lumbar fusion from L3 to S1 without   loosening of hardware. ABDOMINAL WALL: No mass or hernia. ADDITIONAL COMMENTS: N/A                 CXR Results  (Last 48 hours)                 08/11/22 1241  XR CHEST PORT Final result    Impression:      Mild interstitial edema   Left basilar subsegmental atelectasis   Left humeral sclerosis is not specifically evaluated. Narrative:  EXAM: XR CHEST PORT       INDICATION: C/o shortness of breath; recent diagnosis of  pneumonia       COMPARISON: 7/25/2022       FINDINGS: A portable AP radiograph of the chest was obtained at 1238 hours. There is mild interstitial edema without pleural effusions. Linear opacity is   present at the left lung base. A moderate amount of sclerosis is associated with   the left humeral head, incompletely evaluated.                      Medical Decision Making   I am the first provider for this patient. I reviewed the vital signs, available nursing notes, past medical history, past surgical history, family history and social history. Vital Signs-Reviewed the patient's vital signs. Records Reviewed: Nursing Notes and Old Medical Records    ED Course:   ED Course as of 08/12/22 1219   Thu Aug 11, 2022   0043 Progress Note:   Mild elevation of BNP at 477 with interstitial edema noted on CXR but no pleural effusion present. CT of abd shows gallstones but no signs of inflammation or infection. Pt noted to have Oxygen saturations from 89%-93% since arrival which I suspect this is related mild fluid overload and chronic respiratory failure. I suspect pt needs to restart her oxygen. Plan to consult for hospitalization . [NA]   1628 Progress Note:   Discussed case with hospitalist Dr David Randall and she recommends general surgery consultation for gallstones. Overall, pt does not require admission for hypoxia and may be discharge and restarted on home oxygen. General surgery consult placed  [NA]   1638 Progress Note:   Discussed case with general surgery Dr Chris Lawton. CT remarkable for gallstones with CBD dilation but no obstruction, leukocytosis or fever noted. Lactic normal. He recommends outpatient follow up  [NA]   1708 Progress Note:   Failed 6 minute walk test with oxygen saturations decreassd to 84% on RA. Discussed with case management to obtain home oxygen therapy for patient with close follow up with PCP and pulmonary  [NA]   1724 Progress Note:   Informed by case management that 6 minute walk test needs  completion by respiratory therapy. Case transferred to attending Dr Neville Mireles for further disposition pending 6 minute walk test. The plan is to discharge home if oxygen therapy is available at home tonight.  [NA]   1827 Respiratory therapy has ambulated the patient and she did not make it more than 15 steps without the satting into the 80s.   She was placed on 4 L nasal cannula and her oxygen saturation improved to the mid 90s. Case management was able to arrange home oxygen to be restarted and the DME company will deliver a tank to the emergency department and set up her concentrator at home. Will discharge with primary care and pulmonary follow-up. [MS]      ED Course User Index  [MS] Efe Sanchez MD  [NA] Judd Nair NP         Disposition:  Discharge   DISCHARGE NOTE:   Pt has been reexamined. Patient has no new complaints, changes, or physical findings. Care plan outlined and precautions discussed. All of pt's questions and concerns were addressed. Patient was instructed and agrees to follow up with PCP, as well as to return to the ED upon further deterioration. Patient is ready to go home. Follow-up Information       Follow up With Specialties Details Why Contact Info    [employer]  Follow up They will deliver your Oxygen to the home and Portable tank to the hospital. please call for questions and concerns [address]  [phone]    Teresa Naranjo MD Family Medicine Go on 8/30/2022 Aug30 Office Visit 11:00 AM   Kan Courtney MD    call office for earlier appointment   [phone]    Arrive 15 minutes [address] [id number]  [phone]      [facility]  Call   [address] [facility]  [address] [place] [address]    Braden Castillo MD General Surgery, Surgery General, Oncology Schedule an appointment as soon as possible for a visit   [address] [place] [address]. [address]  [phone]              Discharge Medication List as of 8/11/2022  6:27 PM          Diagnosis     Clinical Impression:   1. Calculus of gallbladder without cholecystitis without obstruction    2. Hypokalemia    3. Acute on chronic congestive heart failure, unspecified heart failure type ([address]    4. Atelectasis, left    5.  Hypoxia

## 2022-08-11 NOTE — DISCHARGE INSTRUCTIONS
It was a pleasure taking care of you at [facility] Emergency Department today. We know that when you come to [facility], you are entrusting us with your health, comfort, and safety. Our physicians and nurses honor that trust, and we truly appreciate the opportunity to care for you and your loved ones. We also value our feedback. If you receive a survey about your Emergency Department experience today, please fill it out. We care about our patients' feedback, and we listen to what you have to say. Thank you!

## 2022-08-11 NOTE — PROGRESS NOTES
WALKING OXIMETRY. RT WALK PT WITH RN. PT VERY UNSTEADY AN REQUIRED 2 PEOPLE ASSIST. RM AIR RESTING SPO2 91% HR 82  WALKING RM AIR SPO2 83% HR 81  PT PLACED ON 4L N/C WHILE WALKING SPO2 91% HR 81.

## 2022-08-12 ENCOUNTER — TELEPHONE (OUTPATIENT)
Dept: FAMILY MEDICINE CLINIC | Age: 87
End: 2022-08-12

## 2022-08-12 NOTE — TELEPHONE ENCOUNTER
----- Message from Jalen Iglesias sent at 8/12/2022  9:58 AM EDT -----  Subject: Message to Provider    QUESTIONS  Information for Provider? PT NEEDS A ED F/U APPT PT WAS IN FOR SEVERE ABD   PAIN,AND RIGHT HIP PAIN AND PT HAS PAIN UNDER HER LEFT BREAST ,[facility] STILL IN A LOT OF PAIN AND NOT GETTING BETTER ,PT   IS FEELING SICK TO HER STOMACH ALSO CANT EAT   ---------------------------------------------------------------------------  --------------  Jennifer Gregg [facility]  [phone]; OK to leave message on voicemail  ---------------------------------------------------------------------------  --------------  SCRIPT ANSWERS  Relationship to Patient? Self  Do you have pain that has started or worsened within the past 24 hours? No  Are you vomiting blood or have bloody or black stool? No  Have you recently (14 days) seen a provider for this pain?  Yes

## 2022-08-15 ENCOUNTER — OFFICE VISIT (OUTPATIENT)
Dept: FAMILY MEDICINE CLINIC | Age: 87
End: 2022-08-15
Payer: MEDICARE

## 2022-08-15 VITALS
DIASTOLIC BLOOD PRESSURE: 77 MMHG | HEART RATE: 57 BPM | BODY MASS INDEX: 37.41 KG/M2 | WEIGHT: 173.4 LBS | HEIGHT: 57 IN | TEMPERATURE: 98 F | SYSTOLIC BLOOD PRESSURE: 122 MMHG | RESPIRATION RATE: 12 BRPM | OXYGEN SATURATION: 92 %

## 2022-08-15 DIAGNOSIS — K80.70 CALCULUS OF GALLBLADDER AND BILE DUCT WITHOUT CHOLECYSTITIS OR OBSTRUCTION: ICD-10-CM

## 2022-08-15 DIAGNOSIS — R10.11 RUQ ABDOMINAL PAIN: ICD-10-CM

## 2022-08-15 DIAGNOSIS — Z99.81 CHRONIC RESPIRATORY FAILURE WITH HYPOXIA, ON HOME O2 THERAPY (HCC): ICD-10-CM

## 2022-08-15 DIAGNOSIS — E78.2 MIXED HYPERLIPIDEMIA: ICD-10-CM

## 2022-08-15 DIAGNOSIS — I10 ESSENTIAL HYPERTENSION: ICD-10-CM

## 2022-08-15 DIAGNOSIS — I25.10 ATHEROSCLEROSIS OF NATIVE CORONARY ARTERY OF NATIVE HEART WITHOUT ANGINA PECTORIS: ICD-10-CM

## 2022-08-15 DIAGNOSIS — Z51.81 ENCOUNTER FOR MEDICATION MONITORING: ICD-10-CM

## 2022-08-15 DIAGNOSIS — Z09 ENCOUNTER FOR EXAMINATION FOLLOWING TREATMENT AT HOSPITAL: Primary | ICD-10-CM

## 2022-08-15 DIAGNOSIS — J96.11 CHRONIC RESPIRATORY FAILURE WITH HYPOXIA, ON HOME O2 THERAPY (HCC): ICD-10-CM

## 2022-08-15 DIAGNOSIS — F41.9 ANXIETY: ICD-10-CM

## 2022-08-15 DIAGNOSIS — K21.9 GASTRO-ESOPHAGEAL REFLUX DISEASE WITHOUT ESOPHAGITIS: ICD-10-CM

## 2022-08-15 LAB
ATRIAL RATE: 62 BPM
CALCULATED P AXIS, ECG09: 82 DEGREES
CALCULATED R AXIS, ECG10: -2 DEGREES
CALCULATED T AXIS, ECG11: 48 DEGREES
DIAGNOSIS, 93000: NORMAL
P-R INTERVAL, ECG05: 204 MS
Q-T INTERVAL, ECG07: 436 MS
QRS DURATION, ECG06: 152 MS
QTC CALCULATION (BEZET), ECG08: 442 MS
VENTRICULAR RATE, ECG03: 62 BPM

## 2022-08-15 PROCEDURE — G8752 SYS BP LESS 140: HCPCS | Performed by: FAMILY MEDICINE

## 2022-08-15 PROCEDURE — G8427 DOCREV CUR MEDS BY ELIG CLIN: HCPCS | Performed by: FAMILY MEDICINE

## 2022-08-15 PROCEDURE — G8417 CALC BMI ABV UP PARAM F/U: HCPCS | Performed by: FAMILY MEDICINE

## 2022-08-15 PROCEDURE — G0463 HOSPITAL OUTPT CLINIC VISIT: HCPCS | Performed by: FAMILY MEDICINE

## 2022-08-15 PROCEDURE — 1101F PT FALLS ASSESS-DOCD LE1/YR: CPT | Performed by: FAMILY MEDICINE

## 2022-08-15 PROCEDURE — G8400 PT W/DXA NO RESULTS DOC: HCPCS | Performed by: FAMILY MEDICINE

## 2022-08-15 PROCEDURE — G8536 NO DOC ELDER MAL SCRN: HCPCS | Performed by: FAMILY MEDICINE

## 2022-08-15 PROCEDURE — 1123F ACP DISCUSS/DSCN MKR DOCD: CPT | Performed by: FAMILY MEDICINE

## 2022-08-15 PROCEDURE — 99214 OFFICE O/P EST MOD 30 MIN: CPT | Performed by: FAMILY MEDICINE

## 2022-08-15 PROCEDURE — 1090F PRES/ABSN URINE INCON ASSESS: CPT | Performed by: FAMILY MEDICINE

## 2022-08-15 PROCEDURE — G8510 SCR DEP NEG, NO PLAN REQD: HCPCS | Performed by: FAMILY MEDICINE

## 2022-08-15 PROCEDURE — G8754 DIAS BP LESS 90: HCPCS | Performed by: FAMILY MEDICINE

## 2022-08-15 RX ORDER — PANTOPRAZOLE SODIUM 40 MG/1
40 TABLET, DELAYED RELEASE ORAL 2 TIMES DAILY
Qty: 180 TABLET | Refills: 3 | Status: ON HOLD | OUTPATIENT
Start: 2022-08-15

## 2022-08-15 RX ORDER — MAG HYDROX/ALUMINUM HYD/SIMETH 200-200-20
30 SUSPENSION, ORAL (FINAL DOSE FORM) ORAL
Status: ON HOLD | COMMUNITY
End: 2022-11-03

## 2022-08-15 NOTE — PROGRESS NOTES
HISTORY OF PRESENT ILLNESS  Chio Schaefer is a 82 y.o. female. HPI   Follow up on chronic medical problems. Seen in the ER on 8/11/22 for RUQ pain and \"gas pain\". Had CT scan and told that she has gallstones causing her pain. Has had increased pain still that comes and goes. Admits that she has been eating fried and high fat foods which seems to make her pain worse. She was referred to see surgery but she has not yet made her appt. Hx Acute hypoxic respiratory failure POA stable w/ supplemental oxygen. Seen by pulmonary on last month and was old that she could discontinue her oxygen. Restarted by ER earlier this month at ER visit. Has follow up with pulmonary in 3 months. Has been using O2 prn. Cardiovascular Review:  The patient has hypertension, hyperlipidemia, chronic diastolic heart failure, and obesity. Hx also of aortic stenosis. Diet and Lifestyle: generally follows a low fat low cholesterol diet, generally follows a low sodium diet, sedentary. Pertinent ROS: taking medications as instructed, no medication side effects noted, no TIA's, no chest pain on exertion, mild swelling of ankles, no orthostatic dizziness or lightheadedness, no palpitations,      Home BP Monitoring: is not measured at home. Anxiety Review:  Patient is seen for anxiety. Ongoing symptoms include: increased anxiety still related to family issues. Patient denies: palpitations, sweating, chest pain, shortness of breath. Reported side effects from the treatment: none.     Patient Active Problem List   Diagnosis Code    Hypokalemia E87.6    Hiatal hernia K44.9    Anxiety F41.9    S/P hysterectomy Z90.710    Aortic stenosis, mild I35.0    Spinal stenosis M48.00    S/P foot surgery Z98.890    Environmental allergies Z91.09    S/P cardiac catheterization Z98.890    Hypovitaminosis D E55.9    Chronic ischemic heart disease I25.9    Mixed hyperlipidemia E78.2    Chronic diastolic heart failure ([address] I50.32    Chest pain at rest R07.9    Bifascicular bundle branch block--RBBB,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    Atypical chest pain R07.89    Essential hypertension I10    Gastroesophageal reflux disease without esophagitis K21.9    Atherosclerosis of native coronary artery without angina pectoris--diffuse small vsl disease via cath cath 2009--RCA PDA/ROSA I25.10    Chronic anxiety F41.9    Encounter for medication monitoring Z51.81    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 M47.815    Class 2 obesity due to excess calories with serious comorbidity and body mass index (BMI) of 38.0 to 38.9 in adult [id number]    Paroxysmal cardiac arrhythmia--PVC's,APC's,NSSVT I49.8    Severe obesity (BMI 35.0-39. 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 25 Tablet 0    albuterol (PROVENTIL VENTOLIN) 2.5 mg /3 mL (0.083 %) nebu 3 mL by Nebulization route every four (4) hours as needed for Shortness of Breath or Wheezing. 90 Nebule 5    aspirin delayed-release 81 mg tablet Take 81 mg by mouth daily. OXYGEN-AIR DELIVERY SYSTEMS 5 L/min by Nasal route continuous. (Patient not taking: Reported on 8/11/2022)      albuterol (Ventolin HFA) 90 mcg/actuation inhaler Take 1 Puff by inhalation every four (4) hours as needed for Wheezing or Shortness of Breath.  (Patient not taking: Reported on 8/11/2022) 18 g 1    amLODIPine (NORVASC) 5 mg tablet Take 5 mg by mouth two (2) times a day. Indications: high blood pressure      losartan (COZAAR) 100 mg tablet TAKE 1 TABLET BY MOUTH EVERY DAY (Patient taking differently: Take 100 mg by mouth in the morning.) 90 Tablet 3    metoprolol tartrate (LOPRESSOR) 25 mg tablet TAKE 1 TABLET BY MOUTH TWICE A DAY 1 AT 9AM AND 1 AT 9PM (Patient taking differently: Take 25 mg by mouth two (2) times a day. Indications: high blood pressure) 180 Tablet 3    isosorbide mononitrate ER (IMDUR) 30 mg tablet TAKE 1 TABLET BY MOUTH EVERY DAY (Patient taking differently: Take 30 mg by mouth in the morning. Indications: prevention of anginal chest pain associated with coronary artery disease) 90 Tablet 3    rosuvastatin (CRESTOR) 20 mg tablet TAKE 1 TABLET BY MOUTH EVERY DAY AT NIGHT (Patient taking differently: Take 20 mg by mouth nightly. Indications: excessive fat in the blood) 30 Tablet 11    furosemide (LASIX) 80 mg tablet TAKE 1 TABLET BY MOUTH EVERY MORNING AND TAKE 1/2 TABLET EVERY EVENING 135 Tablet 3    cholecalciferol, vitamin D3, (Vitamin D3) 50 mcg (2,000 unit) tab Take 1 Tablet by mouth daily. acetaminophen (TYLENOL) 500 mg tablet Take 1,000 mg by mouth every six (6) hours as needed for Pain.  (Patient not taking: Reported on 8/11/2022)         Allergies   Allergen Reactions    Bactrim [Sulfamethoxazole-Trimethoprim] Unknown (comments)     Pt does not recall    Darvocet A500 [Propoxyphene N-Acetaminophen] Itching         Past Medical History:   Diagnosis Date    Anxiety     Aortic stenosis 3/3/2010    Aortic stenosis     Arrhythmia     MURMUR    Arthritis     SPINAL STENOSIS    Atherosclerosis of coronary artery     Biliary dyskinesia     CHF (congestive heart failure) ([facility] [address] 3/3/2010    CHF (congestive heart failure) ([facility] [address] 01/2002    Chronic heart failure ([facility] [address] 1/2002; 3/3/2010    chronic diastolic heart failure    Chronic pain     LOWER BACK, RIGHT KNEE Environmental allergies 3/3/2010    GERD (gastroesophageal reflux disease) 3/3/2010    Hiatal hernia 3/3/2010    High cholesterol 3/3/2010    HTN (hypertension) 3/3/2010    Hypokalemia 3/3/2010    Ischemic heart disease, chronic     Obese     Psychiatric disorder     anxiety    S/P hysterectomy 3/3/2010    Spinal stenosis 3/3/2010         Past Surgical History:   Procedure Laterality Date    CARDIAC CATHETERIZATION  01/2002    normal coronaries    DECOMPRESS DISC RF LUMBAR  07/2007    HX BACK SURGERY  5/1/2014    HX BREAST LUMPECTOMY Left 1989    benign left breast    HX BUNIONECTOMY      HX BUNIONECTOMY      HX HEART CATHETERIZATION  3/3/10    HX HYSTERECTOMY  1978    HX LUMBAR DISKECTOMY      HX ORTHOPAEDIC Bilateral     BUNIONECTOMY    HX ORTHOPAEDIC Right     WRIST FX    HX OTHER SURGICAL  10/12/2015    mole removed from right side of face by Dr. Stephenie Tipton FLX DX W/COLLJ Jony Anderson PFRMD  [id number]    Dr Nora Apple GI ENDOSCOPY,BIOPSY  2/27/2019              Family History   Problem Relation Age of Onset    Hypertension Mother     Heart Disease Father     Stroke Sister     Heart Disease Sister     Heart Disease Sister     Cancer Brother         LUNG    Cancer Brother         PROSTATE    Hypertension Brother     No Known Problems Brother     Lung Disease Brother     Heart Attack Brother     Lung Disease Brother     Stroke Brother     Stroke Daughter 47    No Known Problems Daughter     Anesth Problems Neg Hx        Social History     Tobacco Use    Smoking status: Never    Smokeless tobacco: Never   Substance Use Topics    Alcohol use: No     Alcohol/week: 0.0 standard drinks        Lab Results   Component Value Date/Time    WBC 5.0 08/11/2022 12:48 PM    HGB 12.1 08/11/2022 12:48 PM    HCT 38.8 08/11/2022 12:48 PM    PLATELET 760 69/06/6627 12:48 PM    MCV 92.2 08/11/2022 12:48 PM     Lab Results   Component Value Date/Time    Cholesterol, total 176 12/20/2021 11:05 AM    HDL Cholesterol 61 12/20/2021 11:05 AM    LDL, calculated 92.6 12/20/2021 11:05 AM    Triglyceride 112 12/20/2021 11:05 AM    CHOL/HDL Ratio 2.9 12/20/2021 11:05 AM     Lab Results   Component Value Date/Time    TSH 2.470 05/15/2017 03:04 PM      Lab Results   Component Value Date/Time    Sodium 142 08/11/2022 12:48 PM    Potassium 3.1 (L) 08/11/2022 12:48 PM    Chloride 102 08/11/2022 12:48 PM    CO2 36 (H) 08/11/2022 12:48 PM    Anion gap 4 (L) 08/11/2022 12:48 PM    Glucose 95 08/11/2022 12:48 PM    BUN 6 08/11/2022 12:48 PM    Creatinine 1.00 08/11/2022 12:48 PM    BUN/Creatinine ratio 6 (L) 08/11/2022 12:48 PM    GFR est AA >60 08/11/2022 12:48 PM    GFR est non-AA 53 (L) 08/11/2022 12:48 PM    Calcium 9.1 08/11/2022 12:48 PM    Bilirubin, total 0.6 08/11/2022 12:48 PM    ALT (SGPT) 14 08/11/2022 12:48 PM    Alk. phosphatase 84 08/11/2022 12:48 PM    Protein, total 7.8 08/11/2022 12:48 PM    Albumin 3.5 08/11/2022 12:48 PM    Globulin 4.3 (H) 08/11/2022 12:48 PM    A-G Ratio 0.8 (L) 08/11/2022 12:48 PM      Lab Results   Component Value Date/Time    Hemoglobin A1c 5.4 04/16/2014 12:20 PM    Hemoglobin A1c (POC) 5.5 11/26/2014 12:12 PM         Review of Systems   Constitutional:  Negative for malaise/fatigue. HENT:  Negative for congestion. Eyes:  Negative for blurred vision. Respiratory:  Negative for cough and shortness of breath. Cardiovascular:  Negative for chest pain, palpitations and leg swelling. Gastrointestinal:  Negative for abdominal pain, constipation and heartburn. Genitourinary:  Negative for dysuria, frequency and urgency. Musculoskeletal:  Negative for back pain and joint pain. Neurological:  Negative for dizziness, tingling and headaches. Endo/Heme/Allergies:  Negative for environmental allergies. Psychiatric/Behavioral:  Negative for depression. The patient does not have insomnia. Physical Exam  Vitals and nursing note reviewed. Constitutional:       Appearance: Normal appearance. She is well-developed. Comments: /63 (BP 1 Location: Right arm, BP Patient Position: Sitting, BP Cuff Size: Adult)   Pulse 69   Temp 98.1 °F (36.7 °C)   Resp 18   Ht 5' (1.524 m)   Wt 177 lb 3.2 oz (80.4 kg)   LMP  (LMP Unknown)   SpO2 91%   BMI 34.61 kg/m²    HENT:      Right Ear: Tympanic membrane and ear canal normal.      Left Ear: Tympanic membrane and ear canal normal.   Neck:      Thyroid: No thyromegaly. Cardiovascular:      Rate and Rhythm: Normal rate and regular rhythm. Heart sounds: Normal heart sounds. Pulmonary:      Effort: Pulmonary effort is normal. No respiratory distress. Breath sounds: Normal breath sounds. No wheezing or rhonchi. Abdominal:      General: Bowel sounds are normal.      Palpations: Abdomen is soft. There is no mass. Tenderness: There is no abdominal tenderness. Musculoskeletal:         General: Normal range of motion. Cervical back: Normal range of motion and neck supple. Right lower leg: No edema. Left lower leg: No edema. Lymphadenopathy:      Cervical: No cervical adenopathy. Skin:     General: Skin is warm and dry. Neurological:      General: No focal deficit present. Mental Status: She is alert and oriented to person, place, and time. Psychiatric:         Mood and Affect: Mood normal.         ASSESSMENT and PLAN  Diagnoses and all orders for this visit:    1. Encounter for examination following treatment at hospital    2. Calculus of gallbladder and bile duct without cholecystitis or obstruction//  3. RUQ abdominal pain  -     REFERRAL TO GENERAL SURGERY  Discussed bland diet and to avoid fried foods. 4. Gastro-esophageal reflux disease without esophagitis  -     increase pantoprazole (PROTONIX) 40 mg tablet; Take 1 Tablet by mouth two (2) times a day. 5. Chronic respiratory failure with hypoxia, on home O2 therapy ([address]  Follow up with pulmonary     6. Essential hypertension  Stable     7.  Mixed hyperlipidemia  Continue to monitor. Work on diet and exercise. 8. Atherosclerosis of native coronary artery of native heart without angina pectoris  Refer follow up with cardiology with she is due for. 9. Anxiety  Stable     10. Encounter for medication monitoring          Follow-up and Dispositions    Return in about 1 month (around 9/15/2022). reviewed diet, exercise and weight control  cardiovascular risk and specific lipid/LDL goals reviewed  reviewed medications and side effects in detail    I have discussed diagnosis listed in this note with pt and/or family. I have discussed treatment plans and options and the risk/benefit analysis of those options, including safe use of medications and possible medication side effects. Through the use of shared decision making we have agreed to the above plan. The patient has received an after-visit summary and questions were answered concerning future plans and follow up. Advise pt of any urgent changes then to proceed to the ER.

## 2022-08-15 NOTE — PROGRESS NOTES
Chief Complaint   Patient presents with    Hospital Follow Up       1. \"Have you been to the ER, urgent care clinic since your last visit? Hospitalized since your last visit? \" Yes Where: [employer]   Had gas    2. \"Have you seen or consulted any other health care providers outside of the [address] [place] [address] since your last visit? \" No     3. For patients aged 39-70: Has the patient had a colonoscopy / FIT/ Cologuard? No      If the patient is female:    4. For patients aged 41-77: Has the patient had a mammogram within the past 2 years? No      5. For patients aged 21-65: Has the patient had a pap smear? NA - based on age or sex    There are no preventive care reminders to display for this patient.

## 2022-08-29 ENCOUNTER — TELEPHONE (OUTPATIENT)
Dept: FAMILY MEDICINE CLINIC | Age: 87
End: 2022-08-29

## 2022-08-29 NOTE — TELEPHONE ENCOUNTER
Patient would like a call from [address] [place] [address] as to why she can no longer take tylenol please give her a call @ [phone]

## 2022-08-30 ENCOUNTER — OFFICE VISIT (OUTPATIENT)
Dept: SURGERY | Age: 87
End: 2022-08-30

## 2022-08-30 VITALS
HEART RATE: 64 BPM | DIASTOLIC BLOOD PRESSURE: 83 MMHG | BODY MASS INDEX: 36.72 KG/M2 | TEMPERATURE: 98.1 F | OXYGEN SATURATION: 91 % | HEIGHT: 57 IN | SYSTOLIC BLOOD PRESSURE: 138 MMHG | RESPIRATION RATE: 22 BRPM | WEIGHT: 170.2 LBS

## 2022-08-30 DIAGNOSIS — K76.9 LIVER LESION: ICD-10-CM

## 2022-08-30 DIAGNOSIS — K80.20 GALLSTONES: Primary | ICD-10-CM

## 2022-08-30 PROCEDURE — 1123F ACP DISCUSS/DSCN MKR DOCD: CPT | Performed by: SURGERY

## 2022-08-30 PROCEDURE — G8400 PT W/DXA NO RESULTS DOC: HCPCS | Performed by: SURGERY

## 2022-08-30 PROCEDURE — 99213 OFFICE O/P EST LOW 20 MIN: CPT | Performed by: SURGERY

## 2022-08-30 PROCEDURE — G8432 DEP SCR NOT DOC, RNG: HCPCS | Performed by: SURGERY

## 2022-08-30 PROCEDURE — G8752 SYS BP LESS 140: HCPCS | Performed by: SURGERY

## 2022-08-30 PROCEDURE — G8417 CALC BMI ABV UP PARAM F/U: HCPCS | Performed by: SURGERY

## 2022-08-30 PROCEDURE — 1101F PT FALLS ASSESS-DOCD LE1/YR: CPT | Performed by: SURGERY

## 2022-08-30 PROCEDURE — G8427 DOCREV CUR MEDS BY ELIG CLIN: HCPCS | Performed by: SURGERY

## 2022-08-30 PROCEDURE — G8754 DIAS BP LESS 90: HCPCS | Performed by: SURGERY

## 2022-08-30 PROCEDURE — G8536 NO DOC ELDER MAL SCRN: HCPCS | Performed by: SURGERY

## 2022-08-30 PROCEDURE — 1090F PRES/ABSN URINE INCON ASSESS: CPT | Performed by: SURGERY

## 2022-08-30 NOTE — Clinical Note
8/30/2022    Patient: Geronimo Valencia   YOB: 1935   Date of Visit: 8/30/2022     Queen Chaya MD  [address] [place] [address]    Dear Queen Chaya MD,      Thank you for referring Ms. Cynthia Regalado to  [facility]  for evaluation. My notes for this consultation are attached. If you have questions, please do not hesitate to call me. I look forward to following your patient along with you.       Sincerely,    Srinivasa Bojorquez MD

## 2022-08-30 NOTE — PROGRESS NOTES
To: Kolby Albright MD , James Santoro NP    From: Vu Malone MD    Thank you for sending Katherine Conroy back to see us. Nice to see her again. Encounter Date: 8/30/2022    Subjective: Nick Benson is a 82 y.o. female presents for f/u of upper abdominal pain. It starts in her right upper quadrant and moves around her right flank. She says it occurs with eating. She is also been having a lot of belching. No n/v. We saw her in April of last year for similar symptoms and then got a HIDA which did show likely chronic cholecystitis. I have seen her in the past and in 2019 she was going to see Dr. Familia Arellano and then hopefully be cleared for surgery but somehow this never occurred. She has an abnormal ultrasound showing gallstones as well as an abnormal HIDA scan showing delayed filling and an ejection fraction of 0%. When she went to the ER at [facility] recently for the discomfort they did a CAT scan and it was recommended that she undergo a triple phase CT of her liver to evaluate a possible liver lesion. I do not see that this has been ordered. Objective:     Visit Vitals  /83 (BP 1 Location: Right upper arm, BP Patient Position: Sitting, BP Cuff Size: Large adult)   Pulse 64   Temp 98.1 °F (36.7 °C) (Temporal)   Resp 22   Ht 4' 9\" (1.448 m)   Wt 77.2 kg (170 lb 3.2 oz)   SpO2 91%   BMI 36.83 kg/m²       General:  alert, cooperative, no distress, appears stated age   Abdomen: soft, bowel sounds active, mildly tender to palpation in the right upper quadrant    Lower midline scar. Assessment:     Chronic cholecystitis. Possible liver lesion. Plan:     Ordered triple phase CT of the liver as recommended by Dr. Olivares Pac. Will try to get her over ASAP with James Santoro. If ok to proceed with plan lap susanne with cholangiogram.    Plan for 23hr admit.       Vu Malone MD

## 2022-08-30 NOTE — PROGRESS NOTES
Chief Complaint   Patient presents with    Gallbladder Attack     Seen at the request of Dr. Luisito Parrish, roqueal calculus of bile duct and gallbladder. 1. Have you been to the ER, urgent care clinic since your last visit? Hospitalized since your last visit? [facility]    2. Have you seen or consulted any other health care providers outside of the [address] [place] [address] since your last visit? Include any pap smears or colon screening.  No

## 2022-09-13 ENCOUNTER — TELEPHONE (OUTPATIENT)
Dept: SURGERY | Age: 87
End: 2022-09-13

## 2022-09-13 NOTE — TELEPHONE ENCOUNTER
Patient returning call. Patient was instructed to call our office. Patient states she just had her CT scan done yesterday.

## 2022-09-14 ENCOUNTER — TELEPHONE (OUTPATIENT)
Dept: SURGERY | Age: 87
End: 2022-09-14

## 2022-09-14 ENCOUNTER — OFFICE VISIT (OUTPATIENT)
Dept: FAMILY MEDICINE CLINIC | Age: 87
End: 2022-09-14
Payer: MEDICARE

## 2022-09-14 VITALS
BODY MASS INDEX: 36.55 KG/M2 | OXYGEN SATURATION: 96 % | RESPIRATION RATE: 16 BRPM | HEIGHT: 57 IN | SYSTOLIC BLOOD PRESSURE: 123 MMHG | WEIGHT: 169.4 LBS | TEMPERATURE: 97.9 F | DIASTOLIC BLOOD PRESSURE: 75 MMHG | HEART RATE: 86 BPM

## 2022-09-14 DIAGNOSIS — I50.32 CHRONIC DIASTOLIC HEART FAILURE (HCC): ICD-10-CM

## 2022-09-14 DIAGNOSIS — I10 ESSENTIAL HYPERTENSION: ICD-10-CM

## 2022-09-14 DIAGNOSIS — Z87.09 HISTORY OF RESPIRATORY FAILURE: ICD-10-CM

## 2022-09-14 DIAGNOSIS — R10.11 RUQ ABDOMINAL PAIN: ICD-10-CM

## 2022-09-14 DIAGNOSIS — I25.10 ATHEROSCLEROSIS OF NATIVE CORONARY ARTERY OF NATIVE HEART WITHOUT ANGINA PECTORIS: ICD-10-CM

## 2022-09-14 DIAGNOSIS — F41.9 ANXIETY: ICD-10-CM

## 2022-09-14 DIAGNOSIS — Z51.81 ENCOUNTER FOR MEDICATION MONITORING: ICD-10-CM

## 2022-09-14 DIAGNOSIS — K21.9 GASTRO-ESOPHAGEAL REFLUX DISEASE WITHOUT ESOPHAGITIS: ICD-10-CM

## 2022-09-14 DIAGNOSIS — K80.70 CALCULUS OF GALLBLADDER AND BILE DUCT WITHOUT CHOLECYSTITIS OR OBSTRUCTION: Primary | ICD-10-CM

## 2022-09-14 PROCEDURE — G8752 SYS BP LESS 140: HCPCS | Performed by: FAMILY MEDICINE

## 2022-09-14 PROCEDURE — 1101F PT FALLS ASSESS-DOCD LE1/YR: CPT | Performed by: FAMILY MEDICINE

## 2022-09-14 PROCEDURE — 99214 OFFICE O/P EST MOD 30 MIN: CPT | Performed by: FAMILY MEDICINE

## 2022-09-14 PROCEDURE — 1123F ACP DISCUSS/DSCN MKR DOCD: CPT | Performed by: FAMILY MEDICINE

## 2022-09-14 PROCEDURE — 1090F PRES/ABSN URINE INCON ASSESS: CPT | Performed by: FAMILY MEDICINE

## 2022-09-14 PROCEDURE — G8400 PT W/DXA NO RESULTS DOC: HCPCS | Performed by: FAMILY MEDICINE

## 2022-09-14 PROCEDURE — G8417 CALC BMI ABV UP PARAM F/U: HCPCS | Performed by: FAMILY MEDICINE

## 2022-09-14 PROCEDURE — G8754 DIAS BP LESS 90: HCPCS | Performed by: FAMILY MEDICINE

## 2022-09-14 PROCEDURE — G8536 NO DOC ELDER MAL SCRN: HCPCS | Performed by: FAMILY MEDICINE

## 2022-09-14 PROCEDURE — G8427 DOCREV CUR MEDS BY ELIG CLIN: HCPCS | Performed by: FAMILY MEDICINE

## 2022-09-14 PROCEDURE — G8510 SCR DEP NEG, NO PLAN REQD: HCPCS | Performed by: FAMILY MEDICINE

## 2022-09-14 PROCEDURE — G0463 HOSPITAL OUTPT CLINIC VISIT: HCPCS | Performed by: FAMILY MEDICINE

## 2022-09-14 NOTE — PROGRESS NOTES
Patient identified with two identification factors, Name and Date of Birth. Chief Complaint   Patient presents with    Abdominal Pain     1 month follow-up. Visit Vitals  /75 (BP 1 Location: Right arm, BP Patient Position: Sitting, BP Cuff Size: Adult)   Pulse 86   Temp 97.9 °F (36.6 °C) (Oral)   Resp 16   Ht 4' 9\" (1.448 m)   Wt 169 lb 6.4 oz (76.8 kg)   LMP  (LMP Unknown)   SpO2 96%   BMI 36.66 kg/m²       Health Maintenance Due   Topic    Flu Vaccine (1)      Fall Risk Assessment, last 12 mths 9/14/2022   Able to walk? Yes   Fall in past 12 months? 0   Do you feel unsteady? 1   Are you worried about falling 1   Is TUG test greater than 12 seconds? 0   Is the gait abnormal? 0   Number of falls in past 12 months 0   Fall with injury? -           1. \"Have you been to the ER, urgent care clinic since your last visit? Hospitalized since your last visit? \" No    2. \"Have you seen or consulted any other health care providers outside of the [address] [place] [address] since your last visit? \" No     3. For patients aged 39-70: Has the patient had a colonoscopy / FIT/ Cologuard? NA - based on age      If the patient is female:    4. For patients aged 41-77: Has the patient had a mammogram within the past 2 years? NA - based on age or sex      11. For patients aged 21-65: Has the patient had a pap smear?  NA - based on age or sex

## 2022-09-14 NOTE — TELEPHONE ENCOUNTER
Patient called and is wanting to know if she can have x ray done at pcp office this morning, please call  [phone]

## 2022-09-14 NOTE — PROGRESS NOTES
HISTORY OF PRESENT ILLNESS  Demi Kang is a 82 y.o. female. HPI   Follow up on chronic medical problems. Seen in the ER on 8/11/22 for RUQ pain and \"gas pain\". Had CT scan and told that she has gallstones causing her pain. Has had increased pain still that comes and goes. She has seen surgery. Had Hida Scan this past Monday and waiting to get her results. Has been cleared for surgery by cardiologist.   Hx Acute hypoxic respiratory failure at her admission in June and was on home O2. Seen by pulmonary on last month and was told that she could discontinue her oxygen. Overall her breathing is back to her usual baseline. Cardiovascular Review:  The patient has hypertension, hyperlipidemia, chronic diastolic heart failure, and obesity. Hx also of aortic stenosis. Diet and Lifestyle: generally follows a low fat low cholesterol diet, generally follows a low sodium diet, sedentary. Pertinent ROS: taking medications as instructed, no medication side effects noted, no TIA's, no chest pain on exertion, mild swelling of ankles, no orthostatic dizziness or lightheadedness, no palpitations,      Home BP Monitoring: is not measured at home. Anxiety Review:  Patient is seen for anxiety. Ongoing symptoms include: increased anxiety still related to family issues. Patient denies: palpitations, sweating, chest pain, shortness of breath. Reported side effects from the treatment: none.     Patient Active Problem List   Diagnosis Code    Hypokalemia E87.6    Hiatal hernia K44.9    Anxiety F41.9    S/P hysterectomy Z90.710    Aortic stenosis, mild I35.0    Spinal stenosis M48.00    S/P foot surgery Z98.890    Environmental allergies Z91.09    S/P cardiac catheterization Z98.890    Hypovitaminosis D E55.9    Chronic ischemic heart disease I25.9    Mixed hyperlipidemia E78.2    Chronic diastolic heart failure (HCC) I50.32    Chest pain at rest R07.9    Bifascicular bundle branch block--RBBB,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    Atypical chest pain R07.89    Essential hypertension I10    Gastroesophageal reflux disease without esophagitis K21.9    Atherosclerosis of native coronary artery without angina pectoris--diffuse small vsl disease via cath cath 2009--RCA PDA/ROSA I25.10    Chronic anxiety F41.9    Encounter for medication monitoring Z51.81    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 M47.815    Class 2 obesity due to excess calories with serious comorbidity and body mass index (BMI) of 38.0 to 38.9 in adult [id number]    Paroxysmal cardiac arrhythmia--PVC's,APC's,NSSVT I49.8    Severe obesity (BMI 35.0-39. 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 pantoprazole (PROTONIX) 40 mg tablet Take 1 Tablet by mouth two (2) times a day. 180 Tablet 3    potassium chloride SR (KLOR-CON 10) 10 mEq tablet TAKE 3 TABS BY MOUTH 2 TIMES PER  Tablet 3    clonazePAM (KlonoPIN) 1 mg tablet TAKE 1/2 TO 1 TABLET EVERY MORNING AND AS NEEDED FOR ANXIETY, AND TAKE 1 TABLET AT BEDTIME FOR SLEEP 60 Tablet 1    nitroglycerin (NITROSTAT) 0.4 mg SL tablet DISSOLVE 1 TAB BY SUBLINGUAL ROUTE EVERY 5 MINUTES AS NEEDED. 25 Tablet 0    albuterol (PROVENTIL VENTOLIN) 2.5 mg /3 mL (0.083 %) nebu 3 mL by Nebulization route every four (4) hours as needed for Shortness of Breath or Wheezing. 90 Nebule 5    aspirin delayed-release 81 mg tablet Take 81 mg by mouth daily. amLODIPine (NORVASC) 5 mg tablet Take 5 mg by mouth two (2) times a day.  Indications: high blood pressure      losartan (COZAAR) 100 mg tablet TAKE 1 TABLET BY MOUTH EVERY DAY (Patient taking differently: Take 100 mg by mouth daily.) 90 Tablet 3    metoprolol tartrate (LOPRESSOR) 25 mg tablet TAKE 1 TABLET BY MOUTH TWICE A DAY 1 AT 9AM AND 1 AT 9PM (Patient taking differently: Take 25 mg by mouth two (2) times a day. Indications: high blood pressure) 180 Tablet 3    isosorbide mononitrate ER (IMDUR) 30 mg tablet TAKE 1 TABLET BY MOUTH EVERY DAY (Patient taking differently: Take 30 mg by mouth daily. Indications: prevention of anginal chest pain associated with coronary artery disease) 90 Tablet 3    rosuvastatin (CRESTOR) 20 mg tablet TAKE 1 TABLET BY MOUTH EVERY DAY AT NIGHT (Patient taking differently: Take 20 mg by mouth nightly. Indications: excessive fat in the blood) 30 Tablet 11    furosemide (LASIX) 80 mg tablet TAKE 1 TABLET BY MOUTH EVERY MORNING AND TAKE 1/2 TABLET EVERY EVENING 135 Tablet 3    cholecalciferol, vitamin D3, (Vitamin D3) 50 mcg (2,000 unit) tab Take 1 Tablet by mouth daily. OXYGEN-AIR DELIVERY SYSTEMS 5 L/min by Nasal route continuous. (Patient not taking: No sig reported)      albuterol (Ventolin HFA) 90 mcg/actuation inhaler Take 1 Puff by inhalation every four (4) hours as needed for Wheezing or Shortness of Breath.  (Patient not taking: No sig reported) 18 g 1       Allergies   Allergen Reactions    Bactrim [Sulfamethoxazole-Trimethoprim] Unknown (comments)     Pt does not recall    Darvocet A500 [Propoxyphene N-Acetaminophen] Itching         Past Medical History:   Diagnosis Date    Anxiety     Aortic stenosis 03/03/2010    Aortic stenosis     Arrhythmia     MURMUR    Arthritis     SPINAL STENOSIS    Atherosclerosis of coronary artery     Biliary dyskinesia     CHF (congestive heart failure) ([facility] [address] 03/03/2010    CHF (congestive heart failure) ([facility] [address] 01/2002    Chronic heart failure ([facility] [address] 1/2002; 3/3/2010    chronic diastolic heart failure    Chronic pain     LOWER BACK, RIGHT KNEE    Environmental allergies 03/03/2010    GERD (gastroesophageal reflux disease) 03/03/2010    Hiatal hernia 03/03/2010    High cholesterol 03/03/2010    HTN (hypertension) 03/03/2010    Hypokalemia 03/03/2010    Ischemic heart disease, chronic     Multiple gallstones     Obese     Psychiatric disorder     anxiety    S/P hysterectomy 03/03/2010    Spinal stenosis 03/03/2010         Past Surgical History:   Procedure Laterality Date    CARDIAC CATHETERIZATION  01/2002    normal coronaries    DECOMPRESS DISC RF LUMBAR  07/2007    HX BACK SURGERY  5/1/2014    HX BREAST LUMPECTOMY Left 1989    benign left breast    HX BUNIONECTOMY      HX BUNIONECTOMY      HX HEART CATHETERIZATION  3/3/10    HX HYSTERECTOMY  1978    HX LUMBAR DISKECTOMY      HX ORTHOPAEDIC Bilateral     BUNIONECTOMY    HX ORTHOPAEDIC Right     WRIST FX    HX OTHER SURGICAL  10/12/2015    mole removed from right side of face by Dr. Enoch Childs FLX DX W/COLLENRIQUE [place] [facility]  [id number]    Dr Delfino Nascimento GI ENDOSCOPY,BIOPSY  2/27/2019              Family History   Problem Relation Age of Onset    Hypertension Mother     Heart Disease Father     Stroke Sister     Heart Disease Sister     Heart Disease Sister     Cancer Brother         LUNG    Cancer Brother         PROSTATE    Hypertension Brother     No Known Problems Brother     Lung Disease Brother     Heart Attack Brother     Lung Disease Brother     Stroke Brother     Stroke Daughter 47    No Known Problems Daughter     Anesth Problems Neg Hx        Social History     Tobacco Use    Smoking status: Never    Smokeless tobacco: Never   Substance Use Topics    Alcohol use: No     Alcohol/week: 0.0 standard drinks        Lab Results   Component Value Date/Time    WBC 5.0 08/11/2022 12:48 PM    HGB 12.1 08/11/2022 12:48 PM    HCT 38.8 08/11/2022 12:48 PM    PLATELET 184 91/76/3885 12:48 PM    MCV 92.2 08/11/2022 12:48 PM     Lab Results   Component Value Date/Time    Cholesterol, total 176 12/20/2021 11:05 AM    HDL Cholesterol 61 12/20/2021 11:05 AM    LDL, calculated 92.6 12/20/2021 11:05 AM    Triglyceride 112 12/20/2021 11:05 AM    CHOL/HDL Ratio 2.9 12/20/2021 11:05 AM     Lab Results   Component Value Date/Time    TSH 2.470 05/15/2017 03:04 PM      Lab Results   Component Value Date/Time    Sodium 142 08/11/2022 12:48 PM    Potassium 3.1 (L) 08/11/2022 12:48 PM    Chloride 102 08/11/2022 12:48 PM    CO2 36 (H) 08/11/2022 12:48 PM    Anion gap 4 (L) 08/11/2022 12:48 PM    Glucose 95 08/11/2022 12:48 PM    BUN 6 08/11/2022 12:48 PM    Creatinine 1.00 08/11/2022 12:48 PM    BUN/Creatinine ratio 6 (L) 08/11/2022 12:48 PM    GFR est AA >60 08/11/2022 12:48 PM    GFR est non-AA 53 (L) 08/11/2022 12:48 PM    Calcium 9.1 08/11/2022 12:48 PM    Bilirubin, total 0.6 08/11/2022 12:48 PM    ALT (SGPT) 14 08/11/2022 12:48 PM    Alk. phosphatase 84 08/11/2022 12:48 PM    Protein, total 7.8 08/11/2022 12:48 PM    Albumin 3.5 08/11/2022 12:48 PM    Globulin 4.3 (H) 08/11/2022 12:48 PM    A-G Ratio 0.8 (L) 08/11/2022 12:48 PM      Lab Results   Component Value Date/Time    Hemoglobin A1c 5.4 04/16/2014 12:20 PM    Hemoglobin A1c (POC) 5.5 11/26/2014 12:12 PM         Review of Systems   Constitutional:  Negative for malaise/fatigue. HENT:  Negative for congestion. Eyes:  Negative for blurred vision. Respiratory:  Negative for cough and shortness of breath. Cardiovascular:  Negative for chest pain, palpitations and leg swelling. Gastrointestinal:  Negative for abdominal pain, constipation and heartburn. Genitourinary:  Negative for dysuria, frequency and urgency. Musculoskeletal:  Negative for back pain and joint pain. Neurological:  Negative for dizziness, tingling and headaches. Endo/Heme/Allergies:  Negative for environmental allergies. Psychiatric/Behavioral:  Negative for depression. The patient does not have insomnia. Physical Exam  Vitals and nursing note reviewed. Constitutional:       Appearance: Normal appearance. She is well-developed.       Comments: /63 (BP 1 Location: Right arm, BP Patient Position: Sitting, BP Cuff Size: Adult)   Pulse 69   Temp 98.1 °F (36.7 °C)   Resp 18   Ht 5' (1.524 m)   Wt 177 lb 3.2 oz (80.4 kg)   LMP  (LMP Unknown)   SpO2 91%   BMI 34.61 kg/m²    HENT:      Right Ear: Tympanic membrane and ear canal normal.      Left Ear: Tympanic membrane and ear canal normal.   Neck:      Thyroid: No thyromegaly. Cardiovascular:      Rate and Rhythm: Normal rate and regular rhythm. Heart sounds: Normal heart sounds. Pulmonary:      Effort: Pulmonary effort is normal. No respiratory distress. Breath sounds: Normal breath sounds. No wheezing or rhonchi. Abdominal:      General: Bowel sounds are normal.      Palpations: Abdomen is soft. There is no mass. Tenderness: There is no abdominal tenderness. Musculoskeletal:         General: Normal range of motion. Cervical back: Normal range of motion and neck supple. Right lower leg: No edema. Left lower leg: No edema. Lymphadenopathy:      Cervical: No cervical adenopathy. Skin:     General: Skin is warm and dry. Neurological:      General: No focal deficit present. Mental Status: She is alert and oriented to person, place, and time. Psychiatric:         Mood and Affect: Mood normal.     ASSESSMENT and PLAN  Diagnoses and all orders for this visit:    1. Calculus of gallbladder and bile duct without cholecystitis or obstruction  2. RUQ abdominal pain  As per surgery. 3. Essential hypertension  Stable     4. Atherosclerosis of native coronary artery of native heart without angina pectoris  5. Chronic diastolic heart failure (HCC)  Stable     6. History of respiratory failure  Stable off of oxygen     7. Gastro-esophageal reflux disease without esophagitis  Stable on protonix    8. Chronic Anxiety  Stable on clonazapam.      9. Encounter for medication monitoring      Follow-up and Dispositions    Return in about 2 months (around 11/14/2022).        reviewed diet, exercise and weight control  cardiovascular risk and specific lipid/LDL goals reviewed  reviewed medications and side effects in detail    I have discussed diagnosis listed in this note with pt and/or family. I have discussed treatment plans and options and the risk/benefit analysis of those options, including safe use of medications and possible medication side effects. Through the use of shared decision making we have agreed to the above plan. The patient has received an after-visit summary and questions were answered concerning future plans and follow up. Advise pt of any urgent changes then to proceed to the ER.

## 2022-09-27 ENCOUNTER — HOSPITAL ENCOUNTER (OUTPATIENT)
Dept: CT IMAGING | Age: 87
Discharge: HOME | End: 2022-09-27
Attending: SURGERY
Payer: MEDICARE

## 2022-09-27 DIAGNOSIS — K76.9 LIVER LESION: ICD-10-CM

## 2022-09-27 PROCEDURE — 74170 CT ABD WO CNTRST FLWD CNTRST: CPT

## 2022-09-27 PROCEDURE — 74011000636 HC RX REV CODE- 636: Performed by: SURGERY

## 2022-09-27 RX ADMIN — IOPAMIDOL 100 ML: 755 INJECTION, SOLUTION INTRAVENOUS at 10:25

## 2022-09-28 ENCOUNTER — TELEPHONE (OUTPATIENT)
Dept: FAMILY MEDICINE CLINIC | Age: 87
End: 2022-09-28

## 2022-09-28 NOTE — TELEPHONE ENCOUNTER
Patient called requesting a call back from Dr. Mcallister. She declined to share any information other than it's personal. She can be reached at [phone].

## 2022-09-28 NOTE — TELEPHONE ENCOUNTER
Pt state she has a boil on her vagina area x 2 weeks. I asked if she soaked in a warm bath with epsom salt and she stated no because you know what to do because she had this before. The boil has not come to a head and it is painful.   [phone]

## 2022-09-29 RX ORDER — CEPHALEXIN 500 MG/1
500 CAPSULE ORAL 3 TIMES DAILY
Qty: 21 CAPSULE | Refills: 0 | Status: SHIPPED | OUTPATIENT
Start: 2022-09-29 | End: 2022-10-06

## 2022-09-29 NOTE — TELEPHONE ENCOUNTER
Boil in he vagina. Has not come to head. Has not used any heat. Has had same boil in the past and sometime will pop and drain pus. Advised to use warm compresses 2 to 3 times daily and will add abx. To UC if not improving over the weekend.

## 2022-10-15 DIAGNOSIS — F41.9 ANXIETY: Chronic | ICD-10-CM

## 2022-10-17 RX ORDER — CLONAZEPAM 1 MG/1
TABLET ORAL
Qty: 60 TABLET | Refills: 1 | Status: ON HOLD | OUTPATIENT
Start: 2022-10-17 | End: 2022-11-03

## 2022-10-19 ENCOUNTER — TELEPHONE (OUTPATIENT)
Dept: SURGERY | Age: 87
End: 2022-10-19

## 2022-10-19 NOTE — TELEPHONE ENCOUNTER
IMPRESSION  1.  3.5 x 4.4 cm segment 4 hepatic lesion does not display characteristic  findings of HCC. Cholangiocarcinoma versus metastatic disease remains a  consideration. MRI versus biopsy recommended. 2.  Cholelithiasis. Sent to me for symptomatic cholelithiasis but the liver lesion is still not entirely worked up. It has grown in size over time. Biopsy would be difficult due to its location at the dome of the bladder and its proximity to the diaphragm. If we decide to proceed with cholecystectomy we could attempt intraoperative ultrasound-guided core needle biopsy. As recommended by radiology, however, we will attempt to better characterize it with MRI first.    There was no answer at the provided phone number. I left a voicemail with our phone number.

## 2022-10-20 ENCOUNTER — TELEPHONE (OUTPATIENT)
Dept: SURGERY | Age: 87
End: 2022-10-20

## 2022-10-20 NOTE — TELEPHONE ENCOUNTER
Pt called stating she had a missed called from Alexia Guzmán in regards to ct scan I couldn't documentation    Pt would like to speak to nurse    Call back num [phone]

## 2022-10-27 DIAGNOSIS — R16.0 LIVER MASS, RIGHT LOBE: Primary | ICD-10-CM

## 2022-10-27 NOTE — TELEPHONE ENCOUNTER
Tried to reach patient again. Got answering machine. Left message that we have ordered an MRI and for her to please call us back.

## 2022-10-30 DIAGNOSIS — I10 ESSENTIAL (PRIMARY) HYPERTENSION: ICD-10-CM

## 2022-10-31 RX ORDER — AMLODIPINE BESYLATE 5 MG/1
TABLET ORAL
Qty: 60 TABLET | Refills: 11 | Status: ON HOLD | OUTPATIENT
Start: 2022-10-31 | End: 2022-11-03

## 2022-11-01 ENCOUNTER — TELEPHONE (OUTPATIENT)
Dept: SURGERY | Age: 87
End: 2022-11-01

## 2022-11-01 NOTE — TELEPHONE ENCOUNTER
Calling back doctor in regards to [address] on setting up, is reachable at     [phone]

## 2022-11-03 ENCOUNTER — HOSPITAL ENCOUNTER (OUTPATIENT)
Age: 87
Setting detail: OBSERVATION
Discharge: HOME | End: 2022-11-05
Attending: EMERGENCY MEDICINE | Admitting: STUDENT IN AN ORGANIZED HEALTH CARE EDUCATION/TRAINING PROGRAM
Payer: MEDICARE

## 2022-11-03 ENCOUNTER — APPOINTMENT (OUTPATIENT)
Dept: GENERAL RADIOLOGY | Age: 87
End: 2022-11-03
Attending: EMERGENCY MEDICINE
Payer: MEDICARE

## 2022-11-03 DIAGNOSIS — J18.9 PNEUMONIA OF BOTH LOWER LOBES DUE TO INFECTIOUS ORGANISM: Primary | ICD-10-CM

## 2022-11-03 DIAGNOSIS — J44.1 COPD EXACERBATION (HCC): ICD-10-CM

## 2022-11-03 LAB
ANION GAP SERPL CALC-SCNC: 4 MMOL/L (ref 5–15)
ARTERIAL PATENCY WRIST A: YES
BASE DEFICIT BLDA-SCNC: 1 MMOL/L
BASOPHILS # BLD: 0 K/UL (ref 0–0.1)
BASOPHILS NFR BLD: 1 % (ref 0–1)
BDY SITE: NORMAL
BNP SERPL-MCNC: 287 PG/ML (ref 0–450)
BUN SERPL-MCNC: 14 MG/DL (ref 6–20)
BUN/CREAT SERPL: 12 (ref 12–20)
CALCIUM SERPL-MCNC: 9.2 MG/DL (ref 8.5–10.1)
CHLORIDE SERPL-SCNC: 104 MMOL/L (ref 97–108)
CO2 SERPL-SCNC: 29 MMOL/L (ref 21–32)
CREAT SERPL-MCNC: 1.13 MG/DL (ref 0.55–1.02)
DIFFERENTIAL METHOD BLD: ABNORMAL
EOSINOPHIL # BLD: 0.5 K/UL (ref 0–0.4)
EOSINOPHIL NFR BLD: 8 % (ref 0–7)
ERYTHROCYTE [DISTWIDTH] IN BLOOD BY AUTOMATED COUNT: 13.4 % (ref 11.5–14.5)
FLUAV RNA SPEC QL NAA+PROBE: NOT DETECTED
FLUBV RNA SPEC QL NAA+PROBE: NOT DETECTED
GLUCOSE SERPL-MCNC: 107 MG/DL (ref 65–100)
HCO3 BLDA-SCNC: 24 MMOL/L (ref 22–26)
HCT VFR BLD AUTO: 41.6 % (ref 35–47)
HGB BLD-MCNC: 13.3 G/DL (ref 11.5–16)
IMM GRANULOCYTES # BLD AUTO: 0 K/UL (ref 0–0.04)
IMM GRANULOCYTES NFR BLD AUTO: 0 % (ref 0–0.5)
LYMPHOCYTES # BLD: 1.1 K/UL (ref 0.8–3.5)
LYMPHOCYTES NFR BLD: 19 % (ref 12–49)
MCH RBC QN AUTO: 27.6 PG (ref 26–34)
MCHC RBC AUTO-ENTMCNC: 32 G/DL (ref 30–36.5)
MCV RBC AUTO: 86.3 FL (ref 80–99)
MONOCYTES # BLD: 0.4 K/UL (ref 0–1)
MONOCYTES NFR BLD: 7 % (ref 5–13)
NEUTS SEG # BLD: 3.6 K/UL (ref 1.8–8)
NEUTS SEG NFR BLD: 65 % (ref 32–75)
NRBC # BLD: 0 K/UL (ref 0–0.01)
NRBC BLD-RTO: 0 PER 100 WBC
PCO2 BLDA: 39 MMHG (ref 35–45)
PH BLDA: 7.4 [PH] (ref 7.35–7.45)
PLATELET # BLD AUTO: 178 K/UL (ref 150–400)
PMV BLD AUTO: 11.1 FL (ref 8.9–12.9)
PO2 BLDA: 83 MMHG (ref 80–100)
POTASSIUM SERPL-SCNC: 4.1 MMOL/L (ref 3.5–5.1)
RBC # BLD AUTO: 4.82 M/UL (ref 3.8–5.2)
SAO2 % BLD: 96 % (ref 92–97)
SAO2% DEVICE SAO2% SENSOR NAME: NORMAL
SARS-COV-2, COV2: NOT DETECTED
SODIUM SERPL-SCNC: 137 MMOL/L (ref 136–145)
SPECIMEN SITE: NORMAL
TROPONIN-HIGH SENSITIVITY: 12 NG/L (ref 0–51)
WBC # BLD AUTO: 5.6 K/UL (ref 3.6–11)

## 2022-11-03 PROCEDURE — 87040 BLOOD CULTURE FOR BACTERIA: CPT

## 2022-11-03 PROCEDURE — 74011250637 HC RX REV CODE- 250/637: Performed by: EMERGENCY MEDICINE

## 2022-11-03 PROCEDURE — 94760 N-INVAS EAR/PLS OXIMETRY 1: CPT

## 2022-11-03 PROCEDURE — 74011250637 HC RX REV CODE- 250/637: Performed by: STUDENT IN AN ORGANIZED HEALTH CARE EDUCATION/TRAINING PROGRAM

## 2022-11-03 PROCEDURE — 74011250636 HC RX REV CODE- 250/636: Performed by: EMERGENCY MEDICINE

## 2022-11-03 PROCEDURE — 84145 PROCALCITONIN (PCT): CPT

## 2022-11-03 PROCEDURE — 36600 WITHDRAWAL OF ARTERIAL BLOOD: CPT

## 2022-11-03 PROCEDURE — 94664 DEMO&/EVAL PT USE INHALER: CPT

## 2022-11-03 PROCEDURE — 96375 TX/PRO/DX INJ NEW DRUG ADDON: CPT

## 2022-11-03 PROCEDURE — 74011000258 HC RX REV CODE- 258: Performed by: EMERGENCY MEDICINE

## 2022-11-03 PROCEDURE — 82803 BLOOD GASES ANY COMBINATION: CPT

## 2022-11-03 PROCEDURE — 71045 X-RAY EXAM CHEST 1 VIEW: CPT

## 2022-11-03 PROCEDURE — 83880 ASSAY OF NATRIURETIC PEPTIDE: CPT

## 2022-11-03 PROCEDURE — 94640 AIRWAY INHALATION TREATMENT: CPT

## 2022-11-03 PROCEDURE — 96374 THER/PROPH/DIAG INJ IV PUSH: CPT

## 2022-11-03 PROCEDURE — 99285 EMERGENCY DEPT VISIT HI MDM: CPT

## 2022-11-03 PROCEDURE — 85025 COMPLETE CBC W/AUTO DIFF WBC: CPT

## 2022-11-03 PROCEDURE — 94761 N-INVAS EAR/PLS OXIMETRY MLT: CPT

## 2022-11-03 PROCEDURE — 80048 BASIC METABOLIC PNL TOTAL CA: CPT

## 2022-11-03 PROCEDURE — 74011000250 HC RX REV CODE- 250: Performed by: STUDENT IN AN ORGANIZED HEALTH CARE EDUCATION/TRAINING PROGRAM

## 2022-11-03 PROCEDURE — 74011000250 HC RX REV CODE- 250: Performed by: EMERGENCY MEDICINE

## 2022-11-03 PROCEDURE — 87449 NOS EACH ORGANISM AG IA: CPT

## 2022-11-03 PROCEDURE — 36415 COLL VENOUS BLD VENIPUNCTURE: CPT

## 2022-11-03 PROCEDURE — 93005 ELECTROCARDIOGRAM TRACING: CPT

## 2022-11-03 PROCEDURE — 87899 AGENT NOS ASSAY W/OPTIC: CPT

## 2022-11-03 PROCEDURE — 84484 ASSAY OF TROPONIN QUANT: CPT

## 2022-11-03 PROCEDURE — G0378 HOSPITAL OBSERVATION PER HR: HCPCS

## 2022-11-03 PROCEDURE — 87636 SARSCOV2 & INF A&B AMP PRB: CPT

## 2022-11-03 RX ORDER — SODIUM CHLORIDE 0.9 % (FLUSH) 0.9 %
5-40 SYRINGE (ML) INJECTION EVERY 8 HOURS
Status: DISCONTINUED | OUTPATIENT
Start: 2022-11-03 | End: 2022-11-05 | Stop reason: HOSPADM

## 2022-11-03 RX ORDER — ASPIRIN 81 MG/1
81 TABLET ORAL DAILY
Status: DISCONTINUED | OUTPATIENT
Start: 2022-11-04 | End: 2022-11-05 | Stop reason: HOSPADM

## 2022-11-03 RX ORDER — CLONAZEPAM 0.5 MG/1
0.5 TABLET ORAL EVERY MORNING
COMMUNITY
End: 2022-11-15 | Stop reason: DRUGHIGH

## 2022-11-03 RX ORDER — PANTOPRAZOLE SODIUM 40 MG/1
40 TABLET, DELAYED RELEASE ORAL 2 TIMES DAILY
Status: DISCONTINUED | OUTPATIENT
Start: 2022-11-03 | End: 2022-11-05 | Stop reason: HOSPADM

## 2022-11-03 RX ORDER — FUROSEMIDE 10 MG/ML
40 INJECTION INTRAMUSCULAR; INTRAVENOUS
Status: COMPLETED | OUTPATIENT
Start: 2022-11-03 | End: 2022-11-03

## 2022-11-03 RX ORDER — IPRATROPIUM BROMIDE AND ALBUTEROL SULFATE 2.5; .5 MG/3ML; MG/3ML
3 SOLUTION RESPIRATORY (INHALATION)
Status: COMPLETED | OUTPATIENT
Start: 2022-11-03 | End: 2022-11-03

## 2022-11-03 RX ORDER — ACETAMINOPHEN 325 MG/1
650 TABLET ORAL
Status: DISCONTINUED | OUTPATIENT
Start: 2022-11-03 | End: 2022-11-05 | Stop reason: HOSPADM

## 2022-11-03 RX ORDER — SODIUM CHLORIDE 0.9 % (FLUSH) 0.9 %
5-40 SYRINGE (ML) INJECTION AS NEEDED
Status: DISCONTINUED | OUTPATIENT
Start: 2022-11-03 | End: 2022-11-05 | Stop reason: HOSPADM

## 2022-11-03 RX ORDER — ACETAMINOPHEN 650 MG/1
650 SUPPOSITORY RECTAL
Status: DISCONTINUED | OUTPATIENT
Start: 2022-11-03 | End: 2022-11-05 | Stop reason: HOSPADM

## 2022-11-03 RX ORDER — METOPROLOL TARTRATE 25 MG/1
25 TABLET, FILM COATED ORAL 2 TIMES DAILY
Status: DISCONTINUED | OUTPATIENT
Start: 2022-11-03 | End: 2022-11-05 | Stop reason: HOSPADM

## 2022-11-03 RX ORDER — ROSUVASTATIN CALCIUM 10 MG/1
20 TABLET, COATED ORAL
Status: DISCONTINUED | OUTPATIENT
Start: 2022-11-03 | End: 2022-11-05 | Stop reason: HOSPADM

## 2022-11-03 RX ORDER — ONDANSETRON 2 MG/ML
4 INJECTION INTRAMUSCULAR; INTRAVENOUS
Status: DISCONTINUED | OUTPATIENT
Start: 2022-11-03 | End: 2022-11-05 | Stop reason: HOSPADM

## 2022-11-03 RX ORDER — MAG HYDROX/ALUMINUM HYD/SIMETH 200-200-20
30 SUSPENSION, ORAL (FINAL DOSE FORM) ORAL
Status: DISCONTINUED | OUTPATIENT
Start: 2022-11-03 | End: 2022-11-05 | Stop reason: HOSPADM

## 2022-11-03 RX ORDER — FUROSEMIDE 80 MG/1
80 TABLET ORAL EVERY MORNING
COMMUNITY

## 2022-11-03 RX ORDER — CLONAZEPAM 0.5 MG/1
0.5 TABLET ORAL DAILY
Status: DISCONTINUED | OUTPATIENT
Start: 2022-11-04 | End: 2022-11-05 | Stop reason: HOSPADM

## 2022-11-03 RX ORDER — ISOSORBIDE MONONITRATE 30 MG/1
30 TABLET, EXTENDED RELEASE ORAL DAILY
Status: DISCONTINUED | OUTPATIENT
Start: 2022-11-04 | End: 2022-11-05 | Stop reason: HOSPADM

## 2022-11-03 RX ORDER — ONDANSETRON 4 MG/1
4 TABLET, ORALLY DISINTEGRATING ORAL
Status: DISCONTINUED | OUTPATIENT
Start: 2022-11-03 | End: 2022-11-05 | Stop reason: HOSPADM

## 2022-11-03 RX ORDER — POLYETHYLENE GLYCOL 3350 17 G/17G
17 POWDER, FOR SOLUTION ORAL DAILY PRN
Status: DISCONTINUED | OUTPATIENT
Start: 2022-11-03 | End: 2022-11-05 | Stop reason: HOSPADM

## 2022-11-03 RX ORDER — AZITHROMYCIN 250 MG/1
500 TABLET, FILM COATED ORAL DAILY
Status: DISCONTINUED | OUTPATIENT
Start: 2022-11-04 | End: 2022-11-05 | Stop reason: HOSPADM

## 2022-11-03 RX ORDER — FUROSEMIDE 40 MG/1
40 TABLET ORAL
COMMUNITY

## 2022-11-03 RX ORDER — CLONAZEPAM 1 MG/1
1 TABLET ORAL
COMMUNITY
End: 2022-11-15 | Stop reason: DRUGHIGH

## 2022-11-03 RX ORDER — CLONAZEPAM 0.5 MG/1
1 TABLET ORAL
Status: DISCONTINUED | OUTPATIENT
Start: 2022-11-03 | End: 2022-11-05 | Stop reason: HOSPADM

## 2022-11-03 RX ORDER — AMLODIPINE BESYLATE 5 MG/1
5 TABLET ORAL 2 TIMES DAILY
COMMUNITY

## 2022-11-03 RX ORDER — ENOXAPARIN SODIUM 100 MG/ML
40 INJECTION SUBCUTANEOUS DAILY
Status: DISCONTINUED | OUTPATIENT
Start: 2022-11-04 | End: 2022-11-05 | Stop reason: HOSPADM

## 2022-11-03 RX ORDER — GUAIFENESIN/DEXTROMETHORPHAN 100-10MG/5
5 SYRUP ORAL
Status: DISCONTINUED | OUTPATIENT
Start: 2022-11-03 | End: 2022-11-05 | Stop reason: HOSPADM

## 2022-11-03 RX ORDER — AMLODIPINE BESYLATE 5 MG/1
5 TABLET ORAL 2 TIMES DAILY
Status: DISCONTINUED | OUTPATIENT
Start: 2022-11-03 | End: 2022-11-05 | Stop reason: HOSPADM

## 2022-11-03 RX ADMIN — ROSUVASTATIN CALCIUM 20 MG: 10 TABLET, FILM COATED ORAL at 21:53

## 2022-11-03 RX ADMIN — AMLODIPINE BESYLATE 5 MG: 5 TABLET ORAL at 21:46

## 2022-11-03 RX ADMIN — SODIUM CHLORIDE, PRESERVATIVE FREE 10 ML: 5 INJECTION INTRAVENOUS at 21:00

## 2022-11-03 RX ADMIN — METOPROLOL TARTRATE 25 MG: 25 TABLET, FILM COATED ORAL at 21:46

## 2022-11-03 RX ADMIN — PANTOPRAZOLE SODIUM 40 MG: 40 TABLET, DELAYED RELEASE ORAL at 21:52

## 2022-11-03 RX ADMIN — NITROGLYCERIN 0.5 INCH: 20 OINTMENT TOPICAL at 15:31

## 2022-11-03 RX ADMIN — IPRATROPIUM BROMIDE AND ALBUTEROL SULFATE 3 ML: 2.5; .5 SOLUTION RESPIRATORY (INHALATION) at 15:23

## 2022-11-03 RX ADMIN — FUROSEMIDE 40 MG: 10 INJECTION, SOLUTION INTRAMUSCULAR; INTRAVENOUS at 15:29

## 2022-11-03 RX ADMIN — METHYLPREDNISOLONE SODIUM SUCCINATE 125 MG: 125 INJECTION, POWDER, FOR SOLUTION INTRAMUSCULAR; INTRAVENOUS at 15:29

## 2022-11-03 RX ADMIN — CEFTRIAXONE SODIUM 1 G: 1 INJECTION, POWDER, FOR SOLUTION INTRAMUSCULAR; INTRAVENOUS at 16:39

## 2022-11-03 RX ADMIN — SODIUM CHLORIDE, PRESERVATIVE FREE 10 ML: 5 INJECTION INTRAVENOUS at 15:36

## 2022-11-03 RX ADMIN — IPRATROPIUM BROMIDE AND ALBUTEROL SULFATE 3 ML: 2.5; .5 SOLUTION RESPIRATORY (INHALATION) at 15:28

## 2022-11-03 RX ADMIN — DOXYCYCLINE 100 MG: 100 INJECTION, POWDER, LYOPHILIZED, FOR SOLUTION INTRAVENOUS at 16:46

## 2022-11-03 RX ADMIN — CLONAZEPAM 1 MG: 0.5 TABLET ORAL at 21:53

## 2022-11-03 NOTE — ED NOTES
Patient assisted up to the bedside commode with minimal assistance. Patient back in bed and reposition in bed, call bell within reach, denies any other needs at this time, remains on monitor x 3. Hospitalist at bedside evaluating patient.  Patient is agreeable to admission to the hospital.

## 2022-11-03 NOTE — ED PROVIDER NOTES
EMERGENCY DEPARTMENT HISTORY AND PHYSICAL EXAM      Date: 11/3/2022  Patient Name: Judieth Koyanagi    History of Presenting Illness     Chief Complaint   Patient presents with    Shortness of Breath       History Provided By: Patient    HPI: Judieth Koyanagi, 82 y.o. female presents to the ED with cc of shortness of breath, wheezing, dyspnea on exertion and leg swelling for the past 4 days. Patient also complains of a dry cough, denies fever or chest pain. There are no other associated symptoms, patient concerns, or physical findings at this time. I reviewed the vital signs, available nursing notes, past medical history, past surgical history, family history and social history. Vital Signs:  Patient Vitals for the past 12 hrs:   Temp Pulse Resp BP SpO2   11/03/22 1458 97.4 °F (36.3 °C) 68 18 (!) 149/87 95 %     Vital signs reviewed. Current Medications:  No current facility-administered medications on file prior to encounter. Current Outpatient Medications on File Prior to Encounter   Medication Sig Dispense Refill    amLODIPine (NORVASC) 5 mg tablet TAKE 1 TAB BY MOUTH TWO (2) TIMES A DAY. TAKE AT 9AM AND 9PM EVERY DAY. 60 Tablet 11    clonazePAM (KlonoPIN) 1 mg tablet TAKE 1/2 TO 1 TABLET EVERY MORNING AND AS NEEDED FOR ANXIETY, AND TAKE 1 TABLET AT BEDTIME FOR SLEEP 60 Tablet 1    alum-mag hydroxide-simeth (MYLANTA) 200-200-20 mg/5 mL susp Take 30 mL by mouth every four (4) hours as needed for Indigestion. pantoprazole (PROTONIX) 40 mg tablet Take 1 Tablet by mouth two (2) times a day. 180 Tablet 3    potassium chloride SR (KLOR-CON 10) 10 mEq tablet TAKE 3 TABS BY MOUTH 2 TIMES PER  Tablet 3    nitroglycerin (NITROSTAT) 0.4 mg SL tablet DISSOLVE 1 TAB BY SUBLINGUAL ROUTE EVERY 5 MINUTES AS NEEDED. 25 Tablet 0    albuterol (PROVENTIL VENTOLIN) 2.5 mg /3 mL (0.083 %) nebu 3 mL by Nebulization route every four (4) hours as needed for Shortness of Breath or Wheezing.  90 Nebule 5 aspirin delayed-release 81 mg tablet Take 81 mg by mouth daily. OXYGEN-AIR DELIVERY SYSTEMS 5 L/min by Nasal route continuous. (Patient not taking: No sig reported)      albuterol (Ventolin HFA) 90 mcg/actuation inhaler Take 1 Puff by inhalation every four (4) hours as needed for Wheezing or Shortness of Breath. (Patient not taking: No sig reported) 18 g 1    losartan (COZAAR) 100 mg tablet TAKE 1 TABLET BY MOUTH EVERY DAY (Patient taking differently: Take 100 mg by mouth daily.) 90 Tablet 3    metoprolol tartrate (LOPRESSOR) 25 mg tablet TAKE 1 TABLET BY MOUTH TWICE A DAY 1 AT 9AM AND 1 AT 9PM (Patient taking differently: Take 25 mg by mouth two (2) times a day. Indications: high blood pressure) 180 Tablet 3    isosorbide mononitrate ER (IMDUR) 30 mg tablet TAKE 1 TABLET BY MOUTH EVERY DAY (Patient taking differently: Take 30 mg by mouth daily. Indications: prevention of anginal chest pain associated with coronary artery disease) 90 Tablet 3    rosuvastatin (CRESTOR) 20 mg tablet TAKE 1 TABLET BY MOUTH EVERY DAY AT NIGHT (Patient taking differently: Take 20 mg by mouth nightly. Indications: excessive fat in the blood) 30 Tablet 11    furosemide (LASIX) 80 mg tablet TAKE 1 TABLET BY MOUTH EVERY MORNING AND TAKE 1/2 TABLET EVERY EVENING 135 Tablet 3    cholecalciferol, vitamin D3, (Vitamin D3) 50 mcg (2,000 unit) tab Take 1 Tablet by mouth daily.          Past History     Past Medical History:  Past Medical History:   Diagnosis Date    Anxiety     Aortic stenosis 03/03/2010    Aortic stenosis     Arrhythmia     MURMUR    Arthritis     SPINAL STENOSIS    Atherosclerosis of coronary artery     Biliary dyskinesia     CHF (congestive heart failure) ([address] 03/03/2010    CHF (congestive heart failure) ([address] 01/2002    Chronic heart failure ([address] 1/2002; 3/3/2010    chronic diastolic heart failure    Chronic pain     LOWER BACK, RIGHT KNEE    Environmental allergies 03/03/2010    GERD (gastroesophageal reflux disease) 03/03/2010    Hiatal hernia 03/03/2010    High cholesterol 03/03/2010    HTN (hypertension) 03/03/2010    Hypokalemia 03/03/2010    Ischemic heart disease, chronic     Multiple gallstones     Obese     Psychiatric disorder     anxiety    S/P hysterectomy 03/03/2010    Spinal stenosis 03/03/2010       Past Surgical History:  Past Surgical History:   Procedure Laterality Date    CARDIAC CATHETERIZATION  01/2002    normal coronaries    DECOMPRESS DISC RF LUMBAR  07/2007    HX BACK SURGERY  5/1/2014    HX BREAST LUMPECTOMY Left 1989    benign left breast    HX BUNIONECTOMY      HX BUNIONECTOMY      HX HEART CATHETERIZATION  3/3/10    HX HYSTERECTOMY  1978    HX LUMBAR DISKECTOMY      HX ORTHOPAEDIC Bilateral     BUNIONECTOMY    HX ORTHOPAEDIC Right     WRIST FX    HX OTHER SURGICAL  10/12/2015    mole removed from right side of face by Dr. Jarrod Ying COLONOSCOPY FLX DX W/COLLJ [facility] PFRMD  [id number]    Dr Radha Gallagher GI ENDOSCOPY,BIOPSY  2/27/2019            Family History:  Family History   Problem Relation Age of Onset    Hypertension Mother     Heart Disease Father     Stroke Sister     Heart Disease Sister     Heart Disease Sister     Cancer Brother         LUNG    Cancer Brother         PROSTATE    Hypertension Brother     No Known Problems Brother     Lung Disease Brother     Heart Attack Brother     Lung Disease Brother     Stroke Brother     Stroke Daughter 47    No Known Problems Daughter     Anesth Problems Neg Hx        Social History:  Social History     Tobacco Use    Smoking status: Never    Smokeless tobacco: Never   Vaping Use    Vaping Use: Never used   Substance Use Topics    Alcohol use: No     Alcohol/week: 0.0 standard drinks    Drug use: No       Allergies:   Allergies   Allergen Reactions    Bactrim [Sulfamethoxazole-Trimethoprim] Unknown (comments)     Pt does not recall    Darvocet A500 [Propoxyphene N-Acetaminophen] Itching         Review of Systems   Review of Systems   Constitutional: Negative for fever. HENT:  Negative for sore throat. Eyes:  Negative for photophobia and redness. Respiratory:  Positive for shortness of breath and wheezing. Cardiovascular:  Positive for leg swelling. Negative for chest pain. Gastrointestinal:  Negative for abdominal pain, blood in stool, nausea and vomiting. Genitourinary:  Negative for difficulty urinating, dysuria, hematuria, menstrual problem and vaginal bleeding. Musculoskeletal:  Negative for back pain and joint swelling. Neurological:  Negative for dizziness, seizures, syncope, speech difficulty, weakness, numbness and headaches. Hematological:  Negative for adenopathy. Psychiatric/Behavioral:  Negative for agitation, confusion and suicidal ideas. The patient is not nervous/anxious. All other systems reviewed and are negative. Physical Exam   Physical Exam  Vitals and nursing note reviewed. Constitutional:       General: She is in acute distress. Appearance: She is well-developed. She is ill-appearing. Comments: Mild respiratory distress. HENT:      Head: Normocephalic and atraumatic. Mouth/Throat:      Pharynx: No oropharyngeal exudate. Eyes:      General:         Left eye: No discharge. Extraocular Movements: Extraocular movements intact. Conjunctiva/sclera: Conjunctivae normal.      Pupils: Pupils are equal, round, and reactive to light. Neck:      Vascular: No JVD. Cardiovascular:      Rate and Rhythm: Normal rate and regular rhythm. Heart sounds: Normal heart sounds. Pulmonary:      Effort: Tachypnea and respiratory distress present. Breath sounds: Examination of the right-middle field reveals wheezing. Examination of the left-middle field reveals wheezing. Examination of the right-lower field reveals rhonchi and rales. Examination of the left-lower field reveals rhonchi and rales. Wheezing, rhonchi and rales present.    Abdominal:      General: Bowel sounds are normal. There is no distension. Palpations: Abdomen is soft. Tenderness: There is no abdominal tenderness. There is no guarding or rebound. Musculoskeletal:         General: No tenderness. Normal range of motion. Cervical back: Normal range of motion and neck supple. Right lower leg: Edema present. Left lower leg: Edema present. Lymphadenopathy:      Cervical: No cervical adenopathy. Skin:     General: Skin is warm and dry. Findings: No rash. Neurological:      Mental Status: She is alert and oriented to person, place, and time. Cranial Nerves: No cranial nerve deficit. Deep Tendon Reflexes: Reflexes are normal and symmetric. Psychiatric:         Behavior: Behavior normal.       Emergency Department Course   ED Course:  Initial assessment performed. The patient's complaints have been discussed, and they are in agreement with the care plan formulated and outlined with them. I have encouraged them to ask questions as they arise throughout their visit. EKG interpretation: (Preliminary)  Rhythm: normal sinus rhythm; and regular . Rate (approx.): 70; Axis: normal; CA interval: normal; QRS interval: normal ; ST/T wave: non-specific changes; Other findings: abnormal ekg. EKG read and interpreted by Amaury Helton MD     Medical Decision Making:  Acute dyspnea, acute respiratory distress, acute CHF, pneumonia, bronchitis, ACS.     Critical Care Time:CRITICAL CARE NOTE :    4:12 PM      IMPENDING DETERIORATION -Airway, Respiratory, Cardiovascular, and Metabolic    ASSOCIATED RISK FACTORS - Hypoxia, Dysrhythmia, Metabolic changes, and Dehydration    MANAGEMENT- Bedside Assessment and Supervision of Care    INTERPRETATION -  Xrays, Blood Gases, ECG, Blood Pressure, and Cardiac Output Measures     INTERVENTIONS - hemodynamic mngmt and Metobolic interventions    CASE REVIEW - Hospitalist/Intensivist, Nursing, and Family    TREATMENT RESPONSE -Stable    PERFORMED BY - Self        NOTES :      I have spent 60 minutes of critical care time involved in lab review, consultations with specialist, family decision- making, bedside attention and documentation. During this entire length of time I was immediately available to the patient . This time excludes time spent in any separate bill procedures. Yazmin Rogers MD                                                  Procedure:      Progress note:   Time:4:10 pm Case discussed with Dr. Mariia Javier hospitalist who agreed to evaluate the patient for possible admission. Disposition:  ADMITTED at 4:30 pm, patient is being admitted to the hospital. The results of their tests and reasons for their admission have been discussed with them and/or available family. They convey agreement and understanding for the need to be admitted and for their admission diagnosis. Consultation has been made with Adamaris Lechuga for hospitalization. DISCHARGE PLAN:  1. Current Discharge Medication List        2. Follow-up Information    None       3. Return to ED if current symptoms worsen or new symptoms arise. 4. Follow up with Sasha Menon MD in 3-5 days. Diagnosis     Clinical Impression: No diagnosis found.

## 2022-11-03 NOTE — H&P
Hospitalist Admission Note    NAME: Tomeka Johns   :  1935   MRN:  [id number]   Room Number: 783/09  @ [facility]     Date/Time:  11/3/2022 5:26 PM    Patient PCP: Chiqui Chauhan MD  ______________________________________________________________________  Please note that this dictation was completed with [employer], the computer voice recognition software. Quite often unanticipated grammatical, syntax, homophones, and other interpretive errors are inadvertently transcribed by the computer software. Please disregard these errors. Please excuse any errors that have escaped final proofreading. Given the patient's current clinical presentation, I have a high level of concern for decompensation if discharged from the emergency department. Complex decision making was performed, which includes reviewing the patient's available past medical records, laboratory results, and x-ray films. My assessment of this patient's clinical condition and my plan of care is as follows. Assessment / Plan: Active Problems:    Pneumonia (2022)        Community Acquired pneumonia POA  CXR interpreted independently-bibasilar infiltrate noted. - ceftriaxone, azithromycin  - Robitussin DM PRN  - Supplemental oxygen for spo2 less than 90%  - Hydration   - check covid, influenza      Hypertension  Insomnia  Anxiety  Hyperlipidemia  Aortic stenosis  Small vessel coronary artery disease  -Resume home meds as patient tolerates        Body mass index is 33.2 kg/m². Code Status: Full   Surrogate Decision Maker:      DVT Prophylaxis: Lovenox  GI Prophylaxis: not indicated  Baseline: ambulates with a cane         Subjective:   CHIEF COMPLAINT: shortness of breath    HISTORY OF PRESENT ILLNESS:     Joni Corral is a 82 y.o.  female with PMH of HLD, HTN, CAD, Aortic stenosis who presents to ED with c/o shortness of breath.  Patient reports worsening shortness of breath, RICHARD, wheezing, non productive cough for the past 2-3 days. Also reports being in contact with granddaughter and daughter who have 'asthma symptoms'. We were asked to admit for work up and evaluation of the above problems.      Past Medical History:   Diagnosis Date    Anxiety     Aortic stenosis 03/03/2010    Aortic stenosis     Arrhythmia     MURMUR    Arthritis     SPINAL STENOSIS    Atherosclerosis of coronary artery     Biliary dyskinesia     CHF (congestive heart failure) ([facility] 03/03/2010    CHF (congestive heart failure) ([facility] 01/2002    Chronic heart failure ([facility] 1/2002; 3/3/2010    chronic diastolic heart failure    Chronic pain     LOWER BACK, RIGHT KNEE    Environmental allergies 03/03/2010    GERD (gastroesophageal reflux disease) 03/03/2010    Hiatal hernia 03/03/2010    High cholesterol 03/03/2010    HTN (hypertension) 03/03/2010    Hypokalemia 03/03/2010    Ischemic heart disease, chronic     Multiple gallstones     Obese     Psychiatric disorder     anxiety    S/P hysterectomy 03/03/2010    Spinal stenosis 03/03/2010        Past Surgical History:   Procedure Laterality Date    CARDIAC CATHETERIZATION  01/2002    normal coronaries    DECOMPRESS DISC RF LUMBAR  07/2007    HX BACK SURGERY  5/1/2014    HX BREAST LUMPECTOMY Left 1989    benign left breast    HX BUNIONECTOMY      HX BUNIONECTOMY      HX HEART CATHETERIZATION  3/3/10    HX HYSTERECTOMY  1978    HX LUMBAR DISKECTOMY      HX ORTHOPAEDIC Bilateral     BUNIONECTOMY    HX ORTHOPAEDIC Right     WRIST FX    HX OTHER SURGICAL  10/12/2015    mole removed from right side of face by Dr. Zakia Wild FLX DX W/COLLJ Susan Guerrero PFRMD  [id number]    Dr Jasvir Troncoso GI ENDOSCOPY,BIOPSY  2/27/2019            Social History     Tobacco Use    Smoking status: Never    Smokeless tobacco: Never   Substance Use Topics    Alcohol use: No     Alcohol/week: 0.0 standard drinks        Family History   Problem Relation Age of Onset    Hypertension Mother     Heart Disease Father     Stroke Sister     Heart Disease Sister     Heart Disease Sister     Cancer Brother         LUNG    Cancer Brother         PROSTATE    Hypertension Brother     No Known Problems Brother     Lung Disease Brother     Heart Attack Brother     Lung Disease Brother     Stroke Brother     Stroke Daughter 47    No Known Problems Daughter     Anesth Problems Neg Hx      Allergies   Allergen Reactions    Bactrim [Sulfamethoxazole-Trimethoprim] Unknown (comments)     Pt does not recall    [de-identified] A500 [Propoxyphene N-Acetaminophen] Itching        Prior to Admission medications    Medication Sig Start Date End Date Taking? Authorizing Provider   amLODIPine (NORVASC) 5 mg tablet TAKE 1 TAB BY MOUTH TWO (2) TIMES A DAY. TAKE AT 9AM AND 9PM EVERY DAY. 10/31/22   Sameer Arango MD   clonazePAM (KlonoPIN) 1 mg tablet TAKE 1/2 TO 1 TABLET EVERY MORNING AND AS NEEDED FOR ANXIETY, AND TAKE 1 TABLET AT BEDTIME FOR SLEEP 10/17/22   Sarahi GRAVES MD   alum-mag hydroxide-simeth (MYLANTA) 200-200-20 mg/5 mL susp Take 30 mL by mouth every four (4) hours as needed for Indigestion. Provider, Historical   pantoprazole (PROTONIX) 40 mg tablet Take 1 Tablet by mouth two (2) times a day. 8/15/22   Sarahi Mazariegos MD   potassium chloride SR (KLOR-CON 10) 10 mEq tablet TAKE 3 TABS BY MOUTH 2 TIMES PER DAY 8/8/22   Sameer Arango MD   nitroglycerin (NITROSTAT) 0.4 mg SL tablet DISSOLVE 1 TAB BY SUBLINGUAL ROUTE EVERY 5 MINUTES AS NEEDED. 7/21/22   Essence Larios MD   albuterol (PROVENTIL VENTOLIN) 2.5 mg /3 mL (0.083 %) nebu 3 mL by Nebulization route every four (4) hours as needed for Shortness of Breath or Wheezing. 7/20/22   Sarahi Mazariegos MD   aspirin delayed-release 81 mg tablet Take 81 mg by mouth daily. Provider, Historical   OXYGEN-AIR DELIVERY SYSTEMS 5 L/min by Nasal route continuous.   Patient not taking: No sig reported    Provider, Historical albuterol (Ventolin HFA) 90 mcg/actuation inhaler Take 1 Puff by inhalation every four (4) hours as needed for Wheezing or Shortness of Breath. Patient not taking: No sig reported 5/31/22   Paul Rivera MD   losartan (COZAAR) 100 mg tablet TAKE 1 TABLET BY MOUTH EVERY DAY  Patient taking differently: Take 100 mg by mouth daily. 3/8/22   Wilmar Arango MD   metoprolol tartrate (LOPRESSOR) 25 mg tablet TAKE 1 TABLET BY MOUTH TWICE A DAY 1 AT 9AM AND 1 AT 9PM  Patient taking differently: Take 25 mg by mouth two (2) times a day. Indications: high blood pressure 2/15/22   Wilmar Arango MD   isosorbide mononitrate ER (IMDUR) 30 mg tablet TAKE 1 TABLET BY MOUTH EVERY DAY  Patient taking differently: Take 30 mg by mouth daily. Indications: prevention of anginal chest pain associated with coronary artery disease 2/15/22   Sasha Menon MD   rosuvastatin (CRESTOR) 20 mg tablet TAKE 1 TABLET BY MOUTH EVERY DAY AT NIGHT  Patient taking differently: Take 20 mg by mouth nightly. Indications: excessive fat in the blood 12/29/21   Sasha Menon MD   furosemide (LASIX) 80 mg tablet TAKE 1 TABLET BY MOUTH EVERY MORNING AND TAKE 1/2 TABLET EVERY EVENING 12/15/21   Wilmar Arango MD   cholecalciferol, vitamin D3, (Vitamin D3) 50 mcg (2,000 unit) tab Take 1 Tablet by mouth daily. Provider, Historical       REVIEW OF SYSTEMS:     I am not able to complete the review of systems because:    The patient is intubated and sedated    The patient has altered mental status due to his acute medical problems    The patient has baseline aphasia from prior stroke(s)    The patient has baseline dementia and is not reliable historian    The patient is in acute medical distress and unable to provide information           Total of 12 systems reviewed as follows:       POSITIVE= underlined text  Negative = text not underlined  General:  fever, chills, sweats, generalized weakness, weight loss/gain,      loss of appetite   Eyes:    blurred vision, eye pain, loss of vision, double vision  ENT:    rhinorrhea, pharyngitis   Respiratory:   cough, sputum production, SOB, RICHARD, wheezing, pleuritic pain   Cardiology:   chest pain, palpitations, orthopnea, PND, edema, syncope   Gastrointestinal:  abdominal pain , N/V, diarrhea, dysphagia, constipation, bleeding   Genitourinary:  frequency, urgency, dysuria, hematuria, incontinence   Muskuloskeletal :  arthralgia, myalgia, back pain  Hematology:  easy bruising, nose or gum bleeding, lymphadenopathy   Dermatological: rash, ulceration, pruritis, color change / jaundice  Endocrine:   hot flashes or polydipsia   Neurological:  headache, dizziness, confusion, focal weakness, paresthesia,     Speech difficulties, memory loss, gait difficulty  Psychological: Feelings of anxiety, depression, agitation    Objective:   VITALS:    Visit Vitals  /80   Pulse 74   Temp 97.9 °F (36.6 °C)   Resp 18   Ht 5' (1.524 m)   Wt 77.1 kg (170 lb)   SpO2 100%   BMI 33.20 kg/m²       PHYSICAL EXAM:    General:    Alert, cooperative, no distress, appears stated age. HEENT: Atraumatic, anicteric sclerae, pink conjunctivae     No oral ulcers, mucosa moist, throat clear, dentition fair  Neck:  Supple, symmetrical,  thyroid: non tender  Lungs:   Bibasilar crackles  Chest wall:  No tenderness  No Accessory muscle use. Heart:   Regular  rhythm,  No  murmur   No edema  Abdomen:   Soft, non-tender. Not distended. Bowel sounds normal  Extremities: No cyanosis. No clubbing,      Skin turgor normal, Capillary refill normal, Radial dial pulse 2+  Skin:     Not pale. Not Jaundiced  No rashes   Psych:  Good insight. Not depressed. Not anxious or agitated. Neurologic: EOMs intact. No facial asymmetry. No aphasia or slurred speech. Symmetrical strength, Sensation grossly intact.  Alert and oriented X 4.     ______________________________________________________________________    Care Plan discussed with:  Patient/Family and Nurse    Expected  Disposition:  Home w/Family  ________________________________________________________________________  TOTAL TIME:  39 Minutes    Critical Care Provided     Minutes non procedure based      Comments    x Reviewed previous records   >50% of visit spent in counseling and coordination of care x Discussion with patient and/or family and questions answered       ________________________________________________________________________  Signed: Marli Tesfaye MD    Procedures: see electronic medical records for all procedures/Xrays and details which were not copied into this note but were reviewed prior to creation of Plan. LAB DATA REVIEWED:    Recent Results (from the past 24 hour(s))   CBC WITH AUTOMATED DIFF    Collection Time: 11/03/22  3:26 PM   Result Value Ref Range    WBC 5.6 3.6 - 11.0 K/uL    RBC 4.82 3.80 - 5.20 M/uL    HGB 13.3 11.5 - 16.0 g/dL    HCT 41.6 35.0 - 47.0 %    MCV 86.3 80.0 - 99.0 FL    MCH 27.6 26.0 - 34.0 PG    MCHC 32.0 30.0 - 36.5 g/dL    RDW 13.4 11.5 - 14.5 %    PLATELET 549 677 - 120 K/uL    MPV 11.1 8.9 - 12.9 FL    NRBC 0.0 0  WBC    ABSOLUTE NRBC 0.00 0.00 - 0.01 K/uL    NEUTROPHILS 65 32 - 75 %    LYMPHOCYTES 19 12 - 49 %    MONOCYTES 7 5 - 13 %    EOSINOPHILS 8 (H) 0 - 7 %    BASOPHILS 1 0 - 1 %    IMMATURE GRANULOCYTES 0 0.0 - 0.5 %    ABS. NEUTROPHILS 3.6 1.8 - 8.0 K/UL    ABS. LYMPHOCYTES 1.1 0.8 - 3.5 K/UL    ABS. MONOCYTES 0.4 0.0 - 1.0 K/UL    ABS. EOSINOPHILS 0.5 (H) 0.0 - 0.4 K/UL    ABS. BASOPHILS 0.0 0.0 - 0.1 K/UL    ABS. IMM.  GRANS. 0.0 0.00 - 0.04 K/UL    DF AUTOMATED     METABOLIC PANEL, BASIC    Collection Time: 11/03/22  3:26 PM   Result Value Ref Range    Sodium 137 136 - 145 mmol/L    Potassium 4.1 3.5 - 5.1 mmol/L    Chloride 104 97 - 108 mmol/L    CO2 29 21 - 32 mmol/L    Anion gap 4 (L) 5 - 15 mmol/L    Glucose 107 (H) 65 - 100 mg/dL    BUN 14 6 - 20 MG/DL    Creatinine 1.13 (H) 0.55 - 1.02 MG/DL    BUN/Creatinine ratio 12 12 - 20      eGFR 48 (L) >60 ml/min/1.73m2    Calcium 9.2 8.5 - 10.1 MG/DL   NT-PRO BNP    Collection Time: 11/03/22  3:26 PM   Result Value Ref Range    NT pro- 0 - 450 PG/ML   TROPONIN-HIGH SENSITIVITY    Collection Time: 11/03/22  3:26 PM   Result Value Ref Range    Troponin-High Sensitivity 12 0 - 51 ng/L   BLOOD GAS, ARTERIAL    Collection Time: 11/03/22  3:33 PM   Result Value Ref Range    pH 7.40 7.35 - 7.45      PCO2 39 35 - 45 mmHg    PO2 83 80 - 100 mmHg    O2 SAT 96 92 - 97 %    BICARBONATE 24 22 - 26 mmol/L    BASE DEFICIT 1.0 mmol/L    O2 METHOD ROOM AIR      Sample source ARTERIAL      SITE RIGHT BRACHIAL      JEFF'S TEST YES     EKG, 12 LEAD, INITIAL    Collection Time: 11/03/22  3:37 PM   Result Value Ref Range    Ventricular Rate 70 BPM    Atrial Rate 70 BPM    P-R Interval 218 ms    QRS Duration 140 ms    Q-T Interval 410 ms    QTC Calculation (Bezet) 442 ms    Calculated P Axis 63 degrees    Calculated R Axis -11 degrees    Calculated T Axis 37 degrees    Diagnosis       Sinus rhythm with 1st degree AV block  Right bundle branch block  Voltage criteria for left ventricular hypertrophy  Cannot rule out Septal infarct (cited on or before 11-AUG-2022)  Abnormal ECG  When compared with ECG of 11-AUG-2022 12:49,  No significant change was found     COVID-19 WITH INFLUENZA A/B    Collection Time: 11/03/22  4:16 PM   Result Value Ref Range    SARS-CoV-2 by PCR Not detected NOTD      Influenza A by PCR Not detected      Influenza B by PCR Not detected

## 2022-11-03 NOTE — PROGRESS NOTES
56) Verbal report received from Brianna Diane, [address] [place] [facility]) Pt assessed and vital signs stable. Pt has no c/o pain at this time. Height and weight obtained. Admission assessment completed. IV flushed and patent. TV dinner provided to pt. Dual skin completed with EDMAR López    1910) Bedside shift change report given to EDMAR Rosario (oncoming nurse) by Geovani Jackson RN (offgoing nurse). Report included the following information SBAR, Kardex, Intake/Output, MAR, and Recent Results.

## 2022-11-03 NOTE — PROGRESS NOTES
ABG obtained, results as follows:     Latest Reference Range & Units 11/3/22 15:33   pH 7.35 - 7.45   7.40   PCO2 35 - 45 mmHg 39   PO2 80 - 100 mmHg 83   BICARBONATE 22 - 26 mmol/L 24   O2 SAT 92 - 97 % 96   BASE DEFICIT mmol/L 1.0   Sample source -   ARTERIAL   SITE -   RIGHT BRACHIAL   JEFF'S TEST -   YES   O2 METHOD -   ROOM AIR

## 2022-11-03 NOTE — TELEPHONE ENCOUNTER
Called patient informed her MRI has been ordered & they will reach out to her with date & time. Express understanding.

## 2022-11-03 NOTE — ED NOTES
Pt arrives to the ED AAOX4 with a c/c of SOB associated with non-productive cough and BLE swelling onset 4 days ago. Pt reports symptoms worsening last night. Pt is noted in stable condition, now in ED room with side rail up, bed to lowest position, and call light within reach. Will continue to monitor and wait for ED provider evaluation. Emergency Department Nursing Plan of Care       The Nursing Plan of Care is developed from the Nursing assessment and Emergency Department Attending provider initial evaluation. The plan of care may be reviewed in the ED Provider note.     The Plan of Care was developed with the following considerations:   Patient / Family readiness to learn indicated by:verbalized understanding  Persons(s) to be included in education: patient  Barriers to Learning/Limitations:No    Signed     Cody Sicks    11/3/2022   4:08 PM

## 2022-11-03 NOTE — ED NOTES
Bedside and Verbal shift change report given to Paul Lechuga (oncoming nurse) by Steve Zhou (offgoing nurse). Report included the following information SBAR, Kardex, ED Summary, Intake/Output, MAR, Recent Results, and Cardiac Rhythm SR with 1st Degree AV Block .

## 2022-11-03 NOTE — PROGRESS NOTES
Transition of Care Plan:  *RUR: TBD     * Disposition- Home    CM opened case for assessment of D/C planning needs, CM reviewed chart. ACO \"Patient presents to the ED with c/o shortness of breath since last night. Pt reports using an albuterol nebulizer one time today. \"     PCP follow-up 11/15 @ [facility]  [phone]

## 2022-11-03 NOTE — ED TRIAGE NOTES
Patient presents to the ED with c/o shortness of breath since last night. Pt reports using an albuterol nebulizer one time today. Pt reports a cough.

## 2022-11-04 LAB
ALBUMIN SERPL-MCNC: 3.4 G/DL (ref 3.5–5)
ALBUMIN/GLOB SERPL: 0.7 {RATIO} (ref 1.1–2.2)
ALP SERPL-CCNC: 113 U/L (ref 45–117)
ALT SERPL-CCNC: 17 U/L (ref 12–78)
ANION GAP SERPL CALC-SCNC: 8 MMOL/L (ref 5–15)
AST SERPL-CCNC: 19 U/L (ref 15–37)
BASOPHILS # BLD: 0 K/UL (ref 0–0.1)
BASOPHILS NFR BLD: 0 % (ref 0–1)
BILIRUB DIRECT SERPL-MCNC: 0.1 MG/DL (ref 0–0.2)
BILIRUB SERPL-MCNC: 0.4 MG/DL (ref 0.2–1)
BUN SERPL-MCNC: 16 MG/DL (ref 6–20)
BUN/CREAT SERPL: 14 (ref 12–20)
CALCIUM SERPL-MCNC: 10.1 MG/DL (ref 8.5–10.1)
CHLORIDE SERPL-SCNC: 103 MMOL/L (ref 97–108)
CO2 SERPL-SCNC: 28 MMOL/L (ref 21–32)
CREAT SERPL-MCNC: 1.11 MG/DL (ref 0.55–1.02)
DIFFERENTIAL METHOD BLD: ABNORMAL
EOSINOPHIL # BLD: 0 K/UL (ref 0–0.4)
EOSINOPHIL NFR BLD: 0 % (ref 0–7)
ERYTHROCYTE [DISTWIDTH] IN BLOOD BY AUTOMATED COUNT: 13.7 % (ref 11.5–14.5)
GLOBULIN SER CALC-MCNC: 4.8 G/DL (ref 2–4)
GLUCOSE SERPL-MCNC: 124 MG/DL (ref 65–100)
HCT VFR BLD AUTO: 43.8 % (ref 35–47)
HGB BLD-MCNC: 14.2 G/DL (ref 11.5–16)
IMM GRANULOCYTES # BLD AUTO: 0 K/UL (ref 0–0.04)
IMM GRANULOCYTES NFR BLD AUTO: 0 % (ref 0–0.5)
LYMPHOCYTES # BLD: 0.6 K/UL (ref 0.8–3.5)
LYMPHOCYTES NFR BLD: 9 % (ref 12–49)
MAGNESIUM SERPL-MCNC: 2.3 MG/DL (ref 1.6–2.4)
MCH RBC QN AUTO: 28.7 PG (ref 26–34)
MCHC RBC AUTO-ENTMCNC: 32.4 G/DL (ref 30–36.5)
MCV RBC AUTO: 88.5 FL (ref 80–99)
MONOCYTES # BLD: 0.1 K/UL (ref 0–1)
MONOCYTES NFR BLD: 1 % (ref 5–13)
NEUTS SEG # BLD: 5.6 K/UL (ref 1.8–8)
NEUTS SEG NFR BLD: 90 % (ref 32–75)
NRBC # BLD: 0 K/UL (ref 0–0.01)
NRBC BLD-RTO: 0 PER 100 WBC
PHOSPHATE SERPL-MCNC: 4.1 MG/DL (ref 2.6–4.7)
PLATELET # BLD AUTO: 172 K/UL (ref 150–400)
PMV BLD AUTO: 11.8 FL (ref 8.9–12.9)
POTASSIUM SERPL-SCNC: 4.1 MMOL/L (ref 3.5–5.1)
PROCALCITONIN SERPL-MCNC: <0.05 NG/ML
PROT SERPL-MCNC: 8.2 G/DL (ref 6.4–8.2)
RBC # BLD AUTO: 4.95 M/UL (ref 3.8–5.2)
RBC MORPH BLD: ABNORMAL
SODIUM SERPL-SCNC: 139 MMOL/L (ref 136–145)
WBC # BLD AUTO: 6.3 K/UL (ref 3.6–11)

## 2022-11-04 PROCEDURE — 96372 THER/PROPH/DIAG INJ SC/IM: CPT

## 2022-11-04 PROCEDURE — 74011000250 HC RX REV CODE- 250: Performed by: EMERGENCY MEDICINE

## 2022-11-04 PROCEDURE — 77010033678 HC OXYGEN DAILY

## 2022-11-04 PROCEDURE — 74011250636 HC RX REV CODE- 250/636: Performed by: STUDENT IN AN ORGANIZED HEALTH CARE EDUCATION/TRAINING PROGRAM

## 2022-11-04 PROCEDURE — 74011250637 HC RX REV CODE- 250/637: Performed by: STUDENT IN AN ORGANIZED HEALTH CARE EDUCATION/TRAINING PROGRAM

## 2022-11-04 PROCEDURE — 74011000258 HC RX REV CODE- 258: Performed by: STUDENT IN AN ORGANIZED HEALTH CARE EDUCATION/TRAINING PROGRAM

## 2022-11-04 PROCEDURE — 83735 ASSAY OF MAGNESIUM: CPT

## 2022-11-04 PROCEDURE — 85025 COMPLETE CBC W/AUTO DIFF WBC: CPT

## 2022-11-04 PROCEDURE — 80076 HEPATIC FUNCTION PANEL: CPT

## 2022-11-04 PROCEDURE — 96375 TX/PRO/DX INJ NEW DRUG ADDON: CPT

## 2022-11-04 PROCEDURE — 74011000250 HC RX REV CODE- 250: Performed by: STUDENT IN AN ORGANIZED HEALTH CARE EDUCATION/TRAINING PROGRAM

## 2022-11-04 PROCEDURE — 84100 ASSAY OF PHOSPHORUS: CPT

## 2022-11-04 PROCEDURE — G0378 HOSPITAL OBSERVATION PER HR: HCPCS

## 2022-11-04 PROCEDURE — 36415 COLL VENOUS BLD VENIPUNCTURE: CPT

## 2022-11-04 PROCEDURE — 80048 BASIC METABOLIC PNL TOTAL CA: CPT

## 2022-11-04 PROCEDURE — 97116 GAIT TRAINING THERAPY: CPT | Performed by: PHYSICAL THERAPIST

## 2022-11-04 PROCEDURE — 97161 PT EVAL LOW COMPLEX 20 MIN: CPT | Performed by: PHYSICAL THERAPIST

## 2022-11-04 RX ORDER — BENZONATATE 100 MG/1
100 CAPSULE ORAL
Status: DISCONTINUED | OUTPATIENT
Start: 2022-11-04 | End: 2022-11-05 | Stop reason: HOSPADM

## 2022-11-04 RX ADMIN — SODIUM CHLORIDE, PRESERVATIVE FREE 10 ML: 5 INJECTION INTRAVENOUS at 15:10

## 2022-11-04 RX ADMIN — SODIUM CHLORIDE, PRESERVATIVE FREE 10 ML: 5 INJECTION INTRAVENOUS at 21:59

## 2022-11-04 RX ADMIN — ENOXAPARIN SODIUM 40 MG: 100 INJECTION SUBCUTANEOUS at 08:52

## 2022-11-04 RX ADMIN — CLONAZEPAM 0.5 MG: 0.5 TABLET ORAL at 08:52

## 2022-11-04 RX ADMIN — ALUMINUM HYDROXIDE, MAGNESIUM HYDROXIDE, AND SIMETHICONE 30 ML: 200; 200; 20 SUSPENSION ORAL at 18:12

## 2022-11-04 RX ADMIN — AZITHROMYCIN 500 MG: 250 TABLET, FILM COATED ORAL at 08:52

## 2022-11-04 RX ADMIN — ISOSORBIDE MONONITRATE 30 MG: 30 TABLET, EXTENDED RELEASE ORAL at 08:52

## 2022-11-04 RX ADMIN — AMLODIPINE BESYLATE 5 MG: 5 TABLET ORAL at 17:35

## 2022-11-04 RX ADMIN — PANTOPRAZOLE SODIUM 40 MG: 40 TABLET, DELAYED RELEASE ORAL at 08:52

## 2022-11-04 RX ADMIN — GUAIFENESIN AND DEXTROMETHORPHAN 5 ML: 100; 10 SYRUP ORAL at 11:29

## 2022-11-04 RX ADMIN — SODIUM CHLORIDE, PRESERVATIVE FREE 10 ML: 5 INJECTION INTRAVENOUS at 05:05

## 2022-11-04 RX ADMIN — CLONAZEPAM 1 MG: 0.5 TABLET ORAL at 21:59

## 2022-11-04 RX ADMIN — PANTOPRAZOLE SODIUM 40 MG: 40 TABLET, DELAYED RELEASE ORAL at 21:58

## 2022-11-04 RX ADMIN — ASPIRIN 81 MG: 81 TABLET, COATED ORAL at 08:52

## 2022-11-04 RX ADMIN — ALUMINUM HYDROXIDE, MAGNESIUM HYDROXIDE, AND SIMETHICONE 30 ML: 200; 200; 20 SUSPENSION ORAL at 14:17

## 2022-11-04 RX ADMIN — METOPROLOL TARTRATE 25 MG: 25 TABLET, FILM COATED ORAL at 08:52

## 2022-11-04 RX ADMIN — AMLODIPINE BESYLATE 5 MG: 5 TABLET ORAL at 08:52

## 2022-11-04 RX ADMIN — ROSUVASTATIN CALCIUM 20 MG: 10 TABLET, FILM COATED ORAL at 21:59

## 2022-11-04 RX ADMIN — CEFTRIAXONE SODIUM 1 G: 1 INJECTION, POWDER, FOR SOLUTION INTRAMUSCULAR; INTRAVENOUS at 15:10

## 2022-11-04 RX ADMIN — METOPROLOL TARTRATE 25 MG: 25 TABLET, FILM COATED ORAL at 17:35

## 2022-11-04 NOTE — PROGRESS NOTES
Problem: Falls - Risk of  Goal: *Absence of Falls  Description: Document Shilpi Skill Fall Risk and appropriate interventions in the flowsheet.   Outcome: Progressing Towards Goal  Note: Fall Risk Interventions:  Mobility Interventions: Assess mobility with egress test, Bed/chair exit alarm, Communicate number of staff needed for ambulation/transfer         Medication Interventions: Patient to call before getting OOB, Teach patient to arise slowly    Elimination Interventions: Bed/chair exit alarm, Call light in reach    History of Falls Interventions: Bed/chair exit alarm, Consult care management for discharge planning, Door open when patient unattended

## 2022-11-04 NOTE — PROGRESS NOTES
Problem: Falls - Risk of  Goal: *Absence of Falls  Description: Document Rain Litter Fall Risk and appropriate interventions in the flowsheet.   Outcome: Progressing Towards Goal  Note: Fall Risk Interventions:  Mobility Interventions: Utilize walker, cane, or other assistive device, Patient to call before getting OOB         Medication Interventions: Teach patient to arise slowly    Elimination Interventions: Call light in reach    History of Falls Interventions: Door open when patient unattended, Room close to nurse's station         Problem: Patient Education: Go to Patient Education Activity  Goal: Patient/Family Education  Outcome: Progressing Towards Goal

## 2022-11-04 NOTE — PROGRESS NOTES
Problem: Mobility Impaired (Adult and Pediatric)  Goal: *Acute Goals and Plan of Care (Insert Text)  Description: FUNCTIONAL STATUS PRIOR TO ADMISSION: Patient was modified independent using a single point cane for functional mobility. HOME SUPPORT PRIOR TO ADMISSION: The patient lived with her , daughter, and granddaughter who are available to assist as needed. She does require set up and occasional assist when washing up for safety. Physical Therapy Goals  Initiated 11/4/2022  1. Patient will move from supine to sit and sit to supine  in bed with modified independence within 7 day(s). 2.  Patient will transfer from bed to chair and chair to bed with modified independence using the least restrictive device within 7 day(s). 3.  Patient will perform sit to stand with modified independence within 7 day(s). 4.  Patient will ambulate with modified independence for 75 feet with the least restrictive device within 7 day(s). Outcome: Not Met     PHYSICAL THERAPY EVALUATION  Patient: Jose Miguel Haywood (46 y.o. female)  Date: 11/4/2022  Primary Diagnosis: Pneumonia [J18.9]       Precautions:        ASSESSMENT  Based on the objective data described below, the patient presents with decreased activity tolerance and mobility. Patient received sitting out of bed in chair, pleasant, and agreeable to therapy. Patient stood with stand by assist. She ambulated 50 feet with straight cane and SBA-CGA. Patient with decreased strep length and slow pace but no loss of balance noted. SpO2 at 95% on room air at rest. After mobility, patient's SpO2 at 94%. Patient educated on pacing with activity with good understanding. She has good family support and has her straight cane and a rollator at home. She denies need for home health or OP PT at discharge.     Current Level of Function Impacting Discharge (mobility/balance): SBA-CGA with straight cane    Other factors to consider for discharge: good family support     Patient will benefit from skilled therapy intervention to address the above noted impairments. PLAN :  Recommendations and Planned Interventions: bed mobility training, transfer training, gait training, therapeutic exercises, neuromuscular re-education, patient and family training/education, and therapeutic activities      Frequency/Duration: Patient will be followed by physical therapy:  3 times a week to address goals. Recommendation for discharge: (in order for the patient to meet his/her long term goals)  No skilled physical therapy/ follow up rehabilitation needs identified at this time. Patient made aware of outpatient PT or home health PT in future if needed but denies need at this time. This discharge recommendation:  Has been made in collaboration with the attending provider and/or case management    IF patient discharges home will need the following DME: patient owns DME required for discharge         SUBJECTIVE:   Patient stated I fell better.     OBJECTIVE DATA SUMMARY:   HISTORY:    Past Medical History:   Diagnosis Date    Anxiety     Aortic stenosis 03/03/2010    Aortic stenosis     Arrhythmia     MURMUR    Arthritis     SPINAL STENOSIS    Atherosclerosis of coronary artery     Biliary dyskinesia     CHF (congestive heart failure) ([facility] [address] 03/03/2010    CHF (congestive heart failure) ([address] 01/2002    Chronic heart failure ([address] 1/2002; 3/3/2010    chronic diastolic heart failure    Chronic pain     LOWER BACK, RIGHT KNEE    Environmental allergies 03/03/2010    GERD (gastroesophageal reflux disease) 03/03/2010    Hiatal hernia 03/03/2010    High cholesterol 03/03/2010    HTN (hypertension) 03/03/2010    Hypokalemia 03/03/2010    Ischemic heart disease, chronic     Multiple gallstones     Obese     Psychiatric disorder     anxiety    S/P hysterectomy 03/03/2010    Spinal stenosis 03/03/2010     Past Surgical History:   Procedure Laterality Date    CARDIAC CATHETERIZATION  01/2002    normal coronaries    DECOMPRESS DISC RF LUMBAR  07/2007    HX BACK SURGERY  5/1/2014    HX BREAST LUMPECTOMY Left 1989    benign left breast    HX BUNIONECTOMY      HX BUNIONECTOMY      HX HEART CATHETERIZATION  3/3/10    HX HYSTERECTOMY  1978    HX LUMBAR DISKECTOMY      HX ORTHOPAEDIC Bilateral     BUNIONECTOMY    HX ORTHOPAEDIC Right     WRIST FX    HX OTHER SURGICAL  10/12/2015    mole removed from right side of face by Dr. Familia Escalera FLX DX Garett Domínguez PFRMD  [id number]    Dr Tori Andrea GI ENDOSCOPY,BIOPSY  2/27/2019            Home Situation  Home Environment: Private residence  One/Two Story Residence: One story  Living Alone: No  Support Systems: Spouse/Significant Other, Child(jorge)  Patient Expects to be Discharged to[de-identified] Home  Current DME Used/Available at Home: Cane, straight, Nebulizer    EXAMINATION/PRESENTATION/DECISION MAKING:   Critical Behavior:  Neurologic State: Alert  Orientation Level: Oriented X4  Cognition: Follows commands     Hearing: Auditory  Auditory Impairment: None    Range Of Motion:  AROM: Generally decreased, functional     PROM: Generally decreased, functional    Strength:    Strength: Generally decreased, functional    Tone & Sensation:   Tone: Normal    Sensation: Intact    Coordination:  Coordination: Within functional limits    Functional Mobility:  Bed Mobility:     Supine to Sit:  (pt sitting out of bed in chair)    Transfers:  Sit to Stand: Stand-by assistance  Stand to Sit: Stand-by assistance  Bed to Chair: Stand-by assistance    Balance:   Sitting: Intact  Standing: Impaired; With support  Standing - Static: Constant support;Good  Standing - Dynamic : Constant support;Good;Fair    Ambulation/Gait Training:  Distance (ft): 50 Feet (ft)  Assistive Device: Gait belt;Cane, straight  Ambulation - Level of Assistance: Stand-by assistance;Contact guard assistance  Gait Description (WDL): Exceptions to WDL  Gait Abnormalities: Decreased step clearance; Step to gait  Base of Support: Narrowed  Speed/Debbie: Slow  Step Length: Left shortened;Right shortened      Based on the above components, the patient evaluation is determined to be of the following complexity level: LOW     Pain Rating:  Chronic right knee pain, mild; RN aware    Activity Tolerance:   Good    After treatment patient left in no apparent distress:   Sitting in chair and Call bell within reach    COMMUNICATION/EDUCATION:   The patients plan of care was discussed with: Registered nurse, Physician, and Case management. Fall prevention education was provided and the patient/caregiver indicated understanding., Patient/family have participated as able in goal setting and plan of care. , and Patient/family agree to work toward stated goals and plan of care.     Thank you for this referral.  Tripp Logan, PT   Time Calculation: 21 mins

## 2022-11-04 NOTE — PROGRESS NOTES
[facility]: MED RECONCILIATION    Comments/Recommendations:   Patient was interviewed on room phone and was a good historian. Patient reports taking Clonazepam 1mg - 1/2 tab in the morning and 1 tab at bedtime     Medications added:     None    Medications removed: Albuterol 90 mcg/act  Mylanta     Medications adjusted:    None    Information obtained from: Patient, RxQuery Refill History, VAPMP      Allergies: Bactrim [sulfamethoxazole-trimethoprim] and Darvocet a500 [propoxyphene n-acetaminophen]    Prior to Admission   Prior to Admission Medications   Prescriptions Last Dose Informant Patient Reported? Taking? albuterol (PROVENTIL VENTOLIN) 2.5 mg /3 mL (0.083 %) nebu   No Yes   Sig: 3 mL by Nebulization route every four (4) hours as needed for Shortness of Breath or Wheezing. amLODIPine (NORVASC) 5 mg tablet   Yes Yes   Sig: Take 5 mg by mouth two (2) times a day. aspirin delayed-release 81 mg tablet   Yes Yes   Sig: Take 81 mg by mouth daily. cholecalciferol, vitamin D3, (Vitamin D3) 50 mcg (2,000 unit) tab  Self Yes Yes   Sig: Take 1 Tablet by mouth daily. clonazePAM (KlonoPIN) 0.5 mg tablet   Yes Yes   Sig: Take 0.5 mg by mouth Every morning. clonazePAM (KlonoPIN) 1 mg tablet   Yes Yes   Sig: Take 1 mg by mouth nightly. furosemide (LASIX) 40 mg tablet   Yes Yes   Sig: Take 40 mg by mouth nightly. furosemide (LASIX) 80 mg tablet   Yes Yes   Sig: Take 80 mg by mouth Every morning. isosorbide mononitrate ER (IMDUR) 30 mg tablet   No Yes   Sig: TAKE 1 TABLET BY MOUTH EVERY DAY   Patient taking differently: Take 30 mg by mouth daily. Indications: prevention of anginal chest pain associated with coronary artery disease   losartan (COZAAR) 100 mg tablet   No Yes   Sig: TAKE 1 TABLET BY MOUTH EVERY DAY   Patient taking differently: Take 100 mg by mouth daily.    metoprolol tartrate (LOPRESSOR) 25 mg tablet  Self No Yes   Sig: TAKE 1 TABLET BY MOUTH TWICE A DAY 1 AT 9AM AND 1 AT 9PM   Patient taking differently: Take 25 mg by mouth two (2) times a day. Indications: high blood pressure   nitroglycerin (NITROSTAT) 0.4 mg SL tablet   No Yes   Sig: DISSOLVE 1 TAB BY SUBLINGUAL ROUTE EVERY 5 MINUTES AS NEEDED. pantoprazole (PROTONIX) 40 mg tablet   No Yes   Sig: Take 1 Tablet by mouth two (2) times a day. potassium chloride SR (KLOR-CON 10) 10 mEq tablet   No Yes   Sig: TAKE 3 TABS BY MOUTH 2 TIMES PER DAY   rosuvastatin (CRESTOR) 20 mg tablet  Self No Yes   Sig: TAKE 1 TABLET BY MOUTH EVERY DAY AT NIGHT   Patient taking differently: Take 20 mg by mouth nightly.  Indications: excessive fat in the blood      Facility-Administered Medications: None       NEREIDA Burnham   Contact: [phone]

## 2022-11-04 NOTE — PROGRESS NOTES
0715: Report from Alberto Arriaga, [facility]. Orders, labs, MAR and overnight events reviewed. Pt resting quietly in bed at this time.

## 2022-11-04 NOTE — PROGRESS NOTES
ELIER    RUR-N/a      Ongoing assessment, CM to follow with more detailed note. Addendum 11:22 am      Plan  Disposition-home  PCP-ON AVS  NATH-completed  Transportation- freeman Cotton [phone]      Follow up with Nori Akhtar MD on 11/15/2022  Specialty: Family Medicine  [address]   [phone]  Your appointment [id number], Please wear a facemask, Please arrive 15 mins prior to appointment, Please bring INS, ID, and discharge paperwork. As discussed, please call office for earlier appointment      Reason for Admission:  Pneumonia                      RUR Score:  N/A                   Plan for utilizing home health:          PCP: First and Last name:  Yomaira Plummer MD     Name of Practice:    Are you a current patient: Yes/No: Yes   Approximate date of last visit: 2 weeks ago   Can you participate in a virtual visit with your PCP: N/A                    Current Advanced Directive/Advance Care Plan: Full Code      Healthcare Decision Maker:   Click here to complete [address] including selection of the Healthcare Decision Maker Relationship (ie \"Primary\")             Primary Decision MakerTimregi Craig - Daughter - [phone]    Secondary Decision Maker: Lizet Naa - Daughter - [phone]        Care Management Interventions  PCP Verified by CM: Yes  Mode of Transport at Discharge:  Other (see comment) (daughter)  Transition of Care Consult (CM Consult): Discharge Planning  MyChart Signup: Yes  Health Maintenance Reviewed: Yes  Support Systems: Spouse/Significant Other, Child(jorge)  Confirm Follow Up Transport: Family  The Plan for Transition of Care is Related to the Following Treatment Goals : home, pcp  The Patient and/or Patient Representative was Provided with a Choice of Provider and Agrees with the Discharge Plan?: Yes  Freedom of Choice List was Provided with Basic Dialogue that Supports the Patient's Individualized Plan of Care/Goals, Treatment Preferences and Shares the Quality Data Associated with the Providers?: Yes  Discharge Location  Patient Expects to be Discharged to[de-identified] Home                    Transition of Care Plan:         CM met w/ pt at bedside, introduced self/role and conducted initial assessment. Pt provided demographic info--utilizes [facility]. Pt stated she lives in home with spouse, daughter and grandkids. Pt stated she ambulates with cane daily and has a rollator, spouse assists with some IADLs. Pt stated her daughter Shivam Nunez provides transportation to app and will pick her up at discharge. Pt listed daughter Sandra Peña [phone] as ACP. CM discussed NATH letter with pt, pt signed, copy to pt and on pt chart.      Dex, [address] [place] [address]

## 2022-11-04 NOTE — PROGRESS NOTES
1910 Bedside shift change report given to Viridiana Ortega (oncoming nurse) by Janae Nelson (offgoing nurse). Report included the following information SBAR, Kardex, Intake/Output, MAR, and Recent Results. 0730 Bedside shift change report given to 1710 Tesfaye Rogers (oncoming nurse) by Viridiana Ortega (offgoing nurse). Report included the following information SBAR, Kardex, Intake/Output, MAR, and Recent Results.

## 2022-11-04 NOTE — PROGRESS NOTES
0599 Shift report given to Yee Osorio RN and Erika Yeboah RN (oncoming) from Tomeka Campbell RN (offgoing). Report included the following: SBAR, MAR, Kardex, Intake/Output and Recent results. Pt resting in bed    0725 VSS, taken by Ms Parra Brisa, pct.     0757 Morning assessment complete. Wheezing heard bilaterally. Pt denies any pain. New gown & blanket replaced. Pt resting in bed.     0852 Scheduled meds admin. PIV access flushed & capped. Pt left resting in bed     0941 CHG bath provided by Ms Fidel Ledezma, pct.     2309 Pt resting in recliner, watching TV    1129 PRN Robitussin admin per pt request     9439 Pt resting in recliner eating lunch tray      1315 Pt resting in recliner, watching TV. Denies any needs     1417 PRN Mylanta admin by Emil Diggs RN     1510 VSS, pt denies any pain. Reassessment complete, no changes to note. IV abx hung & infusing. Pt left resting in chair, stated she wanted to get back to bed at 1600.     1609 Pt disconnected from IV abx. Pt stated she wanted to stay in chair through dinner     1735 Scheduled meds admin. Pt stated having heart burn. Unable to give PRN for another 45 min, pt verbalized understanding     1812 PRN Mylanta admin for pt stated heart burn    1915 Shift report given to EDMAR SHANE  (oncoming) [facility] and [facility] (offgoing). Report included the following: SBAR, MAR, Kardex, Intake/Output and Recent results.

## 2022-11-04 NOTE — PROGRESS NOTES
Hospitalist Progress Note    NAME: Jose Miguel Haywood   :  1935   MRN:  [id number]   Room Number:  894/00  @ [facility]     Please note that this dictation was completed with Yokasta Savage, the computer voice recognition software. Quite often unanticipated grammatical, syntax, homophones, and other interpretive errors are inadvertently transcribed by the computer software. Please disregard these errors. Please excuse any errors that have escaped final proofreading. Interim Hospital Summary: 82 y.o. female whom presented on 11/3/2022 with      Assessment / Plan:  Anticipated discharge date :   Anticipated disposition : Home   Barriers to discharge : Medical stability       Community Acquired pneumonia POA  CXR interpreted independently-bibasilar infiltrate noted. COVID-19 influenza negative.     -Ceftriaxone and azithromycin  -Robitussin and Tessalon Perles as needed  -Follow-up on urine Legionella and strep pneumo antigen  -Follow-up sputum culture  -PT           Hypertension  Insomnia  Anxiety  Hyperlipidemia  Aortic stenosis  Small vessel coronary artery disease  -Resume home meds as patient tolerates          Code status: Full  Prophylaxis: Lovenox  Recommended Disposition: Home w/Family     Subjective:     Chief Complaint / Reason for Physician Visit  \"SOB and COUGH\". Discussed with RN events overnight. Review of Systems:  No fevers, chills, appetite change, , dyspnea on exertion, nausea, vomitting, diarrhea, constipation, chest pain, leg edema, abdominal pain, joint pain, rash, itching. Tolerating PT/OT. Tolerating diet. Objective:     VITALS:   Last 24hrs VS reviewed since prior progress note.  Most recent are:  Patient Vitals for the past 24 hrs:   Temp Pulse Resp BP SpO2   22 0725 98.4 °F (36.9 °C) 74 18 124/77 91 %   22 0452 98 °F (36.7 °C) 84 20 129/85 94 %   22 1938 98 °F (36.7 °C) 80 18 139/79 95 %   22 1838 98.4 °F (36.9 °C) 80 21 (!) 148/88 91 %   11/03/22 1704 -- 74 18 136/80 100 %   11/03/22 1634 97.9 °F (36.6 °C) 76 21 (!) 154/93 100 %   11/03/22 1528 -- -- -- -- 100 %   11/03/22 1523 -- -- -- -- 100 %   11/03/22 1458 97.4 °F (36.3 °C) 68 18 (!) 149/87 95 %       Intake/Output Summary (Last 24 hours) at 11/4/2022 1016  Last data filed at 11/4/2022 0453  Gross per 24 hour   Intake --   Output 1800 ml   Net -1800 ml        PHYSICAL EXAM:  General: WD, WN. Alert, cooperative, no acute distress    EENT:  EOMI. Anicteric sclerae. MMM  Resp:  Coarse breathing bilaterally   CV:  Regular  rhythm,  normal S1/S2, no murmurs rubs gallops, No edema  GI:  Soft, Non distended, Non tender. +Bowel sounds  Neurologic:  Alert and oriented X 3, normal speech,   Psych:   Good insight. Not anxious nor agitated  Skin:  No rashes. No jaundice    Reviewed most current lab test results and cultures  YES  Reviewed most current radiology test results   YES  Review and summation of old records today    NO  Reviewed patient's current orders and MAR    YES  PMH/ reviewed - no change compared to H&P  ________________________________________________________________________  Care Plan discussed with:    Comments   Patient x    Family      RN x    Care Manager x    Consultant                       x Multidiciplinary team rounds were held today with , nursing, pharmacist and clinical coordinator. Patient's plan of care was discussed; medications were reviewed and discharge planning was addressed.      ________________________________________________________________________  Total NON critical care TIME:  35   Minutes    Total CRITICAL CARE TIME Spent:   Minutes non procedure based      Comments   >50% of visit spent in counseling and coordination of care x    ________________________________________________________________________  Bob Mora MD     Procedures: see electronic medical records for all procedures/Xrays and details which were not copied into this note but were reviewed prior to creation of Plan. LABS:  I reviewed today's most current labs and imaging studies.   Pertinent labs include:  Recent Labs     11/04/22  0512 11/03/22  1526   WBC 6.3 5.6   HGB 14.2 13.3   HCT 43.8 41.6    178     Recent Labs     11/04/22  0512 11/03/22  1526    137   K 4.1 4.1    104   CO2 28 29   * 107*   BUN 16 14   CREA 1.11* 1.13*   CA 10.1 9.2   MG 2.3  --    PHOS 4.1  --    ALB 3.4*  --    TBILI 0.4  --    ALT 17  --        Signed: Leo Chanel MD

## 2022-11-05 VITALS
DIASTOLIC BLOOD PRESSURE: 67 MMHG | RESPIRATION RATE: 16 BRPM | HEART RATE: 59 BPM | WEIGHT: 166.1 LBS | OXYGEN SATURATION: 93 % | BODY MASS INDEX: 38.44 KG/M2 | SYSTOLIC BLOOD PRESSURE: 105 MMHG | HEIGHT: 55 IN | TEMPERATURE: 97.3 F

## 2022-11-05 LAB
FLUID CULTURE, SPNG2: NORMAL
L PNEUMO1 AG UR QL IA: NEGATIVE
ORGANISM ID, SPNG3: NORMAL
PLEASE NOTE, SPNG4: NORMAL
S PNEUM AG SPEC QL LA: NEGATIVE
SPECIMEN SOURCE: NORMAL
SPECIMEN SOURCE: NORMAL
SPECIMEN, SPNG1: NORMAL

## 2022-11-05 PROCEDURE — 96372 THER/PROPH/DIAG INJ SC/IM: CPT

## 2022-11-05 PROCEDURE — 74011000250 HC RX REV CODE- 250: Performed by: EMERGENCY MEDICINE

## 2022-11-05 PROCEDURE — G0378 HOSPITAL OBSERVATION PER HR: HCPCS

## 2022-11-05 PROCEDURE — 77010033678 HC OXYGEN DAILY

## 2022-11-05 PROCEDURE — 74011250637 HC RX REV CODE- 250/637: Performed by: STUDENT IN AN ORGANIZED HEALTH CARE EDUCATION/TRAINING PROGRAM

## 2022-11-05 PROCEDURE — 74011000250 HC RX REV CODE- 250: Performed by: STUDENT IN AN ORGANIZED HEALTH CARE EDUCATION/TRAINING PROGRAM

## 2022-11-05 PROCEDURE — 74011250636 HC RX REV CODE- 250/636: Performed by: STUDENT IN AN ORGANIZED HEALTH CARE EDUCATION/TRAINING PROGRAM

## 2022-11-05 RX ORDER — AZITHROMYCIN 500 MG/1
500 TABLET, FILM COATED ORAL DAILY
Qty: 2 TABLET | Refills: 0 | Status: SHIPPED | OUTPATIENT
Start: 2022-11-05 | End: 2022-11-07

## 2022-11-05 RX ORDER — CEFDINIR 300 MG/1
300 CAPSULE ORAL 2 TIMES DAILY
Qty: 6 CAPSULE | Refills: 0 | Status: SHIPPED | OUTPATIENT
Start: 2022-11-05 | End: 2022-11-08

## 2022-11-05 RX ORDER — GUAIFENESIN/DEXTROMETHORPHAN 100-10MG/5
5 SYRUP ORAL
Qty: 236 ML | Refills: 0 | Status: SHIPPED | OUTPATIENT
Start: 2022-11-05 | End: 2022-11-15

## 2022-11-05 RX ADMIN — CLONAZEPAM 0.5 MG: 0.5 TABLET ORAL at 08:46

## 2022-11-05 RX ADMIN — ASPIRIN 81 MG: 81 TABLET, COATED ORAL at 08:46

## 2022-11-05 RX ADMIN — PANTOPRAZOLE SODIUM 40 MG: 40 TABLET, DELAYED RELEASE ORAL at 08:46

## 2022-11-05 RX ADMIN — METOPROLOL TARTRATE 25 MG: 25 TABLET, FILM COATED ORAL at 08:48

## 2022-11-05 RX ADMIN — ISOSORBIDE MONONITRATE 30 MG: 30 TABLET, EXTENDED RELEASE ORAL at 08:46

## 2022-11-05 RX ADMIN — SODIUM CHLORIDE, PRESERVATIVE FREE 10 ML: 5 INJECTION INTRAVENOUS at 05:23

## 2022-11-05 RX ADMIN — AMLODIPINE BESYLATE 5 MG: 5 TABLET ORAL at 08:46

## 2022-11-05 RX ADMIN — ENOXAPARIN SODIUM 40 MG: 100 INJECTION SUBCUTANEOUS at 08:45

## 2022-11-05 RX ADMIN — AZITHROMYCIN 500 MG: 250 TABLET, FILM COATED ORAL at 08:46

## 2022-11-05 NOTE — PROGRESS NOTES
Bedside and Verbal shift change report given to Erica Viramontes  (oncoming nurse) by Shahriar Blair (offgoing nurse). Report included the following information SBAR, Kardex, and [facility]     Nori Anderson served Dr Megha Peacock, Patient complaining of pain in stomach 7/10, offered Tylenol refused. Stated that primary Doctor took it off due to gallbladder problems. Can we maybe have an alternative. 1200 Reviewed discharge instructions with pt an daughter including follow-up appointments, new medications and side effects, medications to continue pneumonia education, signs/symptoms of stroke and heart attack, and MyChart information. Pt expressed understanding. IV was removed. Belongings sent home with pt. Left the unit at 1330 with daughter.

## 2022-11-05 NOTE — PROGRESS NOTES
Pt assisted to bathroom and new iv access gained. #20 G to left hand , flushed and has blood return. #18 G to Left AC removed due to hurting and leaking when flushed. 2L via NC on and intact.

## 2022-11-05 NOTE — DISCHARGE SUMMARY
Hospitalist Discharge Summary     Patient ID:  Roosevelt Giron  [id number]  82 y.o.  1935    PCP on record: Yomaira Plummer MD    Admit date: 11/3/2022  Discharge date and time: 11/5/2022    Please note that this dictation was completed with [employer], the [employer] voice recognition software. Quite often unanticipated grammatical, syntax, homophones, and other interpretive errors are inadvertently transcribed by the computer software. Please disregard these errors. Please excuse any errors that have escaped final proofreading. Admission Diagnoses: Pneumonia [J18.9]    Discharge Diagnoses: Active Problems:    Pneumonia (5/26/2022)           Hospital Course:     Community Acquired pneumonia POA  CXR interpreted independently-bibasilar infiltrate noted. COVID-19 influenza negative. Robitussin and Tessalon Perles as needed. Treated with ceftriaxone and azithromycin          Hypertension  Insomnia  Anxiety  Hyperlipidemia  Aortic stenosis  Small vessel coronary artery disease  -Resume home meds as patient tolerates           CONSULTATIONS:  None    Excerpted HPI from H&P of Kimberly Richards MD:  Elyssa Adan is a 82 y.o.  female with PMH of HLD, HTN, CAD, Aortic stenosis who presents to ED with c/o shortness of breath. Patient reports worsening shortness of breath, RICHARD, wheezing, non productive cough for the past 2-3 days. Also reports being in contact with granddaughter and daughter who have 'asthma symptoms'. We were asked to admit for work up and evaluation of the above problems    ______________________________________________________________________  DISCHARGE SUMMARY/HOSPITAL COURSE:  for full details see H&P, daily progress notes, labs, consult notes. _______________________________________________________________________  Patient seen and examined by me on discharge day.   Pertinent Findings:  Gen:    Not in distress  Chest: Clear lungs  CVS:   Regular rhythm. No edema  Abd:  Soft, not distended, not tender  Neuro:  Alert with good insight. Oriented to person, place, and time   _______________________________________________________________________  DISCHARGE MEDICATIONS:   Current Discharge Medication List        START taking these medications    Details   guaiFENesin-dextromethorphan (ROBITUSSIN DM) 100-10 mg/5 mL syrup Take 5 mL by mouth every six (6) hours as needed for Cough or Congestion for up to 10 days. Qty: 236 mL, Refills: 0  Start date: 11/5/2022, End date: 11/15/2022      cefdinir (OMNICEF) 300 mg capsule Take 1 Capsule by mouth two (2) times a day for 3 days. Qty: 6 Capsule, Refills: 0  Start date: 11/5/2022, End date: 11/8/2022      azithromycin (ZITHROMAX) 500 mg tab Take 1 Tablet by mouth daily for 2 days. Qty: 2 Tablet, Refills: 0  Start date: 11/5/2022, End date: 11/7/2022           CONTINUE these medications which have NOT CHANGED    Details   amLODIPine (NORVASC) 5 mg tablet Take 5 mg by mouth two (2) times a day. !! clonazePAM (KlonoPIN) 0.5 mg tablet Take 0.5 mg by mouth Every morning. !! clonazePAM (KlonoPIN) 1 mg tablet Take 1 mg by mouth nightly. !! furosemide (LASIX) 40 mg tablet Take 40 mg by mouth nightly. !! furosemide (LASIX) 80 mg tablet Take 80 mg by mouth Every morning. pantoprazole (PROTONIX) 40 mg tablet Take 1 Tablet by mouth two (2) times a day. Qty: 180 Tablet, Refills: 3    Comments: DX Code Needed  . Associated Diagnoses: Gastro-esophageal reflux disease without esophagitis      potassium chloride SR (KLOR-CON 10) 10 mEq tablet TAKE 3 TABS BY MOUTH 2 TIMES PER DAY  Qty: 180 Tablet, Refills: 3      nitroglycerin (NITROSTAT) 0.4 mg SL tablet DISSOLVE 1 TAB BY SUBLINGUAL ROUTE EVERY 5 MINUTES AS NEEDED.   Qty: 25 Tablet, Refills: 0    Associated Diagnoses: Chest pain, unspecified type      albuterol (PROVENTIL VENTOLIN) 2.5 mg /3 mL (0.083 %) nebu 3 mL by Nebulization route every four (4) hours as needed for Shortness of Breath or Wheezing. Qty: 90 Nebule, Refills: 5      aspirin delayed-release 81 mg tablet Take 81 mg by mouth daily. losartan (COZAAR) 100 mg tablet TAKE 1 TABLET BY MOUTH EVERY DAY  Qty: 90 Tablet, Refills: 3    Associated Diagnoses: Essential hypertension      metoprolol tartrate (LOPRESSOR) 25 mg tablet TAKE 1 TABLET BY MOUTH TWICE A DAY 1 AT 9AM AND 1 AT 9PM  Qty: 180 Tablet, Refills: 3    Associated Diagnoses: Essential hypertension      isosorbide mononitrate ER (IMDUR) 30 mg tablet TAKE 1 TABLET BY MOUTH EVERY DAY  Qty: 90 Tablet, Refills: 3    Associated Diagnoses: Atherosclerosis of native coronary artery of native heart without angina pectoris      rosuvastatin (CRESTOR) 20 mg tablet TAKE 1 TABLET BY MOUTH EVERY DAY AT NIGHT  Qty: 30 Tablet, Refills: 11      cholecalciferol, vitamin D3, (Vitamin D3) 50 mcg (2,000 unit) tab Take 1 Tablet by mouth daily. !! - Potential duplicate medications found. Please discuss with provider. STOP taking these medications       alum-mag hydroxide-simeth (MYLANTA) 200-200-20 mg/5 mL susp Comments:   Reason for Stopping:               My Recommended Diet, Activity, Wound Care, and follow-up labs are listed in the patient's Discharge Insturctions which I have personally completed and reviewed. _______________________________________________________________________  DISPOSITION:     Home with Family: x   Home with HH/PT/OT/RN:    SNF/LTC:    MARIVEL:    OTHER:        Condition at Discharge:  Stable  _______________________________________________________________________  Follow up with:   PCP : Khalida Martin MD  Follow-up Information       Follow up With Specialties Details Why Contact Info    Khalida Martin MD Family Medicine Follow up on 11/15/2022 Your appointment [id number], Please wear a facemask, Please arrive 15 mins prior to appointment, Please bring INS, ID, and discharge paperwork. As discussed, please call office for earlier appoinment.  [address]  [phone]                  Total time in minutes spent coordinating this discharge (includes going over instructions, follow-up, prescriptions, and preparing report for sign off to her PCP) :  35 minutes    Signed:  Skylar Rodriguez MD

## 2022-11-05 NOTE — PROGRESS NOTES
Problem: Falls - Risk of  Goal: *Absence of Falls  Description: Document Bebo Morales Fall Risk and appropriate interventions in the flowsheet.   11/5/2022 1358 by Maryjo Grewal RN  Outcome: Resolved/Met  Note: Fall Risk Interventions:  Mobility Interventions: Bed/chair exit alarm         Medication Interventions: Bed/chair exit alarm    Elimination Interventions: Call light in reach    History of Falls Interventions: Bed/chair exit alarm

## 2022-11-05 NOTE — PROGRESS NOTES
Problem: Falls - Risk of  Goal: *Absence of Falls  Description: Document Magaly Joaquin Fall Risk and appropriate interventions in the flowsheet.   Outcome: Progressing Towards Goal  Note: Fall Risk Interventions:  Mobility Interventions: Bed/chair exit alarm         Medication Interventions: Bed/chair exit alarm    Elimination Interventions: Call light in reach    History of Falls Interventions: Bed/chair exit alarm

## 2022-11-05 NOTE — PROGRESS NOTES
Problem: Falls - Risk of  Goal: *Absence of Falls  Description: Document Efrain Blight Fall Risk and appropriate interventions in the flowsheet. Outcome: Progressing Towards Goal  Note: Fall Risk Interventions:  Mobility Interventions: Bed/chair exit alarm, Patient to call before getting OOB         Medication Interventions: Bed/chair exit alarm, Teach patient to arise slowly, Patient to call before getting OOB    Elimination Interventions: Call light in reach, Patient to call for help with toileting needs    History of Falls Interventions: Bed/chair exit alarm, Consult care management for discharge planning, Door open when patient unattended         Problem: Falls - Risk of  Goal: *Absence of Falls  Description: Document Efrain Blight Fall Risk and appropriate interventions in the flowsheet.   11/5/2022 0007 by Evans Sanchez RN  Outcome: Progressing Towards Goal  Note: Fall Risk Interventions:  Mobility Interventions: Bed/chair exit alarm, Patient to call before getting OOB         Medication Interventions: Bed/chair exit alarm, Teach patient to arise slowly, Patient to call before getting OOB    Elimination Interventions: Call light in reach, Patient to call for help with toileting needs    History of Falls Interventions: Bed/chair exit alarm, Consult care management for discharge planning, Door open when patient unattended      11/5/2022 0006 by Evans Sanchez RN  Outcome: Progressing Towards Goal  Note: Fall Risk Interventions:  Mobility Interventions: Bed/chair exit alarm, Patient to call before getting OOB         Medication Interventions: Bed/chair exit alarm, Teach patient to arise slowly, Patient to call before getting OOB    Elimination Interventions: Call light in reach, Patient to call for help with toileting needs    History of Falls Interventions: Bed/chair exit alarm, Consult care management for discharge planning, Door open when patient unattended         Problem: Pain  Goal: *Control of Pain  Outcome: Progressing Towards Goal

## 2022-11-05 NOTE — PROGRESS NOTES
Pharmacist Discharge Medication Reconciliation    Discharge Provider:  Dr. Sada Gupta       Discharge Medications:      My Medications        START taking these medications        Instructions Each Dose to Equal Morning Noon Evening Bedtime   azithromycin 500 mg Tab  Commonly known as: ZITHROMAX    Your last dose was: Your next dose is: Take 1 Tablet by mouth daily for 2 days. 500 mg                 cefdinir 300 mg capsule  Commonly known as: OMNICEF    Your last dose was: Your next dose is: Take 1 Capsule by mouth two (2) times a day for 3 days. 300 mg                 guaiFENesin-dextromethorphan 100-10 mg/5 mL syrup  Commonly known as: ROBITUSSIN DM    Your last dose was: Your next dose is: Take 5 mL by mouth every six (6) hours as needed for Cough or Congestion for up to 10 days. 5 mL                        CHANGE how you take these medications        Instructions Each Dose to Equal Morning Noon Evening Bedtime   losartan 100 mg tablet  Commonly known as: COZAAR  What changed:   how much to take  how to take this  when to take this  additional instructions    Your last dose was: Your next dose is:         TAKE 1 TABLET BY MOUTH EVERY DAY                  metoprolol tartrate 25 mg tablet  Commonly known as: LOPRESSOR  What changed: See the new instructions. Your last dose was: Your next dose is:         TAKE 1 TABLET BY MOUTH TWICE A DAY 1 AT 9AM AND 1 AT 9PM                         CONTINUE taking these medications        Instructions Each Dose to Equal Morning Noon Evening Bedtime   albuterol 2.5 mg /3 mL (0.083 %) Nebu  Commonly known as: PROVENTIL VENTOLIN    Your last dose was: Your next dose is:         3 mL by Nebulization route every four (4) hours as needed for Shortness of Breath or Wheezing. 2.5 mg                 amLODIPine 5 mg tablet  Commonly known as: NORVASC    Your last dose was: Your next dose is:          Take 5 mg by mouth two (2) times a day.   5 mg                 aspirin delayed-release 81 mg tablet    Your last dose was: Your next dose is: Take 81 mg by mouth daily. 81 mg                 * clonazePAM 0.5 mg tablet  Commonly known as: KlonoPIN    Your last dose was: Your next dose is: Take 0.5 mg by mouth Every morning. 0.5 mg                 * clonazePAM 1 mg tablet  Commonly known as: KlonoPIN    Your last dose was: Your next dose is: Take 1 mg by mouth nightly. 1 mg                 * furosemide 40 mg tablet  Commonly known as: LASIX    Your last dose was: Your next dose is: Take 40 mg by mouth nightly. 40 mg                 * furosemide 80 mg tablet  Commonly known as: LASIX    Your last dose was: Your next dose is: Take 80 mg by mouth Every morning. 80 mg                 isosorbide mononitrate ER 30 mg tablet  Commonly known as: IMDUR    Your last dose was: Your next dose is:         TAKE 1 TABLET BY MOUTH EVERY DAY                  nitroglycerin 0.4 mg SL tablet  Commonly known as: NITROSTAT    Your last dose was: Your next dose is:         DISSOLVE 1 TAB BY SUBLINGUAL ROUTE EVERY 5 MINUTES AS NEEDED. pantoprazole 40 mg tablet  Commonly known as: PROTONIX    Your last dose was: Your next dose is: Take 1 Tablet by mouth two (2) times a day. 40 mg                 potassium chloride SR 10 mEq tablet  Commonly known as: KLOR-CON 10    Your last dose was: Your next dose is:         TAKE 3 TABS BY MOUTH 2 TIMES PER DAY                  rosuvastatin 20 mg tablet  Commonly known as: CRESTOR    Your last dose was: Your next dose is:         TAKE 1 TABLET BY MOUTH EVERY DAY AT NIGHT                  Vitamin D3 50 mcg (2,000 unit) Tab  Generic drug: cholecalciferol (vitamin D3)    Your last dose was: Your next dose is: Take 1 Tablet by mouth daily.    1 Tablet                       * This list has 4 medication(s) that are the same as other medications prescribed for you. Read the directions carefully, and ask your doctor or other care provider to review them with you.                    Where to Get Your Medications        These medications were sent to [facility]/pharmacy #[id number]- [place], [address] [place] [address], [address] [place] [address]      Hours: 24-hours Phone: [phone]   azithromycin 500 mg Tab  cefdinir 300 mg capsule  guaiFENesin-dextromethorphan 100-10 mg/5 mL syrup         The patient's chart, MAR, and AVS were reviewed by   NEREIDA Horta,   Contact: [phone]

## 2022-11-06 LAB
ATRIAL RATE: 70 BPM
CALCULATED P AXIS, ECG09: 63 DEGREES
CALCULATED R AXIS, ECG10: -11 DEGREES
CALCULATED T AXIS, ECG11: 37 DEGREES
DIAGNOSIS, 93000: NORMAL
P-R INTERVAL, ECG05: 218 MS
Q-T INTERVAL, ECG07: 410 MS
QRS DURATION, ECG06: 140 MS
QTC CALCULATION (BEZET), ECG08: 442 MS
VENTRICULAR RATE, ECG03: 70 BPM

## 2022-11-07 ENCOUNTER — TELEPHONE (OUTPATIENT)
Dept: FAMILY MEDICINE CLINIC | Age: 87
End: 2022-11-07

## 2022-11-07 ENCOUNTER — PATIENT OUTREACH (OUTPATIENT)
Dept: CASE MANAGEMENT | Age: 87
End: 2022-11-07

## 2022-11-07 NOTE — PROGRESS NOTES
Care Transitions Initial Call    Call within 2 business days of discharge: Yes     Patient: Jorge Courser Patient : 1935 MRN: [id number]    Last Discharge  [address]       Date Complaint Diagnosis Description Type Department Provider    11/3/22 Shortness of Breath Pneumonia of both lower lobes due to infectious organism . .. ED to Hosp-Admission (Discharged) (ADMIT) Jimmy Farrell MD; Willian Mathews... Was this an external facility discharge? No Discharge Facility: [facility]    Challenges to be reviewed by the provider   Additional needs identified to be addressed with provider: yes  medications- cefdinir, zithromax, for pneumonia and patient hospitalized at [facility]         Method of communication with provider : chart routing    Discussed COVID-19 related testing which was available at this time. Test results were negative. Patient informed of results, if available? yes     Advance Care Planning:   Does patient have an Advance Directive: not on file. Inpatient Readmission Risk score: Unplanned Readmit Risk Score: 11.5 ( )    Was this a readmission? no   Patient stated reason for the admission: pneumonia    Patients top risk factors for readmission: functional physical ability, medical condition-pneumonia, medication management, and polypharmacy   Interventions to address risk factors: Scheduled appointment with PCP-11/15/22 and Obtained and reviewed discharge summary and/or continuity of care documents    Care Transition Nurse (CTN) contacted the patient by telephone to perform post hospital discharge assessment. Verified name and  with patient as identifiers. Provided introduction to self, and explanation of the CTN role. CTN reviewed discharge instructions, medical action plan and red flags with patient who verbalized understanding. Were discharge instructions available to patient? yes.  Reviewed appropriate site of care based on symptoms and resources available to patient including: PCP. Patient given an opportunity to ask questions and does not have any further questions or concerns at this time. The patient agrees to contact the PCP office for questions related to their healthcare. Medication reconciliation was performed with patient, who verbalizes understanding of administration of home medications. Advised obtaining a 90-day supply of all daily and as-needed medications. Referral to Pharm D needed: no     Home Health/Outpatient orders at discharge: none      Durable Medical Equipment ordered at discharge: None      Was patient discharged with a pulse oximeter? no    Discussed follow-up appointments. If no appointment was previously scheduled, appointment scheduling offered: yes. Is follow up appointment scheduled within 7 days of discharge? no.   [facility] follow up appointment(s):   Future Appointments   Date Time Provider Kita Kim   11/15/2022 10:40 AM Vicki Barrientos MD [facility]     Non-[facility] follow up appointment(s): none at this time    Plan for follow-up call in 5-7 days based on severity of symptoms and risk factors. Plan for next call: follow up appointment-11/15/22, and any breathing issues and antibiotic complete  CTN provided contact information for future needs. Goals Addressed                   This Visit's Progress     Prevent complications post hospitalization. 11/7/22  Patient to complete antibiotics as ordered. Patient to use cane or rollator at all times for safety. Patient to attend pcp follow up on 11/15/22.    CTN to follow up in one week   Dara Napoles RN

## 2022-11-07 NOTE — TELEPHONE ENCOUNTER
Patient called to inform Dr. Luis Abraham that she has pneumonia again and requested a call back to further discuss. She can be reached at [phone].

## 2022-11-07 NOTE — TELEPHONE ENCOUNTER
Pt was in [facility] for 2 days with pneumonia. She is currently taking cefdinir, azithromycin and the [facility] brand robitussin. She wants a call back to see why she keeps getting pneumonia because she just had it in May of 2022 and she has taken her pneumonia shot. She can be reached at [phone].

## 2022-11-08 LAB
BACTERIA SPEC CULT: NORMAL
SERVICE CMNT-IMP: NORMAL

## 2022-11-15 ENCOUNTER — OFFICE VISIT (OUTPATIENT)
Dept: FAMILY MEDICINE CLINIC | Age: 87
End: 2022-11-15
Payer: MEDICARE

## 2022-11-15 VITALS
DIASTOLIC BLOOD PRESSURE: 64 MMHG | TEMPERATURE: 98 F | SYSTOLIC BLOOD PRESSURE: 123 MMHG | WEIGHT: 163.2 LBS | OXYGEN SATURATION: 94 % | BODY MASS INDEX: 37.77 KG/M2 | HEART RATE: 59 BPM | HEIGHT: 55 IN | RESPIRATION RATE: 15 BRPM

## 2022-11-15 DIAGNOSIS — Z51.81 ENCOUNTER FOR MEDICATION MONITORING: ICD-10-CM

## 2022-11-15 DIAGNOSIS — Z87.01 HISTORY OF PNEUMONIA: ICD-10-CM

## 2022-11-15 DIAGNOSIS — E78.2 MIXED HYPERLIPIDEMIA: ICD-10-CM

## 2022-11-15 DIAGNOSIS — I50.32 CHRONIC DIASTOLIC HEART FAILURE (HCC): ICD-10-CM

## 2022-11-15 DIAGNOSIS — K21.9 GASTROESOPHAGEAL REFLUX DISEASE WITHOUT ESOPHAGITIS: ICD-10-CM

## 2022-11-15 DIAGNOSIS — Z09 ENCOUNTER FOR EXAMINATION FOLLOWING TREATMENT AT HOSPITAL: Primary | ICD-10-CM

## 2022-11-15 DIAGNOSIS — K76.9 LIVER LESION: ICD-10-CM

## 2022-11-15 DIAGNOSIS — F41.9 CHRONIC ANXIETY: ICD-10-CM

## 2022-11-15 DIAGNOSIS — K21.9 GASTRO-ESOPHAGEAL REFLUX DISEASE WITHOUT ESOPHAGITIS: ICD-10-CM

## 2022-11-15 DIAGNOSIS — Z23 ENCOUNTER FOR IMMUNIZATION: ICD-10-CM

## 2022-11-15 DIAGNOSIS — M15.9 PRIMARY OSTEOARTHRITIS INVOLVING MULTIPLE JOINTS: ICD-10-CM

## 2022-11-15 DIAGNOSIS — I25.10 ATHEROSCLEROSIS OF NATIVE CORONARY ARTERY OF NATIVE HEART WITHOUT ANGINA PECTORIS: ICD-10-CM

## 2022-11-15 DIAGNOSIS — R10.11 RUQ ABDOMINAL PAIN: ICD-10-CM

## 2022-11-15 DIAGNOSIS — I10 ESSENTIAL HYPERTENSION: ICD-10-CM

## 2022-11-15 PROCEDURE — 90694 VACC AIIV4 NO PRSRV 0.5ML IM: CPT | Performed by: FAMILY MEDICINE

## 2022-11-15 PROCEDURE — 99495 TRANSJ CARE MGMT MOD F2F 14D: CPT | Performed by: FAMILY MEDICINE

## 2022-11-15 PROCEDURE — G8427 DOCREV CUR MEDS BY ELIG CLIN: HCPCS | Performed by: FAMILY MEDICINE

## 2022-11-15 RX ORDER — CLONAZEPAM 1 MG/1
TABLET ORAL
Qty: 60 TABLET | Refills: 1 | COMMUNITY
Start: 2022-11-15

## 2022-11-15 NOTE — PROGRESS NOTES
HISTORY OF PRESENT ILLNESS  Rupal Mcduffie is a 82 y.o. female. HPI   Follow up on chronic medical problems. Admitted 11/3 thru 11/5 for cough and congestion and was found to have pneumonia. Overall feeling better. Cough is minimal.  No chest congestion, fever or chills. No SOB or wheezing. Completed abx. Had to cancel her follow up with Dr. Ruy Menjivar d/t being sick and has not yet gotten her MRI to eval liver lesion. Pt stated she will call to reschedule. This is a transition in care offiice vist.  Pt was contacted in 2 days by NN. Follow up in the office from hospital care now on day 10 since discharge. Overall feeling better and has recovered well. Hospital records reviewed with Pt and all questions were answered and discussed. Cardiovascular Review:  The patient has hypertension, hyperlipidemia, chronic diastolic heart failure, and obesity. Hx also of aortic stenosis. Diet and Lifestyle: generally follows a low fat low cholesterol diet, generally follows a low sodium diet, sedentary. Pertinent ROS: taking medications as instructed, no medication side effects noted, no TIA's, no chest pain on exertion, mild swelling of ankles, no orthostatic dizziness or lightheadedness, no palpitations,      Home BP Monitoring: is not measured at home. Anxiety Review:  Patient is seen for anxiety. Ongoing symptoms include: increased anxiety still related to family issues. Patient denies: palpitations, sweating, chest pain, shortness of breath. Reported side effects from the treatment: none.     Patient Active Problem List   Diagnosis Code    Hypokalemia E87.6    Hiatal hernia K44.9    Anxiety F41.9    S/P hysterectomy Z90.710    Aortic stenosis, mild I35.0    Spinal stenosis M48.00    S/P foot surgery Z98.890    Environmental allergies Z91.09    S/P cardiac catheterization Z98.890    Hypovitaminosis D E55.9    Chronic ischemic heart disease I25.9    Mixed hyperlipidemia E78.2    Chronic diastolic heart failure (HCC) I50.32    Chest pain at rest R07.9    Bifascicular bundle branch block--RBBB,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    Atypical chest pain R07.89    Essential hypertension I10    Gastroesophageal reflux disease without esophagitis K21.9    Atherosclerosis of native coronary artery without angina pectoris--diffuse small vsl disease via cath cath 2009--RCA PDA/ROSA I25.10    Chronic anxiety F41.9    Encounter for medication monitoring Z51.81    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 M47.815    Class 2 obesity due to excess calories with serious comorbidity and body mass index (BMI) of 38.0 to 38.9 in adult [id number]    Paroxysmal cardiac arrhythmia--PVC's,APC's,NSSVT I49.8    Severe obesity (BMI 35.0-39. 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 236 mL 0    amLODIPine (NORVASC) 5 mg tablet Take 5 mg by mouth two (2) times a day. clonazePAM (KlonoPIN) 0.5 mg tablet Take 0.5 mg by mouth Every morning. clonazePAM (KlonoPIN) 1 mg tablet Take 1 mg by mouth nightly. furosemide (LASIX) 40 mg tablet Take 40 mg by mouth nightly. furosemide (LASIX) 80 mg tablet Take 80 mg by mouth Every morning. pantoprazole (PROTONIX) 40 mg tablet Take 1 Tablet by mouth two (2) times a day.  180 Tablet 3    potassium chloride SR (KLOR-CON 10) 10 mEq tablet TAKE 3 TABS BY MOUTH 2 TIMES PER  Tablet 3    nitroglycerin (NITROSTAT) 0.4 mg SL tablet DISSOLVE 1 TAB BY SUBLINGUAL ROUTE EVERY 5 MINUTES AS NEEDED. 25 Tablet 0    albuterol (PROVENTIL VENTOLIN) 2.5 mg /3 mL (0.083 %) nebu 3 mL by Nebulization route every four (4) hours as needed for Shortness of Breath or Wheezing. 90 Nebule 5    aspirin delayed-release 81 mg tablet Take 81 mg by mouth daily. losartan (COZAAR) 100 mg tablet TAKE 1 TABLET BY MOUTH EVERY DAY (Patient taking differently: Take 100 mg by mouth daily.) 90 Tablet 3    metoprolol tartrate (LOPRESSOR) 25 mg tablet TAKE 1 TABLET BY MOUTH TWICE A DAY 1 AT 9AM AND 1 AT 9PM (Patient taking differently: Take 25 mg by mouth two (2) times a day. Indications: high blood pressure) 180 Tablet 3    isosorbide mononitrate ER (IMDUR) 30 mg tablet TAKE 1 TABLET BY MOUTH EVERY DAY (Patient taking differently: Take 30 mg by mouth daily. Indications: prevention of anginal chest pain associated with coronary artery disease) 90 Tablet 3    rosuvastatin (CRESTOR) 20 mg tablet TAKE 1 TABLET BY MOUTH EVERY DAY AT NIGHT (Patient taking differently: Take 20 mg by mouth nightly. Indications: excessive fat in the blood) 30 Tablet 11    cholecalciferol, vitamin D3, (Vitamin D3) 50 mcg (2,000 unit) tab Take 1 Tablet by mouth daily.          Allergies   Allergen Reactions    Bactrim [Sulfamethoxazole-Trimethoprim] Unknown (comments)     Pt does not recall    Darvocet A500 [Propoxyphene N-Acetaminophen] Itching         Past Medical History:   Diagnosis Date    Anxiety     Aortic stenosis 03/03/2010    Aortic stenosis     Arrhythmia     MURMUR    Arthritis     SPINAL STENOSIS    Atherosclerosis of coronary artery     Biliary dyskinesia     CHF (congestive heart failure) ([address] 03/03/2010    CHF (congestive heart failure) ([address] 01/2002    Chronic heart failure ([address] 1/2002; 3/3/2010    chronic diastolic heart failure    Chronic pain     LOWER BACK, RIGHT KNEE    Environmental allergies 03/03/2010    GERD (gastroesophageal reflux disease) 03/03/2010    Hiatal hernia 03/03/2010    High cholesterol 03/03/2010    HTN (hypertension) 03/03/2010    Hypokalemia 03/03/2010    Ischemic heart disease, chronic     Multiple gallstones     Obese     Psychiatric disorder     anxiety    S/P hysterectomy 03/03/2010    Spinal stenosis 03/03/2010         Past Surgical History:   Procedure Laterality Date    CARDIAC CATHETERIZATION  01/2002    normal coronaries    DECOMPRESS DISC RF LUMBAR  07/2007    HX BACK SURGERY  5/1/2014    HX BREAST LUMPECTOMY Left 1989    benign left breast    HX BUNIONECTOMY      HX BUNIONECTOMY      HX HEART CATHETERIZATION  3/3/10    HX HYSTERECTOMY  1978    HX LUMBAR DISKECTOMY      HX ORTHOPAEDIC Bilateral     BUNIONECTOMY    HX ORTHOPAEDIC Right     WRIST FX    HX OTHER SURGICAL  10/12/2015    mole removed from right side of face by Dr. Pearl Class FLX DX W/COLLJ Gia Pittston PFRMD  [id number]    Dr Haylie Bedoya GI ENDOSCOPY,BIOPSY  2/27/2019                Family History   Problem Relation Age of Onset    Hypertension Mother     Heart Disease Father     Stroke Sister     Heart Disease Sister     Heart Disease Sister     Cancer Brother         LUNG    Cancer Brother         PROSTATE    Hypertension Brother     No Known Problems Brother     Lung Disease Brother     Heart Attack Brother     Lung Disease Brother     Stroke Brother     Stroke Daughter 47    No Known Problems Daughter     Anesth Problems Neg Hx        Social History     Tobacco Use    Smoking status: Never    Smokeless tobacco: Never   Substance Use Topics    Alcohol use: No     Alcohol/week: 0.0 standard drinks        Lab Results   Component Value Date/Time    WBC 6.3 11/04/2022 05:12 AM    HGB 14.2 11/04/2022 05:12 AM    HCT 43.8 11/04/2022 05:12 AM    PLATELET 550 08/77/6746 05:12 AM    MCV 88.5 11/04/2022 05:12 AM     Lab Results   Component Value Date/Time    Cholesterol, total 176 12/20/2021 11:05 AM    HDL Cholesterol 61 12/20/2021 11:05 AM    LDL, calculated 92.6 12/20/2021 11:05 AM    Triglyceride 112 12/20/2021 11:05 AM    CHOL/HDL Ratio 2.9 12/20/2021 11:05 AM     Lab Results   Component Value Date/Time    TSH 2.470 05/15/2017 03:04 PM      Lab Results   Component Value Date/Time    Sodium 139 11/04/2022 05:12 AM    Potassium 4.1 11/04/2022 05:12 AM    Chloride 103 11/04/2022 05:12 AM    CO2 28 11/04/2022 05:12 AM    Anion gap 8 11/04/2022 05:12 AM    Glucose 124 (H) 11/04/2022 05:12 AM    BUN 16 11/04/2022 05:12 AM    Creatinine 1.11 (H) 11/04/2022 05:12 AM    BUN/Creatinine ratio 14 11/04/2022 05:12 AM    GFR est AA >60 08/11/2022 12:48 PM    GFR est non-AA 53 (L) 08/11/2022 12:48 PM    Calcium 10.1 11/04/2022 05:12 AM    Bilirubin, total 0.4 11/04/2022 05:12 AM    ALT (SGPT) 17 11/04/2022 05:12 AM    Alk. phosphatase 113 11/04/2022 05:12 AM    Protein, total 8.2 11/04/2022 05:12 AM    Albumin 3.4 (L) 11/04/2022 05:12 AM    Globulin 4.8 (H) 11/04/2022 05:12 AM    A-G Ratio 0.7 (L) 11/04/2022 05:12 AM      Lab Results   Component Value Date/Time    Hemoglobin A1c 5.4 04/16/2014 12:20 PM    Hemoglobin A1c (POC) 5.5 11/26/2014 12:12 PM         Review of Systems   Constitutional:  Negative for malaise/fatigue. HENT:  Negative for congestion. Eyes:  Negative for blurred vision. Respiratory:  Negative for cough and shortness of breath. Cardiovascular:  Negative for chest pain, palpitations and leg swelling. Gastrointestinal:  Negative for abdominal pain, constipation and heartburn. Genitourinary:  Negative for dysuria, frequency and urgency. Musculoskeletal:  Negative for back pain and joint pain. Neurological:  Negative for dizziness, tingling and headaches. Endo/Heme/Allergies:  Negative for environmental allergies. Psychiatric/Behavioral:  Negative for depression. The patient does not have insomnia. Physical Exam  Vitals and nursing note reviewed. Constitutional:       Appearance: Normal appearance. She is well-developed.       Comments: /64 (BP 1 Location: Right arm, BP Patient Position: Sitting, BP Cuff Size: Small adult)   Pulse (!) 59   Temp 98 °F (36.7 °C) (Oral)   Resp 15   Ht 4' 7\" (1.397 m)   Wt 163 lb 3.2 oz (74 kg)   LMP  (LMP Unknown)   SpO2 94%   BMI 37.93 kg/m²    HENT:      Right Ear: Tympanic membrane and ear canal normal.      Left Ear: Tympanic membrane and ear canal normal.   Neck:      Thyroid: No thyromegaly. Cardiovascular:      Rate and Rhythm: Normal rate and regular rhythm. Heart sounds: Normal heart sounds. Pulmonary:      Effort: Pulmonary effort is normal.      Breath sounds: Normal breath sounds. Abdominal:      General: Bowel sounds are normal.      Palpations: Abdomen is soft. There is no mass. Tenderness: There is abdominal tenderness (mild) in the right upper quadrant. Musculoskeletal:         General: Normal range of motion. Cervical back: Normal range of motion and neck supple. Right lower leg: Edema (trace) present. Left lower leg: Edema (trace) present. Lymphadenopathy:      Cervical: No cervical adenopathy. Skin:     General: Skin is warm and dry. Neurological:      General: No focal deficit present. Mental Status: She is alert and oriented to person, place, and time. Psychiatric:         Mood and Affect: Mood normal.     ASSESSMENT and PLAN  Diagnoses and all orders for this visit:    1. Encounter for examination following treatment at hospital  2. History of pneumonia  Improved. Has completed abx. 3. Essential hypertension  Stable     4. Mixed hyperlipidemia  Stable     5. Atherosclerosis of native coronary artery of native heart without angina pectoris  6. Chronic diastolic heart failure ([address]    7. Gastroesophageal reflux disease without esophagitis  Stable     8. Primary osteoarthritis involving multiple joints  Stable     9. Chronic anxiety  Stable     10. Encounter for medication monitoring    11. RUQ abdominal pain  12. Liver lesion  Will follow up to get MRI and see gen surgery in follow up. Follow-up and Dispositions    Return in about 2 months (around 1/15/2023).        reviewed diet, exercise and weight control  cardiovascular risk and specific lipid/LDL goals reviewed  reviewed medications and side effects in detail    I have discussed diagnosis listed in this note with pt and/or family. I have discussed treatment plans and options and the risk/benefit analysis of those options, including safe use of medications and possible medication side effects. Through the use of shared decision making we have agreed to the above plan. The patient has received an after-visit summary and questions were answered concerning future plans and follow up. Advise pt of any urgent changes then to proceed to the ER.

## 2022-11-15 NOTE — PROGRESS NOTES
Patient identified with two identification factors, Name and Date of Birth. Chief Complaint   Patient presents with    Abdominal Pain    Hypertension     2 month follow-up. Visit Vitals  /64 (BP 1 Location: Right arm, BP Patient Position: Sitting, BP Cuff Size: Small adult)   Pulse (!) 59   Temp 98 °F (36.7 °C) (Oral)   Resp 15   Ht 4' 7\" (1.397 m)   Wt 163 lb 3.2 oz (74 kg)   LMP  (LMP Unknown)   SpO2 94%   BMI 37.93 kg/m²       Health Maintenance Due   Topic    COVID-19 Vaccine (3 - Booster for Moderna series)    Flu Vaccine (1)        1. \"Have you been to the ER, urgent care clinic since your last visit? Hospitalized since your last visit? \"  Yes. [facility] 11/03/22 for Pneumonia. 2. \"Have you seen or consulted any other health care providers outside of the [address] [place] [address] since your last visit? \" No     3. For patients aged 39-70: Has the patient had a colonoscopy / FIT/ Cologuard? NA - based on age      If the patient is female:    4. For patients aged 41-77: Has the patient had a mammogram within the past 2 years? NA - based on age or sex      11. For patients aged 21-65: Has the patient had a pap smear?  NA - based on age or sex

## 2022-11-15 NOTE — PROGRESS NOTES
After obtaining consent, and per orders of Dr. Wilbert Andrade, injection of Influenza FLUAD (right deltoid) at 11:31am given by Kusum Moe LPN. Patient instructed to remain in clinic for 20 minutes afterwards, and to report any adverse reaction to me immediately.

## 2022-11-17 ENCOUNTER — PATIENT OUTREACH (OUTPATIENT)
Dept: CASE MANAGEMENT | Age: 87
End: 2022-11-17

## 2022-11-28 ENCOUNTER — TELEPHONE (OUTPATIENT)
Dept: SURGERY | Age: 87
End: 2022-11-28

## 2022-11-28 NOTE — TELEPHONE ENCOUNTER
MRI order needs to be updated to be with and without contrast     Scheduled for Wednesday    Direct num for call back central scheduling [phone] (Crystal)

## 2022-11-30 ENCOUNTER — HOSPITAL ENCOUNTER (OUTPATIENT)
Dept: MRI IMAGING | Age: 87
Discharge: HOME | End: 2022-11-30
Attending: SURGERY
Payer: MEDICARE

## 2022-11-30 VITALS — WEIGHT: 167 LBS | BODY MASS INDEX: 38.81 KG/M2

## 2022-11-30 DIAGNOSIS — K76.9 LIVER LESION: Primary | ICD-10-CM

## 2022-11-30 DIAGNOSIS — R16.0 LIVER MASS, RIGHT LOBE: ICD-10-CM

## 2022-11-30 PROCEDURE — A9576 INJ PROHANCE MULTIPACK: HCPCS

## 2022-11-30 PROCEDURE — 74183 MRI ABD W/O CNTR FLWD CNTR: CPT

## 2022-11-30 PROCEDURE — 74011000250 HC RX REV CODE- 250: Performed by: SURGERY

## 2022-11-30 PROCEDURE — 74011250636 HC RX REV CODE- 250/636

## 2022-11-30 PROCEDURE — 74011000258 HC RX REV CODE- 258: Performed by: SURGERY

## 2022-11-30 RX ORDER — SODIUM CHLORIDE 0.9 % (FLUSH) 0.9 %
10 SYRINGE (ML) INJECTION
Status: COMPLETED | OUTPATIENT
Start: 2022-11-30 | End: 2022-11-30

## 2022-11-30 RX ADMIN — SODIUM CHLORIDE, PRESERVATIVE FREE 10 ML: 5 INJECTION INTRAVENOUS at 12:41

## 2022-11-30 RX ADMIN — SODIUM CHLORIDE 100 ML: 900 INJECTION, SOLUTION INTRAVENOUS at 12:40

## 2022-11-30 RX ADMIN — GADOTERIDOL 15 ML: 279.3 INJECTION, SOLUTION INTRAVENOUS at 12:49

## 2022-12-01 ENCOUNTER — PATIENT OUTREACH (OUTPATIENT)
Dept: CASE MANAGEMENT | Age: 87
End: 2022-12-01

## 2022-12-01 NOTE — PROGRESS NOTES
Care Transitions Follow Up Call    Challenges to be reviewed by the provider   Additional needs identified to be addressed with provider: no  none           Method of communication with provider : none    Care Transition Nurse (CTN) contacted the patient by telephone to follow up after admission on 11/3/22. Verified name and  with patient as identifiers. Addressed changes since last contact: none  Follow up appointment completed? yes. Was follow up appointment scheduled within 7 days of discharge? no.     Advance Care Planning:   Does patient have an Advance Directive:  yes; reviewed and current     CTN reviewed discharge instructions, medical action plan and red flags with patient and discussed any barriers to care and/or understanding of plan of care after discharge. Discussed appropriate site of care based on symptoms and resources available to patient including: PCP and Specialist. The patient agrees to contact the PCP office for questions related to their healthcare. Patients top risk factors for readmission: functional physical ability, medical condition-recent hospital stay for pneumonia and possible liver mass with mri results pending, and polypharmacy   Interventions to address risk factors: Scheduled appointment with PCP-11/15/22 and Obtained and reviewed discharge summary and/or continuity of care documents    [facility] follow up appointment(s):   Future Appointments   Date Time Provider Kita Kim   2023 10:40 AM Sarahi Mazariegos MD [facility]     Non-[facility] follow up appointment(s): none at this time    CTN provided contact information for future needs. Plan for follow-up call in 5-7 days based on severity of symptoms and risk factors. Plan for next call: follow up appointment-mri results, graduation call       Goals Addressed                   This Visit's Progress     Prevent complications post hospitalization.    On track     22  Patient to complete antibiotics as ordered. Patient to use cane or rollator at all times for safety. Patient to attend pcp follow up on 11/15/22. CTN to follow up in one week   Nisha Connor RN    11/17/22  Patient to have mri completed. Patient attended pcp appt on 11/15  Patient to push po fluids to ensure 3 to 4 voids per day. Ctn to follow up in one week. Nisha Connor RN    12/1/22  Patient had mri completed and awaiting results. Patient to avoid sick people if possible and use good handwashing and masks for minimizing risk of exposure to illness. Patient had flu shot. Ctn to follow up in 5 days for graduation call.    Nisha Connor RN

## 2022-12-01 NOTE — ACP (ADVANCE CARE PLANNING)
Advance Care Planning     General Advance Care Planning (ACP) Conversation      Date of Conversation: 12/1/2022  Conducted with: Patient with Decision Making Capacity    Healthcare Decision Maker:     Primary Decision Maker: Adalgisa Franz - Daughter - [phone]    Secondary Decision Maker: Kathy Norton - Daughter - [phone]  Click here to complete [address] including selection of the Healthcare Decision Maker Relationship (ie \"Primary\")        Content/Action Overview:    Has ACP document(s) on file - reflects the patient's care preferences  Reviewed DNR/DNI and patient says that \"daughter knows what she wants\"         Length of Voluntary ACP Conversation in minutes:  <16 minutes (Non-Billable)    Shane Wu RN

## 2022-12-02 NOTE — TELEPHONE ENCOUNTER
Called Ms. Alejo Pederson today to review the MRI results with her. 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 I explained to her that we will present the information at the oncology conference next week and get back with her.

## 2022-12-07 ENCOUNTER — TELEPHONE (OUTPATIENT)
Dept: SURGERY | Age: 87
End: 2022-12-07

## 2022-12-07 DIAGNOSIS — C22.1 CHOLANGIOCARCINOMA (HCC): Primary | ICD-10-CM

## 2022-12-07 DIAGNOSIS — D49.0 LIVER TUMOR: ICD-10-CM

## 2022-12-07 NOTE — TELEPHONE ENCOUNTER
Spoke with patient today after cancer conference. Told her I have ordered labs (Ca19-9) and IR biopsy of the liver mass. She expressed that she is nervous about what the diagnosis may be. Answered her questions.

## 2022-12-08 ENCOUNTER — TELEPHONE (OUTPATIENT)
Dept: SURGERY | Age: 87
End: 2022-12-08

## 2022-12-08 DIAGNOSIS — R16.0 LIVER MASS, RIGHT LOBE: Primary | ICD-10-CM

## 2022-12-08 NOTE — TELEPHONE ENCOUNTER
Spoke with Don Gamboa from [facility] ordered CBC, PT/INR. Need to be completed prior to liver bx.

## 2022-12-15 ENCOUNTER — PATIENT OUTREACH (OUTPATIENT)
Dept: CASE MANAGEMENT | Age: 87
End: 2022-12-15

## 2022-12-15 NOTE — PROGRESS NOTES
Patient has graduated from the Transitions of Care Coordination  program on 12/15/22. Patient/family has the ability to self-manage at this time Care management goals have been completed. Patient was not referred to the [facility] team for further management. Goals Addressed                   This Visit's Progress     COMPLETED: Prevent complications post hospitalization. On track     11/7/22  Patient to complete antibiotics as ordered. Patient to use cane or rollator at all times for safety. Patient to attend pcp follow up on 11/15/22. CTN to follow up in one week   Fariha Jessica RN    11/17/22  Patient to have mri completed. Patient attended pcp appt on 11/15  Patient to push po fluids to ensure 3 to 4 voids per day. Ctn to follow up in one week. Fariha Jessica RN    12/1/22  Patient had mri completed and awaiting results. Patient to avoid sick people if possible and use good handwashing and masks for minimizing risk of exposure to illness. Patient had flu shot. Ctn to follow up in 5 days for graduation call. Fariha Jessica RN    12/15/22  Patient awaiting liver biopsy and follow up blood work for liver mass. Patient has had no readmissions for 30 days. Fariha Jessica RN              Patient has Care Transition Nurse's contact information for any further questions, concerns, or needs.   Patients upcoming visits:    Future Appointments   Date Time Provider Kita Kim   12/28/2022 11:00 AM [facility] PS[facility]IATRIC [facility] ER 1 [facility] . [facility]    1/17/2023 10:40 AM Vinh Arango MD [facility] BS [facility]

## 2022-12-19 DIAGNOSIS — I10 ESSENTIAL HYPERTENSION: ICD-10-CM

## 2022-12-19 RX ORDER — LOSARTAN POTASSIUM 100 MG/1
TABLET ORAL
Qty: 90 TABLET | Refills: 3 | Status: SHIPPED | OUTPATIENT
Start: 2022-12-19

## 2022-12-22 LAB
BASOPHILS # BLD AUTO: 0.1 X10E3/UL (ref 0–0.2)
BASOPHILS NFR BLD AUTO: 1 %
EOSINOPHIL # BLD AUTO: 0.3 X10E3/UL (ref 0–0.4)
EOSINOPHIL NFR BLD AUTO: 6 %
ERYTHROCYTE [DISTWIDTH] IN BLOOD BY AUTOMATED COUNT: 13.4 % (ref 11.7–15.4)
HCT VFR BLD AUTO: 39.2 % (ref 34–46.6)
HGB BLD-MCNC: 12.6 G/DL (ref 11.1–15.9)
IMM GRANULOCYTES # BLD AUTO: 0 X10E3/UL (ref 0–0.1)
IMM GRANULOCYTES NFR BLD AUTO: 0 %
INR PPP: 1 (ref 0.9–1.2)
LYMPHOCYTES # BLD AUTO: 1.1 X10E3/UL (ref 0.7–3.1)
LYMPHOCYTES NFR BLD AUTO: 23 %
MCH RBC QN AUTO: 27.5 PG (ref 26.6–33)
MCHC RBC AUTO-ENTMCNC: 32.1 G/DL (ref 31.5–35.7)
MCV RBC AUTO: 85 FL (ref 79–97)
MONOCYTES # BLD AUTO: 0.5 X10E3/UL (ref 0.1–0.9)
MONOCYTES NFR BLD AUTO: 11 %
NEUTROPHILS # BLD AUTO: 2.9 X10E3/UL (ref 1.4–7)
NEUTROPHILS NFR BLD AUTO: 59 %
PLATELET # BLD AUTO: 167 X10E3/UL (ref 150–450)
PROTHROMBIN TIME: 10.8 SEC (ref 9.1–12)
RBC # BLD AUTO: 4.59 X10E6/UL (ref 3.77–5.28)
WBC # BLD AUTO: 4.9 X10E3/UL (ref 3.4–10.8)

## 2022-12-28 ENCOUNTER — HOSPITAL ENCOUNTER (OUTPATIENT)
Dept: ULTRASOUND IMAGING | Age: 87
Discharge: HOME | End: 2022-12-28
Attending: SURGERY
Payer: MEDICARE

## 2022-12-28 VITALS
RESPIRATION RATE: 15 BRPM | DIASTOLIC BLOOD PRESSURE: 61 MMHG | OXYGEN SATURATION: 91 % | BODY MASS INDEX: 37.03 KG/M2 | WEIGHT: 160 LBS | SYSTOLIC BLOOD PRESSURE: 109 MMHG | HEIGHT: 55 IN | TEMPERATURE: 98.3 F | HEART RATE: 64 BPM

## 2022-12-28 DIAGNOSIS — D49.0 LIVER TUMOR: ICD-10-CM

## 2022-12-28 PROCEDURE — 74011250636 HC RX REV CODE- 250/636: Performed by: NURSE PRACTITIONER

## 2022-12-28 PROCEDURE — 88341 IMHCHEM/IMCYTCHM EA ADD ANTB: CPT

## 2022-12-28 PROCEDURE — 88307 TISSUE EXAM BY PATHOLOGIST: CPT

## 2022-12-28 PROCEDURE — 88333 PATH CONSLTJ SURG CYTO XM 1: CPT

## 2022-12-28 PROCEDURE — 47000 NEEDLE BIOPSY OF LIVER PERQ: CPT

## 2022-12-28 PROCEDURE — 88342 IMHCHEM/IMCYTCHM 1ST ANTB: CPT

## 2022-12-28 RX ORDER — SODIUM BICARBONATE 42 MG/ML
2 INJECTION, SOLUTION INTRAVENOUS
Status: DISPENSED | OUTPATIENT
Start: 2022-12-28 | End: 2022-12-28

## 2022-12-28 RX ORDER — LIDOCAINE HYDROCHLORIDE 10 MG/ML
5 INJECTION, SOLUTION EPIDURAL; INFILTRATION; INTRACAUDAL; PERINEURAL
Status: COMPLETED | OUTPATIENT
Start: 2022-12-28 | End: 2022-12-28

## 2022-12-28 RX ORDER — FENTANYL CITRATE 50 UG/ML
200 INJECTION, SOLUTION INTRAMUSCULAR; INTRAVENOUS
Status: DISCONTINUED | OUTPATIENT
Start: 2022-12-28 | End: 2023-01-01 | Stop reason: HOSPADM

## 2022-12-28 RX ORDER — MIDAZOLAM HYDROCHLORIDE 1 MG/ML
5 INJECTION, SOLUTION INTRAMUSCULAR; INTRAVENOUS
Status: DISCONTINUED | OUTPATIENT
Start: 2022-12-28 | End: 2023-01-01 | Stop reason: HOSPADM

## 2022-12-28 RX ADMIN — MIDAZOLAM 1 MG: 1 INJECTION INTRAMUSCULAR; INTRAVENOUS at 12:24

## 2022-12-28 RX ADMIN — LIDOCAINE HYDROCHLORIDE 10 ML: 10 INJECTION, SOLUTION EPIDURAL; INFILTRATION; INTRACAUDAL; PERINEURAL at 12:29

## 2022-12-28 RX ADMIN — FENTANYL CITRATE 50 MCG: 50 INJECTION INTRAMUSCULAR; INTRAVENOUS at 12:24

## 2022-12-28 NOTE — PROGRESS NOTES
Pt arrives in hospital wheelchair to angio for liver mass biopsy procedure. All assessments completed and consent was reviewed. Education given was regarding procedure, moderate sedation, post-procedure care and  management/follow-up. Opportunity for questions was provided and all questions and concerns were addressed.

## 2022-12-28 NOTE — DISCHARGE INSTRUCTIONS
Percutaneous Liver Biopsy: What to Expect at [address]  A needle biopsy of the liver is a procedure to take a tiny sample (biopsy) of your liver tissue. The tissue sample is looked at under a microscope. Your doctor can look for infection or other liver problems. You may have some pain where the biopsy needle entered your skin (the puncture site). You may also have pain in your shoulder. This is called referred pain. It is caused by pain traveling along a nerve near the biopsy site. The referred pain usually lasts less than 12 hours. You may have a small amount of bleeding from the puncture site. You can probably go home if you have no problems after the test. You will need to take it easy at home for 1 or 2 days after the procedure. You will probably be able to return to work and most of your usual activities after that. This care sheet gives you a general idea about how long it will take for you to recover. But each person recovers at a different pace. Follow the steps below to get better as quickly as possible. How can you care for yourself at home? Activity    Rest when you feel tired. Getting enough sleep will help you recover. Try to walk each day. Start by walking a little more than you did the day before. Bit by bit, increase the amount you walk. Walking boosts blood flow and helps prevent pneumonia and constipation. Avoid exercises that use your belly muscles and strenuous activities, such as bicycle riding, jogging, weight lifting, or aerobic exercise, for 1 week or until your doctor says it is okay. Ask your doctor when you can drive again. You will probably need to take 1 or 2 days off from work. It depends on the type of work you do and how you feel. You will probably be able to shower the same day as the test, if your doctor says it is okay. Pat the puncture site dry.  Do not take a bath for at least 2 days after the test, or until your doctor tells you it is okay.   Diet    You can eat your normal diet. If your stomach is upset, try bland, low-fat foods like plain rice, broiled chicken, toast, and yogurt. Drink plenty of fluids (unless your doctor tells you not to). Medicines    Your doctor will tell you if and when you can restart your medicines. He or she will also give you instructions about taking any new medicines. If you stopped taking aspirin or some other blood thinner, your doctor will tell you when to start taking it again. Be safe with medicines. Take pain medicines exactly as directed. If the doctor gave you a prescription medicine for pain, take it as prescribed. If you are not taking a prescription pain medicine, take an over-the-counter medicine that your doctor recommends. Read and follow all instructions on the label. Do not take aspirin, ibuprofen (Advil, Motrin), naproxen (Aleve), or other nonsteroidal anti-inflammatory drugs (NSAIDs) unless your doctor says it is okay. If you think your pain medicine is making you sick to your stomach: Take your medicine after meals (unless your doctor has told you not to). Ask your doctor for a different kind of pain medicine. Care of the puncture site    Keep a bandage over the puncture site for the first 1 or 2 days. Follow-up care is a key part of your treatment and safety. Be sure to make and go to all appointments, and call your doctor if you are having problems. It's also a good idea to know your test results and keep a list of the medicines you take. When should you call for help? Call 911 anytime you think you may need emergency care. For example, call if:    You passed out (lost consciousness). You have severe trouble breathing. You have sudden chest pain and shortness of breath, or you cough up blood. You have severe pain in your chest, shoulder, or belly. Call your doctor now or seek immediate medical care if:    You have new or worse shortness of breath. Bright red blood has soaked through the bandage over the puncture site. You have pain that does not get better after you take your pain medicine. You are sick to your stomach or cannot keep fluids down. You have a fever, chills, or body aches. You have signs of infection, such as: Increased pain, swelling, warmth, or redness. Red streaks leading from the puncture site. Pus draining from the puncture site. A fever. You have new or worse pain at the puncture site. You have new or worse belly swelling or bloating. You have trouble passing urine or stool. Your stools are black and tarlike or have streaks of blood. You have pale-colored stools along with dark urine and itching. Watch closely for changes in your health, and be sure to contact your doctor if you have any problems. You have received sedation medications today. YOU SHOULD NOT DRIVE FOR 24 HOURS, DO NOT OPERATE HEAVY MACHINERY, DO NOT MAKE ANY LEGAL DECISIONS OR SIGN LEGAL DOCUMENTS FOR 24 HOURS. DO NOT DRINK ALCOHOL, TAKE ANY MEDICATIONS UNLESS PRESCRIBED BY YOUR DOCTOR. IF YOU ARE A CAREGIVER, SOMEONE SHOULD TAKE THAT ROLE FOR 24 HOURS. Side effects of sedation medications and other medications used today have been reviewed  Those side effects can include but are not limited to: dizziness, drowsiness, poor balance, fatigue, sleepiness. Take precautions at home to prevent falls, such as assistance with walking or stairs if allowed and /or when needed or position changes. Allergic or adverse reactions could include nausea, itching, hives, dizziness, or anything else out of the ordinary. Should you experience any of these significant changes, please call [phone] between the hours of 7:30 am and 3:30 pm or [phone] after hours.  After hours, ask the  to page the [address] Technologist, and describe the problem to the technologist. If you are experiencing chest pain, shortness of breath, altered mental state, unusual bleeding or any other emergent symptom you should call 911 immediately. Where can you learn more? Go to http://www.gray.com/  Enter S014 in the search box to learn more about \"Percutaneous Liver Biopsy: What to Expect at Home. \"  Current as of: January 20, 2022               Content Version: 13.4  © 2006-2022 [employer]. Care instructions adapted under license by [employer] (which disclaims liability or warranty for this information). If you have questions about a medical condition or this instruction, always ask your healthcare professional. [place] [address] any warranty or liability for your use of this information.

## 2022-12-28 NOTE — H&P
INTERVENTIONAL RADIOLOGY  Preoperative History and Physical      Patient: Judieth Koyanagi  :  1935  Age:  82 y.o. MRN:  [id number]  Today's Date:  2022      CC / HPI   Judieth Koyanagi is a 82 y.o. female with a history of liver mass who presents for biopsy.     PAST MEDICAL HISTORY  Past Medical History:   Diagnosis Date    Anxiety     Aortic stenosis 2010    Aortic stenosis     Arrhythmia     MURMUR    Arthritis     SPINAL STENOSIS    Atherosclerosis of coronary artery     Biliary dyskinesia     CHF (congestive heart failure) ([facility] [address] 2010    CHF (congestive heart failure) ([facility] [address] 2002    Chronic heart failure ([facility] [address] 2002; 3/3/2010    chronic diastolic heart failure    Chronic pain     LOWER BACK, RIGHT KNEE    Environmental allergies 2010    GERD (gastroesophageal reflux disease) 2010    Hiatal hernia 2010    High cholesterol 2010    HTN (hypertension) 2010    Hypokalemia 2010    Ischemic heart disease, chronic     Multiple gallstones     Obese     Psychiatric disorder     anxiety    S/P hysterectomy 2010    Spinal stenosis 2010     PAST SURGICAL HISTORY  Past Surgical History:   Procedure Laterality Date    CARDIAC CATHETERIZATION  2002    normal coronaries    DECOMPRESS DISC RF LUMBAR  2007    HX BACK SURGERY  2014    HX BREAST LUMPECTOMY Left     benign left breast    HX BUNIONECTOMY      HX BUNIONECTOMY      HX HEART CATHETERIZATION  3/3/10    HX HYSTERECTOMY      HX LUMBAR DISKECTOMY      HX ORTHOPAEDIC Bilateral     BUNIONECTOMY    HX ORTHOPAEDIC Right     WRIST FX    HX OTHER SURGICAL  10/12/2015    mole removed from right side of face by Dr. Shola Spears FLX DX W/COLLENRIQUE Otero PFMANDAD  [id number]    Dr Marleny Goldstein GI ENDOSCOPY,BIOPSY  2019          SOCIAL HISTORY  Social History     Socioeconomic History    Marital status:      Spouse name: Not on file    Number of children: Not on file Years of education: Not on file    Highest education level: Not on file   Occupational History    Not on file   Tobacco Use    Smoking status: Never    Smokeless tobacco: Never   Vaping Use    Vaping Use: Never used   Substance and Sexual Activity    Alcohol use: No     Alcohol/week: 0.0 standard drinks    Drug use: No    Sexual activity: Not Currently   Other Topics Concern    Not on file   Social History Narrative    Not on file     Social Determinants of Health     Financial Resource Strain: Not on file   Food Insecurity: Not on file   Transportation Needs: Not on file   Physical Activity: Not on file   Stress: Not on file   Social Connections: Not on file   Intimate Partner Violence: Not on file   Housing Stability: Not on file     FAMILY HISTORY  Family History   Problem Relation Age of Onset    Hypertension Mother     Heart Disease Father     Stroke Sister     Heart Disease Sister     Heart Disease Sister     Cancer Brother         LUNG    Cancer Brother         PROSTATE    Hypertension Brother     No Known Problems Brother     Lung Disease Brother     Heart Attack Brother     Lung Disease Brother     Stroke Brother     Stroke Daughter 47    No Known Problems Daughter     Anesth Problems Neg Hx      CURRENT MEDICATIONS  Current Outpatient Medications   Medication Sig Dispense Refill    losartan (COZAAR) 100 mg tablet TAKE 1 TABLET BY MOUTH EVERY DAY 90 Tablet 3    calcium carb/magnesium hydrox (MYLANTA PO) Take  by mouth as needed. clonazePAM (KlonoPIN) 1 mg tablet TAKE 1/2 TO 1 TABLET EVERY MORNING AND AS NEEDED FOR ANXIETY, AND TAKE 1 TABLET AT BEDTIME FOR SLEEP 60 Tablet 1    amLODIPine (NORVASC) 5 mg tablet Take 5 mg by mouth two (2) times a day. furosemide (LASIX) 40 mg tablet Take 40 mg by mouth nightly. furosemide (LASIX) 80 mg tablet Take 80 mg by mouth Every morning. pantoprazole (PROTONIX) 40 mg tablet Take 1 Tablet by mouth two (2) times a day.  180 Tablet 3    potassium chloride SR (KLOR-CON 10) 10 mEq tablet TAKE 3 TABS BY MOUTH 2 TIMES PER  Tablet 3    albuterol (PROVENTIL VENTOLIN) 2.5 mg /3 mL (0.083 %) nebu 3 mL by Nebulization route every four (4) hours as needed for Shortness of Breath or Wheezing. 90 Nebule 5    aspirin delayed-release 81 mg tablet Take 81 mg by mouth daily. metoprolol tartrate (LOPRESSOR) 25 mg tablet TAKE 1 TABLET BY MOUTH TWICE A DAY 1 AT 9AM AND 1 AT 9PM (Patient taking differently: Take 25 mg by mouth two (2) times a day. Indications: high blood pressure) 180 Tablet 3    isosorbide mononitrate ER (IMDUR) 30 mg tablet TAKE 1 TABLET BY MOUTH EVERY DAY (Patient taking differently: Take 30 mg by mouth daily. Indications: prevention of anginal chest pain associated with coronary artery disease) 90 Tablet 3    rosuvastatin (CRESTOR) 20 mg tablet TAKE 1 TABLET BY MOUTH EVERY DAY AT NIGHT (Patient taking differently: Take 20 mg by mouth nightly. Indications: excessive fat in the blood) 30 Tablet 11    nitroglycerin (NITROSTAT) 0.4 mg SL tablet DISSOLVE 1 TAB BY SUBLINGUAL ROUTE EVERY 5 MINUTES AS NEEDED. 25 Tablet 0    cholecalciferol, vitamin D3, (Vitamin D3) 50 mcg (2,000 unit) tab Take 1 Tablet by mouth daily.  (Patient not taking: Reported on 12/28/2022)       Current Facility-Administered Medications   Medication Dose Route Frequency Provider Last Rate Last Admin    lidocaine (PF) (XYLOCAINE) 10 mg/mL (1 %) injection 5 mL  5 mL SubCUTAneous RAD ONCE Favian Rubio MD        sodium bicarbonate (4.2%) injection 84 mg  2 mL SubCUTAneous RAD ONCE Favian Rubio MD         ALLERGIES  Allergies   Allergen Reactions    Bactrim [Sulfamethoxazole-Trimethoprim] Unknown (comments)     Pt does not recall    E[facility] Habermann [Propoxyphene N-Acetaminophen] Itching       DIAGNOSTIC STUDIES   IMAGING STUDIES  I personally reviewed the following imaging studies and reports:    MRI Results (most recent):  Results from Hospital Encounter encounter on 11/30/22    MRI ABD W WO CONT    Narrative  EXAM:  MRI ABD W WO CONT    INDICATION: Liver mass right lobe seen on CT of    COMPARISON: 9/27/2022    TECHNIQUE: Coronal T2 and postcontrast T1; Axial T2, in/out of phase, MRCP, pre-  and dynamic postcontrast T1 MRI of the abdomen. 15 mL of ProHance. Subtractions  were performed. FINDINGS: Liver: In the medial aspect of the right hepatic lobe segment 7 and 8  there is a 5.2 x 4.5 cm heterogeneously enhancing mass which demonstrates T1  hypointensity and mild to moderate T2 hyperintensity. Biliary tree: No biliary dilatation. Cholelithiasis is noted. Pancreas: Pancreas is grossly unremarkable. Evaluation is limited by motion. No  pancreatic ductal dilatation. Spleen: Unremarkable    Adrenal glands: Unremarkable    Kidneys: Simple cyst in the left kidney. Other probable small simple cysts are  noted bilaterally    Vasculature: Possible narrowing of the intrahepatic IVC secondary to the mass. Bowel: Unremarkable    Lymph nodes: No adenopathy is identified. Miscellaneous: None    Impression  1. Nonspecific mass in segment 7/8 of the liver encompassing the intrahepatic  IVC and possibly narrowing the intrahepatic IVC. Enhancement characteristics are  more consistent with cholangiocarcinoma or metastasis. 2.  Cholelithiasis. Other incidental findings as above      CT Results (most recent):  Results from Hospital Encounter encounter on 09/27/22    CT ABD W WO CONT    Narrative  EXAM: CT ABD W WO CONT    INDICATION: Liver lesion on previous imaging; Low-density area within the liver,  not fully characterized. Recommend  three-phase CT per Dr. Anjum Schmitt    COMPARISON: CT abdomen pelvis from 8/11/2022. IV CONTRAST: 100 mL of Isovue-370. ORAL CONTRAST: None    TECHNIQUE:  Multislice helical CT was performed from the diaphragm to the iliac crest prior  to and during rapid bolus intravenous contrast administration.  Contiguous 5 mm  axial images were reconstructed and lung and soft tissue windows were generated. Coronal and sagittal reformations were generated. CT dose reduction was  achieved through use of a standardized protocol tailored for this examination  and automatic exposure control for dose modulation. FINDINGS:    LOWER THORAX: Basilar atelectasis. Cardiomegaly. Trace pericardial effusion is  simple. LIVER: Hypoenhancing 3.5 x 4.4 cm segment 4 hepatic lesion, 6-13, does not  demonstrate arterial enhancement and previously measured 2.9 cm. 7 mm segment 2  hypodensity without enhancement is incompletely characterized, though likely  benign. BILIARY TREE: Cholelithiasis. CBD is not dilated. SPLEEN: within normal limits. PANCREAS: No mass or ductal dilatation. ADRENALS: Unremarkable. KIDNEYS: No mass, calculus, or hydronephrosis. Simple renal cysts do not require  follow-up. STOMACH: Unremarkable. SMALL BOWEL: No dilatation or wall thickening. COLON: No dilatation or wall thickening. APPENDIX: Unremarkable. PERITONEUM: No ascites or pneumoperitoneum. RETROPERITONEUM: No lymphadenopathy or aortic aneurysm. BONES: No destructive bone lesion. Posterior lumbar fusion at L3-S1. Severe  lordosis. Grade 1 anterolisthesis of L1 on L2.  ABDOMINAL WALL: No mass or hernia. ADDITIONAL COMMENTS: N/A    Impression  1.  3.5 x 4.4 cm segment 4 hepatic lesion does not display characteristic  findings of HCC. Cholangiocarcinoma versus metastatic disease remains a  consideration. MRI versus biopsy recommended. 2.  Cholelithiasis.       LABS  Lab Results   Component Value Date/Time    WBC 4.9 12/21/2022 11:00 AM    WBC 5.2 05/07/2012 01:27 PM    HGB 12.6 12/21/2022 11:00 AM    HCT 39.2 12/21/2022 11:00 AM    PLATELET 079 46/52/3809 11:00 AM    MCV 85 12/21/2022 11:00 AM     Lab Results   Component Value Date/Time    Sodium 139 11/04/2022 05:12 AM    Potassium 4.1 11/04/2022 05:12 AM    Chloride 103 11/04/2022 05:12 AM    CO2 28 11/04/2022 05:12 AM    Anion gap 8 11/04/2022 05:12 AM    Glucose 124 (H) 11/04/2022 05:12 AM    BUN 16 11/04/2022 05:12 AM    Creatinine 1.11 (H) 11/04/2022 05:12 AM    BUN/Creatinine ratio 14 11/04/2022 05:12 AM    GFR est AA >60 08/11/2022 12:48 PM    GFR est non-AA 53 (L) 08/11/2022 12:48 PM    Calcium 10.1 11/04/2022 05:12 AM     Lab Results   Component Value Date/Time    INR 1.0 12/21/2022 11:00 AM    Prothrombin time 10.8 12/21/2022 11:00 AM       PHYSICAL EXAM   /84 (BP 1 Location: Left upper arm, BP Patient Position: At rest)   Pulse 69   Temp 98.3 °F (36.8 °C)   Resp 23   Ht 4' 7\" (1.397 m)   Wt 72.6 kg (160 lb)   LMP  (LMP Unknown)   SpO2 93%   BMI 37.19 kg/m²   General:  NAD  Heart:  RRR  Lungs:  NWOB  Neurological:  AAOX3      PLAN   Procedure to be performed:  US guided liver mass biopsy  Plan for sedation:  moderate  Post procedure plan:  observation per protocol  Informed consent:  risks, benefits, and alternatives reviewed with the patient / family who agree to proceed  Code status:  full code      Sonal Lopes, CHARITY  [facility], Supriya Tao.

## 2023-01-01 ENCOUNTER — HOME CARE VISIT (OUTPATIENT)
Age: 88
End: 2023-01-01
Payer: MEDICARE

## 2023-01-01 ENCOUNTER — HOME CARE VISIT (OUTPATIENT)
Facility: HOME HEALTH | Age: 88
End: 2023-01-01
Payer: MEDICARE

## 2023-01-01 VITALS
HEART RATE: 78 BPM | OXYGEN SATURATION: 96 % | SYSTOLIC BLOOD PRESSURE: 150 MMHG | RESPIRATION RATE: 16 BRPM | DIASTOLIC BLOOD PRESSURE: 62 MMHG

## 2023-01-01 PROCEDURE — G0299 HHS/HOSPICE OF RN EA 15 MIN: HCPCS

## 2023-01-01 ASSESSMENT — ENCOUNTER SYMPTOMS: DYSPNEA ACTIVITY LEVEL: AT REST

## 2023-01-02 DIAGNOSIS — F41.9 CHRONIC ANXIETY: ICD-10-CM

## 2023-01-03 ENCOUNTER — TELEPHONE (OUTPATIENT)
Dept: SURGERY | Age: 88
End: 2023-01-03

## 2023-01-03 DIAGNOSIS — C22.7 OTHER SPECIFIED CARCINOMAS OF LIVER (HCC): Primary | ICD-10-CM

## 2023-01-03 RX ORDER — CLONAZEPAM 1 MG/1
TABLET ORAL
Qty: 60 TABLET | Refills: 1 | Status: SHIPPED | OUTPATIENT
Start: 2023-01-03

## 2023-01-03 NOTE — TELEPHONE ENCOUNTER
Called to tell Ms. Elva Sánchez the biopsy results of the liver mass. It is carcinoma. There is some question as to its exact type. \"Overall, the morphologic and immunophenotypic findings are nonspecific,   but favor a pancreaticobiliary, upper or lower gastrointestinal primary, while other sites cannot be entirely excluded. \"    Explained that we will get her referred over to Dr. Rosa Sanchez and that we will likely discuss her case at tumor board. 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

## 2023-01-05 RX ORDER — POTASSIUM CHLORIDE 750 MG/1
TABLET, FILM COATED, EXTENDED RELEASE ORAL
Qty: 180 TABLET | Refills: 3 | Status: SHIPPED | OUTPATIENT
Start: 2023-01-05

## 2023-01-11 NOTE — PROGRESS NOTES
Carole Valladares is a 82 y.o. female here for new patient appt for liver carcinoma. Referred by Dr Val Bedoya  Pt here with daughter and . Pt ambulates with cane. Pt has been having right side abdomen pain for about 4 days. Level 7    1. Have you been to the ER, urgent care clinic since your last visit? Hospitalized since your last visit? New Pt    2. Have you seen or consulted any other health care providers outside of the [address] [place] [address] since your last visit? Include any pap smears or colon screening.  New Pt

## 2023-01-12 ENCOUNTER — OFFICE VISIT (OUTPATIENT)
Dept: ONCOLOGY | Age: 88
End: 2023-01-12
Payer: MEDICARE

## 2023-01-12 VITALS
HEART RATE: 67 BPM | WEIGHT: 162.6 LBS | TEMPERATURE: 98.2 F | BODY MASS INDEX: 37.63 KG/M2 | SYSTOLIC BLOOD PRESSURE: 122 MMHG | OXYGEN SATURATION: 93 % | HEIGHT: 55 IN | DIASTOLIC BLOOD PRESSURE: 77 MMHG

## 2023-01-12 DIAGNOSIS — C22.1 CHOLANGIOCARCINOMA OF LIVER (HCC): Primary | ICD-10-CM

## 2023-01-12 DIAGNOSIS — E66.01 SEVERE OBESITY (BMI 35.0-39.9) WITH COMORBIDITY (HCC): ICD-10-CM

## 2023-01-12 PROCEDURE — G8432 DEP SCR NOT DOC, RNG: HCPCS | Performed by: INTERNAL MEDICINE

## 2023-01-12 PROCEDURE — 1123F ACP DISCUSS/DSCN MKR DOCD: CPT | Performed by: INTERNAL MEDICINE

## 2023-01-12 PROCEDURE — 3078F DIAST BP <80 MM HG: CPT | Performed by: INTERNAL MEDICINE

## 2023-01-12 PROCEDURE — G8400 PT W/DXA NO RESULTS DOC: HCPCS | Performed by: INTERNAL MEDICINE

## 2023-01-12 PROCEDURE — G8417 CALC BMI ABV UP PARAM F/U: HCPCS | Performed by: INTERNAL MEDICINE

## 2023-01-12 PROCEDURE — G0463 HOSPITAL OUTPT CLINIC VISIT: HCPCS | Performed by: INTERNAL MEDICINE

## 2023-01-12 PROCEDURE — G8427 DOCREV CUR MEDS BY ELIG CLIN: HCPCS | Performed by: INTERNAL MEDICINE

## 2023-01-12 PROCEDURE — 1090F PRES/ABSN URINE INCON ASSESS: CPT | Performed by: INTERNAL MEDICINE

## 2023-01-12 PROCEDURE — 1101F PT FALLS ASSESS-DOCD LE1/YR: CPT | Performed by: INTERNAL MEDICINE

## 2023-01-12 PROCEDURE — 99205 OFFICE O/P NEW HI 60 MIN: CPT | Performed by: INTERNAL MEDICINE

## 2023-01-12 PROCEDURE — G8536 NO DOC ELDER MAL SCRN: HCPCS | Performed by: INTERNAL MEDICINE

## 2023-01-12 PROCEDURE — 3074F SYST BP LT 130 MM HG: CPT | Performed by: INTERNAL MEDICINE

## 2023-01-12 NOTE — LETTER
1/12/2023    Patient: Jorge Courser   YOB: 1935   Date of Visit: 1/12/2023     Chino Armenta MD  [address] [place] [address]  [address]  [address] [address] [address] [place] [address]  Via In Basket    Dear MD Uzair Wright MD,      Thank you for referring Ms. Gemini Branham to [address] [place] [address] for evaluation. My notes for this consultation are attached. If you have questions, please do not hesitate to call me. I look forward to following your patient along with you.       Sincerely,    Kike Nice MD

## 2023-01-12 NOTE — PROGRESS NOTES
[address] [facility] [address], [address] [facility], [address] [place] [address]  [phone]         Oncology Note        Patient: Stas Lujan MRN: [id number]  SSN: [id number]    YOB: 1935  Age: 82 y.o. Sex: female      Subjective: Stas Lujan is a 82 y.o. female with a new diagnosis of intrahepatic cholangiocarcinoma. She presented to Dr. Juan Miguel Poe for RUQ abdominal pain. This has been going in for over 6 months. The pain is intermittent and mild to moderate with radiation to the flank. Repeated imaging revealed a lesion in the dome of the liver. A BIOPSY showed a diagnosis of poorly differentiated adenocarcinoma. My interpretation is that this is cholangiocarcinoma. Due to the location of the disease and her advanced age, the disease is unresectable. She comes in to discuss the next steps.        Review of Systems:    Constitutional: negative  Eyes: negative  Ears, Nose, Mouth, Throat, and Face: negative  Respiratory: negative  Cardiovascular: negative  Gastrointestinal: negative  Genitourinary:negative  Integument/Breast: negative  Hematologic/Lymphatic: negative  Musculoskeletal:negative  Neurological: negative    Past Medical History:   Diagnosis Date    Anxiety     Aortic stenosis 03/03/2010    Aortic stenosis     Arrhythmia     MURMUR    Arthritis     SPINAL STENOSIS    Atherosclerosis of coronary artery     Biliary dyskinesia     Cancer ([address] 2023    liver    CHF (congestive heart failure) ([address] 03/03/2010    CHF (congestive heart failure) ([address] 01/2002    Chronic heart failure ([address] 1/2002; 3/3/2010    chronic diastolic heart failure    Chronic pain     LOWER BACK, RIGHT KNEE    Environmental allergies 03/03/2010    GERD (gastroesophageal reflux disease) 03/03/2010    Hiatal hernia 03/03/2010    High cholesterol 03/03/2010    HTN (hypertension) 03/03/2010    Hypokalemia 03/03/2010    Ischemic heart disease, chronic     Multiple gallstones     Obese     Psychiatric disorder     anxiety    S/P hysterectomy 03/03/2010    Spinal stenosis 03/03/2010     Past Surgical History:   Procedure Laterality Date    CARDIAC CATHETERIZATION  01/2002    normal coronaries    DECOMPRESS DISC RF LUMBAR  07/2007    HX BACK SURGERY  5/1/2014    HX BREAST LUMPECTOMY Left 1989    benign left breast    HX BUNIONECTOMY      HX BUNIONECTOMY      HX HEART CATHETERIZATION  3/3/10    HX HYSTERECTOMY  1978    HX LUMBAR DISKECTOMY      HX ORTHOPAEDIC Bilateral     BUNIONECTOMY    HX ORTHOPAEDIC Right     WRIST FX    HX OTHER SURGICAL  10/12/2015    mole removed from right side of face by Dr. Cassy Riley FLX DX Rosaline Ignacio PFRMD  [id number]    Dr Nicole Grandchild GI ENDOSCOPY,BIOPSY  2/27/2019           Family History   Problem Relation Age of Onset    Hypertension Mother     Heart Disease Father     Stroke Sister     Heart Disease Sister     Heart Disease Sister     Cancer Brother         LUNG    Cancer Brother         PROSTATE    Hypertension Brother     No Known Problems Brother     Lung Disease Brother     Heart Attack Brother     Lung Disease Brother     Stroke Brother     Stroke Daughter 47    No Known Problems Daughter     Anesth Problems Neg Hx      Social History     Tobacco Use    Smoking status: Never    Smokeless tobacco: Never   Substance Use Topics    Alcohol use: No     Alcohol/week: 0.0 standard drinks      Prior to Admission medications    Medication Sig Start Date End Date Taking?  Authorizing Provider   potassium chloride SR (KLOR-CON 10) 10 mEq tablet TAKE 3 TABS BY MOUTH 2 TIMES PER DAY 1/5/23  Yes Sergio Arango MD   clonazePAM (KlonoPIN) 1 mg tablet TAKE 1/2 TO 1 TABLET EVERY MORNING AND AS NEEDED FOR ANXIETY, AND TAKE 1 TABLET AT BEDTIME FOR SLEEP 1/3/23  Yes James Burks MD   losartan (COZAAR) 100 mg tablet TAKE 1 TABLET BY MOUTH EVERY DAY 12/19/22  Yes James Burks MD   calcium carb/magnesium hydrox (MYLANTA PO) Take  by mouth as needed. Yes Provider, Historical   amLODIPine (NORVASC) 5 mg tablet Take 5 mg by mouth two (2) times a day. Yes Provider, Historical   furosemide (LASIX) 40 mg tablet Take 40 mg by mouth nightly. Yes Provider, Historical   furosemide (LASIX) 80 mg tablet Take 80 mg by mouth Every morning. Yes Provider, Historical   pantoprazole (PROTONIX) 40 mg tablet Take 1 Tablet by mouth two (2) times a day. 8/15/22  Yes Bentley Quiñonez MD   nitroglycerin (NITROSTAT) 0.4 mg SL tablet DISSOLVE 1 TAB BY SUBLINGUAL ROUTE EVERY 5 MINUTES AS NEEDED. 7/21/22  Yes Radha Leyva MD   albuterol (PROVENTIL VENTOLIN) 2.5 mg /3 mL (0.083 %) nebu 3 mL by Nebulization route every four (4) hours as needed for Shortness of Breath or Wheezing. 7/20/22  Yes Bentley Quiñonez MD   aspirin delayed-release 81 mg tablet Take 81 mg by mouth daily. Yes Provider, Historical   metoprolol tartrate (LOPRESSOR) 25 mg tablet TAKE 1 TABLET BY MOUTH TWICE A DAY 1 AT 9AM AND 1 AT 9PM  Patient taking differently: Take 25 mg by mouth two (2) times a day. Indications: high blood pressure 2/15/22  Yes Aldair Arango MD   isosorbide mononitrate ER (IMDUR) 30 mg tablet TAKE 1 TABLET BY MOUTH EVERY DAY  Patient taking differently: Take 30 mg by mouth daily. Indications: prevention of anginal chest pain associated with coronary artery disease 2/15/22  Yes Aldair Arango MD   rosuvastatin (CRESTOR) 20 mg tablet TAKE 1 TABLET BY MOUTH EVERY DAY AT NIGHT  Patient taking differently: Take 20 mg by mouth nightly. Indications: excessive fat in the blood 12/29/21  Yes Bentley Quiñonez MD   cholecalciferol, vitamin D3, (Vitamin D3) 50 mcg (2,000 unit) tab Take 1 Tablet by mouth daily.   Patient not taking: No sig reported    Provider, Historical              Allergies   Allergen Reactions    Bactrim [Sulfamethoxazole-Trimethoprim] Unknown (comments)     Pt does not recall    Curtistine Grates [Propoxyphene N-Acetaminophen] Itching           Objective:     Vitals:    01/12/23 1527   BP: 122/77   Pulse: 67   Temp: 98.2 °F (36.8 °C)   TempSrc: Temporal   SpO2: 93%   Weight: 162 lb 9.6 oz (73.8 kg)   Height: 4' 7\" (1.397 m)            Physical Exam:  GENERAL: alert, cooperative, no distress, appears stated age  EYE: negative  LYMPHATIC: Cervical, supraclavicular, and axillary nodes normal.   THROAT & NECK: normal and no erythema or exudates noted. LUNG: clear to auscultation bilaterally  HEART: regular rate and rhythm, S1, S2 normal, no murmur, click, rub or gallop  ABDOMEN: soft, non-tender. Bowel sounds normal. No masses,  no organomegaly  EXTREMITIES:  extremities normal, atraumatic, no cyanosis or edema  SKIN: Normal.  NEUROLOGIC: AOx3. Gait normal. Reflexes and motor strength normal and symmetric. Cranial nerves 2-12 and sensation grossly intact. Lab Results   Component Value Date/Time    WBC 4.9 12/21/2022 11:00 AM    WBC 5.2 05/07/2012 01:27 PM    HGB 12.6 12/21/2022 11:00 AM    HCT 39.2 12/21/2022 11:00 AM    PLATELET 436 06/00/7625 11:00 AM    MCV 85 12/21/2022 11:00 AM       Lab Results   Component Value Date/Time    Sodium 139 11/04/2022 05:12 AM    Potassium 4.1 11/04/2022 05:12 AM    Chloride 103 11/04/2022 05:12 AM    CO2 28 11/04/2022 05:12 AM    Anion gap 8 11/04/2022 05:12 AM    Glucose 124 (H) 11/04/2022 05:12 AM    BUN 16 11/04/2022 05:12 AM    Creatinine 1.11 (H) 11/04/2022 05:12 AM    BUN/Creatinine ratio 14 11/04/2022 05:12 AM    GFR est AA >60 08/11/2022 12:48 PM    GFR est non-AA 53 (L) 08/11/2022 12:48 PM    Calcium 10.1 11/04/2022 05:12 AM    Bilirubin, total 0.4 11/04/2022 05:12 AM    Alk.  phosphatase 113 11/04/2022 05:12 AM    Protein, total 8.2 11/04/2022 05:12 AM    Albumin 3.4 (L) 11/04/2022 05:12 AM    Globulin 4.8 (H) 11/04/2022 05:12 AM    A-G Ratio 0.7 (L) 11/04/2022 05:12 AM    ALT (SGPT) 17 11/04/2022 05:12 AM    AST (SGOT) 19 11/04/2022 05:12 AM       MRI Results (most recent):  Results from [place] encounter on 11/30/22    MRI ABD W WO CONT    Narrative  EXAM:  MRI ABD W WO CONT    INDICATION: Liver mass right lobe seen on CT of    COMPARISON: 9/27/2022    TECHNIQUE: Coronal T2 and postcontrast T1; Axial T2, in/out of phase, MRCP, pre-  and dynamic postcontrast T1 MRI of the abdomen. 15 mL of ProHance. Subtractions  were performed. FINDINGS: Liver: In the medial aspect of the right hepatic lobe segment 7 and 8  there is a 5.2 x 4.5 cm heterogeneously enhancing mass which demonstrates T1  hypointensity and mild to moderate T2 hyperintensity. Biliary tree: No biliary dilatation. Cholelithiasis is noted. Pancreas: Pancreas is grossly unremarkable. Evaluation is limited by motion. No  pancreatic ductal dilatation. Spleen: Unremarkable    Adrenal glands: Unremarkable    Kidneys: Simple cyst in the left kidney. Other probable small simple cysts are  noted bilaterally    Vasculature: Possible narrowing of the intrahepatic IVC secondary to the mass. Bowel: Unremarkable    Lymph nodes: No adenopathy is identified. Miscellaneous: None    Impression  1. Nonspecific mass in segment 7/8 of the liver encompassing the intrahepatic  IVC and possibly narrowing the intrahepatic IVC. Enhancement characteristics are  more consistent with cholangiocarcinoma or metastasis. 2.  Cholelithiasis. Other incidental findings as above          Assessment:     1. Intrahepatic cholangiocarcinoma:    ECOG PS 1  Intent of Treatment - palliative  Prognosis Guarded    Due to the location of the disease and her advanced age, the disease is unresectable. I discussed the pros and cons of systemic therapy. She is relatively asymptomatic from the disease. If she receives systemic platinum based chemotherapy, she will feel poorly and her quality of life will be effected. I weighed the pros and cons of treatment vs best supportive care.  I leaned on pursuing best supportive care. I spent 65 minute with the patient in a face-to-face encounter. I explained her the stage of the disease, pathophysiology of the disease and the treatment approaches. I answered all her questions. More than 50% of the time was utilized in education, counseling and co-ordination of care. 2. Morbid obesity    Managed by PCP       Plan:     Wait to hear from the patient about what she chooses - best supportive care?       Signed By: Conchita Courtney MD     January 12, 2023         CC> Ann López MD

## 2023-01-16 ENCOUNTER — TELEPHONE (OUTPATIENT)
Dept: ONCOLOGY | Age: 88
End: 2023-01-16

## 2023-01-16 NOTE — TELEPHONE ENCOUNTER
[employer]  Oncology Social Work  Encounter     Patient Name:  Leonel Cantu    Medical History:     Advance Directives:     Narrative: SW attempted to follow up with pt for missed SW assessment,  Dtr stated pt would call when she has made her decision.      Barriers to Care:     Plan:    Referral/Handouts:

## 2023-01-17 ENCOUNTER — OFFICE VISIT (OUTPATIENT)
Dept: FAMILY MEDICINE CLINIC | Age: 88
End: 2023-01-17
Payer: MEDICARE

## 2023-01-17 VITALS
OXYGEN SATURATION: 97 % | BODY MASS INDEX: 37.4 KG/M2 | TEMPERATURE: 98.4 F | HEART RATE: 60 BPM | SYSTOLIC BLOOD PRESSURE: 132 MMHG | RESPIRATION RATE: 12 BRPM | HEIGHT: 55 IN | WEIGHT: 161.6 LBS | DIASTOLIC BLOOD PRESSURE: 80 MMHG

## 2023-01-17 DIAGNOSIS — K21.9 GASTRO-ESOPHAGEAL REFLUX DISEASE WITHOUT ESOPHAGITIS: ICD-10-CM

## 2023-01-17 DIAGNOSIS — F41.9 CHRONIC ANXIETY: ICD-10-CM

## 2023-01-17 DIAGNOSIS — Z51.81 ENCOUNTER FOR MEDICATION MONITORING: ICD-10-CM

## 2023-01-17 DIAGNOSIS — I50.32 CHRONIC DIASTOLIC HEART FAILURE (HCC): ICD-10-CM

## 2023-01-17 DIAGNOSIS — M15.9 PRIMARY OSTEOARTHRITIS INVOLVING MULTIPLE JOINTS: ICD-10-CM

## 2023-01-17 DIAGNOSIS — E78.2 MIXED HYPERLIPIDEMIA: ICD-10-CM

## 2023-01-17 DIAGNOSIS — I25.10 ATHEROSCLEROSIS OF NATIVE CORONARY ARTERY OF NATIVE HEART WITHOUT ANGINA PECTORIS: ICD-10-CM

## 2023-01-17 DIAGNOSIS — Z00.00 MEDICARE ANNUAL WELLNESS VISIT, SUBSEQUENT: Primary | ICD-10-CM

## 2023-01-17 DIAGNOSIS — C22.1 INTRAHEPATIC CHOLANGIOCARCINOMA (HCC): ICD-10-CM

## 2023-01-17 DIAGNOSIS — I10 ESSENTIAL HYPERTENSION: ICD-10-CM

## 2023-01-17 PROCEDURE — G0439 PPPS, SUBSEQ VISIT: HCPCS | Performed by: FAMILY MEDICINE

## 2023-01-17 PROCEDURE — 99214 OFFICE O/P EST MOD 30 MIN: CPT | Performed by: FAMILY MEDICINE

## 2023-01-17 PROCEDURE — G0463 HOSPITAL OUTPT CLINIC VISIT: HCPCS | Performed by: FAMILY MEDICINE

## 2023-01-17 PROCEDURE — 3079F DIAST BP 80-89 MM HG: CPT | Performed by: FAMILY MEDICINE

## 2023-01-17 PROCEDURE — G8417 CALC BMI ABV UP PARAM F/U: HCPCS | Performed by: FAMILY MEDICINE

## 2023-01-17 PROCEDURE — G8400 PT W/DXA NO RESULTS DOC: HCPCS | Performed by: FAMILY MEDICINE

## 2023-01-17 PROCEDURE — 1123F ACP DISCUSS/DSCN MKR DOCD: CPT | Performed by: FAMILY MEDICINE

## 2023-01-17 PROCEDURE — G8536 NO DOC ELDER MAL SCRN: HCPCS | Performed by: FAMILY MEDICINE

## 2023-01-17 PROCEDURE — 1090F PRES/ABSN URINE INCON ASSESS: CPT | Performed by: FAMILY MEDICINE

## 2023-01-17 PROCEDURE — G8427 DOCREV CUR MEDS BY ELIG CLIN: HCPCS | Performed by: FAMILY MEDICINE

## 2023-01-17 PROCEDURE — G8510 SCR DEP NEG, NO PLAN REQD: HCPCS | Performed by: FAMILY MEDICINE

## 2023-01-17 PROCEDURE — 1101F PT FALLS ASSESS-DOCD LE1/YR: CPT | Performed by: FAMILY MEDICINE

## 2023-01-17 PROCEDURE — 3075F SYST BP GE 130 - 139MM HG: CPT | Performed by: FAMILY MEDICINE

## 2023-01-17 RX ORDER — ROSUVASTATIN CALCIUM 20 MG/1
20 TABLET, COATED ORAL
Qty: 30 TABLET | Refills: 11 | Status: SHIPPED | OUTPATIENT
Start: 2023-01-17

## 2023-01-17 NOTE — LETTER
CONTROLLED SUBSTANCE MEDICATION AGREEMENT  Patient Name: Jhonny Diop  Patient YOB: 1935     I understand, that controlled substance medications may be used to help better manage my symptoms and to improve my ability to function at home, work and in social settings. However, I also understand that these medications do have risks, which have been discussed with me, including possible development of physical or psychological dependence. I understand that successful treatment requires mutual trust and honesty between me and my provider. I understand and agree that following this Medication Agreement is necessary in continuing my provider-patient relationship and the success of my treatment plan. Explanation from my Provider: Benefits and Goals of Controlled Substance Medications: There are two potential goals for your treatment: (1) decreased pain and suffering (2) improved daily life functions. There are many possible treatments for your chronic condition(s). Alternatives such as physical therapy, yoga, massage, home daily exercise, meditation, relaxation techniques, injections, chiropractic manipulations, surgery, cognitive therapy, hypnosis and many medications that are not habit-forming may be used. Use of controlled substance medications may be helpful, but they are unlikely to resolve all symptoms or restore all function. Explanation from my Provider: Risks of Controlled Substance Medications:   Opioid pain medications: These medications can lead to problems such as addiction/dependence, sedation, lightheadedness/dizziness, memory issues, falls, constipation, nausea, or vomiting. They may also impair the ability to drive or operate machinery. Additionally, these medications may lower testosterone levels, leading to loss of bone strength, stamina and sex drive.   They may cause problems with breathing, sleep apnea and reduced coughing, which is especially dangerous for patients with lung disease. Overdose or dangerous interactions with alcohol and other medications may occur, leading to death. Hyperalgesia may develop, which means patients receiving opioids for the treatment of pain may become more sensitive to certain painful stimuli, and in some cases, experience pain from ordinarily non-painful stimuli. Women between the ages of 14-53 who could become pregnant should carefully weigh the risks and benefits of opioids with their physicians, as these medications increase the risk of pregnancy complications, including miscarriage,  delivery and stillbirth. It is also possible for babies to be born addicted to opioids. Opioid dependence withdrawal symptoms may include; feelings of uneasiness, increased pain, irritability, belly pain, diarrhea, sweats and goose-flesh. Testosterone replacement therapy:  Potential side effects include increased risk of stroke and heart attack, blood clots, increased blood pressure, increased cholesterol, enlarged prostate, sleep apnea, irritability/aggression and other mood disorders, and decreased fertility. Naveen Caruso (1935)             Page 1 of 4    Initials:_______    Benzodiazepines and non-benzodiazepine sleep medications: These medications can lead to problems such as addiction/dependence, sedation, fatigue, lightheadedness, dizziness, incoordination, falls, depression, hallucinations, and impaired judgment, memory and concentration. The ability to drive and operate machinery may also be affected. Abnormal sleep-related behaviors have been reported, including sleepwalking, driving, making telephone calls, eating, or having sex while not fully awake. These medications can suppress breathing and worsen sleep apnea, particularly when combined with alcohol or other sedating medications, potentially leading to death. Dependence withdrawal symptoms may include tremors, anxiety, hallucinations and seizures.    Stimulants:  Common adverse effects include addiction/dependence, increased blood pressure and heart rate, decreased appetite, nausea, involuntary weight loss, insomnia,  irritability, and headaches. These risks may increase when these medications are combined with other stimulants, such as caffeine pills or energy drinks, certain weight loss supplements and oral decongestants. Dependence withdrawal symptoms may include depressed mood, loss of interest, suicidal thoughts, anxiety, fatigue, appetite changes and agitation. I agree and understand that I and my prescriber have the following rights and responsibilities regarding my treatment plan:   1. MY RIGHTS:  To be informed of my treatment and medication plan. To be an active participant in my health and wellbeing. 2. MY RESPONSIBILITY AND UNDERSTANDING FOR USE OF MEDICATIONS   I will take medications at the dose and frequency as directed. For my safety, I will not increase or change how I take my medications without the recommendation of my healthcare provider.  I will actively participate in any program recommended by my provider which may improve function, including social, physical, psychological programs.  I will not take my medications with alcohol or other drugs not prescribed to me. I understand that drinking alcohol with my medications increases the chances of side effects, including reduced breathing rate and could lead to personal injury when operating machinery.  I understand that if I have a history of substance use disorders, including alcohol or other illicit drugs, that I may be at increased risk of addiction to my medications.  I agree to notify my provider immediately if I should become pregnant so that my treatment plan can be adjusted.    I agree and understand that I shall only receive controlled substance medications from the prescriber that signed this agreement unless there is written agreement among other prescribers of controlled substances outlining the responsibility of the medications being prescribed.  I understand that the if the controlled medication is not helping to achieve goals, the dosage may be tapered and no longer prescribed. 3. MY RESPONSIBILITY FOR COMMUNICATION / PRESCRIPTION RENEWALS   I agree that all controlled substance medications that I take will be prescribed only by my provider. If another healthcare provider prescribes me medication in an emergency, I will notify my provider within seventy-two (72) hours. Noemi Sat (1935)             Page 2 of 4    Initials:_______   I will arrange for refills at the prescribed interval ONLY during regular office hours. I will not ask for refills earlier than agreed, after-hours, on holidays or weekends. Refills may take up to 72 hours for processing and prescriptions to reach the pharmacy.  I will inform my other health care providers that I am taking these medications and of the existence of this [id number]. In the event of an emergency, I will provide the same information to the emergency department prescribers.  I will keep my provider updated on the pharmacy I am using for controlled medication prescription filling. 4. MY RESPONSIBILITY FOR PROTECTING MEDICATIONS   I will protect my prescriptions and medications. I understand that lost or misplaced prescriptions will not be replaced.  I will keep medications only for my own use and will not share them with others. I will keep all medications away from children.  I agree that if my medications are adjusted or discontinued, I will properly dispose of any remaining medications. I understand that I will be required to dispose of any remaining controlled medications as, directed by my prescriber, prior to being provided with any prescriptions for other controlled medications.   Medication drop box locations can be found at: [employer]  5. MY RESPONSIBILITY WITH ILLEGAL DRUGS    I will not use illegal or street drugs or another person's prescription medications not prescribed to me.  If there are identified addiction type symptoms, then referral to a program may be provided by my provider and I agree to follow through with this recommendation. 6. MY RESPONSIBILITY FOR COOPERATION WITH INVESTIGATIONS   I understand that my provider will comply with any applicable law and may discuss my use and/or possible misuse/abuse of controlled substances and alcohol, as appropriate, with any health care provider involved in my care, pharmacist, or legal authority.  I authorize my provider and pharmacy to cooperate fully with law enforcement agencies (as permitted by law) in the investigation of any possible misuse, sale, or other diversion of my controlled substances.  I agree to waive any applicable privilege or right of privacy or confidentiality with respect to these authorizations. 7. PROVIDERS RIGHT TO MONITOR FOR SAFETY: PRESCRIPTION MONITORING / DRUG TESTING   I consent to drug/toxicology screening and will submit to a drug screen upon my providers request to assure I am only taking the prescribed drugs for my safety monitoring. I understand that a drug screen is a laboratory test in which a sample of my urine, blood or saliva is checked to see what drugs I have been taking. This may entail an observed urine specimen, which means that a nurse or other health care provider may watch me provide urine, and I will cooperate if I am asked to provide an observed specimen. Noah Peres (1935)             Page 3 of 4    Initials:_______  Herve Sams I understand that my provider will check a copy of my State Prescription Monitoring Program () Report in order to safely prescribe medications.      Pill Counts: I consent to pill counts when requested. I may be asked to bring all my prescribed controlled substance medications, in their original bottles, to all of my scheduled appointments. In addition, my provider may ask me to come to the practice at any time for a random pill count. 8. TERMINATION OF THIS AGREEMENT   For my safety, my prescriber has the right to stop prescribing controlled substance medications and may end this agreement.  Conditions that may result in termination of this agreement:  a. I do not show any improvement in pain, or my activity has not improved. b. I develop rapid tolerance or loss of improvement, as described in my treatment plan.  c. I develop significant side effects from the medication. d. My behavior is not consistent with the responsibilities outlined above, thereby causing safety concerns to continue prescribing controlled substance medications. e. I fail to follow the terms of this agreement. f. Other:____________________________     UNDERSTANDING THIS MEDICATION AGREEMENT:    I have read the above and have had all my questions answered. For chronic disease management, I know that my symptoms can be managed with many types of treatments. A chronic medication trial may be part of my treatment, but I must be an active participant in my care. Medication therapy is only one part of my symptom management plan. In some cases, there may be limited scientific evidence to support the chronic use of certain medications to improve symptoms and daily function. Furthermore, in certain circumstances, there may be scientific information that suggests that the use of chronic controlled substances may worsen my symptoms and increase my risk of unintentional death directly related to this medication therapy. I know that if my provider feels my risk from controlled medications is greater than my benefit, I will have my controlled substance medication(s) compassionately lowered or removed altogether. I further agree to allow this office to contact my HIPAA contact if there are concerns about my safety and use of the controlled medications. I have agreed to use the prescribed controlled substance medications to me as instructed by my provider and as stated in this Medication Agreement. My initial on each page and my signature below shows that I have read each page and I have had the opportunity to ask questions with answers provided by my provider.       Patient Name (Printed): _____________________________________    Patient Signature:  ______________________   Date: _____________      Prescriber Name (Printed): ___________________________________    Prescriber Signature: _____________________  Date: _____________     Wendy Guess (1935)             Page 4 of 4

## 2023-01-17 NOTE — PROGRESS NOTES
Patient identified by 2 identifiers. Chief Complaint   Patient presents with    Hypertension    Follow-up     1. Have you been to the ER, urgent care clinic since your last visit? Hospitalized since your last visit? No    2. Have you seen or consulted any other health care providers outside of the [address] [place] [address] since your last visit? Include any pap smears or colon screening.  No

## 2023-01-17 NOTE — PROGRESS NOTES
HISTORY OF PRESENT ILLNESS  Hannah Charles is a 82 y.o. female. HPI   Follow up on chronic medical problems. 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 She has met with oncology and decided not to proceed with systemic therapy and it has been determined to be inoperable. Has good days and bad days. Stress with her family has her feeling anxious most of the time still. Cardiovascular Review:  The patient has hypertension, hyperlipidemia, chronic diastolic heart failure, and obesity. Hx also of aortic stenosis. Diet and Lifestyle: generally follows a low fat low cholesterol diet, generally follows a low sodium diet, sedentary. Pertinent ROS: taking medications as instructed, no medication side effects noted, no TIA's, no chest pain on exertion, mild swelling of ankles, no orthostatic dizziness or lightheadedness, no palpitations,      Home BP Monitoring: is not measured at home. Anxiety Review:  Patient is seen for anxiety. Ongoing symptoms include: increased anxiety still related to family issues. Patient denies: palpitations, sweating, chest pain, shortness of breath. Reported side effects from the treatment: none. Encounter for pain management. 1./2. Medical history/Past medical History  Chronic Pain:  Osteoarthritis:  Patient has osteoarthritis. Overall doing better with back pain. Still having knee pain. Ortho told that they would refer to pain management but she has not yet gone for this consult. Aching more in the right knee and increase pain with walking. Has had 2 injections in the knee which has not helped very much. She has decided to not to pursue surgery. Pain in the knee is \"bad today\"  Discussed getting toradol injection. Pain is 5-6/10. Taking tylenol for the pain which helps some. Symptoms onset: problem is longstanding.   Rheumatological ROS: stable, mild-to-moderate joint symptoms intermittently, reasonably well controlled by PRN meds. Response to treatment plan: stable and intermittent. 3. Applicable records from prior treatment providers are apart of [facility] under the media tab. 4. Diagnostic, therapeutic and laboratory results are available in the [facility] chart. 5. Consultation notes are available for review in the media tab of the [facility] chart. 6. Treatment goals include pain control so that the pt may be as active and function with her daily activities and improved comfort level. Previously pt has been limited by pain. 7. The risks and benefits of treatment has been discussed at this office visit with the pt. She understands that the medication has addicting potential.  Additionally the pt has been advised that narcotic pain medication may impair mental and/or physical ability required for performance of tasks such as driving or operating any other machinery. 8. Pt has an updated signed pain contract on file and can be found under the FYI section of the [facility] chart. 9. The pain contract is reviewed. Pain medication will be continued at the previous dosage. Urine drug screening will not be done today. Diagnostic studies are not indicated at this time. Interventional procedure are not indicated at this time. 10. Medication prescibed is tylenol #3.  has been reviewed at this OV by me. 11. Patient instructions have been reviewed in detail as outlined above and in the pain contract. 12. Re-eval is planned for 3 months. He reports the following adverse side effects: none. Aberrant behaviors: None. Concomitant use of a benzodiazepine: yes  Will continue on current dose of medications as coarse has been stable and there are no signs of overuse, misuese, diversion, or concerning side effects  Naloxone prescription is warranted. It has been provided or is already on file.      Patient Active Problem List   Diagnosis Code    Hypokalemia E87.6    Hiatal hernia K44.9    Anxiety F41.9    S/P hysterectomy Z90.710    Aortic stenosis, mild I35.0    Spinal stenosis M48.00    S/P foot surgery Z98.890    Environmental allergies Z91.09    S/P cardiac catheterization Z98.890    Hypovitaminosis D E55.9    Chronic ischemic heart disease I25.9    Mixed hyperlipidemia E78.2    Chronic diastolic heart failure (HCC) I50.32    Chest pain at rest R07.9    Bifascicular bundle branch block--RBBB,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    Atypical chest pain R07.89    Essential hypertension I10    Gastroesophageal reflux disease without esophagitis K21.9    Atherosclerosis of native coronary artery without angina pectoris--diffuse small vsl disease via cath cath 2009--RCA PDA/ROSA I25.10    Chronic anxiety F41.9    Encounter for medication monitoring Z51.81    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 M47.815    Class 2 obesity due to excess calories with serious comorbidity and body mass index (BMI) of 38.0 to 38.9 in adult [id number]    Paroxysmal cardiac arrhythmia--PVC's,APC's,NSSVT I49.8    Severe obesity (BMI 35.0-39. 9) with comorbidity ([facility] [address] E66.01    Chest pain with normal angiography--2002;normal NST 2018 R07.9    Primary osteoarthritis of left shoulder M19.012    Pneumonia J18.9    Hypoxia R09.02       Current Outpatient Medications   Medication Sig Dispense Refill    rosuvastatin (CRESTOR) 20 mg tablet Take 1 Tablet by mouth nightly. 30 Tablet 11    potassium chloride SR (KLOR-CON 10) 10 mEq tablet TAKE 3 TABS BY MOUTH 2 TIMES PER  Tablet 3    clonazePAM (KlonoPIN) 1 mg tablet TAKE 1/2 TO 1 TABLET EVERY MORNING AND AS NEEDED FOR ANXIETY, AND TAKE 1 TABLET AT BEDTIME FOR SLEEP 60 Tablet 1    losartan (COZAAR) 100 mg tablet TAKE 1 TABLET BY MOUTH EVERY DAY 90 Tablet 3    calcium carb/magnesium hydrox (MYLANTA PO) Take  by mouth as needed. amLODIPine (NORVASC) 5 mg tablet Take 5 mg by mouth two (2) times a day. furosemide (LASIX) 40 mg tablet Take 40 mg by mouth nightly. furosemide (LASIX) 80 mg tablet Take 80 mg by mouth Every morning. pantoprazole (PROTONIX) 40 mg tablet Take 1 Tablet by mouth two (2) times a day. 180 Tablet 3    nitroglycerin (NITROSTAT) 0.4 mg SL tablet DISSOLVE 1 TAB BY SUBLINGUAL ROUTE EVERY 5 MINUTES AS NEEDED. 25 Tablet 0    albuterol (PROVENTIL VENTOLIN) 2.5 mg /3 mL (0.083 %) nebu 3 mL by Nebulization route every four (4) hours as needed for Shortness of Breath or Wheezing. 90 Nebule 5    aspirin delayed-release 81 mg tablet Take 81 mg by mouth daily. metoprolol tartrate (LOPRESSOR) 25 mg tablet TAKE 1 TABLET BY MOUTH TWICE A DAY 1 AT 9AM AND 1 AT 9PM (Patient taking differently: Take 25 mg by mouth two (2) times a day. Indications: high blood pressure) 180 Tablet 3    isosorbide mononitrate ER (IMDUR) 30 mg tablet TAKE 1 TABLET BY MOUTH EVERY DAY (Patient taking differently: Take 30 mg by mouth daily. Indications: prevention of anginal chest pain associated with coronary artery disease) 90 Tablet 3    cholecalciferol, vitamin D3, (Vitamin D3) 50 mcg (2,000 unit) tab Take 1 Tablet by mouth daily.  (Patient not taking: No sig reported)         Allergies   Allergen Reactions    Bactrim [Sulfamethoxazole-Trimethoprim] Unknown (comments)     Pt does not recall    Darvocet A500 [Propoxyphene N-Acetaminophen] Itching           Past Medical History:   Diagnosis Date    Anxiety     Aortic stenosis 03/03/2010    Aortic stenosis     Arrhythmia     MURMUR    Arthritis     SPINAL STENOSIS    Atherosclerosis of coronary artery     Biliary dyskinesia     Cancer ([facility] [address] 2023    liver    CHF (congestive heart failure) ([facility] [address] 03/03/2010    CHF (congestive heart failure) ([address] 01/2002    Chronic heart failure ([facility] [address] 1/2002; 3/3/2010    chronic diastolic heart failure    Chronic pain     LOWER BACK, RIGHT KNEE    Environmental allergies 03/03/2010    GERD (gastroesophageal reflux disease) 03/03/2010    Hiatal hernia 03/03/2010    High cholesterol 03/03/2010    HTN (hypertension) 03/03/2010    Hypokalemia 03/03/2010    Ischemic heart disease, chronic     Multiple gallstones     Obese     Psychiatric disorder     anxiety    S/P hysterectomy 03/03/2010    Spinal stenosis 03/03/2010           Past Surgical History:   Procedure Laterality Date    CARDIAC CATHETERIZATION  01/2002    normal coronaries    DECOMPRESS DISC RF LUMBAR  07/2007    HX BACK SURGERY  5/1/2014    HX BREAST LUMPECTOMY Left 1989    benign left breast    HX BUNIONECTOMY      HX BUNIONECTOMY      HX HEART CATHETERIZATION  3/3/10    HX HYSTERECTOMY  1978    HX LUMBAR DISKECTOMY      HX ORTHOPAEDIC Bilateral     BUNIONECTOMY    HX ORTHOPAEDIC Right     WRIST FX    HX OTHER SURGICAL  10/12/2015    mole removed from right side of face by Dr. Deonte Bryant FLX DX W/COLLJ Yamile Quiver PFRMD  [id number]    Dr Mick Sr GI ENDOSCOPY,BIOPSY  2/27/2019                Problem  Relation  Age of Onset    Hypertension  Mother    Heart Disease  Father    Stroke  Sister    Heart Disease  Sister    Heart Disease  Sister    Cancer  Brother      LUNG    Cancer  Brother      PROSTATE    Hypertension  Brother    No Known Problems  Brother    Lung Disease  Brother    Heart Attack  Brother    Lung Disease  Brother    Stroke  Brother    Stroke  Daughter  47    No Known Problems  Daughter    Anesth Problems  Neg Hx       Social History     Tobacco Use    Smoking status: Never    Smokeless tobacco: Never   Substance Use Topics    Alcohol use: No     Alcohol/week: 0.0 standard drinks        Lab Results   Component Value Date/Time    WBC 4.9 12/21/2022 11:00 AM    HGB 12.6 12/21/2022 11:00 AM    HCT 39.2 12/21/2022 11:00 AM    PLATELET 535 49/98/5369 11:00 AM    MCV 85 12/21/2022 11:00 AM     Lab Results   Component Value Date/Time    Cholesterol, total 176 12/20/2021 11:05 AM    HDL Cholesterol 61 12/20/2021 11:05 AM    LDL, calculated 92.6 12/20/2021 11:05 AM    Triglyceride 112 12/20/2021 11:05 AM    CHOL/HDL Ratio 2.9 12/20/2021 11:05 AM     Lab Results   Component Value Date/Time    TSH 2.470 05/15/2017 03:04 PM      Lab Results   Component Value Date/Time    Sodium 139 11/04/2022 05:12 AM    Potassium 4.1 11/04/2022 05:12 AM    Chloride 103 11/04/2022 05:12 AM    CO2 28 11/04/2022 05:12 AM    Anion gap 8 11/04/2022 05:12 AM    Glucose 124 (H) 11/04/2022 05:12 AM    BUN 16 11/04/2022 05:12 AM    Creatinine 1.11 (H) 11/04/2022 05:12 AM    BUN/Creatinine ratio 14 11/04/2022 05:12 AM    GFR est AA >60 08/11/2022 12:48 PM    GFR est non-AA 53 (L) 08/11/2022 12:48 PM    Calcium 10.1 11/04/2022 05:12 AM    Bilirubin, total 0.4 11/04/2022 05:12 AM    ALT (SGPT) 17 11/04/2022 05:12 AM    Alk. phosphatase 113 11/04/2022 05:12 AM    Protein, total 8.2 11/04/2022 05:12 AM    Albumin 3.4 (L) 11/04/2022 05:12 AM    Globulin 4.8 (H) 11/04/2022 05:12 AM    A-G Ratio 0.7 (L) 11/04/2022 05:12 AM      Lab Results   Component Value Date/Time    Hemoglobin A1c 5.4 04/16/2014 12:20 PM    Hemoglobin A1c (POC) 5.5 11/26/2014 12:12 PM         Review of Systems   Constitutional:  Negative for malaise/fatigue. HENT:  Negative for congestion. Eyes:  Negative for blurred vision. Respiratory:  Negative for cough and shortness of breath. Cardiovascular:  Negative for chest pain, palpitations and leg swelling. Gastrointestinal:  Negative for abdominal pain, constipation and heartburn. Genitourinary:  Negative for dysuria, frequency and urgency. Neurological:  Negative for dizziness, tingling and headaches. Endo/Heme/Allergies:  Negative for environmental allergies. Psychiatric/Behavioral:  Negative for depression. The patient does not have insomnia. Physical Exam  Vitals and nursing note reviewed. Constitutional:       Appearance: Normal appearance. She is well-developed.       Comments: /80   Pulse 60   Temp 98.4 °F (36.9 °C)   Resp 12   Ht 4' 7\" (1.397 m)   Wt 161 lb 9.6 oz (73.3 kg)   LMP  (LMP Unknown)   SpO2 97% BMI 37.56 kg/m²      HENT:      Right Ear: Tympanic membrane and ear canal normal.      Left Ear: Tympanic membrane and ear canal normal.   Neck:      Thyroid: No thyromegaly. Cardiovascular:      Rate and Rhythm: Normal rate and regular rhythm. Heart sounds: Normal heart sounds. Pulmonary:      Effort: Pulmonary effort is normal.      Breath sounds: Normal breath sounds. Abdominal:      General: Bowel sounds are normal.      Palpations: Abdomen is soft. There is no mass. Tenderness: There is no abdominal tenderness. Musculoskeletal:      Cervical back: Normal range of motion and neck supple. Right knee: Swelling present. Decreased range of motion. Tenderness present over the medial joint line and lateral joint line. Right lower leg: No edema. Left lower leg: No edema. Lymphadenopathy:      Cervical: No cervical adenopathy. Skin:     General: Skin is warm and dry. Neurological:      General: No focal deficit present. Mental Status: She is alert and oriented to person, place, and time. Psychiatric:         Mood and Affect: Mood normal. Affect is tearful. ASSESSMENT and PLAN  Diagnoses and all orders for this visit:    1. Medicare annual wellness visit, subsequent    2. Essential hypertension  Stable     3. Mixed hyperlipidemia  -     refill rosuvastatin (CRESTOR) 20 mg tablet; Take 1 Tablet by mouth nightly. 4. Atherosclerosis of native coronary artery of native heart without angina pectoris  5. Chronic diastolic heart failure (HCC)  Stable     6. Intrahepatic cholangiocarcinoma ([address]  As per oncology    7. Gastro-esophageal reflux disease without esophagitis  Stable on protonix    8. Primary osteoarthritis involving multiple joints  Stable   The pt has signed medication agreement. Pain contract is reviewed. Pain medications will be continued at the previous dosage. Urine drug screening will be done today. Diagnostic  studies are not indicated at this time. Interventional procedure are not indicated at this time. Re-eval in 3 months. 9. Chronic anxiety  Stable on prn clonazepam.      10. Encounter for medication monitoring      Follow-up and Dispositions    Return in about 1 month (around 2/17/2023). reviewed diet, exercise and weight control  cardiovascular risk and specific lipid/LDL goals reviewed  reviewed medications and side effects in detail    I have discussed diagnosis listed in this note with pt and/or family. I have discussed treatment plans and options and the risk/benefit analysis of those options, including safe use of medications and possible medication side effects. Through the use of shared decision making we have agreed to the above plan. The patient has received an after-visit summary and questions were answered concerning future plans and follow up. Advise pt of any urgent changes then to proceed to the ER.
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
## 2023-01-26 ENCOUNTER — TELEPHONE (OUTPATIENT)
Dept: ONCOLOGY | Age: 88
End: 2023-01-26

## 2023-01-27 NOTE — TELEPHONE ENCOUNTER
Returned call to pt. HIPAA verified by two patient identifiers. Pt was not sure why she called us, she said she wasn't feeling good yesterday. I asked her if she is wanting chemotherapy for her cancer she has, she said no and thought we already discussed that. I apologized for confusion and advised her I will notate this and she is to call her PCP in the future since not following with us. She  verbalized understanding.

## 2023-01-31 ENCOUNTER — TELEPHONE (OUTPATIENT)
Dept: ONCOLOGY | Age: 88
End: 2023-01-31

## 2023-01-31 NOTE — TELEPHONE ENCOUNTER
[address]  Oncology Social Work  Encounter     Patient Name:  Sandra Mojica    Medical History:     Advance Directives:    Narrative: per dtr- pt doesn't want treatment but has not spoke with her PCP like she wants.      Barriers to Care:     Plan:    Referral/Handouts:     Hospice referral

## 2023-02-03 RX ORDER — FUROSEMIDE 80 MG/1
TABLET ORAL
Qty: 135 TABLET | Refills: 3 | Status: SHIPPED | OUTPATIENT
Start: 2023-02-03

## 2023-02-14 ENCOUNTER — OFFICE VISIT (OUTPATIENT)
Dept: FAMILY MEDICINE CLINIC | Age: 88
End: 2023-02-14
Payer: MEDICARE

## 2023-02-14 VITALS
WEIGHT: 161.8 LBS | SYSTOLIC BLOOD PRESSURE: 132 MMHG | HEIGHT: 55 IN | TEMPERATURE: 98.1 F | RESPIRATION RATE: 12 BRPM | OXYGEN SATURATION: 98 % | DIASTOLIC BLOOD PRESSURE: 76 MMHG | BODY MASS INDEX: 37.44 KG/M2 | HEART RATE: 64 BPM

## 2023-02-14 DIAGNOSIS — E78.2 MIXED HYPERLIPIDEMIA: ICD-10-CM

## 2023-02-14 DIAGNOSIS — Z51.81 ENCOUNTER FOR MEDICATION MONITORING: ICD-10-CM

## 2023-02-14 DIAGNOSIS — I50.32 CHRONIC DIASTOLIC HEART FAILURE (HCC): ICD-10-CM

## 2023-02-14 DIAGNOSIS — I25.10 ATHEROSCLEROSIS OF NATIVE CORONARY ARTERY OF NATIVE HEART WITHOUT ANGINA PECTORIS: ICD-10-CM

## 2023-02-14 DIAGNOSIS — R06.02 SOB (SHORTNESS OF BREATH): ICD-10-CM

## 2023-02-14 DIAGNOSIS — C22.1 INTRAHEPATIC CHOLANGIOCARCINOMA (HCC): ICD-10-CM

## 2023-02-14 DIAGNOSIS — M15.9 PRIMARY OSTEOARTHRITIS INVOLVING MULTIPLE JOINTS: ICD-10-CM

## 2023-02-14 DIAGNOSIS — K21.9 GASTRO-ESOPHAGEAL REFLUX DISEASE WITHOUT ESOPHAGITIS: ICD-10-CM

## 2023-02-14 DIAGNOSIS — I10 ESSENTIAL HYPERTENSION: Primary | ICD-10-CM

## 2023-02-14 PROCEDURE — G8427 DOCREV CUR MEDS BY ELIG CLIN: HCPCS | Performed by: FAMILY MEDICINE

## 2023-02-14 PROCEDURE — 1101F PT FALLS ASSESS-DOCD LE1/YR: CPT | Performed by: FAMILY MEDICINE

## 2023-02-14 PROCEDURE — G0463 HOSPITAL OUTPT CLINIC VISIT: HCPCS | Performed by: FAMILY MEDICINE

## 2023-02-14 PROCEDURE — G8536 NO DOC ELDER MAL SCRN: HCPCS | Performed by: FAMILY MEDICINE

## 2023-02-14 PROCEDURE — G8510 SCR DEP NEG, NO PLAN REQD: HCPCS | Performed by: FAMILY MEDICINE

## 2023-02-14 PROCEDURE — G8417 CALC BMI ABV UP PARAM F/U: HCPCS | Performed by: FAMILY MEDICINE

## 2023-02-14 PROCEDURE — 3078F DIAST BP <80 MM HG: CPT | Performed by: FAMILY MEDICINE

## 2023-02-14 PROCEDURE — 1123F ACP DISCUSS/DSCN MKR DOCD: CPT | Performed by: FAMILY MEDICINE

## 2023-02-14 PROCEDURE — 1090F PRES/ABSN URINE INCON ASSESS: CPT | Performed by: FAMILY MEDICINE

## 2023-02-14 PROCEDURE — 3075F SYST BP GE 130 - 139MM HG: CPT | Performed by: FAMILY MEDICINE

## 2023-02-14 PROCEDURE — 99214 OFFICE O/P EST MOD 30 MIN: CPT | Performed by: FAMILY MEDICINE

## 2023-02-14 PROCEDURE — G8400 PT W/DXA NO RESULTS DOC: HCPCS | Performed by: FAMILY MEDICINE

## 2023-02-14 NOTE — PROGRESS NOTES
Patient identified by 2 identifiers. Chief Complaint   Patient presents with    Follow-up    Hypertension     1. Have you been to the ER, urgent care clinic since your last visit? Hospitalized since your last visit? No    2. Have you seen or consulted any other health care providers outside of the [address] [place] [address] since your last visit? Include any pap smears or colon screening.  No

## 2023-02-14 NOTE — PROGRESS NOTES
HISTORY OF PRESENT ILLNESS  Shakira Dobbins is a 82 y.o. female. HPI   Follow up on chronic medical problems. We discussed the discussed the new diagnosis for her of Intrahepatic cholangiocarcinoma. She has met with oncology and decided not to proceed with systemic therapy and it has been determined to be inoperable. Has good days and bad days. Stress with her family has her feeling anxious most of the time still. Cardiovascular Review:  The patient has hypertension, hyperlipidemia, chronic diastolic heart failure, and obesity. Hx also of aortic stenosis. Diet and Lifestyle: generally follows a low fat low cholesterol diet, generally follows a low sodium diet, sedentary. Pertinent ROS: taking medications as instructed, no medication side effects noted, no TIA's, no chest pain on exertion, mild swelling of ankles, no orthostatic dizziness or lightheadedness, no palpitations,      She has notice feeling more SOB over the past several weeks. No chest pain associated with feeling SOB. Has SOB that comes on with just sitting. Not sure if related with increase anxiety. Will last for several minutes and then seem to get better. Was only getting the SOb with she was walking. No increased swelling. Compliant with medications. No cough or chest congestion noted. Home BP Monitoring: is not measured at home. Anxiety Review:  Patient is seen for anxiety. Ongoing symptoms include: increased anxiety still related to family issues. Patient denies: palpitations, sweating, chest pain, shortness of breath. Reported side effects from the treatment: none. Encounter for pain management. 1./2. Medical history/Past medical History  Chronic Pain:  Osteoarthritis:  Patient has osteoarthritis. Overall doing better with back pain. Still having knee pain. Ortho told that they would refer to pain management but she has not yet gone for this consult.   Aching more in the right knee and increase pain with walking. Has had 2 injections in the knee which has not helped very much. She has decided to not to pursue surgery. Pain in the knee is \"bad today\"  Discussed getting toradol injection. Pain is 5-6/10. Taking tylenol for the pain which helps some. Symptoms onset: problem is longstanding. Rheumatological ROS: stable, mild-to-moderate joint symptoms intermittently, reasonably well controlled by PRN meds. Response to treatment plan: stable and intermittent. 3. Applicable records from prior treatment providers are apart of [facility] under the media tab. 4. Diagnostic, therapeutic and laboratory results are available in the [facility] chart. 5. Consultation notes are available for review in the media tab of the [facility] chart. 6. Treatment goals include pain control so that the pt may be as active and function with her daily activities and improved comfort level. Previously pt has been limited by pain. 7. The risks and benefits of treatment has been discussed at this office visit with the pt. She understands that the medication has addicting potential.  Additionally the pt has been advised that narcotic pain medication may impair mental and/or physical ability required for performance of tasks such as driving or operating any other machinery. 8. Pt has an updated signed pain contract on file and can be found under the FYI section of the [facility] chart. 9. The pain contract is reviewed. Pain medication will be continued at the previous dosage. Urine drug screening will not be done today. Diagnostic studies are not indicated at this time. Interventional procedure are not indicated at this time. 10. Medication prescibed is tylenol #3.  has been reviewed at this OV by me. 11. Patient instructions have been reviewed in detail as outlined above and in the pain contract. 12. Re-eval is planned for 3 months.     He reports the following adverse side effects: none.  Aberrant behaviors: None. Concomitant use of a benzodiazepine: yes  Will continue on current dose of medications as coarse has been stable and there are no signs of overuse, misuese, diversion, or concerning side effects  Naloxone prescription is warranted. It has been provided or is already on file. Patient Active Problem List   Diagnosis Code    Hypokalemia E87.6    Hiatal hernia K44.9    Anxiety F41.9    S/P hysterectomy Z90.710    Aortic stenosis, mild I35.0    Spinal stenosis M48.00    S/P foot surgery Z98.890    Environmental allergies Z91.09    S/P cardiac catheterization Z98.890    Hypovitaminosis D E55.9    Chronic ischemic heart disease I25.9    Mixed hyperlipidemia E78.2    Chronic diastolic heart failure (HCC) I50.32    Chest pain at rest R07.9    Bifascicular bundle branch block--RBBB,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    Atypical chest pain R07.89    Essential hypertension I10    Gastroesophageal reflux disease without esophagitis K21.9    Atherosclerosis of native coronary artery without angina pectoris--diffuse small vsl disease via cath cath 2009--RCA PDA/ORSA I25.10    Chronic anxiety F41.9    Encounter for medication monitoring Z51.81    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 M47.815    Class 2 obesity due to excess calories with serious comorbidity and body mass index (BMI) of 38.0 to 38.9 in adult [id number]    Paroxysmal cardiac arrhythmia--PVC's,APC's,NSSVT I49.8    Severe obesity (BMI 35.0-39. 9) with comorbidity ([facility] [address] E66.01    Chest pain with normal angiography--2002;normal NST 2018 R07.9    Primary osteoarthritis of left shoulder M19.012    Pneumonia J18.9    Hypoxia R09.02       Current Outpatient Medications   Medication Sig Dispense Refill    furosemide (LASIX) 80 mg tablet TAKE 1 TABLET BY MOUTH EVERY MORNING AND TAKE 1/2 TABLET EVERY EVENING 135 Tablet 3    rosuvastatin (CRESTOR) 20 mg tablet Take 1 Tablet by mouth nightly.  30 Tablet 11    potassium chloride SR (KLOR-CON 10) 10 mEq tablet TAKE 3 TABS BY MOUTH 2 TIMES PER  Tablet 3    clonazePAM (KlonoPIN) 1 mg tablet TAKE 1/2 TO 1 TABLET EVERY MORNING AND AS NEEDED FOR ANXIETY, AND TAKE 1 TABLET AT BEDTIME FOR SLEEP 60 Tablet 1    losartan (COZAAR) 100 mg tablet TAKE 1 TABLET BY MOUTH EVERY DAY 90 Tablet 3    calcium carb/magnesium hydrox (MYLANTA PO) Take  by mouth as needed. amLODIPine (NORVASC) 5 mg tablet Take 5 mg by mouth two (2) times a day. furosemide (LASIX) 40 mg tablet Take 40 mg by mouth nightly. pantoprazole (PROTONIX) 40 mg tablet Take 1 Tablet by mouth two (2) times a day. 180 Tablet 3    nitroglycerin (NITROSTAT) 0.4 mg SL tablet DISSOLVE 1 TAB BY SUBLINGUAL ROUTE EVERY 5 MINUTES AS NEEDED. 25 Tablet 0    albuterol (PROVENTIL VENTOLIN) 2.5 mg /3 mL (0.083 %) nebu 3 mL by Nebulization route every four (4) hours as needed for Shortness of Breath or Wheezing. 90 Nebule 5    aspirin delayed-release 81 mg tablet Take 81 mg by mouth daily. metoprolol tartrate (LOPRESSOR) 25 mg tablet TAKE 1 TABLET BY MOUTH TWICE A DAY 1 AT 9AM AND 1 AT 9PM (Patient taking differently: Take 25 mg by mouth two (2) times a day. Indications: high blood pressure) 180 Tablet 3    isosorbide mononitrate ER (IMDUR) 30 mg tablet TAKE 1 TABLET BY MOUTH EVERY DAY (Patient taking differently: Take 30 mg by mouth daily. Indications: prevention of anginal chest pain associated with coronary artery disease) 90 Tablet 3    cholecalciferol, vitamin D3, (Vitamin D3) 50 mcg (2,000 unit) tab Take 1 Tablet by mouth daily.  (Patient not taking: No sig reported)         Allergies   Allergen Reactions    Bactrim [Sulfamethoxazole-Trimethoprim] Unknown (comments)     Pt does not recall    Darvocet A500 [Propoxyphene N-Acetaminophen] Itching           Past Medical History:   Diagnosis Date    Anxiety     Aortic stenosis 03/03/2010    Aortic stenosis     Arrhythmia     MURMUR    Arthritis     SPINAL STENOSIS    Atherosclerosis of coronary artery     Biliary dyskinesia     Cancer ([facility] 2023    liver    CHF (congestive heart failure) ([facility] 03/03/2010    CHF (congestive heart failure) ([facility] 01/2002    Chronic heart failure ([facility] 1/2002; 3/3/2010    chronic diastolic heart failure    Chronic pain     LOWER BACK, RIGHT KNEE    Environmental allergies 03/03/2010    GERD (gastroesophageal reflux disease) 03/03/2010    Hiatal hernia 03/03/2010    High cholesterol 03/03/2010    HTN (hypertension) 03/03/2010    Hypokalemia 03/03/2010    Ischemic heart disease, chronic     Multiple gallstones     Obese     Psychiatric disorder     anxiety    S/P hysterectomy 03/03/2010    Spinal stenosis 03/03/2010           Past Surgical History:   Procedure Laterality Date    CARDIAC CATHETERIZATION  01/2002    normal coronaries    DECOMPRESS DISC RF LUMBAR  07/2007    HX BACK SURGERY  5/1/2014    HX BREAST LUMPECTOMY Left 1989    benign left breast    HX BUNIONECTOMY      HX BUNIONECTOMY      HX HEART CATHETERIZATION  3/3/10    HX HYSTERECTOMY  1978    HX LUMBAR DISKECTOMY      HX ORTHOPAEDIC Bilateral     BUNIONECTOMY    HX ORTHOPAEDIC Right     WRIST FX    HX OTHER SURGICAL  10/12/2015    mole removed from right side of face by Dr. Sincere Gutierres FLX DX W/COLLJ Ritta Aase PFRMD  [id number]    Dr Aidan Lau GI ENDOSCOPY,BIOPSY  2/27/2019                Family History   Problem Relation Age of Onset    Hypertension Mother     Heart Disease Father     Stroke Sister     Heart Disease Sister     Heart Disease Sister     Cancer Brother         LUNG    Cancer Brother         PROSTATE    Hypertension Brother     No Known Problems Brother     Lung Disease Brother     Heart Attack Brother     Lung Disease Brother     Stroke Brother     Stroke Daughter 47    No Known Problems Daughter     Anesth Problems Neg Hx        Social History     Tobacco Use    Smoking status: Never    Smokeless tobacco: Never   Substance Use Topics    Alcohol use: No     Alcohol/week: 0.0 standard drinks        Lab Results   Component Value Date/Time    WBC 4.9 12/21/2022 11:00 AM    HGB 12.6 12/21/2022 11:00 AM    HCT 39.2 12/21/2022 11:00 AM    PLATELET 177 51/75/4145 11:00 AM    MCV 85 12/21/2022 11:00 AM     Lab Results   Component Value Date/Time    Cholesterol, total 176 12/20/2021 11:05 AM    HDL Cholesterol 61 12/20/2021 11:05 AM    LDL, calculated 92.6 12/20/2021 11:05 AM    Triglyceride 112 12/20/2021 11:05 AM    CHOL/HDL Ratio 2.9 12/20/2021 11:05 AM     Lab Results   Component Value Date/Time    TSH 2.470 05/15/2017 03:04 PM      Lab Results   Component Value Date/Time    Sodium 139 11/04/2022 05:12 AM    Potassium 4.1 11/04/2022 05:12 AM    Chloride 103 11/04/2022 05:12 AM    CO2 28 11/04/2022 05:12 AM    Anion gap 8 11/04/2022 05:12 AM    Glucose 124 (H) 11/04/2022 05:12 AM    BUN 16 11/04/2022 05:12 AM    Creatinine 1.11 (H) 11/04/2022 05:12 AM    BUN/Creatinine ratio 14 11/04/2022 05:12 AM    GFR est AA >60 08/11/2022 12:48 PM    GFR est non-AA 53 (L) 08/11/2022 12:48 PM    Calcium 10.1 11/04/2022 05:12 AM    Bilirubin, total 0.4 11/04/2022 05:12 AM    ALT (SGPT) 17 11/04/2022 05:12 AM    Alk. phosphatase 113 11/04/2022 05:12 AM    Protein, total 8.2 11/04/2022 05:12 AM    Albumin 3.4 (L) 11/04/2022 05:12 AM    Globulin 4.8 (H) 11/04/2022 05:12 AM    A-G Ratio 0.7 (L) 11/04/2022 05:12 AM      Lab Results   Component Value Date/Time    Hemoglobin A1c 5.4 04/16/2014 12:20 PM    Hemoglobin A1c (POC) 5.5 11/26/2014 12:12 PM         Review of Systems   Constitutional:  Negative for malaise/fatigue. HENT:  Negative for congestion. Eyes:  Negative for blurred vision. Respiratory:  Negative for cough and shortness of breath. Cardiovascular:  Negative for chest pain, palpitations and leg swelling. Gastrointestinal:  Negative for abdominal pain, constipation and heartburn. Genitourinary:  Negative for dysuria, frequency and urgency.    Neurological:  Negative for dizziness, tingling and headaches. Endo/Heme/Allergies:  Negative for environmental allergies. Psychiatric/Behavioral:  Negative for depression. The patient does not have insomnia. Physical Exam  Vitals and nursing note reviewed. Constitutional:       Appearance: Normal appearance. She is well-developed. Comments: /80   Pulse 60   Temp 98.4 °F (36.9 °C)   Resp 12   Ht 4' 7\" (1.397 m)   Wt 161 lb 9.6 oz (73.3 kg)   LMP  (LMP Unknown)   SpO2 97%   BMI 37.56 kg/m²      HENT:      Right Ear: Tympanic membrane and ear canal normal.      Left Ear: Tympanic membrane and ear canal normal.   Neck:      Thyroid: No thyromegaly. Cardiovascular:      Rate and Rhythm: Normal rate and regular rhythm. Heart sounds: Normal heart sounds. Pulmonary:      Effort: Pulmonary effort is normal.      Breath sounds: Normal breath sounds. Abdominal:      General: Bowel sounds are normal.      Palpations: Abdomen is soft. There is no mass. Tenderness: There is no abdominal tenderness. Musculoskeletal:      Cervical back: Normal range of motion and neck supple. Right lower leg: No edema. Left lower leg: No edema. Lymphadenopathy:      Cervical: No cervical adenopathy. Skin:     General: Skin is warm and dry. Neurological:      General: No focal deficit present. Mental Status: She is alert and oriented to person, place, and time. Psychiatric:         Mood and Affect: Mood normal.     ASSESSMENT and PLAN  Diagnoses and all orders for this visit:    1. Essential hypertension  Stable     2. Mixed hyperlipidemia  Continue to monitor. Work on diet and exercise.  -     LIPID PANEL; Future    3. Atherosclerosis of native coronary artery of native heart without angina pectoris  4. Chronic diastolic heart failure (HCC)  -     NT-PRO BNP; Future    5. SOB (shortness of breath)  -     REFERRAL TO CARDIOLOGY    6. Intrahepatic cholangiocarcinoma (HCC)  Monitor     7.  Gastro-esophageal reflux disease without esophagitis  Stable on protonix    8. Primary osteoarthritis involving multiple joints  Stable     9. Encounter for medication monitoring  -     METABOLIC PANEL, COMPREHENSIVE; Future  -     CBC W/O DIFF; Future        reviewed diet, exercise and weight control  cardiovascular risk and specific lipid/LDL goals reviewed  reviewed medications and side effects in detail    I have discussed diagnosis listed in this note with pt and/or family. I have discussed treatment plans and options and the risk/benefit analysis of those options, including safe use of medications and possible medication side effects. Through the use of shared decision making we have agreed to the above plan. The patient has received an after-visit summary and questions were answered concerning future plans and follow up. Advise pt of any urgent changes then to proceed to the ER.

## 2023-02-15 ENCOUNTER — TELEPHONE (OUTPATIENT)
Dept: FAMILY MEDICINE CLINIC | Age: 88
End: 2023-02-15

## 2023-02-15 DIAGNOSIS — F41.9 CHRONIC ANXIETY: ICD-10-CM

## 2023-02-15 LAB
ALBUMIN SERPL-MCNC: 3.7 G/DL (ref 3.5–5)
ALBUMIN/GLOB SERPL: 0.8 (ref 1.1–2.2)
ALP SERPL-CCNC: 99 U/L (ref 45–117)
ALT SERPL-CCNC: 32 U/L (ref 12–78)
ANION GAP SERPL CALC-SCNC: 7 MMOL/L (ref 5–15)
AST SERPL-CCNC: 30 U/L (ref 15–37)
BILIRUB SERPL-MCNC: 0.5 MG/DL (ref 0.2–1)
BNP SERPL-MCNC: 197 PG/ML
BUN SERPL-MCNC: 12 MG/DL (ref 6–20)
BUN/CREAT SERPL: 12 (ref 12–20)
CALCIUM SERPL-MCNC: 9.5 MG/DL (ref 8.5–10.1)
CHLORIDE SERPL-SCNC: 105 MMOL/L (ref 97–108)
CHOLEST SERPL-MCNC: 199 MG/DL
CO2 SERPL-SCNC: 29 MMOL/L (ref 21–32)
COMMENT, HOLDF: NORMAL
CREAT SERPL-MCNC: 1.03 MG/DL (ref 0.55–1.02)
ERYTHROCYTE [DISTWIDTH] IN BLOOD BY AUTOMATED COUNT: 14.2 % (ref 11.5–14.5)
GLOBULIN SER CALC-MCNC: 4.8 G/DL (ref 2–4)
GLUCOSE SERPL-MCNC: 85 MG/DL (ref 65–100)
HCT VFR BLD AUTO: 41.8 % (ref 35–47)
HDLC SERPL-MCNC: 69 MG/DL
HDLC SERPL: 2.9 (ref 0–5)
HGB BLD-MCNC: 13 G/DL (ref 11.5–16)
LDLC SERPL CALC-MCNC: 108.2 MG/DL (ref 0–100)
MCH RBC QN AUTO: 28 PG (ref 26–34)
MCHC RBC AUTO-ENTMCNC: 31.1 G/DL (ref 30–36.5)
MCV RBC AUTO: 89.9 FL (ref 80–99)
NRBC # BLD: 0 K/UL (ref 0–0.01)
NRBC BLD-RTO: 0 PER 100 WBC
PLATELET # BLD AUTO: 178 K/UL (ref 150–400)
PMV BLD AUTO: 12.3 FL (ref 8.9–12.9)
POTASSIUM SERPL-SCNC: 3.6 MMOL/L (ref 3.5–5.1)
PROT SERPL-MCNC: 8.5 G/DL (ref 6.4–8.2)
RBC # BLD AUTO: 4.65 M/UL (ref 3.8–5.2)
SAMPLES BEING HELD,HOLD: NORMAL
SODIUM SERPL-SCNC: 141 MMOL/L (ref 136–145)
TRIGL SERPL-MCNC: 109 MG/DL (ref ?–150)
VLDLC SERPL CALC-MCNC: 21.8 MG/DL
WBC # BLD AUTO: 6.2 K/UL (ref 3.6–11)

## 2023-02-15 RX ORDER — CLONAZEPAM 1 MG/1
TABLET ORAL
Qty: 60 TABLET | Refills: 1 | Status: ON HOLD | OUTPATIENT
Start: 2023-02-15

## 2023-02-18 ENCOUNTER — APPOINTMENT (OUTPATIENT)
Dept: CT IMAGING | Age: 88
DRG: 189 | End: 2023-02-18
Attending: EMERGENCY MEDICINE
Payer: MEDICARE

## 2023-02-18 ENCOUNTER — HOSPITAL ENCOUNTER (INPATIENT)
Age: 88
LOS: 3 days | Discharge: HOME HEALTH | DRG: 189 | End: 2023-02-21
Attending: EMERGENCY MEDICINE | Admitting: STUDENT IN AN ORGANIZED HEALTH CARE EDUCATION/TRAINING PROGRAM
Payer: MEDICARE

## 2023-02-18 ENCOUNTER — APPOINTMENT (OUTPATIENT)
Dept: GENERAL RADIOLOGY | Age: 88
DRG: 189 | End: 2023-02-18
Attending: EMERGENCY MEDICINE
Payer: MEDICARE

## 2023-02-18 DIAGNOSIS — J96.01 ACUTE RESPIRATORY FAILURE WITH HYPOXIA (HCC): Primary | ICD-10-CM

## 2023-02-18 DIAGNOSIS — I50.9 ACUTE ON CHRONIC CONGESTIVE HEART FAILURE, UNSPECIFIED HEART FAILURE TYPE (HCC): ICD-10-CM

## 2023-02-18 DIAGNOSIS — Z20.822 SUSPECTED COVID-19 VIRUS INFECTION: ICD-10-CM

## 2023-02-18 DIAGNOSIS — R06.03 RESPIRATORY DISTRESS: ICD-10-CM

## 2023-02-18 DIAGNOSIS — I10 ACCELERATED HYPERTENSION: ICD-10-CM

## 2023-02-18 DIAGNOSIS — C22.1 INTRAHEPATIC CHOLANGIOCARCINOMA (HCC): ICD-10-CM

## 2023-02-18 PROBLEM — J96.91 RESPIRATORY FAILURE WITH HYPOXIA (HCC): Status: ACTIVE | Noted: 2023-02-18

## 2023-02-18 PROBLEM — J96.91 RESPIRATORY FAILURE WITH HYPOXIA (HCC): Status: ACTIVE | Noted: 2023-01-01

## 2023-02-18 LAB
ALBUMIN SERPL-MCNC: 3.6 G/DL (ref 3.5–5)
ALBUMIN/GLOB SERPL: 0.7 (ref 1.1–2.2)
ALP SERPL-CCNC: 113 U/L (ref 45–117)
ALT SERPL-CCNC: 29 U/L (ref 12–78)
ANION GAP SERPL CALC-SCNC: 8 MMOL/L (ref 5–15)
ARTERIAL PATENCY WRIST A: YES
AST SERPL-CCNC: 27 U/L (ref 15–37)
BASE EXCESS BLDA CALC-SCNC: 2 MMOL/L
BASOPHILS # BLD: 0.1 K/UL (ref 0–0.1)
BASOPHILS NFR BLD: 1 % (ref 0–1)
BDY SITE: ABNORMAL
BILIRUB SERPL-MCNC: 0.5 MG/DL (ref 0.2–1)
BNP SERPL-MCNC: 358 PG/ML (ref 0–450)
BREATHS.SPONTANEOUS ON VENT: 24
BUN SERPL-MCNC: 12 MG/DL (ref 6–20)
BUN/CREAT SERPL: 12 (ref 12–20)
CALCIUM SERPL-MCNC: 9.1 MG/DL (ref 8.5–10.1)
CHLORIDE SERPL-SCNC: 102 MMOL/L (ref 97–108)
CO2 SERPL-SCNC: 29 MMOL/L (ref 21–32)
CREAT SERPL-MCNC: 1.01 MG/DL (ref 0.55–1.02)
DIFFERENTIAL METHOD BLD: ABNORMAL
EOSINOPHIL # BLD: 0.5 K/UL (ref 0–0.4)
EOSINOPHIL NFR BLD: 7 % (ref 0–7)
ERYTHROCYTE [DISTWIDTH] IN BLOOD BY AUTOMATED COUNT: 14 % (ref 11.5–14.5)
FLUAV RNA SPEC QL NAA+PROBE: NOT DETECTED
FLUBV RNA SPEC QL NAA+PROBE: NOT DETECTED
GAS FLOW.O2 O2 DELIVERY SYS: 3.5 L/MIN
GLOBULIN SER CALC-MCNC: 4.9 G/DL (ref 2–4)
GLUCOSE SERPL-MCNC: 103 MG/DL (ref 65–100)
HCO3 BLDA-SCNC: 28 MMOL/L (ref 22–26)
HCT VFR BLD AUTO: 41.2 % (ref 35–47)
HGB BLD-MCNC: 13.2 G/DL (ref 11.5–16)
IMM GRANULOCYTES # BLD AUTO: 0 K/UL (ref 0–0.04)
IMM GRANULOCYTES NFR BLD AUTO: 1 % (ref 0–0.5)
LYMPHOCYTES # BLD: 2.1 K/UL (ref 0.8–3.5)
LYMPHOCYTES NFR BLD: 27 % (ref 12–49)
MAGNESIUM SERPL-MCNC: 2.2 MG/DL (ref 1.6–2.4)
MCH RBC QN AUTO: 28.6 PG (ref 26–34)
MCHC RBC AUTO-ENTMCNC: 32 G/DL (ref 30–36.5)
MCV RBC AUTO: 89.2 FL (ref 80–99)
MONOCYTES # BLD: 0.7 K/UL (ref 0–1)
MONOCYTES NFR BLD: 9 % (ref 5–13)
NEUTS SEG # BLD: 4.4 K/UL (ref 1.8–8)
NEUTS SEG NFR BLD: 55 % (ref 32–75)
NRBC # BLD: 0 K/UL (ref 0–0.01)
NRBC BLD-RTO: 0 PER 100 WBC
PCO2 BLDA: 48 MMHG (ref 35–45)
PH BLDA: 7.38 (ref 7.35–7.45)
PLATELET # BLD AUTO: 198 K/UL (ref 150–400)
PMV BLD AUTO: 11.5 FL (ref 8.9–12.9)
PO2 BLDA: 76 MMHG (ref 80–100)
POTASSIUM SERPL-SCNC: 3.7 MMOL/L (ref 3.5–5.1)
PROT SERPL-MCNC: 8.5 G/DL (ref 6.4–8.2)
RBC # BLD AUTO: 4.62 M/UL (ref 3.8–5.2)
SAO2 % BLD: 95 % (ref 92–97)
SAO2% DEVICE SAO2% SENSOR NAME: ABNORMAL
SARS-COV-2 RNA RESP QL NAA+PROBE: NOT DETECTED
SODIUM SERPL-SCNC: 139 MMOL/L (ref 136–145)
SPECIMEN SITE: ABNORMAL
TROPONIN I SERPL HS-MCNC: 13 NG/L (ref 0–51)
WBC # BLD AUTO: 7.7 K/UL (ref 3.6–11)

## 2023-02-18 PROCEDURE — 83880 ASSAY OF NATRIURETIC PEPTIDE: CPT

## 2023-02-18 PROCEDURE — 74011000636 HC RX REV CODE- 636: Performed by: STUDENT IN AN ORGANIZED HEALTH CARE EDUCATION/TRAINING PROGRAM

## 2023-02-18 PROCEDURE — 94640 AIRWAY INHALATION TREATMENT: CPT

## 2023-02-18 PROCEDURE — 71045 X-RAY EXAM CHEST 1 VIEW: CPT

## 2023-02-18 PROCEDURE — 36415 COLL VENOUS BLD VENIPUNCTURE: CPT

## 2023-02-18 PROCEDURE — 82803 BLOOD GASES ANY COMBINATION: CPT

## 2023-02-18 PROCEDURE — 36600 WITHDRAWAL OF ARTERIAL BLOOD: CPT

## 2023-02-18 PROCEDURE — 96374 THER/PROPH/DIAG INJ IV PUSH: CPT

## 2023-02-18 PROCEDURE — 65270000029 HC RM PRIVATE

## 2023-02-18 PROCEDURE — 85025 COMPLETE CBC W/AUTO DIFF WBC: CPT

## 2023-02-18 PROCEDURE — 83735 ASSAY OF MAGNESIUM: CPT

## 2023-02-18 PROCEDURE — 94664 DEMO&/EVAL PT USE INHALER: CPT

## 2023-02-18 PROCEDURE — 80053 COMPREHEN METABOLIC PANEL: CPT

## 2023-02-18 PROCEDURE — 87636 SARSCOV2 & INF A&B AMP PRB: CPT

## 2023-02-18 PROCEDURE — 99285 EMERGENCY DEPT VISIT HI MDM: CPT

## 2023-02-18 PROCEDURE — 0202U NFCT DS 22 TRGT SARS-COV-2: CPT

## 2023-02-18 PROCEDURE — 71275 CT ANGIOGRAPHY CHEST: CPT

## 2023-02-18 PROCEDURE — 77010033678 HC OXYGEN DAILY

## 2023-02-18 PROCEDURE — 93005 ELECTROCARDIOGRAM TRACING: CPT

## 2023-02-18 PROCEDURE — 94762 N-INVAS EAR/PLS OXIMTRY CONT: CPT

## 2023-02-18 PROCEDURE — 84484 ASSAY OF TROPONIN QUANT: CPT

## 2023-02-18 PROCEDURE — 74011250636 HC RX REV CODE- 250/636: Performed by: EMERGENCY MEDICINE

## 2023-02-18 PROCEDURE — 74011250637 HC RX REV CODE- 250/637: Performed by: EMERGENCY MEDICINE

## 2023-02-18 PROCEDURE — 74011000250 HC RX REV CODE- 250: Performed by: EMERGENCY MEDICINE

## 2023-02-18 PROCEDURE — 65270000032 HC RM SEMIPRIVATE

## 2023-02-18 RX ORDER — POLYETHYLENE GLYCOL 3350 17 G/17G
17 POWDER, FOR SOLUTION ORAL DAILY PRN
Status: DISCONTINUED | OUTPATIENT
Start: 2023-02-18 | End: 2023-02-21 | Stop reason: HOSPADM

## 2023-02-18 RX ORDER — ONDANSETRON 4 MG/1
4 TABLET, ORALLY DISINTEGRATING ORAL
Status: DISCONTINUED | OUTPATIENT
Start: 2023-02-18 | End: 2023-02-21 | Stop reason: HOSPADM

## 2023-02-18 RX ORDER — IPRATROPIUM BROMIDE AND ALBUTEROL SULFATE 2.5; .5 MG/3ML; MG/3ML
6 SOLUTION RESPIRATORY (INHALATION)
Status: COMPLETED | OUTPATIENT
Start: 2023-02-18 | End: 2023-02-18

## 2023-02-18 RX ORDER — ENOXAPARIN SODIUM 100 MG/ML
40 INJECTION SUBCUTANEOUS DAILY
Status: DISCONTINUED | OUTPATIENT
Start: 2023-02-19 | End: 2023-02-19

## 2023-02-18 RX ORDER — SODIUM CHLORIDE 0.9 % (FLUSH) 0.9 %
5-40 SYRINGE (ML) INJECTION AS NEEDED
Status: DISCONTINUED | OUTPATIENT
Start: 2023-02-18 | End: 2023-02-21 | Stop reason: HOSPADM

## 2023-02-18 RX ORDER — SODIUM CHLORIDE 0.9 % (FLUSH) 0.9 %
5-40 SYRINGE (ML) INJECTION EVERY 8 HOURS
Status: DISCONTINUED | OUTPATIENT
Start: 2023-02-18 | End: 2023-02-21 | Stop reason: HOSPADM

## 2023-02-18 RX ORDER — ONDANSETRON 2 MG/ML
4 INJECTION INTRAMUSCULAR; INTRAVENOUS
Status: DISCONTINUED | OUTPATIENT
Start: 2023-02-18 | End: 2023-02-21 | Stop reason: HOSPADM

## 2023-02-18 RX ORDER — ACETAMINOPHEN 325 MG/1
650 TABLET ORAL
Status: DISCONTINUED | OUTPATIENT
Start: 2023-02-18 | End: 2023-02-21 | Stop reason: HOSPADM

## 2023-02-18 RX ORDER — ALBUTEROL SULFATE 0.83 MG/ML
5 SOLUTION RESPIRATORY (INHALATION)
Status: DISCONTINUED | OUTPATIENT
Start: 2023-02-19 | End: 2023-02-19

## 2023-02-18 RX ORDER — GUAIFENESIN 600 MG/1
600 TABLET, EXTENDED RELEASE ORAL ONCE
Status: COMPLETED | OUTPATIENT
Start: 2023-02-18 | End: 2023-02-18

## 2023-02-18 RX ORDER — ACETAMINOPHEN 650 MG/1
650 SUPPOSITORY RECTAL
Status: DISCONTINUED | OUTPATIENT
Start: 2023-02-18 | End: 2023-02-21 | Stop reason: HOSPADM

## 2023-02-18 RX ADMIN — ALBUTEROL SULFATE 5 MG: 2.5 SOLUTION RESPIRATORY (INHALATION) at 22:03

## 2023-02-18 RX ADMIN — IOPAMIDOL 100 ML: 755 INJECTION, SOLUTION INTRAVENOUS at 23:59

## 2023-02-18 RX ADMIN — METHYLPREDNISOLONE SODIUM SUCCINATE 125 MG: 125 INJECTION, POWDER, FOR SOLUTION INTRAMUSCULAR; INTRAVENOUS at 20:03

## 2023-02-18 RX ADMIN — IPRATROPIUM BROMIDE AND ALBUTEROL SULFATE 6 ML: 2.5; .5 SOLUTION RESPIRATORY (INHALATION) at 19:46

## 2023-02-18 RX ADMIN — GUAIFENESIN 600 MG: 600 TABLET ORAL at 20:06

## 2023-02-19 LAB
ALBUMIN SERPL-MCNC: 3.4 G/DL (ref 3.5–5)
ALBUMIN/GLOB SERPL: 0.7 (ref 1.1–2.2)
ALP SERPL-CCNC: 109 U/L (ref 45–117)
ALT SERPL-CCNC: 30 U/L (ref 12–78)
ANION GAP SERPL CALC-SCNC: 11 MMOL/L (ref 5–15)
AST SERPL-CCNC: 27 U/L (ref 15–37)
B PERT DNA SPEC QL NAA+PROBE: NOT DETECTED
BASOPHILS # BLD: 0 K/UL (ref 0–0.1)
BASOPHILS NFR BLD: 0 % (ref 0–1)
BILIRUB SERPL-MCNC: 0.3 MG/DL (ref 0.2–1)
BORDETELLA PARAPERTUSSIS PCR, BORPAR: NOT DETECTED
BUN SERPL-MCNC: 11 MG/DL (ref 6–20)
BUN/CREAT SERPL: 10 (ref 12–20)
C PNEUM DNA SPEC QL NAA+PROBE: NOT DETECTED
CALCIUM SERPL-MCNC: 8.5 MG/DL (ref 8.5–10.1)
CHLORIDE SERPL-SCNC: 100 MMOL/L (ref 97–108)
CO2 SERPL-SCNC: 29 MMOL/L (ref 21–32)
CREAT SERPL-MCNC: 1.07 MG/DL (ref 0.55–1.02)
DIFFERENTIAL METHOD BLD: ABNORMAL
EOSINOPHIL # BLD: 0 K/UL (ref 0–0.4)
EOSINOPHIL NFR BLD: 0 % (ref 0–7)
ERYTHROCYTE [DISTWIDTH] IN BLOOD BY AUTOMATED COUNT: 14 % (ref 11.5–14.5)
FLUAV SUBTYP SPEC NAA+PROBE: NOT DETECTED
FLUBV RNA SPEC QL NAA+PROBE: NOT DETECTED
GLOBULIN SER CALC-MCNC: 4.9 G/DL (ref 2–4)
GLUCOSE BLD STRIP.AUTO-MCNC: 129 MG/DL (ref 65–117)
GLUCOSE SERPL-MCNC: 180 MG/DL (ref 65–100)
HADV DNA SPEC QL NAA+PROBE: NOT DETECTED
HCOV 229E RNA SPEC QL NAA+PROBE: NOT DETECTED
HCOV HKU1 RNA SPEC QL NAA+PROBE: NOT DETECTED
HCOV NL63 RNA SPEC QL NAA+PROBE: NOT DETECTED
HCOV OC43 RNA SPEC QL NAA+PROBE: NOT DETECTED
HCT VFR BLD AUTO: 40.1 % (ref 35–47)
HGB BLD-MCNC: 13 G/DL (ref 11.5–16)
HMPV RNA SPEC QL NAA+PROBE: NOT DETECTED
HPIV1 RNA SPEC QL NAA+PROBE: NOT DETECTED
HPIV2 RNA SPEC QL NAA+PROBE: NOT DETECTED
HPIV3 RNA SPEC QL NAA+PROBE: NOT DETECTED
HPIV4 RNA SPEC QL NAA+PROBE: NOT DETECTED
IMM GRANULOCYTES # BLD AUTO: 0.1 K/UL (ref 0–0.04)
IMM GRANULOCYTES NFR BLD AUTO: 1 % (ref 0–0.5)
LYMPHOCYTES # BLD: 0.4 K/UL (ref 0.8–3.5)
LYMPHOCYTES NFR BLD: 5 % (ref 12–49)
M PNEUMO DNA SPEC QL NAA+PROBE: NOT DETECTED
MCH RBC QN AUTO: 28.9 PG (ref 26–34)
MCHC RBC AUTO-ENTMCNC: 32.4 G/DL (ref 30–36.5)
MCV RBC AUTO: 89.1 FL (ref 80–99)
MONOCYTES # BLD: 0.1 K/UL (ref 0–1)
MONOCYTES NFR BLD: 1 % (ref 5–13)
NEUTS SEG # BLD: 7.5 K/UL (ref 1.8–8)
NEUTS SEG NFR BLD: 93 % (ref 32–75)
NRBC # BLD: 0.02 K/UL (ref 0–0.01)
NRBC BLD-RTO: 0.2 PER 100 WBC
PLATELET # BLD AUTO: 181 K/UL (ref 150–400)
PMV BLD AUTO: 11.7 FL (ref 8.9–12.9)
POTASSIUM SERPL-SCNC: 3 MMOL/L (ref 3.5–5.1)
POTASSIUM SERPL-SCNC: 3.4 MMOL/L (ref 3.5–5.1)
PROT SERPL-MCNC: 8.3 G/DL (ref 6.4–8.2)
RBC # BLD AUTO: 4.5 M/UL (ref 3.8–5.2)
RBC MORPH BLD: ABNORMAL
RSV RNA SPEC QL NAA+PROBE: NOT DETECTED
RV+EV RNA SPEC QL NAA+PROBE: DETECTED
SARS-COV-2 RNA RESP QL NAA+PROBE: NOT DETECTED
SERVICE CMNT-IMP: ABNORMAL
SODIUM SERPL-SCNC: 140 MMOL/L (ref 136–145)
WBC # BLD AUTO: 8.1 K/UL (ref 3.6–11)

## 2023-02-19 PROCEDURE — 74011000250 HC RX REV CODE- 250: Performed by: STUDENT IN AN ORGANIZED HEALTH CARE EDUCATION/TRAINING PROGRAM

## 2023-02-19 PROCEDURE — 84132 ASSAY OF SERUM POTASSIUM: CPT

## 2023-02-19 PROCEDURE — 74011250636 HC RX REV CODE- 250/636: Performed by: STUDENT IN AN ORGANIZED HEALTH CARE EDUCATION/TRAINING PROGRAM

## 2023-02-19 PROCEDURE — 94761 N-INVAS EAR/PLS OXIMETRY MLT: CPT

## 2023-02-19 PROCEDURE — 77010033678 HC OXYGEN DAILY

## 2023-02-19 PROCEDURE — 82962 GLUCOSE BLOOD TEST: CPT

## 2023-02-19 PROCEDURE — 36415 COLL VENOUS BLD VENIPUNCTURE: CPT

## 2023-02-19 PROCEDURE — 94640 AIRWAY INHALATION TREATMENT: CPT

## 2023-02-19 PROCEDURE — 85025 COMPLETE CBC W/AUTO DIFF WBC: CPT

## 2023-02-19 PROCEDURE — 74011000250 HC RX REV CODE- 250: Performed by: INTERNAL MEDICINE

## 2023-02-19 PROCEDURE — 74011250637 HC RX REV CODE- 250/637: Performed by: STUDENT IN AN ORGANIZED HEALTH CARE EDUCATION/TRAINING PROGRAM

## 2023-02-19 PROCEDURE — 74011250636 HC RX REV CODE- 250/636: Performed by: INTERNAL MEDICINE

## 2023-02-19 PROCEDURE — 80053 COMPREHEN METABOLIC PANEL: CPT

## 2023-02-19 PROCEDURE — 74011250637 HC RX REV CODE- 250/637: Performed by: INTERNAL MEDICINE

## 2023-02-19 PROCEDURE — 74011000250 HC RX REV CODE- 250: Performed by: EMERGENCY MEDICINE

## 2023-02-19 PROCEDURE — 65270000032 HC RM SEMIPRIVATE

## 2023-02-19 PROCEDURE — 74011250637 HC RX REV CODE- 250/637: Performed by: NURSE PRACTITIONER

## 2023-02-19 RX ORDER — INSULIN LISPRO 100 [IU]/ML
INJECTION, SOLUTION INTRAVENOUS; SUBCUTANEOUS
Status: DISCONTINUED | OUTPATIENT
Start: 2023-02-19 | End: 2023-02-20

## 2023-02-19 RX ORDER — MAG HYDROX/ALUMINUM HYD/SIMETH 200-200-20
30 SUSPENSION, ORAL (FINAL DOSE FORM) ORAL
COMMUNITY

## 2023-02-19 RX ORDER — GUAIFENESIN 100 MG/5ML
400 SOLUTION ORAL
Status: DISCONTINUED | OUTPATIENT
Start: 2023-02-19 | End: 2023-02-21 | Stop reason: HOSPADM

## 2023-02-19 RX ORDER — ENOXAPARIN SODIUM 100 MG/ML
30 INJECTION SUBCUTANEOUS EVERY 12 HOURS
Status: DISCONTINUED | OUTPATIENT
Start: 2023-02-20 | End: 2023-02-19

## 2023-02-19 RX ORDER — ROSUVASTATIN CALCIUM 10 MG/1
20 TABLET, COATED ORAL
Status: DISCONTINUED | OUTPATIENT
Start: 2023-02-19 | End: 2023-02-21 | Stop reason: HOSPADM

## 2023-02-19 RX ORDER — POTASSIUM CHLORIDE 750 MG/1
10 TABLET, FILM COATED, EXTENDED RELEASE ORAL 2 TIMES DAILY
Status: DISCONTINUED | OUTPATIENT
Start: 2023-02-19 | End: 2023-02-21 | Stop reason: HOSPADM

## 2023-02-19 RX ORDER — IBUPROFEN 200 MG
4 TABLET ORAL AS NEEDED
Status: DISCONTINUED | OUTPATIENT
Start: 2023-02-19 | End: 2023-02-20

## 2023-02-19 RX ORDER — AMLODIPINE BESYLATE 5 MG/1
5 TABLET ORAL 2 TIMES DAILY
Status: DISCONTINUED | OUTPATIENT
Start: 2023-02-19 | End: 2023-02-21 | Stop reason: HOSPADM

## 2023-02-19 RX ORDER — ENOXAPARIN SODIUM 100 MG/ML
40 INJECTION SUBCUTANEOUS EVERY 24 HOURS
Status: DISCONTINUED | OUTPATIENT
Start: 2023-02-20 | End: 2023-02-21 | Stop reason: HOSPADM

## 2023-02-19 RX ORDER — LANOLIN ALCOHOL/MO/W.PET/CERES
4.5 CREAM (GRAM) TOPICAL
Status: DISCONTINUED | OUTPATIENT
Start: 2023-02-19 | End: 2023-02-21 | Stop reason: HOSPADM

## 2023-02-19 RX ORDER — ASPIRIN 81 MG/1
81 TABLET ORAL DAILY
Status: DISCONTINUED | OUTPATIENT
Start: 2023-02-19 | End: 2023-02-21 | Stop reason: HOSPADM

## 2023-02-19 RX ORDER — NITROGLYCERIN 0.4 MG/1
0.4 TABLET SUBLINGUAL
Status: DISCONTINUED | OUTPATIENT
Start: 2023-02-19 | End: 2023-02-21 | Stop reason: HOSPADM

## 2023-02-19 RX ORDER — DEXTROSE MONOHYDRATE 100 MG/ML
0-250 INJECTION, SOLUTION INTRAVENOUS AS NEEDED
Status: DISCONTINUED | OUTPATIENT
Start: 2023-02-19 | End: 2023-02-20 | Stop reason: SDUPTHER

## 2023-02-19 RX ORDER — ALBUTEROL SULFATE 0.83 MG/ML
5 SOLUTION RESPIRATORY (INHALATION)
Status: DISCONTINUED | OUTPATIENT
Start: 2023-02-19 | End: 2023-02-20

## 2023-02-19 RX ORDER — METOPROLOL TARTRATE 25 MG/1
25 TABLET, FILM COATED ORAL 2 TIMES DAILY
Status: DISCONTINUED | OUTPATIENT
Start: 2023-02-19 | End: 2023-02-21 | Stop reason: HOSPADM

## 2023-02-19 RX ORDER — FUROSEMIDE 10 MG/ML
80 INJECTION INTRAMUSCULAR; INTRAVENOUS 2 TIMES DAILY
Status: DISCONTINUED | OUTPATIENT
Start: 2023-02-19 | End: 2023-02-19

## 2023-02-19 RX ORDER — FUROSEMIDE 10 MG/ML
80 INJECTION INTRAMUSCULAR; INTRAVENOUS 2 TIMES DAILY
Status: DISCONTINUED | OUTPATIENT
Start: 2023-02-19 | End: 2023-02-20

## 2023-02-19 RX ORDER — FUROSEMIDE 10 MG/ML
40 INJECTION INTRAMUSCULAR; INTRAVENOUS 2 TIMES DAILY
Status: DISCONTINUED | OUTPATIENT
Start: 2023-02-19 | End: 2023-02-19

## 2023-02-19 RX ORDER — CLONAZEPAM 0.5 MG/1
1 TABLET ORAL
Status: DISCONTINUED | OUTPATIENT
Start: 2023-02-19 | End: 2023-02-21 | Stop reason: HOSPADM

## 2023-02-19 RX ORDER — ISOSORBIDE MONONITRATE 30 MG/1
30 TABLET, EXTENDED RELEASE ORAL DAILY
Status: DISCONTINUED | OUTPATIENT
Start: 2023-02-19 | End: 2023-02-21 | Stop reason: HOSPADM

## 2023-02-19 RX ORDER — PANTOPRAZOLE SODIUM 40 MG/1
40 TABLET, DELAYED RELEASE ORAL 2 TIMES DAILY
Status: DISCONTINUED | OUTPATIENT
Start: 2023-02-19 | End: 2023-02-21 | Stop reason: HOSPADM

## 2023-02-19 RX ADMIN — ALBUTEROL SULFATE 5 MG: 2.5 SOLUTION RESPIRATORY (INHALATION) at 14:35

## 2023-02-19 RX ADMIN — PANTOPRAZOLE SODIUM 40 MG: 40 TABLET, DELAYED RELEASE ORAL at 21:48

## 2023-02-19 RX ADMIN — ISOSORBIDE MONONITRATE 30 MG: 30 TABLET, EXTENDED RELEASE ORAL at 12:33

## 2023-02-19 RX ADMIN — ASPIRIN 81 MG: 81 TABLET, COATED ORAL at 09:31

## 2023-02-19 RX ADMIN — SODIUM CHLORIDE, PRESERVATIVE FREE 10 ML: 5 INJECTION INTRAVENOUS at 21:48

## 2023-02-19 RX ADMIN — AMLODIPINE BESYLATE 5 MG: 5 TABLET ORAL at 10:42

## 2023-02-19 RX ADMIN — POTASSIUM CHLORIDE 10 MEQ: 750 TABLET, FILM COATED, EXTENDED RELEASE ORAL at 18:16

## 2023-02-19 RX ADMIN — ALBUTEROL SULFATE 5 MG: 2.5 SOLUTION RESPIRATORY (INHALATION) at 03:39

## 2023-02-19 RX ADMIN — FUROSEMIDE 40 MG: 10 INJECTION, SOLUTION INTRAMUSCULAR; INTRAVENOUS at 09:42

## 2023-02-19 RX ADMIN — FUROSEMIDE 80 MG: 10 INJECTION, SOLUTION INTRAMUSCULAR; INTRAVENOUS at 02:57

## 2023-02-19 RX ADMIN — METOPROLOL TARTRATE 25 MG: 25 TABLET, FILM COATED ORAL at 17:53

## 2023-02-19 RX ADMIN — GUAIFENESIN 400 MG: 200 SOLUTION ORAL at 21:48

## 2023-02-19 RX ADMIN — METOPROLOL TARTRATE 25 MG: 25 TABLET, FILM COATED ORAL at 10:43

## 2023-02-19 RX ADMIN — ALBUTEROL SULFATE 5 MG: 2.5 SOLUTION RESPIRATORY (INHALATION) at 08:15

## 2023-02-19 RX ADMIN — ALBUTEROL SULFATE 5 MG: 2.5 SOLUTION RESPIRATORY (INHALATION) at 20:46

## 2023-02-19 RX ADMIN — ROSUVASTATIN CALCIUM 20 MG: 10 TABLET, FILM COATED ORAL at 21:48

## 2023-02-19 RX ADMIN — CLONAZEPAM 1 MG: 0.5 TABLET ORAL at 02:57

## 2023-02-19 RX ADMIN — PANTOPRAZOLE SODIUM 40 MG: 40 TABLET, DELAYED RELEASE ORAL at 08:02

## 2023-02-19 RX ADMIN — FUROSEMIDE 80 MG: 10 INJECTION, SOLUTION INTRAMUSCULAR; INTRAVENOUS at 17:53

## 2023-02-19 RX ADMIN — SODIUM CHLORIDE, PRESERVATIVE FREE 10 ML: 5 INJECTION INTRAVENOUS at 14:49

## 2023-02-19 RX ADMIN — ENOXAPARIN SODIUM 40 MG: 100 INJECTION SUBCUTANEOUS at 09:31

## 2023-02-19 RX ADMIN — AMLODIPINE BESYLATE 5 MG: 5 TABLET ORAL at 17:53

## 2023-02-19 RX ADMIN — POTASSIUM BICARBONATE 40 MEQ: 782 TABLET, EFFERVESCENT ORAL at 09:31

## 2023-02-19 NOTE — PROGRESS NOTES
[facility] Medication Reconciliation    Information obtained from:  Patient interview, RX Query  RxQuery data available1: yes    Comments/recommendations:    1) The patient is an excellent historian and was interviewed regarding current PTA medication list, use and drug allergies. Medication regimen confirmed as below. 1RxQuery pharmacy benefit data reflects medications filled and processed through the patient's insurance, however                this data does NOT capture whether the medication was picked up or is currently being taken by the patient. Time spent: 20 minutes    Past Medical History/Disease States:  Past Medical History:   Diagnosis Date    Anxiety     Aortic stenosis 03/03/2010    Aortic stenosis     Arrhythmia     MURMUR    Arthritis     SPINAL STENOSIS    Atherosclerosis of coronary artery     Biliary dyskinesia     Cancer ([facility] [address] 2023    liver    CHF (congestive heart failure) ([facility] [address] 03/03/2010    CHF (congestive heart failure) ([facility] [address] 01/2002    Chronic heart failure ([facility] [address] 1/2002; 3/3/2010    chronic diastolic heart failure    Chronic pain     LOWER BACK, RIGHT KNEE    Environmental allergies 03/03/2010    GERD (gastroesophageal reflux disease) 03/03/2010    Hiatal hernia 03/03/2010    High cholesterol 03/03/2010    HTN (hypertension) 03/03/2010    Hypokalemia 03/03/2010    Ischemic heart disease, chronic     Multiple gallstones     Obese     Psychiatric disorder     anxiety    S/P hysterectomy 03/03/2010    Spinal stenosis 03/03/2010         Patient allergies: Allergies as of 02/18/2023 - Fully Reviewed 02/18/2023   Allergen Reaction Noted    Bactrim [sulfamethoxazole-trimethoprim] Unknown (comments) 03/29/2010    Darvocet a500 [propoxyphene n-acetaminophen] Itching 11/01/2010         Prior to Admission Medications   Prescriptions Last Dose Informant  Taking?    albuterol (PROVENTIL VENTOLIN) 2.5 mg /3 mL (0.083 %) nebu 2/18/2023 Self  Yes   Sig: 3 mL by Nebulization route every four (4) hours as needed for Shortness of Breath or Wheezing. alum-mag hydroxide-simeth (MYLANTA) 200-200-20 mg/5 mL susp 2/17/2023 Self  Yes   Sig: Take 30 mL by mouth nightly as needed for Indigestion or Heartburn. amLODIPine (NORVASC) 5 mg tablet 2/18/2023 Self  Yes   Sig: Take 5 mg by mouth two (2) times a day. aspirin delayed-release 81 mg tablet 2/18/2023 Self  Yes   Sig: Take 81 mg by mouth daily. clonazePAM (KlonoPIN) 1 mg tablet 2/18/2023 Self  Yes   Sig: TAKE 1/2 TO 1 TABLET EVERY MORNING AND AS NEEDED FOR ANXIETY, AND TAKE 1 TABLET AT BEDTIME FOR SLEEP   furosemide (LASIX) 80 mg tablet 2/18/2023 Self  Yes   Sig: TAKE 1 TABLET BY MOUTH EVERY MORNING AND TAKE 1/2 TABLET EVERY EVENING   isosorbide mononitrate ER (IMDUR) 30 mg tablet 2/18/2023 Self  Yes   Sig: TAKE 1 TABLET BY MOUTH EVERY DAY   Patient taking differently: Take 30 mg by mouth daily. Indications: prevention of anginal chest pain associated with coronary artery disease   losartan (COZAAR) 100 mg tablet 2/18/2023 Self  Yes   Sig: TAKE 1 TABLET BY MOUTH EVERY DAY   metoprolol tartrate (LOPRESSOR) 25 mg tablet 2/18/2023 Self  Yes   Sig: TAKE 1 TABLET BY MOUTH TWICE A DAY 1 AT 9AM AND 1 AT 9PM   Patient taking differently: Take 25 mg by mouth two (2) times a day. Indications: high blood pressure   nitroglycerin (NITROSTAT) 0.4 mg SL tablet  Self  Yes   Sig: DISSOLVE 1 TAB BY SUBLINGUAL ROUTE EVERY 5 MINUTES AS NEEDED. pantoprazole (PROTONIX) 40 mg tablet 2/18/2023 Self  Yes   Sig: Take 1 Tablet by mouth two (2) times a day. potassium chloride SR (KLOR-CON 10) 10 mEq tablet 2/18/2023 Self  Yes   Sig: TAKE 3 TABS BY MOUTH 2 TIMES PER DAY   rosuvastatin (CRESTOR) 20 mg tablet 2/18/2023 Self  Yes   Sig: Take 1 Tablet by mouth nightly.             Thank you,  Fernando Martinez, [facility]

## 2023-02-19 NOTE — ED NOTES
Discussed patient's blood pressure and scheduled diuretic and antihypertensive medications. MD evaluated patient. New perimeters for medications ordered.

## 2023-02-19 NOTE — ED NOTES
Patient SOB on exertion. Remains on oxygen via nasal cannula at 2lm.   No evidence of acute respiratory distress

## 2023-02-19 NOTE — PROGRESS NOTES
[facility] Adult  Hospitalist Group                                                                                          Hospitalist Progress Note  Becca Sandoval MD  Answering service: [phone] OR [phone] from in house phone        Date of Service:  2023  NAME:  Genoveva Nelson  :  1935  MRN:  [id number]       Admission Summary:   Per HPI: Genoveva Nelson is a very pleasant 82 y.o. female with hx of AS, chf, htn, fátima, obesity, intrahepatic cholangiocarcinoma who presents to hospital with complaints of sob. She had been in her usual state of health until 2 days ago when she noted mild dyspnea with exertion which has since progressed to dyspnea at rest.  She has had no recent travel or any sick contacts. Denies any PND, orthopnea, lower extremity edema or significant weight gain. No associated chest pain, cough fever, chills, chest or abdominal pain, nausea, vomiting, cough, congestion, recent illness, palpitations, or dysuria. Remarkable vitals on ER Presentation: spo2 89% on RA  Labs Remarkable for: unremarkable  ER Images: cxr: Interstitial prominence suggesting pulmonary vascular congestion and/or atypical infectious process. ER Rx: solumedrol 125, duoneb       Interval history / Subjective:    Follow up the patient admitted for evaluation of SOB, hypoxia, acute HF  NAD, event over night noted  Requiring supplemental O2 3l/min via NC  UOP -600 ml  No CP  + weak  Discussed with RN, reviewed RN noted over night and ED MD notes     Assessment & Plan:     Acute hypoxic respiratory failure  Decompensated HFpEF  Continue current management  -CTA chest: reviewed,  no acute pathology, no PE  -Continue diuresis with Lasix 80 mg IV twice daily  -Strict I's and O's with daily weights  -Wean supplemental oxygen as tolerated  -Echocardiogram requested  -Cardiology consult pending     Hypertension  -Continue home meds  BP on low side     Generalized anxiety disorder  -Home Klonopin as needed     GERD  -continue Protonix      Dyslipidemia   -continue Crestor     History of intrahepatic cholangiocarcinoma  -Has opted against aggressive chemoradiation therapy  -Outpatient PCP/palliative follow-up     Obesity  -Counseled on weight loss, dieting and exercise       Code status: DNR  Prophylaxis: Lovenox SQ  Care Plan discussed with: patient, RN  Anticipated Disposition: Home in 24-48 hrs  Inpatient  Cardiac monitoring: Telemetry NSR,      Hospital Problems  Date Reviewed: 2/14/2023            Codes Class Noted POA    Respiratory failure with hypoxia [facility]) ICD-10-CM: J96.91  ICD-9-CM: 518.81  2/18/2023 Unknown           Social Determinants of Health     Tobacco Use: Low Risk     Smoking Tobacco Use: Never    Smokeless Tobacco Use: Never    Passive Exposure: Not on file   Alcohol Use: Not on file   Financial Resource Strain: Not on file   Food Insecurity: Not on file   Transportation Needs: Not on file   Physical Activity: Not on file   Stress: Not on file   Social Connections: Not on file   Intimate Partner Violence: Not on file   Depression: Not at risk    PHQ-2 Score: 1   Housing Stability: Not on file       Review of Systems:   A comprehensive review of systems was negative except for that written in the HPI. Vital Signs:    Last 24hrs VS reviewed since prior progress note.  Most recent are:  Visit Vitals  /75   Pulse 93   Temp 98.5 °F (36.9 °C)   Resp 18   Ht 4' 7\" (1.397 m)   Wt 73 kg (161 lb)   SpO2 98%   BMI 37.42 kg/m²         Intake/Output Summary (Last 24 hours) at 2/19/2023 1004  Last data filed at 2/19/2023 7681  Gross per 24 hour   Intake --   Output 600 ml   Net -600 ml        Physical Examination:     I had a face to face encounter with this patient and independently examined them on 2/19/2023 as outlined below:          General : alert x 3, awake, no acute distress, + SOB  HEENT: PEERL, EOMI, moist mucus membrane, TM clear  Neck: supple, no JVD, no meningeal signs  Chest: Coarse to auscultation bilaterally, some rales b/l  CVS: S1 S2 heard, Capillary refill less than 2 seconds, + systolic murmur 3/6  Abd: soft/ non tender, non distended, BS physiological,   Ext: no clubbing, no cyanosis, no edema, brisk 2+ DP pulses  Neuro/Psych: pleasant mood and affect, CN 2-12 grossly intact, sensory grossly within normal limit, Strength decreased in all extremities, DTR 1+ x 4, gait was not assessed  Skin: warm     Data Review:    Review and/or order of clinical lab test    CTA chest:  IMPRESSION  No pulmonary embolus or other acute cardiopulmonary process. CXR:  IMPRESSION  1. Interstitial prominence suggesting pulmonary vascular congestion and/or  atypical infectious process. I have personally and independently reviewed all pertinent labs, diagnostic studies, imaging, and have provided independent interpretation of the same. Labs:     Recent Labs     02/19/23 0602 02/18/23 1927   WBC 8.1 7.7   HGB 13.0 13.2   HCT 40.1 41.2    198     Recent Labs     02/19/23  0602 02/18/23 1927    139   K 3.0* 3.7    102   CO2 29 29   BUN 11 12   CREA 1.07* 1.01   * 103*   CA 8.5 9.1   MG  --  2.2     Recent Labs     02/19/23  0602 02/18/23 1927   ALT 30 29    113   TBILI 0.3 0.5   TP 8.3* 8.5*   ALB 3.4* 3.6   GLOB 4.9* 4.9*     No results for input(s): INR, PTP, APTT, INREXT in the last 72 hours. No results for input(s): FE, TIBC, PSAT, FERR in the last 72 hours. No results found for: FOL, RBCF   Recent Labs     02/18/23 2218   PH 7.38   PCO2 48*   PO2 76*     No results for input(s): CPK, CKNDX, TROIQ in the last 72 hours.     No lab exists for component: CPKMB  Lab Results   Component Value Date/Time    Cholesterol, total 199 02/14/2023 12:57 PM    HDL Cholesterol 69 02/14/2023 12:57 PM    LDL, calculated 108.2 (H) 02/14/2023 12:57 PM    Triglyceride 109 02/14/2023 12:57 PM    CHOL/HDL Ratio 2.9 02/14/2023 12:57 PM     Lab Results   Component Value Date/Time    Glucose (POC) 163 (H) 05/04/2014 08:48 PM    Glucose (POC) 171 (H) 04/04/2013 12:55 PM    Glucose POC 124mg/dl 10/09/2012 10:45 AM     Lab Results   Component Value Date/Time    Color YELLOW/STRAW 05/26/2022 03:31 PM    Appearance CLEAR 05/26/2022 03:31 PM    Specific gravity 1.020 05/26/2022 03:31 PM    pH (UA) 6.5 05/26/2022 03:31 PM    Protein 30 (A) 05/26/2022 03:31 PM    Glucose Negative 05/26/2022 03:31 PM    Ketone Negative 05/26/2022 03:31 PM    Bilirubin Negative 05/26/2022 03:31 PM    Urobilinogen 1.0 05/26/2022 03:31 PM    Nitrites Negative 05/26/2022 03:31 PM    Leukocyte Esterase SMALL (A) 05/26/2022 03:31 PM    Epithelial cells FEW 05/26/2022 03:31 PM    Bacteria 1+ (A) 05/26/2022 03:31 PM    WBC 0-4 05/26/2022 03:31 PM    RBC 0-5 05/26/2022 03:31 PM       Notes reviewed from all clinical/nonclinical/nursing services involved in patient's clinical care. Care coordination discussions were held with appropriate clinical/nonclinical/ nursing providers based on care coordination needs. Patients current active Medications were reviewed, considered, added and adjusted based on the clinical condition today. Home Medications were reconciled to the best of my ability given all available resources at the time of admission. Route is PO if not otherwise noted.       Admission Status:[id number]:::1}      Medications Reviewed:     Current Facility-Administered Medications   Medication Dose Route Frequency    clonazePAM (KlonoPIN) tablet 1 mg  1 mg Oral BID PRN    amLODIPine (NORVASC) tablet 5 mg  5 mg Oral BID    aspirin delayed-release tablet 81 mg  81 mg Oral DAILY    isosorbide mononitrate ER (IMDUR) tablet 30 mg  30 mg Oral DAILY    metoprolol tartrate (LOPRESSOR) tablet 25 mg  25 mg Oral BID    pantoprazole (PROTONIX) tablet 40 mg  40 mg Oral BID    rosuvastatin (CRESTOR) tablet 20 mg  20 mg Oral QHS    melatonin tablet 4.5 mg  4.5 mg Oral QHS PRN    furosemide (LASIX) injection 40 mg  40 mg IntraVENous BID    albuterol (PROVENTIL VENTOLIN) nebulizer solution 5 mg  5 mg Nebulization Q4H RT    sodium chloride (NS) flush 5-40 mL  5-40 mL IntraVENous Q8H    sodium chloride (NS) flush 5-40 mL  5-40 mL IntraVENous PRN    acetaminophen (TYLENOL) tablet 650 mg  650 mg Oral Q6H PRN    Or    acetaminophen (TYLENOL) suppository 650 mg  650 mg Rectal Q6H PRN    polyethylene glycol (MIRALAX) packet 17 g  17 g Oral DAILY PRN    ondansetron (ZOFRAN ODT) tablet 4 mg  4 mg Oral Q8H PRN    Or    ondansetron (ZOFRAN) injection 4 mg  4 mg IntraVENous Q6H PRN    enoxaparin (LOVENOX) injection 40 mg  40 mg SubCUTAneous DAILY     Current Outpatient Medications   Medication Sig    furosemide (LASIX) 80 mg tablet TAKE 1 TABLET BY MOUTH EVERY MORNING AND TAKE 1/2 TABLET EVERY EVENING    potassium chloride SR (KLOR-CON 10) 10 mEq tablet TAKE 3 TABS BY MOUTH 2 TIMES PER DAY    amLODIPine (NORVASC) 5 mg tablet Take 5 mg by mouth two (2) times a day. pantoprazole (PROTONIX) 40 mg tablet Take 1 Tablet by mouth two (2) times a day. clonazePAM (KlonoPIN) 1 mg tablet TAKE 1/2 TO 1 TABLET EVERY MORNING AND AS NEEDED FOR ANXIETY, AND TAKE 1 TABLET AT BEDTIME FOR SLEEP    rosuvastatin (CRESTOR) 20 mg tablet Take 1 Tablet by mouth nightly. losartan (COZAAR) 100 mg tablet TAKE 1 TABLET BY MOUTH EVERY DAY    calcium carb/magnesium hydrox (MYLANTA PO) Take  by mouth as needed. nitroglycerin (NITROSTAT) 0.4 mg SL tablet DISSOLVE 1 TAB BY SUBLINGUAL ROUTE EVERY 5 MINUTES AS NEEDED. albuterol (PROVENTIL VENTOLIN) 2.5 mg /3 mL (0.083 %) nebu 3 mL by Nebulization route every four (4) hours as needed for Shortness of Breath or Wheezing. aspirin delayed-release 81 mg tablet Take 81 mg by mouth daily. metoprolol tartrate (LOPRESSOR) 25 mg tablet TAKE 1 TABLET BY MOUTH TWICE A DAY 1 AT 9AM AND 1 AT 9PM (Patient taking differently: Take 25 mg by mouth two (2) times a day.  Indications: high blood pressure)    isosorbide mononitrate ER (IMDUR) 30 mg tablet TAKE 1 TABLET BY MOUTH EVERY DAY (Patient taking differently: Take 30 mg by mouth daily.  Indications: prevention of anginal chest pain associated with coronary artery disease)     ______________________________________________________________________  EXPECTED LENGTH OF STAY: - - -  ACTUAL LENGTH OF STAY:          1                 Adela Almaguer MD

## 2023-02-19 NOTE — PROGRESS NOTES
Received from ED. Patient assessed, given call bell and lunch tray. Patient currently denies complaints.

## 2023-02-19 NOTE — H&P
History & Physical    Primary Care Provider: Brendon Dalal MD  Source of Information: Patient and chart review    History of Presenting Illness: Andrew Morton is a very pleasant 82 y.o. female with hx of AS, chf, htn, fátima, obesity, intrahepatic cholangiocarcinoma who presents to hospital with complaints of sob. She had been in her usual state of health until 2 days ago when she noted mild dyspnea with exertion which has since progressed to dyspnea at rest.  She has had no recent travel or any sick contacts. Denies any PND, orthopnea, lower extremity edema or significant weight gain. No associated chest pain, cough fever, chills, chest or abdominal pain, nausea, vomiting, cough, congestion, recent illness, palpitations, or dysuria. Remarkable vitals on ER Presentation: spo2 89% on RA  Labs Remarkable for: unremarkable  ER Images: cxr: Interstitial prominence suggesting pulmonary vascular congestion and/or atypical infectious process. ER Rx: solumedrol 125, duoneb     Review of Systems:  Pertinent items are noted in the History of Present Illness.      Past Medical History:   Diagnosis Date    Anxiety     Aortic stenosis 03/03/2010    Aortic stenosis     Arrhythmia     MURMUR    Arthritis     SPINAL STENOSIS    Atherosclerosis of coronary artery     Biliary dyskinesia     Cancer ([address] 2023    liver    CHF (congestive heart failure) ([address] 03/03/2010    CHF (congestive heart failure) ([address] 01/2002    Chronic heart failure ([address] 1/2002; 3/3/2010    chronic diastolic heart failure    Chronic pain     LOWER BACK, RIGHT KNEE    Environmental allergies 03/03/2010    GERD (gastroesophageal reflux disease) 03/03/2010    Hiatal hernia 03/03/2010    High cholesterol 03/03/2010    HTN (hypertension) 03/03/2010    Hypokalemia 03/03/2010    Ischemic heart disease, chronic     Multiple gallstones     Obese     Psychiatric disorder     anxiety    S/P hysterectomy 03/03/2010 Spinal stenosis 03/03/2010      Past Surgical History:   Procedure Laterality Date    CARDIAC CATHETERIZATION  01/2002    normal coronaries    DECOMPRESS DISC RF LUMBAR  07/2007    HX BACK SURGERY  5/1/2014    HX BREAST LUMPECTOMY Left 1989    benign left breast    HX BUNIONECTOMY      HX BUNIONECTOMY      HX HEART CATHETERIZATION  3/3/10    HX HYSTERECTOMY  1978    HX LUMBAR DISKECTOMY      HX ORTHOPAEDIC Bilateral     BUNIONECTOMY    HX ORTHOPAEDIC Right     WRIST FX    HX OTHER SURGICAL  10/12/2015    mole removed from right side of face by Dr. Fortunato Olmedo FLX DX W/COLLJ Faina Larry PFRMD  [id number]    Dr Ezekiel Parker GI ENDOSCOPY,BIOPSY  2/27/2019          Prior to Admission medications    Medication Sig Start Date End Date Taking? Authorizing Provider   furosemide (LASIX) 80 mg tablet TAKE 1 TABLET BY MOUTH EVERY MORNING AND TAKE 1/2 TABLET EVERY EVENING 2/3/23  Yes Zita GRAVES MD   potassium chloride SR (KLOR-CON 10) 10 mEq tablet TAKE 3 TABS BY MOUTH 2 TIMES PER DAY 1/5/23  Yes Senthil Arango MD   amLODIPine (NORVASC) 5 mg tablet Take 5 mg by mouth two (2) times a day. Yes Provider, Historical   pantoprazole (PROTONIX) 40 mg tablet Take 1 Tablet by mouth two (2) times a day. 8/15/22  Yes Senthil Arango MD   clonazePAM (KlonoPIN) 1 mg tablet TAKE 1/2 TO 1 TABLET EVERY MORNING AND AS NEEDED FOR ANXIETY, AND TAKE 1 TABLET AT BEDTIME FOR SLEEP 2/15/23   Zita GRAVES MD   rosuvastatin (CRESTOR) 20 mg tablet Take 1 Tablet by mouth nightly. 1/17/23   Zita Queen MD   losartan (COZAAR) 100 mg tablet TAKE 1 TABLET BY MOUTH EVERY DAY 12/19/22   Zita Queen MD   calcium carb/magnesium hydrox (MYLANTA PO) Take  by mouth as needed.     Provider, Historical   nitroglycerin (NITROSTAT) 0.4 mg SL tablet DISSOLVE 1 TAB BY SUBLINGUAL ROUTE EVERY 5 MINUTES AS NEEDED. 7/21/22   Marcelle Sacks, MD   albuterol (PROVENTIL VENTOLIN) 2.5 mg /3 mL (0.083 %) nebu 3 mL by Nebulization route every four (4) hours as needed for Shortness of Breath or Wheezing. 7/20/22   Malgorzata Michael MD   aspirin delayed-release 81 mg tablet Take 81 mg by mouth daily. Provider, Historical   metoprolol tartrate (LOPRESSOR) 25 mg tablet TAKE 1 TABLET BY MOUTH TWICE A DAY 1 AT 9AM AND 1 AT 9PM  Patient taking differently: Take 25 mg by mouth two (2) times a day. Indications: high blood pressure 2/15/22   Rubio Arango MD   isosorbide mononitrate ER (IMDUR) 30 mg tablet TAKE 1 TABLET BY MOUTH EVERY DAY  Patient taking differently: Take 30 mg by mouth daily.  Indications: prevention of anginal chest pain associated with coronary artery disease 2/15/22   Malgorzata Michael MD     Allergies   Allergen Reactions    Bactrim [Sulfamethoxazole-Trimethoprim] Unknown (comments)     Pt does not recall    Darvocet A500 [Propoxyphene N-Acetaminophen] Itching      Family History   Problem Relation Age of Onset    Hypertension Mother     Heart Disease Father     Stroke Sister     Heart Disease Sister     Heart Disease Sister     Cancer Brother         LUNG    Cancer Brother         PROSTATE    Hypertension Brother     No Known Problems Brother     Lung Disease Brother     Heart Attack Brother     Lung Disease Brother     Stroke Brother     Stroke Daughter 47    No Known Problems Daughter     Anesth Problems Neg Hx         SOCIAL HISTORY:  Patient resides:  Independently x   Assisted Living    SNF    With family care       Smoking history:   None x   Former    Chronic      Alcohol history:   None x   Social    Chronic      Ambulates:   Independently x   w/cane    w/walker    w/wc    CODE STATUS:  DNR    Full x   Other      Objective:     Physical Exam:     Visit Vitals  BP (!) 133/94   Pulse 78   Temp 98.2 °F (36.8 °C)   Resp 21   Ht 4' 7\" (1.397 m)   Wt 73 kg (161 lb)   LMP  (LMP Unknown)   SpO2 90%   BMI 37.42 kg/m²    O2 Flow Rate (L/min): 1 l/min (per Doctor request / wean O2) O2 Device: Nasal cannula    General:  Alert, cooperative, no distress, appears stated age. Head:  Normocephalic, without obvious abnormality, atraumatic. Eyes:  Conjunctivae/corneas clear. PERRL, EOMs intact. Nose: Nares normal. Septum midline. Mucosa normal.        Neck: Supple, symmetrical, trachea midline, no carotid bruit and no JVD. Lungs:   Clear to auscultation bilaterally. Chest wall:  No tenderness or deformity. Heart:  Regular rate and rhythm, S1, S2 normal   Abdomen:   Soft, non-tender. Bowel sounds normal. No masses,  No organomegaly. Extremities: Extremities normal, atraumatic, no cyanosis. Nonpitting edema. Pulses: 2+ and symmetric all extremities. Skin: Skin color, texture, turgor normal. No rashes or lesions   Neurologic: CNII-XII intact. EKG: Normal sinus rhythm. Chronic right bundle branch block  Data Review:     Recent Days:  Recent Labs     02/18/23 1927   WBC 7.7   HGB 13.2   HCT 41.2        Recent Labs     02/18/23 1927      K 3.7      CO2 29   *   BUN 12   CREA 1.01   CA 9.1   ALB 3.6   ALT 29     No results for input(s): PH, PCO2, PO2, HCO3, FIO2 in the last 72 hours. 24 Hour Results:  Recent Results (from the past 24 hour(s))   CBC WITH AUTOMATED DIFF    Collection Time: 02/18/23  7:27 PM   Result Value Ref Range    WBC 7.7 3.6 - 11.0 K/uL    RBC 4.62 3.80 - 5.20 M/uL    HGB 13.2 11.5 - 16.0 g/dL    HCT 41.2 35.0 - 47.0 %    MCV 89.2 80.0 - 99.0 FL    MCH 28.6 26.0 - 34.0 PG    MCHC 32.0 30.0 - 36.5 g/dL    RDW 14.0 11.5 - 14.5 %    PLATELET 684 683 - 322 K/uL    MPV 11.5 8.9 - 12.9 FL    NRBC 0.0 0  WBC    ABSOLUTE NRBC 0.00 0.00 - 0.01 K/uL    NEUTROPHILS 55 32 - 75 %    LYMPHOCYTES 27 12 - 49 %    MONOCYTES 9 5 - 13 %    EOSINOPHILS 7 0 - 7 %    BASOPHILS 1 0 - 1 %    IMMATURE GRANULOCYTES 1 (H) 0.0 - 0.5 %    ABS. NEUTROPHILS 4.4 1.8 - 8.0 K/UL    ABS. LYMPHOCYTES 2.1 0.8 - 3.5 K/UL    ABS.  MONOCYTES 0.7 0.0 - 1.0 K/UL    ABS. EOSINOPHILS 0.5 (H) 0.0 - 0.4 K/UL    ABS. BASOPHILS 0.1 0.0 - 0.1 K/UL    ABS. IMM. GRANS. 0.0 0.00 - 0.04 K/UL    DF AUTOMATED     METABOLIC PANEL, COMPREHENSIVE    Collection Time: 02/18/23  7:27 PM   Result Value Ref Range    Sodium 139 136 - 145 mmol/L    Potassium 3.7 3.5 - 5.1 mmol/L    Chloride 102 97 - 108 mmol/L    CO2 29 21 - 32 mmol/L    Anion gap 8 5 - 15 mmol/L    Glucose 103 (H) 65 - 100 mg/dL    BUN 12 6 - 20 MG/DL    Creatinine 1.01 0.55 - 1.02 MG/DL    BUN/Creatinine ratio 12 12 - 20      eGFR 55 (L) >60 ml/min/1.73m2    Calcium 9.1 8.5 - 10.1 MG/DL    Bilirubin, total 0.5 0.2 - 1.0 MG/DL    ALT (SGPT) 29 12 - 78 U/L    AST (SGOT) 27 15 - 37 U/L    Alk.  phosphatase 113 45 - 117 U/L    Protein, total 8.5 (H) 6.4 - 8.2 g/dL    Albumin 3.6 3.5 - 5.0 g/dL    Globulin 4.9 (H) 2.0 - 4.0 g/dL    A-G Ratio 0.7 (L) 1.1 - 2.2     TROPONIN-HIGH SENSITIVITY    Collection Time: 02/18/23  7:27 PM   Result Value Ref Range    Troponin-High Sensitivity 13 0 - 51 ng/L   NT-PRO BNP    Collection Time: 02/18/23  7:27 PM   Result Value Ref Range    NT pro- 0 - 450 PG/ML   COVID-19 WITH INFLUENZA A/B    Collection Time: 02/18/23  7:27 PM   Result Value Ref Range    SARS-CoV-2 by PCR Not detected NOTD      Influenza A by PCR Not detected      Influenza B by PCR Not detected     EKG, 12 LEAD, INITIAL    Collection Time: 02/18/23  7:31 PM   Result Value Ref Range    Ventricular Rate 77 BPM    Atrial Rate 77 BPM    P-R Interval 198 ms    QRS Duration 140 ms    Q-T Interval 404 ms    QTC Calculation (Bezet) 457 ms    Calculated P Axis 82 degrees    Calculated R Axis -14 degrees    Calculated T Axis 57 degrees    Diagnosis       Normal sinus rhythm  Right bundle branch block  Voltage criteria for left ventricular hypertrophy  Cannot rule out Septal infarct (cited on or before 11-AUG-2022)  Abnormal ECG  When compared with ECG of 03-NOV-2022 15:37,  No significant change was found Imaging:     Assessment:     Melvina Faye is a 82 y.o. female with hx of AS, chf, htn, fátima, obesity who is admitted for ahrf.        Plan:       Acute hypoxic respiratory failure  Decompensated HFpEF  -CTA shows no acute pathology  -Start diuresis with Lasix 80 mg IV twice daily  -Strict I's and O's with daily weights  -Wean supplemental oxygen as tolerated  -Repeat echocardiogram in a.m.  -Cardiology consult    Hypertension  -Continue home meds    Generalized anxiety disorder  -Home Klonopin as needed    GERD-Protonix  Dyslipidemia-Crestor    History of intrahepatic cholangiocarcinoma  -Has opted against aggressive chemoradiation therapy  -Outpatient PCP/palliative follow-up    Obesity  -Counseled on weight loss, dieting and exercise          FEN/GI -  po hydration  Activity - as tolerated  DVT prophylaxis - lovenox  GI prophylaxis -  ni  Disposition - home    CODE STATUS:   full code       Signed By: Gordon Pan MD     February 18, 2023

## 2023-02-19 NOTE — PROGRESS NOTES
Reason for Admission:   Pt presents to ED complaining of shortness of breath that started last night, chest pain. Pt satting at 89% on RA on arrival, pt reports she used to ne on O2 @ home. Placed pt on 2L via nasal cannula. RT at bedside to start breathing tx. Pt is alert and oriented x 4, RR even and unlabored, skin is warm and dry. Assessment completed and pt updated on plan of care. Call bell in reach. RUR Score:    13%               PCP: First and Last name:   Dr. Yanet Live      Name of Practice:    Are you a current patient: Yes/No:    Approximate date of last visit:    Can you participate in a virtual visit if needed:     Do you (patient/family) have any concerns for transition/discharge? Plan for utilizing home health:   TBD    Current Advanced Directive/Advance Care Plan:  DNR      Healthcare Decision Maker:   Click here to complete [address] including selection of the Healthcare Decision Maker Relationship (ie \"Primary\")              Transition of Care Plan:       CM opened case for assessment of D/C planning needs, CM reviewed chart. CM completed assessment with pt at bedside. Pt is alert and oriented throughout encounter. CM introduced self/role, verified demographics, and discussed discharge planning.    -Patient lives at home with , daughter, and Jamison Gehrig. -per patient she was on oxygen and the PULM took her off 2 months ago.  -Only DME is cane.  -She cannot remember the PULM name. Patient received first IMM will need 2nd on discharge.     [address] [facility], [address] [phone] weekend cell phone

## 2023-02-19 NOTE — ED PROVIDER NOTES
EMERGENCY DEPARTMENT HISTORY AND PHYSICAL EXAM            Please note that this dictation was completed with the assistance of \"Dragon\", the computer voice recognition software. Quite often unanticipated grammatical, syntax, homophones, and other interpretive errors are inadvertently transcribed by the computer software. Please disregard these errors and any errors that have escaped final proofreading. Thank you. Date of Evaluation: 02/19/23  Patient: Yasmani Piedra  Patient Age and Sex: 82 y.o. female   MRN: [id number]  CSN: [id number]  PCP: Jennifer Chanel MD    History of Present Illness     Chief Complaint   Patient presents with    Shortness of Breath     History Provided By: Patient/family/EMS (if available)    HPI Limitations : None    HPI: Yasmani Piedra, 82 y.o. female with past medical history as documented below presents to the ED with c/o of acute onset of moderate to severe shortness of breath with associated chest tightness. Patient upon arrival with saturation in 88% on room air. Patient was placed on 3 L of oxygen with subsequent improvement of oxygenation. Patient states that she does not wear oxygen at home. Patient does have a history of aortic stenosis, CHF obesity, and intrahepatic cholangiocarcinoma. Pt denies any other exacerbating or ameliorating factors. There are no other complaints, changes or physical findings pertinent to the HPI at this time. Nursing Notes were all reviewed and agreed with or any disagreements were addressed in the HPI.     Past History   Past Medical History:  Past Medical History:   Diagnosis Date    Anxiety     Aortic stenosis 03/03/2010    Aortic stenosis     Arrhythmia     MURMUR    Arthritis     SPINAL STENOSIS    Atherosclerosis of coronary artery     Biliary dyskinesia     Cancer ([address] 2023    liver    CHF (congestive heart failure) ([address] 03/03/2010    CHF (congestive heart failure) ([address] 01/2002    Chronic heart failure ([address] 1/2002; 3/3/2010 chronic diastolic heart failure    Chronic pain     LOWER BACK, RIGHT KNEE    Environmental allergies 03/03/2010    GERD (gastroesophageal reflux disease) 03/03/2010    Hiatal hernia 03/03/2010    High cholesterol 03/03/2010    HTN (hypertension) 03/03/2010    Hypokalemia 03/03/2010    Ischemic heart disease, chronic     Multiple gallstones     Obese     Psychiatric disorder     anxiety    S/P hysterectomy 03/03/2010    Spinal stenosis 03/03/2010       Past Surgical History:  Past Surgical History:   Procedure Laterality Date    CARDIAC CATHETERIZATION  01/2002    normal coronaries    DECOMPRESS DISC RF LUMBAR  07/2007    HX BACK SURGERY  5/1/2014    HX BREAST LUMPECTOMY Left 1989    benign left breast    HX BUNIONECTOMY      HX BUNIONECTOMY      HX HEART CATHETERIZATION  3/3/10    HX HYSTERECTOMY  1978    HX LUMBAR DISKECTOMY      HX ORTHOPAEDIC Bilateral     BUNIONECTOMY    HX ORTHOPAEDIC Right     WRIST FX    HX OTHER SURGICAL  10/12/2015    mole removed from right side of face by Dr. Yara Cintron FLX DX W/COLLJ Kamryn Layer PFRMD  [id number]    Dr Caroline Feldman GI ENDOSCOPY,BIOPSY  2/27/2019            Family History:   Family history reviewed and was non-contributory, unless specified below:  Family History   Problem Relation Age of Onset    Hypertension Mother     Heart Disease Father     Stroke Sister     Heart Disease Sister     Heart Disease Sister     Cancer Brother         LUNG    Cancer Brother         PROSTATE    Hypertension Brother     No Known Problems Brother     Lung Disease Brother     Heart Attack Brother     Lung Disease Brother     Stroke Brother     Stroke Daughter 47    No Known Problems Daughter     Anesth Problems Neg Hx        Social History:  Social History     Tobacco Use    Smoking status: Never    Smokeless tobacco: Never   Vaping Use    Vaping Use: Never used   Substance Use Topics    Alcohol use: No     Alcohol/week: 0.0 standard drinks    Drug use:  No Allergies: Allergies   Allergen Reactions    Bactrim [Sulfamethoxazole-Trimethoprim] Unknown (comments)     Pt does not recall    Caraaleen Troy [Propoxyphene N-Acetaminophen] Itching       Current Medications:  No current facility-administered medications on file prior to encounter. Current Outpatient Medications on File Prior to Encounter   Medication Sig Dispense Refill    furosemide (LASIX) 80 mg tablet TAKE 1 TABLET BY MOUTH EVERY MORNING AND TAKE 1/2 TABLET EVERY EVENING 135 Tablet 3    potassium chloride SR (KLOR-CON 10) 10 mEq tablet TAKE 3 TABS BY MOUTH 2 TIMES PER  Tablet 3    amLODIPine (NORVASC) 5 mg tablet Take 5 mg by mouth two (2) times a day. pantoprazole (PROTONIX) 40 mg tablet Take 1 Tablet by mouth two (2) times a day. 180 Tablet 3    clonazePAM (KlonoPIN) 1 mg tablet TAKE 1/2 TO 1 TABLET EVERY MORNING AND AS NEEDED FOR ANXIETY, AND TAKE 1 TABLET AT BEDTIME FOR SLEEP 60 Tablet 1    rosuvastatin (CRESTOR) 20 mg tablet Take 1 Tablet by mouth nightly. 30 Tablet 11    losartan (COZAAR) 100 mg tablet TAKE 1 TABLET BY MOUTH EVERY DAY 90 Tablet 3    calcium carb/magnesium hydrox (MYLANTA PO) Take  by mouth as needed. nitroglycerin (NITROSTAT) 0.4 mg SL tablet DISSOLVE 1 TAB BY SUBLINGUAL ROUTE EVERY 5 MINUTES AS NEEDED. 25 Tablet 0    albuterol (PROVENTIL VENTOLIN) 2.5 mg /3 mL (0.083 %) nebu 3 mL by Nebulization route every four (4) hours as needed for Shortness of Breath or Wheezing. 90 Nebule 5    aspirin delayed-release 81 mg tablet Take 81 mg by mouth daily. metoprolol tartrate (LOPRESSOR) 25 mg tablet TAKE 1 TABLET BY MOUTH TWICE A DAY 1 AT 9AM AND 1 AT 9PM (Patient taking differently: Take 25 mg by mouth two (2) times a day. Indications: high blood pressure) 180 Tablet 3    isosorbide mononitrate ER (IMDUR) 30 mg tablet TAKE 1 TABLET BY MOUTH EVERY DAY (Patient taking differently: Take 30 mg by mouth daily.  Indications: prevention of anginal chest pain associated with coronary artery disease) 90 Tablet 3       Review of Systems   A complete ROS was reviewed by me today and all other systems negative, unless otherwise specified below:  Review of Systems   Constitutional: Negative. Negative for chills and fever. HENT: Negative. Negative for congestion and sore throat. Eyes: Negative. Respiratory:  Positive for cough, chest tightness, shortness of breath and wheezing. Cardiovascular: Negative. Negative for chest pain, palpitations and leg swelling. Gastrointestinal: Negative. Negative for abdominal distention, abdominal pain, blood in stool, constipation, diarrhea, nausea and vomiting. Endocrine: Negative. Genitourinary: Negative. Negative for difficulty urinating, dysuria, flank pain, frequency, hematuria and urgency. Musculoskeletal: Negative. Negative for back pain and neck stiffness. Skin: Negative. Negative for rash. Allergic/Immunologic: Negative. Neurological: Negative. Negative for dizziness, syncope, weakness, light-headedness, numbness and headaches. Hematological: Negative. Psychiatric/Behavioral: Negative. Negative for confusion and self-injury.     Physical Exam   Patient Vitals for the past 24 hrs:   Temp Pulse Resp BP SpO2   02/19/23 0337 -- -- -- -- 97 %   02/18/23 2300 -- 84 16 (!) 141/85 96 %   02/18/23 2201 -- -- -- -- 92 %   02/18/23 2200 -- 76 25 (!) 143/94 91 %   02/18/23 2152 -- 78 23 -- (!) 87 %   02/18/23 2151 -- 78 21 -- (!) 87 %   02/18/23 2150 -- 79 24 -- (!) 88 %   02/18/23 2149 -- 79 22 -- (!) 89 %   02/18/23 2148 -- 80 22 -- 91 %   02/18/23 2147 -- 78 21 -- 90 %   02/18/23 2146 -- 78 22 -- (!) 89 %   02/18/23 2145 -- 78 22 -- (!) 88 %   02/18/23 2144 -- 78 18 -- 90 %   02/18/23 2143 -- 78 22 -- (!) 87 %   02/18/23 2142 -- 81 23 -- (!) 88 %   02/18/23 2141 -- 89 26 -- --   02/18/23 2140 -- 87 22 -- --   02/18/23 2139 -- 94 (!) 37 -- --   02/18/23 2138 -- 77 18 -- (!) 89 %   02/18/23 2137 -- 87 20 -- (!) 89 % 02/18/23 2136 -- 84 23 -- (!) 88 %   02/18/23 2135 -- 86 22 -- (!) 88 %   02/18/23 2134 -- 80 21 -- (!) 88 %   02/18/23 2133 -- 81 17 -- (!) 89 %   02/18/23 2132 -- 84 21 -- (!) 88 %   02/18/23 2131 -- 79 18 -- (!) 88 %   02/18/23 2130 -- 79 18 (!) 153/86 (!) 89 %   02/18/23 2129 -- 75 16 -- (!) 87 %   02/18/23 2128 -- 80 17 -- (!) 88 %   02/18/23 2127 -- 81 24 -- (!) 89 %   02/18/23 2126 -- 80 15 -- (!) 88 %   02/18/23 2125 -- 78 24 -- (!) 88 %   02/18/23 2124 -- 78 19 -- (!) 89 %   02/18/23 2123 -- 79 24 -- (!) 89 %   02/18/23 2122 -- 79 22 -- 90 %   02/18/23 2121 -- 76 20 -- (!) 89 %   02/18/23 2120 -- 79 24 -- 90 %   02/18/23 2119 -- 80 24 -- 91 %   02/18/23 2118 -- 76 21 -- 90 %   02/18/23 2117 -- 79 19 -- (!) 89 %   02/18/23 2116 -- 78 21 -- (!) 89 %   02/18/23 2115 -- 81 19 -- (!) 89 %   02/18/23 2114 -- 83 19 -- 91 %   02/18/23 2113 -- 79 23 -- 90 %   02/18/23 2112 -- 78 21 -- 90 %   02/18/23 2111 -- 79 21 -- 90 %   02/18/23 2110 -- 77 22 -- 91 %   02/18/23 2109 -- 78 18 -- 92 %   02/18/23 2108 -- 78 21 -- 92 %   02/18/23 2107 -- 79 22 -- 94 %   02/18/23 2106 -- 79 22 -- 94 %   02/18/23 2105 -- 78 19 -- 94 %   02/18/23 2104 -- 77 20 -- 93 %   02/18/23 2103 -- 80 29 -- --   02/18/23 2102 -- 87 18 -- (!) 87 %   02/18/23 2101 -- 92 (!) 40 -- (!) 76 %   02/18/23 2100 -- 94 20 (!) 142/97 92 %   02/18/23 2059 -- 87 20 -- 90 %   02/18/23 2058 -- 88 23 -- (!) 88 %   02/18/23 2057 -- 92 27 -- --   02/18/23 2056 -- 90 27 -- --   02/18/23 2055 -- 89 24 -- 94 %   02/18/23 2054 -- 87 24 -- 94 %   02/18/23 2053 -- 80 19 -- 94 %   02/18/23 2052 -- 79 22 -- 94 %   02/18/23 2051 -- 82 19 -- 93 %   02/18/23 2050 -- 79 23 -- 93 %   02/18/23 2049 -- 78 21 -- 94 %   02/18/23 2048 -- 78 16 -- 94 %   02/18/23 2047 -- 76 21 -- 93 %   02/18/23 2046 -- 77 20 -- 93 %   02/18/23 2045 -- 78 23 (!) 142/93 93 %   02/18/23 2044 -- 77 24 -- 93 %   02/18/23 2043 -- 83 23 -- 94 %   02/18/23 2042 -- 89 18 -- 94 %   02/18/23 2041 -- 84 23 -- 94 %   02/18/23 2040 -- 80 22 -- 94 %   02/18/23 2039 -- 79 25 -- 95 %   02/18/23 2038 -- 80 16 -- 95 %   02/18/23 2037 -- 76 22 -- 94 %   02/18/23 2036 -- 77 18 -- 94 %   02/18/23 2035 -- 77 18 -- 94 %   02/18/23 2034 -- 75 19 -- 94 %   02/18/23 2033 -- 77 21 -- 94 %   02/18/23 2032 -- 80 21 -- 93 %   02/18/23 2031 -- 85 20 -- 94 %   02/18/23 2030 -- 78 21 (!) 133/94 94 %   02/18/23 2029 -- 75 21 -- 94 %   02/18/23 2028 -- 75 20 -- 94 %   02/18/23 2027 -- 77 20 -- 94 %   02/18/23 2026 -- 78 17 -- 94 %   02/18/23 2025 -- 80 18 -- 95 %   02/18/23 2024 -- 76 20 -- 93 %   02/18/23 2023 -- 76 18 -- 94 %   02/18/23 2022 -- 76 21 -- 94 %   02/18/23 2021 -- 73 21 -- 95 %   02/18/23 2020 -- 76 21 -- 94 %   02/18/23 2019 -- 74 20 -- 94 %   02/18/23 2018 -- 75 20 -- 94 %   02/18/23 2017 -- 74 19 -- 94 %   02/18/23 2016 -- 77 16 -- 94 %   02/18/23 2015 -- 73 20 134/80 94 %   02/18/23 2014 -- 72 19 -- 94 %   02/18/23 2013 -- 71 18 -- 94 %   02/18/23 2012 -- 72 16 -- 94 %   02/18/23 2011 -- 71 19 -- 94 %   02/18/23 2010 -- 73 21 -- 95 %   02/18/23 2009 -- 72 20 -- 95 %   02/18/23 2008 -- 72 22 -- 95 %   02/18/23 2007 -- 77 23 -- 94 %   02/18/23 2006 -- 73 19 -- 95 %   02/18/23 2005 -- 73 20 (!) 124/110 94 %   02/18/23 2004 -- 72 21 -- 94 %   02/18/23 2003 -- 74 16 -- 93 %   02/18/23 2002 -- 72 19 -- 93 %   02/18/23 2001 -- 70 22 -- 93 %   02/18/23 2000 -- 70 22 132/77 93 %   02/18/23 1943 -- -- -- -- 91 %   02/18/23 1934 -- -- -- -- 90 %   02/18/23 1933 -- -- -- -- (!) 89 %   02/18/23 1932 -- -- -- -- (!) 89 %   02/18/23 1931 -- -- -- -- (!) 88 %   02/18/23 1930 -- -- -- -- (!) 89 %   02/18/23 1929 -- -- -- -- (!) 88 %   02/18/23 1920 -- -- -- -- (!) 89 %   02/18/23 1919 -- -- -- -- 90 %   02/18/23 1916 -- -- -- -- 94 %   02/18/23 1915 -- -- -- -- 91 %   02/18/23 1914 -- -- -- -- 92 %   02/18/23 1913 -- -- -- -- 92 %   02/18/23 1912 -- -- -- -- 90 %   02/18/23 1911 -- -- -- -- 90 %   02/18/23 1910 -- -- -- -- (!) 88 %   02/18/23 1909 -- -- -- -- (!) 88 %   02/18/23 1908 -- -- -- -- (!) 88 %   02/18/23 1907 -- -- -- -- (!) 89 %   02/18/23 1901 98.2 °F (36.8 °C) 77 20 (!) 123/91 91 %       Physical Exam  Vitals and nursing note reviewed. Constitutional:       General: She is in acute distress. Appearance: She is well-developed. She is ill-appearing, toxic-appearing and diaphoretic. HENT:      Head: Normocephalic and atraumatic. Mouth/Throat:      Pharynx: No oropharyngeal exudate. Eyes:      Conjunctiva/sclera: Conjunctivae normal.      Pupils: Pupils are equal, round, and reactive to light. Cardiovascular:      Rate and Rhythm: Normal rate and regular rhythm. Heart sounds: Normal heart sounds. No murmur heard. No friction rub. No gallop. Pulmonary:      Effort: Tachypnea, accessory muscle usage and respiratory distress present. Breath sounds: Examination of the right-upper field reveals wheezing. Examination of the left-upper field reveals wheezing. Examination of the right-middle field reveals wheezing. Examination of the left-middle field reveals wheezing. Examination of the right-lower field reveals wheezing. Examination of the left-lower field reveals wheezing. Wheezing present. No rales. Chest:      Chest wall: No tenderness. Abdominal:      General: Bowel sounds are normal. There is no distension. Palpations: Abdomen is soft. There is no mass. Tenderness: There is no abdominal tenderness. There is no guarding or rebound. Musculoskeletal:         General: No tenderness or deformity. Normal range of motion. Cervical back: Normal range of motion. Right lower leg: Edema present. Left lower leg: Edema present. Skin:     General: Skin is warm. Findings: No rash. Neurological:      Mental Status: She is alert and oriented to person, place, and time. Cranial Nerves: No cranial nerve deficit. Motor: No abnormal muscle tone.       Coordination: Coordination normal. Deep Tendon Reflexes: Reflexes normal.     Diagnostic Studies     LABORATORY RESULTS:  I have personally reviewed and interpreted all available laboratory results. Recent Results (from the past 24 hour(s))   CBC WITH AUTOMATED DIFF    Collection Time: 02/18/23  7:27 PM   Result Value Ref Range    WBC 7.7 3.6 - 11.0 K/uL    RBC 4.62 3.80 - 5.20 M/uL    HGB 13.2 11.5 - 16.0 g/dL    HCT 41.2 35.0 - 47.0 %    MCV 89.2 80.0 - 99.0 FL    MCH 28.6 26.0 - 34.0 PG    MCHC 32.0 30.0 - 36.5 g/dL    RDW 14.0 11.5 - 14.5 %    PLATELET 308 769 - 502 K/uL    MPV 11.5 8.9 - 12.9 FL    NRBC 0.0 0  WBC    ABSOLUTE NRBC 0.00 0.00 - 0.01 K/uL    NEUTROPHILS 55 32 - 75 %    LYMPHOCYTES 27 12 - 49 %    MONOCYTES 9 5 - 13 %    EOSINOPHILS 7 0 - 7 %    BASOPHILS 1 0 - 1 %    IMMATURE GRANULOCYTES 1 (H) 0.0 - 0.5 %    ABS. NEUTROPHILS 4.4 1.8 - 8.0 K/UL    ABS. LYMPHOCYTES 2.1 0.8 - 3.5 K/UL    ABS. MONOCYTES 0.7 0.0 - 1.0 K/UL    ABS. EOSINOPHILS 0.5 (H) 0.0 - 0.4 K/UL    ABS. BASOPHILS 0.1 0.0 - 0.1 K/UL    ABS. IMM. GRANS. 0.0 0.00 - 0.04 K/UL    DF AUTOMATED     METABOLIC PANEL, COMPREHENSIVE    Collection Time: 02/18/23  7:27 PM   Result Value Ref Range    Sodium 139 136 - 145 mmol/L    Potassium 3.7 3.5 - 5.1 mmol/L    Chloride 102 97 - 108 mmol/L    CO2 29 21 - 32 mmol/L    Anion gap 8 5 - 15 mmol/L    Glucose 103 (H) 65 - 100 mg/dL    BUN 12 6 - 20 MG/DL    Creatinine 1.01 0.55 - 1.02 MG/DL    BUN/Creatinine ratio 12 12 - 20      eGFR 55 (L) >60 ml/min/1.73m2    Calcium 9.1 8.5 - 10.1 MG/DL    Bilirubin, total 0.5 0.2 - 1.0 MG/DL    ALT (SGPT) 29 12 - 78 U/L    AST (SGOT) 27 15 - 37 U/L    Alk.  phosphatase 113 45 - 117 U/L    Protein, total 8.5 (H) 6.4 - 8.2 g/dL    Albumin 3.6 3.5 - 5.0 g/dL    Globulin 4.9 (H) 2.0 - 4.0 g/dL    A-G Ratio 0.7 (L) 1.1 - 2.2     TROPONIN-HIGH SENSITIVITY    Collection Time: 02/18/23  7:27 PM   Result Value Ref Range    Troponin-High Sensitivity 13 0 - 51 ng/L   NT-PRO BNP    Collection Time: 02/18/23  7:27 PM   Result Value Ref Range    NT pro- 0 - 450 PG/ML   COVID-19 WITH INFLUENZA A/B    Collection Time: 02/18/23  7:27 PM   Result Value Ref Range    SARS-CoV-2 by PCR Not detected NOTD      Influenza A by PCR Not detected      Influenza B by PCR Not detected     MAGNESIUM    Collection Time: 02/18/23  7:27 PM   Result Value Ref Range    Magnesium 2.2 1.6 - 2.4 mg/dL   EKG, 12 LEAD, INITIAL    Collection Time: 02/18/23  7:31 PM   Result Value Ref Range    Ventricular Rate 77 BPM    Atrial Rate 77 BPM    P-R Interval 198 ms    QRS Duration 140 ms    Q-T Interval 404 ms    QTC Calculation (Bezet) 457 ms    Calculated P Axis 82 degrees    Calculated R Axis -14 degrees    Calculated T Axis 57 degrees    Diagnosis       Normal sinus rhythm  Right bundle branch block  Voltage criteria for left ventricular hypertrophy  Cannot rule out Septal infarct (cited on or before 11-AUG-2022)  Abnormal ECG  When compared with ECG of 03-NOV-2022 15:37,  No significant change was found     BLOOD GAS, ARTERIAL    Collection Time: 02/18/23 10:18 PM   Result Value Ref Range    pH 7.38 7.35 - 7.45      PCO2 48 (H) 35 - 45 mmHg    PO2 76 (L) 80 - 100 mmHg    O2 SAT 95 92 - 97 %    BICARBONATE 28 (H) 22 - 26 mmol/L    BASE EXCESS 2.0 mmol/L    O2 METHOD NASAL CANNULA      O2 FLOW RATE 3.50 L/min    SPONTANEOUS RATE 24      Sample source ARTERIAL      SITE LEFT RADIAL      JEFF'S TEST YES         RADIOLOGY RESULTS:  I have personally reviewed and interpreted all available imaging studies and agree with radiology interpretation. CTA CHEST W OR W WO CONT   Final Result   No pulmonary embolus or other acute cardiopulmonary process. XR CHEST PORT   Final Result   1. Interstitial prominence suggesting pulmonary vascular congestion and/or   atypical infectious process.                   CT Results  (Last 48 hours)                 02/18/23 3577  CTA CHEST W OR W WO CONT Final result    Impression:  No pulmonary embolus or other acute cardiopulmonary process. Narrative:  EXAM:  CTA CHEST W OR W WO CONT       INDICATION: acute hypoxia, ? PNA, ? PE       COMPARISON: CTA 3/7/2018. TECHNIQUE: Helical thin section chest CT following uneventful intravenous   administration of nonionic contrast 100 mL of isovue 370 according to   departmental PE protocol. Coronal and sagittal reformats were performed. 3D post   processing was performed. CT dose reduction was achieved through the use of a   standardized protocol tailored for this examination and automatic exposure   control for dose modulation. FINDINGS: This is a good quality study for the evaluation of pulmonary embolism   to the first subsegmental arterial level. There is no pulmonary embolism to this   level. THYROID: No nodule. MEDIASTINUM: No mass or lymphadenopathy. NERY: No mass or lymphadenopathy. THORACIC AORTA: No aneurysm. HEART: Normal in size. ESOPHAGUS: No wall thickening or dilatation. TRACHEA/BRONCHI: Patent. PLEURA: No effusion or pneumothorax. LUNGS: No nodule, mass, or airspace disease. UPPER ABDOMEN: Partially imaged. No acute pathology. BONES: No aggressive bone lesion or fracture. CXR Results  (Last 48 hours)                 02/18/23 1924  XR CHEST PORT Final result    Impression:  1. Interstitial prominence suggesting pulmonary vascular congestion and/or   atypical infectious process. Narrative:  INDICATION: . sob   Additional history: Dyspnea   COMPARISON: Previous chest xray, 11/3/2022. LIMITATIONS: Portable technique. Angulation. Pooja Pool FINDINGS: Single frontal view of the chest.    .   Lines/tubes/surgical: None. Heart/mediastinum: Calcifications in the aortic arch. .    Lungs/pleura: Diffuse interstitial prominence. No visualized pleural effusion or   pneumothorax. Additional Comments: None.     .             CTA CHEST W OR W WO CONT   Final Result   No pulmonary embolus or other acute cardiopulmonary process. XR CHEST PORT   Final Result   1. Interstitial prominence suggesting pulmonary vascular congestion and/or   atypical infectious process. MEDICAL DECISION MAKING   I am the first and primary ED physician for this patient's ED visit today. I reviewed our EMR for any past records that may contribute to the patient's current condition, including their past medical, surgical, social and family history. This also includes their most recent ED visits, previous hospitalizations and prior diagnostic data. I have reviewed and summarized the most pertinent findings in my HPI and MDM.     Vital Signs Reviewed:  Patient Vitals for the past 24 hrs:   Temp Pulse Resp BP SpO2   02/19/23 0337 -- -- -- -- 97 %   02/18/23 2300 -- 84 16 (!) 141/85 96 %   02/18/23 2201 -- -- -- -- 92 %   02/18/23 2200 -- 76 25 (!) 143/94 91 %   02/18/23 2152 -- 78 23 -- (!) 87 %   02/18/23 2151 -- 78 21 -- (!) 87 %   02/18/23 2150 -- 79 24 -- (!) 88 %   02/18/23 2149 -- 79 22 -- (!) 89 %   02/18/23 2148 -- 80 22 -- 91 %   02/18/23 2147 -- 78 21 -- 90 %   02/18/23 2146 -- 78 22 -- (!) 89 %   02/18/23 2145 -- 78 22 -- (!) 88 %   02/18/23 2144 -- 78 18 -- 90 %   02/18/23 2143 -- 78 22 -- (!) 87 %   02/18/23 2142 -- 81 23 -- (!) 88 %   02/18/23 2141 -- 89 26 -- --   02/18/23 2140 -- 87 22 -- --   02/18/23 2139 -- 94 (!) 37 -- --   02/18/23 2138 -- 77 18 -- (!) 89 %   02/18/23 2137 -- 87 20 -- (!) 89 %   02/18/23 2136 -- 84 23 -- (!) 88 %   02/18/23 2135 -- 86 22 -- (!) 88 %   02/18/23 2134 -- 80 21 -- (!) 88 %   02/18/23 2133 -- 81 17 -- (!) 89 %   02/18/23 2132 -- 84 21 -- (!) 88 %   02/18/23 2131 -- 79 18 -- (!) 88 %   02/18/23 2130 -- 79 18 (!) 153/86 (!) 89 %   02/18/23 2129 -- 75 16 -- (!) 87 %   02/18/23 2128 -- 80 17 -- (!) 88 %   02/18/23 2127 -- 81 24 -- (!) 89 %   02/18/23 2126 -- 80 15 -- (!) 88 %   02/18/23 2125 -- 78 24 -- (!) 88 %   02/18/23 2124 -- 78 19 -- (!) 89 %   02/18/23 2123 -- 79 24 -- (!) 89 %   02/18/23 2122 -- 79 22 -- 90 %   02/18/23 2121 -- 76 20 -- (!) 89 %   02/18/23 2120 -- 79 24 -- 90 %   02/18/23 2119 -- 80 24 -- 91 %   02/18/23 2118 -- 76 21 -- 90 %   02/18/23 2117 -- 79 19 -- (!) 89 %   02/18/23 2116 -- 78 21 -- (!) 89 %   02/18/23 2115 -- 81 19 -- (!) 89 %   02/18/23 2114 -- 83 19 -- 91 %   02/18/23 2113 -- 79 23 -- 90 %   02/18/23 2112 -- 78 21 -- 90 %   02/18/23 2111 -- 79 21 -- 90 %   02/18/23 2110 -- 77 22 -- 91 %   02/18/23 2109 -- 78 18 -- 92 %   02/18/23 2108 -- 78 21 -- 92 %   02/18/23 2107 -- 79 22 -- 94 %   02/18/23 2106 -- 79 22 -- 94 %   02/18/23 2105 -- 78 19 -- 94 %   02/18/23 2104 -- 77 20 -- 93 %   02/18/23 2103 -- 80 29 -- --   02/18/23 2102 -- 87 18 -- (!) 87 %   02/18/23 2101 -- 92 (!) 40 -- (!) 76 %   02/18/23 2100 -- 94 20 (!) 142/97 92 %   02/18/23 2059 -- 87 20 -- 90 %   02/18/23 2058 -- 88 23 -- (!) 88 %   02/18/23 2057 -- 92 27 -- --   02/18/23 2056 -- 90 27 -- --   02/18/23 2055 -- 89 24 -- 94 %   02/18/23 2054 -- 87 24 -- 94 %   02/18/23 2053 -- 80 19 -- 94 %   02/18/23 2052 -- 79 22 -- 94 %   02/18/23 2051 -- 82 19 -- 93 %   02/18/23 2050 -- 79 23 -- 93 %   02/18/23 2049 -- 78 21 -- 94 %   02/18/23 2048 -- 78 16 -- 94 %   02/18/23 2047 -- 76 21 -- 93 %   02/18/23 2046 -- 77 20 -- 93 %   02/18/23 2045 -- 78 23 (!) 142/93 93 %   02/18/23 2044 -- 77 24 -- 93 %   02/18/23 2043 -- 83 23 -- 94 %   02/18/23 2042 -- 89 18 -- 94 %   02/18/23 2041 -- 84 23 -- 94 %   02/18/23 2040 -- 80 22 -- 94 %   02/18/23 2039 -- 79 25 -- 95 %   02/18/23 2038 -- 80 16 -- 95 %   02/18/23 2037 -- 76 22 -- 94 %   02/18/23 2036 -- 77 18 -- 94 %   02/18/23 2035 -- 77 18 -- 94 %   02/18/23 2034 -- 75 19 -- 94 %   02/18/23 2033 -- 77 21 -- 94 %   02/18/23 2032 -- 80 21 -- 93 %   02/18/23 2031 -- 85 20 -- 94 %   02/18/23 2030 -- 78 21 (!) 133/94 94 %   02/18/23 2029 -- 75 21 -- 94 %   02/18/23 2028 -- 75 20 -- 94 %   02/18/23 2027 -- 77 20 -- 94 %   02/18/23 2026 -- 78 17 -- 94 %   02/18/23 2025 -- 80 18 -- 95 %   02/18/23 2024 -- 76 20 -- 93 %   02/18/23 2023 -- 76 18 -- 94 %   02/18/23 2022 -- 76 21 -- 94 %   02/18/23 2021 -- 73 21 -- 95 %   02/18/23 2020 -- 76 21 -- 94 %   02/18/23 2019 -- 74 20 -- 94 %   02/18/23 2018 -- 75 20 -- 94 %   02/18/23 2017 -- 74 19 -- 94 %   02/18/23 2016 -- 77 16 -- 94 %   02/18/23 2015 -- 73 20 134/80 94 %   02/18/23 2014 -- 72 19 -- 94 %   02/18/23 2013 -- 71 18 -- 94 %   02/18/23 2012 -- 72 16 -- 94 %   02/18/23 2011 -- 71 19 -- 94 %   02/18/23 2010 -- 73 21 -- 95 %   02/18/23 2009 -- 72 20 -- 95 %   02/18/23 2008 -- 72 22 -- 95 %   02/18/23 2007 -- 77 23 -- 94 %   02/18/23 2006 -- 73 19 -- 95 %   02/18/23 2005 -- 73 20 (!) 124/110 94 %   02/18/23 2004 -- 72 21 -- 94 %   02/18/23 2003 -- 74 16 -- 93 %   02/18/23 2002 -- 72 19 -- 93 %   02/18/23 2001 -- 70 22 -- 93 %   02/18/23 2000 -- 70 22 132/77 93 %   02/18/23 1943 -- -- -- -- 91 %   02/18/23 1934 -- -- -- -- 90 %   02/18/23 1933 -- -- -- -- (!) 89 %   02/18/23 1932 -- -- -- -- (!) 89 %   02/18/23 1931 -- -- -- -- (!) 88 %   02/18/23 1930 -- -- -- -- (!) 89 %   02/18/23 1929 -- -- -- -- (!) 88 %   02/18/23 1920 -- -- -- -- (!) 89 %   02/18/23 1919 -- -- -- -- 90 %   02/18/23 1916 -- -- -- -- 94 %   02/18/23 1915 -- -- -- -- 91 %   02/18/23 1914 -- -- -- -- 92 %   02/18/23 1913 -- -- -- -- 92 %   02/18/23 1912 -- -- -- -- 90 %   02/18/23 1911 -- -- -- -- 90 %   02/18/23 1910 -- -- -- -- (!) 88 %   02/18/23 1909 -- -- -- -- (!) 88 %   02/18/23 1908 -- -- -- -- (!) 88 %   02/18/23 1907 -- -- -- -- (!) 89 %   02/18/23 1901 98.2 °F (36.8 °C) 77 20 (!) 123/91 91 %     Pulse Oximetry Analysis: 88% on RA with good pleth, patient placed on supplemental oxygen with improvement of saturations.     Cardiac Monitor:   Rate: 77 bpm  The cardiac monitor revealed the following rhythm as interpreted by me: Normal Sinus Rhythm  Cardiac monitoring was ordered to monitor patient for signs of cardiac dysrhythmia, which they are at risk for based on their history and/or risk for cardiovascular disease and/or metabolic abnormalities. EKG interpretation:   Billable EKG reviewed by ED Physician in the absence of a cardiologist: Yes  Rhythm: normal sinus rhythm; and regular . Rate (approx.): 77; Axis: normal; P wave: normal; QRS interval: normal ; ST/T wave: normal; Other findings: RBBB. This EKG was interpreted by Estefania Sanchez MD     Records Reviewed: Nursing Notes, Old Medical Records, Previous electrocardiograms, Previous Radiology Studies and Previous Laboratory Studies, EMS reports    DIFFERENTIAL DIAGNOSIS AND MDM:  Patient presents with acute dyspnea. Patient arrives in respiratory distress with significant wheezing; clinically fluid overloaded, plan for blood gas, neb treatment, IV steroids and reassessment. Additionally, will provide IV diuresis. Likely will need admission for management of acute respiratory failure with hypoxia. DDx: asthma, copd, pna, pulmonary edema, acute bronchitis, ACS, ptx, pna. Will obtain EKG, labs, CXR, provide O2 as needed for hypoxia, treat symptomatically and reassess. Will continue to monitor closely in ED. Review of Prior Records and External Documents:   reviewed: YES - I have reviewed the patient's controlled substance prescription history thru the Prescription Monitoring Program so that the prescription(s) for a controlled substance can be given. Prior hospital discharge summaries and clinic notes reviewed: Reviewed office note from February 14, 2023 when patient was seen by PCP for hypertension, hyperlipidemia, CHF and intrahepatic cholangiocarcinoma. Reviewed discharge summary from November 5, 2022 when patient was admitted to [facility] for community-acquired pneumonia. Social Determinants of Health:  Patients evaluation and management were significantly impacted by social determinants of health.      Social Determinants affecting Dx or Tx: None    Social History     Socioeconomic History    Marital status:    Tobacco Use    Smoking status: Never    Smokeless tobacco: Never   Vaping Use    Vaping Use: Never used   Substance and Sexual Activity    Alcohol use: No     Alcohol/week: 0.0 standard drinks    Drug use: No    Sexual activity: Not Currently     ED Course: Progress Notes, Reevaluation, and Consults:  Initial assessment performed. I discussed presenting problems and concerns, and my formulated plan for today's visit with the patient and any available family members. I have encouraged them to ask questions as they arise throughout the visit.      ED Physician Orders:   Orders Placed This Encounter    MECHANICAL PROPHYLAXIS IS CONTRAINDICATED Other, please document Already on Anticoagulation    COVID-19 WITH INFLUENZA A/B    RESPIRATORY VIRUS PANEL W/COVID-19, PCR    XR CHEST PORT    CTA CHEST W OR W WO CONT    CBC WITH AUTOMATED DIFF    METABOLIC PANEL, COMPREHENSIVE    TROPONIN-HIGH SENSITIVITY    NT-PRO BNP    MAGNESIUM    PROCALCITONIN    BLOOD GAS, ARTERIAL    METABOLIC PANEL, COMPREHENSIVE    CBC WITH AUTOMATED DIFF    BLOOD GAS, ARTERIAL    ADULT DIET Regular    CARDIAC/RESPIRATORY MONITORING    VITAL SIGNS    CARDIAC MONITORING    ACTIVITY AS TOLERATED W/ASSIST    INTAKE AND OUTPUT    WEIGH PATIENT    ELEVATE HEAD OF BED    INCENTIVE SPIROMETRY    DO NOT RESUSCITATE    OT--EVAL, DEVISE PLAN OF CARE AND TREAT    PT--EVAL, DEVISE PLAN OF CARE AND TREAT    OXYGEN CANNULA Liters per minute: 2; Indications for O2 therapy: HYPOXIA CONTINUOUS STAT    RT--OXIMETRY, CONTINUOUS    EKG 12 LEAD INITIAL    INSERT PERIPHERAL IV ONE TIME STAT    albuterol-ipratropium (DUO-NEB) 2.5 MG-0.5 MG/3 ML    methylPREDNISolone (PF) (Solu-MEDROL) injection 125 mg    guaiFENesin ER (MUCINEX) tablet 600 mg    albuterol (PROVENTIL VENTOLIN) nebulizer solution 5 mg    sodium chloride (NS) flush 5-40 mL    sodium chloride (NS) flush 5-40 mL    OR Linked Order Group acetaminophen (TYLENOL) tablet 650 mg     acetaminophen (TYLENOL) suppository 650 mg    polyethylene glycol (MIRALAX) packet 17 g    OR Linked Order Group     ondansetron (ZOFRAN ODT) tablet 4 mg     ondansetron (ZOFRAN) injection 4 mg    enoxaparin (LOVENOX) injection 40 mg    iopamidoL (ISOVUE-370) 370 mg iodine /mL (76 %) injection 100 mL    furosemide (LASIX) injection 80 mg    clonazePAM (KlonoPIN) tablet 1 mg    amLODIPine (NORVASC) tablet 5 mg    aspirin delayed-release tablet 81 mg    isosorbide mononitrate ER (IMDUR) tablet 30 mg    metoprolol tartrate (LOPRESSOR) tablet 25 mg    pantoprazole (PROTONIX) tablet 40 mg    rosuvastatin (CRESTOR) tablet 20 mg    melatonin tablet 4.5 mg    INITIAL PHYSICIAN ORDER: INPATIENT Telemetry; Yes; 3.  Patient receiving treatment that can only be provided in an inpatient setting (further clarification in H&P documentation)     ED Medications Given:   Medications   albuterol (PROVENTIL VENTOLIN) nebulizer solution 5 mg (5 mg Nebulization Given 2/19/23 0339)   sodium chloride (NS) flush 5-40 mL (has no administration in time range)   sodium chloride (NS) flush 5-40 mL (has no administration in time range)   acetaminophen (TYLENOL) tablet 650 mg (has no administration in time range)     Or   acetaminophen (TYLENOL) suppository 650 mg (has no administration in time range)   polyethylene glycol (MIRALAX) packet 17 g (has no administration in time range)   ondansetron (ZOFRAN ODT) tablet 4 mg (has no administration in time range)     Or   ondansetron (ZOFRAN) injection 4 mg (has no administration in time range)   enoxaparin (LOVENOX) injection 40 mg (has no administration in time range)   furosemide (LASIX) injection 80 mg (80 mg IntraVENous Given 2/19/23 0257)   clonazePAM (KlonoPIN) tablet 1 mg (1 mg Oral Given 2/19/23 0257)   amLODIPine (NORVASC) tablet 5 mg (has no administration in time range)   aspirin delayed-release tablet 81 mg (has no administration in time range) isosorbide mononitrate ER (IMDUR) tablet 30 mg (has no administration in time range)   metoprolol tartrate (LOPRESSOR) tablet 25 mg (has no administration in time range)   pantoprazole (PROTONIX) tablet 40 mg (has no administration in time range)   rosuvastatin (CRESTOR) tablet 20 mg (has no administration in time range)   melatonin tablet 4.5 mg (has no administration in time range)   albuterol-ipratropium (DUO-NEB) 2.5 MG-0.5 MG/3 ML (6 mL Nebulization Given 2/18/23 1946)   methylPREDNISolone (PF) (Solu-MEDROL) injection 125 mg (125 mg IntraVENous Given 2/18/23 2003)   guaiFENesin ER (MUCINEX) tablet 600 mg (600 mg Oral Given 2/18/23 2006)   iopamidoL (ISOVUE-370) 370 mg iodine /mL (76 %) injection 100 mL (100 mL IntraVENous Given 2/18/23 2359)     ED Physician Interpretation of Test Results: All results were independently reviewed and interpreted by myself, notably showing:     RADIOLOGY:  Non-plain film images such as CT, ultrasound and MRI are read by the radiologist. Antolin Chin radiographic images are visualized and preliminarily interpreted by the ED Provider with the below findings:     Imaging interpreted by me: Chest x-ray with evidence of pulmonary vascular congestion    Interpretation per the Radiologist below, if available at the time of this note:     CTA CHEST W OR W WO CONT   Final Result   No pulmonary embolus or other acute cardiopulmonary process. XR CHEST PORT   Final Result   1. Interstitial prominence suggesting pulmonary vascular congestion and/or   atypical infectious process. CT Results  (Last 48 hours)                 02/18/23 4299  CTA CHEST W OR W WO CONT Final result    Impression:  No pulmonary embolus or other acute cardiopulmonary process. Narrative:  EXAM:  CTA CHEST W OR W WO CONT       INDICATION: acute hypoxia, ? PNA, ? PE       COMPARISON: [facility] 3/7/2018.        TECHNIQUE: Helical thin section chest CT following uneventful intravenous   administration of nonionic contrast 100 mL of isovue 370 according to   departmental PE protocol. Coronal and sagittal reformats were performed. 3D post   processing was performed. CT dose reduction was achieved through the use of a   standardized protocol tailored for this examination and automatic exposure   control for dose modulation. FINDINGS: This is a good quality study for the evaluation of pulmonary embolism   to the first subsegmental arterial level. There is no pulmonary embolism to this   level. THYROID: No nodule. MEDIASTINUM: No mass or lymphadenopathy. NERY: No mass or lymphadenopathy. THORACIC AORTA: No aneurysm. HEART: Normal in size. ESOPHAGUS: No wall thickening or dilatation. TRACHEA/BRONCHI: Patent. PLEURA: No effusion or pneumothorax. LUNGS: No nodule, mass, or airspace disease. UPPER ABDOMEN: Partially imaged. No acute pathology. BONES: No aggressive bone lesion or fracture. CXR Results  (Last 48 hours)                 02/18/23 1924  XR CHEST PORT Final result    Impression:  1. Interstitial prominence suggesting pulmonary vascular congestion and/or   atypical infectious process. Narrative:  INDICATION: . sob   Additional history: Dyspnea   COMPARISON: Previous chest xray, 11/3/2022. LIMITATIONS: Portable technique. Angulation. [place] FINDINGS: Single frontal view of the chest.    .   Lines/tubes/surgical: None. Heart/mediastinum: Calcifications in the aortic arch. .    Lungs/pleura: Diffuse interstitial prominence. No visualized pleural effusion or   pneumothorax. Additional Comments: None. .             CTA CHEST W OR W WO CONT   Final Result   No pulmonary embolus or other acute cardiopulmonary process. XR CHEST PORT   Final Result   1. Interstitial prominence suggesting pulmonary vascular congestion and/or   atypical infectious process. My interpretation of EKG: No STEMI.   See my EKG interpretation in ED course above.    My interpretation of laboratory results: Labs reviewed, arterial blood gas showing pH of 7.38, PCO2 48, PO2 76 on 2 L nasal cannula. CBC showing normal white count, hemoglobin stable. CMP showing normal electrolytes and normal renal function. Troponin is negative. . Negative COVID and influenza screens. Procalcitonin pending. Progress Note:  I have just re-evaluated the patient. Pt reports improvement of her symptoms. I have reviewed her vital signs and determined there is currently no worsening in their condition or physical exam. Results have been reviewed with them and their questions have been answered. I will continue to review further results as they come available. Amount and/or Complexity of Data Reviewed  Medical Complexity: HIGH  Presentation: ACUTE and SEVERE  Clinical lab tests: ordered as appropriate & reviewed  Tests in the radiology section of CPT®: ordered as appropriate & reviewed  Tests in the medicine section of CPT®: ordered as appropriate & reviewed  Obtain history from someone other than the patient: yes  Review and summarize past medical records: yes  Independent visualization of images, tracings, or specimens: yes    Risks  OTC drugs. Prescription drug management. Parenteral controlled substances. Drug therapy requiring intensive monitoring for toxicity. Decision regarding hospitalization. Progress Note:  Given concerns for COVID-19 infection in this patient, I spent extra time to ensure proper and full PPE was used for the initial assessment and for subsequent patient encounters for updates and reassessments. This was done to help combat the transmission of the virus to myself and other patients and staff in the emergency department. Progress Note:  9:00 PM  I re-examined the patient and evaluated the effectiveness of breathing treatment they received.   I feel that there has been some improvement, but also feel that the patient would benefit clinically from further breathing treatments. I will evaluate the patient again after the next round of treatments. ED Consult Note:   Paty Childress MD spoke with Dr. Milagro Angelo  Specialty: Hospitalist  Discussed pt's hx, presentation, current and ongoing workup. Reviewed available diagnostic data and care plans. Agree with management and plan thus far. Consultant will admit patient. CRITICAL CARE NOTE :  IMPENDING DETERIORATION -Airway, Respiratory, Cardiovascular, Metabolic, and Renal  ASSOCIATED RISK FACTORS - Hypoxia, Dysrhythmia, Metabolic changes, Dehydration, and Vascular Compromise  MANAGEMENT- Bedside Assessment and Supervision of Care  INTERPRETATION -  Xrays, CT Scan, Blood Gases, ECG, Blood Pressure, and Cardiac Output Measures   INTERVENTIONS - hemodynamic mngmt, vascular control, and Metobolic interventions  CASE REVIEW - Hospitalist/Intensivist, Nursing, and Family  TREATMENT RESPONSE -Improved  PERFORMED BY - Self    NOTES   :  I personally spent 55 minutes of critical care time with this patient. This is time spent at this critically ill patient's bedside actively involved in patient care as well as the coordination of care and discussions with the patient's family. This includes time involved in ordering and reviewing of laboratory studies, pulse oximetry, re-evaluation of patient's condition, examination of patient, evaluation of patient's response to treatment, ordering and performing treatments and interventions, review of old charts, consultations with specialist, discussions with family regarding pertinent collateral history and plan of care, bedside attention and documentation. During this entire length of time I was immediately available to the patient. This does not include time spent on separately reported billable procedures. Critical Care:  The reason for providing this level of medical care for this critically-ill patient was due to a critical illness that impaired one or more vital organ systems, such that there was a high probability of imminent or life-threatening deterioration in the patient's condition. This care involved the highest level of preparedness to intervene urgently. This care involved high complexity decision making to assess, manipulate, and support vital system functions, to treat this degree of vital organ system failure, and to prevent further life threatening deterioration of the patients condition requiring frequent assessments and interventions. Evelyn Torres MD    ADMIT  Given the patient's current clinical presentation, I have a high level of concern for decompensation if discharged from the emergency department. Patient is being admitted to the hospital.  Complex decision making was performed, which includes reviewing the patient's available past medical records, laboratory results, and radiographic imaging. The results of their tests and reasons for their admission have been discussed with them and/or available family. They convey agreement and understanding for the need to be admitted and for their admission diagnosis. Consultation has been made with the inpatient physician specialist for hospitalization. Shared Decision Making: I considered giving antibiotics however no evidence of sepsis and no evidence of acute bacterial infection. Acute respiratory distress secondary to likely viral illness versus acute CHF exacerbation. Discussed with hospitalist, plan for checking procalcitonin level and will give antibiotic therapy as indicated. ADMIT  Given the patient's current clinical presentation, I have a high level of concern for decompensation if discharged from the emergency department. Patient is being admitted to the hospital.  Complex decision making was performed, which includes reviewing the patient's available past medical records, laboratory results, and radiographic imaging.  The results of their tests and reasons for their admission have been discussed with them and/or available family. They convey agreement and understanding for the need to be admitted and for their admission diagnosis. Consultation has been made with the inpatient physician specialist for hospitalization. FINAL DIAGNOSIS   Clinical Impression:  1. Acute respiratory failure with hypoxia ([address]    2. Accelerated hypertension    3. Respiratory distress    4. Suspected COVID-19 virus infection    5. Acute on chronic congestive heart failure, unspecified heart failure type ([address]    6. 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 I personally performed the services described in this documentation on this date, 2/18/2023 for patient, Warren Clemente. I have reviewed the chart and verified that the record is accurate and complete.       Mj Landon MD (Electronic Signature)

## 2023-02-19 NOTE — ED NOTES
Pt presents to ED complaining of shortness of breath that started last night, chest pain. Pt satting at 89% on RA on arrival, pt reports she used to ne on O2 @ home. Placed pt on 2L via nasal cannula. RT at bedside to start breathing tx. Pt is alert and oriented x 4, RR even and unlabored, skin is warm and dry. Assessment completed and pt updated on plan of care. Call bell in reach. Emergency Department Nursing Plan of Care       The Nursing Plan of Care is developed from the Nursing assessment and Emergency Department Attending provider initial evaluation. The plan of care may be reviewed in the ED Provider note.     The Plan of Care was developed with the following considerations:   Patient / Family readiness to learn indicated by:verbalized understanding  Persons(s) to be included in education: patient  Barriers to Learning/Limitations:No    Signed

## 2023-02-19 NOTE — ED NOTES
Pt returned from CT, moved over to hospital bed, changed into gown and brief. Placed pt back on monitor, O2 via nasal cannula 2L.

## 2023-02-20 ENCOUNTER — APPOINTMENT (OUTPATIENT)
Dept: NON INVASIVE DIAGNOSTICS | Age: 88
DRG: 189 | End: 2023-02-20
Attending: INTERNAL MEDICINE
Payer: MEDICARE

## 2023-02-20 ENCOUNTER — TELEPHONE (OUTPATIENT)
Dept: FAMILY MEDICINE CLINIC | Age: 88
End: 2023-02-20

## 2023-02-20 ENCOUNTER — HOME HEALTH ADMISSION (OUTPATIENT)
Dept: HOME HEALTH SERVICES | Facility: HOME HEALTH | Age: 88
End: 2023-02-20
Payer: MEDICARE

## 2023-02-20 LAB
ANION GAP SERPL CALC-SCNC: 7 MMOL/L (ref 5–15)
ATRIAL RATE: 77 BPM
BUN SERPL-MCNC: 21 MG/DL (ref 6–20)
BUN/CREAT SERPL: 18 (ref 12–20)
CALCIUM SERPL-MCNC: 8.8 MG/DL (ref 8.5–10.1)
CALCULATED P AXIS, ECG09: 82 DEGREES
CALCULATED R AXIS, ECG10: -14 DEGREES
CALCULATED T AXIS, ECG11: 57 DEGREES
CHLORIDE SERPL-SCNC: 103 MMOL/L (ref 97–108)
CO2 SERPL-SCNC: 33 MMOL/L (ref 21–32)
CREAT SERPL-MCNC: 1.16 MG/DL (ref 0.55–1.02)
DIAGNOSIS, 93000: NORMAL
ECHO AO ROOT DIAM: 2.3 CM
ECHO AO ROOT INDEX: 1.44 CM/M2
ECHO AR MAX VEL PISA: 3.7 M/S
ECHO AV AREA PEAK VELOCITY: 1.4 CM2
ECHO AV AREA PLAN/BSA: 0.88 CM2/M2
ECHO AV AREA PLAN: 1.4 CM2
ECHO AV AREA/BSA PEAK VELOCITY: 0.9 CM2/M2
ECHO AV MEAN GRADIENT: 8 MMHG
ECHO AV MEAN VELOCITY: 1.2 M/S
ECHO AV PEAK GRADIENT: 19 MMHG
ECHO AV PEAK VELOCITY: 2.1 M/S
ECHO AV REGURGITANT PHT: 1263.3 MILLISECOND
ECHO AV VELOCITY RATIO: 0.67
ECHO AV VTI: 41.8 CM
ECHO EST RA PRESSURE: 5 MMHG
ECHO LA DIAMETER INDEX: 2 CM/M2
ECHO LA DIAMETER: 3.2 CM
ECHO LA TO AORTIC ROOT RATIO: 1.39
ECHO LA VOL 4C: 54 ML (ref 22–52)
ECHO LA VOLUME INDEX A4C: 34 ML/M2 (ref 16–34)
ECHO LV EDV A4C: 74 ML
ECHO LV EDV INDEX A4C: 46 ML/M2
ECHO LV EJECTION FRACTION A4C: 76 %
ECHO LV ESV A4C: 18 ML
ECHO LV ESV INDEX A4C: 11 ML/M2
ECHO LV FRACTIONAL SHORTENING: 39 % (ref 28–44)
ECHO LV INTERNAL DIMENSION DIASTOLE INDEX: 2.06 CM/M2
ECHO LV INTERNAL DIMENSION DIASTOLIC: 3.3 CM (ref 3.9–5.3)
ECHO LV INTERNAL DIMENSION SYSTOLIC INDEX: 1.25 CM/M2
ECHO LV INTERNAL DIMENSION SYSTOLIC: 2 CM
ECHO LV IVSD: 0.9 CM (ref 0.6–0.9)
ECHO LV MASS 2D: 109.1 G (ref 67–162)
ECHO LV MASS INDEX 2D: 68.2 G/M2 (ref 43–95)
ECHO LV POSTERIOR WALL DIASTOLIC: 1.3 CM (ref 0.6–0.9)
ECHO LV RELATIVE WALL THICKNESS RATIO: 0.79
ECHO LVOT AREA: 2.3 CM2
ECHO LVOT DIAM: 1.7 CM
ECHO LVOT PEAK GRADIENT: 8 MMHG
ECHO LVOT PEAK VELOCITY: 1.4 M/S
ECHO MV A VELOCITY: 0.41 M/S
ECHO MV AREA INDEX PLAN: 3.1 CM2/M2
ECHO MV AREA PHT: 2.6 CM2
ECHO MV AREA PLAN: 5 CM2
ECHO MV E DECELERATION TIME (DT): 194.3 MS
ECHO MV E VELOCITY: 0.29 M/S
ECHO MV E/A RATIO: 0.71
ECHO MV MAX VELOCITY: 0.7 M/S
ECHO MV MEAN GRADIENT: 1 MMHG
ECHO MV MEAN VELOCITY: 0.4 M/S
ECHO MV PEAK GRADIENT: 2 MMHG
ECHO MV PRESSURE HALF TIME (PHT): 85.7 MS
ECHO MV REGURGITANT PEAK GRADIENT: 77 MMHG
ECHO MV REGURGITANT PEAK VELOCITY: 4.4 M/S
ECHO MV VTI: 24 CM
ECHO PV MAX VELOCITY: 1 M/S
ECHO PV PEAK GRADIENT: 4 MMHG
ECHO RIGHT VENTRICULAR SYSTOLIC PRESSURE (RVSP): 39 MMHG
ECHO TV REGURGITANT MAX VELOCITY: 2.92 M/S
ECHO TV REGURGITANT PEAK GRADIENT: 34 MMHG
GLUCOSE BLD STRIP.AUTO-MCNC: 107 MG/DL (ref 65–117)
GLUCOSE BLD STRIP.AUTO-MCNC: 113 MG/DL (ref 65–117)
GLUCOSE BLD STRIP.AUTO-MCNC: 91 MG/DL (ref 65–117)
GLUCOSE BLD STRIP.AUTO-MCNC: 99 MG/DL (ref 65–117)
GLUCOSE SERPL-MCNC: 150 MG/DL (ref 65–100)
P-R INTERVAL, ECG05: 198 MS
POTASSIUM SERPL-SCNC: 3.3 MMOL/L (ref 3.5–5.1)
Q-T INTERVAL, ECG07: 404 MS
QRS DURATION, ECG06: 140 MS
QTC CALCULATION (BEZET), ECG08: 457 MS
SERVICE CMNT-IMP: NORMAL
SODIUM SERPL-SCNC: 143 MMOL/L (ref 136–145)
VENTRICULAR RATE, ECG03: 77 BPM

## 2023-02-20 PROCEDURE — 77010033678 HC OXYGEN DAILY

## 2023-02-20 PROCEDURE — 97535 SELF CARE MNGMENT TRAINING: CPT

## 2023-02-20 PROCEDURE — 74011250636 HC RX REV CODE- 250/636: Performed by: INTERNAL MEDICINE

## 2023-02-20 PROCEDURE — 93306 TTE W/DOPPLER COMPLETE: CPT | Performed by: INTERNAL MEDICINE

## 2023-02-20 PROCEDURE — 80048 BASIC METABOLIC PNL TOTAL CA: CPT

## 2023-02-20 PROCEDURE — 74011000250 HC RX REV CODE- 250: Performed by: INTERNAL MEDICINE

## 2023-02-20 PROCEDURE — 94640 AIRWAY INHALATION TREATMENT: CPT

## 2023-02-20 PROCEDURE — 93306 TTE W/DOPPLER COMPLETE: CPT

## 2023-02-20 PROCEDURE — 97161 PT EVAL LOW COMPLEX 20 MIN: CPT | Performed by: PHYSICAL THERAPIST

## 2023-02-20 PROCEDURE — 74011250637 HC RX REV CODE- 250/637: Performed by: INTERNAL MEDICINE

## 2023-02-20 PROCEDURE — 36415 COLL VENOUS BLD VENIPUNCTURE: CPT

## 2023-02-20 PROCEDURE — 74011000250 HC RX REV CODE- 250: Performed by: STUDENT IN AN ORGANIZED HEALTH CARE EDUCATION/TRAINING PROGRAM

## 2023-02-20 PROCEDURE — 82962 GLUCOSE BLOOD TEST: CPT

## 2023-02-20 PROCEDURE — 65270000032 HC RM SEMIPRIVATE

## 2023-02-20 PROCEDURE — 74011250637 HC RX REV CODE- 250/637: Performed by: STUDENT IN AN ORGANIZED HEALTH CARE EDUCATION/TRAINING PROGRAM

## 2023-02-20 PROCEDURE — 97165 OT EVAL LOW COMPLEX 30 MIN: CPT

## 2023-02-20 RX ORDER — FUROSEMIDE 10 MG/ML
40 INJECTION INTRAMUSCULAR; INTRAVENOUS 2 TIMES DAILY
Status: DISCONTINUED | OUTPATIENT
Start: 2023-02-21 | End: 2023-02-21 | Stop reason: HOSPADM

## 2023-02-20 RX ORDER — CALCIUM CARBONATE 200(500)MG
200 TABLET,CHEWABLE ORAL
Status: DISCONTINUED | OUTPATIENT
Start: 2023-02-20 | End: 2023-02-21 | Stop reason: HOSPADM

## 2023-02-20 RX ORDER — DEXTROSE MONOHYDRATE 100 MG/ML
0-250 INJECTION, SOLUTION INTRAVENOUS AS NEEDED
Status: DISCONTINUED | OUTPATIENT
Start: 2023-02-20 | End: 2023-02-21 | Stop reason: HOSPADM

## 2023-02-20 RX ORDER — IBUPROFEN 200 MG
4 TABLET ORAL AS NEEDED
Status: DISCONTINUED | OUTPATIENT
Start: 2023-02-20 | End: 2023-02-21 | Stop reason: HOSPADM

## 2023-02-20 RX ORDER — INSULIN LISPRO 100 [IU]/ML
INJECTION, SOLUTION INTRAVENOUS; SUBCUTANEOUS
Status: DISCONTINUED | OUTPATIENT
Start: 2023-02-20 | End: 2023-02-21 | Stop reason: HOSPADM

## 2023-02-20 RX ORDER — CALCIUM CARBONATE 200(500)MG
200 TABLET,CHEWABLE ORAL
Status: DISCONTINUED | OUTPATIENT
Start: 2023-02-20 | End: 2023-02-20

## 2023-02-20 RX ORDER — ALBUTEROL SULFATE 0.83 MG/ML
5 SOLUTION RESPIRATORY (INHALATION)
Status: DISCONTINUED | OUTPATIENT
Start: 2023-02-20 | End: 2023-02-21 | Stop reason: HOSPADM

## 2023-02-20 RX ADMIN — ASPIRIN 81 MG: 81 TABLET, COATED ORAL at 08:50

## 2023-02-20 RX ADMIN — CALCIUM CARBONATE (ANTACID) CHEW TAB 500 MG 200 MG: 500 CHEW TAB at 16:30

## 2023-02-20 RX ADMIN — PANTOPRAZOLE SODIUM 40 MG: 40 TABLET, DELAYED RELEASE ORAL at 22:07

## 2023-02-20 RX ADMIN — POTASSIUM CHLORIDE 10 MEQ: 750 TABLET, FILM COATED, EXTENDED RELEASE ORAL at 17:38

## 2023-02-20 RX ADMIN — ROSUVASTATIN CALCIUM 20 MG: 10 TABLET, FILM COATED ORAL at 22:07

## 2023-02-20 RX ADMIN — SODIUM CHLORIDE, PRESERVATIVE FREE 10 ML: 5 INJECTION INTRAVENOUS at 05:05

## 2023-02-20 RX ADMIN — POTASSIUM BICARBONATE 20 MEQ: 782 TABLET, EFFERVESCENT ORAL at 20:37

## 2023-02-20 RX ADMIN — AMLODIPINE BESYLATE 5 MG: 5 TABLET ORAL at 08:50

## 2023-02-20 RX ADMIN — POTASSIUM CHLORIDE 10 MEQ: 750 TABLET, FILM COATED, EXTENDED RELEASE ORAL at 08:50

## 2023-02-20 RX ADMIN — ISOSORBIDE MONONITRATE 30 MG: 30 TABLET, EXTENDED RELEASE ORAL at 08:50

## 2023-02-20 RX ADMIN — METOPROLOL TARTRATE 25 MG: 25 TABLET, FILM COATED ORAL at 17:38

## 2023-02-20 RX ADMIN — CLONAZEPAM 1 MG: 0.5 TABLET ORAL at 22:07

## 2023-02-20 RX ADMIN — SODIUM CHLORIDE, PRESERVATIVE FREE 10 ML: 5 INJECTION INTRAVENOUS at 13:48

## 2023-02-20 RX ADMIN — ENOXAPARIN SODIUM 40 MG: 100 INJECTION SUBCUTANEOUS at 08:50

## 2023-02-20 RX ADMIN — ALBUTEROL SULFATE 5 MG: 2.5 SOLUTION RESPIRATORY (INHALATION) at 07:34

## 2023-02-20 RX ADMIN — AMLODIPINE BESYLATE 5 MG: 5 TABLET ORAL at 17:38

## 2023-02-20 RX ADMIN — ALBUTEROL SULFATE 5 MG: 2.5 SOLUTION RESPIRATORY (INHALATION) at 02:19

## 2023-02-20 RX ADMIN — METOPROLOL TARTRATE 25 MG: 25 TABLET, FILM COATED ORAL at 08:50

## 2023-02-20 RX ADMIN — SODIUM CHLORIDE, PRESERVATIVE FREE 10 ML: 5 INJECTION INTRAVENOUS at 22:07

## 2023-02-20 RX ADMIN — CALCIUM CARBONATE (ANTACID) CHEW TAB 500 MG 200 MG: 500 CHEW TAB at 13:48

## 2023-02-20 RX ADMIN — PANTOPRAZOLE SODIUM 40 MG: 40 TABLET, DELAYED RELEASE ORAL at 08:50

## 2023-02-20 RX ADMIN — ALBUTEROL SULFATE 5 MG: 2.5 SOLUTION RESPIRATORY (INHALATION) at 19:58

## 2023-02-20 RX ADMIN — FUROSEMIDE 80 MG: 10 INJECTION, SOLUTION INTRAMUSCULAR; INTRAVENOUS at 08:50

## 2023-02-20 NOTE — PROGRESS NOTES
1910)  Bedside and Verbal shift change report given to Kasey [address] (oncoming nurse) by Alina Osborne (offgoing nurse). Report included the following information SBAR, Kardex, Intake/Output, MAR and Recent Results. 2027)  PM assessment done. Patient is alert and oriented x4. VS  taken. Patient is on 2l/min nasal canula oxygen. No additional needs at this time. Call light within patient reach. Bed alarm activated. Patient is in bed resting comfortably. 2053) Good evening DrPaola Patient was admitted yesterday with respiratory failure with hypoxia. Vital signs are BP-139/78, Pulse-69, O2-96% at 2l/min nasal canula oxygen, Temp-97.4f, Resp-18. Patient is having non-productive cough, feels congested at her chest causing moderate pain. Please can I get an order for medication for these symptoms. Thank you.       2141) Fingerstick blood glucose check done. BG level is 129 mg/dl. 2148) Scheduled bedtime medications were given. Insulin lispro not given per sliding scale. Patient refused lovenox. PRN Robitussin 100 mg/5 ml oral solution given for congestion and cough. Patient is in bed watching TV. No sign of distress noted. 2220) Patient was rounded on. Patient is in bed watching TV.    0016)  Please can I get an order to recheck patient's potassium level today. Last potassium level was 3.4 at 18:18 yesterday although, it was repleted yesterday. She is on BID potassium tablet. Also, she was supposed to get her procalcitonin check yesterday, but it wasn't. Please can you renew the order for her one time. Thank you. 0030) Reassessment was done. Vital signs taken. No new changes from the previous assessment. 7427) Patient was rounded on. Patient is in bed watching TV.    6033) Reassessment was done. Vital signs taken. No new changes from the previous assessment. 0710) Bedside and Verbal shift change report given to EDMAR Holguin (oncoming nurse) by Kasey [address] (offgoing nurse).  Report included the following information SBAR, Kardex, Intake/Output, MAR and Recent Results.

## 2023-02-20 NOTE — PROGRESS NOTES
Six minute study:  Resting O2 on RA    Sat: 91%  HR: 68    Standing RA:    Sat: 88%  HR: 70    1 min RA    Sat: 87%  HR: 80    Patient walking with walker, very slow and stopping intermittently. 2 min  Added  1L NC. No SOB  Still wants to continue. Sat: 88% on RA  HR: 81    3 min  2L NC  Sat: 91%   HR: 70     Patient wanted to stop @ 4 min and sit in chair. Did not want to continue.   2 L NC  Sat: 92%  HR: 66    Walked about 18 ft but continued to stop and rest.    5 min post on 2L NC  Sat: 92%  HR: 65

## 2023-02-20 NOTE — PROGRESS NOTES
Problem: Self Care Deficits Care Plan (Adult)  Goal: *Acute Goals and Plan of Care (Insert Text)  Description: FUNCTIONAL STATUS PRIOR TO ADMISSION: Patient was modified independent using a single point cane for functional mobility. Patient required moderate assistance for bathing and Min assist for distal LB dressing. HOME SUPPORT: The patient lived with , daughter, and grand-daughter and required moderate assistance  for bathing and minimal assistance for distal LB dressing on R 2* arthritis of R knee. Occupational Therapy Goals  Initiated 2/20/2023  1. Patient will perform toileting with supervision/set-up within 7 day(s). 2.  Patient will perform toilet transfers with supervision/set-up within 7 day(s). 3.  Patient will perform all aspects of toileting with supervision/set-up within 7 day(s). 4.  Patient will participate in upper extremity therapeutic exercise/activities with modified independence for 8 minutes within 7 day(s). 5.  Patient will utilize energy conservation techniques during functional activities with verbal and visual cues within 7 day(s). Outcome: Not Met   OCCUPATIONAL THERAPY EVALUATION  Patient: Ramses Diehl (82 y.o. female)  Date: 2/20/2023  Primary Diagnosis: Respiratory failure with hypoxia ([facility]) [J96.91]       Precautions:        ASSESSMENT  Based on the objective data described below, the patient presents with decreased activity tolerance, increased RICHARD, and increased need for assistance with ADLs and transfers from baseline. Patient admitted with acute hypoxia initially requiring 3L O2, on evaluation patient required 1L O2 for rest and with activity today able to maintain sats 91-93% throughout. Increased RICHARD and baseline R knee pain with functional ambulation with SPC today.  Patient frequently reaching out for furniture and walls in room today on L while ambulating with SPC in right, discussed DME, patient has rollator but anticipate increased stability with increased support of RW and would recommend trialing on next session. Pending acute progress currently recommend return home with appropriate DME, family assistance, and Rc Baron at medically stable at [place]. Pulse oximetry assessment   87% at rest on room air   83% while ambulating on room air  93% at rest on 1LPM  93% while ambulating on 1LPM       Current Level of Function Impacting Discharge (ADLs/self-care): CGA toileting, Min A LB dressing, CGA all transfers and functional ambulation    Functional Outcome Measure: The patient scored Total: 55/100 on the Barthel Index outcome measure      Other factors to consider for discharge: PLOF, new need for supplemental O2     Patient will benefit from skilled therapy intervention to address the above noted impairments. PLAN :  Recommendations and Planned Interventions: self care training, functional mobility training, therapeutic exercise, balance training, therapeutic activities, endurance activities, patient education, home safety training, and family training/education    Frequency/Duration: Patient will be followed by occupational therapy 2 times a week to address goals. Recommendation for discharge: (in order for the patient to meet his/her long term goals)  Occupational therapy at least 2 days/week in the home AND ensure assist and/or supervision for safety with ADLs and transfers    This discharge recommendation:  Has been made in collaboration with the attending provider and/or case management    IF patient discharges home will need the following DME: walker: standard       SUBJECTIVE:   Patient stated Yahaira Newsome was just having so much wheezing but my breathing is OK today.     OBJECTIVE DATA SUMMARY:   HISTORY:   Past Medical History:   Diagnosis Date    Anxiety     Aortic stenosis 03/03/2010    Aortic stenosis     Arrhythmia     MURMUR    Arthritis     SPINAL STENOSIS    Atherosclerosis of coronary artery     Biliary dyskinesia     Cancer ([facility] [address] 2023    liver CHF (congestive heart failure) ([facility] 03/03/2010    CHF (congestive heart failure) ([facility] 01/2002    Chronic heart failure ([facility] 1/2002; 3/3/2010    chronic diastolic heart failure    Chronic pain     LOWER BACK, RIGHT KNEE    Environmental allergies 03/03/2010    GERD (gastroesophageal reflux disease) 03/03/2010    Hiatal hernia 03/03/2010    High cholesterol 03/03/2010    HTN (hypertension) 03/03/2010    Hypokalemia 03/03/2010    Ischemic heart disease, chronic     Multiple gallstones     Obese     Psychiatric disorder     anxiety    S/P hysterectomy 03/03/2010    Spinal stenosis 03/03/2010     Past Surgical History:   Procedure Laterality Date    CARDIAC CATHETERIZATION  01/2002    normal coronaries    DECOMPRESS DISC RF LUMBAR  07/2007    HX BACK SURGERY  5/1/2014    HX BREAST LUMPECTOMY Left 1989    benign left breast    HX BUNIONECTOMY      HX BUNIONECTOMY      HX HEART CATHETERIZATION  3/3/10    HX HYSTERECTOMY  1978    HX LUMBAR DISKECTOMY      HX ORTHOPAEDIC Bilateral     BUNIONECTOMY    HX ORTHOPAEDIC Right     WRIST FX    HX OTHER SURGICAL  10/12/2015    mole removed from right side of face by Dr. Fortunato Olmedo FLX DX W/COLLENRIQUE Larry PFRMD  [id number]    Dr Valencia Dick ENDOSCOPY,BIOPSY  2/27/2019            Expanded or extensive additional review of patient history:     Home Situation  Home Environment: Private residence  # Steps to Enter: 2  Rails to Enter: Yes  Hand Rails : Right  One/Two Story Residence: One story  Living Alone: No  Support Systems: Child(jorge), Spouse/Significant Other  Patient Expects to be Discharged to[de-identified] Home  Current DME Used/Available at Home: Cane, straight, Walker, rollator, Raised toilet seat  Tub or Shower Type: Tub/Shower combination    Hand dominance: Right    EXAMINATION OF PERFORMANCE DEFICITS:  Cognitive/Behavioral Status:  Neurologic State: Alert  Orientation Level: Oriented X4;Appropriate for age  Cognition: Appropriate decision making; Appropriate for age attention/concentration; Appropriate safety awareness; Follows commands  Perception: Appears intact  Perseveration: No perseveration noted  Safety/Judgement: Awareness of environment;Good awareness of safety precautions; Home safety; Insight into deficits    Skin: WNL    Edema: None    Hearing: Auditory  Auditory Impairment: None    Vision/Perceptual:      WNL, able to read clock face  Corrective Lenses: Glasses    Range of Motion:  AROM: Generally decreased, functional  PROM: Generally decreased, functional     Strength:  Strength: Generally decreased, functional     Coordination:  Coordination: Generally decreased, functional  Fine Motor Skills-Upper: Left Intact; Right Intact    Gross Motor Skills-Upper: Left Intact; Right Intact    Tone & Sensation:  Tone: Normal  Sensation: Intact          Balance:  Standing: Impaired; With support (SPC)  Standing - Static: Good;Occasional  Standing - Dynamic : Fair;Constant support    Functional Mobility and Transfers for ADLs:  Bed Mobility:  Supine to Sit: Modified independent  Scooting: Modified independent    Transfers:  Sit to Stand: Contact guard assistance  Stand to Sit: Contact guard assistance  Bed to Chair: Contact guard assistance  Bathroom Mobility: Contact guard assistance  Toilet Transfer : Contact guard assistance    ADL Assessment:  Feeding: Setup    Oral Facial Hygiene/Grooming: Setup    Bathing: Moderate assistance         Upper Body Dressing: Setup    Lower Body Dressing: Minimum assistance    Toileting: Contact guard assistance       ADL Intervention and task modifications:  Feeding  Container Management: Set-up  Cutting Food: Independent  Utensil Management: Independent  Food to Mouth: Independent  Drink to Mouth: Independent      Lower Body Dressing Assistance  Protective Undergarmet: Minimum assistance  Socks: Set-up; Stand-by assistance  Leg Crossed Method Used: Yes  Position Performed: Seated edge of bed  Cues: Verbal cues provided    Toileting  Toileting Assistance: Contact guard assistance  Bladder Hygiene: Contact guard assistance  Clothing Management: Contact guard assistance  Cues: Verbal cues provided;Physical assistance for pants down;Physical assistance for pants up  Adaptive Equipment:  (BSC)    Cognitive Retraining  Safety/Judgement: Awareness of environment;Good awareness of safety precautions; Home safety; Insight into deficits    Functional Measure:    Barthel Index:  Bathin  Bladder: 10  Bowels: 10  Groomin  Dressin  Feeding: 10  Mobility: 0  Stairs: 0  Toilet Use: 5  Transfer (Bed to Chair and Back): 10  Total: 55/100      The Barthel ADL Index: Guidelines  1. The index should be used as a record of what a patient does, not as a record of what a patient could do. 2. The main aim is to establish degree of independence from any help, physical or verbal, however minor and for whatever reason. 3. The need for supervision renders the patient not independent. 4. A patient's performance should be established using the best available evidence. Asking the patient, friends/relatives and nurses are the usual sources, but direct observation and common sense are also important. However direct testing is not needed. 5. Usually the patient's performance over the preceding 24-48 hours is important, but occasionally longer periods will be relevant. 6. Middle categories imply that the patient supplies over 50 per cent of the effort. 7. Use of aids to be independent is allowed. Score Interpretation (from [facility])    Independent   60-79 Minimally independent   40-59 Partially dependent   20-39 Very dependent   <20 Totally dependent     -Kg Ramires., Barthel, D.W. (1965). Functional evaluation: the Barthel Index. [address] [facility] ([address] [facility] [address] (1997). The Barthel activities of daily living index: self-reporting versus actual performance in the old (> or = 75 years).  Journal of [facility] 45(7), [address] [place] [address] SHO Hayes, Neto Mitchell., Luis Manuel Subramanian., Latonya Diaz. (1999). Measuring the change in disability after inpatient rehabilitation; comparison of the responsiveness of the Barthel Index and Functional [place] Measure. Journal of Neurology, Neurosurgery, and Psychiatry, 66(4), 689-591. PAULA Reddy, SIDDHARTH Cole, & Emperatriz North M.A. (2004) Assessment of post-stroke quality of life in cost-effectiveness studies: The usefulness of the Barthel Index and the EuroQoL-5D. Quality of Life Research, 15, 223-54     Occupational Therapy Evaluation Charge Determination   History Examination Decision-Making   LOW Complexity : Brief history review  LOW Complexity : 1-3 performance deficits relating to physical, cognitive , or psychosocial skils that result in activity limitations and / or participation restrictions  LOW Complexity : No comorbidities that affect functional and no verbal or physical assistance needed to complete eval tasks       Based on the above components, the patient evaluation is determined to be of the following complexity level: LOW   Pain Rating:  R knee arthritis pain, baseline though stiffer as patient has not been OOB in 2 days    Activity Tolerance:   Fair, desaturates with exertion and requires oxygen, requires rest breaks, and observed SOB with activity    After treatment patient left in no apparent distress:    Sitting in chair and Call bell within reach    COMMUNICATION/EDUCATION:   The patients plan of care was discussed with: Registered nurse. Home safety education was provided and the patient/caregiver indicated understanding., Patient/family have participated as able in goal setting and plan of care. , and Patient/family agree to work toward stated goals and plan of care. This patients plan of care is appropriate for delegation to [facility].     Thank you for this referral.  Jeff Castro, OT  Time Calculation: 31 mins

## 2023-02-20 NOTE — TELEPHONE ENCOUNTER
----- Message from [facility] sent at 2/20/2023  3:23 PM EST -----  Subject: Message to Provider    QUESTIONS  Information for Provider? Ramesh with [facility] home health would like to let   Dr Rafal Hay to know that Aury Nguyen is being treated at [facility] for acute respiratory failure. She would like to know if she   would follow Aury Nguyen for hospital ordered home health care. Please call   Ramesh at [phone] option 2  ---------------------------------------------------------------------------  --------------  Fer TRIANA  [phone]; OK to leave message on voicemail  ---------------------------------------------------------------------------  --------------  SCRIPT ANSWERS  Relationship to Patient? Third Party  Third Party Type? Home Health Care? Representative Name?  Ramesh

## 2023-02-20 NOTE — PROGRESS NOTES
ADULT PROTOCOL: JET AEROSOL ASSESSMENT    Patient  Brisa Mcgill     82 y.o.   female     2/19/2023  9:17 PM    Breath Sounds Pre Procedure: Right Breath Sounds: Anterior, Upper (slight congestion)                               Left Breath Sounds: Upper, Anterior (slight congestion)    Breath Sounds Post Procedure: Right Breath Sounds: Upper (slight congestion, bases fair)                                 Left Breath Sounds: Upper (slight congestion, bases fair)    Breathing pattern: Pre procedure Breathing Pattern: Regular          Post procedure Breathing Pattern: Regular    Heart Rate: Pre procedure Pulse: 71           Post procedure Pulse: 76    Resp Rate: Pre procedure Respirations: 20           Post procedure Respirations: 18    Peak Flow: Pre bronchodilator             Post bronchodilator       FVC/FEV1:  N/A    Incentive Spirometry:             Cough: Pre procedure Cough: Non-productive               Post procedure Cough: Non-productive    Suctioned: NO    Sputum: Pre procedure                   Post procedure      Oxygen: O2 Device: Nasal cannula    2L NC     Changed: NO    SpO2: Pre procedure SpO2: 96 %   with oxygen              Post procedure SpO2: 98 %  with oxygen    Nebulizer Therapy: Current medications Aerosolized Medications: Albuterol      Changed: NO    Smoking History:  Smoking history:   None x   Former     Chronic         Problem List:   Patient Active Problem List   Diagnosis Code    Hypokalemia E87.6    Hiatal hernia K44.9    Anxiety F41.9    S/P hysterectomy Z90.710    Aortic stenosis, mild I35.0    Spinal stenosis M48.00    S/P foot surgery Z98.890    Environmental allergies Z91.09    S/P cardiac catheterization Z98.890    Hypovitaminosis D E55.9    Chronic ischemic heart disease I25.9    Mixed hyperlipidemia E78.2    Chronic diastolic heart failure (HCC) I50.32    Chest pain at rest R07.9    Bifascicular bundle branch block--RBBB,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    Atypical chest pain R07.89    Essential hypertension I10    Gastroesophageal reflux disease without esophagitis K21.9    Atherosclerosis of native coronary artery without angina pectoris--diffuse small vsl disease via cath cath 2009--RCA PDA/ROSA I25.10    Chronic anxiety F41.9    Encounter for medication monitoring Z51.81    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 9) with comorbidity ([address] E66.01    Chest pain with normal angiography--2002;normal NST 2018 R07.9    Primary osteoarthritis of left shoulder M19.012    Pneumonia J18.9    Hypoxia R09.02    Respiratory failure with hypoxia [facility]) J96.91       Respiratory Therapist: Renny Renteria RT

## 2023-02-20 NOTE — PROGRESS NOTES
Problem: Mobility Impaired (Adult and Pediatric)  Goal: *Acute Goals and Plan of Care (Insert Text)  Description: FUNCTIONAL STATUS PRIOR TO ADMISSION: Patient was modified independent using a single point cane for functional mobility. HOME SUPPORT PRIOR TO ADMISSION: The patient lived with family (, daughter, and great-granddaughter) and required supervision/set-up for mobility. Physical Therapy Goals  Initiated 2/20/2023  1. Patient will move from supine to sit and sit to supine  in bed with modified independence within 7 day(s). 2.  Patient will transfer from bed to chair and chair to bed with modified independence using the least restrictive device within 7 day(s). 3.  Patient will perform sit to stand with supervision/set-up within 7 day(s). 4.  Patient will ambulate with supervision/set-up for 50 feet with the least restrictive device within 7 day(s). 2/20/2023 1102 by Nia Sinha, PT  Outcome: Not Met  2/20/2023 1058 by Nia Sinha, PT  Outcome: Not Met   PHYSICAL THERAPY EVALUATION  Patient: Jennifer Roberson (82 y.o. female)  Date: 2/20/2023  Primary Diagnosis: Respiratory failure with hypoxia (HCC) [J96.91]    ASSESSMENT  Based on the objective data described below, the patient presents with decreased activity tolerance due to hypoxia. Patient required assistance from family for ADLs prior to admission, but patient is current below her baseline level of function. She requires rolling walker for safety during ambulation and was only able to ambulate 15ft before O2 sats dropped to 86% while on 1L O2. PT recommends HHPT and 24/7 supervision at home to allow for improved functional mobility and to maintain safety upon discharge. Current Level of Function Impacting Discharge (mobility/balance): contact guard assist with rolling walker    Functional Outcome Measure: The patient scored 25/56 on the Graham outcome measure which is indicative of moderate fall risk.       Other factors to consider for discharge: O2 needs at home     Patient will benefit from skilled therapy intervention to address the above noted impairments. PLAN :  Recommendations and Planned Interventions: bed mobility training, transfer training, gait training, therapeutic exercises, neuromuscular re-education, patient and family training/education, and therapeutic activities      Frequency/Duration: Patient will be followed by physical therapy:  3 times a week to address goals. Recommendation for discharge: (in order for the patient to meet his/her long term goals)  Physical therapy at least 2 days/week in the home AND ensure assist and/or supervision for safety with mobility    This discharge recommendation:  Has been made in collaboration with the attending provider and/or case management    IF patient discharges home will need the following DME: rolling walker     SUBJECTIVE:   Patient stated My O2 is better, but I thnk I need it at home again.     OBJECTIVE DATA SUMMARY:   HISTORY:    Past Medical History:   Diagnosis Date    Anxiety     Aortic stenosis 03/03/2010    Aortic stenosis     Arrhythmia     MURMUR    Arthritis     SPINAL STENOSIS    Atherosclerosis of coronary artery     Biliary dyskinesia     Cancer ([facility] [address] 2023    liver    CHF (congestive heart failure) ([facility] [address] 03/03/2010    CHF (congestive heart failure) ([address] 01/2002    Chronic heart failure ([address] 1/2002; 3/3/2010    chronic diastolic heart failure    Chronic pain     LOWER BACK, RIGHT KNEE    Environmental allergies 03/03/2010    GERD (gastroesophageal reflux disease) 03/03/2010    Hiatal hernia 03/03/2010    High cholesterol 03/03/2010    HTN (hypertension) 03/03/2010    Hypokalemia 03/03/2010    Ischemic heart disease, chronic     Multiple gallstones     Obese     Psychiatric disorder     anxiety    S/P hysterectomy 03/03/2010    Spinal stenosis 03/03/2010     Past Surgical History:   Procedure Laterality Date    CARDIAC CATHETERIZATION  01/2002 normal coronaries    DECOMPRESS DISC RF LUMBAR  07/2007    HX BACK SURGERY  5/1/2014    HX BREAST LUMPECTOMY Left 1989    benign left breast    HX BUNIONECTOMY      HX BUNIONECTOMY      HX HEART CATHETERIZATION  3/3/10    HX HYSTERECTOMY  1978    HX LUMBAR DISKECTOMY      HX ORTHOPAEDIC Bilateral     BUNIONECTOMY    HX ORTHOPAEDIC Right     WRIST FX    HX OTHER SURGICAL  10/12/2015    mole removed from right side of face by Dr. Paloma Eaton FLX DX W/COLLJ Artice Vann PFRMD  [id number]    Dr Margaret Almeida GI ENDOSCOPY,BIOPSY  2/27/2019          Personal factors and/or comorbidities impacting plan of care:   Home Situation  Home Environment: Private residence  # Steps to Enter: 2  Rails to Enter: Yes  Hand Rails : Right  One/Two Story Residence: One story  Living Alone: No  Support Systems: Spouse/Significant Other, Child(jorge)  Patient Expects to be Discharged to[de-identified] Home with home health  Current DME Used/Available at Home: Darlean Galley, straight, Walker, rollator, Raised toilet seat  Tub or Shower Type: Tub/Shower combination    EXAMINATION/PRESENTATION/DECISION MAKING:   Critical Behavior:  Neurologic State: Alert  Orientation Level: Oriented X4, Appropriate for age  Cognition: Appropriate decision making, Appropriate for age attention/concentration, Appropriate safety awareness, Follows commands  Safety/Judgement: Awareness of environment, Good awareness of safety precautions, Home safety, Insight into deficits  Range Of Motion:  AROM: Generally decreased, functional  PROM: Generally decreased, functional  Strength:    Strength: Generally decreased, functional  Tone & Sensation:   Tone: Normal  Sensation: Intact  Coordination:  Coordination: Generally decreased, functional  Vision:   Corrective Lenses: Glasses  Functional Mobility:  Bed Mobility:  Supine to Sit: Modified independent  Scooting: Modified independent  Transfers:  Sit to Stand: Contact guard assistance  Stand to Sit: Contact guard assistance  Bed to Chair: Contact guard assistance  Balance:   Standing: Impaired; With support (SPC)  Standing - Static: Good;Occasional  Standing - Dynamic : Fair;Constant support  Ambulation/Gait Training:  Distance (ft): 15 Feet (ft)  Assistive Device: Gait belt;Walker, rolling;Cane, straight  Ambulation - Level of Assistance: Contact guard assistance  Gait Description (WDL): Exceptions to WDL  Base of Support: Widened  Speed/Debbie: Slow;Shuffled  Step Length: Right shortened;Left shortened    Functional Measure:  Graham/56 moderate fall risk       Physical Therapy Evaluation Charge Determination   History Examination Presentation Decision-Making   LOW Complexity : Zero comorbidities / personal factors that will impact the outcome / POC LOW Complexity : 1-2 Standardized tests and measures addressing body structure, function, activity limitation and / or participation in recreation  LOW Complexity : Stable, uncomplicated  Other outcome measures Graham  LOW       Based on the above components, the patient evaluation is determined to be of the following complexity level: LOW     Pain Rating:  R knee pain; RN aware    Activity Tolerance:   Good, desaturates with exertion and requires oxygen, and requires rest breaks    After treatment patient left in no apparent distress:   Sitting in chair and Call bell within reach    COMMUNICATION/EDUCATION:   The patients plan of care was discussed with: Physical therapist, Occupational therapist, Registered nurse, and Case management. Fall prevention education was provided and the patient/caregiver indicated understanding., Patient/family have participated as able in goal setting and plan of care. , and Patient/family agree to work toward stated goals and plan of care.     Thank you for this referral.  Estehpania Ricketts, PT   Time Calculation: 17 mins

## 2023-02-20 NOTE — PROGRESS NOTES
[facility] Adult  Hospitalist Group                                                                                          Hospitalist Progress Note  Deep López MD  Answering service: [phone] OR [phone] from in house phone        Date of Service:  2023  NAME:  Tre Osborne  :  1935  MRN:  [id number]       Admission Summary:   Per HPI: Tre Osborne is a very pleasant 82 y.o. female with hx of AS, chf, htn, fátima, obesity, intrahepatic cholangiocarcinoma who presents to hospital with complaints of sob. She had been in her usual state of health until 2 days ago when she noted mild dyspnea with exertion which has since progressed to dyspnea at rest.  She has had no recent travel or any sick contacts. Denies any PND, orthopnea, lower extremity edema or significant weight gain. No associated chest pain, cough fever, chills, chest or abdominal pain, nausea, vomiting, cough, congestion, recent illness, palpitations, or dysuria. Remarkable vitals on ER Presentation: spo2 89% on RA  Labs Remarkable for: unremarkable  ER Images: cxr: Interstitial prominence suggesting pulmonary vascular congestion and/or atypical infectious process. ER Rx: solumedrol 125, duoneb       Interval history / Subjective:    Follow up the patient admitted for evaluation of SOB, hypoxia, acute HF  NAD, no acute event over night   Per pt she did not sleep well   reviewed RN noted over night   Requiring supplemental O2 1-2l/min via NC  Discussed with RT, the patient dropped sat O2 to low 80s on RA  will need home O2 evaluation, 6 min walk  Pt will need home O2 2 L/min via NC  UOP -1470 ml  No CP  + weak, able to ambulate with can and assistance  Discussed with RN, CM, PT and pharmacy during morning rounds  Respiratory PCR positive for rhinovirus and enterovirus  Potassium has been replaced     Assessment & Plan:     Acute hypoxic respiratory failure, slow improving  Rhinovirus/enterovirus upper respiratory infection  Decompensated HFpEF  Continue current management  -CTA chest: reviewed,  no acute pathology, no PE  -Continue diuresis with Lasix 80 mg IV twice daily  -Strict I's and O's with daily weights  -Wean supplemental oxygen as tolerated  -Echocardiogram requested  -Cardiology consult pending  Continue supportive acre    Hypokalemia:   Continue to replace  Follow up BMP daily       Hypertension  -Continue home meds  BP on low side     Generalized anxiety disorder  -Home Klonopin as needed     GERD  -continue Protonix      Dyslipidemia   -continue Crestor     History of intrahepatic cholangiocarcinoma  -Has opted against aggressive chemoradiation therapy  -Outpatient PCP/palliative follow-up     Obesity  -Counseled on weight loss, dieting and exercise       Code status: DNR  Prophylaxis: Lovenox SQ  Care Plan discussed with: patient, RN  Anticipated Disposition: Home in 24-48 hrs, pending clinical improvement, ECHO, cardiology evaluation, replacement of electrolytes, will need home O2 evaluation, 6 min walk  Inpatient  Cardiac monitoring: Telemetry NSR,      Hospital Problems  Date Reviewed: 2/14/2023            Codes Class Noted POA    Respiratory failure with hypoxia [facility]) ICD-10-CM: J96.91  ICD-9-CM: 518.81  2/18/2023 Unknown         Social Determinants of Health     Tobacco Use: Low Risk     Smoking Tobacco Use: Never    Smokeless Tobacco Use: Never    Passive Exposure: Not on file   Alcohol Use: Not on file   Financial Resource Strain: Not on file   Food Insecurity: Not on file   Transportation Needs: Not on file   Physical Activity: Not on file   Stress: Not on file   Social Connections: Not on file   Intimate Partner Violence: Not on file   Depression: Not at risk    PHQ-2 Score: 1   Housing Stability: Not on file       Review of Systems:   A comprehensive review of systems was negative except for that written in the HPI.        Vital Signs:    Last 24hrs VS reviewed since prior progress note. Most recent are:  Visit Vitals  BP (!) 141/90 (BP 1 Location: Right lower arm)   Pulse 67   Temp 97.6 °F (36.4 °C)   Resp 20   Ht 4' 7\" (1.397 m)   Wt 73 kg (161 lb)   SpO2 99%   BMI 37.42 kg/m²         Intake/Output Summary (Last 24 hours) at 2/20/2023 9909  Last data filed at 2/19/2023 2152  Gross per 24 hour   Intake 540 ml   Output 870 ml   Net -330 ml          Physical Examination:     I had a face to face encounter with this patient and independently examined them on 2/20/2023 as outlined below:          General : alert x 3, awake, no acute distress, + SOB  HEENT: PEERL, EOMI, moist mucus membrane, TM clear  Neck: supple, no JVD, no meningeal signs  Chest: Coarse to auscultation bilaterally,   CVS: S1 S2 heard, Capillary refill less than 2 seconds, + systolic murmur 3/6  Abd: soft/ non tender, non distended, BS physiological,   Ext: no clubbing, no cyanosis, no edema, brisk 2+ DP pulses  Neuro/Psych: pleasant mood and affect, CN 2-12 grossly intact, sensory grossly within normal limit, Strength decreased in all extremities, DTR 1+ x 4, gait was not assessed  Skin: warm   Right knee pain due to chronic arthritis    Data Review:    Review and/or order of clinical lab test    CTA chest:  IMPRESSION  No pulmonary embolus or other acute cardiopulmonary process. CXR:  IMPRESSION  1. Interstitial prominence suggesting pulmonary vascular congestion and/or  atypical infectious process. ECHO pending    I have personally and independently reviewed all pertinent labs, diagnostic studies, imaging, and have provided independent interpretation of the same.      Labs:     Recent Labs     02/19/23  0602 02/18/23 1927   WBC 8.1 7.7   HGB 13.0 13.2   HCT 40.1 41.2    198       Recent Labs     02/19/23 1818 02/19/23  0602 02/18/23 1927   NA  --  140 139   K 3.4* 3.0* 3.7   CL  --  100 102   CO2  --  29 29   BUN  --  11 12   CREA  --  1.07* 1.01   GLU  --  180* 103*   CA  -- 8.5 9.1   MG  --   --  2.2       Recent Labs     02/19/23  0602 02/18/23  1927   ALT 30 29    113   TBILI 0.3 0.5   TP 8.3* 8.5*   ALB 3.4* 3.6   GLOB 4.9* 4.9*       No results for input(s): INR, PTP, APTT, INREXT, INREXT in the last 72 hours. No results for input(s): FE, TIBC, PSAT, FERR in the last 72 hours. No results found for: FOL, RBCF   Recent Labs     02/18/23 2218   PH 7.38   PCO2 48*   PO2 76*       No results for input(s): CPK, CKNDX, TROIQ in the last 72 hours. No lab exists for component: CPKMB  Lab Results   Component Value Date/Time    Cholesterol, total 199 02/14/2023 12:57 PM    HDL Cholesterol 69 02/14/2023 12:57 PM    LDL, calculated 108.2 (H) 02/14/2023 12:57 PM    Triglyceride 109 02/14/2023 12:57 PM    CHOL/HDL Ratio 2.9 02/14/2023 12:57 PM     Lab Results   Component Value Date/Time    Glucose (POC) 91 02/20/2023 08:09 AM    Glucose (POC) 129 (H) 02/19/2023 09:41 PM    Glucose (POC) 163 (H) 05/04/2014 08:48 PM    Glucose (POC) 171 (H) 04/04/2013 12:55 PM    Glucose POC 124mg/dl 10/09/2012 10:45 AM     Lab Results   Component Value Date/Time    Color YELLOW/STRAW 05/26/2022 03:31 PM    Appearance CLEAR 05/26/2022 03:31 PM    Specific gravity 1.020 05/26/2022 03:31 PM    pH (UA) 6.5 05/26/2022 03:31 PM    Protein 30 (A) 05/26/2022 03:31 PM    Glucose Negative 05/26/2022 03:31 PM    Ketone Negative 05/26/2022 03:31 PM    Bilirubin Negative 05/26/2022 03:31 PM    Urobilinogen 1.0 05/26/2022 03:31 PM    Nitrites Negative 05/26/2022 03:31 PM    Leukocyte Esterase SMALL (A) 05/26/2022 03:31 PM    Epithelial cells FEW 05/26/2022 03:31 PM    Bacteria 1+ (A) 05/26/2022 03:31 PM    WBC 0-4 05/26/2022 03:31 PM    RBC 0-5 05/26/2022 03:31 PM       Notes reviewed from all clinical/nonclinical/nursing services involved in patient's clinical care. Care coordination discussions were held with appropriate clinical/nonclinical/ nursing providers based on care coordination needs.          Patients current active Medications were reviewed, considered, added and adjusted based on the clinical condition today. Home Medications were reconciled to the best of my ability given all available resources at the time of admission. Route is PO if not otherwise noted.       Admission Status:[id number]:::1}      Medications Reviewed:     Current Facility-Administered Medications   Medication Dose Route Frequency    albuterol (PROVENTIL VENTOLIN) nebulizer solution 5 mg  5 mg Nebulization BID RT    clonazePAM (KlonoPIN) tablet 1 mg  1 mg Oral BID PRN    amLODIPine (NORVASC) tablet 5 mg  5 mg Oral BID    aspirin delayed-release tablet 81 mg  81 mg Oral DAILY    isosorbide mononitrate ER (IMDUR) tablet 30 mg  30 mg Oral DAILY    metoprolol tartrate (LOPRESSOR) tablet 25 mg  25 mg Oral BID    pantoprazole (PROTONIX) tablet 40 mg  40 mg Oral BID    rosuvastatin (CRESTOR) tablet 20 mg  20 mg Oral QHS    melatonin tablet 4.5 mg  4.5 mg Oral QHS PRN    furosemide (LASIX) injection 80 mg  80 mg IntraVENous BID    nitroglycerin (NITROSTAT) tablet 0.4 mg  0.4 mg SubLINGual Q5MIN PRN    potassium chloride SR (KLOR-CON 10) tablet 10 mEq  10 mEq Oral BID    insulin lispro (HUMALOG) injection   SubCUTAneous AC&HS    glucose chewable tablet 16 g  4 Tablet Oral PRN    glucagon (GLUCAGEN) injection 1 mg  1 mg IntraMUSCular PRN    dextrose 10% infusion 0-250 mL  0-250 mL IntraVENous PRN    enoxaparin (LOVENOX) injection 40 mg  40 mg SubCUTAneous Q24H    guaiFENesin (ROBITUSSIN) 100 mg/5 mL oral liquid 400 mg  400 mg Oral Q6H PRN    sodium chloride (NS) flush 5-40 mL  5-40 mL IntraVENous Q8H    sodium chloride (NS) flush 5-40 mL  5-40 mL IntraVENous PRN    acetaminophen (TYLENOL) tablet 650 mg  650 mg Oral Q6H PRN    Or    acetaminophen (TYLENOL) suppository 650 mg  650 mg Rectal Q6H PRN    polyethylene glycol (MIRALAX) packet 17 g  17 g Oral DAILY PRN    ondansetron (ZOFRAN ODT) tablet 4 mg  4 mg Oral Q8H PRN    Or    ondansetron (ZOFRAN) injection 4 mg  4 mg IntraVENous Q6H PRN     ______________________________________________________________________  EXPECTED LENGTH OF STAY: - - -  ACTUAL LENGTH OF STAY:          2                 Lynn Menezes MD

## 2023-02-20 NOTE — PROGRESS NOTES
ELIER:    IDR: Cm discussed pt's discharge plan with MD, nurse, and pharmacist in [address] [place] [address] at 10:00 am.    Plan:  Discharge planning  Home health  DME (potential oxygen)  PCP appt    Reason for Admission:  Brisa Mcgill, 82 y.o. female with past medical history as documented below presents to the ED with c/o of acute onset of moderate to severe shortness of breath with associated chest tightness. Patient upon arrival with saturation in 88% on room air. RUR Score:  13%                PCP: First and Last name:   Paul Cardoza MD     Name of Practice:    Are you a current patient: Yes/No:    Approximate date of last visit:    Can you participate in a virtual visit if needed:     Do you (patient/family) have any concerns for transition/discharge? Pt did not express any concerns to cm. Pt is willing to receive home health services as recommended. Plan for utilizing home health:   Yes, home health recommended and pt is willing. Current Advanced Directive/Advance Care Plan:  DNR      Healthcare Decision Maker:   Click here to complete HealthCare Decision Makers including selection of the Healthcare Decision Maker Relationship (ie \"Primary\")            Primary Decision MakerCristereza Daily - Daughter - [phone]    Secondary Decision Maker: Kirill Naranjo - Daughter - [phone]    Transition of Care Plan:      Cm met with pt and confirmed demographics, PCP, ACP, contact information, current pharmacy, DME needs, transportation needs, current living situation, and home health needs. Pt uses the [facility] at [address] [place] [address] as pharmacy. Pt confirmed that Varsha Hunt MD, [facility]) is PCP. Cm called [facility] to schedule a follow up appt for pt. Care team will call cm back to schedule. Pt told cm that she has a ride home from the hospital as well as a ride to follow up appts. Pt lives at home with her daughter and .  She told cm that her  cooks and cleans for her. Pt is interested in home health services. Pt uses a cane at home. Pt also has a rollator but told cm that she does not use the rollator very often because it is hard to maneuver it in her home. Cm asked pt about other providers/ in the community. Pt said that she sees a cardiologist, but that her PCP arranges all of her specialist appts for her. She said that she does not need assistance with scheduling specialists appts in the hospital.    Cm will follow up with provider regarding whether pt will need oxygen at home. 2:03 pm: Cm met with pt and asked for permission to speak with her daughter, Brian Red, regarding her discharge plan. Pt gave cm permission. Cm asked pt if she remembers what company supplied her oxygen before. Pt could not remember. Cm called pt's daughter, Brian Red, and spoke with her about pt's discharge plan. Familia Rueda told cm that she does not have any concerns about pt's discharge. Familia Rueda lives 5 min away from pt and checks on her regularly. She confirmed that pt has significant family support. Familia Rueda is planning to give pt a ride home from the hospital tomorrow. She told cm to call her at [phone] when pt is ready for discharge. Cm called [facility] and asked if they are aware of which company supplied pt's oxygen when she received it before. The  said that they will check on this and get back with her. 4:13 pm: Pt signed 2nd IM letter. Copy to pt and copy in chart. Pt signed Freedom of Choice form. Cm discussed [place] of Choice with pt. Cm referred pt to Home Health services through [address] [place] [address]. Amanda ([phone] option 2) from [facility] called cm and said that they are working on processing the referral. Home health waiting to confirm pt's PCP. Cm sent home oxygen orders to [facility] through [facility]. Care Management Interventions  PCP Verified by CM:  Yes  Mode of Transport at Discharge:  Other (see comment) (Friend/family)  Transition of Care Consult (CM Consult): Discharge Planning, Home Health, DME/Supply Assistance  Discharge Durable Medical Equipment: Yes  Health Maintenance Reviewed: Yes  Physical Therapy Consult: Yes  Occupational Therapy Consult: Yes  Support Systems: Spouse/Significant Other, Child(jorge)  Confirm Follow Up Transport: Family  The Plan for Transition of Care is Related to the Following Treatment Goals : PCP appt, home health, possible DME, discharge planning  The Patient and/or Patient Representative was Provided with a Choice of Provider and Agrees with the Discharge Plan?: Yes  Freedom of Choice List was Provided with Basic Dialogue that Supports the Patient's Individualized Plan of Care/Goals, Treatment Preferences and Shares the Quality Data Associated with the Providers?: Yes  Discharge Location  Patient Expects to be Discharged to[de-identified] Home with home health     ESTRELLA Clark,   [phone]

## 2023-02-20 NOTE — TELEPHONE ENCOUNTER
Spoke to JOE to let her know that I did not see anything about the pt oxygen in her chart but I did see that she went to a pulmonary physician. Pt was also scheduled for 3/1/23 at 11:20 am with Dr. Dariel Cornejo for a hospital follow up.

## 2023-02-20 NOTE — INTERDISCIPLINARY ROUNDS
Interdisciplinary team rounds were held 2/20/2023 with the following team members:Care Management, Nursing, Occupational Therapy, Pharmacy, Physical Therapy,Physician and the patient. Plan of care discussed. Plans for echo with referral to cardiology pending the results of echo. Patient will need a 6min. O2 study done prior to discharge to determine is home O2 is needed. Will possibly have home health services put into place as well. See clinical pathway and/or care plan for interventions and desired outcomes.

## 2023-02-20 NOTE — TELEPHONE ENCOUNTER
----- Message from Dennise Carbajal sent at 2/20/2023  2:03 PM EST -----  Subject: Message to Provider    QUESTIONS  Information for Provider? discharging from hospital tomorrow and is trying   to arrange home oxygen. asking who her oxygen supplier was if we have it   listed. ---------------------------------------------------------------------------  --------------  Jeanne Berkowitz INFO  [phone]; OK to leave message on voicemail  ---------------------------------------------------------------------------  --------------  SCRIPT ANSWERS  Relationship to Patient? Third Party  Third Party Type? Hospital?  Representative Name?  Debi Partida

## 2023-02-21 VITALS
BODY MASS INDEX: 37.26 KG/M2 | WEIGHT: 161 LBS | SYSTOLIC BLOOD PRESSURE: 102 MMHG | HEIGHT: 55 IN | TEMPERATURE: 98.4 F | OXYGEN SATURATION: 94 % | RESPIRATION RATE: 18 BRPM | DIASTOLIC BLOOD PRESSURE: 61 MMHG | HEART RATE: 66 BPM

## 2023-02-21 LAB
ANION GAP SERPL CALC-SCNC: 3 MMOL/L (ref 5–15)
BUN SERPL-MCNC: 22 MG/DL (ref 6–20)
BUN/CREAT SERPL: 21 (ref 12–20)
CALCIUM SERPL-MCNC: 8.9 MG/DL (ref 8.5–10.1)
CHLORIDE SERPL-SCNC: 104 MMOL/L (ref 97–108)
CO2 SERPL-SCNC: 36 MMOL/L (ref 21–32)
CREAT SERPL-MCNC: 1.07 MG/DL (ref 0.55–1.02)
GLUCOSE BLD STRIP.AUTO-MCNC: 84 MG/DL (ref 65–117)
GLUCOSE BLD STRIP.AUTO-MCNC: 90 MG/DL (ref 65–117)
GLUCOSE SERPL-MCNC: 87 MG/DL (ref 65–100)
POTASSIUM SERPL-SCNC: 3.6 MMOL/L (ref 3.5–5.1)
PROCALCITONIN SERPL-MCNC: <0.05 NG/ML
SERVICE CMNT-IMP: NORMAL
SERVICE CMNT-IMP: NORMAL
SODIUM SERPL-SCNC: 143 MMOL/L (ref 136–145)

## 2023-02-21 PROCEDURE — 74011250637 HC RX REV CODE- 250/637: Performed by: STUDENT IN AN ORGANIZED HEALTH CARE EDUCATION/TRAINING PROGRAM

## 2023-02-21 PROCEDURE — 84145 PROCALCITONIN (PCT): CPT

## 2023-02-21 PROCEDURE — 74011250636 HC RX REV CODE- 250/636: Performed by: INTERNAL MEDICINE

## 2023-02-21 PROCEDURE — 74011250637 HC RX REV CODE- 250/637: Performed by: INTERNAL MEDICINE

## 2023-02-21 PROCEDURE — 36415 COLL VENOUS BLD VENIPUNCTURE: CPT

## 2023-02-21 PROCEDURE — 80048 BASIC METABOLIC PNL TOTAL CA: CPT

## 2023-02-21 PROCEDURE — 74011000250 HC RX REV CODE- 250: Performed by: INTERNAL MEDICINE

## 2023-02-21 PROCEDURE — 82962 GLUCOSE BLOOD TEST: CPT

## 2023-02-21 PROCEDURE — 94640 AIRWAY INHALATION TREATMENT: CPT

## 2023-02-21 PROCEDURE — 94760 N-INVAS EAR/PLS OXIMETRY 1: CPT

## 2023-02-21 PROCEDURE — 77010033678 HC OXYGEN DAILY

## 2023-02-21 RX ADMIN — CALCIUM CARBONATE (ANTACID) CHEW TAB 500 MG 200 MG: 500 CHEW TAB at 12:51

## 2023-02-21 RX ADMIN — ALBUTEROL SULFATE 5 MG: 2.5 SOLUTION RESPIRATORY (INHALATION) at 07:56

## 2023-02-21 RX ADMIN — METOPROLOL TARTRATE 25 MG: 25 TABLET, FILM COATED ORAL at 08:13

## 2023-02-21 RX ADMIN — POTASSIUM CHLORIDE 10 MEQ: 750 TABLET, FILM COATED, EXTENDED RELEASE ORAL at 08:13

## 2023-02-21 RX ADMIN — CALCIUM CARBONATE (ANTACID) CHEW TAB 500 MG 200 MG: 500 CHEW TAB at 08:13

## 2023-02-21 RX ADMIN — FUROSEMIDE 40 MG: 10 INJECTION, SOLUTION INTRAMUSCULAR; INTRAVENOUS at 08:12

## 2023-02-21 RX ADMIN — AMLODIPINE BESYLATE 5 MG: 5 TABLET ORAL at 08:13

## 2023-02-21 RX ADMIN — ASPIRIN 81 MG: 81 TABLET, COATED ORAL at 08:13

## 2023-02-21 RX ADMIN — ISOSORBIDE MONONITRATE 30 MG: 30 TABLET, EXTENDED RELEASE ORAL at 08:13

## 2023-02-21 RX ADMIN — ENOXAPARIN SODIUM 40 MG: 100 INJECTION SUBCUTANEOUS at 08:13

## 2023-02-21 RX ADMIN — PANTOPRAZOLE SODIUM 40 MG: 40 TABLET, DELAYED RELEASE ORAL at 08:13

## 2023-02-21 NOTE — PROGRESS NOTES
1920: Bedside and Verbal shift change report given to Lincoln Rivera RN (oncoming nurse) by Dennise Betancourt RN (offgoing nurse). Report included the following information SBAR, Kardex, MAR, and Recent Results. 6600: Bedside and Verbal shift change report given to Dennise Betancourt RN (oncoming nurse) by Lincoln Rivera RN (offgoing nurse). Report included the following information SBAR, Kardex, MAR, and Recent Results.

## 2023-02-21 NOTE — PROGRESS NOTES
Spiritual Care Partner Volunteer Cj Chavez visited Ms. Kimo Thibodeaux at [facility] in [address] on 2/21/2023   Documented by:  Michael Smith

## 2023-02-21 NOTE — PROGRESS NOTES
ELIER:    Plan:  Home health  Home oxygen    8:45 am: Kerwin spoke with pt's daughter, Majo Celaya ([phone]), and asked if pt has an oxygen tank at home that is not being used. Ranjith Loyd confirmed that pt does not have one. [facility] came and picked it up when pt's oxygen was discontinued a couple of months ago. [facility] messaged cm and said that they have not been able to speak with pt to confirm oxygen delivery. Cm provided Adalgisa's number to them. [facility] spoke with Ranjith Loyd and confirmed oxygen delivery. Kerwin confirmed that portable tank will be delivered to hospital and a regular tank will be delivered to pt's address. [facility] home health referral accepted. Kerwin spoke with [facility], who asked for a new home health order from the provider that specifies pt's needs SNV/PT. Shima Lucas and asked for a new home health order to be written. Provider responded and confirmed that he will do this. Home health added to AVS.    11:45 am: Kerwin confirmed that portable oxygen tank was delivered to pt's room. Cm called Majo Celaya and let her know that pt will be ready to go home around 1:30/2:00 pm. Ranjith Loyd confirmed that she will pick her up. Kerwin provided Ranjith Loyd with nurses' station number and cm's number to call when she arrives.     ESTRELLA Rodrigues,   [phone]

## 2023-02-21 NOTE — PROGRESS NOTES
ADULT PROTOCOL: JET AEROSOL  REASSESSMENT    Patient  Argelia Romo     82 y.o.   female     2/21/2023  10:17 AM    Breath Sounds Pre Procedure: Right Breath Sounds: Scattered wheezing                               Left Breath Sounds: Scattered wheezing    Breath Sounds Post Procedure: Right Breath Sounds: Clear, Diminished                                 Left Breath Sounds: Clear, Diminished    Breathing pattern: Pre procedure Breathing Pattern: Regular          Post procedure Breathing Pattern: Regular    Heart Rate: Pre procedure Pulse: 58           Post procedure Pulse: 69    Resp Rate: Pre procedure Respirations: 18           Post procedure Respirations: 18    Cough: Pre procedure Cough: Non-productive               Post procedure Cough: Non-productive    Suctioned: NO    Sputum: Pre procedure                   Post procedure      Oxygen: O2 Device: Nasal cannula    1L     Changed: YES    SpO2: Pre procedure SpO2: 97 %   with oxygen              Post procedure SpO2: 96 %  with oxygen    Nebulizer Therapy: Current medications Aerosolized Medications: Albuterol      Changed: NO    Smoking History: never    Problem List:   Patient Active Problem List   Diagnosis Code    Hypokalemia E87.6    Hiatal hernia K44.9    Anxiety F41.9    S/P hysterectomy Z90.710    Aortic stenosis, mild I35.0    Spinal stenosis M48.00    S/P foot surgery Z98.890    Environmental allergies Z91.09    S/P cardiac catheterization Z98.890    Hypovitaminosis D E55.9    Chronic ischemic heart disease I25.9    Mixed hyperlipidemia E78.2    Chronic diastolic heart failure (HCC) I50.32    Chest pain at rest R07.9    Bifascicular bundle branch block--RBBB,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    Atypical chest pain R07.89    Essential hypertension I10    Gastroesophageal reflux disease without esophagitis K21.9    Atherosclerosis of native coronary artery without angina pectoris--diffuse small vsl disease via cath cath 2009--RCA PDA/ROSA I25.10    Chronic anxiety F41.9 Encounter for medication monitoring Z51.81    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 M47.815    Class 2 obesity due to excess calories with serious comorbidity and body mass index (BMI) of 38.0 to 38.9 in adult [id number]    Paroxysmal cardiac arrhythmia--PVC's,APC's,NSSVT I49.8    Severe obesity (BMI 35.0-39. 9) with comorbidity (HCC) E66.01    Chest pain with normal angiography--2002;normal NST 2018 R07.9    Primary osteoarthritis of left shoulder M19.012    Pneumonia J18.9    Hypoxia R09.02    Respiratory failure with hypoxia (HCC) J96.91       Respiratory Therapist: Bridget Mclain, RT ADULT PROTOCOL: JET AEROSOL  REASSESSMENT    Patient  Katarzyna Mcadams     82 y.o.   female     2/21/2023  10:17 AM    Breath Sounds Pre Procedure: Right Breath Sounds: Scattered wheezing                               Left Breath Sounds: Scattered wheezing    Breath Sounds Post Procedure: Right Breath Sounds: Clear, Diminished                                 Left Breath Sounds: Clear, Diminished    Breathing pattern: Pre procedure Breathing Pattern: Regular          Post procedure Breathing Pattern: Regular    Heart Rate: Pre procedure Pulse: 58           Post procedure Pulse: 69    Resp Rate: Pre procedure Respirations: 18           Post procedure RR-18    Cough: Pre procedure Cough: Non-productive               Post procedure Cough: Non-productive    Suctioned: NO    Sputum: Pre procedure                   Post procedure      Oxygen: O2 Device: Nasal cannula    1L     Changed: YES    SpO2: Pre procedure SpO2: 97 %   with oxygen              Post procedure SpO2: 96 %  with oxygen    Nebulizer Therapy: Current medications Aerosolized Medications: Albuterol      Changed: NO    Smoking History: never    Problem List:   Patient Active Problem List   Diagnosis Code    Hypokalemia E87.6    Hiatal hernia K44.9    Anxiety F41.9    S/P hysterectomy Z90.710    Aortic stenosis, mild I35.0    Spinal stenosis M48.00    S/P foot surgery Z98.890    Environmental allergies Z91.09    S/P cardiac catheterization Z98.890    Hypovitaminosis D E55.9    Chronic ischemic heart disease I25.9    Mixed hyperlipidemia E78.2    Chronic diastolic heart failure (HCC) I50.32    Chest pain at rest R07.9    Bifascicular bundle branch block--RBBB,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    Atypical chest pain R07.89    Essential hypertension I10    Gastroesophageal reflux disease without esophagitis K21.9    Atherosclerosis of native coronary artery without angina pectoris--diffuse small vsl disease via cath cath 2009--RCA PDA/ROSA I25.10    Chronic anxiety F41.9    Encounter for medication monitoring Z51.81    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 M47.815    Class 2 obesity due to excess calories with serious comorbidity and body mass index (BMI) of 38.0 to 38.9 in adult [id number]    Paroxysmal cardiac arrhythmia--PVC's,APC's,NSSVT I49.8    Severe obesity (BMI 35.0-39. 9) with comorbidity ([address] E66.01    Chest pain with normal angiography--2002;normal NST 2018 R07.9    Primary osteoarthritis of left shoulder M19.012    Pneumonia J18.9    Hypoxia R09.02    Respiratory failure with hypoxia ([address] J96.91       Respiratory Therapist: Zev Kang, RT

## 2023-02-21 NOTE — PROGRESS NOTES
Consult received and chart reviewed    Elderly female admitted with hypoxemia and an abnormal chest x-ray. Brain natruretic peptide was within normal limits for age. She was treated with steroids and nebulizers and given diuretics and improved but still requiring oxygen on hospital discharge. Her virology studies were positive for rhinovirus. Echocardiogram showed preserved LV function and no significant valve abnormalities. I do not think her clinical course supports a diagnosis of heart failure with preserved ejection fraction. She needs no further cardiac testing or follow-up.

## 2023-02-21 NOTE — DISCHARGE SUMMARY
Discharge Summary   Please note that this dictation was completed with Verious, the computer voice recognition software. Quite often unanticipated grammatical, syntax, homophones, and other interpretive errors are inadvertently transcribed by the computer software. Please disregard these errors. Please excuse any errors that have escaped final proofreading. PATIENT ID: Jose Carlos Knowles  MRN: [id number]   YOB: 1935    DATE OF ADMISSION: 2/18/2023  7:02 PM    DATE OF DISCHARGE: 2/21/2023  PRIMARY CARE PROVIDER: Rach Hyatt MD         ATTENDING PHYSICIAN: Ana Laura Soares MD  DISCHARGING PROVIDER: Ana Laura Soares MD       CONSULTATIONS: IP CONSULT TO CARDIOLOGY    PROCEDURES/SURGERIES: * No surgery found *    ADMITTING HPI from excerpted H&P   Jose Carlos Knowles is a very pleasant 82 y.o. female with hx of AS, chf, htn, fátima, obesity, intrahepatic cholangiocarcinoma who presents to hospital with complaints of sob. She had been in her usual state of health until 2 days ago when she noted mild dyspnea with exertion which has since progressed to dyspnea at rest.  She has had no recent travel or any sick contacts. Denies any PND, orthopnea, lower extremity edema or significant weight gain. No associated chest pain, cough fever, chills, chest or abdominal pain, nausea, vomiting, cough, congestion, recent illness, palpitations, or dysuria. Remarkable vitals on ER Presentation: spo2 89% on RA  Labs Remarkable for: unremarkable  ER Images: cxr: Interstitial prominence suggesting pulmonary vascular congestion and/or atypical infectious process.      ER Rx: solumedrol 125, duoneb        HOSPITAL COURSE & DISCHARGE DIAGNOSIS/ PLAN:     Acute hypoxic respiratory failure, stable on supplemental oxygen  Rhinovirus/enterovirus upper respiratory infection  History of hypertension  History of diastolic heart failure  -CTA chest: reviewed,  no acute pathology, no PE  -Chest x-ray 2/18/2023 with interstitial prominent suggesting pulmonary vascular congestion or atypical infectious process  -TTE reviewed with EF 65 to 70% normal wall motion normal diastolic function  - troponin 13  -EKG reviewed in dependently normal sinus rhythm right bundle branch block  -Patient underwent 6-minute walk test and required home oxygen  -Respiratory viral panel positive for rhinovirus enterovirus. COVID-19 influenza negative.      -Patient be discharged on home regimen of Lasix twice daily losartan , amlodipine metoprolol and Imdur  -Patient to be discharged on home supplemental oxygen to go home and follow-up with PCP/currently further management of hypoxic respiratory failure  - Patient agreed and verbalized understanding        Hypokalemia resolved         Generalized anxiety disorder  -Home Klonopin as needed     GERD  -continue Protonix        Dyslipidemia   -continue Crestor     History of intrahepatic cholangiocarcinoma  -Has opted against aggressive chemoradiation therapy  -Outpatient PCP/palliative follow-up     Obesity  -Counseled on weight loss, dieting and exercise               PENDING TEST RESULTS:   At the time of discharge the following test results are still pending: None    FOLLOW UP APPOINTMENTS:    Follow-up Information       Follow up With Specialties Details Why Contact Info    Elza Saba MD Family Medicine Follow up on 3/1/2023 Follow up appt scheduled for 3/1 at 11:20 am. [address]  [phone]      Patti Cordero MD Pulmonary Disease Follow up Follow up with pulmonary doctor recommended. [address] [facility]  [address] [facility]  Follow up Home health to begin services when patient is discharged.  [address] [place], [address] [place] [address]  [phone]             ADDITIONAL CARE RECOMMENDATIONS: Follow-up with cardiology and pulmonology    DIET: Diabetic Diet    ACTIVITY: Activity as tolerated    WOUND CARE: None    EQUIPMENT needed: None      DISCHARGE MEDICATIONS:  Current Discharge Medication List            NOTIFY YOUR PHYSICIAN FOR ANY OF THE FOLLOWING:   Fever over 101 degrees for 24 hours. Chest pain, shortness of breath, fever, chills, nausea, vomiting, diarrhea, change in mentation, falling, weakness, bleeding. Severe pain or pain not relieved by medications. Or, any other signs or symptoms that you may have questions about.     DISPOSITION:   x Home With:  x OT x PT  HH  RN       Long term SNF/Inpatient Rehab    Independent/assisted living    Hospice    Other:       PATIENT CONDITION AT DISCHARGE:     Functional status    Poor    x Deconditioned     Independent      Cognition   x  Lucid     Forgetful     Dementia      Catheters/lines (plus indication)    Medel     PICC     PEG    x None      Code status     Full code    x DNR      PHYSICAL EXAMINATION AT DISCHARGE:    General : alert x 3, awake, no acute distress,   HEENT: PEERL, EOMI, moist mucus membrane, TM clear  Neck: supple, no JVD, no meningeal signs  Chest: Clear to auscultation bilaterally   CVS: S1 S2 heard, Capillary refill less than 2 seconds  Abd: soft/ Non tender, non distended, BS physiological,   Ext: no clubbing, no cyanosis, no edema, brisk 2+ DP pulses  Neuro/Psych: pleasant mood and affect, CN 2-12 grossly intact, sensory grossly within normal limit, Strength 5/5 in all extremities, DTR 1+ x 4  Skin: warm     CHRONIC MEDICAL DIAGNOSES:  Problem List as of 2/21/2023 Date Reviewed: 2/14/2023            Codes Class Noted - Resolved    Respiratory failure with hypoxia ([facility] [address] ICD-10-CM: J96.91  ICD-9-CM: 518.81  2/18/2023 - Present        Pneumonia ICD-10-CM: J18.9  ICD-9-CM: 731  5/26/2022 - Present        Hypoxia ICD-10-CM: R09.02  ICD-9-CM: 799.02  5/26/2022 - Present        Primary osteoarthritis of left shoulder ICD-10-CM: S58.493  ICD-9-CM: 715.11  5/7/2021 - Present        Chest pain with normal angiography--2002;normal NST 2018 ICD-10-CM: R07.9  ICD-9-CM: 786.50  4/30/2019 - Present        Severe obesity (BMI 35.0-39. 9) with comorbidity ([address] ICD-10-CM: E66.01  ICD-9-CM: 278.01  3/28/2018 - Present        Paroxysmal cardiac arrhythmia--PVC's,APC's,NSSVT ICD-10-CM: I49.8  ICD-9-CM: 427.89  2/20/2018 - Present        Class 2 obesity due to excess calories with serious comorbidity and body mass index (BMI) of 38.0 to 38.9 in adult ICD-10-CM: HES0991  ICD-9-CM: Fadumo Vides  11/20/2017 - Present        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 ICD-10-CM: M47.815  ICD-9-CM: 721.2  2/12/2016 - Present        Encounter for medication monitoring ICD-10-CM: Z51.81  ICD-9-CM: V58.83  11/29/2015 - Present        Essential hypertension (Chronic) ICD-10-CM: I10  ICD-9-CM: 401.9  7/12/2015 - Present        Gastroesophageal reflux disease without esophagitis (Chronic) ICD-10-CM: K21.9  ICD-9-CM: 530.81  7/12/2015 - Present        Atherosclerosis of native coronary artery without angina pectoris--diffuse small vsl disease via cath cath 2009--RCA PDA/ROSA ICD-10-CM: I25.10  ICD-9-CM: 414.01  7/12/2015 - Present    Overview Signed 8/23/2017 10:38 AM by Todd Muñoz medical rx implemented.              Chronic anxiety ICD-10-CM: F41.9  ICD-9-CM: 300.00  7/12/2015 - Present        Atypical chest pain ICD-10-CM: R07.89  ICD-9-CM: 786.59  2/24/2014 - Present        Chest pain at rest ICD-10-CM: R07.9  ICD-9-CM: 786.50  2/20/2014 - Present        Bifascicular bundle branch block--RBBB,IRVING ICD-10-CM: I45.2  ICD-9-CM: 426.53  2/20/2014 - Present        Chronic ischemic heart disease ICD-10-CM: I25.9  ICD-9-CM: 414.9  5/29/2012 - Present        Mixed hyperlipidemia ICD-10-CM: E78.2  ICD-9-CM: 272.2  5/29/2012 - Present        Chronic diastolic heart failure (HCC) ICD-10-CM: I50.32  ICD-9-CM: 428.32  5/29/2012 - Present        Hypovitaminosis D ICD-10-CM: E55.9  ICD-9-CM: 268.9 7/28/2010 - Present        Hypokalemia (Chronic) ICD-10-CM: E87.6  ICD-9-CM: 276.8  3/3/2010 - Present        Hiatal hernia ICD-10-CM: K44.9  ICD-9-CM: 553.3  3/3/2010 - Present        Anxiety (Chronic) ICD-10-CM: F41.9  ICD-9-CM: 300.00  3/3/2010 - Present        S/P hysterectomy ICD-10-CM: Z90.710  ICD-9-CM: V88.01  3/3/2010 - Present    Overview Signed 3/3/2010 11:37 AM by Harry Palumbo     1976             Aortic stenosis, mild ICD-10-CM: I35.0  ICD-9-CM: 424.1  3/3/2010 - Present        Spinal stenosis ICD-10-CM: M48.00  ICD-9-CM: 724.00  3/3/2010 - Present        S/P foot surgery ICD-10-CM: Z98.890  ICD-9-CM: V45.89  3/3/2010 - Present    Overview Signed 3/3/2010 11:39 AM by Harry Palumbo     Left bunionectomy             Environmental allergies (Chronic) ICD-10-CM: Z91.09  ICD-9-CM: V15.09  3/3/2010 - Present        S/P cardiac catheterization ICD-10-CM: Z98.890  ICD-9-CM: V45.89  3/3/2010 - Present    Overview Signed 3/3/2010 11:39 AM by Harry Palumbo     1/02             RESOLVED: Opioid use, unspecified with unspecified opioid-induced disorder ICD-10-CM: F11.99  ICD-9-CM: 292.9, 305.50  8/13/2021 - 3/21/2022        RESOLVED: CKD (chronic kidney disease) stage 3, GFR 30-59 ml/min ([facility] [address] ICD-10-CM: N18.30  ICD-9-CM: 585.3  10/30/2018 - 7/11/2021        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-CM: G47.20  ICD-9-CM: 327.30  8/25/2015 - 11/20/2017        RESOLVED: + ICD-10-CM: I45.10  ICD-9-CM: 426.4  2/20/2014 - 2/20/2014        RESOLVED: Acute respiratory failure ([facility] [address] ICD-10-CM: J96.00  ICD-9-CM: 518.81  4/2/2013 - 11/20/2017        RESOLVED: Pneumonia, organism unspecified(486) ICD-10-CM: J18.9  ICD-9-CM: 291  4/2/2013 - 5/15/2017        RESOLVED: Chest pain, unspecified ICD-10-CM: R07.9  ICD-9-CM: 786.50  2/11/2013 - 8/3/2021        RESOLVED: Other and unspecified hyperlipidemia ICD-10-CM: E78.5  ICD-9-CM: 272.4  5/29/2012 - 4/15/2013        RESOLVED: Essential hypertension, benign ICD-10-CM: I10  ICD-9-CM: 401.1  5/29/2012 - 4/15/2013        RESOLVED: Coronary atherosclerosis of native coronary artery ICD-10-CM: I25.10  ICD-9-CM: 414.01  5/29/2012 - 7/12/2015        RESOLVED: Encounter for long-term (current) use of other medications ICD-10-CM: Z79.899  ICD-9-CM: V58.69  7/28/2010 - 11/29/2015        RESOLVED: HTN (hypertension) (Chronic) ICD-10-CM: I10  ICD-9-CM: 401.9  3/3/2010 - 7/12/2015        RESOLVED: High cholesterol (Chronic) ICD-10-CM: E78.00  ICD-9-CM: 272.0  3/3/2010 - 10/9/2012        RESOLVED: GERD (gastroesophageal reflux disease) (Chronic) ICD-10-CM: K21.9  ICD-9-CM: 530.81  3/3/2010 - 7/12/2015        RESOLVED: CHF (congestive heart failure) (HCC) (Chronic) ICD-10-CM: I50.9  ICD-9-CM: 428.0  3/3/2010 - 6/6/2014    Overview Signed 3/3/2010 11:38 AM by Brooklyn Verdugo     1/02                Greater than 45 minutes were spent with the patient on counseling and coordination of care    Signed:   Marquis Janna MD  2/21/2023  9:33 AM

## 2023-02-22 ENCOUNTER — PATIENT OUTREACH (OUTPATIENT)
Dept: CASE MANAGEMENT | Age: 88
End: 2023-02-22

## 2023-02-22 ENCOUNTER — TELEPHONE (OUTPATIENT)
Dept: CASE MANAGEMENT | Age: 88
End: 2023-02-22

## 2023-02-22 NOTE — PROGRESS NOTES
Care Transitions Initial Call    Call within 2 business days of discharge: Yes     Patient Current Location: [place]    Patient: Abundio Zarco Patient : 1935 MRN: [id number]    Last Discharge [address]       Date Complaint Diagnosis Description Type Department Provider    23 Shortness of Breath Acute respiratory failure with hypoxia ([facility] [address] . .. ED to Hosp-Admission (Discharged) (ADMIT) [facility] [place] [place] Tamiko Espitia MD; Delia Lepe... Was this an external facility discharge? No     Challenges to be reviewed by the provider   Additional needs identified to be addressed with provider:            Method of communication with provider : none    Discussed COVID-19 related testing which was available at this time. Test results were negative. Patient informed of results, if available? yes     Advance Care Planning:   Does patient have an Advance Directive: yes, reviewed and needs to be updated, referral to internal ACP facilitator; Section I on the DNR form was not completed. Inpatient Readmission Risk score: Unplanned Readmit Risk Score: 15    Was this a readmission? no   Patient stated reason for the admission: sob    Patients top risk factors for readmission: medical condition-ARF    Interventions to address risk factors: Scheduled appointment with PCP-3/1 and Obtained and reviewed discharge summary and/or continuity of care documents    Care Transition Nurse (CTN) contacted the patient by telephone to perform post hospital discharge assessment. Verified name and  with patient as identifiers. Provided introduction to self, and explanation of the CTN role. Reports stuffy nose. Denies chest pain, fever. Endorses BL sob. Reports compliance with continuous oxygen at 1-1.5 L. Has pulse ox. Encouraged the patient to check oxygen saturation. Recommended contacting DME company for humidifier. Reports good appetite. No bladder, bowel concerns. Reviewed fall risk with oxygen tubing.  Reports she ambulates with cane. CTN reviewed discharge instructions, medical action plan and red flags with patient who verbalized understanding. Were discharge instructions available to patient? yes. Reviewed appropriate site of care based on symptoms and resources available to patient including: PCP and Home Health. Patient given an opportunity to ask questions and does not have any further questions or concerns at this time. The patient agrees to contact the PCP office for questions related to their healthcare. Medication reconciliation was performed with patient, who verbalizes understanding of administration of home medications. Referral to Pharm D needed: no     Home Health/Outpatient orders at discharge: home health care, PT, and [address]: [facility]IRA [facility]  Date of initial visit: 2/23/2023 per patients request    Durable Medical Equipment ordered at discharge: [address]: [email or web address]   Durable Medical Equipment received: yes    Was patient discharged with a pulse oximeter? no; however, the patient reports she has one. Discussed follow-up appointments. If no appointment was previously scheduled, appointment scheduling offered:  appt already scheduled . Is follow up appointment scheduled within 7 days of discharge? no.   [facility] follow up appointment(s):   Future Appointments   Date Time Provider Kita Kim   3/1/2023 11:20 AM Rodolfo Nova MD [facility] BS AMB   3/22/2023 10:40 AM Rodolfo Nova MD [facility] BS AMB     Non-[facility] follow up appointment(s): NA    Plan for follow-up call in 5-7 days based on severity of symptoms and risk factors. Plan for next call: self management-sob, stuffy nose and community resources-  CTN provided contact information for future needs. Goals Addressed                   This Visit's Progress     Prevent complications post hospitalization.           02/22/23  Absence of falls  Will report compliance with oxygen  Will attend fu appt with pcp  Will update AMD

## 2023-02-22 NOTE — TELEPHONE ENCOUNTER
Hospital follow up  Case Management follow up call    CM attempt to call patient for hospital followup. Nurse Navigator already called and spoke to patient. Appointments already on AVS prior to d/c. Followup on 3/1. Encounter closed.      DNEIZ Linder, [place], Sima Guild  [phone]

## 2023-02-23 ENCOUNTER — HOME CARE VISIT (OUTPATIENT)
Dept: SCHEDULING | Facility: HOME HEALTH | Age: 88
End: 2023-02-23
Payer: MEDICARE

## 2023-02-23 VITALS
WEIGHT: 154.6 LBS | TEMPERATURE: 98.5 F | SYSTOLIC BLOOD PRESSURE: 126 MMHG | OXYGEN SATURATION: 95 % | RESPIRATION RATE: 18 BRPM | HEART RATE: 64 BPM | BODY MASS INDEX: 35.93 KG/M2 | DIASTOLIC BLOOD PRESSURE: 75 MMHG

## 2023-02-23 VITALS
OXYGEN SATURATION: 95 % | HEART RATE: 64 BPM | DIASTOLIC BLOOD PRESSURE: 75 MMHG | TEMPERATURE: 98.5 F | SYSTOLIC BLOOD PRESSURE: 126 MMHG | RESPIRATION RATE: 18 BRPM

## 2023-02-23 PROCEDURE — G0299 HHS/HOSPICE OF RN EA 15 MIN: HCPCS

## 2023-02-23 PROCEDURE — G0151 HHCP-SERV OF PT,EA 15 MIN: HCPCS

## 2023-02-23 PROCEDURE — 400018 HH-NO PAY CLAIM PROCEDURE

## 2023-02-23 NOTE — HOME HEALTH
Skilled reason for admission/summary of clinical condition: Patient referred to [facility] after recent hospitalization for respiratory failure with hypoxia. Patient was discharged with orders to use continuous oxygen at 1 to 2 L per minute depending on need. Today she is in her home with a portable oxygen tank which is empty. She does not have her oxygen concentrator set up currently. Hospital orders, instructor wean supplemental, oxygen as tolerated. She lives in a one level house with her granddaughter, and great grandson and . Her daughter and granddaughter usually assist with meals and household activities. Patient reports that prior to going to the hospital she was performing most of her self care activities independently, except for cleaning her feet and putting on socks because of difficulty with bending and using the right knee. She notes a history of right knee pain and has had injections in the past to manage  her overall pain. She reports today that her right knee pain is 7/10. Diagnosis: acute hypoxic respiratory failure    Past medical history: hypertension, generalized anxiety disorder, GERD, history of intrahepatic cholangiocarcinoma, obesity, CHF    Subjective: my knee hurts when I get up and move around a lot. There are times when it doesn't hurt at all. I need my daughter to come and set up my oxygen concentrator for me. I don't want to go into my bedroom because my  and great grandson are sleeping in the bed. Caregiver: relative - daughter, granddaughter, spouse. Caregiver assists with: Meals, IADL, Transportation and Housekeeping Caregiver unable to assist with: Medications, Bathing and ADL. Caregiver is available Inconsistently  Caregiver is present at this visit and did participate with clinician. Medications reconciled and all medications are available in the home this visit.  The following education was provided regarding medications: medication interactions and look alike medications:     High alert medication teaching on aspirin (enter name of medication), anticoagulant therapy education; purpose, dose, and frequency, food/drug interactions, risks of co-administration with other medications such as ASA, NSAID, and herbals, bleeding prevention strategies such as the use of a soft toothbrush, gentle flossing, use of electric razor, on the potential for increased bleeding from falls and lacerations, to notify medical providers of anticoagulant therapy, consider carrying an ID card, signs and symptoms of abnormal bleeding such as unexplained bruising, dizziness/lightheadedness, red or tarry looking stool, blood in urine, blood in vomit and to call home care agency and/or physician for any problems or concerns    . Patient/CG able to demonstrate knowledge through teach back with 90 percent accuracy. Medications are unable to assess effectiveness at this time. Home health supplies by type and quantity ordered/delivered this visit include: n/a  Patient/caregiver instructed on plan of care and are agreeable to plan of care at this time. Patient at risk for falls Yes:   Recommended requesting PT/OT orders due to fall risk YES: PT only  Patient response to recommended requesting of PT/OT orders: agreeable    Discharge planning discussed with patient and caregiver. Discharge planning as follows: PT will see patient 1w1, 2w2, 3 PRN  and will discharge to modified independent level with assistance of family   under the care/supervision of MD, when goals have been met, when max potential has been achieved. Pt/Caregiver did verbalize understanding of discharge planning. May omit visit for MD appointments, patient request or inclement weather. Patient may be discharged prior to end of certification when goals are met, upon patient request or per MD order. Patient/caregiver did verbalize agreement with discharge planning.      Clinical Assessment (What this means for the patient overall and need for ongoing skilled care):patient is a plus an 56-year-old female, referred for home health physical therapy. Per report of patient and her daughter, her overall mobility throughout the home is close to her baseline, although she is more limited currently because of right knee pain. This limits her ability to safely, walked around her house climb stairs to enter and exit the house. Patient demonstrates 3/5 hip strength for hip flexion and 3 -/5 knee strength for flexion extension she would benefit from physical therapy to set up a home exercise program for strengthening, bilateral lower extremities, education on safety in the home, and addressing balance deficits. Tinetti score is 12/28 which indicates a high risk of falls and TUG is 55 seconds. Written Teaching Material Utilized: N/A    Specific plan for next visit: instruct patient in a home exercise program for gentle lower extremity strengthening and balance training at next visit. Plan of care and admission to home health status called to attending physician. The following practitioner has agreed to sign the ongoing POC  , and was notified of the following: visit frequency of 1w1, 2w2, 3 PRN    Interdisciplinary communication with: Toñito Trejo RN for the purpose of [employer] collaboration and POC collaboration    PCP: Glenn Barrientos MD  Next scheduled doctor appointment: 3/1/2023  Patient/Caregiver instructed to keep follow up appointment because lack of follow through with physician appointments could result in discontinuation of home care services for non-compliance. Patient/Caregiver verbalize knowledge of above through teach back with 100 percent accuracy. Emergency Preparedness: Patient/Caregiver instructed in the following:  Have one gallon of water per person for at least 3 days on hand. Have non-perishable food for at least 3 days that do not need to be cooked. Have flashlights and batteries.   Charge your cell phones and any back up lithium batteries for your cell phones. Have 3+ days of back up oxygen in your home. Have a phone in your home that is hard wired and does not require power. Have medication for a week in your home. Make sure you have a caregiver in the home to provide care in case your home health nurse cannot get to your house. Make sure you have all of your paperwork i.e. written emergency preparedness plan, Identification, insurance cards, DME phone number, physician and pharmacy phone number, agency phone number, and your medications in one place for easy access and in a zip lock bag to protect them. Take your Admission Handbook, written emergency preparedness plan, written medication list and folder if you relocate in the event of an emergency, if possible. Call agency if you relocate so we can contact you. Patient/Caregiver verbalize knowledge of above through teach back with 100 percent accuracy.

## 2023-02-24 ENCOUNTER — PATIENT OUTREACH (OUTPATIENT)
Dept: CASE MANAGEMENT | Age: 88
End: 2023-02-24

## 2023-02-24 NOTE — ACP (ADVANCE CARE PLANNING)
Initial outbound call made to patient who states she does not email. Patient states she can ask a family member for assistance. Follow up scheduled for one week.

## 2023-02-27 ENCOUNTER — HOME CARE VISIT (OUTPATIENT)
Dept: SCHEDULING | Facility: HOME HEALTH | Age: 88
End: 2023-02-27
Payer: MEDICARE

## 2023-02-27 VITALS
HEART RATE: 58 BPM | TEMPERATURE: 98.5 F | SYSTOLIC BLOOD PRESSURE: 118 MMHG | OXYGEN SATURATION: 97 % | RESPIRATION RATE: 16 BRPM | DIASTOLIC BLOOD PRESSURE: 70 MMHG

## 2023-02-27 PROCEDURE — G0151 HHCP-SERV OF PT,EA 15 MIN: HCPCS

## 2023-02-27 NOTE — HOME HEALTH
Subjective: Patient states today is not a good day. Falls since last visit NO(if yes complete the Fall Tracking Form and include bsrifallreport):  Caregiver involvement changes:  no changes  Home health supplies by type and quantity ordered/delivered this visit include: n/a    Clinician asked if patient has had any physician contact since last home care visit and patient states:NO  Clinician asked if patient has any new or changed medications and patient states:  YES guaifenesin/dextromorphan for cough  If Yes, were medications reconciled? YES    Was the certifying physician notified of changes in medications? YES      Clinical assessment (what this visit means for the patient overall and need for ongoing skilled care) and progress or lack of progress towards SPECIFIC goals: HEP was established today for LE strengthening. Patient also had good activity tolerance and no drop in Oxygen with exercise today. Further training on stairs and outdoors is needed. Written Teaching Material Utilized: written home exercise program.     Interdisciplinary communication with: N/A     Discharge planning as follows: Will discharge when the patient has reached their maximum functional potential and maximum safety in their home and When goals are met    Specific plan for next visit: progress outdoor gait training and stair training at next visit.

## 2023-02-28 ENCOUNTER — HOME CARE VISIT (OUTPATIENT)
Dept: SCHEDULING | Facility: HOME HEALTH | Age: 88
End: 2023-02-28
Payer: MEDICARE

## 2023-02-28 VITALS
OXYGEN SATURATION: 99 % | TEMPERATURE: 97.7 F | HEART RATE: 60 BPM | RESPIRATION RATE: 16 BRPM | SYSTOLIC BLOOD PRESSURE: 138 MMHG | DIASTOLIC BLOOD PRESSURE: 64 MMHG

## 2023-02-28 PROCEDURE — G0300 HHS/HOSPICE OF LPN EA 15 MIN: HCPCS

## 2023-03-01 ENCOUNTER — OFFICE VISIT (OUTPATIENT)
Dept: FAMILY MEDICINE CLINIC | Age: 88
End: 2023-03-01
Payer: MEDICARE

## 2023-03-01 ENCOUNTER — PATIENT OUTREACH (OUTPATIENT)
Dept: CASE MANAGEMENT | Age: 88
End: 2023-03-01

## 2023-03-01 VITALS
HEIGHT: 55 IN | WEIGHT: 165.2 LBS | TEMPERATURE: 98.1 F | RESPIRATION RATE: 12 BRPM | OXYGEN SATURATION: 98 % | SYSTOLIC BLOOD PRESSURE: 136 MMHG | DIASTOLIC BLOOD PRESSURE: 72 MMHG | BODY MASS INDEX: 38.23 KG/M2 | HEART RATE: 90 BPM

## 2023-03-01 DIAGNOSIS — Z09 ENCOUNTER FOR EXAMINATION FOLLOWING TREATMENT AT HOSPITAL: Primary | ICD-10-CM

## 2023-03-01 DIAGNOSIS — Z51.81 ENCOUNTER FOR MEDICATION MONITORING: ICD-10-CM

## 2023-03-01 DIAGNOSIS — J96.11 CHRONIC RESPIRATORY FAILURE WITH HYPOXIA, ON HOME OXYGEN THERAPY (HCC): ICD-10-CM

## 2023-03-01 DIAGNOSIS — F41.9 CHRONIC ANXIETY: ICD-10-CM

## 2023-03-01 DIAGNOSIS — Z99.81 CHRONIC RESPIRATORY FAILURE WITH HYPOXIA, ON HOME OXYGEN THERAPY (HCC): ICD-10-CM

## 2023-03-01 DIAGNOSIS — I25.10 ATHEROSCLEROSIS OF NATIVE CORONARY ARTERY OF NATIVE HEART WITHOUT ANGINA PECTORIS: ICD-10-CM

## 2023-03-01 DIAGNOSIS — K21.9 GASTRO-ESOPHAGEAL REFLUX DISEASE WITHOUT ESOPHAGITIS: ICD-10-CM

## 2023-03-01 DIAGNOSIS — Z71.89 DNR (DO NOT RESUSCITATE) DISCUSSION: ICD-10-CM

## 2023-03-01 DIAGNOSIS — I50.32 CHRONIC DIASTOLIC HEART FAILURE (HCC): ICD-10-CM

## 2023-03-01 DIAGNOSIS — M15.9 PRIMARY OSTEOARTHRITIS INVOLVING MULTIPLE JOINTS: ICD-10-CM

## 2023-03-01 DIAGNOSIS — E78.2 MIXED HYPERLIPIDEMIA: ICD-10-CM

## 2023-03-01 DIAGNOSIS — J96.01 ACUTE HYPOXEMIC RESPIRATORY FAILURE (HCC): ICD-10-CM

## 2023-03-01 DIAGNOSIS — I10 ESSENTIAL HYPERTENSION: ICD-10-CM

## 2023-03-01 DIAGNOSIS — C22.1 INTRAHEPATIC CHOLANGIOCARCINOMA (HCC): ICD-10-CM

## 2023-03-01 PROCEDURE — 99495 TRANSJ CARE MGMT MOD F2F 14D: CPT | Performed by: FAMILY MEDICINE

## 2023-03-01 PROCEDURE — G8427 DOCREV CUR MEDS BY ELIG CLIN: HCPCS | Performed by: FAMILY MEDICINE

## 2023-03-01 NOTE — PROGRESS NOTES
HISTORY OF PRESENT ILLNESS  Gabriel Hurtado is a 82 y.o. female. HPI   [facility] [place] [facility]  Admitted on 2/18 thur 2/21/23. Pt was contacted in 2 days by NN. Follow up in the office from hospital care now on day 8 since discharge. Overall feeling better. SOB has improved. Pt has discussed DNR order and wishes to sign it today while in the office. Hospital records reviewed with Pt and all questions were answered and discussed. Hospital diagnosis: Acute hypoxic respiratory failure, stable on supplemental oxygen  Rhinovirus/enterovirus upper respiratory infection  History of hypertension  History of diastolic heart failure  -CTA chest: reviewed,  no acute pathology, no PE  -Chest x-ray 2/18/2023 with interstitial prominent suggesting pulmonary vascular congestion or atypical infectious process  -TTE reviewed with EF 65 to 70% normal wall motion normal diastolic function  - troponin 13  -EKG reviewed in dependently normal sinus rhythm right bundle branch block  -Patient underwent 6-minute walk test and required home oxygen  -Respiratory viral panel positive for rhinovirus enterovirus. COVID-19 influenza negative  Follow up on chronic medical problems. Has new diagnosis for her of Intrahepatic cholangiocarcinoma. She has met with oncology and decided not to proceed with systemic therapy and it has been determined to be inoperable. Has good days and bad days. Stress with her family has her feeling anxious most of the time still. Cardiovascular Review:  The patient has hypertension, hyperlipidemia, chronic diastolic heart failure, and obesity. Hx also of aortic stenosis. Diet and Lifestyle: generally follows a low fat low cholesterol diet, generally follows a low sodium diet, sedentary.   Pertinent ROS: taking medications as instructed, no medication side effects noted, no TIA's, no chest pain on exertion, mild swelling of ankles, no orthostatic dizziness or lightheadedness, no palpitations,      She has notice feeling more SOB over the past several weeks. No chest pain associated with feeling SOB. Has SOB that comes on with just sitting. Not sure if related with increase anxiety. Will last for several minutes and then seem to get better. Was only getting the SOb with she was walking. No increased swelling. Compliant with medications. No cough or chest congestion noted. Home BP Monitoring: is not measured at home. Anxiety Review:  Patient is seen for anxiety. Ongoing symptoms include: increased anxiety still related to family issues. Patient denies: palpitations, sweating, chest pain, shortness of breath. Reported side effects from the treatment: none. Encounter for pain management. 1./2. Medical history/Past medical History  Chronic Pain:  Osteoarthritis:  Patient has osteoarthritis. Overall doing better with back pain. Still having knee pain. Ortho told that they would refer to pain management but she has not yet gone for this consult. Aching more in the right knee and increase pain with walking. Has had 2 injections in the knee which has not helped very much. She has decided to not to pursue surgery. Pain in the knee is \"bad today\"  Discussed getting toradol injection. Pain is 5-6/10. Taking tylenol for the pain which helps some. Symptoms onset: problem is longstanding. Rheumatological ROS: stable, mild-to-moderate joint symptoms intermittently, reasonably well controlled by PRN meds. Response to treatment plan: stable and intermittent. 3. Applicable records from prior treatment providers are apart of [facility] under the media tab. 4. Diagnostic, therapeutic and laboratory results are available in the [address] [place] [address] chart. 5. Consultation notes are available for review in the media tab of the [address] [place] [address] chart.   6. Treatment goals include pain control so that the pt may be as active and function with her daily activities and improved comfort level. Previously pt has been limited by pain. 7. The risks and benefits of treatment has been discussed at this office visit with the pt. She understands that the medication has addicting potential.  Additionally the pt has been advised that narcotic pain medication may impair mental and/or physical ability required for performance of tasks such as driving or operating any other machinery. 8. Pt has an updated signed pain contract on file and can be found under the FYI section of the [facility] chart. 9. The pain contract is reviewed. Pain medication will be continued at the previous dosage. Urine drug screening will not be done today. Diagnostic studies are not indicated at this time. Interventional procedure are not indicated at this time. 10. Medication prescibed is tylenol #3.  has been reviewed at this OV by me. 11. Patient instructions have been reviewed in detail as outlined above and in the pain contract. 12. Re-eval is planned for 3 months. He reports the following adverse side effects: none. Aberrant behaviors: None. Concomitant use of a benzodiazepine: yes  Will continue on current dose of medications as coarse has been stable and there are no signs of overuse, misuese, diversion, or concerning side effects  Naloxone prescription is warranted. It has been provided or is already on file.      Patient Active Problem List   Diagnosis Code    Hypokalemia E87.6    Hiatal hernia K44.9    Anxiety F41.9    S/P hysterectomy Z90.710    Aortic stenosis, mild I35.0    Spinal stenosis M48.00    S/P foot surgery Z98.890    Environmental allergies Z91.09    S/P cardiac catheterization Z98.890    Hypovitaminosis D E55.9    Chronic ischemic heart disease I25.9    Mixed hyperlipidemia E78.2    Chronic diastolic heart failure (HCC) I50.32    Chest pain at rest R07.9    Bifascicular bundle branch block--RBBB,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    Atypical chest pain R07.89 Essential hypertension I10    Gastroesophageal reflux disease without esophagitis K21.9    Atherosclerosis of native coronary artery without angina pectoris--diffuse small vsl disease via cath cath 2009--RCA PDA/ROSA I25.10    Chronic anxiety F41.9    Encounter for medication monitoring Z51.81    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 M47.815    Class 2 obesity due to excess calories with serious comorbidity and body mass index (BMI) of 38.0 to 38.9 in adult [id number]    Paroxysmal cardiac arrhythmia--PVC's,APC's,NSSVT I49.8    Severe obesity (BMI 35.0-39. 9) with comorbidity ([address] E66.01    Chest pain with normal angiography--2002;normal NST 2018 R07.9    Primary osteoarthritis of left shoulder M19.012    Pneumonia J18.9    Hypoxia R09.02    Respiratory failure with hypoxia (HCC) J96.91       Current Outpatient Medications   Medication Sig Dispense Refill    guaiFENesin-dextromethorphan (G-FENESIN DM)  mg tab tablet Take 1 Tablet by mouth every four (4) hours as needed for Cough. Take 1 tablet by mouth every 4 hours as needed for cough      OXYGEN-AIR DELIVERY SYSTEMS 1-2 L/min by Nasal route continuous. wear 1-2 liters/min continuously      alum-mag hydroxide-simeth (MYLANTA) 200-200-20 mg/5 mL susp Take 30 mL by mouth nightly as needed for Indigestion or Heartburn. clonazePAM (KlonoPIN) 1 mg tablet TAKE 1/2 TO 1 TABLET EVERY MORNING AND AS NEEDED FOR ANXIETY, AND TAKE 1 TABLET AT BEDTIME FOR SLEEP 60 Tablet 1    furosemide (LASIX) 80 mg tablet TAKE 1 TABLET BY MOUTH EVERY MORNING AND TAKE 1/2 TABLET EVERY EVENING 135 Tablet 3    rosuvastatin (CRESTOR) 20 mg tablet Take 1 Tablet by mouth nightly. 30 Tablet 11    potassium chloride SR (KLOR-CON 10) 10 mEq tablet TAKE 3 TABS BY MOUTH 2 TIMES PER  Tablet 3    losartan (COZAAR) 100 mg tablet TAKE 1 TABLET BY MOUTH EVERY DAY 90 Tablet 3    amLODIPine (NORVASC) 5 mg tablet Take 5 mg by mouth two (2) times a day. pantoprazole (PROTONIX) 40 mg tablet Take 1 Tablet by mouth two (2) times a day. 180 Tablet 3    nitroglycerin (NITROSTAT) 0.4 mg SL tablet DISSOLVE 1 TAB BY SUBLINGUAL ROUTE EVERY 5 MINUTES AS NEEDED. (Patient taking differently: No sig reported) 25 Tablet 0    albuterol (PROVENTIL VENTOLIN) 2.5 mg /3 mL (0.083 %) nebu 3 mL by Nebulization route every four (4) hours as needed for Shortness of Breath or Wheezing. 90 Nebule 5    aspirin delayed-release 81 mg tablet Take 81 mg by mouth daily. metoprolol tartrate (LOPRESSOR) 25 mg tablet TAKE 1 TABLET BY MOUTH TWICE A DAY 1 AT 9AM AND 1 AT 9PM (Patient taking differently: Take 25 mg by mouth two (2) times a day. Indications: high blood pressure) 180 Tablet 3    isosorbide mononitrate ER (IMDUR) 30 mg tablet TAKE 1 TABLET BY MOUTH EVERY DAY (Patient taking differently: Take 30 mg by mouth daily.  Indications: prevention of anginal chest pain associated with coronary artery disease) 90 Tablet 3       Allergies   Allergen Reactions    Bactrim [Sulfamethoxazole-Trimethoprim] Unknown (comments)     Pt does not recall    Darvocet A500 [Propoxyphene N-Acetaminophen] Itching           Past Medical History:   Diagnosis Date    Anxiety     Aortic stenosis 03/03/2010    Aortic stenosis     Arrhythmia     MURMUR    Arthritis     SPINAL STENOSIS    Atherosclerosis of coronary artery     Biliary dyskinesia     Cancer ([facility] 2023    liver    CHF (congestive heart failure) ([facility] 03/03/2010    CHF (congestive heart failure) ([facility] 01/2002    Chronic heart failure ([facility] 1/2002; 3/3/2010    chronic diastolic heart failure    Chronic pain     LOWER BACK, RIGHT KNEE    Environmental allergies 03/03/2010    GERD (gastroesophageal reflux disease) 03/03/2010    Hiatal hernia 03/03/2010    High cholesterol 03/03/2010    HTN (hypertension) 03/03/2010    Hypokalemia 03/03/2010    Ischemic heart disease, chronic     Multiple gallstones     Obese     Psychiatric disorder     anxiety    S/P hysterectomy 03/03/2010    Spinal stenosis 03/03/2010           Past Surgical History:   Procedure Laterality Date    CARDIAC CATHETERIZATION  01/2002    normal coronaries    DECOMPRESS DISC RF LUMBAR  07/2007    HX BACK SURGERY  5/1/2014    HX BREAST LUMPECTOMY Left 1989    benign left breast    HX BUNIONECTOMY      HX BUNIONECTOMY      HX HEART CATHETERIZATION  3/3/10    HX HYSTERECTOMY  1978    HX LUMBAR DISKECTOMY      HX ORTHOPAEDIC Bilateral     BUNIONECTOMY    HX ORTHOPAEDIC Right     WRIST FX    HX OTHER SURGICAL  10/12/2015    mole removed from right side of face by Dr. Mariah Simpson FLX DX W/COLLJ Valentin Basilio PFRMD  [id number]    Dr Reida Hodgkin GI ENDOSCOPY,BIOPSY  2/27/2019                Family History   Problem Relation Age of Onset    Hypertension Mother     Heart Disease Father     Stroke Sister     Heart Disease Sister     Heart Disease Sister     Cancer Brother         LUNG    Cancer Brother         PROSTATE    Hypertension Brother     No Known Problems Brother     Lung Disease Brother     Heart Attack Brother     Lung Disease Brother     Stroke Brother     Stroke Daughter 47    No Known Problems Daughter     Anesth Problems Neg Hx        Social History     Tobacco Use    Smoking status: Never    Smokeless tobacco: Never   Substance Use Topics    Alcohol use: No     Alcohol/week: 0.0 standard drinks        Lab Results   Component Value Date/Time    WBC 8.1 02/19/2023 06:02 AM    HGB 13.0 02/19/2023 06:02 AM    HCT 40.1 02/19/2023 06:02 AM    PLATELET 018 81/32/6595 06:02 AM    MCV 89.1 02/19/2023 06:02 AM     Lab Results   Component Value Date/Time    Cholesterol, total 199 02/14/2023 12:57 PM    HDL Cholesterol 69 02/14/2023 12:57 PM    LDL, calculated 108.2 (H) 02/14/2023 12:57 PM    Triglyceride 109 02/14/2023 12:57 PM    CHOL/HDL Ratio 2.9 02/14/2023 12:57 PM     Lab Results   Component Value Date/Time    TSH 2.470 05/15/2017 03:04 PM      Lab Results   Component Value Date/Time Sodium 143 02/21/2023 04:12 AM    Potassium 3.6 02/21/2023 04:12 AM    Chloride 104 02/21/2023 04:12 AM    CO2 36 (H) 02/21/2023 04:12 AM    Anion gap 3 (L) 02/21/2023 04:12 AM    Glucose 87 02/21/2023 04:12 AM    BUN 22 (H) 02/21/2023 04:12 AM    Creatinine 1.07 (H) 02/21/2023 04:12 AM    BUN/Creatinine ratio 21 (H) 02/21/2023 04:12 AM    GFR est AA >60 08/11/2022 12:48 PM    GFR est non-AA 53 (L) 08/11/2022 12:48 PM    Calcium 8.9 02/21/2023 04:12 AM    Bilirubin, total 0.3 02/19/2023 06:02 AM    ALT (SGPT) 30 02/19/2023 06:02 AM    Alk. phosphatase 109 02/19/2023 06:02 AM    Protein, total 8.3 (H) 02/19/2023 06:02 AM    Albumin 3.4 (L) 02/19/2023 06:02 AM    Globulin 4.9 (H) 02/19/2023 06:02 AM    A-G Ratio 0.7 (L) 02/19/2023 06:02 AM      Lab Results   Component Value Date/Time    Hemoglobin A1c 5.4 04/16/2014 12:20 PM    Hemoglobin A1c (POC) 5.5 11/26/2014 12:12 PM         Review of Systems   Constitutional:  Negative for malaise/fatigue. HENT:  Negative for congestion. Eyes:  Negative for blurred vision. Respiratory:  Negative for cough and shortness of breath. Cardiovascular:  Negative for chest pain, palpitations and leg swelling. Gastrointestinal:  Negative for abdominal pain, constipation and heartburn. Genitourinary:  Negative for dysuria, frequency and urgency. Neurological:  Negative for dizziness, tingling and headaches. Endo/Heme/Allergies:  Negative for environmental allergies. Psychiatric/Behavioral:  Negative for depression. The patient does not have insomnia. Physical Exam  Vitals and nursing note reviewed. Constitutional:       Appearance: Normal appearance. She is well-developed.       Comments: /72   Pulse 90   Temp 98.1 °F (36.7 °C)   Resp 12   Ht 4' 7\" (1.397 m)   Wt 165 lb 3.2 oz (74.9 kg)   LMP  (LMP Unknown)   SpO2 98%   BMI 38.40 kg/m²    HENT:      Right Ear: Tympanic membrane and ear canal normal.      Left Ear: Tympanic membrane and ear canal normal.   Neck:      Thyroid: No thyromegaly. Cardiovascular:      Rate and Rhythm: Normal rate and regular rhythm. Heart sounds: Normal heart sounds. Pulmonary:      Effort: Pulmonary effort is normal.      Breath sounds: Normal breath sounds. Abdominal:      General: Bowel sounds are normal.      Palpations: Abdomen is soft. There is no mass. Tenderness: There is no abdominal tenderness. Musculoskeletal:         General: Normal range of motion. Cervical back: Normal range of motion and neck supple. Right lower leg: No edema. Left lower leg: No edema. Lymphadenopathy:      Cervical: No cervical adenopathy. Skin:     General: Skin is warm and dry. Neurological:      General: No focal deficit present. Mental Status: She is alert and oriented to person, place, and time. Psychiatric:         Mood and Affect: Mood normal.     ASSESSMENT and PLAN  Diagnoses and all orders for this visit:    1. Encounter for examination following treatment at hospital  2. Acute hypoxemic respiratory failure (HCC)//  3. Chronic respiratory failure with hypoxia, on home oxygen therapy ([address]    4. DNR (do not resuscitate) discussion  DNR signed and given to the pt to place on her bedroom door or refrigerator at home. 5. Essential hypertension  Stable     6. Mixed hyperlipidemia    7. Atherosclerosis of native coronary artery of native heart without angina pectoris  8. Chronic diastolic heart failure (HCC)  Stable     9. Intrahepatic cholangiocarcinoma (HCC)  Abd pain has been tolerable     10. Gastro-esophageal reflux disease without esophagitis  Stable     11. Chronic anxiety  Stable     12. Primary osteoarthritis involving multiple joints  Stable     13. Encounter for medication monitoring      Follow-up and Dispositions    Return in about 3 weeks (around 3/22/2023).        current treatment plan is effective, no change in therapy  reviewed diet, exercise and weight control  cardiovascular risk and specific lipid/LDL goals reviewed  reviewed medications and side effects in detail    I have discussed diagnosis listed in this note with pt and/or family. I have discussed treatment plans and options and the risk/benefit analysis of those options, including safe use of medications and possible medication side effects. Through the use of shared decision making we have agreed to the above plan. The patient has received an after-visit summary and questions were answered concerning future plans and follow up. Advise pt of any urgent changes then to proceed to the ER.

## 2023-03-01 NOTE — PROGRESS NOTES
Patient identified by 2 identifiers. Chief Complaint   Patient presents with    Hospital Follow Up     1. Have you been to the ER, urgent care clinic since your last visit? Hospitalized since your last visit? Yes Where[facility] 2. Have you seen or consulted any other health care providers outside of the [address] [place] [address] since your last visit? Include any pap smears or colon screening.  No

## 2023-03-01 NOTE — HOME HEALTH
Subjective: \"My knee is acting up right now. \"  Falls since last visit NO(if yes complete the Fall Tracking Form and include bsrifallreport):   Caregiver involvement changes: No  Home health supplies by type and quantity ordered/delivered this visit include: n/a    Clinician asked if patient has had any physician contact since last home care visit and patient states: NO  Clinician asked if patient has any new or changed medications and patient states:  NO   If Yes, were medications reconciled? N/A   Was the certifying physician notified of changes in medications? N/A     Clinical assessment (what this visit means for the patient overall and need for ongoing skilled care) and progress or lack of progress towards SPECIFIC goals: Pt at risk for rehospitalization r/t fall risk, CHF exacerbation, risk of pneumonia.     Written Teaching Material Utilized: N/A    Interdisciplinary communication with: N/A for the purpose of n/a    Discharge planning as follows: Per physician order and When goals are met    Specific plan for next visit: Assessment, education as needed

## 2023-03-02 ENCOUNTER — PATIENT OUTREACH (OUTPATIENT)
Dept: CASE MANAGEMENT | Age: 88
End: 2023-03-02

## 2023-03-02 NOTE — PROGRESS NOTES
Care Transitions Follow Up Call    Patient Current Location: [facility] Transition Nurse (CTN) contacted the patient by telephone to follow up after admission on 2/18/2023. Reports breathing is better. Compliant with 1.5 L of oxygen    Addressed changes since last contact: none  Follow up appointment completed? yes. Was follow up appointment scheduled within 7 days of discharge? no.     CTN reviewed medical action plan and red flags with patient and discussed any barriers to care and/or understanding of plan of care after discharge. Discussed appropriate site of care based on symptoms and resources available to patient including: PCP. Patients top risk factors for readmission: medical condition-R knee pain    Interventions to address risk factors:  fu with ortho    [address] follow up appointment(s):   Future Appointments   Date Time Provider Kita Kim   3/3/2023 To Be Determined Emily Kirby RN [address] [place] [address]   3/3/2023 12:45 PM Tosin Bowling PT [address]   3/6/2023 To Be Determined Robert Marmolejo RN [facility]   3/6/2023 To Be Determined Vidya Sigala [facility]   3/9/2023 To Be Determined Robert Marmolejo RN [facility]   3/10/2023 To Be Determined Tosin Bowling PT [facility]   3/22/2023 10:40 AM Omar Davies MD [facility]     Non-[facility] follow up appointment(s): NA    CTN provided contact information for future needs. Plan for follow-up call in 7-10 days based on severity of symptoms and risk factors. Plan for next call:       Goals Addressed                   This Visit's Progress     Prevent complications post hospitalization.    On track       02/22/23  Absence of falls  Will report compliance with oxygen  Will attend fu appt with pcp  Will update AMD    03/02/23  Completed DNR form during PCP visit  Reports compliance with oxygen at 1.5 L  Attended fu appt with PCP  Reports R knee pain, applied heat  Participating in [facility] PT to improve strength and mobility

## 2023-03-03 ENCOUNTER — TELEPHONE (OUTPATIENT)
Dept: FAMILY MEDICINE CLINIC | Age: 88
End: 2023-03-03

## 2023-03-03 ENCOUNTER — HOME CARE VISIT (OUTPATIENT)
Dept: SCHEDULING | Facility: HOME HEALTH | Age: 88
End: 2023-03-03
Payer: MEDICARE

## 2023-03-03 ENCOUNTER — PATIENT OUTREACH (OUTPATIENT)
Dept: CASE MANAGEMENT | Age: 88
End: 2023-03-03

## 2023-03-03 VITALS
HEART RATE: 68 BPM | RESPIRATION RATE: 16 BRPM | OXYGEN SATURATION: 98 % | SYSTOLIC BLOOD PRESSURE: 120 MMHG | TEMPERATURE: 98.1 F | BODY MASS INDEX: 37.88 KG/M2 | DIASTOLIC BLOOD PRESSURE: 70 MMHG | WEIGHT: 163 LBS

## 2023-03-03 DIAGNOSIS — I10 ESSENTIAL HYPERTENSION: ICD-10-CM

## 2023-03-03 DIAGNOSIS — I25.10 ATHEROSCLEROSIS OF NATIVE CORONARY ARTERY OF NATIVE HEART WITHOUT ANGINA PECTORIS: ICD-10-CM

## 2023-03-03 PROCEDURE — G0151 HHCP-SERV OF PT,EA 15 MIN: HCPCS

## 2023-03-03 PROCEDURE — G0299 HHS/HOSPICE OF RN EA 15 MIN: HCPCS

## 2023-03-03 RX ORDER — METOPROLOL TARTRATE 25 MG/1
TABLET, FILM COATED ORAL
Qty: 60 TABLET | Refills: 11 | Status: SHIPPED | OUTPATIENT
Start: 2023-03-03

## 2023-03-03 RX ORDER — ISOSORBIDE MONONITRATE 30 MG/1
TABLET, EXTENDED RELEASE ORAL
Qty: 90 TABLET | Refills: 3 | Status: SHIPPED | OUTPATIENT
Start: 2023-03-03

## 2023-03-03 NOTE — ACP (ADVANCE CARE PLANNING)
Outbound call made to patient for follow up. Patient states she has no assistance with completing electronically. Patient agrees to request to complete at PCP appt 3/22/23. Message sent to PCP.

## 2023-03-03 NOTE — TELEPHONE ENCOUNTER
Bruno Fraire (PT) with [facility] would like for [place] Kristen to know patient had 10 out of 10 (r) knee pain is their anything she can prescribe for her she can be reached @ [phone]

## 2023-03-03 NOTE — HOME HEALTH
Skilled reason for admission/summary of clinical condition:  Katherine Barnett admitted to home care for respiratory failure. Nursing needed for assessment and education on disease process and oxygen therapy. Diagnosis: repiratory failure with unspecified hypoxia  Subjective: \"I just got off of oxygen about 3 months ago\"  Caregiver: freeman rincon is primary caregiver but pt lives with , another daughter and her granddaughter. Caregiver assists with: Meals, Transportation and Housekeeping Caregiver unable to assist with: n/a. Caregiver is available 24 hours/day Caregiver is present at this visit and did not participate with clinician. Medications reconciled and all medications are available in the home this visit. The following education was provided regarding medications: medication interactions and look alike medications. Patient/CG able to demonstrate knowledge through teach back with 75% percent accuracy. Medications are effective at this time. MD notified of any discrepancies/medication interactions n/a. A list of reconciled medications has been given to the patient/caregiver and a copy has been uploaded to media.   High alert medication teaching on aspirin (enter name of medication), anticoagulant therapy education; purpose, dose, and frequency, food/drug interactions, bleeding prevention strategies such as the use of a soft toothbrush, gentle flossing, use of electric razor, on the potential for increased bleeding from falls and lacerations and signs and symptoms of abnormal bleeding such as unexplained bruising, dizziness/lightheadedness, red or tarry looking stool, blood in urine, blood in vomit  High risk medication education also provided on klonopin including purpose, dose and frequency as well as the risk for drowsiness from the medication    Home health supplies by type and quantity ordered/delivered this visit include: n/a  Patient/caregiver instructed on plan of care and are agreeable to plan of care at this time. Clinician reviewed orientation to home health booklet with patient/caregiver including agency phone number, agency complaint process, state hotline number, as well as joint commission's quality hotline number. Consent forms signed. Patient at risk for falls Yes:   Recommended requesting PT/OT orders due to fall risk YES:   Patient response to recommended requesting of PT/OT orders: PT orders in place    Discharge planning discussed with patient and caregiver. Discharge planning as follows: Patient/Caregiver is knowledgeable in disease process, Per physician order and When goals are met Patient/caregiver did verbalize agreement with discharge planning. Clinical Assessment (What this means for the patient overall and need for ongoing skilled care):patient recentenly hospitalized for respiratory failure. patient has a history of CHF and was on oxygen therapy several months ago. patient is well known to this clinician from a previous admission with [facility]. patient at baseline has shortness of breath, knee pain, lower extremity edema and wore oxygen. reports she came off of the oxygen and sent her concentrator back appoximately 3 months ago. 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 patient discussed she is a DNR however she does not currently have a DDNR. patient and daughter instructed to speak to Dr. Lopez Began about this at her appointment on 3/1/23. both verbalize understanding. education the use of oxygen, changing the tubing, being mindful of tubing when ambulating, daily weights and record and when to report, notification of pain, respiratory distress s/s, s/s of bleeding related to aspirin and infection prevention as well as s/s of infection prevention. All education needs reinforced. VS within defined parameters, assessment completed    Written Teaching Material Utilized: medlist, home health handbook.     Specific plan for next visit: continue to reinforce all education    Plan of care and admission to home health status called to attending physician. The following practitioner has agreed to sign the ongoing Yina Jama WINSTON, and was notified of the following: visit frequency of 1w1, 2w2, 1w4, 3prn    Interdisciplinary communication with: Dipak Delcid PT for the purpose of OASIS collaboration    PCP: Edie Terry MD  Next scheduled doctor appointment: 3/1/23  Patient/Caregiver instructed to keep follow up appointment because lack of follow through with physician appointments could result in discontinuation of home care services for non-compliance. Patient/Caregiver verbalize knowledge of above through teach back with 75% percent accuracy. Emergency Preparedness: Patient/Caregiver instructed in the following:  Have one gallon of water per person for at least 3 days on hand. Have non-perishable food for at least 3 days that do not need to be cooked. Have flashlights and batteries. Charge your cell phones and any back up lithium batteries for your cell phones. Have 3+ days of back up oxygen in your home. Have a phone in your home that is hard wired and does not require power. Have medication for a week in your home. Make sure you have a caregiver in the home to provide care in case your home health nurse cannot get to your house. Make sure you have all of your paperwork i.e. written emergency preparedness plan, Identification, insurance cards, DME phone number, physician and pharmacy phone number, agency phone number, and your medications in one place for easy access and in a zip lock bag to protect them. Take your Admission Handbook, written emergency preparedness plan, written medication list and folder if you relocate in the event of an emergency, if possible. Call agency if you relocate so we can contact you. Patient/Caregiver verbalize knowledge of above through teach back with 75% percent accuracy.   IEP completed and uploaded to media.

## 2023-03-06 ENCOUNTER — HOME CARE VISIT (OUTPATIENT)
Dept: SCHEDULING | Facility: HOME HEALTH | Age: 88
End: 2023-03-06
Payer: MEDICARE

## 2023-03-06 VITALS
BODY MASS INDEX: 38.48 KG/M2 | WEIGHT: 165.57 LBS | HEART RATE: 76 BPM | RESPIRATION RATE: 16 BRPM | OXYGEN SATURATION: 97 % | TEMPERATURE: 98.5 F | DIASTOLIC BLOOD PRESSURE: 80 MMHG | SYSTOLIC BLOOD PRESSURE: 110 MMHG

## 2023-03-06 PROCEDURE — G0151 HHCP-SERV OF PT,EA 15 MIN: HCPCS

## 2023-03-06 NOTE — HOME HEALTH
Subjective: Patient states today is another bad day for her knee and she is in 10/10 pain. She wishes she could just cut her knee off. Falls since last visit NO(if yes complete the Fall Tracking Form and include bsrifallreport):  Caregiver involvement changes: no change  Home health supplies by type and quantity ordered/delivered this visit include: n/a    Clinician asked if patient has had any physician contact since last home care visit and patient states: NO  Clinician asked if patient has any new or changed medications and patient states:  NO    If Yes, were medications reconciled? YES    Was the certifying physician notified of changes in medications? N/A      Clinical assessment (what this visit means for the patient overall and need for ongoing skilled care) and progress or lack of progress towards SPECIFIC goals: Patient was on room air for entire session today and oxygen saturation was 96% at rest and went to 91% shortly after walking. Her gait was more unstable due to right knee pain. Further training is needed for safe use of stairs. Written Teaching Material Utilized: N/A    Interdisciplinary communication with: Dr. Martha Serna Physician for the purpose of notification of pain    Discharge planning as follows:  Will discharge when the patient has reached their maximum functional potential and maximum safety in their home and When goals are met    Specific plan for next visit: educate on safe ways to ascend/descend the stairs

## 2023-03-07 ENCOUNTER — HOME CARE VISIT (OUTPATIENT)
Dept: SCHEDULING | Facility: HOME HEALTH | Age: 88
End: 2023-03-07
Payer: MEDICARE

## 2023-03-07 PROCEDURE — G0300 HHS/HOSPICE OF LPN EA 15 MIN: HCPCS

## 2023-03-08 ENCOUNTER — PATIENT OUTREACH (OUTPATIENT)
Dept: CASE MANAGEMENT | Age: 88
End: 2023-03-08

## 2023-03-08 VITALS
RESPIRATION RATE: 18 BRPM | TEMPERATURE: 97.5 F | HEART RATE: 78 BPM | SYSTOLIC BLOOD PRESSURE: 130 MMHG | DIASTOLIC BLOOD PRESSURE: 70 MMHG

## 2023-03-08 NOTE — PROGRESS NOTES
Care Transitions Follow Up Call    Patient Current Location: [facility] Transition Nurse (CTN) contacted the patient by telephone to follow up after admission on 2/18/2023. Denies sob. Reports compliance with o2 with 1.5 L 93-97% oxygenation    Addressed changes since last contact: none    CTN reviewed medical action plan and red flags with patient and discussed any barriers to care and/or understanding of plan of care after discharge. Discussed appropriate site of care based on symptoms and resources available to patient including: PCP and Home Health. Patients top risk factors for readmission:  NA    Interventions to address risk factors:  NA    [facility] follow up appointment(s):   Future Appointments   Date Time Provider Kita Kim   3/9/2023 To Be Determined Ursula Fraire 301 [facility] [address]   3/10/2023  2:30 PM Len Hernandez PT [facility]   3/14/2023 To Be Determined Tanisha Officer, EDMAR [address]   3/21/2023 To Be Determined Tanisha Officer, EDMAR [address] [place] [address]   3/22/2023 10:40 AM Marleny Malhotra MD [facility] BS [facility]   3/28/2023 To Be Determined Tanisha Officer, RN [address] [place] [address]   4/4/2023 To Be Determined Tanisha OfficerEDMAR [facility]     Non-[facility] follow up appointment(s): NA    CTN provided contact information for future needs. Plan for follow-up call in 10-14 days based on severity of symptoms and risk factors. CTN contact information provided to the patient   Plan for next call:       Goals Addressed                   This Visit's Progress     Prevent complications post hospitalization.           02/22/23  Absence of falls  Will report compliance with oxygen  Will attend fu appt with pcp  Will update AMD    03/02/23  Completed DNR form during PCP visit  Reports compliance with oxygen at 1.5 L  Attended fu appt with PCP  Reports R knee pain, applied heat  Participating in [facility] PT to improve strength and mobility    03/08/23  Compliant with O2

## 2023-03-09 ENCOUNTER — HOME CARE VISIT (OUTPATIENT)
Dept: SCHEDULING | Facility: HOME HEALTH | Age: 88
End: 2023-03-09
Payer: MEDICARE

## 2023-03-09 VITALS
TEMPERATURE: 97.9 F | RESPIRATION RATE: 16 BRPM | SYSTOLIC BLOOD PRESSURE: 136 MMHG | HEART RATE: 62 BPM | DIASTOLIC BLOOD PRESSURE: 68 MMHG | OXYGEN SATURATION: 99 %

## 2023-03-09 PROCEDURE — G0300 HHS/HOSPICE OF LPN EA 15 MIN: HCPCS

## 2023-03-09 NOTE — HOME HEALTH
Subjective: \"I'm doing alright, except this knee. \"  Falls since last visit NO(if yes complete the Fall Tracking Form and include bsrifallreport):   Caregiver involvement changes: No  Home health supplies by type and quantity ordered/delivered this visit include: n/a    Clinician asked if patient has had any physician contact since last home care visit and patient states: NO  Clinician asked if patient has any new or changed medications and patient states:  NO   If Yes, were medications reconciled? N/A   Was the certifying physician notified of changes in medications? N/A     Clinical assessment (what this visit means for the patient overall and need for ongoing skilled care) and progress or lack of progress towards SPECIFIC goals: Pt at risk for rehospitalization r/t fall risk, dyspnea, pain.     Written Teaching Material Utilized: N/A    Interdisciplinary communication with: Dr. Fabio Cordero MD Physician via In Basket for the purpose of notification of pt's report of 9/10 pain to right knee this visit    Discharge planning as follows: Per physician order and When goals are met    Specific plan for next visit: Assessment, education as needed

## 2023-03-10 ENCOUNTER — HOME CARE VISIT (OUTPATIENT)
Dept: SCHEDULING | Facility: HOME HEALTH | Age: 88
End: 2023-03-10
Payer: MEDICARE

## 2023-03-10 VITALS
SYSTOLIC BLOOD PRESSURE: 130 MMHG | OXYGEN SATURATION: 97 % | RESPIRATION RATE: 16 BRPM | HEART RATE: 69 BPM | DIASTOLIC BLOOD PRESSURE: 70 MMHG | TEMPERATURE: 98 F

## 2023-03-10 PROCEDURE — G0151 HHCP-SERV OF PT,EA 15 MIN: HCPCS

## 2023-03-10 NOTE — Clinical Note
Patient discharged from PT today with max potential achieved. She was educated that using a FWW is needed but she does not have the space in the hallway to use one and she was told to ask family to move the boxes. Also, per recommendation from Dr. Karan Livingston, patient was educated on over-the-counter and Voltaren gel for use on her knees. Medication directions for use were reviewed with patient from the Voltaren gel website and it was written down so that she can have her daughter go purchase some.

## 2023-03-11 NOTE — HOME HEALTH
Subjective: patient notes that on cold, rainy days her knee pain is always worse. She finds that the pain in her name is keeping her in her bed most of the day, except for when she needs to come to the kitchen. She also notes that she has some AFOs that her podiatrist had made for her but she doesn't know how to wear them. Falls since last visit NO(if yes complete the Fall Tracking Form and include bsrifallreport):  Caregiver involvement changes: no change  Home health supplies by type and quantity ordered/delivered this visit include: n/a    Clinician asked if patient has had any physician contact since last home care visit and patient states: NO  Clinician asked if patient has any new or changed medications and patient states:  NO    If Yes, were medications reconciled? YES    Was the certifying physician notified of changes in medications? N/A      Clinical assessment (what this visit means for the patient overall and need for ongoing skilled care) and progress or lack of progress towards SPECIFIC goals: patient has been seen for three weeks skilled home health, physical therapy with focus on gait training, balance, training, and setting up a home exercise program for strengthening the lower extremities. Patient is now able to ambulate throughout her house and have steady oxygen levels above 94% all of the time. Her score on Tinetti has improved from 12/28 to 14/28 today. The TUG was attempted today, but not able to be scored due to patient becoming distracted and not sitting down at the end as well as spending extra time to turn in reposition her chair before she sits down. Due to pain levels in her name a repeat performance of this was not performed. patient demonstrates that she is at her baseline in regards to mobility throughout her house. She continues to ambulate with a wide base single point cane. Throughout her house.  She would be safer using a walker, but it is not feasible at this time because of bins and containers in her hallway, making the space very narrow. At this time patient has reached her max potential with physical therapy intervention. Patient will be discharge from home health physical therapy. Written Teaching Material Utilized: written home exercise program.      Interdisciplinary communication with: Davis Rashid RN for the purpose of POC collaboration    Discharge planning as follows: Discharge from Physical Therapy today. Discharge instructions signed and patient/caregiver are in agreement with plan of care.       Specific plan for next visit: n/a

## 2023-03-11 NOTE — HOME HEALTH
Subjective: I have arthritis and my knee still hurting  Falls since last visit NO(if yes complete the Fall Tracking Form and include bsrifallreport):   Caregiver involvement changes: none  Home health supplies by type and quantity ordered/delivered this visit include: none    Does the patient have any new or changed medications? NO   If Yes, were medications reconciled? NO   Was the certifying physician notified of changes in medications? NO     Clinical assessment (what this visit means for the patient overall and need for ongoing skilled care) and progress or lack of progress towards SPECIFIC goals: patient remains risk of hospiltilization due to resp distress. Patient educated to contact [address] [place] [address] and informed them she needs the humidifier to add on due to nose being dry. Patient states she will call the company.   Interdisciplinary communication: na  Discharge planning as follows: Per physician order and When goals are met  Specific plan for next visit: Assessment and education

## 2023-03-14 ENCOUNTER — HOME CARE VISIT (OUTPATIENT)
Dept: HOME HEALTH SERVICES | Facility: HOME HEALTH | Age: 88
End: 2023-03-14
Payer: MEDICARE

## 2023-03-15 VITALS
SYSTOLIC BLOOD PRESSURE: 128 MMHG | HEART RATE: 62 BPM | OXYGEN SATURATION: 98 % | DIASTOLIC BLOOD PRESSURE: 52 MMHG | TEMPERATURE: 98.3 F | RESPIRATION RATE: 10 BRPM
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
## 2023-03-16 ENCOUNTER — HOME CARE VISIT (OUTPATIENT)
Dept: SCHEDULING | Facility: HOME HEALTH | Age: 88
End: 2023-03-16
Payer: MEDICARE

## 2023-03-16 PROCEDURE — G0299 HHS/HOSPICE OF RN EA 15 MIN: HCPCS

## 2023-03-16 NOTE — ED NOTES
Patient presents with c/o LUQ abdominal/epigastric pain. Denies nausea/vomiting. Skin warm and dry to touch. No acute respiratory distress. Emergency Department Nursing Plan of Care       The Nursing Plan of Care is developed from the Nursing assessment and Emergency Department Attending provider initial evaluation. The plan of care may be reviewed in the ED Provider note.     The Plan of Care was developed with the following considerations:   Patient / Family readiness to learn indicated by:verbalized understanding  Persons(s) to be included in education: patient  Barriers to Learning/Limitations:No    Signed     Ángel Phillips RN    2/5/2018   2:42 PM HE

## 2023-03-21 ENCOUNTER — PATIENT OUTREACH (OUTPATIENT)
Dept: CASE MANAGEMENT | Age: 88
End: 2023-03-21

## 2023-03-21 NOTE — PROGRESS NOTES
Patient has graduated from the Transitions of Care Coordination  program on 3/21/2023. Patient/family has the ability to self-manage at this time Care management goals have been completed. Patient was not referred to the [facility] team for further management. Goals Addressed                   This Visit's Progress     COMPLETED: Prevent complications post hospitalization. 02/22/23  Absence of falls  Will report compliance with oxygen  Will attend fu appt with pcp  Will update AMD    03/02/23  Completed DNR form during PCP visit  Reports compliance with oxygen at 1.5 L  Attended fu appt with PCP  Reports R knee pain, applied heat  Participating in [facility] PT to improve strength and mobility    03/08/23  Compliant with O2    03/21/23  DNR form updated              Patient has Care Transition Nurse's contact information for any further questions, concerns, or needs.   Patients upcoming visits:    Future Appointments   Date Time Provider Kita Kim   3/22/2023 10:40 AM Elsi Chavez MD [facility] BS [facility]   3/23/2023 To Be Determined Mariaelena Wild RN [address]   3/28/2023 To Be Determined Mariaelena Wild RN [address] [place] [address]   4/4/2023 To Be Determined Mariaelena Wild RN [address]

## 2023-03-22 ENCOUNTER — OFFICE VISIT (OUTPATIENT)
Dept: FAMILY MEDICINE CLINIC | Age: 88
End: 2023-03-22
Payer: MEDICARE

## 2023-03-22 VITALS
BODY MASS INDEX: 37.86 KG/M2 | HEART RATE: 64 BPM | SYSTOLIC BLOOD PRESSURE: 127 MMHG | RESPIRATION RATE: 12 BRPM | HEIGHT: 55 IN | DIASTOLIC BLOOD PRESSURE: 74 MMHG | WEIGHT: 163.6 LBS | OXYGEN SATURATION: 98 % | TEMPERATURE: 98 F

## 2023-03-22 VITALS
RESPIRATION RATE: 12 BRPM | OXYGEN SATURATION: 97 % | HEART RATE: 70 BPM | DIASTOLIC BLOOD PRESSURE: 58 MMHG | TEMPERATURE: 98.2 F | SYSTOLIC BLOOD PRESSURE: 134 MMHG

## 2023-03-22 DIAGNOSIS — I25.10 ATHEROSCLEROSIS OF NATIVE CORONARY ARTERY OF NATIVE HEART WITHOUT ANGINA PECTORIS: ICD-10-CM

## 2023-03-22 DIAGNOSIS — I10 ESSENTIAL HYPERTENSION: Primary | ICD-10-CM

## 2023-03-22 DIAGNOSIS — E78.2 MIXED HYPERLIPIDEMIA: ICD-10-CM

## 2023-03-22 DIAGNOSIS — Z51.81 ENCOUNTER FOR MEDICATION MONITORING: ICD-10-CM

## 2023-03-22 DIAGNOSIS — I50.32 CHRONIC DIASTOLIC HEART FAILURE (HCC): ICD-10-CM

## 2023-03-22 DIAGNOSIS — Z99.81 CHRONIC RESPIRATORY FAILURE WITH HYPOXIA, ON HOME OXYGEN THERAPY (HCC): ICD-10-CM

## 2023-03-22 DIAGNOSIS — J96.11 CHRONIC RESPIRATORY FAILURE WITH HYPOXIA, ON HOME OXYGEN THERAPY (HCC): ICD-10-CM

## 2023-03-22 DIAGNOSIS — K21.9 GASTRO-ESOPHAGEAL REFLUX DISEASE WITHOUT ESOPHAGITIS: ICD-10-CM

## 2023-03-22 DIAGNOSIS — C22.1 INTRAHEPATIC CHOLANGIOCARCINOMA (HCC): ICD-10-CM

## 2023-03-22 DIAGNOSIS — M15.9 PRIMARY OSTEOARTHRITIS INVOLVING MULTIPLE JOINTS: ICD-10-CM

## 2023-03-22 DIAGNOSIS — F41.9 CHRONIC ANXIETY: ICD-10-CM

## 2023-03-22 PROCEDURE — 3074F SYST BP LT 130 MM HG: CPT | Performed by: FAMILY MEDICINE

## 2023-03-22 PROCEDURE — G8536 NO DOC ELDER MAL SCRN: HCPCS | Performed by: FAMILY MEDICINE

## 2023-03-22 PROCEDURE — G8427 DOCREV CUR MEDS BY ELIG CLIN: HCPCS | Performed by: FAMILY MEDICINE

## 2023-03-22 PROCEDURE — G8510 SCR DEP NEG, NO PLAN REQD: HCPCS | Performed by: FAMILY MEDICINE

## 2023-03-22 PROCEDURE — 99214 OFFICE O/P EST MOD 30 MIN: CPT | Performed by: FAMILY MEDICINE

## 2023-03-22 PROCEDURE — G8417 CALC BMI ABV UP PARAM F/U: HCPCS | Performed by: FAMILY MEDICINE

## 2023-03-22 PROCEDURE — 1090F PRES/ABSN URINE INCON ASSESS: CPT | Performed by: FAMILY MEDICINE

## 2023-03-22 PROCEDURE — 1111F DSCHRG MED/CURRENT MED MERGE: CPT | Performed by: FAMILY MEDICINE

## 2023-03-22 PROCEDURE — 3078F DIAST BP <80 MM HG: CPT | Performed by: FAMILY MEDICINE

## 2023-03-22 PROCEDURE — G0463 HOSPITAL OUTPT CLINIC VISIT: HCPCS | Performed by: FAMILY MEDICINE

## 2023-03-22 PROCEDURE — G8400 PT W/DXA NO RESULTS DOC: HCPCS | Performed by: FAMILY MEDICINE

## 2023-03-22 PROCEDURE — 1123F ACP DISCUSS/DSCN MKR DOCD: CPT | Performed by: FAMILY MEDICINE

## 2023-03-22 PROCEDURE — 1101F PT FALLS ASSESS-DOCD LE1/YR: CPT | Performed by: FAMILY MEDICINE

## 2023-03-22 RX ORDER — CLONAZEPAM 1 MG/1
TABLET ORAL
Qty: 60 TABLET | Refills: 1 | Status: SHIPPED | OUTPATIENT
Start: 2023-03-22

## 2023-03-22 RX ORDER — AMMONIUM LACTATE 12 G/100G
1 LOTION TOPICAL 2 TIMES DAILY
COMMUNITY
Start: 2023-02-24

## 2023-03-22 RX ORDER — LOSARTAN POTASSIUM 100 MG/1
100 TABLET ORAL DAILY
Qty: 90 TABLET | Refills: 3 | Status: SHIPPED | OUTPATIENT
Start: 2023-03-22

## 2023-03-22 NOTE — PROGRESS NOTES
HISTORY OF PRESENT ILLNESS  Sivan Hernandez is a 82 y.o. female. HPI   1 month follow up. Overall she is feeling better. Less SOB. Sshe is not wearing her O2. SOB at her usual baseline. Hospital diagnosis on last month: Acute hypoxic respiratory failure, stable on supplemental oxygen at home but she does have it on now at her visit. Took it off to come to visit d/t not feeling like having to bring oxygen tank. She feels that she does not need it all the time. History of hypertension  History of diastolic heart failure  -CTA chest: reviewed,  no acute pathology, no PE  -Chest x-ray 2/18/2023 with interstitial prominent suggesting pulmonary vascular congestion or atypical infectious process  -TTE reviewed with EF 65 to 70% normal wall motion normal diastolic function  -Patient underwent 6-minute walk test and required home oxygen  -Respiratory viral panel positive for rhinovirus enterovirus. 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 She has met with oncology and decided not to proceed with systemic therapy and it has been determined to be inoperable. Has good days and bad days. Stress with her family has her feeling anxious most of the time still. Cardiovascular Review:  The patient has hypertension, hyperlipidemia, chronic diastolic heart failure, and obesity. Hx also of aortic stenosis. Diet and Lifestyle: generally follows a low fat low cholesterol diet, generally follows a low sodium diet, sedentary. Pertinent ROS: taking medications as instructed, no medication side effects noted, no TIA's, no chest pain on exertion, mild swelling of ankles, no orthostatic dizziness or lightheadedness, no palpitations,      She has notice feeling more SOB over the past several weeks. No chest pain associated with feeling SOB. Has SOB that comes on with just sitting. Not sure if related with increase anxiety.   Will last for several minutes and then seem to get better. Was only getting the SOb with she was walking. No increased swelling. Compliant with medications. No cough or chest congestion noted. Home BP Monitoring: is not measured at home. Anxiety Review:  Patient is seen for anxiety. Ongoing symptoms include: increased anxiety still related to family issues. Patient denies: palpitations, sweating, chest pain, shortness of breath. Reported side effects from the treatment: none. Encounter for pain management. 1./2. Medical history/Past medical History  Chronic Pain:  Osteoarthritis:  Patient has osteoarthritis. Overall doing better with back pain. Still having knee pain. Ortho told that they would refer to pain management but she has not yet gone for this consult. Aching more in the right knee and increase pain with walking. Has had 2 injections in the knee which has not helped very much. She has decided to not to pursue surgery. Pain in the knee is \"bad today\"  Discussed getting toradol injection. Pain is 5-6/10. Taking tylenol for the pain which helps some. Symptoms onset: problem is longstanding. Rheumatological ROS: stable, mild-to-moderate joint symptoms intermittently, reasonably well controlled by PRN meds. Response to treatment plan: stable and intermittent. 3. Applicable records from prior treatment providers are apart of [facility] under the media tab. 4. Diagnostic, therapeutic and laboratory results are available in the [facility] chart. 5. Consultation notes are available for review in the media tab of the [facility] chart. 6. Treatment goals include pain control so that the pt may be as active and function with her daily activities and improved comfort level. Previously pt has been limited by pain. 7. The risks and benefits of treatment has been discussed at this office visit with the pt.   She understands that the medication has addicting potential.  Additionally the pt has been advised that narcotic pain medication may impair mental and/or physical ability required for performance of tasks such as driving or operating any other machinery. 8. Pt has an updated signed pain contract on file and can be found under the FYI section of the [facility] chart. 9. The pain contract is reviewed. Pain medication will be continued at the previous dosage. Urine drug screening will not be done today. Diagnostic studies are not indicated at this time. Interventional procedure are not indicated at this time. 10. Medication prescibed is tylenol #3 but she has not been using it. 11. Patient instructions have been reviewed in detail as outlined above and in the pain contract. 12. Re-eval is planned for 3 months. He reports the following adverse side effects: none. Aberrant behaviors: None. Concomitant use of a benzodiazepine: yes  Will continue on current dose of medications as coarse has been stable and there are no signs of overuse, misuese, diversion, or concerning side effects  Naloxone prescription is warranted. It has been provided or is already on file.      Patient Active Problem List   Diagnosis Code    Hypokalemia E87.6    Hiatal hernia K44.9    Anxiety F41.9    S/P hysterectomy Z90.710    Aortic stenosis, mild I35.0    Spinal stenosis M48.00    S/P foot surgery Z98.890    Environmental allergies Z91.09    S/P cardiac catheterization Z98.890    Hypovitaminosis D E55.9    Chronic ischemic heart disease I25.9    Mixed hyperlipidemia E78.2    Chronic diastolic heart failure (HCC) I50.32    Chest pain at rest R07.9    Bifascicular bundle branch block--RBBB,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    Atypical chest pain R07.89    Essential hypertension I10    Gastroesophageal reflux disease without esophagitis K21.9    Atherosclerosis of native coronary artery without angina pectoris--diffuse small vsl disease via cath cath 2009--RCA PDA/ROSA I25.10    Chronic anxiety F41.9    Encounter for medication monitoring Z51.81    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 M47.815    Class 2 obesity due to excess calories with serious comorbidity and body mass index (BMI) of 38.0 to 38.9 in adult [id number]    Paroxysmal cardiac arrhythmia--PVC's,APC's,NSSVT I49.8    Severe obesity (BMI 35.0-39. 9) with comorbidity ([facility] [address] E66.01    Chest pain with normal angiography--2002;normal NST 2018 R07.9    Primary osteoarthritis of left shoulder M19.012    Pneumonia J18.9    Hypoxia R09.02    Respiratory failure with hypoxia (HCC) J96.91       Current Outpatient Medications   Medication Sig Dispense Refill    losartan (COZAAR) 100 mg tablet Take 1 Tablet by mouth daily. 90 Tablet 3    clonazePAM (KlonoPIN) 1 mg tablet TAKE 1/2 TO 1 TABLET EVERY MORNING AND AS NEEDED FOR ANXIETY, AND TAKE 1 TABLET AT BEDTIME FOR SLEEP 60 Tablet 1    metoprolol tartrate (LOPRESSOR) 25 mg tablet TAKE 1 TABLET BY MOUTH TWICE A DAY 1 AT 9AM AND 1 AT 9PM 60 Tablet 11    isosorbide mononitrate ER (IMDUR) 30 mg tablet TAKE 1 TABLET BY MOUTH EVERY DAY 90 Tablet 3    guaiFENesin-dextromethorphan (G-FENESIN DM)  mg tab tablet Take 1 Tablet by mouth every four (4) hours as needed for Cough. Take 1 tablet by mouth every 4 hours as needed for cough      OXYGEN-AIR DELIVERY SYSTEMS 1-2 L/min by Nasal route continuous. wear 1-2 liters/min continuously      alum-mag hydroxide-simeth (MYLANTA) 200-200-20 mg/5 mL susp Take 30 mL by mouth nightly as needed for Indigestion or Heartburn. furosemide (LASIX) 80 mg tablet TAKE 1 TABLET BY MOUTH EVERY MORNING AND TAKE 1/2 TABLET EVERY EVENING 135 Tablet 3    rosuvastatin (CRESTOR) 20 mg tablet Take 1 Tablet by mouth nightly. 30 Tablet 11    potassium chloride SR (KLOR-CON 10) 10 mEq tablet TAKE 3 TABS BY MOUTH 2 TIMES PER  Tablet 3    amLODIPine (NORVASC) 5 mg tablet Take 5 mg by mouth two (2) times a day.       pantoprazole (PROTONIX) 40 mg tablet Take 1 Tablet by mouth two (2) times a day. 180 Tablet 3    nitroglycerin (NITROSTAT) 0.4 mg SL tablet DISSOLVE 1 TAB BY SUBLINGUAL ROUTE EVERY 5 MINUTES AS NEEDED. (Patient taking differently: No sig reported) 25 Tablet 0    albuterol (PROVENTIL VENTOLIN) 2.5 mg /3 mL (0.083 %) nebu 3 mL by Nebulization route every four (4) hours as needed for Shortness of Breath or Wheezing. 90 Nebule 5    aspirin delayed-release 81 mg tablet Take 81 mg by mouth daily. ammonium lactate (LAC-HYDRIN) 12 % lotion Apply 1 Applicator to affected area two (2) times a day.          Allergies   Allergen Reactions    Bactrim [Sulfamethoxazole-Trimethoprim] Unknown (comments)     Pt does not recall    Darvocet A500 [Propoxyphene N-Acetaminophen] Itching           Past Medical History:   Diagnosis Date    Anxiety     Aortic stenosis 03/03/2010    Aortic stenosis     Arrhythmia     MURMUR    Arthritis     SPINAL STENOSIS    Atherosclerosis of coronary artery     Biliary dyskinesia     Cancer ([facility] [address] 2023    liver    CHF (congestive heart failure) ([facility] [address] 03/03/2010    CHF (congestive heart failure) ([facility] [address] 01/2002    Chronic heart failure ([facility] [address] 1/2002; 3/3/2010    chronic diastolic heart failure    Chronic pain     LOWER BACK, RIGHT KNEE    Environmental allergies 03/03/2010    GERD (gastroesophageal reflux disease) 03/03/2010    Hiatal hernia 03/03/2010    High cholesterol 03/03/2010    HTN (hypertension) 03/03/2010    Hypokalemia 03/03/2010    Ischemic heart disease, chronic     Multiple gallstones     Obese     Psychiatric disorder     anxiety    S/P hysterectomy 03/03/2010    Spinal stenosis 03/03/2010           Past Surgical History:   Procedure Laterality Date    CARDIAC CATHETERIZATION  01/2002    normal coronaries    DECOMPRESS DISC RF LUMBAR  07/2007    HX BACK SURGERY  5/1/2014    HX BREAST LUMPECTOMY Left 1989    benign left breast    HX BUNIONECTOMY      HX BUNIONECTOMY      HX HEART CATHETERIZATION  3/3/10    HX HYSTERECTOMY  1978    HX LUMBAR DISKECTOMY HX ORTHOPAEDIC Bilateral     BUNIONECTOMY    HX ORTHOPAEDIC Right     WRIST FX    HX OTHER SURGICAL  10/12/2015    mole removed from right side of face by Dr. Radah Reynoso FLX DX W/COLLJ Indra Goldman PFD  [id number]    Dr Dickson Rhodes GI ENDOSCOPY,BIOPSY  2/27/2019                Family History   Problem Relation Age of Onset    Hypertension Mother     Heart Disease Father     Stroke Sister     Heart Disease Sister     Heart Disease Sister     Cancer Brother         LUNG    Cancer Brother         PROSTATE    Hypertension Brother     No Known Problems Brother     Lung Disease Brother     Heart Attack Brother     Lung Disease Brother     Stroke Brother     Stroke Daughter 47    No Known Problems Daughter     Anesth Problems Neg Hx        Social History     Tobacco Use    Smoking status: Never    Smokeless tobacco: Never   Substance Use Topics    Alcohol use: No     Alcohol/week: 0.0 standard drinks        Lab Results   Component Value Date/Time    WBC 8.1 02/19/2023 06:02 AM    HGB 13.0 02/19/2023 06:02 AM    HCT 40.1 02/19/2023 06:02 AM    PLATELET 349 66/26/6395 06:02 AM    MCV 89.1 02/19/2023 06:02 AM     Lab Results   Component Value Date/Time    Cholesterol, total 199 02/14/2023 12:57 PM    HDL Cholesterol 69 02/14/2023 12:57 PM    LDL, calculated 108.2 (H) 02/14/2023 12:57 PM    Triglyceride 109 02/14/2023 12:57 PM    CHOL/HDL Ratio 2.9 02/14/2023 12:57 PM     Lab Results   Component Value Date/Time    TSH 2.470 05/15/2017 03:04 PM      Lab Results   Component Value Date/Time    Sodium 143 02/21/2023 04:12 AM    Potassium 3.6 02/21/2023 04:12 AM    Chloride 104 02/21/2023 04:12 AM    CO2 36 (H) 02/21/2023 04:12 AM    Anion gap 3 (L) 02/21/2023 04:12 AM    Glucose 87 02/21/2023 04:12 AM    BUN 22 (H) 02/21/2023 04:12 AM    Creatinine 1.07 (H) 02/21/2023 04:12 AM    BUN/Creatinine ratio 21 (H) 02/21/2023 04:12 AM    GFR est AA >60 08/11/2022 12:48 PM    GFR est non-AA 53 (L) 08/11/2022 12:48 PM Calcium 8.9 02/21/2023 04:12 AM    Bilirubin, total 0.3 02/19/2023 06:02 AM    ALT (SGPT) 30 02/19/2023 06:02 AM    Alk. phosphatase 109 02/19/2023 06:02 AM    Protein, total 8.3 (H) 02/19/2023 06:02 AM    Albumin 3.4 (L) 02/19/2023 06:02 AM    Globulin 4.9 (H) 02/19/2023 06:02 AM    A-G Ratio 0.7 (L) 02/19/2023 06:02 AM      Lab Results   Component Value Date/Time    Hemoglobin A1c 5.4 04/16/2014 12:20 PM    Hemoglobin A1c (POC) 5.5 11/26/2014 12:12 PM         Review of Systems   Constitutional:  Negative for malaise/fatigue. HENT:  Negative for congestion. Eyes:  Negative for blurred vision. Respiratory:  Negative for cough and shortness of breath. Cardiovascular:  Negative for chest pain, palpitations and leg swelling. Gastrointestinal:  Negative for abdominal pain, constipation and heartburn. Genitourinary:  Negative for dysuria, frequency and urgency. Neurological:  Negative for dizziness, tingling and headaches. Endo/Heme/Allergies:  Negative for environmental allergies. Psychiatric/Behavioral:  Negative for depression. The patient does not have insomnia. Physical Exam  Vitals and nursing note reviewed. Constitutional:       Appearance: Normal appearance. She is well-developed. Comments: /74   Pulse 64   Temp 98 °F (36.7 °C)   Resp 12   Ht 4' 7\" (1.397 m)   Wt 163 lb 9.6 oz (74.2 kg)   LMP  (LMP Unknown)   SpO2 98%   BMI 38.02 kg/m²      HENT:      Right Ear: Tympanic membrane and ear canal normal.      Left Ear: Tympanic membrane and ear canal normal.   Neck:      Thyroid: No thyromegaly. Cardiovascular:      Rate and Rhythm: Normal rate and regular rhythm. Heart sounds: Normal heart sounds. Pulmonary:      Effort: Pulmonary effort is normal.      Breath sounds: Normal breath sounds. Abdominal:      General: Bowel sounds are normal.      Palpations: Abdomen is soft. There is no mass. Tenderness: There is no abdominal tenderness.    Musculoskeletal: General: Normal range of motion. Cervical back: Normal range of motion and neck supple. Right lower leg: No edema. Left lower leg: No edema. Lymphadenopathy:      Cervical: No cervical adenopathy. Skin:     General: Skin is warm and dry. Neurological:      General: No focal deficit present. Mental Status: She is alert and oriented to person, place, and time. Psychiatric:         Mood and Affect: Mood normal.       ASSESSMENT and PLAN  Diagnoses and all orders for this visit:    1. Essential hypertension  Stable   -     refill losartan (COZAAR) 100 mg tablet; Take 1 Tablet by mouth daily. 2. Chronic respiratory failure with hypoxia, on home oxygen therapy (HCC)  Stable     3. Atherosclerosis of native coronary artery of native heart without angina pectoris  4. Chronic diastolic heart failure (HCC)  Stable     5. Mixed hyperlipidemia  Continue to monitor. Work on diet and exercise. 6. Intrahepatic cholangiocarcinoma (HCC)  Stable     7. Chronic anxiety  -     refill clonazePAM (KlonoPIN) 1 mg tablet; TAKE 1/2 TO 1 TABLET EVERY MORNING AND AS NEEDED FOR ANXIETY, AND TAKE 1 TABLET AT BEDTIME FOR SLEEP    8. Gastro-esophageal reflux disease without esophagitis  Stable on protonix    9. Primary osteoarthritis involving multiple joints  Stable     10. Encounter for medication monitoring      Follow-up and Dispositions    Return in about 2 months (around 5/22/2023). current treatment plan is effective, no change in therapy  reviewed diet, exercise and weight control  cardiovascular risk and specific lipid/LDL goals reviewed  reviewed medications and side effects in detail      I have discussed diagnosis listed in this note with pt and/or family. I have discussed treatment plans and options and the risk/benefit analysis of those options, including safe use of medications and possible medication side effects.    Through the use of shared decision making we have agreed to the above plan. The patient has received an after-visit summary and questions were answered concerning future plans and follow up. Advise pt of any urgent changes then to proceed to the ER.

## 2023-03-22 NOTE — PROGRESS NOTES
Patient identified by 2 identifiers. Chief Complaint   Patient presents with    Follow-up    Hypertension     1. Have you been to the ER, urgent care clinic since your last visit? Hospitalized since your last visit? No    2. Have you seen or consulted any other health care providers outside of the [address] [place] [address] since your last visit? Include any pap smears or colon screening.  No breast enlargement/nipple/areola darkened and large

## 2023-03-23 ENCOUNTER — HOME CARE VISIT (OUTPATIENT)
Dept: SCHEDULING | Facility: HOME HEALTH | Age: 88
End: 2023-03-23
Payer: MEDICARE

## 2023-03-23 VITALS
SYSTOLIC BLOOD PRESSURE: 110 MMHG | RESPIRATION RATE: 18 BRPM | DIASTOLIC BLOOD PRESSURE: 80 MMHG | OXYGEN SATURATION: 97 % | HEART RATE: 85 BPM | TEMPERATURE: 97.4 F

## 2023-03-23 PROCEDURE — G0300 HHS/HOSPICE OF LPN EA 15 MIN: HCPCS

## 2023-03-23 NOTE — HOME HEALTH
Subjective: \"I'm so tired today. I didn't sleep last night. \"  Falls since last visit NO(if yes complete the Fall Tracking Form and include bsrifallreport):   Caregiver involvement changes: no  Home health supplies by type and quantity ordered/delivered this visit include: n/a    Clinician asked if patient has had any physician contact since last home care visit and patient states: NO  Clinician asked if patient has any new or changed medications and patient states:  NO   If Yes, were medications reconciled? N/A   Was the certifying physician notified of changes in medications? N/A     Clinical assessment (what this visit means for the patient overall and need for ongoing skilled care) and progress or lack of progress towards SPECIFIC goals: Pt. at risk for fire due to oxygen and gas stove/smoker in the home. Pt at risk for respiratory distress requeing SN for education. Pt. at risk for hospitalization due to lack of knowledge of medications, CHF, oxygen, and respiratory status requiring SN for education. Written Teaching Material Utilized: N/A    Interdisciplinary communication with: Lexx Dubon RN for the purpose of obtianing a scale and equipment    Discharge planning as follows:  Is no longer homebound, Per physician order, Will discharge when the patient has reached their maximum functional potential and maximum safety in their home and When goals are met    Specific plan for next visit: Continue CHF and oxygen education

## 2023-03-24 DIAGNOSIS — R07.9 CHEST PAIN, UNSPECIFIED TYPE: ICD-10-CM

## 2023-03-27 ENCOUNTER — TELEPHONE (OUTPATIENT)
Dept: FAMILY MEDICINE CLINIC | Age: 88
End: 2023-03-27

## 2023-03-27 RX ORDER — NITROGLYCERIN 0.4 MG/1
TABLET SUBLINGUAL
Qty: 25 TABLET | Refills: 2 | Status: SHIPPED | OUTPATIENT
Start: 2023-03-27

## 2023-03-27 NOTE — HOME HEALTH
Subjective: Im still not sleeping very well  Falls since last visit NO(if yes complete the Fall Tracking Form and include bsrifallreport):   Caregiver involvement changes: no  Home health supplies by type and quantity ordered/delivered this visit include: n/a     Clinician asked if patient has had any physician contact since last home care visit and patient states: NO  Clinician asked if patient has any new or changed medications and patient states:  NO   If Yes, were medications reconciled? N/A   Was the certifying physician notified of changes in medications? N/A     Clinical assessment (what this visit means for the patient overall and need for ongoing skilled care) and progress or lack of progress towards SPECIFIC goals: Pt. at risk for fire due to oxygen and gas stove/smoker in the home. Pt at risk for respiratory distress requeing SN for education. Pt. at risk for hospitalization due to lack of knowledge of medications, CHF, oxygen, and respiratory status requiring SN for education. Written Teaching Material Utilized: N/A  Interdisciplinary communication with: barrett  Discharge planning as follows:  Is no longer homebound, Per physician order, Will discharge when the patient has reached their maximum functional potential and maximum safety in their home and When goals are met    Specific plan for next visit: Continue CHF and oxygen education

## 2023-03-27 NOTE — TELEPHONE ENCOUNTER
Patient wants a return call regarding being very nervous and she said when she swallow's she gets choked, she said it started yesterday.   Please give her a call @ [phone]

## 2023-03-28 ENCOUNTER — OFFICE VISIT (OUTPATIENT)
Dept: FAMILY MEDICINE CLINIC | Age: 88
End: 2023-03-28
Payer: MEDICARE

## 2023-03-28 VITALS
BODY MASS INDEX: 37.95 KG/M2 | RESPIRATION RATE: 12 BRPM | WEIGHT: 164 LBS | TEMPERATURE: 97.9 F | OXYGEN SATURATION: 95 % | HEIGHT: 55 IN | SYSTOLIC BLOOD PRESSURE: 137 MMHG | DIASTOLIC BLOOD PRESSURE: 75 MMHG | HEART RATE: 63 BPM

## 2023-03-28 DIAGNOSIS — C22.1 INTRAHEPATIC CHOLANGIOCARCINOMA (HCC): ICD-10-CM

## 2023-03-28 DIAGNOSIS — E87.6 HYPOKALEMIA: ICD-10-CM

## 2023-03-28 DIAGNOSIS — F45.8: Primary | ICD-10-CM

## 2023-03-28 DIAGNOSIS — F41.9 CHRONIC ANXIETY: ICD-10-CM

## 2023-03-28 DIAGNOSIS — I45.9 SKIPPED HEART BEATS: ICD-10-CM

## 2023-03-28 PROCEDURE — 93010 ELECTROCARDIOGRAM REPORT: CPT | Performed by: FAMILY MEDICINE

## 2023-03-28 PROCEDURE — G8536 NO DOC ELDER MAL SCRN: HCPCS | Performed by: FAMILY MEDICINE

## 2023-03-28 PROCEDURE — G0463 HOSPITAL OUTPT CLINIC VISIT: HCPCS | Performed by: FAMILY MEDICINE

## 2023-03-28 PROCEDURE — G8400 PT W/DXA NO RESULTS DOC: HCPCS | Performed by: FAMILY MEDICINE

## 2023-03-28 PROCEDURE — G8510 SCR DEP NEG, NO PLAN REQD: HCPCS | Performed by: FAMILY MEDICINE

## 2023-03-28 PROCEDURE — 3075F SYST BP GE 130 - 139MM HG: CPT | Performed by: FAMILY MEDICINE

## 2023-03-28 PROCEDURE — 3078F DIAST BP <80 MM HG: CPT | Performed by: FAMILY MEDICINE

## 2023-03-28 PROCEDURE — G8417 CALC BMI ABV UP PARAM F/U: HCPCS | Performed by: FAMILY MEDICINE

## 2023-03-28 PROCEDURE — 99213 OFFICE O/P EST LOW 20 MIN: CPT | Performed by: FAMILY MEDICINE

## 2023-03-28 PROCEDURE — G8427 DOCREV CUR MEDS BY ELIG CLIN: HCPCS | Performed by: FAMILY MEDICINE

## 2023-03-28 PROCEDURE — 1123F ACP DISCUSS/DSCN MKR DOCD: CPT | Performed by: FAMILY MEDICINE

## 2023-03-28 PROCEDURE — 1101F PT FALLS ASSESS-DOCD LE1/YR: CPT | Performed by: FAMILY MEDICINE

## 2023-03-28 PROCEDURE — 1090F PRES/ABSN URINE INCON ASSESS: CPT | Performed by: FAMILY MEDICINE

## 2023-03-28 PROCEDURE — 93005 ELECTROCARDIOGRAM TRACING: CPT | Performed by: FAMILY MEDICINE

## 2023-03-28 RX ORDER — CLONAZEPAM 1 MG/1
TABLET ORAL
Qty: 60 TABLET | Refills: 1
Start: 2023-03-28

## 2023-03-28 NOTE — PROGRESS NOTES
Patient identified by 2 identifiers. Chief Complaint   Patient presents with    Dysphagia     Nervous      1. Have you been to the ER, urgent care clinic since your last visit? Hospitalized since your last visit? No    2. Have you seen or consulted any other health care providers outside of the [address] [place] [address] since your last visit? Include any pap smears or colon screening.  No

## 2023-03-28 NOTE — PROGRESS NOTES
HISTORY OF PRESENT ILLNESS  Cordell Vasquez is a 82 y.o. female. HPI  C/o nervous feeling in her chest that started on Sunday. [place] like her heart was skipping a beat. Denied feeling SOB. Her \"breathe was not short\"  but still took nebulizer treatment which did help and then she laid down. Got up later on Sunday and felt better but still had nervous feeling in the chest.  No chest pain , tightness or pressure in the chest.  Felt bad for most of yesterday in the same way. Her O2 sats at home have been over 95% without her oxygen. Nervous feeling in the chest is better today. Still has some feeling that her heart is skipping a beat. Pt is a DNR and she did not want to go back to the hospital.  She took her clonazepam which has been helping with the nervous feeling. She is worried about the cancer she has and what it is doing to her. She is eating and drinking ok. No nausea or vomiting noted. Denies abd pain. Reflex comes and goes. No cough or congestion. No fever or chills noted. Overall sleeping ok once she gets to sleep. No increase swelling noted.         Patient Active Problem List   Diagnosis Code    Hypokalemia E87.6    Hiatal hernia K44.9    Anxiety F41.9    S/P hysterectomy Z90.710    Aortic stenosis, mild I35.0    Spinal stenosis M48.00    S/P foot surgery Z98.890    Environmental allergies Z91.09    S/P cardiac catheterization Z98.890    Hypovitaminosis D E55.9    Chronic ischemic heart disease I25.9    Mixed hyperlipidemia E78.2    Chronic diastolic heart failure (HCC) I50.32    Chest pain at rest R07.9    Bifascicular bundle branch block--RBBB,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    Atypical chest pain R07.89    Essential hypertension I10    Gastroesophageal reflux disease without esophagitis K21.9    Atherosclerosis of native coronary artery without angina pectoris--diffuse small vsl disease via cath cath 2009--RCA PDA/ROSA I25.10    Chronic anxiety F41.9    Encounter for medication monitoring Z51.81    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 M47.815    Class 2 obesity due to excess calories with serious comorbidity and body mass index (BMI) of 38.0 to 38.9 in adult [id number]    Paroxysmal cardiac arrhythmia--PVC's,APC's,NSSVT I49.8    Severe obesity (BMI 35.0-39. 9) with comorbidity ([facility] [address] E66.01    Chest pain with normal angiography--2002;normal NST 2018 R07.9    Primary osteoarthritis of left shoulder M19.012    Pneumonia J18.9    Hypoxia R09.02    Respiratory failure with hypoxia (HCC) J96.91       Current Outpatient Medications   Medication Sig Dispense Refill    clonazePAM (KlonoPIN) 1 mg tablet TAKE 1 TABLET EVERY MORNING  AND TAKE 1 TABLET AT BEDTIME FOR SLEEP. 60 Tablet 1    nitroglycerin (NITROSTAT) 0.4 mg SL tablet DISSOLVE 1 TAB BY SUBLINGUAL ROUTE EVERY 5 MINUTES AS NEEDED. 25 Tablet 2    ammonium lactate (LAC-HYDRIN) 12 % lotion Apply 1 Applicator to affected area two (2) times a day. losartan (COZAAR) 100 mg tablet Take 1 Tablet by mouth daily. 90 Tablet 3    metoprolol tartrate (LOPRESSOR) 25 mg tablet TAKE 1 TABLET BY MOUTH TWICE A DAY 1 AT 9AM AND 1 AT 9PM 60 Tablet 11    isosorbide mononitrate ER (IMDUR) 30 mg tablet TAKE 1 TABLET BY MOUTH EVERY DAY 90 Tablet 3    guaiFENesin-dextromethorphan (G-FENESIN DM)  mg tab tablet Take 1 Tablet by mouth every four (4) hours as needed for Cough. Take 1 tablet by mouth every 4 hours as needed for cough      OXYGEN-AIR DELIVERY SYSTEMS 1-2 L/min by Nasal route continuous. wear 1-2 liters/min continuously      alum-mag hydroxide-simeth (MYLANTA) 200-200-20 mg/5 mL susp Take 30 mL by mouth nightly as needed for Indigestion or Heartburn. furosemide (LASIX) 80 mg tablet TAKE 1 TABLET BY MOUTH EVERY MORNING AND TAKE 1/2 TABLET EVERY EVENING 135 Tablet 3    rosuvastatin (CRESTOR) 20 mg tablet Take 1 Tablet by mouth nightly.  30 Tablet 11    potassium chloride SR (KLOR-CON 10) 10 mEq tablet TAKE 3 TABS BY MOUTH 2 TIMES PER  Tablet 3    amLODIPine (NORVASC) 5 mg tablet Take 5 mg by mouth two (2) times a day. pantoprazole (PROTONIX) 40 mg tablet Take 1 Tablet by mouth two (2) times a day. 180 Tablet 3    albuterol (PROVENTIL VENTOLIN) 2.5 mg /3 mL (0.083 %) nebu 3 mL by Nebulization route every four (4) hours as needed for Shortness of Breath or Wheezing. 90 Nebule 5    aspirin delayed-release 81 mg tablet Take 81 mg by mouth daily.          Allergies   Allergen Reactions    Bactrim [Sulfamethoxazole-Trimethoprim] Unknown (comments)     Pt does not recall    Darvocet A500 [Propoxyphene N-Acetaminophen] Itching         Past Medical History:   Diagnosis Date    Anxiety     Aortic stenosis 03/03/2010    Aortic stenosis     Arrhythmia     MURMUR    Arthritis     SPINAL STENOSIS    Atherosclerosis of coronary artery     Biliary dyskinesia     Cancer ([facility] [address] 2023    liver    CHF (congestive heart failure) ([facility] [address] 03/03/2010    CHF (congestive heart failure) ([facility] [address] 01/2002    Chronic heart failure ([facility] [address] 1/2002; 3/3/2010    chronic diastolic heart failure    Chronic pain     LOWER BACK, RIGHT KNEE    Environmental allergies 03/03/2010    GERD (gastroesophageal reflux disease) 03/03/2010    Hiatal hernia 03/03/2010    High cholesterol 03/03/2010    HTN (hypertension) 03/03/2010    Hypokalemia 03/03/2010    Ischemic heart disease, chronic     Multiple gallstones     Obese     Psychiatric disorder     anxiety    S/P hysterectomy 03/03/2010    Spinal stenosis 03/03/2010         Past Surgical History:   Procedure Laterality Date    CARDIAC CATHETERIZATION  01/2002    normal coronaries    DECOMPRESS DISC RF LUMBAR  07/2007    HX BACK SURGERY  5/1/2014    HX BREAST LUMPECTOMY Left 1989    benign left breast    HX BUNIONECTOMY      HX BUNIONECTOMY      HX HEART CATHETERIZATION  3/3/10    HX HYSTERECTOMY  1978    HX LUMBAR DISKECTOMY      HX ORTHOPAEDIC Bilateral     BUNIONECTOMY    HX ORTHOPAEDIC Right     WRIST FX    HX OTHER SURGICAL  10/12/2015 mole removed from right side of face by Dr. Georgina Stuart FLX DX W/COLLJ Senatta Alpers PFRMD  [id number]    Dr Db Sam GI ENDOSCOPY,BIOPSY  2/27/2019              Family History   Problem Relation Age of Onset    Hypertension Mother     Heart Disease Father     Stroke Sister     Heart Disease Sister     Heart Disease Sister     Cancer Brother         LUNG    Cancer Brother         PROSTATE    Hypertension Brother     No Known Problems Brother     Lung Disease Brother     Heart Attack Brother     Lung Disease Brother     Stroke Brother     Stroke Daughter 47    No Known Problems Daughter     Anesth Problems Neg Hx        Social History     Tobacco Use    Smoking status: Never    Smokeless tobacco: Never   Substance Use Topics    Alcohol use: No     Alcohol/week: 0.0 standard drinks        Lab Results   Component Value Date/Time    WBC 8.1 02/19/2023 06:02 AM    HGB 13.0 02/19/2023 06:02 AM    HCT 40.1 02/19/2023 06:02 AM    PLATELET 324 89/75/5201 06:02 AM    MCV 89.1 02/19/2023 06:02 AM     Lab Results   Component Value Date/Time    Cholesterol, total 199 02/14/2023 12:57 PM    HDL Cholesterol 69 02/14/2023 12:57 PM    LDL, calculated 108.2 (H) 02/14/2023 12:57 PM    Triglyceride 109 02/14/2023 12:57 PM    CHOL/HDL Ratio 2.9 02/14/2023 12:57 PM     Lab Results   Component Value Date/Time    TSH 2.470 05/15/2017 03:04 PM      Lab Results   Component Value Date/Time    Sodium 143 02/21/2023 04:12 AM    Potassium 3.6 02/21/2023 04:12 AM    Chloride 104 02/21/2023 04:12 AM    CO2 36 (H) 02/21/2023 04:12 AM    Anion gap 3 (L) 02/21/2023 04:12 AM    Glucose 87 02/21/2023 04:12 AM    BUN 22 (H) 02/21/2023 04:12 AM    Creatinine 1.07 (H) 02/21/2023 04:12 AM    BUN/Creatinine ratio 21 (H) 02/21/2023 04:12 AM    GFR est AA >60 08/11/2022 12:48 PM    GFR est non-AA 53 (L) 08/11/2022 12:48 PM    Calcium 8.9 02/21/2023 04:12 AM    Bilirubin, total 0.3 02/19/2023 06:02 AM    ALT (SGPT) 30 02/19/2023 06:02 AM    Alk. phosphatase 109 02/19/2023 06:02 AM    Protein, total 8.3 (H) 02/19/2023 06:02 AM    Albumin 3.4 (L) 02/19/2023 06:02 AM    Globulin 4.9 (H) 02/19/2023 06:02 AM    A-G Ratio 0.7 (L) 02/19/2023 06:02 AM      Lab Results   Component Value Date/Time    Hemoglobin A1c 5.4 04/16/2014 12:20 PM    Hemoglobin A1c (POC) 5.5 11/26/2014 12:12 PM         Review of Systems   Constitutional:  Negative for malaise/fatigue. HENT:  Negative for congestion. Eyes:  Negative for blurred vision. Respiratory:  Negative for cough, shortness of breath and wheezing. Cardiovascular:  Positive for chest pain. Negative for palpitations and leg swelling. Gastrointestinal:  Positive for abdominal pain. Negative for constipation and heartburn. Genitourinary:  Negative for dysuria, frequency and urgency. Musculoskeletal:  Negative for back pain and joint pain. Neurological:  Negative for dizziness, tingling and headaches. Endo/Heme/Allergies:  Negative for environmental allergies. Psychiatric/Behavioral:  Negative for depression. The patient is nervous/anxious. The patient does not have insomnia. Physical Exam  Vitals and nursing note reviewed. Constitutional:       Appearance: Normal appearance. She is well-developed. Comments: /75   Pulse 63   Temp 97.9 °F (36.6 °C)   Resp 12   Ht 4' 7\" (1.397 m)   Wt 164 lb (74.4 kg)   LMP  (LMP Unknown)   SpO2 95%   BMI 38.12 kg/m²    HENT:      Right Ear: Tympanic membrane and ear canal normal.      Left Ear: Tympanic membrane and ear canal normal.   Neck:      Thyroid: No thyromegaly. Cardiovascular:      Rate and Rhythm: Normal rate and regular rhythm. Heart sounds: Normal heart sounds. Pulmonary:      Effort: Pulmonary effort is normal.      Breath sounds: Normal breath sounds. Abdominal:      General: Bowel sounds are normal.      Palpations: Abdomen is soft. There is no mass. Tenderness: There is no abdominal tenderness.    Musculoskeletal: General: Normal range of motion. Cervical back: Normal range of motion and neck supple. Right lower leg: No edema. Left lower leg: No edema. Lymphadenopathy:      Cervical: No cervical adenopathy. Skin:     General: Skin is warm and dry. Neurological:      General: No focal deficit present. Mental Status: She is alert and oriented to person, place, and time. Psychiatric:         Mood and Affect: Mood normal.       ASSESSMENT and PLAN  Diagnoses and all orders for this visit:    1. Heart, nervous  2. Skipped heart beats  -     AMB POC EKG ROUTINE W/ 12 LEADS, INTER & REP  -  NSR with RBBB, unchanged from previous tracing 8/15/46  -     METABOLIC PANEL, COMPREHENSIVE; Future  -     CBC W/O DIFF; Future    3. Hypokalemia  -     MAGNESIUM; Future    4. Chronic anxiety  -     increase morning wlid of the clonazePAM (KlonoPIN) 1 mg tablet; TAKE 1 TABLET EVERY MORNING  AND TAKE 1 TABLET AT BEDTIME FOR SLEEP. 5. Intrahepatic cholangiocarcinoma ([address]  Overall has been stable. She has declined again today further treatment for the cancer and wants to remain as DNR. Follow-up and Dispositions    Return in about 2 weeks (around 4/11/2023). reviewed medications and side effects in detail    I have discussed diagnosis listed in this note with pt and/or family. I have discussed treatment plans and options and the risk/benefit analysis of those options, including safe use of medications and possible medication side effects. Through the use of shared decision making we have agreed to the above plan. The patient has received an after-visit summary and questions were answered concerning future plans and follow up. Advise pt of any urgent changes then to proceed to the ER.

## 2023-03-29 LAB
ALBUMIN SERPL-MCNC: 3.6 G/DL (ref 3.5–5)
ALBUMIN/GLOB SERPL: 0.8 (ref 1.1–2.2)
ALP SERPL-CCNC: 104 U/L (ref 45–117)
ALT SERPL-CCNC: 28 U/L (ref 12–78)
ANION GAP SERPL CALC-SCNC: 2 MMOL/L (ref 5–15)
AST SERPL-CCNC: 23 U/L (ref 15–37)
BILIRUB SERPL-MCNC: 0.6 MG/DL (ref 0.2–1)
BUN SERPL-MCNC: 11 MG/DL (ref 6–20)
BUN/CREAT SERPL: 11 (ref 12–20)
CALCIUM SERPL-MCNC: 9.6 MG/DL (ref 8.5–10.1)
CHLORIDE SERPL-SCNC: 101 MMOL/L (ref 97–108)
CO2 SERPL-SCNC: 34 MMOL/L (ref 21–32)
CREAT SERPL-MCNC: 0.97 MG/DL (ref 0.55–1.02)
ERYTHROCYTE [DISTWIDTH] IN BLOOD BY AUTOMATED COUNT: 13.6 % (ref 11.5–14.5)
GLOBULIN SER CALC-MCNC: 4.6 G/DL (ref 2–4)
GLUCOSE SERPL-MCNC: 95 MG/DL (ref 65–100)
HCT VFR BLD AUTO: 41.7 % (ref 35–47)
HGB BLD-MCNC: 12.5 G/DL (ref 11.5–16)
MAGNESIUM SERPL-MCNC: 2.4 MG/DL (ref 1.6–2.4)
MCH RBC QN AUTO: 27.5 PG (ref 26–34)
MCHC RBC AUTO-ENTMCNC: 30 G/DL (ref 30–36.5)
MCV RBC AUTO: 91.6 FL (ref 80–99)
NRBC # BLD: 0 K/UL (ref 0–0.01)
NRBC BLD-RTO: 0 PER 100 WBC
PLATELET # BLD AUTO: 108 K/UL (ref 150–400)
POTASSIUM SERPL-SCNC: 3.8 MMOL/L (ref 3.5–5.1)
PROT SERPL-MCNC: 8.2 G/DL (ref 6.4–8.2)
RBC # BLD AUTO: 4.55 M/UL (ref 3.8–5.2)
SODIUM SERPL-SCNC: 137 MMOL/L (ref 136–145)
WBC # BLD AUTO: 5.4 K/UL (ref 3.6–11)

## 2023-03-30 ENCOUNTER — HOME CARE VISIT (OUTPATIENT)
Dept: SCHEDULING | Facility: HOME HEALTH | Age: 88
End: 2023-03-30
Payer: MEDICARE

## 2023-03-30 PROCEDURE — G0299 HHS/HOSPICE OF RN EA 15 MIN: HCPCS

## 2023-04-02 VITALS
SYSTOLIC BLOOD PRESSURE: 142 MMHG | DIASTOLIC BLOOD PRESSURE: 52 MMHG | OXYGEN SATURATION: 96 % | HEART RATE: 78 BPM | TEMPERATURE: 97.8 F | RESPIRATION RATE: 12 BRPM
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
## 2023-04-03 ENCOUNTER — TELEPHONE (OUTPATIENT)
Dept: FAMILY MEDICINE CLINIC | Age: 88
End: 2023-04-03

## 2023-04-03 RX ORDER — ALBUTEROL SULFATE 0.83 MG/ML
2.5 SOLUTION RESPIRATORY (INHALATION)
Qty: 90 EACH | Refills: 5 | Status: SHIPPED | OUTPATIENT
Start: 2023-04-03

## 2023-04-03 NOTE — TELEPHONE ENCOUNTER
Patient wants to get the medication albuterol (PROVENTIL VENTOLIN) 2.5 mg /3 mL (0.083 %) nebu.   If any questions please give her a call @ [phone]

## 2023-04-06 ENCOUNTER — HOME CARE VISIT (OUTPATIENT)
Dept: HOME HEALTH SERVICES | Facility: HOME HEALTH | Age: 88
End: 2023-04-06
Payer: MEDICARE

## 2023-04-13 ENCOUNTER — HOME CARE VISIT (OUTPATIENT)
Dept: SCHEDULING | Facility: HOME HEALTH | Age: 88
End: 2023-04-13
Payer: MEDICARE

## 2023-04-13 PROCEDURE — G0299 HHS/HOSPICE OF RN EA 15 MIN: HCPCS

## 2023-04-14 VITALS
DIASTOLIC BLOOD PRESSURE: 58 MMHG | TEMPERATURE: 97.7 F | HEART RATE: 72 BPM | RESPIRATION RATE: 28 BRPM | SYSTOLIC BLOOD PRESSURE: 144 MMHG | OXYGEN SATURATION: 96 %

## 2023-04-18 ENCOUNTER — OFFICE VISIT (OUTPATIENT)
Dept: FAMILY MEDICINE CLINIC | Age: 88
End: 2023-04-18
Payer: MEDICARE

## 2023-04-18 VITALS
RESPIRATION RATE: 12 BRPM | HEART RATE: 61 BPM | DIASTOLIC BLOOD PRESSURE: 70 MMHG | HEIGHT: 55 IN | SYSTOLIC BLOOD PRESSURE: 117 MMHG | WEIGHT: 163.8 LBS | BODY MASS INDEX: 37.91 KG/M2 | TEMPERATURE: 97.8 F | OXYGEN SATURATION: 97 %

## 2023-04-18 DIAGNOSIS — I10 ESSENTIAL HYPERTENSION: ICD-10-CM

## 2023-04-18 DIAGNOSIS — Z51.81 ENCOUNTER FOR MEDICATION MONITORING: ICD-10-CM

## 2023-04-18 DIAGNOSIS — F41.9 CHRONIC ANXIETY: ICD-10-CM

## 2023-04-18 DIAGNOSIS — Z99.81 CHRONIC RESPIRATORY FAILURE WITH HYPOXIA, ON HOME OXYGEN THERAPY (HCC): ICD-10-CM

## 2023-04-18 DIAGNOSIS — J96.11 CHRONIC RESPIRATORY FAILURE WITH HYPOXIA, ON HOME OXYGEN THERAPY (HCC): ICD-10-CM

## 2023-04-18 DIAGNOSIS — R06.02 SOB (SHORTNESS OF BREATH): ICD-10-CM

## 2023-04-18 DIAGNOSIS — I50.32 CHRONIC DIASTOLIC HEART FAILURE (HCC): ICD-10-CM

## 2023-04-18 DIAGNOSIS — E78.2 MIXED HYPERLIPIDEMIA: ICD-10-CM

## 2023-04-18 DIAGNOSIS — M15.9 PRIMARY OSTEOARTHRITIS INVOLVING MULTIPLE JOINTS: ICD-10-CM

## 2023-04-18 DIAGNOSIS — K21.9 GASTRO-ESOPHAGEAL REFLUX DISEASE WITHOUT ESOPHAGITIS: ICD-10-CM

## 2023-04-18 DIAGNOSIS — R07.89 ATYPICAL CHEST PAIN: Primary | ICD-10-CM

## 2023-04-18 DIAGNOSIS — I25.10 ATHEROSCLEROSIS OF NATIVE CORONARY ARTERY OF NATIVE HEART WITHOUT ANGINA PECTORIS: ICD-10-CM

## 2023-04-18 DIAGNOSIS — C22.1 INTRAHEPATIC CHOLANGIOCARCINOMA (HCC): ICD-10-CM

## 2023-04-18 PROCEDURE — 3078F DIAST BP <80 MM HG: CPT | Performed by: FAMILY MEDICINE

## 2023-04-18 PROCEDURE — 1123F ACP DISCUSS/DSCN MKR DOCD: CPT | Performed by: FAMILY MEDICINE

## 2023-04-18 PROCEDURE — G8400 PT W/DXA NO RESULTS DOC: HCPCS | Performed by: FAMILY MEDICINE

## 2023-04-18 PROCEDURE — G8510 SCR DEP NEG, NO PLAN REQD: HCPCS | Performed by: FAMILY MEDICINE

## 2023-04-18 PROCEDURE — 3074F SYST BP LT 130 MM HG: CPT | Performed by: FAMILY MEDICINE

## 2023-04-18 PROCEDURE — 99214 OFFICE O/P EST MOD 30 MIN: CPT | Performed by: FAMILY MEDICINE

## 2023-04-18 PROCEDURE — G0463 HOSPITAL OUTPT CLINIC VISIT: HCPCS | Performed by: FAMILY MEDICINE

## 2023-04-18 PROCEDURE — G8536 NO DOC ELDER MAL SCRN: HCPCS | Performed by: FAMILY MEDICINE

## 2023-04-18 PROCEDURE — G8417 CALC BMI ABV UP PARAM F/U: HCPCS | Performed by: FAMILY MEDICINE

## 2023-04-18 PROCEDURE — 1101F PT FALLS ASSESS-DOCD LE1/YR: CPT | Performed by: FAMILY MEDICINE

## 2023-04-18 PROCEDURE — 1090F PRES/ABSN URINE INCON ASSESS: CPT | Performed by: FAMILY MEDICINE

## 2023-04-18 PROCEDURE — G8427 DOCREV CUR MEDS BY ELIG CLIN: HCPCS | Performed by: FAMILY MEDICINE

## 2023-04-18 RX ORDER — POTASSIUM CHLORIDE 750 MG/1
TABLET, FILM COATED, EXTENDED RELEASE ORAL
Qty: 180 TABLET | Refills: 3 | Status: CANCELLED | OUTPATIENT
Start: 2023-04-18

## 2023-04-18 RX ORDER — THERMOMETER, TEMPLE ELECTRONIC
1 EACH MISCELLANEOUS
COMMUNITY
Start: 2023-04-13 | End: 2023-04-18 | Stop reason: DRUGHIGH

## 2023-04-18 RX ORDER — DEXTROMETHORPHAN HYDROBROMIDE AND GUAIFENESIN 20; 400 MG/20ML; MG/20ML
20 SOLUTION ORAL
Qty: 355 ML | Refills: 1 | Status: SHIPPED | OUTPATIENT
Start: 2023-04-18

## 2023-04-18 RX ORDER — DOXYCYCLINE 100 MG/1
1 CAPSULE ORAL 2 TIMES DAILY
COMMUNITY
Start: 2023-04-13

## 2023-04-18 RX ORDER — POTASSIUM CHLORIDE 750 MG/1
TABLET, FILM COATED, EXTENDED RELEASE ORAL
Qty: 180 TABLET | Refills: 3 | Status: SHIPPED | OUTPATIENT
Start: 2023-04-18

## 2023-04-18 NOTE — PROGRESS NOTES
HISTORY OF PRESENT ILLNESS  Sandy Malone is a 82 y.o. female. HPI   Follow up. Had eval on last week by [facility]. Was started on doxycycline. Had chest xray done and was told ti looked ok. Overall she is feeling better. Less SOB. She is not wearing her O2. SOB at her usual baseline. On supplemental oxygen at home but she does have it on now at her visit. Took it off to come to visit d/t not feeling like having to bring oxygen tank. She feels that she does not need it all the time. History of hypertension  History of diastolic heart failure  -CTA chest: reviewed,  no acute pathology, no PE  -Chest x-ray 2/18/2023 with interstitial prominent suggesting pulmonary vascular congestion or atypical infectious process  -TTE reviewed with EF 65 to 70% normal wall motion normal diastolic function  -Patient underwent 6-minute walk test and required home oxygen  -Respiratory viral panel positive for rhinovirus enterovirus. 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 She has met with oncology and decided not to proceed with systemic therapy and it has been determined to be inoperable. Has good days and bad days. Stress with her family has her feeling anxious most of the time still. Cardiovascular Review:  The patient has hypertension, hyperlipidemia, chronic diastolic heart failure, and obesity. Hx also of aortic stenosis. Diet and Lifestyle: generally follows a low fat low cholesterol diet, generally follows a low sodium diet, sedentary. Pertinent ROS: taking medications as instructed, no medication side effects noted, no TIA's, no chest pain on exertion, mild swelling of ankles, no orthostatic dizziness or lightheadedness, no palpitations,      She has notice feeling more SOB over the past several weeks. No chest pain associated with feeling SOB. Has SOB that comes on with just sitting. Not sure if related with increase anxiety.   Will last for several minutes and then seem to get better. Was only getting the SOb with she was walking. No increased swelling. Compliant with medications. No cough or chest congestion noted. Home BP Monitoring: is not measured at home. Anxiety Review:  Patient is seen for anxiety. Ongoing symptoms include: increased anxiety still related to family issues. Patient denies: palpitations, sweating, chest pain, shortness of breath. Reported side effects from the treatment: none. Encounter for pain management. 1./2. Medical history/Past medical History  Chronic Pain:  Osteoarthritis:  Patient has osteoarthritis. Overall doing better with back pain. Still having knee pain. Ortho told that they would refer to pain management but she has not yet gone for this consult. Aching more in the right knee and increase pain with walking. Has had 2 injections in the knee which has not helped very much. She has decided to not to pursue surgery. Pain in the knee is \"bad today\"  Discussed getting toradol injection. Pain is 5-6/10. Taking tylenol for the pain which helps some. Symptoms onset: problem is longstanding. Rheumatological ROS: stable, mild-to-moderate joint symptoms intermittently, reasonably well controlled by PRN meds. Response to treatment plan: stable and intermittent. 3. Applicable records from prior treatment providers are apart of [facility] under the media tab. 4. Diagnostic, therapeutic and laboratory results are available in the [facility] chart. 5. Consultation notes are available for review in the media tab of the [facility] chart. 6. Treatment goals include pain control so that the pt may be as active and function with her daily activities and improved comfort level. Previously pt has been limited by pain. 7. The risks and benefits of treatment has been discussed at this office visit with the pt.   She understands that the medication has addicting potential. Additionally the pt has been advised that narcotic pain medication may impair mental and/or physical ability required for performance of tasks such as driving or operating any other machinery. 8. Pt has an updated signed pain contract on file and can be found under the FYI section of the [facility] chart. 9. The pain contract is reviewed. Pain medication will be continued at the previous dosage. Urine drug screening will not be done today. Diagnostic studies are not indicated at this time. Interventional procedure are not indicated at this time. 10. Medication prescibed is tylenol #3 but she has not been using it. 11. Patient instructions have been reviewed in detail as outlined above and in the pain contract. 12. Re-eval is planned for 3 months. He reports the following adverse side effects: none. Aberrant behaviors: None. Concomitant use of a benzodiazepine: yes  Will continue on current dose of medications as coarse has been stable and there are no signs of overuse, misuese, diversion, or concerning side effects  Naloxone prescription is warranted. It has been provided or is already on file.      Patient Active Problem List   Diagnosis Code    Hypokalemia E87.6    Hiatal hernia K44.9    Anxiety F41.9    S/P hysterectomy Z90.710    Aortic stenosis, mild I35.0    Spinal stenosis M48.00    S/P foot surgery Z98.890    Environmental allergies Z91.09    S/P cardiac catheterization Z98.890    Hypovitaminosis D E55.9    Chronic ischemic heart disease I25.9    Mixed hyperlipidemia E78.2    Chronic diastolic heart failure (HCC) I50.32    Chest pain at rest R07.9    Bifascicular bundle branch block--RBBB,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    Atypical chest pain R07.89    Essential hypertension I10    Gastroesophageal reflux disease without esophagitis K21.9    Atherosclerosis of native coronary artery without angina pectoris--diffuse small vsl disease via cath cath 2009--RCA PDA/ROSA I25.10    Chronic anxiety F41.9 Encounter for medication monitoring Z51.81    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 M47.815    Class 2 obesity due to excess calories with serious comorbidity and body mass index (BMI) of 38.0 to 38.9 in adult [id number]    Paroxysmal cardiac arrhythmia--PVC's,APC's,NSSVT I49.8    Severe obesity (BMI 35.0-39. 9) with comorbidity ([facility] [address] E66.01    Chest pain with normal angiography--2002;normal NST 2018 R07.9    Primary osteoarthritis of left shoulder M19.012    Pneumonia J18.9    Hypoxia R09.02    Respiratory failure with hypoxia (HCC) J96.91       Current Outpatient Medications   Medication Sig Dispense Refill    potassium chloride SR (KLOR-CON 10) 10 mEq tablet TAKE 3 TABS BY MOUTH 2 TIMES PER  Tablet 3    dextromethorphan-guaiFENesin (Mucinex Fast-Max DM Max) 5-100 mg/5 mL liqd Take 20 mL by mouth every four to six (4-6) hours as needed for Cough or Congestion. 355 mL 1    clonazePAM (KlonoPIN) 1 mg tablet Take 0.5 Tablets by mouth daily as needed for Anxiety. Take 1/2 tablet by mouth daily as needed for anxiety in addition to scheduled      albuterol (PROVENTIL VENTOLIN) 2.5 mg /3 mL (0.083 %) nebu 3 mL by Nebulization route every four (4) hours as needed for Shortness of Breath or Wheezing. 90 Each 5    clonazePAM (KlonoPIN) 1 mg tablet TAKE 1 TABLET EVERY MORNING  AND TAKE 1 TABLET AT BEDTIME FOR SLEEP. 60 Tablet 1    nitroglycerin (NITROSTAT) 0.4 mg SL tablet DISSOLVE 1 TAB BY SUBLINGUAL ROUTE EVERY 5 MINUTES AS NEEDED. 25 Tablet 2    ammonium lactate (LAC-HYDRIN) 12 % lotion Apply 1 Applicator to affected area two (2) times a day. losartan (COZAAR) 100 mg tablet Take 1 Tablet by mouth daily.  90 Tablet 3    metoprolol tartrate (LOPRESSOR) 25 mg tablet TAKE 1 TABLET BY MOUTH TWICE A DAY 1 AT 9AM AND 1 AT 9PM 60 Tablet 11    isosorbide mononitrate ER (IMDUR) 30 mg tablet TAKE 1 TABLET BY MOUTH EVERY DAY 90 Tablet 3    guaiFENesin-dextromethorphan (G-FENESIN DM)  mg tab tablet Take 1 Tablet by mouth every four (4) hours as needed for Cough. Take 1 tablet by mouth every 4 hours as needed for cough      OXYGEN-AIR DELIVERY SYSTEMS 1-2 L/min by Nasal route continuous. wear 1-2 liters/min continuously      alum-mag hydroxide-simeth (MYLANTA) 200-200-20 mg/5 mL susp Take 30 mL by mouth nightly as needed for Indigestion or Heartburn. furosemide (LASIX) 80 mg tablet TAKE 1 TABLET BY MOUTH EVERY MORNING AND TAKE 1/2 TABLET EVERY EVENING 135 Tablet 3    rosuvastatin (CRESTOR) 20 mg tablet Take 1 Tablet by mouth nightly. 30 Tablet 11    amLODIPine (NORVASC) 5 mg tablet Take 1 Tablet by mouth two (2) times a day. pantoprazole (PROTONIX) 40 mg tablet Take 1 Tablet by mouth two (2) times a day. 180 Tablet 3    aspirin delayed-release 81 mg tablet Take 1 Tablet by mouth daily. doxycycline (VIBRAMYCIN) 100 mg capsule Take 1 Capsule by mouth two (2) times a day.          Allergies   Allergen Reactions    Bactrim [Sulfamethoxazole-Trimethoprim] Unknown (comments)     Pt does not recall    Darvocet A500 [Propoxyphene N-Acetaminophen] Itching           Past Medical History:   Diagnosis Date    Anxiety     Aortic stenosis 03/03/2010    Aortic stenosis     Arrhythmia     MURMUR    Arthritis     SPINAL STENOSIS    Atherosclerosis of coronary artery     Biliary dyskinesia     Cancer ([facility] [address] 2023    liver    CHF (congestive heart failure) ([facility] [address] 03/03/2010    CHF (congestive heart failure) ([facility] [address] 01/2002    Chronic heart failure ([facility] [address] 1/2002; 3/3/2010    chronic diastolic heart failure    Chronic pain     LOWER BACK, RIGHT KNEE    Environmental allergies 03/03/2010    GERD (gastroesophageal reflux disease) 03/03/2010    Hiatal hernia 03/03/2010    High cholesterol 03/03/2010    HTN (hypertension) 03/03/2010    Hypokalemia 03/03/2010    Ischemic heart disease, chronic     Multiple gallstones     Obese     Psychiatric disorder     anxiety    S/P hysterectomy 03/03/2010    Spinal stenosis 03/03/2010           Past Surgical History:   Procedure Laterality Date    CARDIAC CATHETERIZATION  01/2002    normal coronaries    DECOMPRESS DISC RF LUMBAR  07/2007    HX BACK SURGERY  5/1/2014    HX BREAST LUMPECTOMY Left 1989    benign left breast    HX BUNIONECTOMY      HX BUNIONECTOMY      HX HEART CATHETERIZATION  3/3/10    HX HYSTERECTOMY  1978    HX LUMBAR DISKECTOMY      HX ORTHOPAEDIC Bilateral     BUNIONECTOMY    HX ORTHOPAEDIC Right     WRIST FX    HX OTHER SURGICAL  10/12/2015    mole removed from right side of face by Dr. Kristy Chow FLX DX W/COLLJ Thiago Espinosa PFRMD  [id number]    Dr Tuyet Espinosa GI ENDOSCOPY,BIOPSY  2/27/2019                Family History   Problem Relation Age of Onset    Hypertension Mother     Heart Disease Father     Stroke Sister     Heart Disease Sister     Heart Disease Sister     Cancer Brother         LUNG    Cancer Brother         PROSTATE    Hypertension Brother     No Known Problems Brother     Lung Disease Brother     Heart Attack Brother     Lung Disease Brother     Stroke Brother     Stroke Daughter 47    No Known Problems Daughter     Anesth Problems Neg Hx        Social History     Tobacco Use    Smoking status: Never    Smokeless tobacco: Never   Substance Use Topics    Alcohol use: No     Alcohol/week: 0.0 standard drinks        Lab Results   Component Value Date/Time    WBC 5.4 03/28/2023 12:45 PM    HGB 12.5 03/28/2023 12:45 PM    HCT 41.7 03/28/2023 12:45 PM    PLATELET 706 (L) 19/44/4953 12:45 PM    MCV 91.6 03/28/2023 12:45 PM     Lab Results   Component Value Date/Time    Cholesterol, total 199 02/14/2023 12:57 PM    HDL Cholesterol 69 02/14/2023 12:57 PM    LDL, calculated 108.2 (H) 02/14/2023 12:57 PM    Triglyceride 109 02/14/2023 12:57 PM    CHOL/HDL Ratio 2.9 02/14/2023 12:57 PM     Lab Results   Component Value Date/Time    TSH 2.470 05/15/2017 03:04 PM      Lab Results   Component Value Date/Time    Sodium 137 03/28/2023 12:45 PM Potassium 3.8 03/28/2023 12:45 PM    Chloride 101 03/28/2023 12:45 PM    CO2 34 (H) 03/28/2023 12:45 PM    Anion gap 2 (L) 03/28/2023 12:45 PM    Glucose 95 03/28/2023 12:45 PM    BUN 11 03/28/2023 12:45 PM    Creatinine 0.97 03/28/2023 12:45 PM    BUN/Creatinine ratio 11 (L) 03/28/2023 12:45 PM    GFR est AA >60 08/11/2022 12:48 PM    GFR est non-AA 53 (L) 08/11/2022 12:48 PM    Calcium 9.6 03/28/2023 12:45 PM    Bilirubin, total 0.6 03/28/2023 12:45 PM    ALT (SGPT) 28 03/28/2023 12:45 PM    Alk. phosphatase 104 03/28/2023 12:45 PM    Protein, total 8.2 03/28/2023 12:45 PM    Albumin 3.6 03/28/2023 12:45 PM    Globulin 4.6 (H) 03/28/2023 12:45 PM    A-G Ratio 0.8 (L) 03/28/2023 12:45 PM      Lab Results   Component Value Date/Time    Hemoglobin A1c 5.4 04/16/2014 12:20 PM    Hemoglobin A1c (POC) 5.5 11/26/2014 12:12 PM         Review of Systems   Constitutional:  Negative for malaise/fatigue. HENT:  Negative for congestion. Eyes:  Negative for blurred vision. Respiratory:  Negative for cough and shortness of breath. Cardiovascular:  Negative for chest pain, palpitations and leg swelling. Gastrointestinal:  Negative for abdominal pain, constipation and heartburn. Genitourinary:  Negative for dysuria, frequency and urgency. Neurological:  Negative for dizziness, tingling and headaches. Endo/Heme/Allergies:  Negative for environmental allergies. Psychiatric/Behavioral:  Negative for depression. The patient does not have insomnia. Physical Exam  Vitals and nursing note reviewed. Constitutional:       Appearance: Normal appearance. She is well-developed. Comments: /70   Pulse 61   Temp 97.8 °F (36.6 °C)   Resp 12   Ht 4' 7\" (1.397 m)   Wt 163 lb 12.8 oz (74.3 kg)   LMP  (LMP Unknown)   SpO2 97%   BMI 38.07 kg/m²        HENT:      Right Ear: Tympanic membrane and ear canal normal.      Left Ear: Tympanic membrane and ear canal normal.   Neck:      Thyroid: No thyromegaly. Cardiovascular:      Rate and Rhythm: Normal rate and regular rhythm. Heart sounds: Normal heart sounds. Pulmonary:      Effort: Pulmonary effort is normal.      Breath sounds: Normal breath sounds. Abdominal:      General: Bowel sounds are normal.      Palpations: Abdomen is soft. There is no mass. Tenderness: There is no abdominal tenderness. Musculoskeletal:         General: Normal range of motion. Cervical back: Normal range of motion and neck supple. Right lower leg: No edema. Left lower leg: No edema. Lymphadenopathy:      Cervical: No cervical adenopathy. Skin:     General: Skin is warm and dry. Neurological:      General: No focal deficit present. Mental Status: She is alert and oriented to person, place, and time. Psychiatric:         Mood and Affect: Mood normal.       ASSESSMENT and PLAN  Diagnoses and all orders for this visit:    1. Atypical chest pain  2. SOB (shortness of breath)  -     REFERRAL TO CARDIOLOGY    3. Chronic respiratory failure with hypoxia, on home oxygen therapy (HCC)  Stable     4. Chronic diastolic heart failure ([address]  5. Atherosclerosis of native coronary artery of native heart without angina pectoris  -     REFERRAL TO CARDIOLOGY    6. Essential hypertension  Stable     7. Mixed hyperlipidemia  Stable     8. Chronic anxiety  Stable     9. Intrahepatic cholangiocarcinoma (HCC)  Stable     10. Gastro-esophageal reflux disease without esophagitis  Stable on Protonix    11. Primary osteoarthritis involving multiple joints  Stable     12. Encounter for medication monitoring    Other orders  -     potassium chloride SR (KLOR-CON 10) 10 mEq tablet; TAKE 3 TABS BY MOUTH 2 TIMES PER DAY  -     dextromethorphan-guaiFENesin (Mucinex Fast-Max DM Max) 5-100 mg/5 mL liqd; Take 20 mL by mouth every four to six (4-6) hours as needed for Cough or Congestion. Follow-up and Dispositions    Return in about 1 month (around 5/18/2023).        reviewed diet, exercise and weight control  cardiovascular risk and specific lipid/LDL goals reviewed  reviewed medications and side effects in detail    I have discussed diagnosis listed in this note with pt and/or family. I have discussed treatment plans and options and the risk/benefit analysis of those options, including safe use of medications and possible medication side effects. Through the use of shared decision making we have agreed to the above plan. The patient has received an after-visit summary and questions were answered concerning future plans and follow up. Advise pt of any urgent changes then to proceed to the ER.

## 2023-04-18 NOTE — PROGRESS NOTES
Patient identified by 2 identifiers. Chief Complaint   Patient presents with    Follow-up    Hypertension     1. Have you been to the ER, urgent care clinic since your last visit? Hospitalized since your last visit? Yes Where: [facility]    2. Have you seen or consulted any other health care providers outside of the [address] [place] [address] since your last visit? Include any pap smears or colon screening.  No

## 2023-04-19 NOTE — HOME HEALTH
[facility] called because pt. refusing to go to urgent care. Crackles, CP, dyspnea, swelling in ankles. Suspected CHF exacerbation. Dr. Mary Vargas notified. Subjective: \"I've been having trouble breathing for 5 days now. Falls since last visit NO(if yes complete the Fall Tracking Form and include bsrifallreport):   Caregiver involvement changes: no  Home health supplies by type and quantity ordered/delivered this visit include: n/a    Clinician asked if patient has had any physician contact since last home care visit and patient states: NO  Clinician asked if patient has any new or changed medications and patient states:  Clonazapem dose increased  If Yes, were medications reconciled? YES   Was the certifying physician notified of changes in medications? N/A She had ordered it. Clinical assessment (what this visit means for the patient overall and need for ongoing skilled care) and progress or lack of progress towards SPECIFIC goals: Pt. at severe risk for hospitalization due to non-compliance with CHF restrictions and oxygen use requiring SN for education and monitoring. Written Teaching Material Utilized: N/A    Interdisciplinary communication with: Dr. Mary Vargas Physician for the purpose of She has had no more chest pain, but she is complaining of feeling like her :\"lungs have mucus but she can't cough it up. \" Her lungs have some crackles and she continues to eat lots of salt despite instruction otherwise. Oxygen was good at 96% on RA but RR was 28. She says she doesn't make appointments but that you were setting her up with a cardiologist????? She has no appointment at this time. Discharge planning as follows:  Is no longer homebound, Per physician order, When patient is no longer able to participate or progresses to SNF/Hospice, Will discharge when the patient has reached their maximum functional potential and maximum safety in their home and When goals are met    Specific plan for next visit: Cardiopulmonary assessment and continue CHF education

## 2023-04-20 ENCOUNTER — HOME CARE VISIT (OUTPATIENT)
Dept: SCHEDULING | Facility: HOME HEALTH | Age: 88
End: 2023-04-20

## 2023-04-20 PROCEDURE — G0299 HHS/HOSPICE OF RN EA 15 MIN: HCPCS

## 2023-04-27 ENCOUNTER — HOME CARE VISIT (OUTPATIENT)
Dept: SCHEDULING | Facility: HOME HEALTH | Age: 88
End: 2023-04-27
Payer: MEDICARE

## 2023-04-27 VITALS
OXYGEN SATURATION: 98 % | TEMPERATURE: 98 F | TEMPERATURE: 98.7 F | SYSTOLIC BLOOD PRESSURE: 128 MMHG | DIASTOLIC BLOOD PRESSURE: 60 MMHG | OXYGEN SATURATION: 96 % | SYSTOLIC BLOOD PRESSURE: 132 MMHG | HEART RATE: 67 BPM | DIASTOLIC BLOOD PRESSURE: 72 MMHG | HEART RATE: 62 BPM | RESPIRATION RATE: 16 BRPM | RESPIRATION RATE: 14 BRPM

## 2023-04-27 PROCEDURE — G0299 HHS/HOSPICE OF RN EA 15 MIN: HCPCS

## 2023-04-28 NOTE — HOME HEALTH
Physician Notification and Justification to Continue Services:     Justification for continued intermittent care: continued need for CHF and medication education     Dr. Evans Deleon notified of the following: the need for continued Skilled Nursing home health services for above reasons, plan of care including visit frequency of 1W8; 3 PRN Skilled Nursing for : additional assessment and education on CHF and medication

## 2023-05-03 ENCOUNTER — HOSPITAL ENCOUNTER (EMERGENCY)
Age: 88
Discharge: HOME | End: 2023-05-03
Attending: STUDENT IN AN ORGANIZED HEALTH CARE EDUCATION/TRAINING PROGRAM
Payer: MEDICARE

## 2023-05-03 ENCOUNTER — APPOINTMENT (OUTPATIENT)
Dept: GENERAL RADIOLOGY | Age: 88
End: 2023-05-03
Attending: STUDENT IN AN ORGANIZED HEALTH CARE EDUCATION/TRAINING PROGRAM
Payer: MEDICARE

## 2023-05-03 ENCOUNTER — TELEPHONE (OUTPATIENT)
Dept: FAMILY MEDICINE CLINIC | Age: 88
End: 2023-05-03

## 2023-05-03 VITALS
RESPIRATION RATE: 18 BRPM | BODY MASS INDEX: 37.03 KG/M2 | SYSTOLIC BLOOD PRESSURE: 160 MMHG | HEART RATE: 61 BPM | WEIGHT: 160 LBS | DIASTOLIC BLOOD PRESSURE: 90 MMHG | HEIGHT: 55 IN | OXYGEN SATURATION: 95 % | TEMPERATURE: 97.9 F

## 2023-05-03 DIAGNOSIS — R07.9 CHEST PAIN, UNSPECIFIED TYPE: Primary | ICD-10-CM

## 2023-05-03 LAB
ALBUMIN SERPL-MCNC: 3.4 G/DL (ref 3.5–5)
ALBUMIN/GLOB SERPL: 0.7 (ref 1.1–2.2)
ALP SERPL-CCNC: 121 U/L (ref 45–117)
ALT SERPL-CCNC: 25 U/L (ref 12–78)
ANION GAP SERPL CALC-SCNC: 5 MMOL/L (ref 5–15)
AST SERPL-CCNC: 24 U/L (ref 15–37)
ATRIAL RATE: 61 BPM
BASOPHILS # BLD: 0.1 K/UL (ref 0–0.1)
BASOPHILS NFR BLD: 1 % (ref 0–1)
BILIRUB SERPL-MCNC: 0.6 MG/DL (ref 0.2–1)
BUN SERPL-MCNC: 6 MG/DL (ref 6–20)
BUN/CREAT SERPL: 6 (ref 12–20)
CALCIUM SERPL-MCNC: 9.1 MG/DL (ref 8.5–10.1)
CALCULATED P AXIS, ECG09: 73 DEGREES
CALCULATED R AXIS, ECG10: -24 DEGREES
CALCULATED T AXIS, ECG11: 25 DEGREES
CHLORIDE SERPL-SCNC: 101 MMOL/L (ref 97–108)
CO2 SERPL-SCNC: 33 MMOL/L (ref 21–32)
CREAT SERPL-MCNC: 1.09 MG/DL (ref 0.55–1.02)
DIAGNOSIS, 93000: NORMAL
DIFFERENTIAL METHOD BLD: NORMAL
EOSINOPHIL # BLD: 0.4 K/UL (ref 0–0.4)
EOSINOPHIL NFR BLD: 7 % (ref 0–7)
ERYTHROCYTE [DISTWIDTH] IN BLOOD BY AUTOMATED COUNT: 13.8 % (ref 11.5–14.5)
GLOBULIN SER CALC-MCNC: 4.7 G/DL (ref 2–4)
GLUCOSE SERPL-MCNC: 97 MG/DL (ref 65–100)
HCT VFR BLD AUTO: 40.7 % (ref 35–47)
HGB BLD-MCNC: 12.9 G/DL (ref 11.5–16)
IMM GRANULOCYTES # BLD AUTO: 0 K/UL (ref 0–0.04)
IMM GRANULOCYTES NFR BLD AUTO: 0 % (ref 0–0.5)
LYMPHOCYTES # BLD: 1.2 K/UL (ref 0.8–3.5)
LYMPHOCYTES NFR BLD: 26 % (ref 12–49)
MCH RBC QN AUTO: 28.1 PG (ref 26–34)
MCHC RBC AUTO-ENTMCNC: 31.7 G/DL (ref 30–36.5)
MCV RBC AUTO: 88.7 FL (ref 80–99)
MONOCYTES # BLD: 0.4 K/UL (ref 0–1)
MONOCYTES NFR BLD: 9 % (ref 5–13)
NEUTS SEG # BLD: 2.6 K/UL (ref 1.8–8)
NEUTS SEG NFR BLD: 57 % (ref 32–75)
NRBC # BLD: 0 K/UL (ref 0–0.01)
NRBC BLD-RTO: 0 PER 100 WBC
P-R INTERVAL, ECG05: 218 MS
PLATELET # BLD AUTO: 179 K/UL (ref 150–400)
PMV BLD AUTO: 10.9 FL (ref 8.9–12.9)
POTASSIUM SERPL-SCNC: 3.9 MMOL/L (ref 3.5–5.1)
PROT SERPL-MCNC: 8.1 G/DL (ref 6.4–8.2)
Q-T INTERVAL, ECG07: 422 MS
QRS DURATION, ECG06: 146 MS
QTC CALCULATION (BEZET), ECG08: 424 MS
RBC # BLD AUTO: 4.59 M/UL (ref 3.8–5.2)
SODIUM SERPL-SCNC: 139 MMOL/L (ref 136–145)
TROPONIN I SERPL HS-MCNC: 11 NG/L (ref 0–51)
TROPONIN I SERPL HS-MCNC: 12 NG/L (ref 0–51)
VENTRICULAR RATE, ECG03: 61 BPM
WBC # BLD AUTO: 4.7 K/UL (ref 3.6–11)

## 2023-05-03 PROCEDURE — 74011250637 HC RX REV CODE- 250/637: Performed by: STUDENT IN AN ORGANIZED HEALTH CARE EDUCATION/TRAINING PROGRAM

## 2023-05-03 PROCEDURE — 85025 COMPLETE CBC W/AUTO DIFF WBC: CPT

## 2023-05-03 PROCEDURE — 99285 EMERGENCY DEPT VISIT HI MDM: CPT

## 2023-05-03 PROCEDURE — 93005 ELECTROCARDIOGRAM TRACING: CPT

## 2023-05-03 PROCEDURE — 74011000250 HC RX REV CODE- 250: Performed by: STUDENT IN AN ORGANIZED HEALTH CARE EDUCATION/TRAINING PROGRAM

## 2023-05-03 PROCEDURE — 84484 ASSAY OF TROPONIN QUANT: CPT

## 2023-05-03 PROCEDURE — 80053 COMPREHEN METABOLIC PANEL: CPT

## 2023-05-03 PROCEDURE — 71046 X-RAY EXAM CHEST 2 VIEWS: CPT

## 2023-05-03 PROCEDURE — 36415 COLL VENOUS BLD VENIPUNCTURE: CPT

## 2023-05-03 RX ORDER — FAMOTIDINE 20 MG/1
20 TABLET, FILM COATED ORAL
Qty: 20 TABLET | Refills: 0 | Status: SHIPPED | OUTPATIENT
Start: 2023-05-03 | End: 2023-06-02

## 2023-05-03 RX ORDER — PANTOPRAZOLE SODIUM 40 MG/1
40 TABLET, DELAYED RELEASE ORAL
Status: COMPLETED | OUTPATIENT
Start: 2023-05-03 | End: 2023-05-03

## 2023-05-03 RX ORDER — SIMETHICONE 80 MG
80 TABLET,CHEWABLE ORAL ONCE
Status: COMPLETED | OUTPATIENT
Start: 2023-05-03 | End: 2023-05-03

## 2023-05-03 RX ORDER — LIDOCAINE HYDROCHLORIDE 20 MG/ML
15 SOLUTION OROPHARYNGEAL AS NEEDED
Qty: 1 EACH | Refills: 0 | Status: SHIPPED | OUTPATIENT
Start: 2023-05-03

## 2023-05-03 RX ADMIN — ALUMINUM HYDROXIDE, MAGNESIUM HYDROXIDE, AND SIMETHICONE 40 ML: 200; 200; 20 SUSPENSION ORAL at 12:17

## 2023-05-03 RX ADMIN — PANTOPRAZOLE SODIUM 40 MG: 40 TABLET, DELAYED RELEASE ORAL at 12:17

## 2023-05-03 RX ADMIN — SIMETHICONE 80 MG: 80 TABLET, CHEWABLE ORAL at 12:17

## 2023-05-04 ENCOUNTER — HOME CARE VISIT (OUTPATIENT)
Dept: SCHEDULING | Facility: HOME HEALTH | Age: 88
End: 2023-05-04
Payer: MEDICARE

## 2023-05-04 VITALS
DIASTOLIC BLOOD PRESSURE: 62 MMHG | TEMPERATURE: 98 F | HEART RATE: 64 BPM | RESPIRATION RATE: 16 BRPM | OXYGEN SATURATION: 92 % | SYSTOLIC BLOOD PRESSURE: 136 MMHG

## 2023-05-04 PROCEDURE — G0300 HHS/HOSPICE OF LPN EA 15 MIN: HCPCS

## 2023-05-09 ENCOUNTER — HOME CARE VISIT (OUTPATIENT)
Dept: SCHEDULING | Facility: HOME HEALTH | Age: 88
End: 2023-05-09
Payer: MEDICARE

## 2023-05-09 ENCOUNTER — TELEPHONE (OUTPATIENT)
Age: 88
End: 2023-05-09

## 2023-05-09 PROCEDURE — G0299 HHS/HOSPICE OF RN EA 15 MIN: HCPCS

## 2023-05-12 ENCOUNTER — APPOINTMENT (OUTPATIENT)
Facility: HOSPITAL | Age: 88
End: 2023-05-12
Payer: MEDICARE

## 2023-05-12 ENCOUNTER — HOSPITAL ENCOUNTER (EMERGENCY)
Facility: HOSPITAL | Age: 88
Discharge: HOME | End: 2023-05-13
Attending: EMERGENCY MEDICINE
Payer: MEDICARE

## 2023-05-12 VITALS
DIASTOLIC BLOOD PRESSURE: 58 MMHG | OXYGEN SATURATION: 95 % | RESPIRATION RATE: 21 BRPM | TEMPERATURE: 97.2 F | HEART RATE: 59 BPM | SYSTOLIC BLOOD PRESSURE: 100 MMHG

## 2023-05-12 DIAGNOSIS — C22.1 CHOLANGIOCARCINOMA (HCC): ICD-10-CM

## 2023-05-12 DIAGNOSIS — R07.9 CHEST PAIN, UNSPECIFIED TYPE: Primary | ICD-10-CM

## 2023-05-12 PROCEDURE — 71046 X-RAY EXAM CHEST 2 VIEWS: CPT

## 2023-05-12 PROCEDURE — 99285 EMERGENCY DEPT VISIT HI MDM: CPT

## 2023-05-12 ASSESSMENT — PAIN - FUNCTIONAL ASSESSMENT: PAIN_FUNCTIONAL_ASSESSMENT: 0-10

## 2023-05-12 ASSESSMENT — PAIN DESCRIPTION - LOCATION: LOCATION: CHEST

## 2023-05-12 ASSESSMENT — PAIN DESCRIPTION - DESCRIPTORS: DESCRIPTORS: ACHING

## 2023-05-12 ASSESSMENT — PAIN SCALES - GENERAL: PAINLEVEL_OUTOF10: 7

## 2023-05-12 ASSESSMENT — PAIN DESCRIPTION - ORIENTATION: ORIENTATION: LEFT;MID

## 2023-05-13 ENCOUNTER — APPOINTMENT (OUTPATIENT)
Facility: HOSPITAL | Age: 88
End: 2023-05-13
Payer: MEDICARE

## 2023-05-13 VITALS
WEIGHT: 160.05 LBS | RESPIRATION RATE: 19 BRPM | TEMPERATURE: 97.8 F | OXYGEN SATURATION: 96 % | HEIGHT: 55 IN | DIASTOLIC BLOOD PRESSURE: 66 MMHG | BODY MASS INDEX: 37.04 KG/M2 | SYSTOLIC BLOOD PRESSURE: 106 MMHG | HEART RATE: 64 BPM

## 2023-05-13 LAB
ALBUMIN SERPL-MCNC: 3.4 G/DL (ref 3.5–5)
ALBUMIN/GLOB SERPL: 0.7 (ref 1.1–2.2)
ALP SERPL-CCNC: 107 U/L (ref 45–117)
ALT SERPL-CCNC: 26 U/L (ref 12–78)
ANION GAP SERPL CALC-SCNC: 8 MMOL/L (ref 5–15)
AST SERPL-CCNC: 46 U/L (ref 15–37)
BASOPHILS # BLD: 0 K/UL (ref 0–0.1)
BASOPHILS NFR BLD: 1 % (ref 0–1)
BILIRUB SERPL-MCNC: 0.8 MG/DL (ref 0.2–1)
BUN SERPL-MCNC: 9 MG/DL (ref 6–20)
BUN/CREAT SERPL: 9 (ref 12–20)
CALCIUM SERPL-MCNC: 8.9 MG/DL (ref 8.5–10.1)
CHLORIDE SERPL-SCNC: 102 MMOL/L (ref 97–108)
CO2 SERPL-SCNC: 29 MMOL/L (ref 21–32)
CREAT SERPL-MCNC: 0.96 MG/DL (ref 0.55–1.02)
DIFFERENTIAL METHOD BLD: NORMAL
EOSINOPHIL # BLD: 0.4 K/UL (ref 0–0.4)
EOSINOPHIL NFR BLD: 7 % (ref 0–7)
ERYTHROCYTE [DISTWIDTH] IN BLOOD BY AUTOMATED COUNT: 13.6 % (ref 11.5–14.5)
GLOBULIN SER CALC-MCNC: 4.8 G/DL (ref 2–4)
GLUCOSE SERPL-MCNC: 91 MG/DL (ref 65–100)
HCT VFR BLD AUTO: 37.6 % (ref 35–47)
HGB BLD-MCNC: 12.3 G/DL (ref 11.5–16)
IMM GRANULOCYTES # BLD AUTO: 0 K/UL (ref 0–0.04)
IMM GRANULOCYTES NFR BLD AUTO: 0 % (ref 0–0.5)
LIPASE SERPL-CCNC: 136 U/L (ref 73–393)
LYMPHOCYTES # BLD: 1.7 K/UL (ref 0.8–3.5)
LYMPHOCYTES NFR BLD: 31 % (ref 12–49)
MCH RBC QN AUTO: 28.5 PG (ref 26–34)
MCHC RBC AUTO-ENTMCNC: 32.7 G/DL (ref 30–36.5)
MCV RBC AUTO: 87.2 FL (ref 80–99)
MONOCYTES # BLD: 0.6 K/UL (ref 0–1)
MONOCYTES NFR BLD: 10 % (ref 5–13)
NEUTS SEG # BLD: 2.8 K/UL (ref 1.8–8)
NEUTS SEG NFR BLD: 51 % (ref 32–75)
NRBC # BLD: 0 K/UL (ref 0–0.01)
NRBC BLD-RTO: 0 PER 100 WBC
PLATELET # BLD AUTO: 160 K/UL (ref 150–400)
PMV BLD AUTO: 11.6 FL (ref 8.9–12.9)
POTASSIUM SERPL-SCNC: 5 MMOL/L (ref 3.5–5.1)
PROT SERPL-MCNC: 8.2 G/DL (ref 6.4–8.2)
RBC # BLD AUTO: 4.31 M/UL (ref 3.8–5.2)
SODIUM SERPL-SCNC: 139 MMOL/L (ref 136–145)
TROPONIN I SERPL HS-MCNC: 13 NG/L (ref 0–51)
TROPONIN I SERPL HS-MCNC: 13 NG/L (ref 0–51)
WBC # BLD AUTO: 5.5 K/UL (ref 3.6–11)

## 2023-05-13 PROCEDURE — 85025 COMPLETE CBC W/AUTO DIFF WBC: CPT

## 2023-05-13 PROCEDURE — 80053 COMPREHEN METABOLIC PANEL: CPT

## 2023-05-13 PROCEDURE — 84484 ASSAY OF TROPONIN QUANT: CPT

## 2023-05-13 PROCEDURE — 6370000000 HC RX 637 (ALT 250 FOR IP): Performed by: EMERGENCY MEDICINE

## 2023-05-13 PROCEDURE — 6360000004 HC RX CONTRAST MEDICATION: Performed by: EMERGENCY MEDICINE

## 2023-05-13 PROCEDURE — 74177 CT ABD & PELVIS W/CONTRAST: CPT

## 2023-05-13 PROCEDURE — 83690 ASSAY OF LIPASE: CPT

## 2023-05-13 RX ADMIN — ALUMINUM HYDROXIDE, MAGNESIUM HYDROXIDE, AND SIMETHICONE 40 ML: 200; 200; 20 SUSPENSION ORAL at 02:00

## 2023-05-13 RX ADMIN — IOPAMIDOL 100 ML: 755 INJECTION, SOLUTION INTRAVENOUS at 04:58

## 2023-05-13 ASSESSMENT — LIFESTYLE VARIABLES
HOW MANY STANDARD DRINKS CONTAINING ALCOHOL DO YOU HAVE ON A TYPICAL DAY: PATIENT DOES NOT DRINK
HOW OFTEN DO YOU HAVE A DRINK CONTAINING ALCOHOL: NEVER

## 2023-05-13 NOTE — ED NOTES
Discharge instructions were given to the patient by [facility]. The patient left the Emergency Department ambulatory, alert and oriented and in no acute distress with 0 prescriptions. The patient was encouraged to call or return to the ED for worsening issues or problems and was encouraged to schedule a follow up appointment for continuing care. The patient verbalized understanding of discharge instructions and prescriptions, all questions were answered. The patient has no further concerns at this time.         Maria Ines Jackman RN  05/13/23 3211

## 2023-05-13 NOTE — ED NOTES
Pt presents to ED ambulatory complaining of chest pain. Pt was seen her 2 weeks ago for the same symptoms but she states that it hasn't gotten matter. Pt is alert and oriented x 4, RR even and unlabored, skin is warm and dry. Assessment completed and pt updated on plan of care. Call bell in reach. Emergency Department Nursing Plan of Care       The Nursing Plan of Care is developed from the Nursing assessment and Emergency Department Attending provider initial evaluation. The plan of care may be reviewed in the ED Provider note.     The Plan of Care was developed with the following considerations:   Patient / Family readiness to learn indicated by:verbalized understanding  Persons(s) to be included in education: patient  Barriers to Learning/Limitations:No    Signed         Viki Hunter RN  05/13/23 0311

## 2023-05-15 ENCOUNTER — TELEPHONE (OUTPATIENT)
Age: 88
End: 2023-05-15

## 2023-05-15 ENCOUNTER — NURSE TRIAGE (OUTPATIENT)
Dept: OTHER | Facility: CLINIC | Age: 88
End: 2023-05-15

## 2023-05-15 DIAGNOSIS — R14.1 ABDOMINAL GAS PAIN: ICD-10-CM

## 2023-05-15 DIAGNOSIS — R13.19 OTHER DYSPHAGIA: ICD-10-CM

## 2023-05-15 DIAGNOSIS — K30 PERSISTENT INDIGESTION: Primary | ICD-10-CM

## 2023-05-15 LAB
EKG ATRIAL RATE: 64 BPM
EKG DIAGNOSIS: NORMAL
EKG P AXIS: 92 DEGREES
EKG P-R INTERVAL: 196 MS
EKG Q-T INTERVAL: 438 MS
EKG QRS DURATION: 132 MS
EKG QTC CALCULATION (BAZETT): 451 MS
EKG R AXIS: -18 DEGREES
EKG T AXIS: 48 DEGREES
EKG VENTRICULAR RATE: 64 BPM

## 2023-05-16 ENCOUNTER — OFFICE VISIT (OUTPATIENT)
Age: 88
End: 2023-05-16
Payer: MEDICARE

## 2023-05-16 ENCOUNTER — HOME CARE VISIT (OUTPATIENT)
Dept: HOME HEALTH SERVICES | Facility: HOME HEALTH | Age: 88
End: 2023-05-16
Payer: MEDICARE

## 2023-05-16 VITALS
DIASTOLIC BLOOD PRESSURE: 69 MMHG | OXYGEN SATURATION: 96 % | BODY MASS INDEX: 36.63 KG/M2 | RESPIRATION RATE: 16 BRPM | WEIGHT: 157.6 LBS | SYSTOLIC BLOOD PRESSURE: 114 MMHG | TEMPERATURE: 97.6 F | HEART RATE: 60 BPM

## 2023-05-16 DIAGNOSIS — K30 PERSISTENT INDIGESTION: ICD-10-CM

## 2023-05-16 DIAGNOSIS — Z79.899 ENCOUNTER FOR LONG-TERM (CURRENT) USE OF MEDICATIONS: ICD-10-CM

## 2023-05-16 DIAGNOSIS — K21.9 GASTRO-ESOPHAGEAL REFLUX DISEASE WITHOUT ESOPHAGITIS: ICD-10-CM

## 2023-05-16 DIAGNOSIS — R14.1 ABDOMINAL GAS PAIN: Primary | ICD-10-CM

## 2023-05-16 DIAGNOSIS — R13.19 OTHER DYSPHAGIA: ICD-10-CM

## 2023-05-16 PROCEDURE — 3078F DIAST BP <80 MM HG: CPT | Performed by: FAMILY MEDICINE

## 2023-05-16 PROCEDURE — 1036F TOBACCO NON-USER: CPT | Performed by: FAMILY MEDICINE

## 2023-05-16 PROCEDURE — 99213 OFFICE O/P EST LOW 20 MIN: CPT | Performed by: FAMILY MEDICINE

## 2023-05-16 PROCEDURE — 1123F ACP DISCUSS/DSCN MKR DOCD: CPT | Performed by: FAMILY MEDICINE

## 2023-05-16 PROCEDURE — G8400 PT W/DXA NO RESULTS DOC: HCPCS | Performed by: FAMILY MEDICINE

## 2023-05-16 PROCEDURE — G8427 DOCREV CUR MEDS BY ELIG CLIN: HCPCS | Performed by: FAMILY MEDICINE

## 2023-05-16 PROCEDURE — G8417 CALC BMI ABV UP PARAM F/U: HCPCS | Performed by: FAMILY MEDICINE

## 2023-05-16 PROCEDURE — 1090F PRES/ABSN URINE INCON ASSESS: CPT | Performed by: FAMILY MEDICINE

## 2023-05-16 PROCEDURE — 3074F SYST BP LT 130 MM HG: CPT | Performed by: FAMILY MEDICINE

## 2023-05-16 RX ORDER — FAMOTIDINE 20 MG/1
20 TABLET, FILM COATED ORAL
Qty: 60 TABLET | Refills: 0 | COMMUNITY
Start: 2023-05-16 | End: 2023-05-16 | Stop reason: SDUPTHER

## 2023-05-16 RX ORDER — FAMOTIDINE 20 MG/1
20 TABLET, FILM COATED ORAL
Qty: 60 TABLET | Refills: 1 | Status: SHIPPED | OUTPATIENT
Start: 2023-05-16

## 2023-05-16 RX ORDER — AMMONIUM LACTATE 12 G/100G
LOTION TOPICAL
COMMUNITY
Start: 2023-02-24

## 2023-05-16 RX ORDER — KETOCONAZOLE 20 MG/G
CREAM TOPICAL
COMMUNITY

## 2023-05-16 RX ORDER — FAMOTIDINE 20 MG/1
TABLET, FILM COATED ORAL
COMMUNITY
Start: 2023-05-03 | End: 2023-05-16 | Stop reason: SDUPTHER

## 2023-05-16 RX ORDER — LIDOCAINE HYDROCHLORIDE 20 MG/ML
SOLUTION OROPHARYNGEAL
COMMUNITY
Start: 2023-05-03

## 2023-05-16 RX ORDER — DICYCLOMINE HYDROCHLORIDE 10 MG/1
10 CAPSULE ORAL
Qty: 120 CAPSULE | Refills: 1 | Status: SHIPPED | OUTPATIENT
Start: 2023-05-16

## 2023-05-16 RX ORDER — DESONIDE 0.5 MG/ML
LOTION TOPICAL
COMMUNITY

## 2023-05-16 RX ORDER — GUAIFENESIN 1200 MG/1
TABLET, EXTENDED RELEASE ORAL
COMMUNITY

## 2023-05-16 ASSESSMENT — ENCOUNTER SYMPTOMS
RHINORRHEA: 0
SHORTNESS OF BREATH: 0
BLOOD IN STOOL: 0
ABDOMINAL PAIN: 0
CHEST TIGHTNESS: 0
COUGH: 0
CONSTIPATION: 0

## 2023-05-16 NOTE — ED PROVIDER NOTES
interpretive errors are inadvertently transcribed by the computer software. Please disregard these errors. Additionally, please excuse any errors that have escaped final proofreading.           Kel Daniels MD  05/16/23 0853

## 2023-05-16 NOTE — PROGRESS NOTES
Chief Complaint   Patient presents with    Gas     After eating     Follow-up     [facility] ER 23     Vitals:    23 1229   BP: 114/69   Pulse: 60   Resp: 16   Temp: 97.6 °F (36.4 °C)   TempSrc: Oral   SpO2: 96%   Weight: 157 lb 9.6 oz (71.5 kg)      1. Have you been to the ER, urgent care clinic since your last visit? Hospitalized since your last visit? Yes [facility] 23    2. Have you seen or consulted any other health care providers outside of the [address] [place] [address] since your last visit? Include any pap smears or colon screening. no    The patient, Claire Buckner, identity was verified by  Name and .
chocolate and other food triggers, alcohol and tobacco. .        Encounter for long-term (current) use of medications      No follow-ups on file. reviewed medications and side effects in detail           I have discussed diagnosis listed in this note with pt and/or family. I have discussed treatment plans and options and the risk/benefit analysis of those options, including safe use of medications and possible medication side effects. Through the use of shared decision making we have agreed to the above plan. The patient has received an after-visit summary and questions were answered concerning future plans and follow up. Advise pt of any urgent changes then to proceed to the ER.             Stephani Gant MD

## 2023-05-22 ENCOUNTER — TELEPHONE (OUTPATIENT)
Age: 88
End: 2023-05-22

## 2023-05-22 NOTE — TELEPHONE ENCOUNTER
Patient would like a call from nurse regarding having severe gas what can she take her call back number is [phone]

## 2023-05-22 NOTE — TELEPHONE ENCOUNTER
Pt state she is taking the dicyclomine and the famotidine, been to the ER twice, barely eating anything and have seen Dr. Eleonora Mccain. She state she is still having severe gas in her chest.  She wants to know what can she do to get relief. I advised her to drink something hot or warm and she stated she did that. [phone].

## 2023-05-23 ENCOUNTER — HOME CARE VISIT (OUTPATIENT)
Dept: SCHEDULING | Facility: HOME HEALTH | Age: 88
End: 2023-05-23
Payer: MEDICARE

## 2023-05-23 PROCEDURE — G0299 HHS/HOSPICE OF RN EA 15 MIN: HCPCS

## 2023-05-23 NOTE — TELEPHONE ENCOUNTER
Pt called. She is able to swallow her foods. Still has indigestion with eating. Add Gas X 4 times daily. Hold bentyl since it has not been helping.

## 2023-05-28 VITALS
RESPIRATION RATE: 12 BRPM | TEMPERATURE: 98.3 F | SYSTOLIC BLOOD PRESSURE: 108 MMHG | OXYGEN SATURATION: 96 % | HEART RATE: 56 BPM | DIASTOLIC BLOOD PRESSURE: 56 MMHG

## 2023-05-30 ENCOUNTER — TELEPHONE (OUTPATIENT)
Age: 88
End: 2023-05-30

## 2023-05-30 NOTE — TELEPHONE ENCOUNTER
----- Message from Alexandr Portillo sent at 5/30/2023  8:18 AM EDT -----  Subject: Message to Provider    QUESTIONS  Information for Provider? patient called to say she needs to speak to PCP   about potassium, please contact patient. Patient stated its urgent, would   like liquid form of potassium, as pills as to hard to swallow. ---------------------------------------------------------------------------  --------------  Betty Hernandez [facility]  [phone]; OK to leave message on voicemail  ---------------------------------------------------------------------------  --------------  SCRIPT ANSWERS  Relationship to Patient?  Self

## 2023-05-31 RX ORDER — POTASSIUM CHLORIDE 20 MEQ/15ML
30 SOLUTION ORAL 2 TIMES DAILY
Qty: 3800 ML | Refills: 1 | Status: SHIPPED | OUTPATIENT
Start: 2023-05-31

## 2023-06-01 ENCOUNTER — HOME CARE VISIT (OUTPATIENT)
Dept: SCHEDULING | Facility: HOME HEALTH | Age: 88
End: 2023-06-01
Payer: MEDICARE

## 2023-06-01 VITALS
TEMPERATURE: 98.1 F | SYSTOLIC BLOOD PRESSURE: 140 MMHG | HEART RATE: 62 BPM | RESPIRATION RATE: 16 BRPM | DIASTOLIC BLOOD PRESSURE: 82 MMHG | OXYGEN SATURATION: 96 %

## 2023-06-01 PROCEDURE — G0300 HHS/HOSPICE OF LPN EA 15 MIN: HCPCS

## 2023-06-06 ENCOUNTER — HOME CARE VISIT (OUTPATIENT)
Dept: SCHEDULING | Facility: HOME HEALTH | Age: 88
End: 2023-06-06
Payer: MEDICARE

## 2023-06-06 ENCOUNTER — TELEPHONE (OUTPATIENT)
Age: 88
End: 2023-06-06

## 2023-06-06 PROCEDURE — G0299 HHS/HOSPICE OF RN EA 15 MIN: HCPCS

## 2023-06-06 NOTE — TELEPHONE ENCOUNTER
Pt stated she is having HA's since starting the liquid potassium. She did not take it today. The HA's started yesterday. Yesterday she stated she only ate a slice of bread. I explained to that her head may be hurting because she is not eating. I advised the pt to eat a light meal 2-3 times today and to drink plenty of fluids so she does not go into dehydration and see if she feels any better. I advised her to take the potassium also and if the HA's continue to call us back.

## 2023-06-06 NOTE — TELEPHONE ENCOUNTER
Patient would like a call from [address] [place] [address] regarding taking her liquid potassium is causing a severe headache she can be reached @ [phone]

## 2023-06-06 NOTE — TELEPHONE ENCOUNTER
Nancy with [address] [facility] is checking status on order faxed on 6/1 please give her a call @ [phone]

## 2023-06-10 VITALS
RESPIRATION RATE: 20 BRPM | SYSTOLIC BLOOD PRESSURE: 106 MMHG | DIASTOLIC BLOOD PRESSURE: 58 MMHG | OXYGEN SATURATION: 97 % | TEMPERATURE: 98.2 F | HEART RATE: 58 BPM

## 2023-06-18 ENCOUNTER — HOSPITAL ENCOUNTER (EMERGENCY)
Facility: HOSPITAL | Age: 88
Discharge: HOME | End: 2023-06-18
Payer: MEDICARE

## 2023-06-18 ENCOUNTER — APPOINTMENT (OUTPATIENT)
Facility: HOSPITAL | Age: 88
End: 2023-06-18
Payer: MEDICARE

## 2023-06-18 VITALS
SYSTOLIC BLOOD PRESSURE: 125 MMHG | HEART RATE: 62 BPM | OXYGEN SATURATION: 99 % | DIASTOLIC BLOOD PRESSURE: 65 MMHG | RESPIRATION RATE: 16 BRPM

## 2023-06-18 DIAGNOSIS — I50.9 CHRONIC CONGESTIVE HEART FAILURE, UNSPECIFIED HEART FAILURE TYPE (HCC): ICD-10-CM

## 2023-06-18 DIAGNOSIS — R10.13 DYSPEPSIA: ICD-10-CM

## 2023-06-18 DIAGNOSIS — R07.9 CHEST PAIN, UNSPECIFIED TYPE: Primary | ICD-10-CM

## 2023-06-18 LAB
ALBUMIN SERPL-MCNC: 3.3 G/DL (ref 3.5–5)
ALBUMIN/GLOB SERPL: 0.7 (ref 1.1–2.2)
ALP SERPL-CCNC: 115 U/L (ref 45–117)
ALT SERPL-CCNC: 19 U/L (ref 12–78)
ANION GAP SERPL CALC-SCNC: 9 MMOL/L (ref 5–15)
AST SERPL-CCNC: 18 U/L (ref 15–37)
BASOPHILS # BLD: 0 K/UL (ref 0–0.1)
BASOPHILS NFR BLD: 1 % (ref 0–1)
BILIRUB SERPL-MCNC: 0.4 MG/DL (ref 0.2–1)
BUN SERPL-MCNC: 8 MG/DL (ref 6–20)
BUN/CREAT SERPL: 7 (ref 12–20)
CALCIUM SERPL-MCNC: 9 MG/DL (ref 8.5–10.1)
CHLORIDE SERPL-SCNC: 103 MMOL/L (ref 97–108)
CO2 SERPL-SCNC: 27 MMOL/L (ref 21–32)
CREAT SERPL-MCNC: 1.17 MG/DL (ref 0.55–1.02)
DIFFERENTIAL METHOD BLD: NORMAL
EOSINOPHIL # BLD: 0.4 K/UL (ref 0–0.4)
EOSINOPHIL NFR BLD: 6 % (ref 0–7)
ERYTHROCYTE [DISTWIDTH] IN BLOOD BY AUTOMATED COUNT: 13.8 % (ref 11.5–14.5)
GLOBULIN SER CALC-MCNC: 4.5 G/DL (ref 2–4)
GLUCOSE SERPL-MCNC: 90 MG/DL (ref 65–100)
HCT VFR BLD AUTO: 40.1 % (ref 35–47)
HGB BLD-MCNC: 12.9 G/DL (ref 11.5–16)
IMM GRANULOCYTES # BLD AUTO: 0 K/UL (ref 0–0.04)
IMM GRANULOCYTES NFR BLD AUTO: 0 % (ref 0–0.5)
LIPASE SERPL-CCNC: 144 U/L (ref 73–393)
LYMPHOCYTES # BLD: 1.8 K/UL (ref 0.8–3.5)
LYMPHOCYTES NFR BLD: 32 % (ref 12–49)
MCH RBC QN AUTO: 28.1 PG (ref 26–34)
MCHC RBC AUTO-ENTMCNC: 32.2 G/DL (ref 30–36.5)
MCV RBC AUTO: 87.4 FL (ref 80–99)
MONOCYTES # BLD: 0.5 K/UL (ref 0–1)
MONOCYTES NFR BLD: 10 % (ref 5–13)
NEUTS SEG # BLD: 2.8 K/UL (ref 1.8–8)
NEUTS SEG NFR BLD: 51 % (ref 32–75)
NRBC # BLD: 0 K/UL (ref 0–0.01)
NRBC BLD-RTO: 0 PER 100 WBC
NT PRO BNP: 180 PG/ML (ref 0–450)
PLATELET # BLD AUTO: 197 K/UL (ref 150–400)
PMV BLD AUTO: 11 FL (ref 8.9–12.9)
POTASSIUM SERPL-SCNC: 3.7 MMOL/L (ref 3.5–5.1)
PROT SERPL-MCNC: 7.8 G/DL (ref 6.4–8.2)
RBC # BLD AUTO: 4.59 M/UL (ref 3.8–5.2)
SODIUM SERPL-SCNC: 139 MMOL/L (ref 136–145)
TROPONIN I SERPL HS-MCNC: 10 NG/L (ref 0–51)
TROPONIN I SERPL HS-MCNC: 11 NG/L (ref 0–51)
WBC # BLD AUTO: 5.5 K/UL (ref 3.6–11)

## 2023-06-18 PROCEDURE — 80053 COMPREHEN METABOLIC PANEL: CPT

## 2023-06-18 PROCEDURE — 99285 EMERGENCY DEPT VISIT HI MDM: CPT

## 2023-06-18 PROCEDURE — 84484 ASSAY OF TROPONIN QUANT: CPT

## 2023-06-18 PROCEDURE — 83880 ASSAY OF NATRIURETIC PEPTIDE: CPT

## 2023-06-18 PROCEDURE — 96374 THER/PROPH/DIAG INJ IV PUSH: CPT

## 2023-06-18 PROCEDURE — 2500000003 HC RX 250 WO HCPCS: Performed by: NURSE PRACTITIONER

## 2023-06-18 PROCEDURE — 83690 ASSAY OF LIPASE: CPT

## 2023-06-18 PROCEDURE — 93005 ELECTROCARDIOGRAM TRACING: CPT | Performed by: EMERGENCY MEDICINE

## 2023-06-18 PROCEDURE — A4216 STERILE WATER/SALINE, 10 ML: HCPCS | Performed by: NURSE PRACTITIONER

## 2023-06-18 PROCEDURE — 85025 COMPLETE CBC W/AUTO DIFF WBC: CPT

## 2023-06-18 PROCEDURE — 96375 TX/PRO/DX INJ NEW DRUG ADDON: CPT

## 2023-06-18 PROCEDURE — 2580000003 HC RX 258: Performed by: NURSE PRACTITIONER

## 2023-06-18 PROCEDURE — 71045 X-RAY EXAM CHEST 1 VIEW: CPT

## 2023-06-18 PROCEDURE — 6370000000 HC RX 637 (ALT 250 FOR IP): Performed by: NURSE PRACTITIONER

## 2023-06-18 PROCEDURE — 36415 COLL VENOUS BLD VENIPUNCTURE: CPT

## 2023-06-18 RX ORDER — ASPIRIN 81 MG/1
324 TABLET, CHEWABLE ORAL
Status: COMPLETED | OUTPATIENT
Start: 2023-06-18 | End: 2023-06-18

## 2023-06-18 RX ADMIN — ASPIRIN 81 MG 324 MG: 81 TABLET ORAL at 10:53

## 2023-06-18 RX ADMIN — FAMOTIDINE 20 MG: 10 INJECTION, SOLUTION INTRAVENOUS at 10:53

## 2023-06-18 RX ADMIN — ALUMINUM HYDROXIDE, MAGNESIUM HYDROXIDE, AND SIMETHICONE 40 ML: 200; 200; 20 SUSPENSION ORAL at 10:53

## 2023-06-18 ASSESSMENT — PAIN DESCRIPTION - PAIN TYPE: TYPE: ACUTE PAIN

## 2023-06-18 ASSESSMENT — PAIN DESCRIPTION - FREQUENCY: FREQUENCY: CONTINUOUS

## 2023-06-18 ASSESSMENT — PAIN - FUNCTIONAL ASSESSMENT
PAIN_FUNCTIONAL_ASSESSMENT: 0-10
PAIN_FUNCTIONAL_ASSESSMENT: ACTIVITIES ARE NOT PREVENTED

## 2023-06-18 ASSESSMENT — PAIN DESCRIPTION - DESCRIPTORS: DESCRIPTORS: ACHING;DISCOMFORT

## 2023-06-18 ASSESSMENT — PAIN SCALES - GENERAL: PAINLEVEL_OUTOF10: 8

## 2023-06-18 ASSESSMENT — PAIN DESCRIPTION - LOCATION: LOCATION: CHEST

## 2023-06-19 LAB
EKG ATRIAL RATE: 61 BPM
EKG DIAGNOSIS: NORMAL
EKG P AXIS: 73 DEGREES
EKG P-R INTERVAL: 196 MS
EKG Q-T INTERVAL: 436 MS
EKG QRS DURATION: 150 MS
EKG QTC CALCULATION (BAZETT): 438 MS
EKG R AXIS: 3 DEGREES
EKG T AXIS: 42 DEGREES
EKG VENTRICULAR RATE: 61 BPM

## 2023-06-19 PROCEDURE — 93010 ELECTROCARDIOGRAM REPORT: CPT | Performed by: SPECIALIST

## 2023-06-20 ENCOUNTER — HOME CARE VISIT (OUTPATIENT)
Dept: SCHEDULING | Facility: HOME HEALTH | Age: 88
End: 2023-06-20
Payer: MEDICARE

## 2023-06-20 PROCEDURE — G0299 HHS/HOSPICE OF RN EA 15 MIN: HCPCS

## 2023-06-21 ENCOUNTER — OFFICE VISIT (OUTPATIENT)
Age: 88
End: 2023-06-21
Payer: MEDICARE

## 2023-06-21 VITALS
OXYGEN SATURATION: 94 % | TEMPERATURE: 98.4 F | RESPIRATION RATE: 14 BRPM | HEART RATE: 65 BPM | BODY MASS INDEX: 36.03 KG/M2 | SYSTOLIC BLOOD PRESSURE: 110 MMHG | DIASTOLIC BLOOD PRESSURE: 68 MMHG | WEIGHT: 155 LBS

## 2023-06-21 VITALS
RESPIRATION RATE: 16 BRPM | OXYGEN SATURATION: 95 % | SYSTOLIC BLOOD PRESSURE: 108 MMHG | DIASTOLIC BLOOD PRESSURE: 58 MMHG | HEART RATE: 62 BPM | TEMPERATURE: 97.5 F

## 2023-06-21 DIAGNOSIS — J96.11 CHRONIC RESPIRATORY FAILURE WITH HYPOXIA (HCC): ICD-10-CM

## 2023-06-21 DIAGNOSIS — R06.02 SHORTNESS OF BREATH: ICD-10-CM

## 2023-06-21 DIAGNOSIS — Z79.899 ENCOUNTER FOR LONG-TERM (CURRENT) USE OF MEDICATIONS: ICD-10-CM

## 2023-06-21 DIAGNOSIS — K21.9 GASTRO-ESOPHAGEAL REFLUX DISEASE WITHOUT ESOPHAGITIS: ICD-10-CM

## 2023-06-21 DIAGNOSIS — I10 ESSENTIAL (PRIMARY) HYPERTENSION: ICD-10-CM

## 2023-06-21 DIAGNOSIS — E78.2 MIXED HYPERLIPIDEMIA: ICD-10-CM

## 2023-06-21 DIAGNOSIS — I25.10 ATHEROSCLEROSIS OF NATIVE CORONARY ARTERY OF NATIVE HEART WITHOUT ANGINA PECTORIS: ICD-10-CM

## 2023-06-21 DIAGNOSIS — I50.32 CHRONIC DIASTOLIC (CONGESTIVE) HEART FAILURE (HCC): ICD-10-CM

## 2023-06-21 DIAGNOSIS — C22.1 INTRAHEPATIC CHOLANGIOCARCINOMA (HCC): Primary | ICD-10-CM

## 2023-06-21 DIAGNOSIS — M15.9 PRIMARY OSTEOARTHRITIS INVOLVING MULTIPLE JOINTS: ICD-10-CM

## 2023-06-21 PROCEDURE — 99214 OFFICE O/P EST MOD 30 MIN: CPT | Performed by: FAMILY MEDICINE

## 2023-06-21 PROCEDURE — 1090F PRES/ABSN URINE INCON ASSESS: CPT | Performed by: FAMILY MEDICINE

## 2023-06-21 PROCEDURE — G8427 DOCREV CUR MEDS BY ELIG CLIN: HCPCS | Performed by: FAMILY MEDICINE

## 2023-06-21 PROCEDURE — G8400 PT W/DXA NO RESULTS DOC: HCPCS | Performed by: FAMILY MEDICINE

## 2023-06-21 PROCEDURE — 3074F SYST BP LT 130 MM HG: CPT | Performed by: FAMILY MEDICINE

## 2023-06-21 PROCEDURE — 1123F ACP DISCUSS/DSCN MKR DOCD: CPT | Performed by: FAMILY MEDICINE

## 2023-06-21 PROCEDURE — G8417 CALC BMI ABV UP PARAM F/U: HCPCS | Performed by: FAMILY MEDICINE

## 2023-06-21 PROCEDURE — 3078F DIAST BP <80 MM HG: CPT | Performed by: FAMILY MEDICINE

## 2023-06-21 PROCEDURE — 1036F TOBACCO NON-USER: CPT | Performed by: FAMILY MEDICINE

## 2023-06-21 SDOH — ECONOMIC STABILITY: FOOD INSECURITY: WITHIN THE PAST 12 MONTHS, YOU WORRIED THAT YOUR FOOD WOULD RUN OUT BEFORE YOU GOT MONEY TO BUY MORE.: NEVER TRUE

## 2023-06-21 SDOH — ECONOMIC STABILITY: INCOME INSECURITY: HOW HARD IS IT FOR YOU TO PAY FOR THE VERY BASICS LIKE FOOD, HOUSING, MEDICAL CARE, AND HEATING?: NOT HARD AT ALL

## 2023-06-21 SDOH — ECONOMIC STABILITY: HOUSING INSECURITY
IN THE LAST 12 MONTHS, WAS THERE A TIME WHEN YOU DID NOT HAVE A STEADY PLACE TO SLEEP OR SLEPT IN A SHELTER (INCLUDING NOW)?: NO

## 2023-06-21 SDOH — ECONOMIC STABILITY: FOOD INSECURITY: WITHIN THE PAST 12 MONTHS, THE FOOD YOU BOUGHT JUST DIDN'T LAST AND YOU DIDN'T HAVE MONEY TO GET MORE.: NEVER TRUE

## 2023-06-21 ASSESSMENT — PATIENT HEALTH QUESTIONNAIRE - PHQ9
SUM OF ALL RESPONSES TO PHQ QUESTIONS 1-9: 0
1. LITTLE INTEREST OR PLEASURE IN DOING THINGS: 0
2. FEELING DOWN, DEPRESSED OR HOPELESS: 0
SUM OF ALL RESPONSES TO PHQ QUESTIONS 1-9: 0
SUM OF ALL RESPONSES TO PHQ9 QUESTIONS 1 & 2: 0

## 2023-06-21 ASSESSMENT — ENCOUNTER SYMPTOMS
COUGH: 0
RHINORRHEA: 0
BLOOD IN STOOL: 0
CONSTIPATION: 0
NAUSEA: 0
VOMITING: 0
ABDOMINAL PAIN: 1
SHORTNESS OF BREATH: 0
CHEST TIGHTNESS: 0

## 2023-06-21 NOTE — PROGRESS NOTES
Chief Complaint   Patient presents with    Follow-up     [facility] 2023  Dx indigestion      Vitals:    23 0934   BP: 110/68   Pulse: 65   Resp: 14   Temp: 98.4 °F (36.9 °C)   TempSrc: Oral   SpO2: 94%   Weight: 155 lb (70.3 kg)      1. Have you been to the ER, urgent care clinic since your last visit? Hospitalized since your last visit? Yes [facility] 23    2. Have you seen or consulted any other health care providers outside of the [address] [place] [address] since your last visit? Include any pap smears or colon screening.  No    The patient, Fidel Macias, identity was verified by  Name and

## 2023-06-27 ENCOUNTER — HOSPICE ADMISSION (OUTPATIENT)
Age: 88
End: 2023-06-27
Payer: MEDICARE

## 2023-06-28 ENCOUNTER — TELEPHONE (OUTPATIENT)
Age: 88
End: 2023-06-28

## 2023-06-28 ENCOUNTER — HOME CARE VISIT (OUTPATIENT)
Facility: HOME HEALTH | Age: 88
End: 2023-06-28
Payer: MEDICARE

## 2023-06-28 ENCOUNTER — HOME CARE VISIT (OUTPATIENT)
Age: 88
End: 2023-06-28
Payer: MEDICARE

## 2023-06-28 VITALS
HEART RATE: 70 BPM | RESPIRATION RATE: 18 BRPM | SYSTOLIC BLOOD PRESSURE: 110 MMHG | DIASTOLIC BLOOD PRESSURE: 66 MMHG | OXYGEN SATURATION: 95 %

## 2023-06-28 PROCEDURE — 0651 HSPC ROUTINE HOME CARE

## 2023-06-28 PROCEDURE — 2500000001 HSPC NON INJECTABLE MED

## 2023-06-28 PROCEDURE — G0299 HHS/HOSPICE OF RN EA 15 MIN: HCPCS

## 2023-06-28 ASSESSMENT — ENCOUNTER SYMPTOMS
PAIN LOCATION - PAIN QUALITY: SORE
INDIGESTION: 1
CONSTIPATION: 1
CRAMPS: 1
NAUSEA: 1
ABDOMINAL PAIN: 1

## 2023-06-29 ENCOUNTER — HOME CARE VISIT (OUTPATIENT)
Age: 88
End: 2023-06-29
Payer: MEDICARE

## 2023-06-29 ENCOUNTER — HOME CARE VISIT (OUTPATIENT)
Facility: HOME HEALTH | Age: 88
End: 2023-06-29
Payer: MEDICARE

## 2023-06-29 VITALS
RESPIRATION RATE: 14 BRPM | HEART RATE: 79 BPM | SYSTOLIC BLOOD PRESSURE: 124 MMHG | OXYGEN SATURATION: 96 % | DIASTOLIC BLOOD PRESSURE: 60 MMHG

## 2023-06-29 PROCEDURE — G0299 HHS/HOSPICE OF RN EA 15 MIN: HCPCS

## 2023-06-29 PROCEDURE — G0155 HHCP-SVS OF CSW,EA 15 MIN: HCPCS

## 2023-06-29 PROCEDURE — 0651 HSPC ROUTINE HOME CARE

## 2023-06-29 ASSESSMENT — ENCOUNTER SYMPTOMS
PAIN LOCATION - PAIN QUALITY: ACHE
ABDOMINAL PAIN: 1

## 2023-06-30 PROCEDURE — 2500000001 HSPC NON INJECTABLE MED

## 2023-06-30 PROCEDURE — 0651 HSPC ROUTINE HOME CARE

## 2023-07-01 ENCOUNTER — HOME CARE VISIT (OUTPATIENT)
Age: 88
End: 2023-07-01
Payer: MEDICARE

## 2023-07-01 VITALS
RESPIRATION RATE: 18 BRPM | HEART RATE: 67 BPM | OXYGEN SATURATION: 94 % | SYSTOLIC BLOOD PRESSURE: 128 MMHG | DIASTOLIC BLOOD PRESSURE: 74 MMHG

## 2023-07-01 PROCEDURE — G0299 HHS/HOSPICE OF RN EA 15 MIN: HCPCS

## 2023-07-01 PROCEDURE — 0651 HSPC ROUTINE HOME CARE

## 2023-07-02 ENCOUNTER — HOME CARE VISIT (OUTPATIENT)
Age: 88
End: 2023-07-02
Payer: MEDICARE

## 2023-07-02 PROCEDURE — 0651 HSPC ROUTINE HOME CARE

## 2023-07-03 ENCOUNTER — HOME HEALTH/ HOSPICE FACE TO FACE (OUTPATIENT)
Facility: HOSPITAL | Age: 88
End: 2023-07-03

## 2023-07-03 ENCOUNTER — HOME CARE VISIT (OUTPATIENT)
Facility: HOME HEALTH | Age: 88
End: 2023-07-03
Payer: MEDICARE

## 2023-07-03 VITALS
HEART RATE: 83 BPM | OXYGEN SATURATION: 97 % | SYSTOLIC BLOOD PRESSURE: 120 MMHG | RESPIRATION RATE: 14 BRPM | DIASTOLIC BLOOD PRESSURE: 62 MMHG

## 2023-07-03 PROCEDURE — G0299 HHS/HOSPICE OF RN EA 15 MIN: HCPCS

## 2023-07-03 RX ADMIN — OXYCODONE HYDROCHLORIDE 2.5 MG: 5 TABLET ORAL at 13:40

## 2023-07-03 ASSESSMENT — ENCOUNTER SYMPTOMS
PAIN LOCATION - PAIN QUALITY: ACHE
ABDOMINAL PAIN: 1

## 2023-07-03 NOTE — HOSPICE
Hospice RN and NP Angelica Aviles visit with patient. Patient states her  is asleep in the other room, grandson and another person present in home but not in visit. Patient seated on sofa in living room, alert x 3, reports 81/0 abdominal pain. Team assisted patient to take oxycodone and recommended to use every 4 hours as needed, discussed better pain control, as patient states she doesn't like to take pain medicine but tries to endure it. Last BM yesterday, poor appetite with many GI symptoms of acid reflux and gas. NP reviewed meds with patient, use of Pepcid and Bentyl. Also reviewed anxiety meds, new recommendation to take Klonopin 3x/day. +3 edema to lower extremities, patient wearing compression stockings. Patient reports arthritis pain in knees, has tried Voltaren in the past with little effect. Patient declines hospital bed for now although she says she needs at least 2 pillows to help her reflux at night. Nicki has family member who is a  however she would like hospice chaplain to visit as well. RN sent email to  Titus Mcclure to notify. RN will order needed med refills - Klonopin, Bentyl, Pepcid and Imdur. No supplies needed today. Family understand to call hospice with any needs.

## 2023-07-05 NOTE — HOSPICE
No spiritual care issues noted on admission, nor has the family or patient reached out for spiritual support. Will contact family the week of 07/03/23 to offer support and assess spiritual care needs. Patient declines  care.

## 2023-07-05 NOTE — HOSPICE
Phil Mari to meet new hospice patient with [facility]. Patient has been diagnosed with intrahepatic cholangiocarcinoma. She has indigestion all the time and GI had not more suggestions to try other treatments and told her it was related to her cancer progression. She also has abdominal pain radiating to the right and around to the back. Her cancer is inoperable and she opted not to pursue palliative treatment. She also has a hiatal hernia causing symptoms. Other PMH includes HTN, HLD, HFpEF, AS, anxiety, MDD, chronic pain, O.A. (right knee in particular), spinal stenosis, biliary dyskinesia,  CAD. Patient continues to c/o almost constant heartburn and abdominal pain. She has not been taking her pain medication for it. We discussed that her symptoms are related to her cancer and progression and that opioids are the most likely treatment to benefit her. She agreed to a dose of medication during our visit today. She is tearful and struggling with having a terminal illness and concerns about leaving her family. O.   Gen:  elderly female patient seated on couch in NAD. Tearful at times. Eyes:  anicteric, conjunctiva pink  HENT:  oral mucosa moist without rashes or lesions  Neck:  No JVD or LAD  Heart:  RRR, S1S2, 3/6 SHYANN, no edema  Lungs:  No increased wob, CTA A&P  Abd:  generally TTP, especially right side. No distention, BS normoactive. Skin:  W&D  Neuro:  A&O X 3  Psyc:  tearful at times, good historian, appropriate mood and affect     A/P   Intrahepatic cholangiocarcinoma. Gave patient dose of oxycodone during visit and encouraged her to take as needed. CM will follow response and call for dose adjustments as needed. Continue hospice care. Grief reaction to terminal illness. Patient would like to speak to our felix and CM will contact him. Provided listening and support.

## 2023-07-06 ENCOUNTER — HOME CARE VISIT (OUTPATIENT)
Age: 88
End: 2023-07-06
Payer: MEDICARE

## 2023-07-06 VITALS
HEART RATE: 88 BPM | OXYGEN SATURATION: 96 % | DIASTOLIC BLOOD PRESSURE: 90 MMHG | RESPIRATION RATE: 16 BRPM | SYSTOLIC BLOOD PRESSURE: 150 MMHG

## 2023-07-06 PROCEDURE — G0299 HHS/HOSPICE OF RN EA 15 MIN: HCPCS

## 2023-07-06 ASSESSMENT — ENCOUNTER SYMPTOMS
CONSTIPATION: 1
ABDOMINAL PAIN: 1
PAIN LOCATION - PAIN QUALITY: PRESSURE
INDIGESTION: 1

## 2023-07-06 NOTE — HOSPICE
PRN visit after pt called in to report epigastric pain, gas, fullness unrelieved with gas X tablets. On arrival, pt at kitchen table. States she has pain in her mid chest, used gas X with no relief. She has had only a few crackers to eat today. Last BM was Monday, she is using Senna S one tab daily, and has used some home remedies like prune juice and apple sauce for GI relief. Reviewed meds on hand, Pt states she has used a half of any oxy. IR 5mg tablet. Re-educated on dose ordered and encouraged her for comfort to use full dose to see if she has relief. Checked rectal vault, no impaction, but pt agreed to a bisacodyl suppository to see if a BM would help relieve GI fullness/gas. No results at time I left. Reviewed meds. Discussed disease and how it is impacting her symptoms. She nodded understanding. She asked what would happen if she went to the emergency room. I explained she would need to end hospice care if she is admitted to hospital and she nodded she understood. Encouraged to call with any concerns tonight.

## 2023-07-07 ENCOUNTER — HOME CARE VISIT (OUTPATIENT)
Age: 88
End: 2023-07-07
Payer: MEDICARE

## 2023-07-08 ENCOUNTER — HOME CARE VISIT (OUTPATIENT)
Age: 88
End: 2023-07-08
Payer: MEDICARE

## 2023-07-09 ENCOUNTER — HOME CARE VISIT (OUTPATIENT)
Age: 88
End: 2023-07-09
Payer: MEDICARE

## 2023-07-12 ENCOUNTER — HOME CARE VISIT (OUTPATIENT)
Facility: HOME HEALTH | Age: 88
End: 2023-07-12
Payer: MEDICARE

## 2023-07-12 VITALS
HEART RATE: 100 BPM | OXYGEN SATURATION: 96 % | TEMPERATURE: 97.7 F | RESPIRATION RATE: 18 BRPM | DIASTOLIC BLOOD PRESSURE: 80 MMHG | SYSTOLIC BLOOD PRESSURE: 127 MMHG

## 2023-07-12 PROCEDURE — G0300 HHS/HOSPICE OF LPN EA 15 MIN: HCPCS

## 2023-07-12 ASSESSMENT — ENCOUNTER SYMPTOMS
PAIN LOCATION - PAIN QUALITY: ACHE
CONSTIPATION: 1

## 2023-07-12 NOTE — HOSPICE
routine visit. On arrival patient ambulated to door then to sofa with assistance of cane. Patient c/o (L) side pain; however, unable to describe pain. . She stated heating pad helps relieve pain. Patient stated she is eating well. She denies dysuria. Last bm=7/10. She stated she is having trouble moving bowels. She is not using senna as instructed. Advised using senna 2 tabs daily as instructed to see if that helps bowels move. No refills needed. Agrees to call [address] [place] [address] with any concerns.

## 2023-07-13 ENCOUNTER — HOME CARE VISIT (OUTPATIENT)
Age: 88
End: 2023-07-13
Payer: MEDICARE

## 2023-07-13 VITALS
DIASTOLIC BLOOD PRESSURE: 80 MMHG | SYSTOLIC BLOOD PRESSURE: 128 MMHG | TEMPERATURE: 97.6 F | RESPIRATION RATE: 20 BRPM | HEART RATE: 102 BPM | OXYGEN SATURATION: 96 %

## 2023-07-13 PROCEDURE — G0299 HHS/HOSPICE OF RN EA 15 MIN: HCPCS

## 2023-07-13 ASSESSMENT — ENCOUNTER SYMPTOMS
PAIN LOCATION - PAIN QUALITY: ACHE
CONSTIPATION: 1

## 2023-07-13 NOTE — HOSPICE
PRN visit made to administer suppository. Dulcolax suppository inserted, patient tolerated well, no immediate BM. Patient states she is taking her senna tablets. Educated patient to contact hospice if not BM by the morning. Patient verbalized understanding, no further needs at this time.

## 2023-07-14 ENCOUNTER — HOME CARE VISIT (OUTPATIENT)
Facility: HOME HEALTH | Age: 88
End: 2023-07-14
Payer: MEDICARE

## 2023-07-14 VITALS
HEART RATE: 62 BPM | RESPIRATION RATE: 14 BRPM | OXYGEN SATURATION: 98 % | DIASTOLIC BLOOD PRESSURE: 70 MMHG | SYSTOLIC BLOOD PRESSURE: 128 MMHG

## 2023-07-14 PROCEDURE — G0299 HHS/HOSPICE OF RN EA 15 MIN: HCPCS

## 2023-07-14 ASSESSMENT — ENCOUNTER SYMPTOMS
TROUBLE SWALLOWING: 1
PAIN LOCATION - PAIN QUALITY: ACHE

## 2023-07-14 NOTE — HOSPICE
RD consult requested for this 83 y/o F d/t c/o discomfort when eating, inability to find dietary choices that she can tolerate. PMH includes Intrahepatic cholangiocarcinoma (inoperable), GERD, CHF, HTN. Pt estimates ht/wt at 64\", 157#. BMI assessed at 27.0 indicating borderline overweight status. MD has recommended a soft, bland diet d/t her c/o indigestion \"all the time\", swallowing trouble. GI MD relates these complaints to her CA dx. Recent labs obtained 6/18 show abnormal results including Alb-3.3 (mild depletion), Cr-1.17. Nutrition-related medication includes Bentyl, Pepcid, Imdur, Senna, Maalox, Cozaar. Edema reported to BLE but not assessed by RD at this visit. Pt diet recall includes eating 3 meals per day (breakfast/lunch/dinner). . She is able to tolerate foods such as chicken noodle soup, applesauce, Ensure, pudding, turkey lunchmeat, pancakes with jelly. She reports difficulty tolerating foods such as oatmeal, boiled eggs, thicker breads such as sub rolls, and rice. She reports cutting her food up into smaller pieces to improve tolerance. Her intolerance of food items is related to pain in her sternum area, states it feels like \"food gets stuck\". Pt relates this feeling to having a hernia. RD provided education on texture modified diet choices that may be easier to swallow and provide relief when eating. RD also discussed with pt low fat diet options which may be helpful to incorporate as CA progresses. Encouraged continued use of oral nutritional supplements to help meet nutrition needs. Pt verbalized understanding of material discussed. Encouraged pt to contact RD/CM if w/further questions or concerns. CM updated on details of visit.

## 2023-07-14 NOTE — HOSPICE
Hospice RN visit with patient, patient's grandson present in home. Patient seated on sofa, states she is not doing well today. Has not eaten anything and only ate crackers and Ensure for dinner last night. Reports she had a BM this morning after nurse visit to assist with suppository last night. RN reviewed with patient that she seems to have trouble with the suppositories (2 requests to triage for help with this), she may benefit from tablet form of medication instead. Patient agrees, new order from CRYSTAL Mehta for Dulcolax tablets 1-2 daily prn for constipation. Pills ordered from [facility] for delivery tomorrow. Patient reports 8/10 pain in her L side which radiates across her back and into her R side. Patient has not taken pain medication, states she does not like pain meds. RN reviewed with patient that 8/10 pain is too high and she does not deserve to suffer through that. Encouraged patient to take oxycodone tablet for pain. Patient stated her grandson will be fixing her scrambled eggs after visit and she plans to take pain pill with her other medications \"once I have something on my stomach\". RN reviewed patient use of Klonopin. Patient states she always takes med at bedtime to help her sleep. However her daytime doses may be missed b/c she won't take pills without food in her stomach and she is often missing meals due to GI issues. Patient endorses anxiety, RN reviewed use of med and need for anxiety medication due to pain levels, GI upset, dx. etc.    RN discussed with patient possible respite stay to have [facility] staff assess her medications and any chnages that may help her. It is unclear what patient is doing in the home with her meds, possible lack of CG support or plan for future patient decline. Call placed to [phone] to notify. Patient stated she wants \"to think about it\" in regards to respite stay. No med refills or supplies needed this visit.   Patient states understanding to call hospice

## 2023-07-15 ENCOUNTER — HOME CARE VISIT (OUTPATIENT)
Age: 88
End: 2023-07-15
Payer: MEDICARE

## 2023-07-16 ENCOUNTER — HOME CARE VISIT (OUTPATIENT)
Facility: HOME HEALTH | Age: 88
End: 2023-07-16
Payer: MEDICARE

## 2023-07-16 PROCEDURE — G0299 HHS/HOSPICE OF RN EA 15 MIN: HCPCS

## 2023-07-16 ASSESSMENT — ENCOUNTER SYMPTOMS: CONSTIPATION: 1

## 2023-07-17 ENCOUNTER — HOME CARE VISIT (OUTPATIENT)
Age: 88
End: 2023-07-17
Payer: MEDICARE

## 2023-07-17 PROCEDURE — G0155 HHCP-SVS OF CSW,EA 15 MIN: HCPCS

## 2023-07-18 ENCOUNTER — HOME CARE VISIT (OUTPATIENT)
Age: 88
End: 2023-07-18
Payer: MEDICARE

## 2023-07-18 VITALS — OXYGEN SATURATION: 98 % | RESPIRATION RATE: 16 BRPM | HEART RATE: 78 BPM

## 2023-07-18 PROCEDURE — G0299 HHS/HOSPICE OF RN EA 15 MIN: HCPCS

## 2023-07-19 NOTE — HOSPICE
PRN visit related to oxygen concentrator malfunction, per call from patient. Upon arrival, greeeted by Mrs. Lalo Callahan. Pt alert, mildly dyspneic, though O2 sats on room air 96 and RR 16. RN assessed concentrator and questioned pt. Concentrator is functioning properly, but the oxygen tubing was accidentally damaged/severed earlier today by another member of the household. Demonstrated to pt how to replace tubing with spare supplies from bag hanging from oxygen cannister. Placed pt on 2L O2 via concentrator. O2 sats 98. Also showed pt how to attach tubing to O2 cannister and adjust L/min. Per pt, she uses O2 at 2L at night and prn during the day. Explained to pt that [facility] has been contacted and they will bring new tubing supplies, which she should store with oxygen cannister. Pt also said she needs her toenails trimmed and her dtr will take her to the doctor who performed her foot surgeries (for bunions). Explained to pt that hospice can have a podiatrist make a home visit for this; advised her message will be sent to [facility] to coordinate. After visit, RN contacted [facility] [facility] to request oxygen tubing supplies be dropped off Wed, 7/19 instead of tonight. Rep said  would be notified and visit made tomorrow instead.

## 2023-07-20 ENCOUNTER — HOME CARE VISIT (OUTPATIENT)
Age: 88
End: 2023-07-20
Payer: MEDICARE

## 2023-07-20 VITALS
DIASTOLIC BLOOD PRESSURE: 70 MMHG | RESPIRATION RATE: 16 BRPM | SYSTOLIC BLOOD PRESSURE: 132 MMHG | HEART RATE: 77 BPM | OXYGEN SATURATION: 93 %

## 2023-07-20 PROCEDURE — G0299 HHS/HOSPICE OF RN EA 15 MIN: HCPCS

## 2023-07-20 ASSESSMENT — ENCOUNTER SYMPTOMS
PAIN LOCATION - PAIN QUALITY: ACHE
DYSPNEA ACTIVITY LEVEL: AFTER AMBULATING LESS THAN 10 FT

## 2023-07-20 NOTE — HOSPICE
Hospice RN visit with patient, daughter Belen People present in visit. Patient seated at kitchen table, states she slept badly last night. Patient reports sternal pain but is unable to rate it, only saying it is mild and describing it as \"nervousness\". Daughter reports patient took AM meds, including Klonopin. Patient ambulates to sofa using her cane, reports significant pain in knees when she walks but declines use of pain med. RN reviewed need to stay ahead of pain, advised patient to take oxycodone before pain got unbearable. Patient states understanding. Daughter reports patient is only eating crackers, she tried to get her to eat dinner last night and has been pureeing her food for easier swallowing. Patient endorses poor appetite, using GasX frequently. Patient reports dyspnea especially with walking, states her nebulizer helps SOB. Patient does not have nebs on MAR, patient has her own nebulizer machine and states she uses nebs 2-3 times per week. RN obtained order from current box of nebs, call placed to NP Sultana Joyce who approves adding medication to [facility] and patient use q4hrs prn. Daughter and patient have decided on a respite stay (patient declined on Monday). Call placed to [facility] at [facility], no beds available today, patient added to waiting list.  Patricia Gonzalez notified to arrange transport when bed available. RN will order needed med refills - albuterol, senna, pepcid, klonopin, oxycodone and albuterol. Family understand to call hospice with any needs.

## 2023-07-21 ENCOUNTER — HOME CARE VISIT (OUTPATIENT)
Age: 88
End: 2023-07-21
Payer: MEDICARE

## 2023-07-21 VITALS
SYSTOLIC BLOOD PRESSURE: 159 MMHG | OXYGEN SATURATION: 97 % | HEART RATE: 78 BPM | RESPIRATION RATE: 20 BRPM | DIASTOLIC BLOOD PRESSURE: 97 MMHG

## 2023-07-21 PROCEDURE — G0299 HHS/HOSPICE OF RN EA 15 MIN: HCPCS

## 2023-07-21 RX ADMIN — HYDROMORPHONE HYDROCHLORIDE 0.5 MG: 1 SOLUTION ORAL at 15:40

## 2023-07-21 NOTE — HOSPICE
Daphne Eller  1935     [facility] Visit- patient called triage complaining of chest pain after eating. Arrived to home, and patient sitting on couch wearing her oxygen. Patient /97, HR 78, RR 20, Spo2 99% on 3L oxygen via nasal canula, afebrile. Patient complaining of pain upper left chest wall below subclavian. Called William Workman NP to update on patient status. No new orders, just encourage patient to take pain medications. She has been complaining about this pain and it is not related to her heart, but it is cancer related. She needs to take her pain medication to help control the pain. Patient had never tried the liquid Dilaudid. 1535 Administered 0.5mL (0.5mg) of Dilaudid. Sat with patient and educated patient on pain medications. Patient was worried about taking the dilaudid since she had never taken it before. This is why we administered a half dose to see how she would tolerate. Informed patient she would receive a call to check on her in one hour to check on her pain. [facility] is here 24/7 to call if there are any concerns or questions. [address] [place] [facility] to check on patient. He speech is more slow and she states her pain is gone and the liquid medication relieves the pain better. Going forward, either she needs to stay ahead of the pain with her oxycodone 5mg PO Q4HPRN or use the dilaudid for immediate pain relief.

## 2023-07-22 ENCOUNTER — HOME CARE VISIT (OUTPATIENT)
Age: 88
End: 2023-07-22
Payer: MEDICARE

## 2023-07-22 PROCEDURE — G0299 HHS/HOSPICE OF RN EA 15 MIN: HCPCS

## 2023-07-22 ASSESSMENT — ENCOUNTER SYMPTOMS: PAIN LOCATION - PAIN QUALITY: BURNING

## 2023-07-22 NOTE — HOSPICE
Tai Rutherford  1935     Health system Visit - Patient called triage stating she was unable to take her pain medication. She was by herself and there was no one who could help her. Patient received sitting at the kitchen table with her SRK box in front of her. She said she was not able to figure out how to take her medication. The Dilaudid was on top and some medication had spilled into the plastic baggy. After discussing with her how to take the medication she stated she was able to finally open the bottle, but had a hard time closing it. She was not able to pull it up in the syringe. The patient demonstrated this to the me last night. Helped her pull the medication and she then was able to administer it to herself. One of her grandsons came into the house. Discussed pain management with the patient. Repeated the pain management plan five times. She asked the same questions over and over. Concern patient may be progressing. Wrote down her next time for her medications including her pain med. I believe a family meeting needs to happen to discuss who is available to help care for the patient. Grandson left pretty quickly was not able to speak with him. Patient was wearing her oxygen. Reinforced to the patient to call hospice 24/7 for any questions or concerns.

## 2023-07-23 ENCOUNTER — HOME CARE VISIT (OUTPATIENT)
Age: 88
End: 2023-07-23
Payer: MEDICARE

## 2023-07-23 PROBLEM — C22.1 INTRAHEPATIC CHOLANGIOCARCINOMA (HCC): Status: ACTIVE | Noted: 2023-01-01

## 2023-07-23 PROCEDURE — G0155 HHCP-SVS OF CSW,EA 15 MIN: HCPCS

## 2023-07-23 PROCEDURE — G0299 HHS/HOSPICE OF RN EA 15 MIN: HCPCS

## 2023-07-23 ASSESSMENT — ENCOUNTER SYMPTOMS: PAIN LOCATION - PAIN QUALITY: ACHE

## 2023-07-24 NOTE — HOSPICE
SW visited with pt at [facility] bedside on this date. SW set up transportation earlier on this date through hospital to home for pt to receive respite care at [facility] starting 7/23. Inpatient agreement form was completed by ANNA REYNOLDS, Thuy Mendoza, with pt on this date (LOC change from routine home care to respite). Form was sent to records to be filed. Pt was finishing up dinner. Pt appeared alert and oriented. Pt greeted SW and reported she was settling in to [facility] with no concerns. SW provided brief socialization. SW had touched base with pt's daughter, Susan Dewey, earlier on this date to confirm transportation. SW communicated with home Adrianna Khoury, who reported she would set up transportation home when respite stay in complete. No concerns reported pt or [facility] staff rpeorted on this date. SW will continue to support pt and pt's family to access community resources, including respite. SW will continue to be available for emotional support.

## 2023-07-27 ENCOUNTER — HOME CARE VISIT (OUTPATIENT)
Facility: HOME HEALTH | Age: 88
End: 2023-07-27
Payer: MEDICARE

## 2023-07-27 PROCEDURE — G0155 HHCP-SVS OF CSW,EA 15 MIN: HCPCS

## 2023-07-27 NOTE — HOSPICE
MSW met with the patient at [facility] for support. The patient was alert and oriented. MSW assessed the patient for caregiver needs. The patient identified she has two grandsons in the home, Bright Acosta, aged 35 and 21. The patient reported her grandsons  are supported as they assist with daily needs. The patient also identified her daughter and  as another support system. The patient confirmed her daughter lives a few minutes away from the home. The patient is aware that she is scheduled to return home tomorrow. MSW will follow up  at home to continue assessing patient safety and the need for an additional caregiver; MSW offered emotional support and supportive counseling through engagement and socialization.

## 2023-07-28 ENCOUNTER — HOME CARE VISIT (OUTPATIENT)
Facility: HOME HEALTH | Age: 88
End: 2023-07-28
Payer: MEDICARE

## 2023-07-28 VITALS
DIASTOLIC BLOOD PRESSURE: 50 MMHG | OXYGEN SATURATION: 86 % | RESPIRATION RATE: 16 BRPM | SYSTOLIC BLOOD PRESSURE: 98 MMHG | HEART RATE: 102 BPM

## 2023-07-28 PROCEDURE — G0299 HHS/HOSPICE OF RN EA 15 MIN: HCPCS

## 2023-07-28 RX ADMIN — HYDROMORPHONE HYDROCHLORIDE 1 MG: 1 SOLUTION ORAL at 12:25

## 2023-07-28 ASSESSMENT — ENCOUNTER SYMPTOMS
DYSPNEA ACTIVITY LEVEL: AT REST
STOOL DESCRIPTION: LOOSE

## 2023-07-28 NOTE — HOSPICE
Hospice RN visit with patient on return home from respite stay at [facility]. Patient's daughter Lorin Mason and grandson Carissa Noel present in visit. Patient had arrived home about 40-45 minutes before RN arrival.  Lorin Mason met RN outside of home on arrival, asking for a \"nurse to stay with her now\". When questioned, daughter felt that hospice provided senior care caregivers. RN provided education, asked daughter if she had discussed hired CGs with anyone, if patient has Medicaid. Lorin Mason states she does not know if her mother has Medicaid, states understanding that no CG arrangements could be completed today. Call placed to [facility] who will call Lorin Meléndezjose ramonmarychuy regarding Medicaid and CGs. RN went into home to assess patient, found her on the toilet where family somehow managed to get her as she needed to have BM. Patient stated she had diarrhea, in a very weakened state, could not stand. RN informed family that non-emergent FD would need to be called to return patient to bed. Patient's grandson Carissa Noel instead picked patient up and carried her to the bed down the peace. Patient assessed, reports abdominal pain but could not rate, BP at 98/50, O2 sats at 86% (had been off O2 in the bathroom), . RN placed oxygen on patient and administered 1mg dose of Dilaudid. Re-assessed in about 20 minutes, /6-0, sats 96%, HR 87.  States pain is better, starting to fall asleep. RN recommended to patient to stay in bed, advised her she is too weak to walk or get up right now. Patient stated understanding. Call placed to Dr. John Lara to discuss low BPs, new order to stop Imdur daily. RN discussed with daughter the need for hospital bed. Daughter agrees to hospital bed and will make a place for it in living room. Call placed to triage ANNA Shepard who will add bed to current order from [facility] for [facility]. RN reviewed with family how to call FD to move patient when bed delivered.   RN provided instruction to Lorin Meléndezjose ramonmarychuy on

## 2023-07-29 ENCOUNTER — HOME CARE VISIT (OUTPATIENT)
Age: 88
End: 2023-07-29
Payer: MEDICARE

## 2023-07-29 VITALS — SYSTOLIC BLOOD PRESSURE: 125 MMHG | HEART RATE: 78 BPM | RESPIRATION RATE: 18 BRPM | DIASTOLIC BLOOD PRESSURE: 85 MMHG

## 2023-07-29 ASSESSMENT — ENCOUNTER SYMPTOMS: DYSPNEA ACTIVITY LEVEL: AT REST

## 2023-07-30 NOTE — HOSPICE
PRN visit completed at request of CM/Triage. Purpose: To support the family with safe care in the home as they had expressed their concern that they may not be able to manage the care at home. Bony Quan and Swetha's  were present in the bedroom to discuss the offer of a return to the [facility]. Jg Vila was determined to stay home and stated \" I don't want to go back there\". Bony Quan and Swetha's  agreed, and that they wanted Jg Vila to stay home, and they would look after her. Bony Quan confirmed that her sister was arriving Thursday and that would provide more help. The hospital bed was set up in the front room, Jg Vila had declined to move from their bedroom. Hydromorphone had arrived from [facility] was educated on administration / dosage and 'safe keeping' of the medications in the fridge and to maintain the bottles in an upright position to avoid spillage (which had already occurred), Odette Sneed confirmed his understanding. Jg Vila was in good spirits, despite having moderate pain to her abdomen (LUQ), she was waiting for her lunch so that she could take the Oxycodone with food, that Odette Gave was preparing. She remained sitting on the commode as she was comfortable sitting upright and would eat her lunch there (there is no room to have a chair in the bedroom).

## 2023-07-31 NOTE — HOSPICE
Hospice RN visit with patient, patient's spouse and daughter James Pike present in visit. Patient sitting on BSC next to bed, using as chair. Patient able to stand pivot transfer with assistance, denies current pain. Still eating poorly, last BM Saturday. Patient had low BPs last week, now resumed to 150/62, patient has not taken routine meds yet this morning except Klonopin. RN placed call to  [employer], order to resume Imdur for BP. Patient last took Dilaudid at 0930 this morning for breathing issues,  RN also reviewed use of neb treatments with family. Patient wants to start walking again but confirms still feeling very weak. RN reviewed energy conservation measures, stressed to patient not to overdo and frequently rest.  RN will order needed med refills - Imdur, Klonopin, Dilaudid, Senna. No supplies needed. Family understand to call hospice with any needs.

## 2023-08-01 NOTE — HOSPICE
Patient Shonda Minaya passed away this morning at home with family. TOD 9:01a, pronounced by RN after 1 minute auscultation of no breath sounds, no pulse. NP Cathie Shaver notified by phone. Meds wasted per protocol. [id number].  to serve.

## 2023-12-19 NOTE — TELEPHONE ENCOUNTER
Increase Protonix to twice a day. ER also gave her 2 new rx to take. OV on tomorrow to go over her medications.
READ LAST PARAGRAPH.    ----- Message from Maurice Letters sent at 5/15/2023  8:24 AM EDT -----  Subject: Message to Provider    QUESTIONS  Information for Provider? Patient called in states she was seen in the ED   on Friday for Chest pains told her it was gas related but she is unable to   bring it up she would like to have the doctor to give her call they didn't   give her a script Please call  [phone]  [phone]; OK to leave message on voicemail  SCRIPT ANSWERS  Relationship to Patient? Self  -----------------------------------------------------------------------------------------  Pt went to [facility] ED on 5/12/23 and 5/3/23 for chest pains. She stated they did Xrays and gave her a Mylanta cocktail and she still don't seem to be able to digest her food right and can not burp even if she drinks a soda when she need to. She takes the pantoprazole 40 mg 2 x daily. Pt state she has normal BM's and she has no chest pains since leaving the ED.  [phone].
19-Dec-2023 10:13:28

## (undated) DEVICE — FORCEPS BX L160CM DIA8MM GRSP DISECT CUP TIP NONLOCKING ROT

## (undated) DEVICE — SYR 10ML LUER LOK 1/5ML GRAD --

## (undated) DEVICE — BASIN EMSIS 16OZ GRAPHITE PLAS KID SHP MOLD GRAD FOR ORAL

## (undated) DEVICE — KENDALL RADIOLUCENT FOAM MONITORING ELECTRODE RECTANGULAR SHAPE: Brand: KENDALL

## (undated) DEVICE — CATH IV AUTOGRD BC PNK 20GA 25 -- INSYTE

## (undated) DEVICE — BAG SPEC BIOHZRD 10 X 10 IN --

## (undated) DEVICE — NEONATAL-ADULT SPO2 SENSOR: Brand: NELLCOR

## (undated) DEVICE — SET ADMIN 16ML TBNG L100IN 2 Y INJ SITE IV PIGGY BK DISP

## (undated) DEVICE — Device

## (undated) DEVICE — NEEDLE HYPO 18GA L1.5IN PNK S STL HUB POLYPR SHLD REG BVL

## (undated) DEVICE — BLOCK BITE ENDOSCP AD 21 MM W/ DIL BLU LF DISP

## (undated) DEVICE — Z DISCONTINUED PER MEDLINE LINE GAS SAMPLING O2/CO2 LNG AD 13 FT NSL W/ TBNG FILTERLINE

## (undated) DEVICE — TOWEL 4 PLY TISS 19X30 SUE WHT

## (undated) DEVICE — CONTAINER SPEC 20 ML LID NEUT BUFF FORMALIN 10 % POLYPR STS

## (undated) DEVICE — MEDI-VAC YANK SUCT HNDL W/TPRD BULBOUS TIP: Brand: CARDINAL HEALTH

## (undated) DEVICE — SYR 3ML LL TIP 1/10ML GRAD --

## (undated) DEVICE — 1200 GUARD II KIT W/5MM TUBE W/O VAC TUBE: Brand: GUARDIAN

## (undated) DEVICE — SOLIDIFIER MEDC 1200ML -- CONVERT TO 356117